# Patient Record
Sex: FEMALE | Race: OTHER | NOT HISPANIC OR LATINO | URBAN - METROPOLITAN AREA
[De-identification: names, ages, dates, MRNs, and addresses within clinical notes are randomized per-mention and may not be internally consistent; named-entity substitution may affect disease eponyms.]

---

## 2018-09-14 ENCOUNTER — INPATIENT (INPATIENT)
Facility: HOSPITAL | Age: 83
LOS: 48 days | Discharge: HOME | DRG: 4 | End: 2018-11-02
Attending: STUDENT IN AN ORGANIZED HEALTH CARE EDUCATION/TRAINING PROGRAM | Admitting: HOSPITALIST
Payer: MEDICARE

## 2018-09-14 VITALS
OXYGEN SATURATION: 92 % | HEART RATE: 86 BPM | TEMPERATURE: 99 F | DIASTOLIC BLOOD PRESSURE: 63 MMHG | SYSTOLIC BLOOD PRESSURE: 200 MMHG | RESPIRATION RATE: 28 BRPM

## 2018-09-14 DIAGNOSIS — Z95.0 PRESENCE OF CARDIAC PACEMAKER: Chronic | ICD-10-CM

## 2018-09-14 DIAGNOSIS — Z95.2 PRESENCE OF PROSTHETIC HEART VALVE: Chronic | ICD-10-CM

## 2018-09-14 DIAGNOSIS — J96.91 RESPIRATORY FAILURE, UNSPECIFIED WITH HYPOXIA: ICD-10-CM

## 2018-09-14 LAB
ALBUMIN SERPL ELPH-MCNC: 3.5 G/DL — SIGNIFICANT CHANGE UP (ref 3.3–5)
ALBUMIN SERPL ELPH-MCNC: 3.8 G/DL — SIGNIFICANT CHANGE UP (ref 3.3–5)
ALBUMIN SERPL ELPH-MCNC: 3.9 G/DL — SIGNIFICANT CHANGE UP (ref 3.3–5)
ALP SERPL-CCNC: 128 U/L — HIGH (ref 40–120)
ALP SERPL-CCNC: 135 U/L — HIGH (ref 40–120)
ALP SERPL-CCNC: 142 U/L — HIGH (ref 40–120)
ALT FLD-CCNC: 56 U/L — HIGH (ref 10–45)
ALT FLD-CCNC: 58 U/L — HIGH (ref 10–45)
ALT FLD-CCNC: 61 U/L — HIGH (ref 10–45)
ANION GAP SERPL CALC-SCNC: 11 MMOL/L — SIGNIFICANT CHANGE UP (ref 5–17)
ANION GAP SERPL CALC-SCNC: 14 MMOL/L — SIGNIFICANT CHANGE UP (ref 5–17)
ANION GAP SERPL CALC-SCNC: 16 MMOL/L — SIGNIFICANT CHANGE UP (ref 5–17)
APPEARANCE UR: CLEAR — SIGNIFICANT CHANGE UP
APTT BLD: 44.2 SEC — HIGH (ref 27.5–37.4)
APTT BLD: 50.5 SEC — HIGH (ref 27.5–37.4)
AST SERPL-CCNC: 63 U/L — HIGH (ref 10–40)
AST SERPL-CCNC: 74 U/L — HIGH (ref 10–40)
AST SERPL-CCNC: 82 U/L — HIGH (ref 10–40)
BACTERIA # UR AUTO: 0 — SIGNIFICANT CHANGE UP
BASE EXCESS BLDV CALC-SCNC: 0.5 MMOL/L — SIGNIFICANT CHANGE UP (ref -2–2)
BASOPHILS # BLD AUTO: 0 K/UL — SIGNIFICANT CHANGE UP (ref 0–0.2)
BASOPHILS NFR BLD AUTO: 0.1 % — SIGNIFICANT CHANGE UP (ref 0–2)
BILIRUB SERPL-MCNC: 0.8 MG/DL — SIGNIFICANT CHANGE UP (ref 0.2–1.2)
BILIRUB SERPL-MCNC: 1 MG/DL — SIGNIFICANT CHANGE UP (ref 0.2–1.2)
BILIRUB SERPL-MCNC: 1.2 MG/DL — SIGNIFICANT CHANGE UP (ref 0.2–1.2)
BILIRUB UR-MCNC: NEGATIVE — SIGNIFICANT CHANGE UP
BUN SERPL-MCNC: 26 MG/DL — HIGH (ref 7–23)
BUN SERPL-MCNC: 30 MG/DL — HIGH (ref 7–23)
BUN SERPL-MCNC: 33 MG/DL — HIGH (ref 7–23)
CALCIUM SERPL-MCNC: 9 MG/DL — SIGNIFICANT CHANGE UP (ref 8.4–10.5)
CALCIUM SERPL-MCNC: 9.2 MG/DL — SIGNIFICANT CHANGE UP (ref 8.4–10.5)
CALCIUM SERPL-MCNC: 9.6 MG/DL — SIGNIFICANT CHANGE UP (ref 8.4–10.5)
CHLORIDE SERPL-SCNC: 97 MMOL/L — SIGNIFICANT CHANGE UP (ref 96–108)
CHLORIDE SERPL-SCNC: 99 MMOL/L — SIGNIFICANT CHANGE UP (ref 96–108)
CHLORIDE SERPL-SCNC: 99 MMOL/L — SIGNIFICANT CHANGE UP (ref 96–108)
CK MB BLD-MCNC: 2.2 % — SIGNIFICANT CHANGE UP (ref 0–3.5)
CK MB CFR SERPL CALC: 3.3 NG/ML — SIGNIFICANT CHANGE UP (ref 0–3.8)
CK SERPL-CCNC: 152 U/L — SIGNIFICANT CHANGE UP (ref 25–170)
CO2 BLDV-SCNC: 29 MMOL/L — SIGNIFICANT CHANGE UP (ref 22–30)
CO2 SERPL-SCNC: 21 MMOL/L — LOW (ref 22–31)
CO2 SERPL-SCNC: 24 MMOL/L — SIGNIFICANT CHANGE UP (ref 22–31)
CO2 SERPL-SCNC: 25 MMOL/L — SIGNIFICANT CHANGE UP (ref 22–31)
COLOR SPEC: SIGNIFICANT CHANGE UP
CREAT SERPL-MCNC: 1.24 MG/DL — SIGNIFICANT CHANGE UP (ref 0.5–1.3)
CREAT SERPL-MCNC: 1.87 MG/DL — HIGH (ref 0.5–1.3)
CREAT SERPL-MCNC: 2.12 MG/DL — HIGH (ref 0.5–1.3)
DIFF PNL FLD: NEGATIVE — SIGNIFICANT CHANGE UP
EOSINOPHIL # BLD AUTO: 0 K/UL — SIGNIFICANT CHANGE UP (ref 0–0.5)
EOSINOPHIL NFR BLD AUTO: 0.2 % — SIGNIFICANT CHANGE UP (ref 0–6)
EPI CELLS # UR: 1 /HPF — SIGNIFICANT CHANGE UP
GAS PNL BLDA: SIGNIFICANT CHANGE UP
GAS PNL BLDA: SIGNIFICANT CHANGE UP
GAS PNL BLDV: SIGNIFICANT CHANGE UP
GAS PNL BLDV: SIGNIFICANT CHANGE UP
GLUCOSE SERPL-MCNC: 139 MG/DL — HIGH (ref 70–99)
GLUCOSE SERPL-MCNC: 168 MG/DL — HIGH (ref 70–99)
GLUCOSE SERPL-MCNC: 205 MG/DL — HIGH (ref 70–99)
GLUCOSE UR QL: NEGATIVE — SIGNIFICANT CHANGE UP
GRAM STN FLD: SIGNIFICANT CHANGE UP
HCO3 BLDV-SCNC: 27 MMOL/L — SIGNIFICANT CHANGE UP (ref 21–29)
HCT VFR BLD CALC: 34.4 % — LOW (ref 34.5–45)
HCT VFR BLD CALC: 37.9 % — SIGNIFICANT CHANGE UP (ref 34.5–45)
HGB BLD-MCNC: 11.7 G/DL — SIGNIFICANT CHANGE UP (ref 11.5–15.5)
HGB BLD-MCNC: 12.2 G/DL — SIGNIFICANT CHANGE UP (ref 11.5–15.5)
HOROWITZ INDEX BLDV+IHG-RTO: 40 — SIGNIFICANT CHANGE UP
HYALINE CASTS # UR AUTO: 1 /LPF — SIGNIFICANT CHANGE UP (ref 0–2)
INR BLD: 2.9 RATIO — HIGH (ref 0.88–1.16)
INR BLD: 4.37 RATIO — HIGH (ref 0.88–1.16)
KETONES UR-MCNC: NEGATIVE — SIGNIFICANT CHANGE UP
LEUKOCYTE ESTERASE UR-ACNC: NEGATIVE — SIGNIFICANT CHANGE UP
LYMPHOCYTES # BLD AUTO: 0.7 K/UL — LOW (ref 1–3.3)
LYMPHOCYTES # BLD AUTO: 4.7 % — LOW (ref 13–44)
MAGNESIUM SERPL-MCNC: 2 MG/DL — SIGNIFICANT CHANGE UP (ref 1.6–2.6)
MAGNESIUM SERPL-MCNC: 2.2 MG/DL — SIGNIFICANT CHANGE UP (ref 1.6–2.6)
MCHC RBC-ENTMCNC: 28.1 PG — SIGNIFICANT CHANGE UP (ref 27–34)
MCHC RBC-ENTMCNC: 29.5 PG — SIGNIFICANT CHANGE UP (ref 27–34)
MCHC RBC-ENTMCNC: 32.3 GM/DL — SIGNIFICANT CHANGE UP (ref 32–36)
MCHC RBC-ENTMCNC: 34 GM/DL — SIGNIFICANT CHANGE UP (ref 32–36)
MCV RBC AUTO: 86.7 FL — SIGNIFICANT CHANGE UP (ref 80–100)
MCV RBC AUTO: 86.8 FL — SIGNIFICANT CHANGE UP (ref 80–100)
MONOCYTES # BLD AUTO: 0.7 K/UL — SIGNIFICANT CHANGE UP (ref 0–0.9)
MONOCYTES NFR BLD AUTO: 4.7 % — SIGNIFICANT CHANGE UP (ref 2–14)
NEUTROPHILS # BLD AUTO: 13.1 K/UL — HIGH (ref 1.8–7.4)
NEUTROPHILS NFR BLD AUTO: 90.3 % — HIGH (ref 43–77)
NITRITE UR-MCNC: NEGATIVE — SIGNIFICANT CHANGE UP
NT-PROBNP SERPL-SCNC: 3467 PG/ML — HIGH (ref 0–300)
PCO2 BLDV: 54 MMHG — HIGH (ref 35–50)
PH BLDV: 7.32 — LOW (ref 7.35–7.45)
PH UR: 6 — SIGNIFICANT CHANGE UP (ref 5–8)
PHOSPHATE SERPL-MCNC: 3.7 MG/DL — SIGNIFICANT CHANGE UP (ref 2.5–4.5)
PHOSPHATE SERPL-MCNC: 3.9 MG/DL — SIGNIFICANT CHANGE UP (ref 2.5–4.5)
PLATELET # BLD AUTO: 174 K/UL — SIGNIFICANT CHANGE UP (ref 150–400)
PLATELET # BLD AUTO: 180 K/UL — SIGNIFICANT CHANGE UP (ref 150–400)
PO2 BLDV: 38 MMHG — SIGNIFICANT CHANGE UP (ref 25–45)
POTASSIUM SERPL-MCNC: 4.9 MMOL/L — SIGNIFICANT CHANGE UP (ref 3.5–5.3)
POTASSIUM SERPL-MCNC: 4.9 MMOL/L — SIGNIFICANT CHANGE UP (ref 3.5–5.3)
POTASSIUM SERPL-MCNC: 5.7 MMOL/L — HIGH (ref 3.5–5.3)
POTASSIUM SERPL-SCNC: 4.9 MMOL/L — SIGNIFICANT CHANGE UP (ref 3.5–5.3)
POTASSIUM SERPL-SCNC: 4.9 MMOL/L — SIGNIFICANT CHANGE UP (ref 3.5–5.3)
POTASSIUM SERPL-SCNC: 5.7 MMOL/L — HIGH (ref 3.5–5.3)
PROT SERPL-MCNC: 6.9 G/DL — SIGNIFICANT CHANGE UP (ref 6–8.3)
PROT SERPL-MCNC: 7 G/DL — SIGNIFICANT CHANGE UP (ref 6–8.3)
PROT SERPL-MCNC: 7.9 G/DL — SIGNIFICANT CHANGE UP (ref 6–8.3)
PROT UR-MCNC: ABNORMAL
PROTHROM AB SERPL-ACNC: 32 SEC — HIGH (ref 9.8–12.7)
PROTHROM AB SERPL-ACNC: 49.1 SEC — HIGH (ref 9.8–12.7)
RAPID RVP RESULT: DETECTED
RBC # BLD: 3.97 M/UL — SIGNIFICANT CHANGE UP (ref 3.8–5.2)
RBC # BLD: 4.36 M/UL — SIGNIFICANT CHANGE UP (ref 3.8–5.2)
RBC # FLD: 15.2 % — HIGH (ref 10.3–14.5)
RBC # FLD: 15.6 % — HIGH (ref 10.3–14.5)
RBC CASTS # UR COMP ASSIST: 9 /HPF — HIGH (ref 0–4)
RV+EV RNA SPEC QL NAA+PROBE: DETECTED
SAO2 % BLDV: 74 % — SIGNIFICANT CHANGE UP (ref 67–88)
SODIUM SERPL-SCNC: 134 MMOL/L — LOW (ref 135–145)
SODIUM SERPL-SCNC: 134 MMOL/L — LOW (ref 135–145)
SODIUM SERPL-SCNC: 138 MMOL/L — SIGNIFICANT CHANGE UP (ref 135–145)
SP GR SPEC: 1.01 — SIGNIFICANT CHANGE UP (ref 1.01–1.02)
SPECIMEN SOURCE: SIGNIFICANT CHANGE UP
TROPONIN T, HIGH SENSITIVITY RESULT: 166 NG/L — HIGH (ref 0–51)
TROPONIN T, HIGH SENSITIVITY RESULT: 21 NG/L — SIGNIFICANT CHANGE UP (ref 0–51)
TROPONIN T, HIGH SENSITIVITY RESULT: 254 NG/L — HIGH (ref 0–51)
TROPONIN T, HIGH SENSITIVITY RESULT: 261 NG/L — HIGH (ref 0–51)
TROPONIN T, HIGH SENSITIVITY RESULT: 52 NG/L — HIGH (ref 0–51)
UROBILINOGEN FLD QL: NEGATIVE — SIGNIFICANT CHANGE UP
WBC # BLD: 13 K/UL — HIGH (ref 3.8–10.5)
WBC # BLD: 14.5 K/UL — HIGH (ref 3.8–10.5)
WBC # FLD AUTO: 13 K/UL — HIGH (ref 3.8–10.5)
WBC # FLD AUTO: 14.5 K/UL — HIGH (ref 3.8–10.5)
WBC UR QL: 0 /HPF — SIGNIFICANT CHANGE UP (ref 0–5)

## 2018-09-14 PROCEDURE — 99291 CRITICAL CARE FIRST HOUR: CPT | Mod: 25

## 2018-09-14 PROCEDURE — 99291 CRITICAL CARE FIRST HOUR: CPT | Mod: 25,GC

## 2018-09-14 PROCEDURE — 71045 X-RAY EXAM CHEST 1 VIEW: CPT | Mod: 26,77

## 2018-09-14 PROCEDURE — 31500 INSERT EMERGENCY AIRWAY: CPT | Mod: GC

## 2018-09-14 PROCEDURE — 93306 TTE W/DOPPLER COMPLETE: CPT | Mod: 26

## 2018-09-14 PROCEDURE — 36556 INSERT NON-TUNNEL CV CATH: CPT | Mod: GC

## 2018-09-14 PROCEDURE — 93010 ELECTROCARDIOGRAM REPORT: CPT | Mod: 59

## 2018-09-14 PROCEDURE — 71045 X-RAY EXAM CHEST 1 VIEW: CPT | Mod: 26,77,59

## 2018-09-14 PROCEDURE — 71045 X-RAY EXAM CHEST 1 VIEW: CPT | Mod: 26

## 2018-09-14 RX ORDER — CEFEPIME 1 G/1
1000 INJECTION, POWDER, FOR SOLUTION INTRAMUSCULAR; INTRAVENOUS DAILY
Qty: 0 | Refills: 0 | Status: DISCONTINUED | OUTPATIENT
Start: 2018-09-15 | End: 2018-09-17

## 2018-09-14 RX ORDER — PROPOFOL 10 MG/ML
5 INJECTION, EMULSION INTRAVENOUS
Qty: 500 | Refills: 0 | Status: DISCONTINUED | OUTPATIENT
Start: 2018-09-14 | End: 2018-09-16

## 2018-09-14 RX ORDER — SUCCINYLCHOLINE CHLORIDE 100 MG/5ML
80 SYRINGE (ML) INTRAVENOUS ONCE
Qty: 0 | Refills: 0 | Status: COMPLETED | OUTPATIENT
Start: 2018-09-14 | End: 2018-09-14

## 2018-09-14 RX ORDER — CEFEPIME 1 G/1
1000 INJECTION, POWDER, FOR SOLUTION INTRAMUSCULAR; INTRAVENOUS ONCE
Qty: 0 | Refills: 0 | Status: COMPLETED | OUTPATIENT
Start: 2018-09-14 | End: 2018-09-14

## 2018-09-14 RX ORDER — VANCOMYCIN HCL 1 G
500 VIAL (EA) INTRAVENOUS EVERY 12 HOURS
Qty: 0 | Refills: 0 | Status: DISCONTINUED | OUTPATIENT
Start: 2018-09-14 | End: 2018-09-15

## 2018-09-14 RX ORDER — CEFEPIME 1 G/1
INJECTION, POWDER, FOR SOLUTION INTRAMUSCULAR; INTRAVENOUS
Qty: 0 | Refills: 0 | Status: DISCONTINUED | OUTPATIENT
Start: 2018-09-14 | End: 2018-09-17

## 2018-09-14 RX ORDER — ASPIRIN/CALCIUM CARB/MAGNESIUM 324 MG
325 TABLET ORAL ONCE
Qty: 0 | Refills: 0 | Status: COMPLETED | OUTPATIENT
Start: 2018-09-14 | End: 2018-09-14

## 2018-09-14 RX ORDER — AZITHROMYCIN 500 MG/1
500 TABLET, FILM COATED ORAL ONCE
Qty: 0 | Refills: 0 | Status: COMPLETED | OUTPATIENT
Start: 2018-09-14 | End: 2018-09-14

## 2018-09-14 RX ORDER — MIDAZOLAM HYDROCHLORIDE 1 MG/ML
2 INJECTION, SOLUTION INTRAMUSCULAR; INTRAVENOUS ONCE
Qty: 0 | Refills: 0 | Status: DISCONTINUED | OUTPATIENT
Start: 2018-09-14 | End: 2018-09-14

## 2018-09-14 RX ORDER — PIPERACILLIN AND TAZOBACTAM 4; .5 G/20ML; G/20ML
3.38 INJECTION, POWDER, LYOPHILIZED, FOR SOLUTION INTRAVENOUS EVERY 8 HOURS
Qty: 0 | Refills: 0 | Status: DISCONTINUED | OUTPATIENT
Start: 2018-09-14 | End: 2018-09-14

## 2018-09-14 RX ORDER — AZITHROMYCIN 500 MG/1
TABLET, FILM COATED ORAL
Qty: 0 | Refills: 0 | Status: DISCONTINUED | OUTPATIENT
Start: 2018-09-14 | End: 2018-09-16

## 2018-09-14 RX ORDER — ACETAMINOPHEN 500 MG
650 TABLET ORAL ONCE
Qty: 0 | Refills: 0 | Status: DISCONTINUED | OUTPATIENT
Start: 2018-09-14 | End: 2018-09-18

## 2018-09-14 RX ORDER — ASPIRIN/CALCIUM CARB/MAGNESIUM 324 MG
81 TABLET ORAL DAILY
Qty: 0 | Refills: 0 | Status: DISCONTINUED | OUTPATIENT
Start: 2018-09-15 | End: 2018-10-03

## 2018-09-14 RX ORDER — IPRATROPIUM/ALBUTEROL SULFATE 18-103MCG
3 AEROSOL WITH ADAPTER (GRAM) INHALATION EVERY 4 HOURS
Qty: 0 | Refills: 0 | Status: DISCONTINUED | OUTPATIENT
Start: 2018-09-14 | End: 2018-09-16

## 2018-09-14 RX ORDER — ETOMIDATE 2 MG/ML
20 INJECTION INTRAVENOUS ONCE
Qty: 0 | Refills: 0 | Status: COMPLETED | OUTPATIENT
Start: 2018-09-14 | End: 2018-09-14

## 2018-09-14 RX ORDER — AZITHROMYCIN 500 MG/1
500 TABLET, FILM COATED ORAL EVERY 24 HOURS
Qty: 0 | Refills: 0 | Status: DISCONTINUED | OUTPATIENT
Start: 2018-09-15 | End: 2018-09-16

## 2018-09-14 RX ORDER — FUROSEMIDE 40 MG
40 TABLET ORAL ONCE
Qty: 0 | Refills: 0 | Status: COMPLETED | OUTPATIENT
Start: 2018-09-14 | End: 2018-09-14

## 2018-09-14 RX ORDER — CLOPIDOGREL BISULFATE 75 MG/1
75 TABLET, FILM COATED ORAL DAILY
Qty: 0 | Refills: 0 | Status: DISCONTINUED | OUTPATIENT
Start: 2018-09-15 | End: 2018-09-16

## 2018-09-14 RX ORDER — ONDANSETRON 8 MG/1
4 TABLET, FILM COATED ORAL ONCE
Qty: 0 | Refills: 0 | Status: COMPLETED | OUTPATIENT
Start: 2018-09-14 | End: 2018-09-14

## 2018-09-14 RX ORDER — CLOPIDOGREL BISULFATE 75 MG/1
300 TABLET, FILM COATED ORAL ONCE
Qty: 0 | Refills: 0 | Status: COMPLETED | OUTPATIENT
Start: 2018-09-14 | End: 2018-09-14

## 2018-09-14 RX ORDER — NOREPINEPHRINE BITARTRATE/D5W 8 MG/250ML
0.05 PLASTIC BAG, INJECTION (ML) INTRAVENOUS
Qty: 8 | Refills: 0 | Status: DISCONTINUED | OUTPATIENT
Start: 2018-09-14 | End: 2018-09-16

## 2018-09-14 RX ORDER — ACETAMINOPHEN 500 MG
1000 TABLET ORAL ONCE
Qty: 0 | Refills: 0 | Status: COMPLETED | OUTPATIENT
Start: 2018-09-14 | End: 2018-09-14

## 2018-09-14 RX ORDER — CEFTRIAXONE 500 MG/1
1 INJECTION, POWDER, FOR SOLUTION INTRAMUSCULAR; INTRAVENOUS ONCE
Qty: 0 | Refills: 0 | Status: COMPLETED | OUTPATIENT
Start: 2018-09-14 | End: 2018-09-14

## 2018-09-14 RX ORDER — PANTOPRAZOLE SODIUM 20 MG/1
40 TABLET, DELAYED RELEASE ORAL DAILY
Qty: 0 | Refills: 0 | Status: DISCONTINUED | OUTPATIENT
Start: 2018-09-14 | End: 2018-09-26

## 2018-09-14 RX ORDER — VANCOMYCIN HCL 1 G
1000 VIAL (EA) INTRAVENOUS EVERY 12 HOURS
Qty: 0 | Refills: 0 | Status: DISCONTINUED | OUTPATIENT
Start: 2018-09-14 | End: 2018-09-14

## 2018-09-14 RX ADMIN — AZITHROMYCIN 250 MILLIGRAM(S): 500 TABLET, FILM COATED ORAL at 07:48

## 2018-09-14 RX ADMIN — ONDANSETRON 4 MILLIGRAM(S): 8 TABLET, FILM COATED ORAL at 06:40

## 2018-09-14 RX ADMIN — CEFTRIAXONE 1 GRAM(S): 500 INJECTION, POWDER, FOR SOLUTION INTRAMUSCULAR; INTRAVENOUS at 07:42

## 2018-09-14 RX ADMIN — Medication 1000 MILLIGRAM(S): at 12:50

## 2018-09-14 RX ADMIN — CEFEPIME 100 MILLIGRAM(S): 1 INJECTION, POWDER, FOR SOLUTION INTRAMUSCULAR; INTRAVENOUS at 15:34

## 2018-09-14 RX ADMIN — PANTOPRAZOLE SODIUM 40 MILLIGRAM(S): 20 TABLET, DELAYED RELEASE ORAL at 14:47

## 2018-09-14 RX ADMIN — ETOMIDATE 20 MILLIGRAM(S): 2 INJECTION INTRAVENOUS at 09:35

## 2018-09-14 RX ADMIN — Medication 100 MILLIGRAM(S): at 14:23

## 2018-09-14 RX ADMIN — Medication 325 MILLIGRAM(S): at 14:47

## 2018-09-14 RX ADMIN — Medication 80 MILLIGRAM(S): at 09:35

## 2018-09-14 RX ADMIN — Medication 3 MILLILITER(S): at 08:36

## 2018-09-14 RX ADMIN — CEFTRIAXONE 100 GRAM(S): 500 INJECTION, POWDER, FOR SOLUTION INTRAMUSCULAR; INTRAVENOUS at 06:39

## 2018-09-14 RX ADMIN — MIDAZOLAM HYDROCHLORIDE 2 MILLIGRAM(S): 1 INJECTION, SOLUTION INTRAMUSCULAR; INTRAVENOUS at 11:45

## 2018-09-14 RX ADMIN — Medication 3 MILLILITER(S): at 21:07

## 2018-09-14 RX ADMIN — Medication 400 MILLIGRAM(S): at 11:30

## 2018-09-14 RX ADMIN — Medication 3 MILLILITER(S): at 14:04

## 2018-09-14 RX ADMIN — Medication 3 MILLILITER(S): at 17:36

## 2018-09-14 RX ADMIN — CLOPIDOGREL BISULFATE 300 MILLIGRAM(S): 75 TABLET, FILM COATED ORAL at 14:47

## 2018-09-14 RX ADMIN — Medication 40 MILLIGRAM(S): at 06:39

## 2018-09-14 RX ADMIN — PROPOFOL 1.8 MICROGRAM(S)/KG/MIN: 10 INJECTION, EMULSION INTRAVENOUS at 10:33

## 2018-09-14 NOTE — PROCEDURE NOTE - PROCEDURE
<<-----Click on this checkbox to enter Procedure Central line placement  09/14/2018    Active  [id number]

## 2018-09-14 NOTE — ED PROVIDER NOTE - PMH
Aortic stenosis, moderate    Chronic atrial fibrillation    Chronic obstructive pulmonary disease, unspecified COPD type    CKD (chronic kidney disease) stage 3, GFR 30-59 ml/min    Heart failure with preserved ejection fraction    Rheumatic heart disease    Sick sinus syndrome

## 2018-09-14 NOTE — ED PROVIDER NOTE - NS ED MD EKG STATUS PRIOR 1
Newly inverted T waves om V4-V6. II, III, aVF, elevated trop known by MICU.  MICU called to make aware of EKG changes

## 2018-09-14 NOTE — ED PROVIDER NOTE - CRITICAL CARE PROVIDED
consultation with other physicians/conducted a detailed discussion of DNR status/consult w/ pt's family directly relating to pts condition/direct patient care (not related to procedure)/documentation

## 2018-09-14 NOTE — ED ADULT NURSE NOTE - ED STAT RN HANDOFF DETAILS
Bedside report given to on coming nurse Gudelia Trevino RN. Understands pmh, medications given and plan of care for patient. Patient in stable condition, vital signs updated, has no complaints at this time and has been updated on care plan. Explained to patient that it is change of shift and new nurse is taking over, pt verbalized understanding.

## 2018-09-14 NOTE — ED ADULT NURSE REASSESSMENT NOTE - NS ED NURSE REASSESS COMMENT FT1
Pt is tachypneic and breathing in tripod position. Spo2 >95% on 2L NC. MD Garcia aware. Respiratory contacted by MD Garcia to put pt back on bipap. Pt started on Duoneb treatment per MD Garcia request.

## 2018-09-14 NOTE — H&P ADULT - NSHPLABSRESULTS_GEN_ALL_CORE
12.2   14.5  )-----------( 174      ( 14 Sep 2018 06:26 )             37.9     09-14    138  |  99  |  26<H>  ----------------------------<  205<H>  5.7<H>   |  25  |  1.24    Ca    9.6      14 Sep 2018 06:26    TPro  7.9  /  Alb  3.9  /  TBili  0.8  /  DBili  x   /  AST  82<H>  /  ALT  61<H>  /  AlkPhos  142<H>  09-14    PT/INR - ( 14 Sep 2018 07:12 )   PT: 32.0 sec;   INR: 2.90 ratio      PTT - ( 14 Sep 2018 07:12 )  PTT:44.2 sec    < from: Xray Chest 1 View- PORTABLE-Urgent (09.14.18 @ 06:28) >    IMPRESSION:   Bilateral central opacities may represent pulmonary edema, however   superimposed infection cannot be ruled out.    < end of copied text >    EKG: V-paced, rate 64, TWI in V4-V6, II, III    All labs, imaging, and EKG personally reviewed by me.

## 2018-09-14 NOTE — H&P ADULT - NSHPPHYSICALEXAM_GEN_ALL_CORE
Vital Signs Last 24 Hrs  T(C): 37.1 (14 Sep 2018 05:15), Max: 37.1 (14 Sep 2018 05:15)  T(F): 98.7 (14 Sep 2018 05:15), Max: 98.7 (14 Sep 2018 05:15)  HR: 62 (14 Sep 2018 10:53) (62 - 86)  BP: 134/40 (14 Sep 2018 10:53) (123/88 - 200/63)  BP(mean): --  RR: 30 (14 Sep 2018 10:53) (28 - 40)  SpO2: 100% (14 Sep 2018 10:53) (92% - 100%)    PHYSICAL EXAM:   GENERAL: intubated and sedated  HEENT: NC/AT, EOMI, neck supple  RESPIRATORY: coarse BS w/ intermittent wheezes and rhonchi  CARDIOVASCULAR: regular rate, irregularly irregular rhythm, +2/6 systolic murmur LUSB  ABDOMINAL: soft, non-tender, non-distended, positive bowel sounds   EXTREMITIES: no clubbing, cyanosis, or edema  NEUROLOGICAL: sedated  SKIN: no rashes or lesions   MUSCULOSKELETAL: no gross joint deformity

## 2018-09-14 NOTE — CONSULT NOTE ADULT - ATTENDING COMMENTS
Patient intubated, on pressors.  INR uptrending.    Patient with + blood cultures.    Appreciate ICU care - continue with aggressive supportive care.  Would Repeat INR later today, if continues to uptrend, consider low dose PO vitamin K.  Echo with aortic stenosis and increased gradients through mitral valve and diastolic dysfunction.  Cautious monitoring of fluid status.   Diurese as needed to keep output more than input.    Will follow with you    Thanks    ÁNGEL Denny [id number]

## 2018-09-14 NOTE — CONSULT NOTE ADULT - SUBJECTIVE AND OBJECTIVE BOX
Patient seen and evaluated at bedside    Chief Complaint:    HPI:  This is an 85 y/o F w/ a PMH significant for COPD on home O2 (though has not used in past few months), JENNIE on BiPAP, rheumatic heart disease s/p mitral valve replacement, HFpEF, aortic stenosis, A-fib on coumadin, sick sinus syndrome s/p pacemaker placement, HTN, and CKD3 who presented to the ED w/ dyspnea x2 days. She had a known sick contact at home w/ a URI. Per family, she did not have any fevers/chills, chest pain, or dizziness/lightheadedness. Patient was initially placed on BiPAP in the ED, but she was persistently tachypneic and tripoding, and was subsequently intubated for work of breathing. She was admitted to the MICU. Cardiac enzymes are up trending, so cardiology was consulted for NSTEMI.       PMHx:   Aortic stenosis, moderate  Heart failure with preserved ejection fraction  Rheumatic heart disease  Chronic obstructive pulmonary disease, unspecified COPD type  CKD (chronic kidney disease) stage 3, GFR 30-59 ml/min  Chronic atrial fibrillation  Sick sinus syndrome      PSHx:   Cardiac pacemaker recipient  H/O mitral valve replacement      Allergies:  penicillin (Rash)      Home Meds:  · 	Advair Diskus 500 mcg-50 mcg inhalation powder: 1 puff(s) inhaled 2 times a day, Last Dose Taken:    · 	albuterol 2.5 mg/3 mL (0.083%) inhalation solution: 3 milliliter(s) inhaled every 6 hours, Last Dose Taken:    · 	aspirin 81 mg oral delayed release tablet: 1 tab(s) orally once a day, Last Dose Taken:    · 	Lipitor 20 mg oral tablet: 1 tab(s) orally once a day, Last Dose Taken:    · 	Cardizem  mg/24 hours oral capsule, extended release: 1 cap(s) orally once a day, Last Dose Taken:    · 	Colcrys 0.6 mg oral tablet: 1 tab(s) orally once a day, Last Dose Taken:    · 	latanoprost 0.005% ophthalmic solution: 1 drop(s) to each affected eye once a day (in the evening), Last Dose Taken:    · 	omeprazole 20 mg oral delayed release capsule: 1 cap(s) orally once a day  · 	warfarin 5 mg oral tablet: 1 tab(s) orally once a day  · 	torsemide 20 mg oral tablet: 2 tab(s) orally once a day  · 	sotalol 120 mg oral tablet: 1 tab(s) orally once a day          · 	Spiriva 18 mcg inhalation capsule: 1 cap(s) inhaled once a day    Current Medications:   acetaminophen   Tablet .. 650 milliGRAM(s) Oral once PRN  ALBUTerol/ipratropium for Nebulization 3 milliLiter(s) Nebulizer every 4 hours  azithromycin  IVPB      azithromycin  IVPB 500 milliGRAM(s) IV Intermittent once  cefepime   IVPB      norepinephrine Infusion 0.05 MICROgram(s)/kG/Min IV Continuous <Continuous>  pantoprazole  Injectable 40 milliGRAM(s) IV Push daily  propofol Infusion 5 MICROgram(s)/kG/Min IV Continuous <Continuous>  vancomycin  IVPB 500 milliGRAM(s) IV Intermittent every 12 hours      FAMILY HISTORY:  No pertinent family history in first degree relatives      Social History: does ADLs  Smoking History: None  Alcohol Use: None  Drug Use: None    REVIEW OF SYSTEMS:  Constitutional:     [ ] negative [ ] fevers [ ] chills [ ] weight loss [ ] weight gain  HEENT:                  [ ] negative [ ] dry eyes [ ] eye irritation [ ] postnasal drip [ ] nasal congestion  CV:                         [ ] negative  [ ] chest pain [ ] orthopnea [ ] palpitations [ ] murmur  Resp:                     [ ] negative [ ] cough [ ] shortness of breath [ ] dyspnea [ ] wheezing [ ] sputum [ ]hemoptysis  GI:                          [ ] negative [ ] nausea [ ] vomiting [ ] diarrhea [ ] constipation [ ] abd pain [ ] dysphagia   :                        [ ] negative [ ] dysuria [ ] nocturia [ ] hematuria [ ] increased urinary frequency  Musculoskeletal: [ ] negative [ ] back pain [ ] myalgias [ ] arthralgias [ ] fracture  Skin:                       [ ] negative [ ] rash [ ] itch  Neurological:        [ ] negative [ ] headache [ ] dizziness [ ] syncope [ ] weakness [ ] numbness  Psychiatric:           [ ] negative [ ] anxiety [ ] depression  Endocrine:            [ ] negative [ ] diabetes [ ] thyroid problem  Heme/Lymph:      [ ] negative [ ] anemia [ ] bleeding problem  Allergic/Immune: [ ] negative [ ] itchy eyes [ ] nasal discharge [ ] hives [ ] angioedema    [ ] All other systems negative  [x ] Unable to assess ROS because pt intubated and sedated.       Physical Exam:  T(F): 101 (09-14), Max: 103.3 (09-14)  HR: 59 (09-14) (59 - 86)  BP: 116/56 (09-14) (80/38 - 200/63)  RR: 28 (09-14)  SpO2: 100% (09-14)  GENERAL: No acute distress, well-developed  HEAD:  Atraumatic, Normocephalic  ENT: EOMI, PERRLA, conjunctiva and sclera clear, Neck supple, No JVD, moist mucosa  CHEST/LUNG:b/l ronchi.   BACK: No spinal tenderness  HEART: Regular rate and rhythm; No murmurs, rubs, or gallops  ABDOMEN: Soft, Nontender, Nondistended; Bowel sounds present  EXTREMITIES:  No clubbing, cyanosis, or edema  PSYCH: Nl behavior, nl affect  NEUROLOGY: AAOx3, non-focal, cranial nerves intact  SKIN: Normal color, No rashes or lesions  LINES:    Cardiovascular Diagnostic Testing:    ECG: Personally reviewed:  paced  Echo: Personally reviewed:    CXR: Personally reviewed    Labs: Personally reviewed                        11.7   13.0  )-----------( 180      ( 14 Sep 2018 12:13 )             34.4     09-14    134<L>  |  99  |  30<H>  ----------------------------<  168<H>  4.9   |  24  |  1.87<H>    Ca    9.2      14 Sep 2018 12:13  Phos  3.9     09-14  Mg     2.2     09-14    TPro  7.0  /  Alb  3.8  /  TBili  1.2  /  DBili  x   /  AST  74<H>  /  ALT  58<H>  /  AlkPhos  135<H>  09-14    PT/INR - ( 14 Sep 2018 07:12 )   PT: 32.0 sec;   INR: 2.90 ratio         PTT - ( 14 Sep 2018 07:12 )  PTT:44.2 sec  Serum Pro-Brain Natriuretic Peptide: 3467 pg/mL (09-14 @ 06:26)

## 2018-09-14 NOTE — ED PROVIDER NOTE - CARE PLAN
Principal Discharge DX:	Respiratory failure with hypoxia  Secondary Diagnosis:	CHF exacerbation Principal Discharge DX:	Respiratory failure with hypoxia  Secondary Diagnosis:	CHF exacerbation  Secondary Diagnosis:	Respiratory distress

## 2018-09-14 NOTE — ED PROVIDER NOTE - PHYSICAL EXAMINATION
GENERAL: tachypneic; able to speak in full sentences.   HEAD:  Atraumatic, Normocephalic  EYES: PERRLA,   ENT: MMM; oropharynx clear  NECK: Supple, +JVD  CHEST/LUNG: Coarse breath sounds throughout w/rhonchi & rales. No wheezing.   HEART: Regular rate and rhythm; No murmurs, rubs, or gallops  ABDOMEN: Soft, Nontender, Nondistended; Bowel sounds present  EXTREMITIES:  2+ Peripheral Pulses, 1+ pitting LE edema   PSYCH: AAOx3  NEUROLOGY: no focal motor or sensory deficits. 5/5 muscle strength in all extremities.   SKIN: No rashes or lesions

## 2018-09-14 NOTE — H&P ADULT - ATTENDING COMMENTS
Patient seen and examined. Admitted to MICU after being intubated for increased work of breathing and respiratory distress in the ED. She is visiting from [place] and has a history of rheumatic heart disease with MV repair, Afib on AC, HFpEF, COPD on supplemental oxygen with prior lung infections of Aspergillus and ADELA - but unclear if these were ever treated who presents to [facility] with dyspnea and cough. She is a nonsmoker, denied prior fevers or chills, but did have a sick contact. She was initially on BIPAP but continued to have increased work of breathing and therefore was intubated.     Upon arrival to the MICU she was noted to be febrile to 103 and was hypotensive with MAP 50s while on sedation. She has been having pink, frothy sputum and has diffuse B-lines on bedside echo. Her bedside echo is also notable for an abnormal MV and abnormal TV movement.    -Acute Hypoxemic Respiratory Failure - intubated continue mechanical ventilation. Check ABG. I suspect her findings are due to decompensated heart failure and volume overload/pulmonary edema in the setting of uncontrolled HTN. Check sputum culture. Would suggest bronchoscopy to evaluate further given prior Aspergillus and ADELA but family has deferred.  -ID - Patient febrile to 103 in MICU. Check cultures - sputum, blood, urine. RVP negative. Her MV looks abnormal on bedside echo with concern for possible vegetation. Broad spectrum antibiotics for now.  -Cardiovascular - check official echo. Patient presently in shock likely distributive. Continue pressors to maintain MAP > 65. Was previously hypertensive in ED but received medications which may now be causing present hypotension in addition to sepsis and propofol. Repeat troponin as it is increasing. Patient also with history of SSS with PPM. Will need to interrogate device.  -Renal function - monitor urine output. Patient was on Torsemide at home. Will monitor BP and urine output. Will likely require further diuresis.    Admit to MICU for further care.  Critical Care Time 55 minutes

## 2018-09-14 NOTE — H&P ADULT - ASSESSMENT
85 y/o F w/ a PMH significant for COPD on home O2 (though has not used in past few months), JENNIE on BiPAP, rheumatic heart disease s/p mitral valve replacement, HFpEF, aortic stenosis, A-fib on coumadin, sick sinus syndrome s/p pacemaker placement, HTN, and CKD3 who presented to the ED w/ dyspnea x2 days. Admitted w/ COPD exacerbation, ADHF, and +entero/rhinovirus.     #Neuro  - sedated on propofol    #CV  - BP was significantly elevated upon admission w/   - +troponin and new TWI in multiple leads  - this is all most likely in the setting of hypertensive urgency, flash pulmonary edema, and demand ischemia  - patient takes torsemide 40 mg daily, would hold for now as patient became hypotensive on propofol upon arriving to the MICU    #Resp  - intubated on volume control: FiO2 40%, PEEP 5, RR 16, Vt 400 cc  - CXR w/ pulmonary edema  - will obtain POCUS once in MICU    #Renal  - patient appears to have minimal LATANYA, most likely in the setting of sepsis 2/2 +URI and hypertensive urgency    #ID  - +entero/rhinovirus  - patient spiked fever to 103 upon arrival to MICU  - would c/w ceftriaxone/azithro at this time for possible superimposed bacterial infection  - f/u blood and urine cx    Will admit to MICU for further care.    Elian Cárdenas MD  PGY-2, Internal Medicine  Pager# [phone] 83 y/o F w/ a PMH significant for COPD on home O2 (though has not used in past few months), JENNIE on BiPAP, rheumatic heart disease s/p mitral valve replacement, HFpEF, aortic stenosis, A-fib on coumadin, sick sinus syndrome s/p pacemaker placement, HTN, and CKD3 who presented to the ED w/ dyspnea x2 days. Admitted w/ COPD exacerbation, ADHF, and +entero/rhinovirus.     #Neuro  - sedated on propofol    #CV  - BP was significantly elevated upon admission w/   - +troponin and new TWI in multiple leads  - this is all most likely in the setting of hypertensive urgency, flash pulmonary edema, and demand ischemia  - patient takes torsemide 40 mg daily, would hold for now as patient became hypotensive on propofol upon arriving to the MICU  - f/u TTE    #Resp  - intubated on volume control: FiO2 40%, PEEP 5, RR 16, Vt 400 cc  - CXR w/ pulmonary edema  - will obtain POCUS once in MICU    #Renal  - patient appears to have minimal LATANYA, most likely in the setting of sepsis 2/2 +URI and hypertensive urgency    #ID  - +entero/rhinovirus  - patient spiked fever to 103 upon arrival to MICU  - would c/w vanc/zosyn at this time for possible superimposed bacterial infection  - f/u blood and urine cx    Will admit to MICU for further care.    Elian Cárdenas MD  PGY-2, Internal Medicine  Pager# [phone]

## 2018-09-14 NOTE — ED ADULT NURSE REASSESSMENT NOTE - NS ED NURSE REASSESS COMMENT FT1
MD Garcia made decision with family to intubate patient at 0920. Pt VSS pre intubation -SEE ED ADULT VS FLOW SHEET.   0935- succinylcholine 80mg pushed, 20mg etomidate pushed for RSI  0936- VSS and patient sedated appropriately -SEE ED ADULT VS FLOW SHEET.   0937- First intubation attempt failed (7.0 tube) by MD Veronica, no color change on co2 monitor and air heard in abd  0938- Tube removed by MD Veronica  0940- Second intubation attempt by MD Veronica successful with 7.0 ET tube, 22 lip line, confirmed with color change on Co2 monitor and isabela breath sounds heard by MD Garcia. Tube secured by Renae from respiratory therapy. Vent settings- 400 TV, 40% fio2, 5 PEEP, 16 RR  0955- Propofol drip started at 5/mcg/kg/min for estimated weight of 60kg  1000- OGT placed by MD Veronica, air push heard in stomach to confirm placement  1003- MD Varela, MD Cárdenas at [facility] from MICU, patient accepted, bed is ready MORIAH More to call report.

## 2018-09-14 NOTE — ED ADULT NURSE NOTE - OBJECTIVE STATEMENT
85 YO female with PMH COPD on BiPAP at night, Rheumatic heart disease sp MVR, aortic stenosis, HF, sick sinus syndrome sp PPM, atrial fibrillation on coumadin, DM diet controlled, HLD, & CKD III, via walk in presenting with complaints of shortness of breath and cough. Pt and family at bedside report that over the last two days, pt has been experiencing shortness of breath and cough, sick contact earlier in the week. Pt denies chest pain, visual disturbances, numbness/tingling, fever, chills, diaphoresis, headache, nausea, vomiting, constipation, diarrhea, or urinary symptoms.   Pt Axox4, gross neuro intact, PERRL 3 mm. Rhonchi auscultated bilaterally. S1S2 heard. Abdomen soft, non-tender, non-distended. Skin warm, dry, and intact. Safety and comfort measures maintained. family present at bedside.

## 2018-09-14 NOTE — ED PROVIDER NOTE - ATTENDING CONTRIBUTION TO CARE
84 yof pmhx copd, ronak w bipap qhs, intermittent home 02 use prn as per family, rheumatic heart dz, mitral valve replacement, hf w pEF, ckd, aortic stenosis, a fib on coumadin, sick sinus syndrome s/p pacer, htn, lives in [facility] and receives most of her care at [place] - presents w sob/ breaux x 2 days. states one of her family members at home is sick w uri sxs. family at bedside are both physicians - deny any fever or chills. + cough which is making her unable to sleep. no headache, no cp. no signif swelling to legs. states difficulty lying flat due to sob.     ROS:   constitutional - no fever, no chills  eyes - no visual changes, no redness  eent - no sore throat, no nasal congestion  cvs - no chest pain, no leg swelling  resp - +shortness of breath, + cough  gi - no abdominal pain, no vomiting, no diarrhea  gu - no dysuria, no hematuria  msk - no acute back pain, no joint swelling  skin - no rashes, no jaundice  neuro - no headache, no focal weakness  psych - no acute mental health issue     Physical Exam:   constitutional - ill appearing, awake and alert, oriented x3  head - no external evidence of trauma  cvs -rrr , no murmurs, 1+ peripheral edema, jvd bilat  resp - coarse rhonchi bilat  gi - abdomen soft and nontender, no rigidity, guarding or rebound, bowel sounds present  msk - moving all extremities spontaneously  neuro - alert and oriented x3, no focal deficits, CNs 2-12 grossly intact  skin- no jaundice, warm and dry  psych - mood and affect wnl, no apparent risk to self or others     ? CAP (pt w recent admission at hospital near her home few days greater than 4 mo) vs viral syndrome vs chf exac/APE vs COPD - likely a component of all of the above.   pt afebrile, however cxr concerning for poss infiltrates vs pulm effusion - will start abx.   given nebs for copd and started on bipap for incr wob w some improvement initially.   found also to be entero/rhino pos. some improvement in sxs w lasix and bipap. endorsed to Dr Garcia on BIPAP admitted at 0700. LUIS ENRIQUE Benitez MD

## 2018-09-14 NOTE — ED ADULT NURSE REASSESSMENT NOTE - NS ED NURSE REASSESS COMMENT FT1
pt received 0730 from MORIAH Sin, pt requested female caretaker. She is ambulatory in bathroom with wheelchair assisting to and from room. Pt is urinating after Lasix. Clear yellow urine. She is saturating well on 2L NC and lung sounds are present at bases. VSS/ NAD. Safety and comfort maintained. Family at bedside. Pt is admitted to hospital and awaiting bed assignment. Will continue to monitor.

## 2018-09-14 NOTE — ED PROVIDER NOTE - OBJECTIVE STATEMENT
83 y/o F hx of COPD on BiPaP qHS, Rheumatic Heart Disease s/p MVR, AS, HFpEF, SSS s/p PPM, Afib on Coumadin, DM (diet controlled), HLD, CKD III presents with cough & dyspnea.    Patient has been experiencing cough and dyspnea since 2 days prior to arrival. Known sick contact (URI) earlier this week. Family at bedside deny any fevers/chills, chest pain, abdominal pain or over wheezing. Patient uses BiPap qHS but has not been on home oxygen in 3 mo. Home saturation usually 92-95%.    Meds: Advair, Albuterol, ASA, atorvastatin, Cardizem, Nexium, Torsemide, sotalol, Spiriva & Coumadin.     Meds: 85 y/o F hx of COPD on BiPaP qHS, Rheumatic Heart Disease s/p MVR, AS, HFpEF, SSS s/p PPM, Afib on Coumadin, DM (diet controlled), HLD, CKD III presents with cough & dyspnea.    Patient has been experiencing cough and dyspnea since 2 days prior to arrival. Known sick contact (URI) earlier this week. Family at bedside deny any fevers/chills, chest pain, abdominal pain or over wheezing. Patient uses BiPap qHS but has not been on home oxygen in 3 mo. Home saturation usually 92-95%.    Meds: Advair, Albuterol, ASA, atorvastatin, Cardizem, Nexium, Torsemide, sotalol, Spiriva & Coumadin.

## 2018-09-14 NOTE — PROCEDURE NOTE - NSPOSTPRCRAD_GEN_A_CORE
central line located in the/guidewire noted in Rt IJ, catheter filtered over guidewire, guidewire removed in entirety . Lung sliding noted via POCUS/no pneumothorax

## 2018-09-14 NOTE — ED PROVIDER NOTE - MEDICAL DECISION MAKING DETAILS
85 y/o F hx of COPD on BiPaP qHS, Rheumatic Heart Disease s/p MVR, AS, HFpEF, SSS s/p PPM, Afib on Coumadin, DM (diet controlled), HLD, CKD III presents with hypoxic respiratory failure. Concern for concomitant PNA/viral infection with ADHF given evidence of hypervolemia with course breath sounds on exam. Plan: Abx, Diuretics, Labs including CE, BNP, CXR, Blood Cx, trial of BiPAP.

## 2018-09-14 NOTE — ED PROVIDER NOTE - PROGRESS NOTE DETAILS
Patient weaned off BiPaP; saturating 92-94% on NC. Admitted to hospitalist. Attending MD Garcia.  Pt noted shortly after signout to have increased resp distress with tachypnea, SOB, O2 sats remain stable despite above.  Bipap restarted and duonebs administered however pt remains persistently tachypneic with tripoding, SOB and coarse breath sounds throughout bilateral lung fields.  Discussion with family re: goals of care.  Pt continues to have labored breathing and family/pt amenable to intubation but made aware of risks of intubation with pt's age.  Family and pt aware and pt intubated.  She tolerated procedure well with 2 attempts (first direct with esophageal intubation noted with lack of color change and insufflation of stomach, tube removed and glidescope utilized and 7.0 tube visualized to pass through cords.)  OG tube placed.  Tube secured by respiratory and MICU called for consult/admission.  Hospitalist paged re: change in status. Attending MD Garcia.  MICU called to make aware of EKG changes from previous EKG, known trop elevation.  In setting of severe resp distress.  Recommended cards eval if desired by ICU. Pressure better controlled s/p Lasix IVP; defer Nitroglycerin. Patient wishes to take off BiPaP, however encouraged to maintain given increased WOB and tachypnea. Shortly after, patient vomited. BiPap removed to prevent aspiration; Patient in no overt distress; saturating 92-94% on NC. Admitted to hospitalist.

## 2018-09-14 NOTE — ED ADULT NURSE NOTE - NSIMPLEMENTINTERV_GEN_ALL_ED
Implemented All Fall with Harm Risk Interventions:  [place] to call system. Call bell, personal items and telephone within reach. Instruct patient to call for assistance. Room bathroom lighting operational. Non-slip footwear when patient is off stretcher. Physically safe environment: no spills, clutter or unnecessary equipment. Stretcher in lowest position, wheels locked, appropriate side rails in place. Provide visual cue, wrist band, yellow gown, etc. Monitor gait and stability. Monitor for mental status changes and reorient to person, place, and time. Review medications for side effects contributing to fall risk. Reinforce activity limits and safety measures with patient and family. Provide visual clues: red socks.

## 2018-09-14 NOTE — ED ADULT NURSE REASSESSMENT NOTE - NS ED NURSE REASSESS COMMENT FT1
pt not responding to bipap, remains tachypneic and tripod position. MD penn discussion intubation with family. family decides they would like pt to be intubated for airway protection long term. will prepare pt for intubation. RSI meds at bedside and 2nd large bore IV initiated.

## 2018-09-14 NOTE — H&P ADULT - HISTORY OF PRESENT ILLNESS
This is an 85 y/o F w/ a PMH significant for COPD on home O2 (though has not used in past few months), JENNIE on BiPAP, rheumatic heart disease s/p mitral valve replacement, HFpEF, aortic stenosis, A-fib on coumadin, sick sinus syndrome s/p pacemaker placement, HTN, and CKD3 who presented to the ED w/ dyspnea x2 days. She had a known sick contact at home w/ a URI. Per family, she did not have any fevers/chills, chest pain, or dizziness/lightheadedness. Patient was initially placed on BiPAP in the ED, but she was persistently tachypneic and tripoding, and was subsequently intubated for work of breathing.     In the ED, initial vitals: temp 98.7F, HR 86, /63, RR 28, saturating 92% on 2L NC. Labs and imaging remarkable for: WBC 14.5, INR 2.9, troponin 21 => 52, SCr 1.24 (baseline 0.8-1), pro-BNP 3467, RVP +entero/rhinovirus, CXR w/ pulmonary vascular congestion. Patient received 1g ceftriaxone, 500 mg azithro, 40 mg IV lasix, multiple duonebs, intubated and sedated on propofol.

## 2018-09-15 LAB
ALBUMIN SERPL ELPH-MCNC: 3.1 G/DL — LOW (ref 3.3–5)
ALBUMIN SERPL ELPH-MCNC: 3.4 G/DL — SIGNIFICANT CHANGE UP (ref 3.3–5)
ALP SERPL-CCNC: 114 U/L — SIGNIFICANT CHANGE UP (ref 40–120)
ALP SERPL-CCNC: 117 U/L — SIGNIFICANT CHANGE UP (ref 40–120)
ALT FLD-CCNC: 48 U/L — HIGH (ref 10–45)
ALT FLD-CCNC: 53 U/L — HIGH (ref 10–45)
ANION GAP SERPL CALC-SCNC: 12 MMOL/L — SIGNIFICANT CHANGE UP (ref 5–17)
ANION GAP SERPL CALC-SCNC: 12 MMOL/L — SIGNIFICANT CHANGE UP (ref 5–17)
APTT BLD: 55.7 SEC — HIGH (ref 27.5–37.4)
APTT BLD: 60.6 SEC — HIGH (ref 27.5–37.4)
APTT BLD: 65 SEC — HIGH (ref 27.5–37.4)
AST SERPL-CCNC: 44 U/L — HIGH (ref 10–40)
AST SERPL-CCNC: 53 U/L — HIGH (ref 10–40)
BILIRUB SERPL-MCNC: 0.6 MG/DL — SIGNIFICANT CHANGE UP (ref 0.2–1.2)
BILIRUB SERPL-MCNC: 0.7 MG/DL — SIGNIFICANT CHANGE UP (ref 0.2–1.2)
BUN SERPL-MCNC: 38 MG/DL — HIGH (ref 7–23)
BUN SERPL-MCNC: 38 MG/DL — HIGH (ref 7–23)
CALCIUM SERPL-MCNC: 8.7 MG/DL — SIGNIFICANT CHANGE UP (ref 8.4–10.5)
CALCIUM SERPL-MCNC: 9.2 MG/DL — SIGNIFICANT CHANGE UP (ref 8.4–10.5)
CHLORIDE SERPL-SCNC: 95 MMOL/L — LOW (ref 96–108)
CHLORIDE SERPL-SCNC: 96 MMOL/L — SIGNIFICANT CHANGE UP (ref 96–108)
CK MB BLD-MCNC: 2.5 % — SIGNIFICANT CHANGE UP (ref 0–3.5)
CK MB BLD-MCNC: 2.6 % — SIGNIFICANT CHANGE UP (ref 0–3.5)
CK MB CFR SERPL CALC: 2.8 NG/ML — SIGNIFICANT CHANGE UP (ref 0–3.8)
CK MB CFR SERPL CALC: 3.2 NG/ML — SIGNIFICANT CHANGE UP (ref 0–3.8)
CK SERPL-CCNC: 107 U/L — SIGNIFICANT CHANGE UP (ref 25–170)
CK SERPL-CCNC: 128 U/L — SIGNIFICANT CHANGE UP (ref 25–170)
CO2 SERPL-SCNC: 23 MMOL/L — SIGNIFICANT CHANGE UP (ref 22–31)
CO2 SERPL-SCNC: 24 MMOL/L — SIGNIFICANT CHANGE UP (ref 22–31)
CORTIS AM PEAK SERPL-MCNC: 18.2 UG/DL — SIGNIFICANT CHANGE UP (ref 6–18.4)
CREAT SERPL-MCNC: 2.29 MG/DL — HIGH (ref 0.5–1.3)
CREAT SERPL-MCNC: 2.53 MG/DL — HIGH (ref 0.5–1.3)
CULTURE RESULTS: NO GROWTH — SIGNIFICANT CHANGE UP
D DIMER BLD IA.RAPID-MCNC: 1188 NG/ML DDU — HIGH
FIBRINOGEN PPP-MCNC: 679 MG/DL — HIGH (ref 310–510)
GLUCOSE SERPL-MCNC: 132 MG/DL — HIGH (ref 70–99)
GLUCOSE SERPL-MCNC: 183 MG/DL — HIGH (ref 70–99)
GRAM STN FLD: SIGNIFICANT CHANGE UP
HAEM INFLU DNA BLD POS QL NAA+NON-PROBE: SIGNIFICANT CHANGE UP
HCT VFR BLD CALC: 34.9 % — SIGNIFICANT CHANGE UP (ref 34.5–45)
HGB BLD-MCNC: 11.4 G/DL — LOW (ref 11.5–15.5)
INR BLD: 4.95 RATIO — HIGH (ref 0.88–1.16)
INR BLD: 7.39 RATIO — CRITICAL HIGH (ref 0.88–1.16)
INR BLD: 9.28 RATIO — CRITICAL HIGH (ref 0.88–1.16)
MCHC RBC-ENTMCNC: 28.3 PG — SIGNIFICANT CHANGE UP (ref 27–34)
MCHC RBC-ENTMCNC: 32.7 GM/DL — SIGNIFICANT CHANGE UP (ref 32–36)
MCV RBC AUTO: 86.7 FL — SIGNIFICANT CHANGE UP (ref 80–100)
METHOD TYPE: SIGNIFICANT CHANGE UP
PLATELET # BLD AUTO: 167 K/UL — SIGNIFICANT CHANGE UP (ref 150–400)
POTASSIUM SERPL-MCNC: 4.7 MMOL/L — SIGNIFICANT CHANGE UP (ref 3.5–5.3)
POTASSIUM SERPL-MCNC: 4.8 MMOL/L — SIGNIFICANT CHANGE UP (ref 3.5–5.3)
POTASSIUM SERPL-SCNC: 4.7 MMOL/L — SIGNIFICANT CHANGE UP (ref 3.5–5.3)
POTASSIUM SERPL-SCNC: 4.8 MMOL/L — SIGNIFICANT CHANGE UP (ref 3.5–5.3)
PROCALCITONIN SERPL-MCNC: 36.07 NG/ML — HIGH (ref 0.02–0.1)
PROT SERPL-MCNC: 6.6 G/DL — SIGNIFICANT CHANGE UP (ref 6–8.3)
PROT SERPL-MCNC: 6.8 G/DL — SIGNIFICANT CHANGE UP (ref 6–8.3)
PROTHROM AB SERPL-ACNC: 104.9 SEC — HIGH (ref 9.8–12.7)
PROTHROM AB SERPL-ACNC: 55.7 SEC — HIGH (ref 9.8–12.7)
PROTHROM AB SERPL-ACNC: 83.9 SEC — HIGH (ref 9.8–12.7)
RBC # BLD: 4.03 M/UL — SIGNIFICANT CHANGE UP (ref 3.8–5.2)
RBC # FLD: 15.5 % — HIGH (ref 10.3–14.5)
SODIUM SERPL-SCNC: 130 MMOL/L — LOW (ref 135–145)
SODIUM SERPL-SCNC: 132 MMOL/L — LOW (ref 135–145)
SPECIMEN SOURCE: SIGNIFICANT CHANGE UP
SPECIMEN SOURCE: SIGNIFICANT CHANGE UP
TROPONIN T, HIGH SENSITIVITY RESULT: 152 NG/L — HIGH (ref 0–51)
TROPONIN T, HIGH SENSITIVITY RESULT: 192 NG/L — HIGH (ref 0–51)
URATE SERPL-MCNC: 9.5 MG/DL — HIGH (ref 2.5–7)
WBC # BLD: 17.7 K/UL — HIGH (ref 3.8–10.5)
WBC # FLD AUTO: 17.7 K/UL — HIGH (ref 3.8–10.5)

## 2018-09-15 PROCEDURE — 99291 CRITICAL CARE FIRST HOUR: CPT

## 2018-09-15 PROCEDURE — 99223 1ST HOSP IP/OBS HIGH 75: CPT

## 2018-09-15 PROCEDURE — 93010 ELECTROCARDIOGRAM REPORT: CPT

## 2018-09-15 RX ORDER — ACETAMINOPHEN 500 MG
1000 TABLET ORAL ONCE
Qty: 0 | Refills: 0 | Status: COMPLETED | OUTPATIENT
Start: 2018-09-15 | End: 2018-09-15

## 2018-09-15 RX ORDER — PHYTONADIONE (VIT K1) 5 MG
5 TABLET ORAL ONCE
Qty: 0 | Refills: 0 | Status: COMPLETED | OUTPATIENT
Start: 2018-09-15 | End: 2018-09-15

## 2018-09-15 RX ADMIN — Medication 3 MILLILITER(S): at 10:50

## 2018-09-15 RX ADMIN — Medication 1000 MILLIGRAM(S): at 18:52

## 2018-09-15 RX ADMIN — Medication 3 MILLILITER(S): at 23:10

## 2018-09-15 RX ADMIN — CLOPIDOGREL BISULFATE 75 MILLIGRAM(S): 75 TABLET, FILM COATED ORAL at 11:41

## 2018-09-15 RX ADMIN — Medication 400 MILLIGRAM(S): at 18:30

## 2018-09-15 RX ADMIN — AZITHROMYCIN 250 MILLIGRAM(S): 500 TABLET, FILM COATED ORAL at 06:58

## 2018-09-15 RX ADMIN — Medication 3 MILLILITER(S): at 05:22

## 2018-09-15 RX ADMIN — Medication 3 MILLILITER(S): at 18:09

## 2018-09-15 RX ADMIN — PROPOFOL 1.8 MICROGRAM(S)/KG/MIN: 10 INJECTION, EMULSION INTRAVENOUS at 02:49

## 2018-09-15 RX ADMIN — Medication 3 MILLILITER(S): at 01:10

## 2018-09-15 RX ADMIN — Medication 5.9 MICROGRAM(S)/KG/MIN: at 02:50

## 2018-09-15 RX ADMIN — Medication 1000 MILLIGRAM(S): at 11:43

## 2018-09-15 RX ADMIN — Medication 100 MILLIGRAM(S): at 02:49

## 2018-09-15 RX ADMIN — Medication 3 MILLILITER(S): at 14:20

## 2018-09-15 RX ADMIN — PANTOPRAZOLE SODIUM 40 MILLIGRAM(S): 20 TABLET, DELAYED RELEASE ORAL at 11:41

## 2018-09-15 RX ADMIN — Medication 81 MILLIGRAM(S): at 11:41

## 2018-09-15 RX ADMIN — Medication 101 MILLIGRAM(S): at 19:00

## 2018-09-15 RX ADMIN — CEFEPIME 100 MILLIGRAM(S): 1 INJECTION, POWDER, FOR SOLUTION INTRAMUSCULAR; INTRAVENOUS at 11:41

## 2018-09-15 RX ADMIN — Medication 400 MILLIGRAM(S): at 11:43

## 2018-09-15 NOTE — PROGRESS NOTE ADULT - SUBJECTIVE AND OBJECTIVE BOX
Shawnee Figueroa, PGY2  Internal Medicine, team 1  Pager [phone] / [phone]  After 7PM on weekdays and 12PM on weekends, please page #[phone]    INTERVAL HPI/OVERNIGHT EVENTS:  No acute events overnight. Tube feeds started. Pressure trial. BCx w/ GN coccobascillus.     SUBJECTIVE: Patient seen and examined at bedside.     OBJECTIVE:    VITAL SIGNS:  ICU Vital Signs Last 24 Hrs  T(C): 36.7 (15 Sep 2018 08:00), Max: 39.6 (14 Sep 2018 11:30)  T(F): 98 (15 Sep 2018 08:00), Max: 103.3 (14 Sep 2018 11:30)  HR: 59 (15 Sep 2018 08:15) (59 - 79)  BP: 111/56 (15 Sep 2018 08:15) (80/38 - 177/58)  BP(mean): 81 (15 Sep 2018 08:15) (55 - 89)  ABP: --  ABP(mean): --  RR: 20 (15 Sep 2018 08:15) (16 - 40)  SpO2: 100% (15 Sep 2018 08:15) (97% - 100%)    Mode: AC/ CMV (Assist Control/ Continuous Mandatory Ventilation), RR (machine): 20, TV (machine): 400, FiO2: 40, PEEP: 5, ITime: 0.86, MAP: 10, PIP: 32     @ 07:01  -  09-15 @ 07:00  --------------------------------------------------------  IN: 1517.5 mL / OUT: 545 mL / NET: 972.5 mL    09-15 @ 07:01  -  09-15 @ 08:27  --------------------------------------------------------  IN: 21.3 mL / OUT: 30 mL / NET: -8.7 mL      CAPILLARY BLOOD GLUCOSE          PHYSICAL EXAM:    General: NAD  HEENT: NC/AT; PERRL, clear conjunctiva  Neck: Central line in place w/out evidence bleeding or discharge.  Respiratory: Loud ronchi and upper airway sounds. Diffuse crackles.  Cardiovascular: +S1/S2; RRR. Unable to accurately assess murmurs 2/2 loud airway sounds.   Abdomen: soft, NT/ND; +BS x4  Extremities: WWP, 2+ peripheral pulses b/l; no LE edema  Skin: normal color and turgor; no rash  Neurological: Sedated. Withdraws to pain.    MEDICATIONS:  MEDICATIONS  (STANDING):  ALBUTerol/ipratropium for Nebulization 3 milliLiter(s) Nebulizer every 4 hours  aspirin  chewable 81 milliGRAM(s) Oral daily  azithromycin  IVPB      azithromycin  IVPB 500 milliGRAM(s) IV Intermittent every 24 hours  cefepime   IVPB      cefepime   IVPB 1000 milliGRAM(s) IV Intermittent daily  clopidogrel Tablet 75 milliGRAM(s) Oral daily  norepinephrine Infusion 0.05 MICROgram(s)/kG/Min (5.897 mL/Hr) IV Continuous <Continuous>  pantoprazole  Injectable 40 milliGRAM(s) IV Push daily  propofol Infusion 5 MICROgram(s)/kG/Min (1.8 mL/Hr) IV Continuous <Continuous>  vancomycin  IVPB 500 milliGRAM(s) IV Intermittent every 12 hours    MEDICATIONS  (PRN):  acetaminophen   Tablet .. 650 milliGRAM(s) Oral once PRN Moderate Pain (4 - 6)      ALLERGIES:  Allergies    penicillin (Rash)    Intolerances        LABS:                        11.4   17.7  )-----------( 167      ( 15 Sep 2018 00:55 )             34.9     CBC Full  -  ( 15 Sep 2018 00:55 )  WBC Count : 17.7 K/uL  Hemoglobin : 11.4 g/dL  Hematocrit : 34.9 %  Platelet Count - Automated : 167 K/uL  Mean Cell Volume : 86.7 fl  Mean Cell Hemoglobin : 28.3 pg  Mean Cell Hemoglobin Concentration : 32.7 gm/dL  Auto Neutrophil # : x  Auto Lymphocyte # : x  Auto Monocyte # : x  Auto Eosinophil # : x  Auto Basophil # : x  Auto Neutrophil % : x  Auto Lymphocyte % : x  Auto Monocyte % : x  Auto Eosinophil % : x  Auto Basophil % : x    09-15    132<L>  |  96  |  38<H>  ----------------------------<  183<H>  4.7   |  24  |  2.29<H>    Ca    8.7      15 Sep 2018 07:43  Phos  3.7       Mg     2.0         TPro  6.6  /  Alb  3.1<L>  /  TBili  0.6  /  DBili  x   /  AST  44<H>  /  ALT  48<H>  /  AlkPhos  114  09-15    Creatinine Trend: 2.29<--, 2.53<--, 2.12<--, 1.87<--, 1.24<--  LIVER FUNCTIONS - ( 15 Sep 2018 07:43 )  Alb: 3.1 g/dL / Pro: 6.6 g/dL / ALK PHOS: 114 U/L / ALT: 48 U/L / AST: 44 U/L / GGT: x           PT/INR - ( 15 Sep 2018 07:43 )   PT: 83.9 sec;   INR: 7.39 ratio         PTT - ( 15 Sep 2018 07:43 )  PTT:60.6 sec  CARDIAC MARKERS ( 15 Sep 2018 07:43 )  x     / x     / 107 U/L / x     / x      CARDIAC MARKERS ( 15 Sep 2018 00:55 )  x     / x     / 128 U/L / x     / 3.2 ng/mL  CARDIAC MARKERS ( 14 Sep 2018 17:49 )  x     / x     / 152 U/L / x     / 3.3 ng/mL      ABG - ( 14 Sep 2018 17:42 )  pH, Arterial: 7.36  pH, Blood: x     /  pCO2: 43    /  pO2: 109   / HCO3: 24    / Base Excess: -1.0  /  SaO2: 97                Urinalysis Basic - ( 14 Sep 2018 12:13 )    Color: Light Yellow / Appearance: Clear / S.013 / pH: x  Gluc: x / Ketone: Negative  / Bili: Negative / Urobili: Negative   Blood: x / Protein: 30 mg/dL / Nitrite: Negative   Leuk Esterase: Negative / RBC: 9 /hpf / WBC 0 /hpf   Sq Epi: x / Non Sq Epi: 1 /hpf / Bacteria: 0.0        MICROBIOLOGY:    Culture - Sputum (collected 14 Sep 2018 17:30)  Source: .Sputum Sputum  Gram Stain (14 Sep 2018 23:31):    Moderate polymorphonuclear leukocytes per low power field    Rare Squamous epithelial cells per low power field    No organisms seen per oil power field    Culture - Blood (collected 14 Sep 2018 09:13)  Source: .Blood Blood-Venous  Gram Stain (15 Sep 2018 06:27):    Growth in aerobic bottle: Gram Negative Coccobacilli  Preliminary Report (15 Sep 2018 06:28):    Growth in aerobic bottle: Gram Negative Coccobacilli         IMAGING:    RADIOLOGY & ADDITIONAL TESTS: Reviewed. Temitope Topete MD PGY-1  [id number]  c: [phone]    INTERVAL HPI/OVERNIGHT EVENTS:  No acute events overnight. Tube feeds started. Pressure trial. BCx w/ GN coccobascillus.     SUBJECTIVE: Patient seen and examined at bedside.     OBJECTIVE:    VITAL SIGNS:  ICU Vital Signs Last 24 Hrs  T(C): 36.7 (15 Sep 2018 08:00), Max: 39.6 (14 Sep 2018 11:30)  T(F): 98 (15 Sep 2018 08:00), Max: 103.3 (14 Sep 2018 11:30)  HR: 59 (15 Sep 2018 08:15) (59 - 79)  BP: 111/56 (15 Sep 2018 08:15) (80/38 - 177/58)  BP(mean): 81 (15 Sep 2018 08:15) (55 - 89)  ABP: --  ABP(mean): --  RR: 20 (15 Sep 2018 08:15) (16 - 40)  SpO2: 100% (15 Sep 2018 08:15) (97% - 100%)    Mode: AC/ CMV (Assist Control/ Continuous Mandatory Ventilation), RR (machine): 20, TV (machine): 400, FiO2: 40, PEEP: 5, ITime: 0.86, MAP: 10, PIP: 32     @ :01  -  09-15 @ 07:00  --------------------------------------------------------  IN: 1517.5 mL / OUT: 545 mL / NET: 972.5 mL    09-15 @ 07:01  -  09-15 @ 08:27  --------------------------------------------------------  IN: 21.3 mL / OUT: 30 mL / NET: -8.7 mL      CAPILLARY BLOOD GLUCOSE          PHYSICAL EXAM:    General: NAD  HEENT: NC/AT; PERRL, clear conjunctiva  Neck: Central line in place w/out evidence bleeding or discharge.  Respiratory: Loud ronchi and upper airway sounds. Diffuse crackles.  Cardiovascular: +S1/S2; RRR. Unable to accurately assess murmurs 2/2 loud airway sounds.   Abdomen: soft, NT/ND; +BS x4  Extremities: WWP, 2+ peripheral pulses b/l; no LE edema  Skin: normal color and turgor; no rash  Neurological: Sedated. Withdraws to pain.    MEDICATIONS:  MEDICATIONS  (STANDING):  ALBUTerol/ipratropium for Nebulization 3 milliLiter(s) Nebulizer every 4 hours  aspirin  chewable 81 milliGRAM(s) Oral daily  azithromycin  IVPB      azithromycin  IVPB 500 milliGRAM(s) IV Intermittent every 24 hours  cefepime   IVPB      cefepime   IVPB 1000 milliGRAM(s) IV Intermittent daily  clopidogrel Tablet 75 milliGRAM(s) Oral daily  norepinephrine Infusion 0.05 MICROgram(s)/kG/Min (5.897 mL/Hr) IV Continuous <Continuous>  pantoprazole  Injectable 40 milliGRAM(s) IV Push daily  propofol Infusion 5 MICROgram(s)/kG/Min (1.8 mL/Hr) IV Continuous <Continuous>  vancomycin  IVPB 500 milliGRAM(s) IV Intermittent every 12 hours    MEDICATIONS  (PRN):  acetaminophen   Tablet .. 650 milliGRAM(s) Oral once PRN Moderate Pain (4 - 6)      ALLERGIES:  Allergies    penicillin (Rash)    Intolerances        LABS:                        11.4   17.7  )-----------( 167      ( 15 Sep 2018 00:55 )             34.9     CBC Full  -  ( 15 Sep 2018 00:55 )  WBC Count : 17.7 K/uL  Hemoglobin : 11.4 g/dL  Hematocrit : 34.9 %  Platelet Count - Automated : 167 K/uL  Mean Cell Volume : 86.7 fl  Mean Cell Hemoglobin : 28.3 pg  Mean Cell Hemoglobin Concentration : 32.7 gm/dL  Auto Neutrophil # : x  Auto Lymphocyte # : x  Auto Monocyte # : x  Auto Eosinophil # : x  Auto Basophil # : x  Auto Neutrophil % : x  Auto Lymphocyte % : x  Auto Monocyte % : x  Auto Eosinophil % : x  Auto Basophil % : x    09-15    132<L>  |  96  |  38<H>  ----------------------------<  183<H>  4.7   |  24  |  2.29<H>    Ca    8.7      15 Sep 2018 07:43  Phos  3.7     -14  Mg     2.0     -    TPro  6.6  /  Alb  3.1<L>  /  TBili  0.6  /  DBili  x   /  AST  44<H>  /  ALT  48<H>  /  AlkPhos  114  09-15    Creatinine Trend: 2.29<--, 2.53<--, 2.12<--, 1.87<--, 1.24<--  LIVER FUNCTIONS - ( 15 Sep 2018 07:43 )  Alb: 3.1 g/dL / Pro: 6.6 g/dL / ALK PHOS: 114 U/L / ALT: 48 U/L / AST: 44 U/L / GGT: x           PT/INR - ( 15 Sep 2018 07:43 )   PT: 83.9 sec;   INR: 7.39 ratio         PTT - ( 15 Sep 2018 07:43 )  PTT:60.6 sec  CARDIAC MARKERS ( 15 Sep 2018 07:43 )  x     / x     / 107 U/L / x     / x      CARDIAC MARKERS ( 15 Sep 2018 00:55 )  x     / x     / 128 U/L / x     / 3.2 ng/mL  CARDIAC MARKERS ( 14 Sep 2018 17:49 )  x     / x     / 152 U/L / x     / 3.3 ng/mL      ABG - ( 14 Sep 2018 17:42 )  pH, Arterial: 7.36  pH, Blood: x     /  pCO2: 43    /  pO2: 109   / HCO3: 24    / Base Excess: -1.0  /  SaO2: 97                Urinalysis Basic - ( 14 Sep 2018 12:13 )    Color: Light Yellow / Appearance: Clear / S.013 / pH: x  Gluc: x / Ketone: Negative  / Bili: Negative / Urobili: Negative   Blood: x / Protein: 30 mg/dL / Nitrite: Negative   Leuk Esterase: Negative / RBC: 9 /hpf / WBC 0 /hpf   Sq Epi: x / Non Sq Epi: 1 /hpf / Bacteria: 0.0        MICROBIOLOGY:    Culture - Sputum (collected 14 Sep 2018 17:30)  Source: .Sputum Sputum  Gram Stain (14 Sep 2018 23:31):    Moderate polymorphonuclear leukocytes per low power field    Rare Squamous epithelial cells per low power field    No organisms seen per oil power field    Culture - Blood (collected 14 Sep 2018 09:13)  Source: .Blood Blood-Venous  Gram Stain (15 Sep 2018 06:27):    Growth in aerobic bottle: Gram Negative Coccobacilli  Preliminary Report (15 Sep 2018 06:28):    Growth in aerobic bottle: Gram Negative Coccobacilli         IMAGING:    RADIOLOGY & ADDITIONAL TESTS: Reviewed. Temitope Topete MD PGY-1  [phone]  c: [phone]    INTERVAL HPI/OVERNIGHT EVENTS:  No acute events overnight. Tube feeds started. Pressure trial. BCx w/ GN coccobascillus.     SUBJECTIVE: Patient seen and examined at bedside.     OBJECTIVE:    VITAL SIGNS:  ICU Vital Signs Last 24 Hrs  T(C): 36.7 (15 Sep 2018 08:00), Max: 39.6 (14 Sep 2018 11:30)  T(F): 98 (15 Sep 2018 08:00), Max: 103.3 (14 Sep 2018 11:30)  HR: 59 (15 Sep 2018 08:15) (59 - 79)  BP: 111/56 (15 Sep 2018 08:15) (80/38 - 177/58)  BP(mean): 81 (15 Sep 2018 08:15) (55 - 89)  ABP: --  ABP(mean): --  RR: 20 (15 Sep 2018 08:15) (16 - 40)  SpO2: 100% (15 Sep 2018 08:15) (97% - 100%)    Mode: AC/ CMV (Assist Control/ Continuous Mandatory Ventilation), RR (machine): 20, TV (machine): 400, FiO2: 40, PEEP: 5, ITime: 0.86, MAP: 10, PIP: 32     @ 07:01  -  09-15 @ 07:00  --------------------------------------------------------  IN: 1517.5 mL / OUT: 545 mL / NET: 972.5 mL    09-15 @ 07:01  -  09-15 @ 08:27  --------------------------------------------------------  IN: 21.3 mL / OUT: 30 mL / NET: -8.7 mL      CAPILLARY BLOOD GLUCOSE          PHYSICAL EXAM:    General: NAD.   HEENT: NC/AT; PERRL, clear conjunctiva  Neck: Central line in place w/out evidence bleeding or discharge.  Respiratory: Loud ronchi and upper airway sounds. Diffuse crackles.  Cardiovascular: +S1/S2; RRR. Unable to accurately assess murmurs 2/2 loud airway sounds.   Abdomen: soft, NT/ND; +BS x4  Extremities: WWP, 2+ peripheral pulses b/l; no LE edema  Skin: normal color and turgor; no rash  Neurological: Sedated. Withdraws to pain.    MEDICATIONS:  MEDICATIONS  (STANDING):  ALBUTerol/ipratropium for Nebulization 3 milliLiter(s) Nebulizer every 4 hours  aspirin  chewable 81 milliGRAM(s) Oral daily  azithromycin  IVPB      azithromycin  IVPB 500 milliGRAM(s) IV Intermittent every 24 hours  cefepime   IVPB      cefepime   IVPB 1000 milliGRAM(s) IV Intermittent daily  clopidogrel Tablet 75 milliGRAM(s) Oral daily  norepinephrine Infusion 0.05 MICROgram(s)/kG/Min (5.897 mL/Hr) IV Continuous <Continuous>  pantoprazole  Injectable 40 milliGRAM(s) IV Push daily  propofol Infusion 5 MICROgram(s)/kG/Min (1.8 mL/Hr) IV Continuous <Continuous>  vancomycin  IVPB 500 milliGRAM(s) IV Intermittent every 12 hours    MEDICATIONS  (PRN):  acetaminophen   Tablet .. 650 milliGRAM(s) Oral once PRN Moderate Pain (4 - 6)      ALLERGIES:  Allergies    penicillin (Rash)    Intolerances        LABS:                        11.4   17.7  )-----------( 167      ( 15 Sep 2018 00:55 )             34.9     CBC Full  -  ( 15 Sep 2018 00:55 )  WBC Count : 17.7 K/uL  Hemoglobin : 11.4 g/dL  Hematocrit : 34.9 %  Platelet Count - Automated : 167 K/uL  Mean Cell Volume : 86.7 fl  Mean Cell Hemoglobin : 28.3 pg  Mean Cell Hemoglobin Concentration : 32.7 gm/dL  Auto Neutrophil # : x  Auto Lymphocyte # : x  Auto Monocyte # : x  Auto Eosinophil # : x  Auto Basophil # : x  Auto Neutrophil % : x  Auto Lymphocyte % : x  Auto Monocyte % : x  Auto Eosinophil % : x  Auto Basophil % : x    09-15    132<L>  |  96  |  38<H>  ----------------------------<  183<H>  4.7   |  24  |  2.29<H>    Ca    8.7      15 Sep 2018 07:43  Phos  3.7     -  Mg     2.0     -    TPro  6.6  /  Alb  3.1<L>  /  TBili  0.6  /  DBili  x   /  AST  44<H>  /  ALT  48<H>  /  AlkPhos  114  09-15    Creatinine Trend: 2.29<--, 2.53<--, 2.12<--, 1.87<--, 1.24<--  LIVER FUNCTIONS - ( 15 Sep 2018 07:43 )  Alb: 3.1 g/dL / Pro: 6.6 g/dL / ALK PHOS: 114 U/L / ALT: 48 U/L / AST: 44 U/L / GGT: x           PT/INR - ( 15 Sep 2018 07:43 )   PT: 83.9 sec;   INR: 7.39 ratio         PTT - ( 15 Sep 2018 07:43 )  PTT:60.6 sec  CARDIAC MARKERS ( 15 Sep 2018 07:43 )  x     / x     / 107 U/L / x     / x      CARDIAC MARKERS ( 15 Sep 2018 00:55 )  x     / x     / 128 U/L / x     / 3.2 ng/mL  CARDIAC MARKERS ( 14 Sep 2018 17:49 )  x     / x     / 152 U/L / x     / 3.3 ng/mL      ABG - ( 14 Sep 2018 17:42 )  pH, Arterial: 7.36  pH, Blood: x     /  pCO2: 43    /  pO2: 109   / HCO3: 24    / Base Excess: -1.0  /  SaO2: 97                Urinalysis Basic - ( 14 Sep 2018 12:13 )    Color: Light Yellow / Appearance: Clear / S.013 / pH: x  Gluc: x / Ketone: Negative  / Bili: Negative / Urobili: Negative   Blood: x / Protein: 30 mg/dL / Nitrite: Negative   Leuk Esterase: Negative / RBC: 9 /hpf / WBC 0 /hpf   Sq Epi: x / Non Sq Epi: 1 /hpf / Bacteria: 0.0        MICROBIOLOGY:    Culture - Sputum (collected 14 Sep 2018 17:30)  Source: .Sputum Sputum  Gram Stain (14 Sep 2018 23:31):    Moderate polymorphonuclear leukocytes per low power field    Rare Squamous epithelial cells per low power field    No organisms seen per oil power field    Culture - Blood (collected 14 Sep 2018 09:13)  Source: .Blood Blood-Venous  Gram Stain (15 Sep 2018 06:27):    Growth in aerobic bottle: Gram Negative Coccobacilli  Preliminary Report (15 Sep 2018 06:28):    Growth in aerobic bottle: Gram Negative Coccobacilli           RADIOLOGY & ADDITIONAL TESTS: Reviewed.

## 2018-09-15 NOTE — PROGRESS NOTE ADULT - ASSESSMENT
85 y/o F w/ a PMH significant for COPD on home O2 (though has not used in past few months), JENNIE on BiPAP, rheumatic heart disease s/p mitral valve replacement, HFpEF, aortic stenosis, A-fib on coumadin, sick sinus syndrome s/p pacemaker placement, HTN, and CKD3 who presented to the ED w/ dyspnea x2 days. Admitted w/ COPD exacerbation, ADHF, and +entero/rhinovirus.     #Neuro  - sedated on propofol    #CV  - BP was significantly elevated upon admission w/   - +troponin and new TWI in multiple leads  - this is all most likely in the setting of hypertensive urgency, flash pulmonary edema, and demand ischemia  - patient takes torsemide 40 mg daily, would hold for now as patient became hypotensive on propofol upon arriving to the MICU  - f/u TTE    #Resp  - intubated on volume control: FiO2 40%, PEEP 5, RR 16, Vt 400 cc  - CXR w/ pulmonary edema  - will obtain POCUS once in MICU    #Renal  - patient appears to have minimal LATANYA, most likely in the setting of sepsis 2/2 +URI and hypertensive urgency    #ID  - +entero/rhinovirus  - patient spiked fever to 103 upon arrival to MICU  - would c/w vanc/zosyn at this time for possible superimposed bacterial infection  - f/u blood and urine cx 83 y/o F w/ a PMH significant for COPD, JENNIE on BiPAP, rheumatic heart disease s/p mitral valve replacement, HFpEF, aortic stenosis, A-fib on coumadin, sick sinus syndrome s/p pacemaker placement, HTN, and CKD3 who presented to the ED w/ dyspnea x2 days. Admitted w/ COPD exacerbation, ADHF, and +entero/rhinovirus.     #Neuro  -sedated on propofol    #CV  -admitted w/ sBP 200, since requiring pressure support 2/2 sepsis  -0.12 noreph pressure support  -troponins downtrending, currently 192. likely demand 2/2 HTN urgency              -per cardiology, trend CK/CKMP Q8H (past x3 wnl)              -s/p ASA and plavix load. now on QD ASA + clopidogrel.  -HFpEF (40-50%) w/ severe diasystolic failure  -multivalvular disease: MV replacement, severe AS  -per cards, no diuresis w/ lasix.      #Resp  - intubated on volume control: FiO2 40%, PEEP 5, RR 16, Vt 400 cc  - CXR w/ pulmonary edema  - enterovirus positive, now w/ GN coccobacillus positive, possible bacteremia from overlying PNA  -triple abx coverage: azithro, cefepime, vanc      #Renal  -LATANYA, likely 2/2 sepsis + HTN urgency  -sCr continues to uptrend  -holding diuresis per cards     #ID  - +entero/rhinovirus  - GN coccobacillus on 9/15 culture  -admitted w/ 103 fever, now afebrile.   -triple therapy (started 9/14): azithro, cefepime, vanc    Heme:  -persistently increasing INR.   -home warfarin  -worsening 2/2 sepsis vs DIC vs LATANYA/decreased clearance  -DIC panel ordered, pending  -LFTs mildly elevated. low suspicion for acute liver failure.     GI: No issues.    #Endocrine: No issues.    #Nutrition  -started tube feeds last PM 83 y/o F w/ a PMH significant for COPD, JENNIE on BiPAP, rheumatic heart disease s/p mitral valve replacement, HFpEF, aortic stenosis, A-fib on coumadin, sick sinus syndrome s/p pacemaker placement, HTN, and CKD3 who presented to the ED w/ dyspnea x2 days. Admitted w/ COPD exacerbation, ADHF, and +entero/rhinovirus.     #Neuro  -sedated on propofol    #CV  -admitted w/ sBP 200, since requiring pressure support 2/2 sepsis  -0.12 noreph pressure support  -troponins downtrending, currently 192. likely demand 2/2 HTN urgency              -will stop trending cardiac enzymes (CK/CKMB negative x3)              -s/p ASA and plavix load. now on QD ASA + clopidogrel.              -low threshold to stop ASA/plavix of any sx bleeding.  -1 episode lai to 30s              -EP consulted for interrogation of pacemaker.  -HFpEF (40-50%) w/ severe diasystolic failure  -multivalvular disease: MV replacement, severe AS  -per cards, no diuresis w/ lasix in setting of shock      #Resp  - intubated on volume control: FiO2 40%, PEEP 5, RR 16, Vt 400 cc  - CXR w/ pulmonary edema vs overlying PNA  -enterovirus positive, now w/ GN coccobacillus positive, possible bacteremia from overlying PNA  -triple abx coverage: azithro, cefepime, vanc      #Renal  -LATANYA, likely 2/2 sepsis + HTN urgency  -sCr continues to uptrend, expect improvement w/ improvement of sepsis/shock.  -holding diuresis per cards     #ID  - +entero/rhinovirus  - GN coccobacillus on 9/15 culture  -admitted w/ 103 fever, now afebrile.   -triple therapy (started 9/14): azithro, cefepime, vanc    Heme:  -persistently increasing INR.   -home warfarin  -worsening 2/2 sepsis vs DIC vs LATANYA/decreased clearance  -DIC panel ordered, pending  -LFTs mildly elevated. low suspicion for acute liver failure.     GI: No issues.    #Endocrine: No issues.    #Nutrition  -started tube feeds last PM 85 y/o F w/ a PMH significant for COPD, JENNIE on BiPAP, multivalvular disease (severe AS, s/p MV replacement), HFpEF/severe diastolic failure, A-fib on coumadin, sick sinus syndrome s/p pacemaker placement, HTN, and CKD3 who admitted for acute respiratory failure requiring intubation suspected to be 2/2 COPD exacerbation c/b PNA w/ +entero/rhinovirus and GN coccobacillus.    #Neuro  -sedated on propofol    #CV  -admitted w/ sBP 200, since requiring pressure support 2/2 sepsis  -0.12 noreph pressure support  -troponins downtrending, currently 192. likely demand 2/2 HTN urgency              -will stop trending cardiac enzymes (CK/CKMB negative x3)              -s/p ASA and plavix load. now on QD ASA + clopidogrel.              -low threshold to stop ASA/plavix of any sx bleeding.  -1 episode lai to 30s              -EP consulted for interrogation of pacemaker.  -HFpEF (40-50%) w/ severe diastolic failure  -multivalvular disease: MV replacement, severe AS  -per cards, no diuresis w/ lasix in setting of shock      #Resp  - intubated on volume control: FiO2 40%, PEEP 5, RR 16, Vt 400 cc  - CXR w/ pulmonary edema vs overlying PNA  -enterovirus positive, now w/ GN coccobacillus positive, possible bacteremia from overlying PNA  -suspect H. flu PNA  -continue azithromycin, cefepime      #Renal  -LATANYA, likely 2/2 sepsis + HTN urgency  -sCr continues to uptrend, expect improvement w/ improvement of sepsis/shock.  -holding diuresis per cards in setting of shock    #ID  -+entero/rhinovirus  -GN coccobacillus on 9/15 culture  -admitted w/ 103 fever, now afebrile.   -dual therapy (started 9/14): azithro, cefepime  -d/c vanc, low suspicion for MRSA infection    Heme:  -persistently increasing INR.   -home warfarin  -worsening 2/2 sepsis vs loss of GI chastity from abx  -DIC panel negative  -LFTs mildly elevated. low suspicion for acute liver failure.   -continue to monitor, low threshold for IV K if any sx bleeding.    GI: No issues.    #Endocrine: No issues.    #Nutrition  -started tube feeds last PM

## 2018-09-15 NOTE — CONSULT NOTE ADULT - SUBJECTIVE AND OBJECTIVE BOX
ELECTROPHYSIOLOGY  Device Interrogation Performed                                  Date/Time: 9/15/18 12:00PM  : [employer]                         Model: [place] XT DR MRI [id number]             Mode: DDDR                 Rate: 60        Atrial Lead:  P wave amplitude: 1.5 mv          Impedence:  361 Ohms      Threshold:  V@  ms      Ventricular Lead(s):  RV Lead: R wave amplitude:    2.0 mv          Impedence:    437Ohms      Threshold:    V@   ms   LV Lead:  R wave amplitude:     mv          Impedence:    Ohms      Threshold:    V@   ms     Battery Status:   Good                ERROL                     EOL    Underlying Rhythm:       Events/Observation: No arrhythmia episode noted since 9/4/18.    Impression/Plan:  Normal PPM / ICD function.   Normal sensing and pacing via iterative testing. Good battery status. Excellent threshold capture.  No reprogramming.     Gal Mendoza, #[id number]  Cardiology Fellow

## 2018-09-15 NOTE — PROGRESS NOTE ADULT - ATTENDING COMMENTS
Elderly woman wit COPD, valvular heart disease, CHR ( normal EFO pacemaker atrial fibrillation and CKD admitted with respiratory failure, sepsis, likely secondary ot pneumonia,  blood cultures with gram negative coccobacillus, ID pending.  Remains on pressors, vent support, creatinine improving. INR is rising, there is no evidence of bleeding.  Will continue to observe for now and not correct at this time.  Agree with current management.  Check ID on cultures and adjust antibiotics as needed.  Titrate pressors as needed.  NO weaining efforts at this time.  She is critically ill.

## 2018-09-16 LAB
ALBUMIN SERPL ELPH-MCNC: 2.6 G/DL — LOW (ref 3.3–5)
ALP SERPL-CCNC: 146 U/L — HIGH (ref 40–120)
ALT FLD-CCNC: 68 U/L — HIGH (ref 10–45)
ANION GAP SERPL CALC-SCNC: 14 MMOL/L — SIGNIFICANT CHANGE UP (ref 5–17)
APTT BLD: 39.2 SEC — HIGH (ref 27.5–37.4)
APTT BLD: 54.2 SEC — HIGH (ref 27.5–37.4)
AST SERPL-CCNC: 62 U/L — HIGH (ref 10–40)
BILIRUB SERPL-MCNC: 0.5 MG/DL — SIGNIFICANT CHANGE UP (ref 0.2–1.2)
BUN SERPL-MCNC: 54 MG/DL — HIGH (ref 7–23)
CALCIUM SERPL-MCNC: 8.1 MG/DL — LOW (ref 8.4–10.5)
CHLORIDE SERPL-SCNC: 97 MMOL/L — SIGNIFICANT CHANGE UP (ref 96–108)
CO2 SERPL-SCNC: 22 MMOL/L — SIGNIFICANT CHANGE UP (ref 22–31)
CREAT SERPL-MCNC: 2.39 MG/DL — HIGH (ref 0.5–1.3)
CULTURE RESULTS: SIGNIFICANT CHANGE UP
GAS PNL BLDA: SIGNIFICANT CHANGE UP
GLUCOSE SERPL-MCNC: 185 MG/DL — HIGH (ref 70–99)
HCT VFR BLD CALC: 29.5 % — LOW (ref 34.5–45)
HGB BLD-MCNC: 9.7 G/DL — LOW (ref 11.5–15.5)
INR BLD: 1.33 RATIO — HIGH (ref 0.88–1.16)
INR BLD: 3.41 RATIO — HIGH (ref 0.88–1.16)
LEGIONELLA AG UR QL: NEGATIVE — SIGNIFICANT CHANGE UP
MAGNESIUM SERPL-MCNC: 2.4 MG/DL — SIGNIFICANT CHANGE UP (ref 1.6–2.6)
MCHC RBC-ENTMCNC: 28.4 PG — SIGNIFICANT CHANGE UP (ref 27–34)
MCHC RBC-ENTMCNC: 33 GM/DL — SIGNIFICANT CHANGE UP (ref 32–36)
MCV RBC AUTO: 86.3 FL — SIGNIFICANT CHANGE UP (ref 80–100)
PHOSPHATE SERPL-MCNC: 4.2 MG/DL — SIGNIFICANT CHANGE UP (ref 2.5–4.5)
PLATELET # BLD AUTO: 117 K/UL — LOW (ref 150–400)
POTASSIUM SERPL-MCNC: 4.6 MMOL/L — SIGNIFICANT CHANGE UP (ref 3.5–5.3)
POTASSIUM SERPL-SCNC: 4.6 MMOL/L — SIGNIFICANT CHANGE UP (ref 3.5–5.3)
PROT SERPL-MCNC: 6.1 G/DL — SIGNIFICANT CHANGE UP (ref 6–8.3)
PROTHROM AB SERPL-ACNC: 14.6 SEC — HIGH (ref 9.8–12.7)
PROTHROM AB SERPL-ACNC: 37.8 SEC — HIGH (ref 9.8–12.7)
RBC # BLD: 3.42 M/UL — LOW (ref 3.8–5.2)
RBC # FLD: 15.1 % — HIGH (ref 10.3–14.5)
SODIUM SERPL-SCNC: 133 MMOL/L — LOW (ref 135–145)
SPECIMEN SOURCE: SIGNIFICANT CHANGE UP
WBC # BLD: 8.5 K/UL — SIGNIFICANT CHANGE UP (ref 3.8–10.5)
WBC # FLD AUTO: 8.5 K/UL — SIGNIFICANT CHANGE UP (ref 3.8–10.5)

## 2018-09-16 PROCEDURE — 99233 SBSQ HOSP IP/OBS HIGH 50: CPT

## 2018-09-16 PROCEDURE — 99291 CRITICAL CARE FIRST HOUR: CPT

## 2018-09-16 RX ORDER — HEPARIN SODIUM 5000 [USP'U]/ML
2500 INJECTION INTRAVENOUS; SUBCUTANEOUS EVERY 6 HOURS
Qty: 0 | Refills: 0 | Status: DISCONTINUED | OUTPATIENT
Start: 2018-09-16 | End: 2018-09-20

## 2018-09-16 RX ORDER — HEPARIN SODIUM 5000 [USP'U]/ML
5000 INJECTION INTRAVENOUS; SUBCUTANEOUS EVERY 6 HOURS
Qty: 0 | Refills: 0 | Status: DISCONTINUED | OUTPATIENT
Start: 2018-09-16 | End: 2018-09-20

## 2018-09-16 RX ORDER — DEXMEDETOMIDINE HYDROCHLORIDE IN 0.9% SODIUM CHLORIDE 4 UG/ML
0.01 INJECTION INTRAVENOUS
Qty: 200 | Refills: 0 | Status: DISCONTINUED | OUTPATIENT
Start: 2018-09-16 | End: 2018-09-17

## 2018-09-16 RX ORDER — HEPARIN SODIUM 5000 [USP'U]/ML
INJECTION INTRAVENOUS; SUBCUTANEOUS
Qty: 25000 | Refills: 0 | Status: DISCONTINUED | OUTPATIENT
Start: 2018-09-16 | End: 2018-09-20

## 2018-09-16 RX ORDER — IPRATROPIUM/ALBUTEROL SULFATE 18-103MCG
3 AEROSOL WITH ADAPTER (GRAM) INHALATION EVERY 6 HOURS
Qty: 0 | Refills: 0 | Status: DISCONTINUED | OUTPATIENT
Start: 2018-09-16 | End: 2018-09-29

## 2018-09-16 RX ADMIN — Medication 3 MILLILITER(S): at 10:36

## 2018-09-16 RX ADMIN — Medication 3 MILLILITER(S): at 06:11

## 2018-09-16 RX ADMIN — CLOPIDOGREL BISULFATE 75 MILLIGRAM(S): 75 TABLET, FILM COATED ORAL at 11:16

## 2018-09-16 RX ADMIN — AZITHROMYCIN 250 MILLIGRAM(S): 500 TABLET, FILM COATED ORAL at 06:02

## 2018-09-16 RX ADMIN — HEPARIN SODIUM 1100 UNIT(S)/HR: 5000 INJECTION INTRAVENOUS; SUBCUTANEOUS at 17:48

## 2018-09-16 RX ADMIN — Medication 40 MILLIGRAM(S): at 14:28

## 2018-09-16 RX ADMIN — PANTOPRAZOLE SODIUM 40 MILLIGRAM(S): 20 TABLET, DELAYED RELEASE ORAL at 11:16

## 2018-09-16 RX ADMIN — Medication 3 MILLILITER(S): at 17:13

## 2018-09-16 RX ADMIN — CEFEPIME 100 MILLIGRAM(S): 1 INJECTION, POWDER, FOR SOLUTION INTRAMUSCULAR; INTRAVENOUS at 11:15

## 2018-09-16 RX ADMIN — Medication 81 MILLIGRAM(S): at 11:16

## 2018-09-16 NOTE — PROGRESS NOTE ADULT - ASSESSMENT
85 y/o F w/ a PMH significant for COPD, JENNIE on BiPAP, multivalvular disease (severe AS, s/p MV replacement), HFpEF/severe diastolic failure, A-fib on coumadin, sick sinus syndrome s/p pacemaker placement, HTN, and CKD3 who admitted for acute respiratory failure requiring intubation suspected to be 2/2 COPD exacerbation c/b PNA w/ +entero/rhinovirus and GN coccobacillus.    #Neuro  -sedated on propofol    #CV  -admitted w/ sBP 200, since requiring pressure support 2/2 sepsis  -0.12 noreph pressure support  -troponins downtrending, currently 192. likely demand 2/2 HTN urgency              -will stop trending cardiac enzymes (CK/CKMB negative x3)              -s/p ASA and plavix load. now on QD ASA + clopidogrel.              -low threshold to stop ASA/plavix of any sx bleeding.  -1 episode lai to 30s              -EP consulted for interrogation of pacemaker.  -HFpEF (40-50%) w/ severe diastolic failure  -multivalvular disease: MV replacement, severe AS  -per cards, no diuresis w/ lasix in setting of shock      #Resp  - intubated on volume control: FiO2 40%, PEEP 5, RR 16, Vt 400 cc  - CXR w/ pulmonary edema vs overlying PNA  -enterovirus positive, now w/ GN coccobacillus positive, possible bacteremia from overlying PNA  -suspect H. flu PNA  -continue azithromycin, cefepime      #Renal  -LATANYA, likely 2/2 sepsis + HTN urgency  -sCr continues to uptrend, expect improvement w/ improvement of sepsis/shock.  -holding diuresis per cards in setting of shock    #ID  -+entero/rhinovirus  -GN coccobacillus on 9/15 culture  -admitted w/ 103 fever, now afebrile.   -dual therapy (started 9/14): azithro, cefepime  -d/c vanc, low suspicion for MRSA infection    Heme:  -persistently increasing INR.   -home warfarin  -worsening 2/2 sepsis vs loss of GI chastity from abx  -DIC panel negative  -LFTs mildly elevated. low suspicion for acute liver failure.   -continue to monitor, low threshold for IV K if any sx bleeding.    GI: No issues.    #Endocrine: No issues.    #Nutrition  -started tube feeds last PM 85 y/o F w/ a PMH significant for COPD, JENNIE on BiPAP, multivalvular disease (severe AS, s/p MV replacement), HFpEF/severe diastolic failure, A-fib on coumadin, sick sinus syndrome s/p pacemaker placement, HTN, and CKD3 who admitted for acute respiratory failure requiring intubation suspected to be 2/2 COPD exacerbation c/b PNA w/ +entero/rhinovirus and GN coccobacillus.    #Neuro  -sedated on propofol    #CV  -admitted w/ sBP 200, since requiring pressure support 2/2 sepsis  -0.12 noreph pressure support  -troponins downtrending, currently 192. likely demand 2/2 HTN urgency              -will stop trending cardiac enzymes (CK/CKMB negative x3)              -s/p ASA and plavix load. now on QD ASA + clopidogrel.              -low threshold to stop ASA/plavix of any sx bleeding.  -1 episode lai to 30s              -EP consulted for interrogation of pacemaker.  -HFpEF (40-50%) w/ severe diastolic failure  -multivalvular disease: MV replacement, severe AS  -per cards, no diuresis w/ lasix in setting of shock      #Resp  - intubated on volume control: FiO2 40%, PEEP 5, RR 16, Vt 400 cc  - CXR w/ pulmonary edema vs overlying PNA  -enterovirus positive, now w/ GN coccobacillus positive, possible bacteremia from overlying PNA  -suspect H. flu PNA  -continue azithromycin, cefepime      #Renal  -LATANYA, likely 2/2 sepsis + HTN urgency  -sCr continues to uptrend, expect improvement w/ improvement of sepsis/shock.  -holding diuresis per cards in setting of shock    #ID  -+entero/rhinovirus  -legionella negative  -GN coccobacillus (H. influenzae) on 9/14 blood culture  - UClx (9/14): NGTD  - Sputum Clx (9/14): No organisms  -admitted w/ 103 fever, now afebrile.   -dual therapy (started 9/14): azithro, cefepime  -d/c vanc, low suspicion for MRSA infection    Heme:  -persistently increasing INR.   -home warfarin  -worsening 2/2 sepsis vs loss of GI chastity from abx  -DIC panel negative  -LFTs mildly elevated. low suspicion for acute liver failure.   -continue to monitor, low threshold for IV K if any sx bleeding.    GI: No issues.    #Endocrine: No issues.    #Nutrition  -started tube feeds last PM

## 2018-09-16 NOTE — PROGRESS NOTE ADULT - SUBJECTIVE AND OBJECTIVE BOX
Patient seen and examined at bedside.    Overnight Events: Continues to be intubated and on pressors. Received IV Vit K yesterday. Is/Os +1.5L in last 24 hours.    Review Of Systems: No chest pain, shortness of breath, or palpitations            Medications:  acetaminophen   Tablet .. 650 milliGRAM(s) Oral once PRN  ALBUTerol/ipratropium for Nebulization 3 milliLiter(s) Nebulizer every 4 hours  aspirin  chewable 81 milliGRAM(s) Oral daily  azithromycin  IVPB      azithromycin  IVPB 500 milliGRAM(s) IV Intermittent every 24 hours  cefepime   IVPB      cefepime   IVPB 1000 milliGRAM(s) IV Intermittent daily  clopidogrel Tablet 75 milliGRAM(s) Oral daily  norepinephrine Infusion 0.05 MICROgram(s)/kG/Min IV Continuous <Continuous>  pantoprazole  Injectable 40 milliGRAM(s) IV Push daily  propofol Infusion 5 MICROgram(s)/kG/Min IV Continuous <Continuous>      PAST MEDICAL & SURGICAL HISTORY:  Aortic stenosis, moderate  Heart failure with preserved ejection fraction  Rheumatic heart disease  Chronic obstructive pulmonary disease, unspecified COPD type  CKD (chronic kidney disease) stage 3, GFR 30-59 ml/min  Chronic atrial fibrillation  Sick sinus syndrome  Cardiac pacemaker recipient  H/O mitral valve replacement      Vitals:  T(F): 100.5 (09-16), Max: 101.8 (09-15)  HR: 59 (09-16) (59 - 62)  BP: 108/54 (09-16) (80/39 - 137/60)  RR: 20 (09-16)  SpO2: 100% (09-16)  I&O's Summary    15 Sep 2018 07:01  -  16 Sep 2018 07:00  --------------------------------------------------------  IN: 2139 mL / OUT: 620 mL / NET: 1519 mL    16 Sep 2018 07:01  -  16 Sep 2018 08:22  --------------------------------------------------------  IN: 16 mL / OUT: 40 mL / NET: -24 mL        Physical Exam:  GENERAL: intubated, sedated  HEAD:  Atraumatic, Normocephalic  ENT: EOMI, JVD elevated, moist mucosa  CHEST/LUNG: B/l ronchi.   HEART: Regular rate and rhythm; II/VI systolic murmur without S2  ABDOMEN: Soft, Nontender, Nondistended; Bowel sounds present  EXTREMITIES:  No clubbing, cyanosis, or edema  NEUROLOGY: sedated                          9.7    8.5   )-----------( 117      ( 16 Sep 2018 01:26 )             29.5     09-16    133<L>  |  97  |  54<H>  ----------------------------<  185<H>  4.6   |  22  |  2.39<H>    Ca    8.1<L>      16 Sep 2018 01:26  Phos  4.2     09-16  Mg     2.4     09-16    TPro  6.1  /  Alb  2.6<L>  /  TBili  0.5  /  DBili  x   /  AST  62<H>  /  ALT  68<H>  /  AlkPhos  146<H>  09-16    PT/INR - ( 16 Sep 2018 01:26 )   PT: 37.8 sec;   INR: 3.41 ratio         PTT - ( 16 Sep 2018 01:26 )  PTT:54.2 sec  CARDIAC MARKERS ( 15 Sep 2018 07:43 )  x     / x     / 107 U/L / x     / 2.8 ng/mL  CARDIAC MARKERS ( 15 Sep 2018 00:55 )  x     / x     / 128 U/L / x     / 3.2 ng/mL  CARDIAC MARKERS ( 14 Sep 2018 17:49 )  x     / x     / 152 U/L / x     / 3.3 ng/mL      Serum Pro-Brain Natriuretic Peptide: 3467 pg/mL (09-14 @ 06:26)    Interpretation of Telemetry:  AT,  60 Patient seen and examined at bedside.    Overnight Events: Continues to be intubated and on pressors. Received IV Vit K yesterday. Is/Os +1.5L in last 24 hours.    Review Of Systems: No chest pain, shortness of breath, or palpitations            Medications:  acetaminophen   Tablet .. 650 milliGRAM(s) Oral once PRN  ALBUTerol/ipratropium for Nebulization 3 milliLiter(s) Nebulizer every 4 hours  aspirin  chewable 81 milliGRAM(s) Oral daily  azithromycin  IVPB      azithromycin  IVPB 500 milliGRAM(s) IV Intermittent every 24 hours  cefepime   IVPB      cefepime   IVPB 1000 milliGRAM(s) IV Intermittent daily  clopidogrel Tablet 75 milliGRAM(s) Oral daily  norepinephrine Infusion 0.05 MICROgram(s)/kG/Min IV Continuous <Continuous>  pantoprazole  Injectable 40 milliGRAM(s) IV Push daily  propofol Infusion 5 MICROgram(s)/kG/Min IV Continuous <Continuous>      PAST MEDICAL & SURGICAL HISTORY:  Aortic stenosis, moderate  Heart failure with preserved ejection fraction  Rheumatic heart disease  Chronic obstructive pulmonary disease, unspecified COPD type  CKD (chronic kidney disease) stage 3, GFR 30-59 ml/min  Chronic atrial fibrillation  Sick sinus syndrome  Cardiac pacemaker recipient  H/O mitral valve replacement      Vitals:  T(F): 100.5 (09-16), Max: 101.8 (09-15)  HR: 59 (09-16) (59 - 62)  BP: 108/54 (09-16) (80/39 - 137/60)  RR: 20 (09-16)  SpO2: 100% (09-16)  I&O's Summary    15 Sep 2018 07:01  -  16 Sep 2018 07:00  --------------------------------------------------------  IN: 2139 mL / OUT: 620 mL / NET: 1519 mL    16 Sep 2018 07:01  -  16 Sep 2018 08:22  --------------------------------------------------------  IN: 16 mL / OUT: 40 mL / NET: -24 mL        Physical Exam:  GENERAL: intubated, sedated  HEAD:  Atraumatic, Normocephalic  ENT: EOMI, JVD elevated, moist mucosa  CHEST/LUNG: B/l ronchi.   HEART: Regular rate and rhythm; II/VI systolic murmur  ABDOMEN: Soft, Nontender, Nondistended; Bowel sounds present  EXTREMITIES:  No clubbing, cyanosis, or edema  NEUROLOGY: sedated                          9.7    8.5   )-----------( 117      ( 16 Sep 2018 01:26 )             29.5     09-16    133<L>  |  97  |  54<H>  ----------------------------<  185<H>  4.6   |  22  |  2.39<H>    Ca    8.1<L>      16 Sep 2018 01:26  Phos  4.2     09-16  Mg     2.4     09-16    TPro  6.1  /  Alb  2.6<L>  /  TBili  0.5  /  DBili  x   /  AST  62<H>  /  ALT  68<H>  /  AlkPhos  146<H>  09-16    PT/INR - ( 16 Sep 2018 01:26 )   PT: 37.8 sec;   INR: 3.41 ratio         PTT - ( 16 Sep 2018 01:26 )  PTT:54.2 sec  CARDIAC MARKERS ( 15 Sep 2018 07:43 )  x     / x     / 107 U/L / x     / 2.8 ng/mL  CARDIAC MARKERS ( 15 Sep 2018 00:55 )  x     / x     / 128 U/L / x     / 3.2 ng/mL  CARDIAC MARKERS ( 14 Sep 2018 17:49 )  x     / x     / 152 U/L / x     / 3.3 ng/mL      Serum Pro-Brain Natriuretic Peptide: 3467 pg/mL (09-14 @ 06:26)    Interpretation of Telemetry:  AT,  60

## 2018-09-16 NOTE — PROGRESS NOTE ADULT - SUBJECTIVE AND OBJECTIVE BOX
Patient is a 84y old  Female who presents with a chief complaint of COPD exacerbation, ADHF (15 Sep 2018 12:25)      Interval Events:    REVIEW OF SYSTEMS:  Constitutional: [ ] negative [ ] fevers [ ] chills [ ] weight loss [ ] weight gain  HEENT: [ ] negative [ ] dry eyes [ ] eye irritation [ ] postnasal drip [ ] nasal congestion  CV: [ ] negative  [ ] chest pain [ ] orthopnea [ ] palpitations [ ] murmur  Resp: [ ] negative [ ] cough [ ] shortness of breath [ ] dyspnea [ ] wheezing [ ] sputum [ ] hemoptysis  GI: [ ] negative [ ] nausea [ ] vomiting [ ] diarrhea [ ] constipation [ ] abd pain [ ] dysphagia   : [ ] negative [ ] dysuria [ ] nocturia [ ] hematuria [ ] increased urinary frequency  Musculoskeletal: [ ] negative [ ] back pain [ ] myalgias [ ] arthralgias [ ] fracture  Skin: [ ] negative [ ] rash [ ] itch  Neurological: [ ] negative [ ] headache [ ] dizziness [ ] syncope [ ] weakness [ ] numbness  Psychiatric: [ ] negative [ ] anxiety [ ] depression  Endocrine: [ ] negative [ ] diabetes [ ] thyroid problem  Hematologic/Lymphatic: [ ] negative [ ] anemia [ ] bleeding problem  Allergic/Immunologic: [ ] negative [ ] itchy eyes [ ] nasal discharge [ ] hives [ ] angioedema  [ ] All other systems negative  [ ] Unable to assess ROS because ________    OBJECTIVE:  ICU Vital Signs Last 24 Hrs  T(C): 37.4 (16 Sep 2018 04:00), Max: 38.8 (15 Sep 2018 11:45)  T(F): 99.4 (16 Sep 2018 04:00), Max: 101.8 (15 Sep 2018 11:45)  HR: 59 (16 Sep 2018 06:45) (59 - 62)  BP: 89/46 (16 Sep 2018 06:45) (80/39 - 137/60)  BP(mean): 65 (16 Sep 2018 06:45) (57 - 87)  ABP: --  ABP(mean): --  RR: 21 (16 Sep 2018 06:45) (20 - 36)  SpO2: 100% (16 Sep 2018 06:45) (100% - 100%)    Mode: AC/ CMV (Assist Control/ Continuous Mandatory Ventilation), RR (machine): 20, TV (machine): 400, FiO2: 40, PEEP: 5, ITime: 1, MAP: 10, PIP: 26    15 @ 07:01  -   @ 07:00  --------------------------------------------------------  IN: 2139 mL / OUT: 620 mL / NET: 1519 mL      CAPILLARY BLOOD GLUCOSE        PHYSICAL EXAM:  General: NAD.   HEENT: NC/AT; PERRL, clear conjunctiva  Neck: Central line in place w/out evidence bleeding or discharge.  Respiratory: Loud ronchi and upper airway sounds. Diffuse crackles.  Cardiovascular: +S1/S2; RRR. Unable to accurately assess murmurs 2/2 loud airway sounds.   Abdomen: soft, NT/ND; +BS x4  Extremities: WWP, 2+ peripheral pulses b/l; no LE edema  Skin: normal color and turgor; no rash  Neurological: Sedated. Withdraws to pain.    LINES:    HOSPITAL MEDICATIONS:  Standing Meds:  ALBUTerol/ipratropium for Nebulization 3 milliLiter(s) Nebulizer every 4 hours  aspirin  chewable 81 milliGRAM(s) Oral daily  azithromycin  IVPB      azithromycin  IVPB 500 milliGRAM(s) IV Intermittent every 24 hours  cefepime   IVPB      cefepime   IVPB 1000 milliGRAM(s) IV Intermittent daily  clopidogrel Tablet 75 milliGRAM(s) Oral daily  norepinephrine Infusion 0.05 MICROgram(s)/kG/Min IV Continuous <Continuous>  pantoprazole  Injectable 40 milliGRAM(s) IV Push daily  propofol Infusion 5 MICROgram(s)/kG/Min IV Continuous <Continuous>      PRN Meds:  acetaminophen   Tablet .. 650 milliGRAM(s) Oral once PRN      LABS:                        9.7    8.5   )-----------( 117      ( 16 Sep 2018 01:26 )             29.5     Hgb Trend: 9.7<--, 11.4<--, 11.7<--, 12.2<--  0916    133<L>  |  97  |  54<H>  ----------------------------<  185<H>  4.6   |  22  |  2.39<H>    Ca    8.1<L>      16 Sep 2018 01:26  Phos  4.2       Mg     2.4         TPro  6.1  /  Alb  2.6<L>  /  TBili  0.5  /  DBili  x   /  AST  62<H>  /  ALT  68<H>  /  AlkPhos  146<H>      Creatinine Trend: 2.39<--, 2.29<--, 2.53<--, 2.12<--, 1.87<--, 1.24<--  PT/INR - ( 16 Sep 2018 01:26 )   PT: 37.8 sec;   INR: 3.41 ratio         PTT - ( 16 Sep 2018 01:26 )  PTT:54.2 sec  Urinalysis Basic - ( 14 Sep 2018 12:13 )    Color: Light Yellow / Appearance: Clear / S.013 / pH: x  Gluc: x / Ketone: Negative  / Bili: Negative / Urobili: Negative   Blood: x / Protein: 30 mg/dL / Nitrite: Negative   Leuk Esterase: Negative / RBC: 9 /hpf / WBC 0 /hpf   Sq Epi: x / Non Sq Epi: 1 /hpf / Bacteria: 0.0      Arterial Blood Gas:   @ 17:42  7.36/43/109/24/97/-1.0  ABG lactate: --  Arterial Blood Gas:   @ 12:06  7.32/51/106/26/97/-.3  ABG lactate: --    Venous Blood Gas:   @ 14:24  7.32/54/38/27/74  VBG Lactate: --      MICROBIOLOGY:     Culture - Sputum (collected 14 Sep 2018 17:30)  Source: .Sputum Sputum  Gram Stain (14 Sep 2018 23:31):    Moderate polymorphonuclear leukocytes per low power field    Rare Squamous epithelial cells per low power field    No organisms seen per oil power field  Preliminary Report (15 Sep 2018 17:03):    No growth to date.    Culture - Urine (collected 14 Sep 2018 16:58)  Source: .Urine Catheterized  Final Report (15 Sep 2018 22:31):    No growth    Culture - Blood (collected 14 Sep 2018 09:13)  Source: .Blood Blood-Venous  Gram Stain (15 Sep 2018 06:27):    Growth in aerobic bottle: Gram Negative Coccobacilli  Preliminary Report (15 Sep 2018 06:28):    Growth in aerobic bottle: Gram Negative Coccobacilli    "Due to technical problems, Proteus sp. will Not be reported as part of    the BCID panel until further notice"    ***Blood Panel PCR results on this specimen are available    approximately 3 hours after the Gram stain result.***    Gram stain, PCR, and/or culture results may not always    correspond due to difference in methodologies.    ************************************************************    This PCR assay was performed using "[employer].    The following targets are tested for: Enterococcus,    vancomycin resistant enterococci, Listeria monocytogenes,    coagulase negative staphylococci, S. aureus,    methicillin resistant S. aureus, Streptococcus agalactiae    (Group B), S. pneumoniae, S. pyogenes (Group A),    Acinetobacter baumannii, Enterobacter cloacae, E. coli,    Klebsiella oxytoca, K. pneumoniae, Proteus sp.,    Serratia marcescens, Haemophilus influenzae,    Neisseria meningitidis, Pseudomonas aeruginosa, Candida    albicans, C. glabrata, C krusei, C parapsilosis,    C. tropicalis and the KPC resistance gene.  Organism: Blood Culture PCR (15 Sep 2018 08:02)  Organism: Blood Culture PCR (15 Sep 2018 08:02)    Culture - Blood (collected 14 Sep 2018 09:13)  Source: .Blood Blood-Peripheral  Preliminary Report (15 Sep 2018 10:01):    No growth to date.      RADIOLOGY:  [ ] Reviewed and interpreted by me    EKG: Patient is a 84y old  Female who presents with a chief complaint of COPD exacerbation, ADHF (15 Sep 2018 12:25)      Interval Events: INR elevated at 9.28. Given Vitamin K. Repeat INR at 3.41.    REVIEW OF SYSTEMS:  Constitutional: [ ] negative [ ] fevers [ ] chills [ ] weight loss [ ] weight gain  HEENT: [ ] negative [ ] dry eyes [ ] eye irritation [ ] postnasal drip [ ] nasal congestion  CV: [ ] negative  [ ] chest pain [ ] orthopnea [ ] palpitations [ ] murmur  Resp: [ ] negative [ ] cough [ ] shortness of breath [ ] dyspnea [ ] wheezing [ ] sputum [ ] hemoptysis  GI: [ ] negative [ ] nausea [ ] vomiting [ ] diarrhea [ ] constipation [ ] abd pain [ ] dysphagia   : [ ] negative [ ] dysuria [ ] nocturia [ ] hematuria [ ] increased urinary frequency  Musculoskeletal: [ ] negative [ ] back pain [ ] myalgias [ ] arthralgias [ ] fracture  Skin: [ ] negative [ ] rash [ ] itch  Neurological: [ ] negative [ ] headache [ ] dizziness [ ] syncope [ ] weakness [ ] numbness  Psychiatric: [ ] negative [ ] anxiety [ ] depression  Endocrine: [ ] negative [ ] diabetes [ ] thyroid problem  Hematologic/Lymphatic: [ ] negative [ ] anemia [ ] bleeding problem  Allergic/Immunologic: [ ] negative [ ] itchy eyes [ ] nasal discharge [ ] hives [ ] angioedema  [ ] All other systems negative  [ ] Unable to assess ROS because ________    OBJECTIVE:  ICU Vital Signs Last 24 Hrs  T(C): 37.4 (16 Sep 2018 04:00), Max: 38.8 (15 Sep 2018 11:45)  T(F): 99.4 (16 Sep 2018 04:00), Max: 101.8 (15 Sep 2018 11:45)  HR: 59 (16 Sep 2018 06:45) (59 - 62)  BP: 89/46 (16 Sep 2018 06:45) (80/39 - 137/60)  BP(mean): 65 (16 Sep 2018 06:45) (57 - 87)  ABP: --  ABP(mean): --  RR: 21 (16 Sep 2018 06:45) (20 - 36)  SpO2: 100% (16 Sep 2018 06:45) (100% - 100%)    Mode: AC/ CMV (Assist Control/ Continuous Mandatory Ventilation), RR (machine): 20, TV (machine): 400, FiO2: 40, PEEP: 5, ITime: 1, MAP: 10, PIP: 26    09-15 @ 07:01  -   @ 07:00  --------------------------------------------------------  IN: 2139 mL / OUT: 620 mL / NET: 1519 mL      CAPILLARY BLOOD GLUCOSE        PHYSICAL EXAM:  General: NAD.   HEENT: NC/AT; PERRL, clear conjunctiva  Neck: Central line in place w/out evidence bleeding or discharge.  Respiratory: Loud ronchi and upper airway sounds. Diffuse crackles.  Cardiovascular: +S1/S2; RRR. Unable to accurately assess murmurs 2/2 loud airway sounds.   Abdomen: soft, NT/ND; +BS x4  Extremities: WWP, 2+ peripheral pulses b/l; no LE edema  Skin: normal color and turgor; no rash  Neurological: Sedated. Withdraws to pain.    LINES:    HOSPITAL MEDICATIONS:  Standing Meds:  ALBUTerol/ipratropium for Nebulization 3 milliLiter(s) Nebulizer every 4 hours  aspirin  chewable 81 milliGRAM(s) Oral daily  azithromycin  IVPB      azithromycin  IVPB 500 milliGRAM(s) IV Intermittent every 24 hours  cefepime   IVPB      cefepime   IVPB 1000 milliGRAM(s) IV Intermittent daily  clopidogrel Tablet 75 milliGRAM(s) Oral daily  norepinephrine Infusion 0.05 MICROgram(s)/kG/Min IV Continuous <Continuous>  pantoprazole  Injectable 40 milliGRAM(s) IV Push daily  propofol Infusion 5 MICROgram(s)/kG/Min IV Continuous <Continuous>      PRN Meds:  acetaminophen   Tablet .. 650 milliGRAM(s) Oral once PRN      LABS:                        9.7    8.5   )-----------( 117      ( 16 Sep 2018 01:26 )             29.5     Hgb Trend: 9.7<--, 11.4<--, 11.7<--, 12.2<--      133<L>  |  97  |  54<H>  ----------------------------<  185<H>  4.6   |  22  |  2.39<H>    Ca    8.1<L>      16 Sep 2018 01:26  Phos  4.2       Mg     2.4         TPro  6.1  /  Alb  2.6<L>  /  TBili  0.5  /  DBili  x   /  AST  62<H>  /  ALT  68<H>  /  AlkPhos  146<H>      Creatinine Trend: 2.39<--, 2.29<--, 2.53<--, 2.12<--, 1.87<--, 1.24<--  PT/INR - ( 16 Sep 2018 01:26 )   PT: 37.8 sec;   INR: 3.41 ratio         PTT - ( 16 Sep 2018 01:26 )  PTT:54.2 sec  Urinalysis Basic - ( 14 Sep 2018 12:13 )    Color: Light Yellow / Appearance: Clear / S.013 / pH: x  Gluc: x / Ketone: Negative  / Bili: Negative / Urobili: Negative   Blood: x / Protein: 30 mg/dL / Nitrite: Negative   Leuk Esterase: Negative / RBC: 9 /hpf / WBC 0 /hpf   Sq Epi: x / Non Sq Epi: 1 /hpf / Bacteria: 0.0      Arterial Blood Gas:   @ 17:42  7.36/43/109/24/97/-1.0  ABG lactate: --  Arterial Blood Gas:   @ 12:06  7.32/51/106/26/97/-.3  ABG lactate: --    Venous Blood Gas:   @ 14:24  7.32/54/38/27/74  VBG Lactate: --      MICROBIOLOGY:     Culture - Sputum (collected 14 Sep 2018 17:30)  Source: .Sputum Sputum  Gram Stain (14 Sep 2018 23:31):    Moderate polymorphonuclear leukocytes per low power field    Rare Squamous epithelial cells per low power field    No organisms seen per oil power field  Preliminary Report (15 Sep 2018 17:03):    No growth to date.    Culture - Urine (collected 14 Sep 2018 16:58)  Source: .Urine Catheterized  Final Report (15 Sep 2018 22:31):    No growth    Culture - Blood (collected 14 Sep 2018 09:13)  Source: .Blood Blood-Venous  Gram Stain (15 Sep 2018 06:27):    Growth in aerobic bottle: Gram Negative Coccobacilli  Preliminary Report (15 Sep 2018 06:28):    Growth in aerobic bottle: Gram Negative Coccobacilli    "Due to technical problems, Proteus sp. will Not be reported as part of    the BCID panel until further notice"    ***Blood Panel PCR results on this specimen are available    approximately 3 hours after the Gram stain result.***    Gram stain, PCR, and/or culture results may not always    correspond due to difference in methodologies.    ************************************************************    This PCR assay was performed using [employer].    The following targets are tested for: Enterococcus,    vancomycin resistant enterococci, Listeria monocytogenes,    coagulase negative staphylococci, S. aureus,    methicillin resistant S. aureus, Streptococcus agalactiae    (Group B), S. pneumoniae, S. pyogenes (Group A),    Acinetobacter baumannii, Enterobacter cloacae, E. coli,    Klebsiella oxytoca, K. pneumoniae, Proteus sp.,    Serratia marcescens, Haemophilus influenzae,    Neisseria meningitidis, Pseudomonas aeruginosa, Candida    albicans, C. glabrata, C krusei, C parapsilosis,    C. tropicalis and the KPC resistance gene.  Organism: Blood Culture PCR (15 Sep 2018 08:02)  Organism: Blood Culture PCR (15 Sep 2018 08:02)    Culture - Blood (collected 14 Sep 2018 09:13)  Source: .Blood Blood-Peripheral  Preliminary Report (15 Sep 2018 10:01):    No growth to date.      RADIOLOGY:  [ ] Reviewed and interpreted by me    EKG:

## 2018-09-16 NOTE — PROGRESS NOTE ADULT - ASSESSMENT
83 y/o F w/ a PMH significant for COPD on home O2 (though has not used in past few months), JENNIE on BiPAP, rheumatic heart disease s/p mitral valve replacement, HFpEF, aortic stenosis, A-fib on coumadin, sick sinus syndrome s/p pacemaker placement, HTN, and CKD3 who presented to the ED w/ dyspnea x2 days. Admitted w/ COPD exacerbation, ADHF, and +entero/rhinovirus.     #NSTEMI. Likely Type II 2/2 septic shock.  - Stop plavix  - Would transduce CVP off CVL and give diuretics for goal 8-10 while intubated    #Mechanical MV. Elevated gradients on TTE.  - INR goal 2.5-3.5  - May need JENN once she recovers from this illness    #Severe AS  - Will address long term treatment once she recovers from this illness 83 y/o F w/ a PMH significant for COPD on home O2 (though has not used in past few months), JENNIE on BiPAP, rheumatic heart disease s/p mitral valve replacement, HFpEF, aortic stenosis, A-fib on coumadin, sick sinus syndrome s/p pacemaker placement, HTN, and CKD3 who presented to the ED w/ dyspnea x2 days. Admitted w/ COPD exacerbation, ADHF, and +entero/rhinovirus.     #NSTEMI. Likely Type II 2/2 septic shock.  - Stop plavix  - Would transduce CVP off CVL and give diuretics for goal 8-10 while intubated    #Mechanical MV. Elevated gradients on TTE.  - INR goal 2.5-3.5  - May need JENN once she recovers from this illness    #Mod-severe AS. Decreased MAGNUS but gradients do not reflect severe AS.  - repeat TTE when she recovers from this illness

## 2018-09-16 NOTE — PROGRESS NOTE ADULT - ATTENDING COMMENTS
84 year old woman with respiratory failure/sepsis secondary to h,Influenza pneumonia and enterorhinovirus.  Pressor requirement is improving.  INR was partially corrected yesterday with vitamin K as it continued to rise overnight.  SHe has normal eF diastolic dysfunction and valvular disease.  Will start weaning efforts today, follow INR and start heparin if less than 2.  Conitnue antibioitcs.  Will check sensitivities and narrow spectrum if possible from Cefepime.

## 2018-09-17 LAB
ALBUMIN SERPL ELPH-MCNC: 2.6 G/DL — LOW (ref 3.3–5)
ALP SERPL-CCNC: 139 U/L — HIGH (ref 40–120)
ALT FLD-CCNC: 92 U/L — HIGH (ref 10–45)
ANION GAP SERPL CALC-SCNC: 12 MMOL/L — SIGNIFICANT CHANGE UP (ref 5–17)
APTT BLD: 128 SEC — CRITICAL HIGH (ref 27.5–37.4)
APTT BLD: 138.7 SEC — CRITICAL HIGH (ref 27.5–37.4)
APTT BLD: 62.5 SEC — HIGH (ref 27.5–37.4)
APTT BLD: 72.9 SEC — HIGH (ref 27.5–37.4)
AST SERPL-CCNC: 71 U/L — HIGH (ref 10–40)
BILIRUB SERPL-MCNC: 0.3 MG/DL — SIGNIFICANT CHANGE UP (ref 0.2–1.2)
BUN SERPL-MCNC: 72 MG/DL — HIGH (ref 7–23)
CALCIUM SERPL-MCNC: 8 MG/DL — LOW (ref 8.4–10.5)
CHLORIDE SERPL-SCNC: 97 MMOL/L — SIGNIFICANT CHANGE UP (ref 96–108)
CO2 SERPL-SCNC: 23 MMOL/L — SIGNIFICANT CHANGE UP (ref 22–31)
CREAT SERPL-MCNC: 1.95 MG/DL — HIGH (ref 0.5–1.3)
CULTURE RESULTS: SIGNIFICANT CHANGE UP
GAS PNL BLDA: SIGNIFICANT CHANGE UP
GLUCOSE BLDC GLUCOMTR-MCNC: 152 MG/DL — HIGH (ref 70–99)
GLUCOSE BLDC GLUCOMTR-MCNC: 250 MG/DL — HIGH (ref 70–99)
GLUCOSE BLDC GLUCOMTR-MCNC: 256 MG/DL — HIGH (ref 70–99)
GLUCOSE SERPL-MCNC: 329 MG/DL — HIGH (ref 70–99)
GRAM STN FLD: SIGNIFICANT CHANGE UP
HCT VFR BLD CALC: 26.3 % — LOW (ref 34.5–45)
HCT VFR BLD CALC: 27.9 % — LOW (ref 34.5–45)
HGB BLD-MCNC: 8.9 G/DL — LOW (ref 11.5–15.5)
HGB BLD-MCNC: 9 G/DL — LOW (ref 11.5–15.5)
INR BLD: 1.05 RATIO — SIGNIFICANT CHANGE UP (ref 0.88–1.16)
INR BLD: 1.18 RATIO — HIGH (ref 0.88–1.16)
INR BLD: 1.28 RATIO — HIGH (ref 0.88–1.16)
MAGNESIUM SERPL-MCNC: 2.8 MG/DL — HIGH (ref 1.6–2.6)
MCHC RBC-ENTMCNC: 28.2 PG — SIGNIFICANT CHANGE UP (ref 27–34)
MCHC RBC-ENTMCNC: 29.4 PG — SIGNIFICANT CHANGE UP (ref 27–34)
MCHC RBC-ENTMCNC: 32.4 GM/DL — SIGNIFICANT CHANGE UP (ref 32–36)
MCHC RBC-ENTMCNC: 34 GM/DL — SIGNIFICANT CHANGE UP (ref 32–36)
MCV RBC AUTO: 86.6 FL — SIGNIFICANT CHANGE UP (ref 80–100)
MCV RBC AUTO: 87.1 FL — SIGNIFICANT CHANGE UP (ref 80–100)
PHOSPHATE SERPL-MCNC: 4.8 MG/DL — HIGH (ref 2.5–4.5)
PLATELET # BLD AUTO: 114 K/UL — LOW (ref 150–400)
PLATELET # BLD AUTO: 99 K/UL — LOW (ref 150–400)
POTASSIUM SERPL-MCNC: 5.1 MMOL/L — SIGNIFICANT CHANGE UP (ref 3.5–5.3)
POTASSIUM SERPL-SCNC: 5.1 MMOL/L — SIGNIFICANT CHANGE UP (ref 3.5–5.3)
PROT SERPL-MCNC: 6.1 G/DL — SIGNIFICANT CHANGE UP (ref 6–8.3)
PROTHROM AB SERPL-ACNC: 11.4 SEC — SIGNIFICANT CHANGE UP (ref 9.8–12.7)
PROTHROM AB SERPL-ACNC: 12.8 SEC — HIGH (ref 9.8–12.7)
PROTHROM AB SERPL-ACNC: 13.9 SEC — HIGH (ref 9.8–12.7)
RBC # BLD: 3.04 M/UL — LOW (ref 3.8–5.2)
RBC # BLD: 3.2 M/UL — LOW (ref 3.8–5.2)
RBC # FLD: 15.3 % — HIGH (ref 10.3–14.5)
RBC # FLD: 15.4 % — HIGH (ref 10.3–14.5)
SODIUM SERPL-SCNC: 132 MMOL/L — LOW (ref 135–145)
SPECIMEN SOURCE: SIGNIFICANT CHANGE UP
WBC # BLD: 5.8 K/UL — SIGNIFICANT CHANGE UP (ref 3.8–10.5)
WBC # BLD: 7.9 K/UL — SIGNIFICANT CHANGE UP (ref 3.8–10.5)
WBC # FLD AUTO: 5.8 K/UL — SIGNIFICANT CHANGE UP (ref 3.8–10.5)
WBC # FLD AUTO: 7.9 K/UL — SIGNIFICANT CHANGE UP (ref 3.8–10.5)

## 2018-09-17 PROCEDURE — 93308 TTE F-UP OR LMTD: CPT | Mod: 26

## 2018-09-17 PROCEDURE — 99232 SBSQ HOSP IP/OBS MODERATE 35: CPT | Mod: GC

## 2018-09-17 PROCEDURE — 99291 CRITICAL CARE FIRST HOUR: CPT

## 2018-09-17 PROCEDURE — 76604 US EXAM CHEST: CPT | Mod: 26

## 2018-09-17 RX ORDER — DEXTROSE 50 % IN WATER 50 %
25 SYRINGE (ML) INTRAVENOUS ONCE
Qty: 0 | Refills: 0 | Status: DISCONTINUED | OUTPATIENT
Start: 2018-09-17 | End: 2018-09-20

## 2018-09-17 RX ORDER — DEXTROSE 50 % IN WATER 50 %
15 SYRINGE (ML) INTRAVENOUS ONCE
Qty: 0 | Refills: 0 | Status: DISCONTINUED | OUTPATIENT
Start: 2018-09-17 | End: 2018-09-19

## 2018-09-17 RX ORDER — CEFTRIAXONE 500 MG/1
1 INJECTION, POWDER, FOR SOLUTION INTRAMUSCULAR; INTRAVENOUS ONCE
Qty: 0 | Refills: 0 | Status: COMPLETED | OUTPATIENT
Start: 2018-09-17 | End: 2018-09-17

## 2018-09-17 RX ORDER — SODIUM CHLORIDE 9 MG/ML
1000 INJECTION, SOLUTION INTRAVENOUS
Qty: 0 | Refills: 0 | Status: DISCONTINUED | OUTPATIENT
Start: 2018-09-17 | End: 2018-09-20

## 2018-09-17 RX ORDER — CEFTRIAXONE 500 MG/1
INJECTION, POWDER, FOR SOLUTION INTRAMUSCULAR; INTRAVENOUS
Qty: 0 | Refills: 0 | Status: DISCONTINUED | OUTPATIENT
Start: 2018-09-17 | End: 2018-09-21

## 2018-09-17 RX ORDER — GLUCAGON INJECTION, SOLUTION 0.5 MG/.1ML
1 INJECTION, SOLUTION SUBCUTANEOUS ONCE
Qty: 0 | Refills: 0 | Status: DISCONTINUED | OUTPATIENT
Start: 2018-09-17 | End: 2018-09-20

## 2018-09-17 RX ORDER — CEFTRIAXONE 500 MG/1
1 INJECTION, POWDER, FOR SOLUTION INTRAMUSCULAR; INTRAVENOUS EVERY 24 HOURS
Qty: 0 | Refills: 0 | Status: DISCONTINUED | OUTPATIENT
Start: 2018-09-18 | End: 2018-09-21

## 2018-09-17 RX ORDER — INSULIN LISPRO 100/ML
VIAL (ML) SUBCUTANEOUS EVERY 6 HOURS
Qty: 0 | Refills: 0 | Status: DISCONTINUED | OUTPATIENT
Start: 2018-09-17 | End: 2018-09-20

## 2018-09-17 RX ORDER — DEXTROSE 50 % IN WATER 50 %
12.5 SYRINGE (ML) INTRAVENOUS ONCE
Qty: 0 | Refills: 0 | Status: DISCONTINUED | OUTPATIENT
Start: 2018-09-17 | End: 2018-09-20

## 2018-09-17 RX ADMIN — Medication 40 MILLIGRAM(S): at 05:43

## 2018-09-17 RX ADMIN — HEPARIN SODIUM 800 UNIT(S)/HR: 5000 INJECTION INTRAVENOUS; SUBCUTANEOUS at 08:06

## 2018-09-17 RX ADMIN — HEPARIN SODIUM 0 UNIT(S)/HR: 5000 INJECTION INTRAVENOUS; SUBCUTANEOUS at 06:51

## 2018-09-17 RX ADMIN — Medication 1: at 18:02

## 2018-09-17 RX ADMIN — Medication 3 MILLILITER(S): at 05:22

## 2018-09-17 RX ADMIN — Medication 3 MILLILITER(S): at 18:06

## 2018-09-17 RX ADMIN — Medication 2: at 11:47

## 2018-09-17 RX ADMIN — HEPARIN SODIUM 1000 UNIT(S)/HR: 5000 INJECTION INTRAVENOUS; SUBCUTANEOUS at 01:23

## 2018-09-17 RX ADMIN — Medication 3: at 05:43

## 2018-09-17 RX ADMIN — HEPARIN SODIUM 800 UNIT(S)/HR: 5000 INJECTION INTRAVENOUS; SUBCUTANEOUS at 21:39

## 2018-09-17 RX ADMIN — CEFEPIME 100 MILLIGRAM(S): 1 INJECTION, POWDER, FOR SOLUTION INTRAMUSCULAR; INTRAVENOUS at 11:47

## 2018-09-17 RX ADMIN — PANTOPRAZOLE SODIUM 40 MILLIGRAM(S): 20 TABLET, DELAYED RELEASE ORAL at 11:47

## 2018-09-17 RX ADMIN — Medication 3 MILLILITER(S): at 00:14

## 2018-09-17 RX ADMIN — Medication 3 MILLILITER(S): at 11:54

## 2018-09-17 RX ADMIN — CEFTRIAXONE 100 GRAM(S): 500 INJECTION, POWDER, FOR SOLUTION INTRAMUSCULAR; INTRAVENOUS at 18:01

## 2018-09-17 RX ADMIN — Medication 81 MILLIGRAM(S): at 11:47

## 2018-09-17 NOTE — DIETITIAN INITIAL EVALUATION ADULT. - OTHER INFO
Per grandson pt weighs herself daily 2/2 CHF and reports no recent changes in wt.  Pt seen for: MICU Length Of Stay   Adm dx: COPD exacerbation, heart failure, LATANYA   GI issues: no V/D, abd soft   Last BM: none since adm   Food allergies: NKFA   Vit/supplement PTA: none noted

## 2018-09-17 NOTE — AIRWAY REMOVAL NOTE  ADULT & PEDS - ARTIFICAL AIRWAY REMOVAL COMMENTS
Written order for extubation verified. The patient was identified by full name and birth date compared to the identification band. Present during the procedure was Radha GUTIERREZ

## 2018-09-17 NOTE — PROGRESS NOTE ADULT - SUBJECTIVE AND OBJECTIVE BOX
Critical Care Ultrasonography  Indication: Respiratory Failure  Lung US: A line predominance throughout with scattered B lines Small pleff on right   Cardiac: Mild reduction LV fxn No RV [facility] c/w AS

## 2018-09-17 NOTE — ED PROVIDER NOTE - NS ED ATTENDING STATEMENT MOD
Left message for patient to call back I have personally seen and examined this patient.  I have fully participated in the care of this patient. I have reviewed all pertinent clinical information, including history, physical exam, plan and the Resident’s note and agree except as noted.

## 2018-09-17 NOTE — DIETITIAN INITIAL EVALUATION ADULT. - NS AS NUTRI INTERV ENTERAL NUTRITION
If pt remains intubated, recommend continue Vital 1.2 at 60 cc/hr x 18 hrs provides 1296 kcals, 81 gm protein, 876cc free water  meets 21 Kcal/Kg, 1.3Gm/kg dosing wt 62.9kg

## 2018-09-17 NOTE — PROGRESS NOTE ADULT - ASSESSMENT
83 y/o F w/ a PMH significant for COPD on home O2 (though has not used in past few months), JENNIE on BiPAP, rheumatic heart disease s/p mitral valve replacement, HFpEF, aortic stenosis, A-fib on coumadin, sick sinus syndrome s/p pacemaker placement, HTN, and CKD3 who presented to the ED w/ dyspnea x2 days. Admitted w/ COPD exacerbation, ADHF, and +entero/rhinovirus.     #NSTEMI. Likely Type II 2/2 septic shock v stress CM.   - no need to continue to check CE.   - c/w ASA.   - Overall pt not showing signs of ADHF.   - TTE showing mild global systolic dysfunction.   - Would repeat TTE after d/c    #Mechanical MV. Elevated gradients on TTE.  - INR goal 2.5-3.5  - May need JENN once recovered to eval mitral valve.     #Mod-severe AS. Decreased MAGNUS but gradients do not reflect severe AS.  - repeat TTE when pt recovers, decreased opening may be do to decreased LV function.     #PPM  - Pt has PPM set in DDDR according to interrogation.   - Underlying rhythm on monitor seems to be atrial flutter.   - will need to discuss setting w/ EP.     Barb Ray MD  Cardiology Fellow - PGY-4  [facility]: [id number]  NS: [phone]  [id number] 85 y/o F w/ a PMH significant for COPD on home O2 (though has not used in past few months), JENNIE on BiPAP, rheumatic heart disease s/p mitral valve replacement, HFpEF, aortic stenosis, A-fib on coumadin, sick sinus syndrome s/p pacemaker placement, HTN, and CKD3 who presented to the ED w/ dyspnea x2 days. Admitted w/ COPD exacerbation, ADHF, and +entero/rhinovirus.     #NSTEMI. Likely Type II 2/2 septic shock v stress CM.   - no need to continue to check CE.   - c/w ASA.   - Overall pt not showing signs of ADHF.   - TTE showing mild global systolic dysfunction.   - Would repeat TTE after d/c    #Mechanical MV. Elevated gradients on TTE.  - INR goal 2.5-3.5, heparin to coumadin bridge.   - May need JENN once recovered to eval mitral valve.     #Mod-severe AS. Decreased MAGNUS but gradients do not reflect severe AS.  - repeat TTE when pt recovers, decreased opening may be do to decreased LV function.     #PPM  - Pt has PPM set in DDDR according to interrogation.   - Underlying rhythm on monitor seems to be atrial flutter.   - will need to discuss setting w/ EP.     Barb Ray MD  Cardiology Fellow - PGY-4  [facility]: [id number]  NS: [phone] [id number] 85 y/o F w/ a PMH significant for COPD on home O2 (though has not used in past few months), JENNIE on BiPAP, rheumatic heart disease s/p mitral valve replacement, HFpEF, aortic stenosis, A-fib on coumadin, sick sinus syndrome s/p pacemaker placement, HTN, and CKD3 who presented to the ED w/ dyspnea x2 days. Admitted w/ COPD exacerbation, ADHF, and +entero/rhinovirus.     #NSTEMI. Likely Type II 2/2 septic shock v stress CM.   - no need to continue to check CE.   - can d/c ASA  - Overall pt not showing signs of ADHF.   - TTE showing mild global systolic dysfunction.   - Would repeat TTE after d/c    #Mechanical MV. Elevated gradients on TTE.  - INR goal 2.5-3.5, heparin to coumadin bridge.   - May need JENN once recovered to eval mitral valve.     #Mod-severe AS. Decreased MAGNUS but gradients do not reflect severe AS.  - repeat TTE when pt recovers, decreased opening may be do to decreased LV function.   - Would need to be worked up once pts acute illness has resolved.     #PPM  - Pt has PPM set in DDDR according to interrogation.   - Underlying rhythm on monitor seems to be atrial flutter, which keyurley prompted mode switch to VVO, which is why pts HR is 60.     Barb Ray MD  Cardiology Fellow - PGY-4  LAST: [id number]  NS: [phone]  [id number] 83 y/o F w/ a PMH significant for COPD on home O2 (though has not used in past few months), JENNIE on BiPAP, rheumatic heart disease s/p mitral valve replacement, HFpEF, aortic stenosis, A-fib on coumadin, sick sinus syndrome s/p pacemaker placement, HTN, and CKD3 who presented to the ED w/ dyspnea x2 days. Admitted w/ COPD exacerbation, ADHF, and +entero/rhinovirus.     #NSTEMI. Likely Type II 2/2 septic shock v stress CM.   - no need to continue to check CE.   - can d/c ASA  - Overall pt not showing signs of ADHF.   - TTE showing mild global systolic dysfunction.   - Would repeat TTE after d/c    #Mechanical MV. Elevated gradients on TTE.  - INR goal 2.5-3.5, heparin to coumadin bridge.   - May need JENN once recovered to eval mitral valve.     #Mod-severe AS. Decreased MAGNUS but gradients do not reflect severe AS and may be magnified by concomitant AI. LFLG AS, element of pseudo AS remains possible  - repeat TTE when pt recovers, decreased opening may be do to decreased LV function.   - Would need to be worked up once pts acute illness has resolved.     #PPM  - Pt has PPM set in DDDR according to interrogation.   - Underlying rhythm on monitor seems to be atrial flutter, which likely prompted mode switch to VVI, which is why pts HR is 60.     Barb Ray MD  Cardiology Fellow - PGY-4  LAST: [id number]  NS: [phone]  [id number]

## 2018-09-17 NOTE — PROGRESS NOTE ADULT - ASSESSMENT
85 y/o F w/ a PMH significant for COPD, JENNIE on BiPAP, multivalvular disease (severe AS, s/p MV replacement), HFpEF/severe diastolic failure, A-fib on coumadin, sick sinus syndrome s/p pacemaker placement, HTN, and CKD3 who admitted for acute respiratory failure requiring intubation suspected to be 2/2 COPD exacerbation c/b PNA w/ +entero/rhinovirus and GN coccobacillus.    #Neuro  -sedated on precedex    #CV  -admitted w/ sBP 200, since requiring pressure support 2/2 sepsis  -0.12 noreph pressure support  -troponins downtrending, currently 192. likely demand 2/2 HTN urgency              -will stop trending cardiac enzymes (CK/CKMB negative x3)              -s/p ASA and plavix load. now on QD ASA + clopidogrel.              -low threshold to stop ASA/plavix of any sx bleeding.  -1 episode lai to 30s              -EP consulted for interrogation of pacemaker.  -HFpEF (40-50%) w/ severe diastolic failure  -multivalvular disease: MV replacement, severe AS  -per cards, no diuresis w/ lasix in setting of shock      #Resp  - intubated on volume control: FiO2 40%, PEEP 5, RR 16, Vt 400 cc  - CXR w/ pulmonary edema vs overlying PNA  -enterovirus positive, now w/ GN coccobacillus positive, possible bacteremia from overlying PNA  -suspect H. flu PNA  -continue azithromycin, cefepime      #Renal  -LATANYA, likely 2/2 sepsis + HTN urgency  -sCr continues to uptrend, expect improvement w/ improvement of sepsis/shock.  -holding diuresis per cards in setting of shock    #ID  -+entero/rhinovirus  -legionella negative  -GN coccobacillus (H. influenzae) on 9/14 blood culture  - UClx (9/14): NGTD  - Sputum Clx (9/14): No organisms  -admitted w/ 103 fever, now afebrile.   -dual therapy (started 9/14): azithro, cefepime  -d/c vanc, low suspicion for MRSA infection    Heme:  -persistently increasing INR.   -home warfarin  -worsening 2/2 sepsis vs loss of GI chastity from abx  -DIC panel negative  -LFTs mildly elevated. low suspicion for acute liver failure.   -continue to monitor, low threshold for IV K if any sx bleeding.    GI: No issues.    #Endocrine: No issues.    #Nutrition  -started tube feeds last PM

## 2018-09-17 NOTE — PROGRESS NOTE ADULT - ATTENDING COMMENTS
Patient interviewed and examined.  Chart reviewed and note edited where appropriate.  Case discussed with fellow.  Agree w/ Assessment and Plan as outlined.    Ernie Wset MD [facility]  Spectra:  [id number]  Office: [phone]

## 2018-09-17 NOTE — PROGRESS NOTE ADULT - SUBJECTIVE AND OBJECTIVE BOX
Patient seen and examined at bedside.    Overnight Events: Pt intubated.       REVIEW OF SYSTEMS:  Constitutional:     [ ] negative [ ] fevers [ ] chills [ ] weight loss [ ] weight gain  HEENT:                  [ ] negative [ ] dry eyes [ ] eye irritation [ ] postnasal drip [ ] nasal congestion  CV:                         [ ] negative  [ ] chest pain [ ] orthopnea [ ] palpitations [ ] murmur  Resp:                     [ ] negative [ ] cough [ ] shortness of breath [ ] dyspnea [ ] wheezing [ ] sputum [ ]hemoptysis  GI:                          [ ] negative [ ] nausea [ ] vomiting [ ] diarrhea [ ] constipation [ ] abd pain [ ] dysphagia   :                        [ ] negative [ ] dysuria [ ] nocturia [ ] hematuria [ ] increased urinary frequency  Musculoskeletal: [ ] negative [ ] back pain [ ] myalgias [ ] arthralgias [ ] fracture  Skin:                       [ ] negative [ ] rash [ ] itch  Neurological:        [ ] negative [ ] headache [ ] dizziness [ ] syncope [ ] weakness [ ] numbness  Psychiatric:           [ ] negative [ ] anxiety [ ] depression  Endocrine:            [ ] negative [ ] diabetes [ ] thyroid problem  Heme/Lymph:      [ ] negative [ ] anemia [ ] bleeding problem  Allergic/Immune: [ ] negative [ ] itchy eyes [ ] nasal discharge [ ] hives [ ] angioedema    [ ] All other systems negative  [x ] Unable to assess ROS because pt intubated.     Current Meds:  acetaminophen   Tablet .. 650 milliGRAM(s) Oral once PRN  ALBUTerol/ipratropium for Nebulization 3 milliLiter(s) Nebulizer every 6 hours  aspirin  chewable 81 milliGRAM(s) Oral daily  cefepime   IVPB      cefepime   IVPB 1000 milliGRAM(s) IV Intermittent daily  dexmedetomidine Infusion 0.01 MICROgram(s)/kG/Hr IV Continuous <Continuous>  dextrose 40% Gel 15 Gram(s) Oral once PRN  dextrose 5%. 1000 milliLiter(s) IV Continuous <Continuous>  dextrose 50% Injectable 12.5 Gram(s) IV Push once  dextrose 50% Injectable 25 Gram(s) IV Push once  dextrose 50% Injectable 25 Gram(s) IV Push once  glucagon  Injectable 1 milliGRAM(s) IntraMuscular once PRN  heparin  Infusion.  Unit(s)/Hr IV Continuous <Continuous>  heparin  Injectable 5000 Unit(s) IV Push every 6 hours PRN  heparin  Injectable 2500 Unit(s) IV Push every 6 hours PRN  insulin lispro (HumaLOG) corrective regimen sliding scale   SubCutaneous every 6 hours  pantoprazole  Injectable 40 milliGRAM(s) IV Push daily  predniSONE   Tablet 40 milliGRAM(s) Oral daily      PAST MEDICAL & SURGICAL HISTORY:  Aortic stenosis, moderate  Heart failure with preserved ejection fraction  Rheumatic heart disease  Chronic obstructive pulmonary disease, unspecified COPD type  CKD (chronic kidney disease) stage 3, GFR 30-59 ml/min  Chronic atrial fibrillation  Sick sinus syndrome  Cardiac pacemaker recipient  H/O mitral valve replacement      Vitals:  T(F): 98.4 (09-17), Max: 100.5 (09-16)  HR: 59 (09-17) (59 - 65)  BP: 96/45 (09-17) (80/42 - 179/74)  RR: 26 (09-17)  SpO2: 100% (09-17)  I&O's Summary    15 Sep 2018 07:01  -  16 Sep 2018 07:00  --------------------------------------------------------  IN: 2139 mL / OUT: 620 mL / NET: 1519 mL    16 Sep 2018 07:01  -  17 Sep 2018 06:30  --------------------------------------------------------  IN: 867 mL / OUT: 1345 mL / NET: -478 mL        Physical Exam:  Appearance: No acute distress; well appearing  Eyes: PERRL, EOMI, pink conjunctiva  HENT: Normal oral mucosa. intubated.   Cardiovascular: RRR, S1, S2, systolic murmur, no rubs, or gallops; no edema; no JVD  Respiratory: Clear to auscultation bilaterally  Gastrointestinal: soft, non-tender, non-distended with normal bowel sounds  Musculoskeletal: No clubbing; no joint deformity   Neurologic: Non-focal  Lymphatic: No lymphadenopathy  Skin: No rashes, ecchymoses, or cyanosis                          9.0    5.8   )-----------( 99       ( 17 Sep 2018 00:05 )             27.9     09-17    132<L>  |  97  |  72<H>  ----------------------------<  329<H>  5.1   |  23  |  1.95<H>    Ca    8.0<L>      17 Sep 2018 00:05  Phos  4.8     09-17  Mg     2.8     09-17    TPro  6.1  /  Alb  2.6<L>  /  TBili  0.3  /  DBili  x   /  AST  71<H>  /  ALT  92<H>  /  AlkPhos  139<H>  09-17    PT/INR - ( 17 Sep 2018 00:05 )   PT: 13.9 sec;   INR: 1.28 ratio         PTT - ( 17 Sep 2018 00:05 )  PTT:128.0 sec  CARDIAC MARKERS ( 15 Sep 2018 07:43 )  x     / x     / 107 U/L / x     / 2.8 ng/mL      Serum Pro-Brain Natriuretic Peptide: 3467 pg/mL (09-14 @ 06:26)          New ECG(s): Personally reviewed    Echo:  < from: Transthoracic Echocardiogram (09.14.18 @ 17:01) >  ------------------------------------------------------------------------  Conclusions:  1. Mechanical prosthetic mitral valve replacement. Peak  mitral valve gradient equals 13 mm Hg, mean transmitral  valve gradient equals 6 mm Hg, which is elevated even in  the setting of a mechanical prosthetic mitral valve  replacement.  2. Severely calcified aortic valve with decreased opening.  Peak transaortic valve gradient equals 36 mm Hg, mean  transaortic valve gradient equals 19 mmHg, estimated  aortic valve area equals 0.7 sqcm (by continuity equation),  aortic valve velocity time integral equals 61 cm,  consistent with severe aortic stenosis. Moderate aortic  regurgitation.  3. Severely dilated left atrium.  LA volume index = 49  cc/m2.  4. Increased relative wall thickness with normal left  ventricular mass index, consistent with concentric left  ventricular remodeling.  5. Mild global left ventricular systolic dysfunction.  6. Severe diastolic dysfunction (Stage III).  7. Moderate right atrial enlargement.  8. Normal right ventricular size with mildly decreased  right ventricular systolic function. A device wire is noted  in the right heart.  9. Estimated pulmonary artery systolic pressure equals 46  mm Hg, assuming right atrial pressure equals 10 - 15 mm Hg,  consistent with mild pulmonary pressures.  *** No previous Echo exam.  ------------------------------------------------------------------------  Confirmed on  9/15/2018 - 05:10:20 by Jerry Edwards M.D.  ------------------------------------------------------------------------    < end of copied text >    Imaging:    Interpretation of Telemetry:

## 2018-09-17 NOTE — PROGRESS NOTE ADULT - ATTENDING COMMENTS
Critically ill on vent with COPD exacerbation Adequate performance of SBT For extubation attempt  Frequent bedside visits with therapy change today. Crit Care Time Today 35 min +

## 2018-09-17 NOTE — DIETITIAN INITIAL EVALUATION ADULT. - ENERGY NEEDS
Estimated calorie needs for intubated pt per [employer] ([employer]) 2003  1311cal/day (21kcals/kg)   Ht: 62"  Wt: 139  BMI: 25.5 kg/m2   IBW: 110 (+/-10%)    126% IBW  Edema: none    Skin: no pressure injuries

## 2018-09-17 NOTE — PROGRESS NOTE ADULT - SUBJECTIVE AND OBJECTIVE BOX
Patient is a 84y old  Female who presents with a chief complaint of COPD exacerbation, ADHF (17 Sep 2018 06:30)      Interval Events: NAOE. On heparin gtt, pTT 128-138.    REVIEW OF SYSTEMS:  Constitutional: [ ] negative [ ] fevers [ ] chills [ ] weight loss [ ] weight gain  HEENT: [ ] negative [ ] dry eyes [ ] eye irritation [ ] postnasal drip [ ] nasal congestion  CV: [ ] negative  [ ] chest pain [ ] orthopnea [ ] palpitations [ ] murmur  Resp: [ ] negative [ ] cough [ ] shortness of breath [ ] dyspnea [ ] wheezing [ ] sputum [ ] hemoptysis  GI: [ ] negative [ ] nausea [ ] vomiting [ ] diarrhea [ ] constipation [ ] abd pain [ ] dysphagia   : [ ] negative [ ] dysuria [ ] nocturia [ ] hematuria [ ] increased urinary frequency  Musculoskeletal: [ ] negative [ ] back pain [ ] myalgias [ ] arthralgias [ ] fracture  Skin: [ ] negative [ ] rash [ ] itch  Neurological: [ ] negative [ ] headache [ ] dizziness [ ] syncope [ ] weakness [ ] numbness  Psychiatric: [ ] negative [ ] anxiety [ ] depression  Endocrine: [ ] negative [ ] diabetes [ ] thyroid problem  Hematologic/Lymphatic: [ ] negative [ ] anemia [ ] bleeding problem  Allergic/Immunologic: [ ] negative [ ] itchy eyes [ ] nasal discharge [ ] hives [ ] angioedema  [ ] All other systems negative  [X] Unable to assess ROS because intubated    OBJECTIVE:  ICU Vital Signs Last 24 Hrs  T(C): 36.9 (17 Sep 2018 04:00), Max: 38.1 (16 Sep 2018 08:00)  T(F): 98.4 (17 Sep 2018 04:00), Max: 100.5 (16 Sep 2018 08:00)  HR: 59 (17 Sep 2018 06:00) (59 - 65)  BP: 96/45 (17 Sep 2018 06:00) (80/42 - 179/74)  BP(mean): 65 (17 Sep 2018 06:00) (58 - 107)  ABP: --  ABP(mean): --  RR: 26 (17 Sep 2018 06:00) (20 - 35)  SpO2: 100% (17 Sep 2018 06:00) (100% - 100%)    Mode: AC/ CMV (Assist Control/ Continuous Mandatory Ventilation), RR (machine): 20, TV (machine): 400, FiO2: 40, PEEP: 5, ITime: 1, MAP: 10, PIP: 24    09-15 @ 07:01 - 09-16 @ 07:00  --------------------------------------------------------  IN: 2139 mL / OUT: 620 mL / NET: 1519 mL    09-16 @ 07:01 - 09-17 @ 06:58  --------------------------------------------------------  IN: 866.4 mL / OUT: 1425 mL / NET: -558.6 mL      CAPILLARY BLOOD GLUCOSE      POCT Blood Glucose.: 256 mg/dL (17 Sep 2018 05:37)      PHYSICAL EXAM:  General: NAD.  HEENT: NC/AT; PERRL, clear conjunctiva  Neck: Central line in place w/out evidence bleeding or discharge.  Respiratory: Loud ronchi and upper airway sounds. Diffuse crackles.  Cardiovascular: +S1/S2; RRR. Unable to accurately assess murmurs 2/2 loud airway sounds.   Abdomen: soft, NT/ND; +BS x4  Extremities: WWP, 2+ peripheral pulses b/l; no LE edema  Skin: normal color and turgor; no rash  Neurological: Sedated. Withdraws to pain.    LINES:    HOSPITAL MEDICATIONS:  Standing Meds:  ALBUTerol/ipratropium for Nebulization 3 milliLiter(s) Nebulizer every 6 hours  aspirin  chewable 81 milliGRAM(s) Oral daily  cefepime   IVPB      cefepime   IVPB 1000 milliGRAM(s) IV Intermittent daily  dexmedetomidine Infusion 0.01 MICROgram(s)/kG/Hr IV Continuous <Continuous>  dextrose 5%. 1000 milliLiter(s) IV Continuous <Continuous>  dextrose 50% Injectable 12.5 Gram(s) IV Push once  dextrose 50% Injectable 25 Gram(s) IV Push once  dextrose 50% Injectable 25 Gram(s) IV Push once  heparin  Infusion.  Unit(s)/Hr IV Continuous <Continuous>  insulin lispro (HumaLOG) corrective regimen sliding scale   SubCutaneous every 6 hours  pantoprazole  Injectable 40 milliGRAM(s) IV Push daily  predniSONE   Tablet 40 milliGRAM(s) Oral daily      PRN Meds:  acetaminophen   Tablet .. 650 milliGRAM(s) Oral once PRN  dextrose 40% Gel 15 Gram(s) Oral once PRN  glucagon  Injectable 1 milliGRAM(s) IntraMuscular once PRN  heparin  Injectable 5000 Unit(s) IV Push every 6 hours PRN  heparin  Injectable 2500 Unit(s) IV Push every 6 hours PRN      LABS:                        9.0    5.8   )-----------( 99       ( 17 Sep 2018 00:05 )             27.9     Hgb Trend: 9.0<--, 9.7<--, 11.4<--, 11.7<--, 12.2<--  09-17    132<L>  |  97  |  72<H>  ----------------------------<  329<H>  5.1   |  23  |  1.95<H>    Ca    8.0<L>      17 Sep 2018 00:05  Phos  4.8     09-17  Mg     2.8     09-17    TPro  6.1  /  Alb  2.6<L>  /  TBili  0.3  /  DBili  x   /  AST  71<H>  /  ALT  92<H>  /  AlkPhos  139<H>  09-17    Creatinine Trend: 1.95<--, 2.39<--, 2.29<--, 2.53<--, 2.12<--, 1.87<--  PT/INR - ( 17 Sep 2018 06:10 )   PT: 12.8 sec;   INR: 1.18 ratio         PTT - ( 17 Sep 2018 06:10 )  PTT:138.7 sec    Arterial Blood Gas:  09-16 @ 14:48  7.34/47/143/25/97/-.7  ABG lactate: --        MICROBIOLOGY:     Culture - Sputum (collected 14 Sep 2018 17:30)  Source: .Sputum Sputum  Gram Stain (14 Sep 2018 23:31):    Moderate polymorphonuclear leukocytes per low power field    Rare Squamous epithelial cells per low power field    No organisms seen per oil power field  Final Report (16 Sep 2018 16:46):    No growth at 48 hours    Culture - Urine (collected 14 Sep 2018 16:58)  Source: .Urine Catheterized  Final Report (15 Sep 2018 22:31):    No growth    Culture - Blood (collected 14 Sep 2018 09:13)  Source: .Blood Blood-Venous  Gram Stain (15 Sep 2018 06:27):    Growth in aerobic bottle: Gram Negative Coccobacilli  Preliminary Report (16 Sep 2018 09:32):    Growth in aerobic bottle: Haemophilus influenzae    "Due to technical problems, Proteus sp. will Not be reported as part of    the BCID panel until further notice"    ***Blood Panel PCR results on this specimen are available    approximately 3 hours after the Gram stain result.***    Gram stain, PCR, and/or culture results may not always    correspond due to difference in methodologies.    ************************************************************    This PCR assay was performed using [employer].    Thefollowing targets are tested for: Enterococcus,    vancomycin resistant enterococci, Listeria monocytogenes,    coagulase negative staphylococci, S. aureus,    methicillin resistant S. aureus, Streptococcus agalactiae    (Group B), S. pneumoniae, S. pyogenes(Group A),    Acinetobacter baumannii, Enterobacter cloacae, E. coli,    Klebsiella oxytoca, K. pneumoniae, Proteus sp.,    Serratia marcescens, Haemophilus influenzae,    Neisseria meningitidis, Pseudomonas aeruginosa, Candida    albicans, C. glabrata, C krusei, C parapsilosis,    C. tropicalis and the KPC resistance gene.  Organism: Blood Culture PCR (15 Sep 2018 08:02)  Organism: Blood Culture PCR (15 Sep 2018 08:02)    Culture - Blood (collected 14 Sep 2018 09:13)  Source: .Blood Blood-Peripheral  Gram Stain (17 Sep 2018 02:39):    Growth in anaerobic bottle: Gram Negative Coccobacilli  Preliminary Report (17 Sep 2018 02:39):    Growth in anaerobic bottle: Gram Negative Coccobacilli      RADIOLOGY:  [ ] Reviewed and interpreted by me    EKG: Patient is a 84y old  Female who presents with a chief complaint of COPD exacerbation, ADHF (17 Sep 2018 06:30)      Interval Events: NAOE. Off the pressors since yesterday noon. On heparin gtt, pTT 128-138. Failed CPAP trial this AM due to tachypnea and low TV.    REVIEW OF SYSTEMS:  Constitutional: [ ] negative [ ] fevers [ ] chills [ ] weight loss [ ] weight gain  HEENT: [ ] negative [ ] dry eyes [ ] eye irritation [ ] postnasal drip [ ] nasal congestion  CV: [ ] negative  [ ] chest pain [ ] orthopnea [ ] palpitations [ ] murmur  Resp: [ ] negative [ ] cough [ ] shortness of breath [ ] dyspnea [ ] wheezing [ ] sputum [ ] hemoptysis  GI: [ ] negative [ ] nausea [ ] vomiting [ ] diarrhea [ ] constipation [ ] abd pain [ ] dysphagia   : [ ] negative [ ] dysuria [ ] nocturia [ ] hematuria [ ] increased urinary frequency  Musculoskeletal: [ ] negative [ ] back pain [ ] myalgias [ ] arthralgias [ ] fracture  Skin: [ ] negative [ ] rash [ ] itch  Neurological: [ ] negative [ ] headache [ ] dizziness [ ] syncope [ ] weakness [ ] numbness  Psychiatric: [ ] negative [ ] anxiety [ ] depression  Endocrine: [ ] negative [ ] diabetes [ ] thyroid problem  Hematologic/Lymphatic: [ ] negative [ ] anemia [ ] bleeding problem  Allergic/Immunologic: [ ] negative [ ] itchy eyes [ ] nasal discharge [ ] hives [ ] angioedema  [ ] All other systems negative  [X] Unable to assess ROS because intubated    OBJECTIVE:  ICU Vital Signs Last 24 Hrs  T(C): 36.9 (17 Sep 2018 04:00), Max: 38.1 (16 Sep 2018 08:00)  T(F): 98.4 (17 Sep 2018 04:00), Max: 100.5 (16 Sep 2018 08:00)  HR: 59 (17 Sep 2018 06:00) (59 - 65)  BP: 96/45 (17 Sep 2018 06:00) (80/42 - 179/74)  BP(mean): 65 (17 Sep 2018 06:00) (58 - 107)  ABP: --  ABP(mean): --  RR: 26 (17 Sep 2018 06:00) (20 - 35)  SpO2: 100% (17 Sep 2018 06:00) (100% - 100%)    Mode: AC/ CMV (Assist Control/ Continuous Mandatory Ventilation), RR (machine): 20, TV (machine): 400, FiO2: 40, PEEP: 5, ITime: 1, MAP: 10, PIP: 24    09-15 @ 07:01  -  09-16 @ 07:00  --------------------------------------------------------  IN: 2139 mL / OUT: 620 mL / NET: 1519 mL    09-16 @ 07:01 - 09-17 @ 06:58  --------------------------------------------------------  IN: 866.4 mL / OUT: 1425 mL / NET: -558.6 mL      CAPILLARY BLOOD GLUCOSE      POCT Blood Glucose.: 256 mg/dL (17 Sep 2018 05:37)      PHYSICAL EXAM:  General: NAD.  HEENT: NC/AT; PERRL, clear conjunctiva  Neck: Central line in place w/out evidence bleeding or discharge.  Respiratory: Loud ronchi and upper airway sounds. Diffuse crackles.  Cardiovascular: +S1/S2; RRR. Unable to accurately assess murmurs 2/2 loud airway sounds.   Abdomen: soft, NT/ND; +BS x4  Extremities: WWP, 2+ peripheral pulses b/l; no LE edema  Skin: normal color and turgor; no rash  Neurological: Sedated. Withdraws to pain.    LINES:    HOSPITAL MEDICATIONS:  Standing Meds:  ALBUTerol/ipratropium for Nebulization 3 milliLiter(s) Nebulizer every 6 hours  aspirin  chewable 81 milliGRAM(s) Oral daily  cefepime   IVPB      cefepime   IVPB 1000 milliGRAM(s) IV Intermittent daily  dexmedetomidine Infusion 0.01 MICROgram(s)/kG/Hr IV Continuous <Continuous>  dextrose 5%. 1000 milliLiter(s) IV Continuous <Continuous>  dextrose 50% Injectable 12.5 Gram(s) IV Push once  dextrose 50% Injectable 25 Gram(s) IV Push once  dextrose 50% Injectable 25 Gram(s) IV Push once  heparin  Infusion.  Unit(s)/Hr IV Continuous <Continuous>  insulin lispro (HumaLOG) corrective regimen sliding scale   SubCutaneous every 6 hours  pantoprazole  Injectable 40 milliGRAM(s) IV Push daily  predniSONE   Tablet 40 milliGRAM(s) Oral daily      PRN Meds:  acetaminophen   Tablet .. 650 milliGRAM(s) Oral once PRN  dextrose 40% Gel 15 Gram(s) Oral once PRN  glucagon  Injectable 1 milliGRAM(s) IntraMuscular once PRN  heparin  Injectable 5000 Unit(s) IV Push every 6 hours PRN  heparin  Injectable 2500 Unit(s) IV Push every 6 hours PRN      LABS:                        9.0    5.8   )-----------( 99       ( 17 Sep 2018 00:05 )             27.9     Hgb Trend: 9.0<--, 9.7<--, 11.4<--, 11.7<--, 12.2<--  09-17    132<L>  |  97  |  72<H>  ----------------------------<  329<H>  5.1   |  23  |  1.95<H>    Ca    8.0<L>      17 Sep 2018 00:05  Phos  4.8     09-17  Mg     2.8     09-17    TPro  6.1  /  Alb  2.6<L>  /  TBili  0.3  /  DBili  x   /  AST  71<H>  /  ALT  92<H>  /  AlkPhos  139<H>  09-17    Creatinine Trend: 1.95<--, 2.39<--, 2.29<--, 2.53<--, 2.12<--, 1.87<--  PT/INR - ( 17 Sep 2018 06:10 )   PT: 12.8 sec;   INR: 1.18 ratio         PTT - ( 17 Sep 2018 06:10 )  PTT:138.7 sec    Arterial Blood Gas:  09-16 @ 14:48  7.34/47/143/25/97/-.7  ABG lactate: --        MICROBIOLOGY:     Culture - Sputum (collected 14 Sep 2018 17:30)  Source: .Sputum Sputum  Gram Stain (14 Sep 2018 23:31):    Moderate polymorphonuclear leukocytes per low power field    Rare Squamous epithelial cells per low power field    No organisms seen per oil power field  Final Report (16 Sep 2018 16:46):    No growth at 48 hours    Culture - Urine (collected 14 Sep 2018 16:58)  Source: .Urine Catheterized  Final Report (15 Sep 2018 22:31):    No growth    Culture - Blood (collected 14 Sep 2018 09:13)  Source: .Blood Blood-Venous  Gram Stain (15 Sep 2018 06:27):    Growth in aerobic bottle: Gram Negative Coccobacilli  Preliminary Report (16 Sep 2018 09:32):    Growth in aerobic bottle: Haemophilus influenzae    "Due to technical problems, Proteus sp. will Not be reported as part of    the BCID panel until further notice"    ***Blood Panel PCR results on this specimen are available    approximately 3 hours after the Gram stain result.***    Gram stain, PCR, and/or culture results may not always    correspond due to difference in methodologies.    ************************************************************    This PCR assay was performed using [employer].    Thefollowing targets are tested for: Enterococcus,    vancomycin resistant enterococci, Listeria monocytogenes,    coagulase negative staphylococci, S. aureus,    methicillin resistant S. aureus, Streptococcus agalactiae    (Group B), S. pneumoniae, S. pyogenes(Group A),    Acinetobacter baumannii, Enterobacter cloacae, E. coli,    Klebsiella oxytoca, K. pneumoniae, Proteus sp.,    Serratia marcescens, Haemophilus influenzae,    Neisseria meningitidis, Pseudomonas aeruginosa, Candida    albicans, C. glabrata, C krusei, C parapsilosis,    C. tropicalis and the KPC resistance gene.  Organism: Blood Culture PCR (15 Sep 2018 08:02)  Organism: Blood Culture PCR (15 Sep 2018 08:02)    Culture - Blood (collected 14 Sep 2018 09:13)  Source: .Blood Blood-Peripheral  Gram Stain (17 Sep 2018 02:39):    Growth in anaerobic bottle: Gram Negative Coccobacilli  Preliminary Report (17 Sep 2018 02:39):    Growth in anaerobic bottle: Gram Negative Coccobacilli      RADIOLOGY:  [ ] Reviewed and interpreted by me    EKG:

## 2018-09-18 LAB
-  AMPICILLIN: SIGNIFICANT CHANGE UP
-  CEFTRIAXONE: SIGNIFICANT CHANGE UP
-  LEVOFLOXACIN: SIGNIFICANT CHANGE UP
-  MEROPENEM: SIGNIFICANT CHANGE UP
ALBUMIN SERPL ELPH-MCNC: 2.6 G/DL — LOW (ref 3.3–5)
ALP SERPL-CCNC: 116 U/L — SIGNIFICANT CHANGE UP (ref 40–120)
ALT FLD-CCNC: 107 U/L — HIGH (ref 10–45)
ANION GAP SERPL CALC-SCNC: 13 MMOL/L — SIGNIFICANT CHANGE UP (ref 5–17)
APTT BLD: 60.5 SEC — HIGH (ref 27.5–37.4)
AST SERPL-CCNC: 68 U/L — HIGH (ref 10–40)
BILIRUB SERPL-MCNC: 0.3 MG/DL — SIGNIFICANT CHANGE UP (ref 0.2–1.2)
BUN SERPL-MCNC: 76 MG/DL — HIGH (ref 7–23)
CALCIUM SERPL-MCNC: 8.6 MG/DL — SIGNIFICANT CHANGE UP (ref 8.4–10.5)
CHLORIDE SERPL-SCNC: 103 MMOL/L — SIGNIFICANT CHANGE UP (ref 96–108)
CO2 SERPL-SCNC: 24 MMOL/L — SIGNIFICANT CHANGE UP (ref 22–31)
CREAT SERPL-MCNC: 1.42 MG/DL — HIGH (ref 0.5–1.3)
GAS PNL BLDA: SIGNIFICANT CHANGE UP
GLUCOSE BLDC GLUCOMTR-MCNC: 126 MG/DL — HIGH (ref 70–99)
GLUCOSE BLDC GLUCOMTR-MCNC: 226 MG/DL — HIGH (ref 70–99)
GLUCOSE BLDC GLUCOMTR-MCNC: 235 MG/DL — HIGH (ref 70–99)
GLUCOSE SERPL-MCNC: 127 MG/DL — HIGH (ref 70–99)
HCT VFR BLD CALC: 28.2 % — LOW (ref 34.5–45)
HGB BLD-MCNC: 9.2 G/DL — LOW (ref 11.5–15.5)
INR BLD: 1.05 RATIO — SIGNIFICANT CHANGE UP (ref 0.88–1.16)
MAGNESIUM SERPL-MCNC: 3 MG/DL — HIGH (ref 1.6–2.6)
MCHC RBC-ENTMCNC: 28.2 PG — SIGNIFICANT CHANGE UP (ref 27–34)
MCHC RBC-ENTMCNC: 32.5 GM/DL — SIGNIFICANT CHANGE UP (ref 32–36)
MCV RBC AUTO: 86.9 FL — SIGNIFICANT CHANGE UP (ref 80–100)
METHOD TYPE: SIGNIFICANT CHANGE UP
PHOSPHATE SERPL-MCNC: 3.2 MG/DL — SIGNIFICANT CHANGE UP (ref 2.5–4.5)
PLATELET # BLD AUTO: 123 K/UL — LOW (ref 150–400)
POTASSIUM SERPL-MCNC: 4.3 MMOL/L — SIGNIFICANT CHANGE UP (ref 3.5–5.3)
POTASSIUM SERPL-SCNC: 4.3 MMOL/L — SIGNIFICANT CHANGE UP (ref 3.5–5.3)
PROT SERPL-MCNC: 6.2 G/DL — SIGNIFICANT CHANGE UP (ref 6–8.3)
PROTHROM AB SERPL-ACNC: 11.3 SEC — SIGNIFICANT CHANGE UP (ref 9.8–12.7)
RBC # BLD: 3.25 M/UL — LOW (ref 3.8–5.2)
RBC # FLD: 15.3 % — HIGH (ref 10.3–14.5)
SODIUM SERPL-SCNC: 140 MMOL/L — SIGNIFICANT CHANGE UP (ref 135–145)
WBC # BLD: 8.5 K/UL — SIGNIFICANT CHANGE UP (ref 3.8–10.5)
WBC # FLD AUTO: 8.5 K/UL — SIGNIFICANT CHANGE UP (ref 3.8–10.5)

## 2018-09-18 PROCEDURE — 99291 CRITICAL CARE FIRST HOUR: CPT | Mod: 25

## 2018-09-18 PROCEDURE — 99232 SBSQ HOSP IP/OBS MODERATE 35: CPT | Mod: GC

## 2018-09-18 PROCEDURE — 76775 US EXAM ABDO BACK WALL LIM: CPT | Mod: 26

## 2018-09-18 PROCEDURE — 71045 X-RAY EXAM CHEST 1 VIEW: CPT | Mod: 26

## 2018-09-18 RX ORDER — HALOPERIDOL DECANOATE 100 MG/ML
1 INJECTION INTRAMUSCULAR ONCE
Qty: 0 | Refills: 0 | Status: DISCONTINUED | OUTPATIENT
Start: 2018-09-18 | End: 2018-09-18

## 2018-09-18 RX ORDER — HYDRALAZINE HCL 50 MG
5 TABLET ORAL ONCE
Qty: 0 | Refills: 0 | Status: COMPLETED | OUTPATIENT
Start: 2018-09-18 | End: 2018-09-18

## 2018-09-18 RX ORDER — HYDRALAZINE HCL 50 MG
10 TABLET ORAL ONCE
Qty: 0 | Refills: 0 | Status: COMPLETED | OUTPATIENT
Start: 2018-09-18 | End: 2018-09-18

## 2018-09-18 RX ORDER — ACETAMINOPHEN 500 MG
1000 TABLET ORAL ONCE
Qty: 0 | Refills: 0 | Status: COMPLETED | OUTPATIENT
Start: 2018-09-18 | End: 2018-09-18

## 2018-09-18 RX ORDER — SODIUM CHLORIDE 9 MG/ML
3 INJECTION INTRAMUSCULAR; INTRAVENOUS; SUBCUTANEOUS EVERY 12 HOURS
Qty: 0 | Refills: 0 | Status: DISCONTINUED | OUTPATIENT
Start: 2018-09-18 | End: 2018-09-20

## 2018-09-18 RX ORDER — HALOPERIDOL DECANOATE 100 MG/ML
2.5 INJECTION INTRAMUSCULAR ONCE
Qty: 0 | Refills: 0 | Status: COMPLETED | OUTPATIENT
Start: 2018-09-18 | End: 2018-09-18

## 2018-09-18 RX ADMIN — Medication 1000 MILLIGRAM(S): at 10:00

## 2018-09-18 RX ADMIN — HALOPERIDOL DECANOATE 2.5 MILLIGRAM(S): 100 INJECTION INTRAMUSCULAR at 23:31

## 2018-09-18 RX ADMIN — CEFTRIAXONE 100 GRAM(S): 500 INJECTION, POWDER, FOR SOLUTION INTRAMUSCULAR; INTRAVENOUS at 17:01

## 2018-09-18 RX ADMIN — Medication 1000 MILLIGRAM(S): at 04:07

## 2018-09-18 RX ADMIN — Medication 400 MILLIGRAM(S): at 03:09

## 2018-09-18 RX ADMIN — Medication 400 MILLIGRAM(S): at 22:58

## 2018-09-18 RX ADMIN — Medication 400 MILLIGRAM(S): at 09:41

## 2018-09-18 RX ADMIN — SODIUM CHLORIDE 3 MILLILITER(S): 9 INJECTION INTRAMUSCULAR; INTRAVENOUS; SUBCUTANEOUS at 05:46

## 2018-09-18 RX ADMIN — HEPARIN SODIUM 800 UNIT(S)/HR: 5000 INJECTION INTRAVENOUS; SUBCUTANEOUS at 01:00

## 2018-09-18 RX ADMIN — Medication 32 MILLIGRAM(S): at 06:17

## 2018-09-18 RX ADMIN — Medication 10 MILLIGRAM(S): at 22:30

## 2018-09-18 RX ADMIN — Medication 3 MILLILITER(S): at 17:16

## 2018-09-18 RX ADMIN — Medication 5 MILLIGRAM(S): at 21:54

## 2018-09-18 RX ADMIN — Medication 3 MILLILITER(S): at 23:23

## 2018-09-18 RX ADMIN — Medication 2: at 17:24

## 2018-09-18 RX ADMIN — PANTOPRAZOLE SODIUM 40 MILLIGRAM(S): 20 TABLET, DELAYED RELEASE ORAL at 11:30

## 2018-09-18 RX ADMIN — SODIUM CHLORIDE 3 MILLILITER(S): 9 INJECTION INTRAMUSCULAR; INTRAVENOUS; SUBCUTANEOUS at 17:16

## 2018-09-18 RX ADMIN — Medication 3 MILLILITER(S): at 11:55

## 2018-09-18 RX ADMIN — Medication 3 MILLILITER(S): at 05:47

## 2018-09-18 RX ADMIN — Medication 3 MILLILITER(S): at 00:02

## 2018-09-18 RX ADMIN — Medication 2: at 11:30

## 2018-09-18 NOTE — PROGRESS NOTE ADULT - ATTENDING COMMENTS
Patient interviewed and examined.  Chart reviewed and note edited where appropriate.  Case discussed with fellow.  Agree w/ Assessment and Plan as outlined.    Ernie West MD [facility]  Spectra:  [phone]  Office: [phone]

## 2018-09-18 NOTE — PROGRESS NOTE ADULT - SUBJECTIVE AND OBJECTIVE BOX
Patient is a 84y old  Female who presents with a chief complaint of COPD exacerbation, ADHF (18 Sep 2018 06:16)      Interval Events: Extubated yesterday. NAOE. Pt. has moderate secretions; underwent chest PT and started on hypertonic saline 3% inhalation q12h. Currently on 2L NC.    REVIEW OF SYSTEMS:  Constitutional: [ ] negative [ ] fevers [ ] chills [ ] weight loss [ ] weight gain  HEENT: [ ] negative [ ] dry eyes [ ] eye irritation [ ] postnasal drip [ ] nasal congestion  CV: [ ] negative  [ ] chest pain [ ] orthopnea [ ] palpitations [ ] murmur  Resp: [ ] negative [ ] cough [ ] shortness of breath [ ] dyspnea [ ] wheezing [ ] sputum [ ] hemoptysis  GI: [ ] negative [ ] nausea [ ] vomiting [ ] diarrhea [ ] constipation [ ] abd pain [ ] dysphagia   : [ ] negative [ ] dysuria [ ] nocturia [ ] hematuria [ ] increased urinary frequency  Musculoskeletal: [ ] negative [ ] back pain [ ] myalgias [ ] arthralgias [ ] fracture  Skin: [ ] negative [ ] rash [ ] itch  Neurological: [ ] negative [ ] headache [ ] dizziness [ ] syncope [ ] weakness [ ] numbness  Psychiatric: [ ] negative [ ] anxiety [ ] depression  Endocrine: [ ] negative [ ] diabetes [ ] thyroid problem  Hematologic/Lymphatic: [ ] negative [ ] anemia [ ] bleeding problem  Allergic/Immunologic: [ ] negative [ ] itchy eyes [ ] nasal discharge [ ] hives [ ] angioedema  [X] All other systems negative  [ ] Unable to assess ROS because ________    OBJECTIVE:  ICU Vital Signs Last 24 Hrs  T(C): 36.4 (18 Sep 2018 04:00), Max: 36.6 (17 Sep 2018 08:00)  T(F): 97.6 (18 Sep 2018 04:00), Max: 97.9 (17 Sep 2018 08:00)  HR: 63 (18 Sep 2018 06:00) (59 - 79)  BP: 149/65 (18 Sep 2018 06:00) (105/52 - 149/65)  BP(mean): 93 (18 Sep 2018 06:00) (75 - 94)  ABP: --  ABP(mean): --  RR: 28 (18 Sep 2018 06:00) (20 - 38)  SpO2: 100% (18 Sep 2018 06:00) (95% - 100%)    Mode: CPAP with PS, FiO2: 40, PEEP: 5, PS: 5, MAP: 8    09-17 @ 07:01  -  09-18 @ 07:00  --------------------------------------------------------  IN: 440.8 mL / OUT: 740 mL / NET: -299.2 mL      CAPILLARY BLOOD GLUCOSE      POCT Blood Glucose.: 126 mg/dL (18 Sep 2018 05:35)      PHYSICAL EXAM:  General: NAD.  HEENT: NC/AT; PERRL, clear conjunctiva  Neck: Central line in place w/out evidence bleeding or discharge.  Respiratory: Diffuse crackles.  Cardiovascular: +S1/S2; RRR. Unable to accurately assess murmurs 2/2 loud airway sounds.   Abdomen: soft, NT/ND; +BS x4  Extremities: WWP, 2+ peripheral pulses b/l; no LE edema  Skin: normal color and turgor; no rash  Neurological: Sedated. Withdraws to pain.    LINES:    HOSPITAL MEDICATIONS:  Standing Meds:  ALBUTerol/ipratropium for Nebulization 3 milliLiter(s) Nebulizer every 6 hours  aspirin  chewable 81 milliGRAM(s) Oral daily  cefTRIAXone   IVPB      cefTRIAXone   IVPB 1 Gram(s) IV Intermittent every 24 hours  dextrose 5%. 1000 milliLiter(s) IV Continuous <Continuous>  dextrose 50% Injectable 12.5 Gram(s) IV Push once  dextrose 50% Injectable 25 Gram(s) IV Push once  dextrose 50% Injectable 25 Gram(s) IV Push once  heparin  Infusion.  Unit(s)/Hr IV Continuous <Continuous>  insulin lispro (HumaLOG) corrective regimen sliding scale   SubCutaneous every 6 hours  pantoprazole  Injectable 40 milliGRAM(s) IV Push daily  predniSONE   Tablet 40 milliGRAM(s) Oral daily  sodium chloride 3%  Inhalation 3 milliLiter(s) Inhalation every 12 hours      PRN Meds:  acetaminophen   Tablet .. 650 milliGRAM(s) Oral once PRN  bisacodyl Suppository 10 milliGRAM(s) Rectal daily PRN  dextrose 40% Gel 15 Gram(s) Oral once PRN  glucagon  Injectable 1 milliGRAM(s) IntraMuscular once PRN  heparin  Injectable 5000 Unit(s) IV Push every 6 hours PRN  heparin  Injectable 2500 Unit(s) IV Push every 6 hours PRN      LABS:                        9.2    8.5   )-----------( 123      ( 18 Sep 2018 00:03 )             28.2     Hgb Trend: 9.2<--, 8.9<--, 9.0<--, 9.7<--, 11.4<--  09-18    140  |  103  |  76<H>  ----------------------------<  127<H>  4.3   |  24  |  1.42<H>    Ca    8.6      18 Sep 2018 00:03  Phos  3.2     09-18  Mg     3.0     09-18    TPro  6.2  /  Alb  2.6<L>  /  TBili  0.3  /  DBili  x   /  AST  68<H>  /  ALT  107<H>  /  AlkPhos  116  09-18    Creatinine Trend: 1.42<--, 1.95<--, 2.39<--, 2.29<--, 2.53<--, 2.12<--  PT/INR - ( 18 Sep 2018 00:03 )   PT: 11.3 sec;   INR: 1.05 ratio         PTT - ( 18 Sep 2018 00:03 )  PTT:60.5 sec    Arterial Blood Gas:  09-17 @ 12:29  7.39/44/165/26/99/1.1  ABG lactate: --  Arterial Blood Gas:  09-16 @ 14:48  7.34/47/143/25/97/-.7  ABG lactate: --        MICROBIOLOGY:     RADIOLOGY:  [ ] Reviewed and interpreted by me    EKG:

## 2018-09-18 NOTE — PROGRESS NOTE ADULT - SUBJECTIVE AND OBJECTIVE BOX
Critical Care Ultrasonography  Indication: Respiratory distress  Lung: Bilateral A line pattern  Cardiac: Nl LV fxn, no RV enlargement, No PEF  Retroperitoneal: Massive bladder distentiion  Imp: Pt much improved post straight cath

## 2018-09-18 NOTE — PROGRESS NOTE ADULT - SUBJECTIVE AND OBJECTIVE BOX
Patient seen and examined at bedside.    Overnight Events:       REVIEW OF SYSTEMS:  Constitutional:     [ ] negative [ ] fevers [ ] chills [ ] weight loss [ ] weight gain  HEENT:                  [ ] negative [ ] dry eyes [ ] eye irritation [ ] postnasal drip [ ] nasal congestion  CV:                         [ ] negative  [ ] chest pain [ ] orthopnea [ ] palpitations [ ] murmur  Resp:                     [ ] negative [ ] cough [ ] shortness of breath [ ] dyspnea [ ] wheezing [ ] sputum [ ]hemoptysis  GI:                          [ ] negative [ ] nausea [ ] vomiting [ ] diarrhea [ ] constipation [ ] abd pain [ ] dysphagia   :                        [ ] negative [ ] dysuria [ ] nocturia [ ] hematuria [ ] increased urinary frequency  Musculoskeletal: [ ] negative [ ] back pain [ ] myalgias [ ] arthralgias [ ] fracture  Skin:                       [ ] negative [ ] rash [ ] itch  Neurological:        [ ] negative [ ] headache [ ] dizziness [ ] syncope [ ] weakness [ ] numbness  Psychiatric:           [ ] negative [ ] anxiety [ ] depression  Endocrine:            [ ] negative [ ] diabetes [ ] thyroid problem  Heme/Lymph:      [ ] negative [ ] anemia [ ] bleeding problem  Allergic/Immune: [ ] negative [ ] itchy eyes [ ] nasal discharge [ ] hives [ ] angioedema    [ ] All other systems negative  [ ] Unable to assess ROS because sedated with anoxic brain injury.    Current Meds:  acetaminophen   Tablet .. 650 milliGRAM(s) Oral once PRN  ALBUTerol/ipratropium for Nebulization 3 milliLiter(s) Nebulizer every 6 hours  aspirin  chewable 81 milliGRAM(s) Oral daily  bisacodyl Suppository 10 milliGRAM(s) Rectal daily PRN  cefTRIAXone   IVPB      cefTRIAXone   IVPB 1 Gram(s) IV Intermittent every 24 hours  dextrose 40% Gel 15 Gram(s) Oral once PRN  dextrose 5%. 1000 milliLiter(s) IV Continuous <Continuous>  dextrose 50% Injectable 12.5 Gram(s) IV Push once  dextrose 50% Injectable 25 Gram(s) IV Push once  dextrose 50% Injectable 25 Gram(s) IV Push once  glucagon  Injectable 1 milliGRAM(s) IntraMuscular once PRN  heparin  Infusion.  Unit(s)/Hr IV Continuous <Continuous>  heparin  Injectable 5000 Unit(s) IV Push every 6 hours PRN  heparin  Injectable 2500 Unit(s) IV Push every 6 hours PRN  insulin lispro (HumaLOG) corrective regimen sliding scale   SubCutaneous every 6 hours  methylPREDNISolone sodium succinate Injectable 32 milliGRAM(s) IV Push once  pantoprazole  Injectable 40 milliGRAM(s) IV Push daily  predniSONE   Tablet 40 milliGRAM(s) Oral daily  sodium chloride 3%  Inhalation 3 milliLiter(s) Inhalation every 12 hours      PAST MEDICAL & SURGICAL HISTORY:  Aortic stenosis, moderate  Heart failure with preserved ejection fraction  Rheumatic heart disease  Chronic obstructive pulmonary disease, unspecified COPD type  CKD (chronic kidney disease) stage 3, GFR 30-59 ml/min  Chronic atrial fibrillation  Sick sinus syndrome  Cardiac pacemaker recipient  H/O mitral valve replacement      Vitals:  T(F): 97.6 (09-18), Max: 97.9 (09-17)  HR: 61 (09-18) (59 - 79)  BP: 105/80 (09-18) (99/51 - 138/60)  RR: 29 (09-18)  SpO2: 99% (09-18)  I&O's Summary    16 Sep 2018 07:01  -  17 Sep 2018 07:00  --------------------------------------------------------  IN: 866.4 mL / OUT: 1425 mL / NET: -558.6 mL    17 Sep 2018 07:01  -  18 Sep 2018 06:16  --------------------------------------------------------  IN: 332.8 mL / OUT: 740 mL / NET: -407.2 mL        Physical Exam:  Appearance: No acute distress; well appearing  Eyes: PERRL, EOMI, pink conjunctiva  HENT: Normal oral mucosa  Cardiovascular: RRR, S1, S2, no murmurs, rubs, or gallops; no edema; no JVD  Respiratory: Clear to auscultation bilaterally  Gastrointestinal: soft, non-tender, non-distended with normal bowel sounds  Musculoskeletal: No clubbing; no joint deformity   Neurologic: Non-focal  Lymphatic: No lymphadenopathy  Psychiatry: AAOx3, mood & affect appropriate  Skin: No rashes, ecchymoses, or cyanosis                          9.2    8.5   )-----------( 123      ( 18 Sep 2018 00:03 )             28.2     09-18    140  |  103  |  76<H>  ----------------------------<  127<H>  4.3   |  24  |  1.42<H>    Ca    8.6      18 Sep 2018 00:03  Phos  3.2     09-18  Mg     3.0     09-18    TPro  6.2  /  Alb  2.6<L>  /  TBili  0.3  /  DBili  x   /  AST  68<H>  /  ALT  107<H>  /  AlkPhos  116  09-18    PT/INR - ( 18 Sep 2018 00:03 )   PT: 11.3 sec;   INR: 1.05 ratio         PTT - ( 18 Sep 2018 00:03 )  PTT:60.5 sec      Serum Pro-Brain Natriuretic Peptide: 3467 pg/mL (09-14 @ 06:26)          New ECG(s): Personally reviewed    Echo:  < from: Transthoracic Echocardiogram (09.14.18 @ 17:01) >  ------------------------------------------------------------------------  Conclusions:  1. Mechanical prosthetic mitral valve replacement. Peak  mitral valve gradient equals 13 mm Hg, mean transmitral  valve gradient equals 6 mm Hg, which is elevated even in  the setting of a mechanical prosthetic mitral valve  replacement.  2. Severely calcified aortic valve with decreased opening.  Peak transaortic valve gradient equals 36 mm Hg, mean  transaortic valve gradient equals 19 mmHg, estimated  aortic valve area equals 0.7 sqcm (by continuity equation),  aortic valve velocity time integral equals 61 cm,  consistent with severe aortic stenosis. Moderate aortic  regurgitation.  3. Severely dilated left atrium.  LA volume index = 49  cc/m2.  4. Increased relative wall thickness with normal left  ventricular mass index, consistent with concentric left  ventricular remodeling.  5. Mild global left ventricular systolic dysfunction.  6. Severe diastolic dysfunction (Stage III).  7. Moderate right atrial enlargement.  8. Normal right ventricular size with mildly decreased  right ventricular systolic function. A device wire is noted  in the right heart.  9. Estimated pulmonary artery systolic pressure equals 46  mm Hg, assuming right atrial pressure equals 10 - 15 mm Hg,  consistent with mild pulmonary pressures.  *** No previous Echo exam.  ------------------------------------------------------------------------  Confirmed on  9/15/2018 - 05:10:20 by Jerry Edwards M.D.  ------------------------------------------------------------------------      Interpretation of Telemetry: Patient seen and examined at bedside.    Overnight Events: Pt extubated yesterday. pt still having great deal of secretions.       REVIEW OF SYSTEMS:  Constitutional:     [ ] negative [ ] fevers [ ] chills [ ] weight loss [ ] weight gain  HEENT:                  [ ] negative [ ] dry eyes [ ] eye irritation [ ] postnasal drip [ ] nasal congestion  CV:                         [ ] negative  [ ] chest pain [ ] orthopnea [ ] palpitations [ ] murmur  Resp:                     [ ] negative [ ] cough [x ] shortness of breath [ ] dyspnea [ ] wheezing [ ] sputum [ ]hemoptysis  GI:                          [ ] negative [ x] nausea [ ] vomiting [ ] diarrhea [ ] constipation [ ] abd pain [ ] dysphagia   :                        [ ] negative [ ] dysuria [ ] nocturia [ ] hematuria [ ] increased urinary frequency  Musculoskeletal: [ ] negative [ ] back pain [ ] myalgias [ ] arthralgias [ ] fracture  Skin:                       [ ] negative [ ] rash [ ] itch  Neurological:        [ ] negative [ ] headache [ ] dizziness [ ] syncope [ ] weakness [ ] numbness  Psychiatric:           [ ] negative [ ] anxiety [ ] depression  Endocrine:            [ ] negative [ ] diabetes [ ] thyroid problem  Heme/Lymph:      [ ] negative [ ] anemia [ ] bleeding problem  Allergic/Immune: [ ] negative [ ] itchy eyes [ ] nasal discharge [ ] hives [ ] angioedema    [x ] All other systems negative  [ ] Unable to assess ROS because sedated with anoxic brain injury.    Current Meds:  acetaminophen   Tablet .. 650 milliGRAM(s) Oral once PRN  ALBUTerol/ipratropium for Nebulization 3 milliLiter(s) Nebulizer every 6 hours  aspirin  chewable 81 milliGRAM(s) Oral daily  bisacodyl Suppository 10 milliGRAM(s) Rectal daily PRN  cefTRIAXone   IVPB      cefTRIAXone   IVPB 1 Gram(s) IV Intermittent every 24 hours  dextrose 40% Gel 15 Gram(s) Oral once PRN  dextrose 5%. 1000 milliLiter(s) IV Continuous <Continuous>  dextrose 50% Injectable 12.5 Gram(s) IV Push once  dextrose 50% Injectable 25 Gram(s) IV Push once  dextrose 50% Injectable 25 Gram(s) IV Push once  glucagon  Injectable 1 milliGRAM(s) IntraMuscular once PRN  heparin  Infusion.  Unit(s)/Hr IV Continuous <Continuous>  heparin  Injectable 5000 Unit(s) IV Push every 6 hours PRN  heparin  Injectable 2500 Unit(s) IV Push every 6 hours PRN  insulin lispro (HumaLOG) corrective regimen sliding scale   SubCutaneous every 6 hours  methylPREDNISolone sodium succinate Injectable 32 milliGRAM(s) IV Push once  pantoprazole  Injectable 40 milliGRAM(s) IV Push daily  predniSONE   Tablet 40 milliGRAM(s) Oral daily  sodium chloride 3%  Inhalation 3 milliLiter(s) Inhalation every 12 hours      PAST MEDICAL & SURGICAL HISTORY:  Aortic stenosis, moderate  Heart failure with preserved ejection fraction  Rheumatic heart disease  Chronic obstructive pulmonary disease, unspecified COPD type  CKD (chronic kidney disease) stage 3, GFR 30-59 ml/min  Chronic atrial fibrillation  Sick sinus syndrome  Cardiac pacemaker recipient  H/O mitral valve replacement      Vitals:  T(F): 97.6 (09-18), Max: 97.9 (09-17)  HR: 61 (09-18) (59 - 79)  BP: 105/80 (09-18) (99/51 - 138/60)  RR: 29 (09-18)  SpO2: 99% (09-18)  I&O's Summary    16 Sep 2018 07:01  -  17 Sep 2018 07:00  --------------------------------------------------------  IN: 866.4 mL / OUT: 1425 mL / NET: -558.6 mL    17 Sep 2018 07:01  -  18 Sep 2018 06:16  --------------------------------------------------------  IN: 332.8 mL / OUT: 740 mL / NET: -407.2 mL        Physical Exam:  Appearance: No acute distress; well appearing  Eyes: PERRL, EOMI, pink conjunctiva  HENT: Normal oral mucosa  Cardiovascular: RRR, prominent S1, S2, 3/6 systolic murmur heard at left 3rd intercostal, rubs, or gallops; no edema; no JVD  Respiratory: b/l ronchi.   Gastrointestinal: soft, non-tender, non-distended with normal bowel sounds  Musculoskeletal: No clubbing; no joint deformity   Neurologic: Non-focal  Lymphatic: No lymphadenopathy  Psychiatry: AAOx3, mood & affect appropriate  Skin: No rashes, ecchymoses, or cyanosis                          9.2    8.5   )-----------( 123      ( 18 Sep 2018 00:03 )             28.2     09-18    140  |  103  |  76<H>  ----------------------------<  127<H>  4.3   |  24  |  1.42<H>    Ca    8.6      18 Sep 2018 00:03  Phos  3.2     09-18  Mg     3.0     09-18    TPro  6.2  /  Alb  2.6<L>  /  TBili  0.3  /  DBili  x   /  AST  68<H>  /  ALT  107<H>  /  AlkPhos  116  09-18    PT/INR - ( 18 Sep 2018 00:03 )   PT: 11.3 sec;   INR: 1.05 ratio         PTT - ( 18 Sep 2018 00:03 )  PTT:60.5 sec      Serum Pro-Brain Natriuretic Peptide: 3467 pg/mL (09-14 @ 06:26)          New ECG(s): Personally reviewed    Echo:  < from: Transthoracic Echocardiogram (09.14.18 @ 17:01) >  ------------------------------------------------------------------------  Conclusions:  1. Mechanical prosthetic mitral valve replacement. Peak  mitral valve gradient equals 13 mm Hg, mean transmitral  valve gradient equals 6 mm Hg, which is elevated even in  the setting of a mechanical prosthetic mitral valve  replacement.  2. Severely calcified aortic valve with decreased opening.  Peak transaortic valve gradient equals 36 mm Hg, mean  transaortic valve gradient equals 19 mmHg, estimated  aortic valve area equals 0.7 sqcm (by continuity equation),  aortic valve velocity time integral equals 61 cm,  consistent with severe aortic stenosis. Moderate aortic  regurgitation.  3. Severely dilated left atrium.  LA volume index = 49  cc/m2.  4. Increased relative wall thickness with normal left  ventricular mass index, consistent with concentric left  ventricular remodeling.  5. Mild global left ventricular systolic dysfunction.  6. Severe diastolic dysfunction (Stage III).  7. Moderate right atrial enlargement.  8. Normal right ventricular size with mildly decreased  right ventricular systolic function. A device wire is noted  in the right heart.  9. Estimated pulmonary artery systolic pressure equals 46  mm Hg, assuming right atrial pressure equals 10 - 15 mm Hg,  consistent with mild pulmonary pressures.  *** No previous Echo exam.  ------------------------------------------------------------------------  Confirmed on  9/15/2018 - 05:10:20 by Jerry Edwards M.D.  ------------------------------------------------------------------------      Interpretation of Telemetry:

## 2018-09-18 NOTE — PROGRESS NOTE ADULT - ASSESSMENT
83 y/o F w/ a PMH significant for COPD on home O2 (though has not used in past few months), JENNIE on BiPAP, rheumatic heart disease s/p mitral valve replacement, HFpEF, aortic stenosis, A-fib on coumadin, sick sinus syndrome s/p pacemaker placement, HTN, and CKD3 who presented to the ED w/ dyspnea x2 days. Admitted w/ COPD exacerbation, ADHF, and +entero/rhinovirus.     #NSTEMI. Likely Type II 2/2 septic shock v stress CM.   - no need to continue to check CE.   - can d/c ASA  - Overall pt not showing signs of ADHF.   - TTE showing mild global systolic dysfunction.   - Would repeat TTE after d/c    #Mechanical MV. Elevated gradients on TTE.  - INR goal 2.5-3.5, heparin to coumadin bridge.   - May need JENN once recovered to eval mitral valve.     #Mod-severe AS. Decreased MAGNUS but gradients do not reflect severe AS and may be magnified by concomitant AI. LFLG AS, element of pseudo AS remains possible  - repeat TTE when pt recovers, decreased opening may be do to decreased LV function.   - Would need to be worked up once pts acute illness has resolved.     #PPM  - Pt has PPM set in DDDR according to interrogation.   - Underlying rhythm on monitor seems to be atrial flutter, which likely prompted mode switch to VVI, which is why pts HR is 60.     Barb Ray MD  Cardiology Fellow - PGY-4  LAST: [id number]  NS: [phone]  [id number] 83 y/o F w/ a PMH significant for COPD on home O2 (though has not used in past few months), JENNIE on BiPAP, rheumatic heart disease s/p mitral valve replacement, HFpEF, aortic stenosis, A-fib on coumadin, sick sinus syndrome s/p pacemaker placement, HTN, and CKD3 who presented to the ED w/ dyspnea x2 days. Admitted w/ COPD exacerbation, ADHF, and +entero/rhinovirus.     #NSTEMI. Likely Type II 2/2 septic shock v stress CM.   - no need to continue to check CE.   - can d/c ASA  - Overall pt not showing signs of ADHF.   - TTE showing mild global systolic dysfunction.   - Would repeat TTE after d/c    #Mechanical MV. Elevated gradients on TTE.  - INR goal 2.5-3.5, heparin to coumadin bridge.   - May need JENN once recovered to eval mitral valve.     #Mod-severe AS. Decreased MAGNUS but gradients do not reflect severe AS and may be magnified by concomitant AI. LFLG AS, element of pseudo AS are both possible  - repeat TTE when pt recovers, decreased opening may be do to decreased LV function.   - Would need to be worked up once pts acute illness has resolved.     #PPM  - Pt has PPM set in DDDR according to interrogation.   - Underlying rhythm on monitor seems to be atrial flutter, which likely prompted mode switch to VVI, which is why pts HR is 60.     Barb Ray MD  Cardiology Fellow - PGY-4  LAST: [id number]  NS: [phone]  [id number]

## 2018-09-18 NOTE — PROGRESS NOTE ADULT - ATTENDING COMMENTS
Critically ill with agitation/labored breathing Lung US with A line pattern GDE with normal LV fxn no RV dilation AS and mech MV Severe bladder distention with estimated 600cc volume Straight cath resulted in remission of symptoms  Frequent bedside visits with therapy change today. Crit Care Time Today 35 min +

## 2018-09-19 LAB
-  AMOXICILLIN/CLAVULANIC ACID: SIGNIFICANT CHANGE UP
-  AMPICILLIN/SULBACTAM: SIGNIFICANT CHANGE UP
-  CIPROFLOXACIN: SIGNIFICANT CHANGE UP
ALBUMIN SERPL ELPH-MCNC: 3.4 G/DL — SIGNIFICANT CHANGE UP (ref 3.3–5)
ALP SERPL-CCNC: 114 U/L — SIGNIFICANT CHANGE UP (ref 40–120)
ALT FLD-CCNC: 117 U/L — HIGH (ref 10–45)
ANION GAP SERPL CALC-SCNC: 13 MMOL/L — SIGNIFICANT CHANGE UP (ref 5–17)
APTT BLD: 51.7 SEC — HIGH (ref 27.5–37.4)
APTT BLD: 69.3 SEC — HIGH (ref 27.5–37.4)
APTT BLD: 93.2 SEC — HIGH (ref 27.5–37.4)
AST SERPL-CCNC: 60 U/L — HIGH (ref 10–40)
BILIRUB SERPL-MCNC: 0.5 MG/DL — SIGNIFICANT CHANGE UP (ref 0.2–1.2)
BUN SERPL-MCNC: 76 MG/DL — HIGH (ref 7–23)
CALCIUM SERPL-MCNC: 9.6 MG/DL — SIGNIFICANT CHANGE UP (ref 8.4–10.5)
CHLORIDE SERPL-SCNC: 103 MMOL/L — SIGNIFICANT CHANGE UP (ref 96–108)
CO2 SERPL-SCNC: 24 MMOL/L — SIGNIFICANT CHANGE UP (ref 22–31)
CREAT SERPL-MCNC: 1.37 MG/DL — HIGH (ref 0.5–1.3)
CULTURE RESULTS: SIGNIFICANT CHANGE UP
GAS PNL BLDA: SIGNIFICANT CHANGE UP
GLUCOSE BLDC GLUCOMTR-MCNC: 224 MG/DL — HIGH (ref 70–99)
GLUCOSE BLDC GLUCOMTR-MCNC: 243 MG/DL — HIGH (ref 70–99)
GLUCOSE SERPL-MCNC: 206 MG/DL — HIGH (ref 70–99)
HCT VFR BLD CALC: 31.2 % — LOW (ref 34.5–45)
HGB BLD-MCNC: 10 G/DL — LOW (ref 11.5–15.5)
INR BLD: 1.04 RATIO — SIGNIFICANT CHANGE UP (ref 0.88–1.16)
INR BLD: 1.23 RATIO — HIGH (ref 0.88–1.16)
MAGNESIUM SERPL-MCNC: 3.1 MG/DL — HIGH (ref 1.6–2.6)
MCHC RBC-ENTMCNC: 28 PG — SIGNIFICANT CHANGE UP (ref 27–34)
MCHC RBC-ENTMCNC: 32.2 GM/DL — SIGNIFICANT CHANGE UP (ref 32–36)
MCV RBC AUTO: 87.1 FL — SIGNIFICANT CHANGE UP (ref 80–100)
ORGANISM # SPEC MICROSCOPIC CNT: SIGNIFICANT CHANGE UP
PHOSPHATE SERPL-MCNC: 3.1 MG/DL — SIGNIFICANT CHANGE UP (ref 2.5–4.5)
PLATELET # BLD AUTO: 164 K/UL — SIGNIFICANT CHANGE UP (ref 150–400)
POTASSIUM SERPL-MCNC: 4.9 MMOL/L — SIGNIFICANT CHANGE UP (ref 3.5–5.3)
POTASSIUM SERPL-SCNC: 4.9 MMOL/L — SIGNIFICANT CHANGE UP (ref 3.5–5.3)
PROT SERPL-MCNC: 7.1 G/DL — SIGNIFICANT CHANGE UP (ref 6–8.3)
PROTHROM AB SERPL-ACNC: 11.4 SEC — SIGNIFICANT CHANGE UP (ref 9.8–12.7)
PROTHROM AB SERPL-ACNC: 13.3 SEC — HIGH (ref 9.8–12.7)
RBC # BLD: 3.58 M/UL — LOW (ref 3.8–5.2)
RBC # FLD: 15.7 % — HIGH (ref 10.3–14.5)
SODIUM SERPL-SCNC: 140 MMOL/L — SIGNIFICANT CHANGE UP (ref 135–145)
SPECIMEN SOURCE: SIGNIFICANT CHANGE UP
WBC # BLD: 11.7 K/UL — HIGH (ref 3.8–10.5)
WBC # FLD AUTO: 11.7 K/UL — HIGH (ref 3.8–10.5)

## 2018-09-19 PROCEDURE — 74018 RADEX ABDOMEN 1 VIEW: CPT | Mod: 26

## 2018-09-19 PROCEDURE — 71045 X-RAY EXAM CHEST 1 VIEW: CPT | Mod: 26

## 2018-09-19 PROCEDURE — 92953 TEMPORARY EXTERNAL PACING: CPT | Mod: 59

## 2018-09-19 PROCEDURE — 93308 TTE F-UP OR LMTD: CPT | Mod: 26

## 2018-09-19 PROCEDURE — 31500 INSERT EMERGENCY AIRWAY: CPT

## 2018-09-19 PROCEDURE — 76775 US EXAM ABDO BACK WALL LIM: CPT | Mod: 26

## 2018-09-19 PROCEDURE — 76604 US EXAM CHEST: CPT | Mod: 26

## 2018-09-19 PROCEDURE — 99291 CRITICAL CARE FIRST HOUR: CPT | Mod: 25

## 2018-09-19 PROCEDURE — 99233 SBSQ HOSP IP/OBS HIGH 50: CPT

## 2018-09-19 RX ORDER — HYDRALAZINE HCL 50 MG
10 TABLET ORAL ONCE
Qty: 0 | Refills: 0 | Status: COMPLETED | OUTPATIENT
Start: 2018-09-19 | End: 2018-09-19

## 2018-09-19 RX ORDER — FENTANYL CITRATE 50 UG/ML
100 INJECTION INTRAVENOUS ONCE
Qty: 0 | Refills: 0 | Status: DISCONTINUED | OUTPATIENT
Start: 2018-09-19 | End: 2018-09-19

## 2018-09-19 RX ORDER — NICARDIPINE HYDROCHLORIDE 30 MG/1
3 CAPSULE, EXTENDED RELEASE ORAL
Qty: 40 | Refills: 0 | Status: DISCONTINUED | OUTPATIENT
Start: 2018-09-19 | End: 2018-09-19

## 2018-09-19 RX ORDER — PROPOFOL 10 MG/ML
5 INJECTION, EMULSION INTRAVENOUS
Qty: 500 | Refills: 0 | Status: DISCONTINUED | OUTPATIENT
Start: 2018-09-19 | End: 2018-09-22

## 2018-09-19 RX ORDER — SENNA PLUS 8.6 MG/1
10 TABLET ORAL ONCE
Qty: 0 | Refills: 0 | Status: COMPLETED | OUTPATIENT
Start: 2018-09-19 | End: 2018-09-19

## 2018-09-19 RX ORDER — PROPOFOL 10 MG/ML
50 INJECTION, EMULSION INTRAVENOUS ONCE
Qty: 0 | Refills: 0 | Status: COMPLETED | OUTPATIENT
Start: 2018-09-19 | End: 2018-09-19

## 2018-09-19 RX ORDER — POLYETHYLENE GLYCOL 3350 17 G/17G
17 POWDER, FOR SOLUTION ORAL
Qty: 0 | Refills: 0 | Status: DISCONTINUED | OUTPATIENT
Start: 2018-09-19 | End: 2018-09-28

## 2018-09-19 RX ORDER — NOREPINEPHRINE BITARTRATE/D5W 8 MG/250ML
0.05 PLASTIC BAG, INJECTION (ML) INTRAVENOUS
Qty: 8 | Refills: 0 | Status: DISCONTINUED | OUTPATIENT
Start: 2018-09-19 | End: 2018-09-25

## 2018-09-19 RX ADMIN — Medication 2: at 01:30

## 2018-09-19 RX ADMIN — Medication 81 MILLIGRAM(S): at 13:03

## 2018-09-19 RX ADMIN — FENTANYL CITRATE 100 MICROGRAM(S): 50 INJECTION INTRAVENOUS at 10:31

## 2018-09-19 RX ADMIN — Medication 10 MILLIGRAM(S): at 03:00

## 2018-09-19 RX ADMIN — Medication 1000 MILLIGRAM(S): at 00:00

## 2018-09-19 RX ADMIN — Medication 3 MILLILITER(S): at 17:31

## 2018-09-19 RX ADMIN — SENNA PLUS 10 MILLILITER(S): 8.6 TABLET ORAL at 20:48

## 2018-09-19 RX ADMIN — PANTOPRAZOLE SODIUM 40 MILLIGRAM(S): 20 TABLET, DELAYED RELEASE ORAL at 13:03

## 2018-09-19 RX ADMIN — PROPOFOL 50 MILLIGRAM(S): 10 INJECTION, EMULSION INTRAVENOUS at 10:30

## 2018-09-19 RX ADMIN — Medication 3 MILLILITER(S): at 23:22

## 2018-09-19 RX ADMIN — Medication 40 MILLIGRAM(S): at 18:14

## 2018-09-19 RX ADMIN — Medication 3 MILLILITER(S): at 11:21

## 2018-09-19 RX ADMIN — Medication 2: at 13:03

## 2018-09-19 RX ADMIN — HEPARIN SODIUM 900 UNIT(S)/HR: 5000 INJECTION INTRAVENOUS; SUBCUTANEOUS at 11:30

## 2018-09-19 RX ADMIN — Medication 3 MILLILITER(S): at 05:20

## 2018-09-19 RX ADMIN — Medication 10 MILLIGRAM(S): at 01:25

## 2018-09-19 RX ADMIN — Medication 2: at 05:10

## 2018-09-19 RX ADMIN — SODIUM CHLORIDE 3 MILLILITER(S): 9 INJECTION INTRAMUSCULAR; INTRAVENOUS; SUBCUTANEOUS at 17:34

## 2018-09-19 RX ADMIN — SODIUM CHLORIDE 3 MILLILITER(S): 9 INJECTION INTRAMUSCULAR; INTRAVENOUS; SUBCUTANEOUS at 05:20

## 2018-09-19 RX ADMIN — CEFTRIAXONE 100 GRAM(S): 500 INJECTION, POWDER, FOR SOLUTION INTRAMUSCULAR; INTRAVENOUS at 18:09

## 2018-09-19 RX ADMIN — HEPARIN SODIUM 900 UNIT(S)/HR: 5000 INJECTION INTRAVENOUS; SUBCUTANEOUS at 04:53

## 2018-09-19 NOTE — PROGRESS NOTE ADULT - ASSESSMENT
85 y/o F w/ a PMH significant for COPD on home O2 (though has not used in past few months), JENNIE on BiPAP, rheumatic heart disease s/p mitral valve replacement, HFpEF, aortic stenosis, A-fib on coumadin, sick sinus syndrome s/p pacemaker placement, HTN, and CKD3 who presented to the ED w/ dyspnea x2 days. Admitted w/ COPD exacerbation, ADHF, and +entero/rhinovirus.     #NSTEMI. Likely Type II 2/2 septic shock v stress CM.   - no need to continue to check CE.   - Overall pt not showing signs of ADHF.   - TTE showing mild global systolic dysfunction.   - Would repeat TTE after d/c    #Mechanical MV. Elevated gradients on TTE.  - INR goal 2.5-3.5, heparin to coumadin bridge.   - May need JENN once recovered to eval mitral valve.     #Mod-severe AS. Decreased MAGNUS but gradients do not reflect severe AS and may be magnified by concomitant AI. LFLG AS, element of pseudo AS are both possible  - repeat TTE when pt recovers, decreased opening may be do to decreased LV function.   - Would need to be worked up once pts acute illness has resolved.     #PPM  - Pt has PPM set in DDDR according to interrogation.   - Underlying rhythm on monitor seems to be atrial flutter, which likely prompted mode switch to VVI, which is why pts HR is 60.     Barb Ray MD  Cardiology Fellow - PGY-4  LAST: [id number]  NS: [phone]  [id number] 83 y/o F w/ a PMH significant for COPD on home O2 (though has not used in past few months), JENNIE on BiPAP, rheumatic heart disease s/p mitral valve replacement, HFpEF, aortic stenosis, A-fib on coumadin, sick sinus syndrome s/p pacemaker placement, HTN, and CKD3 who presented to the ED w/ dyspnea x2 days. Admitted w/ COPD exacerbation, ADHF, and +entero/rhinovirus.     #NSTEMI. Likely Type II 2/2 septic shock v stress CM.   - no need to continue to check CE.   - Overall pt not showing signs of ADHF.   - TTE showing mild global systolic dysfunction.   - Would repeat TTE after d/c    #Mechanical MV. Elevated gradients on TTE.  - INR goal 2.5-3.5, heparin to coumadin bridge.     #Mod-severe AS. Decreased MAGNUS but gradients do not reflect severe AS and may be magnified by concomitant AI. LFLG AS, element of pseudo AS are both possible  - repeat TTE after d/c, decreased opening may be do to decreased LV function.   - Would need to be worked up once pts acute illness has resolved.     #PPM  - Pt has PPM set in DDDR according to interrogation.   - Underlying rhythm on monitor seems to be atrial flutter, which likely prompted mode switch to VVI, which is why pts HR is 60.     - Please call back with any questions.     Barb Ray MD  Cardiology Fellow - PGY-4  LIWARNER: [id number]  NS: [phone]  [id number]

## 2018-09-19 NOTE — PHYSICAL THERAPY INITIAL EVALUATION ADULT - IMPAIRMENTS CONTRIBUTING IMPAIRED BED MOBILITY, REHAB EVAL
impaired motor control/decreased strength/impaired balance/decreased flexibility/impaired postural control

## 2018-09-19 NOTE — PROGRESS NOTE ADULT - SUBJECTIVE AND OBJECTIVE BOX
Patient is a 84y old  Female who presents with a chief complaint of COPD exacerbation, ADHF (19 Sep 2018 06:29)      Interval Events: Agitated overnight; given Haldol 2.5mg. Also found to be hypertensive 170-180s. Multiple Hydralazine pushes didn't help. Started on nicardipine drip at 15mL/hr. BP decreased to 140s.    REVIEW OF SYSTEMS:  Constitutional: [ ] negative [ ] fevers [ ] chills [ ] weight loss [ ] weight gain  HEENT: [ ] negative [ ] dry eyes [ ] eye irritation [ ] postnasal drip [ ] nasal congestion  CV: [ ] negative  [ ] chest pain [ ] orthopnea [ ] palpitations [ ] murmur  Resp: [ ] negative [ ] cough [ ] shortness of breath [ ] dyspnea [ ] wheezing [ ] sputum [ ] hemoptysis  GI: [ ] negative [ ] nausea [ ] vomiting [ ] diarrhea [ ] constipation [ ] abd pain [ ] dysphagia   : [ ] negative [ ] dysuria [ ] nocturia [ ] hematuria [ ] increased urinary frequency  Musculoskeletal: [ ] negative [ ] back pain [ ] myalgias [ ] arthralgias [ ] fracture  Skin: [ ] negative [ ] rash [ ] itch  Neurological: [ ] negative [ ] headache [ ] dizziness [ ] syncope [ ] weakness [ ] numbness  Psychiatric: [ ] negative [ ] anxiety [ ] depression  Endocrine: [ ] negative [ ] diabetes [ ] thyroid problem  Hematologic/Lymphatic: [ ] negative [ ] anemia [ ] bleeding problem  Allergic/Immunologic: [ ] negative [ ] itchy eyes [ ] nasal discharge [ ] hives [ ] angioedema  [X] All other systems negative  [ ] Unable to assess ROS because ________    OBJECTIVE:  ICU Vital Signs Last 24 Hrs  T(C): 36.4 (19 Sep 2018 04:00), Max: 36.7 (18 Sep 2018 12:00)  T(F): 97.6 (19 Sep 2018 04:00), Max: 98 (18 Sep 2018 12:00)  HR: 59 (19 Sep 2018 07:00) (59 - 72)  BP: 149/64 (19 Sep 2018 07:00) (125/58 - 192/112)  BP(mean): 92 (19 Sep 2018 07:00) (84 - 146)  ABP: --  ABP(mean): --  RR: 78 (19 Sep 2018 07:00) (20 - 78)  SpO2: 92% (19 Sep 2018 07:00) (92% - 100%)        09-18 @ 07:01  -  09-19 @ 07:00  --------------------------------------------------------  IN: 510 mL / OUT: 2380 mL / NET: -1870 mL      CAPILLARY BLOOD GLUCOSE      POCT Blood Glucose.: 243 mg/dL (19 Sep 2018 05:05)      PHYSICAL EXAM:  General: NAD.  HEENT: NC/AT; PERRL, clear conjunctiva  Neck: Central line in place w/out evidence bleeding or discharge.  Respiratory: Diffuse crackles.  Cardiovascular: +S1/S2; RRR. Unable to accurately assess murmurs 2/2 loud airway sounds.   Abdomen: soft, NT/ND; +BS x4  Extremities: WWP, 2+ peripheral pulses b/l; no LE edema  Skin: normal color and turgor; no rash  Neurological: AAOx3.    LINES:    HOSPITAL MEDICATIONS:  Standing Meds:  ALBUTerol/ipratropium for Nebulization 3 milliLiter(s) Nebulizer every 6 hours  aspirin  chewable 81 milliGRAM(s) Oral daily  cefTRIAXone   IVPB      cefTRIAXone   IVPB 1 Gram(s) IV Intermittent every 24 hours  dextrose 5%. 1000 milliLiter(s) IV Continuous <Continuous>  dextrose 50% Injectable 12.5 Gram(s) IV Push once  dextrose 50% Injectable 25 Gram(s) IV Push once  dextrose 50% Injectable 25 Gram(s) IV Push once  heparin  Infusion.  Unit(s)/Hr IV Continuous <Continuous>  insulin lispro (HumaLOG) corrective regimen sliding scale   SubCutaneous every 6 hours  niCARdipine Infusion 3 mG/Hr IV Continuous <Continuous>  pantoprazole  Injectable 40 milliGRAM(s) IV Push daily  sodium chloride 3%  Inhalation 3 milliLiter(s) Inhalation every 12 hours      PRN Meds:  bisacodyl Suppository 10 milliGRAM(s) Rectal daily PRN  dextrose 40% Gel 15 Gram(s) Oral once PRN  glucagon  Injectable 1 milliGRAM(s) IntraMuscular once PRN  heparin  Injectable 5000 Unit(s) IV Push every 6 hours PRN  heparin  Injectable 2500 Unit(s) IV Push every 6 hours PRN      LABS:                        10.0   11.7  )-----------( 164      ( 19 Sep 2018 00:54 )             31.2     Hgb Trend: 10.0<--, 9.2<--, 8.9<--, 9.0<--, 9.7<--  09-19    140  |  103  |  76<H>  ----------------------------<  206<H>  4.9   |  24  |  1.37<H>    Ca    9.6      19 Sep 2018 00:54  Phos  3.1     09-19  Mg     3.1     09-19    TPro  7.1  /  Alb  3.4  /  TBili  0.5  /  DBili  x   /  AST  60<H>  /  ALT  117<H>  /  AlkPhos  114  09-19    Creatinine Trend: 1.37<--, 1.42<--, 1.95<--, 2.39<--, 2.29<--, 2.53<--  PT/INR - ( 19 Sep 2018 00:54 )   PT: 11.4 sec;   INR: 1.04 ratio         PTT - ( 19 Sep 2018 00:54 )  PTT:51.7 sec    Arterial Blood Gas:  09-18 @ 23:18  7.40/43/86/26/96/1.4  ABG lactate: --  Arterial Blood Gas:  09-17 @ 12:29  7.39/44/165/26/99/1.1  ABG lactate: --        MICROBIOLOGY:     Culture - Blood (collected 17 Sep 2018 14:06)  Source: .Blood Blood-Peripheral  Preliminary Report (18 Sep 2018 15:01):    No growth to date.    Culture - Blood (collected 17 Sep 2018 14:05)  Source: .Blood Blood-Venous  Preliminary Report (18 Sep 2018 15:01):    No growth to date.      RADIOLOGY:  [ ] Reviewed and interpreted by me    EKG:

## 2018-09-19 NOTE — PHYSICAL THERAPY INITIAL EVALUATION ADULT - PERTINENT HX OF CURRENT PROBLEM, REHAB EVAL
Pt is a 85 y/o F w/ a PMH significant for COPD, JENNIE on BiPAP, multivalvular disease (severe AS, s/p MV replacement), HFpEF/severe diastolic failure, A-fib on coumadin, sick sinus syndrome s/p pacemaker placement, HTN, and CKD3 who admitted for acute respiratory failure requiring intubation suspected to be 2/2 COPD exacerbation c/b PNA w/ +entero/rhinovirus and GN coccobacillus.

## 2018-09-19 NOTE — PROGRESS NOTE ADULT - ATTENDING COMMENTS
Critically ill with respiratory distress multifactorial in origin Requires reintubation  Frequent bedside visits with therapy change today. Crit Care Time Today 35 min +

## 2018-09-19 NOTE — PROGRESS NOTE ADULT - SUBJECTIVE AND OBJECTIVE BOX
Critical Care Ultrasonography  Indication: Respiratory failure  Lung: Bilateral A line pattern  Cardiac: Nl LV fxn, no RV enlargement, No PEF, AS, mech MV in place    Retroperitoneal: Empty bladder  Imp: No change in cardiac exam Normal aeration pattern No urinary retention

## 2018-09-19 NOTE — PROGRESS NOTE ADULT - SUBJECTIVE AND OBJECTIVE BOX
Patient seen and examined at bedside.    Overnight Events:       REVIEW OF SYSTEMS:  Constitutional:     [ ] negative [ ] fevers [ ] chills [ ] weight loss [ ] weight gain  HEENT:                  [ ] negative [ ] dry eyes [ ] eye irritation [ ] postnasal drip [ ] nasal congestion  CV:                         [ ] negative  [ ] chest pain [ ] orthopnea [ ] palpitations [ ] murmur  Resp:                     [ ] negative [ ] cough [ ] shortness of breath [ ] dyspnea [ ] wheezing [ ] sputum [ ]hemoptysis  GI:                          [ ] negative [ ] nausea [ ] vomiting [ ] diarrhea [ ] constipation [ ] abd pain [ ] dysphagia   :                        [ ] negative [ ] dysuria [ ] nocturia [ ] hematuria [ ] increased urinary frequency  Musculoskeletal: [ ] negative [ ] back pain [ ] myalgias [ ] arthralgias [ ] fracture  Skin:                       [ ] negative [ ] rash [ ] itch  Neurological:        [ ] negative [ ] headache [ ] dizziness [ ] syncope [ ] weakness [ ] numbness  Psychiatric:           [ ] negative [ ] anxiety [ ] depression  Endocrine:            [ ] negative [ ] diabetes [ ] thyroid problem  Heme/Lymph:      [ ] negative [ ] anemia [ ] bleeding problem  Allergic/Immune: [ ] negative [ ] itchy eyes [ ] nasal discharge [ ] hives [ ] angioedema    [ ] All other systems negative  [ ] Unable to assess ROS because sedated with anoxic brain injury.    Current Meds:  ALBUTerol/ipratropium for Nebulization 3 milliLiter(s) Nebulizer every 6 hours  aspirin  chewable 81 milliGRAM(s) Oral daily  bisacodyl Suppository 10 milliGRAM(s) Rectal daily PRN  cefTRIAXone   IVPB      cefTRIAXone   IVPB 1 Gram(s) IV Intermittent every 24 hours  dextrose 40% Gel 15 Gram(s) Oral once PRN  dextrose 5%. 1000 milliLiter(s) IV Continuous <Continuous>  dextrose 50% Injectable 12.5 Gram(s) IV Push once  dextrose 50% Injectable 25 Gram(s) IV Push once  dextrose 50% Injectable 25 Gram(s) IV Push once  glucagon  Injectable 1 milliGRAM(s) IntraMuscular once PRN  heparin  Infusion.  Unit(s)/Hr IV Continuous <Continuous>  heparin  Injectable 5000 Unit(s) IV Push every 6 hours PRN  heparin  Injectable 2500 Unit(s) IV Push every 6 hours PRN  insulin lispro (HumaLOG) corrective regimen sliding scale   SubCutaneous every 6 hours  niCARdipine Infusion 3 mG/Hr IV Continuous <Continuous>  pantoprazole  Injectable 40 milliGRAM(s) IV Push daily  sodium chloride 3%  Inhalation 3 milliLiter(s) Inhalation every 12 hours      PAST MEDICAL & SURGICAL HISTORY:  Aortic stenosis, moderate  Heart failure with preserved ejection fraction  Rheumatic heart disease  Chronic obstructive pulmonary disease, unspecified COPD type  CKD (chronic kidney disease) stage 3, GFR 30-59 ml/min  Chronic atrial fibrillation  Sick sinus syndrome  Cardiac pacemaker recipient  H/O mitral valve replacement      Vitals:  T(F): 97.6 (09-19), Max: 98 (09-18)  HR: 59 (09-19) (59 - 72)  BP: 152/67 (09-19) (125/58 - 192/112)  RR: 27 (09-19)  SpO2: 96% (09-19)  I&O's Summary    17 Sep 2018 07:01  -  18 Sep 2018 07:00  --------------------------------------------------------  IN: 440.8 mL / OUT: 740 mL / NET: -299.2 mL    18 Sep 2018 07:01  -  19 Sep 2018 06:30  --------------------------------------------------------  IN: 510 mL / OUT: 2330 mL / NET: -1820 mL        Physical Exam:  Appearance: No acute distress; well appearing  Eyes: PERRL, EOMI, pink conjunctiva  HENT: Normal oral mucosa  Cardiovascular: RRR, S1, S2, no murmurs, rubs, or gallops; no edema; no JVD  Respiratory: Clear to auscultation bilaterally  Gastrointestinal: soft, non-tender, non-distended with normal bowel sounds  Musculoskeletal: No clubbing; no joint deformity   Neurologic: Non-focal  Lymphatic: No lymphadenopathy  Psychiatry: AAOx3, mood & affect appropriate  Skin: No rashes, ecchymoses, or cyanosis                          10.0   11.7  )-----------( 164      ( 19 Sep 2018 00:54 )             31.2     09-19    140  |  103  |  76<H>  ----------------------------<  206<H>  4.9   |  24  |  1.37<H>    Ca    9.6      19 Sep 2018 00:54  Phos  3.1     09-19  Mg     3.1     09-19    TPro  7.1  /  Alb  3.4  /  TBili  0.5  /  DBili  x   /  AST  60<H>  /  ALT  117<H>  /  AlkPhos  114  09-19    PT/INR - ( 19 Sep 2018 00:54 )   PT: 11.4 sec;   INR: 1.04 ratio         PTT - ( 19 Sep 2018 00:54 )  PTT:51.7 sec      Serum Pro-Brain Natriuretic Peptide: 3467 pg/mL (09-14 @ 06:26)    New ECG(s): Personally reviewed    Echo:  < from: Transthoracic Echocardiogram (09.14.18 @ 17:01) >  ------------------------------------------------------------------------  Conclusions:  1. Mechanical prosthetic mitral valve replacement. Peak  mitral valve gradient equals 13 mm Hg, mean transmitral  valve gradient equals 6 mm Hg, which is elevated even in  the setting of a mechanical prosthetic mitral valve  replacement.  2. Severely calcified aortic valve with decreased opening.  Peak transaortic valve gradient equals 36 mm Hg, mean  transaortic valve gradient equals 19 mmHg, estimated  aortic valve area equals 0.7 sqcm (by continuity equation),  aortic valve velocity time integral equals 61 cm,  consistent with severe aortic stenosis. Moderate aortic  regurgitation.  3. Severely dilated left atrium.  LA volume index = 49  cc/m2.  4. Increased relative wall thickness with normal left  ventricular mass index, consistent with concentric left  ventricular remodeling.  5. Mild global left ventricular systolic dysfunction.  6. Severe diastolic dysfunction (Stage III).  7. Moderate right atrial enlargement.  8. Normal right ventricular size with mildly decreased  right ventricular systolic function. A device wire is noted  in the right heart.  9. Estimated pulmonary artery systolic pressure equals 46  mm Hg, assuming right atrial pressure equals 10 - 15 mm Hg,  consistent with mild pulmonary pressures.  *** No previous Echo exam.  ------------------------------------------------------------------------  Confirmed on  9/15/2018 - 05:10:20 by Jerry Edwards M.D.  ------------------------------------------------------------------------    < end of copied text >      Interpretation of Telemetry: Patient seen and examined at bedside.    Overnight Events: Pt still having some SOB after extubation. Lots of secretions and was reintubated. Agitated and confused overnight. Hypertensive and started on nicardipine gtt.       REVIEW OF SYSTEMS:  Constitutional:     [ ] negative [ ] fevers [ ] chills [ ] weight loss [ ] weight gain  HEENT:                  [ ] negative [ ] dry eyes [ ] eye irritation [ ] postnasal drip [ ] nasal congestion  CV:                         [ ] negative  [ ] chest pain [ ] orthopnea [ ] palpitations [ ] murmur  Resp:                     [ ] negative [ ] cough [ ] shortness of breath [ ] dyspnea [ ] wheezing [ ] sputum [ ]hemoptysis  GI:                          [ ] negative [ ] nausea [ ] vomiting [ ] diarrhea [ ] constipation [ ] abd pain [ ] dysphagia   :                        [ ] negative [ ] dysuria [ ] nocturia [ ] hematuria [ ] increased urinary frequency  Musculoskeletal: [ ] negative [ ] back pain [ ] myalgias [ ] arthralgias [ ] fracture  Skin:                       [ ] negative [ ] rash [ ] itch  Neurological:        [ ] negative [ ] headache [ ] dizziness [ ] syncope [ ] weakness [ ] numbness  Psychiatric:           [ ] negative [ ] anxiety [ ] depression  Endocrine:            [ ] negative [ ] diabetes [ ] thyroid problem  Heme/Lymph:      [ ] negative [ ] anemia [ ] bleeding problem  Allergic/Immune: [ ] negative [ ] itchy eyes [ ] nasal discharge [ ] hives [ ] angioedema    [ ] All other systems negative  [x ] Unable to assess ROS because pt intubated.     Current Meds:  ALBUTerol/ipratropium for Nebulization 3 milliLiter(s) Nebulizer every 6 hours  aspirin  chewable 81 milliGRAM(s) Oral daily  bisacodyl Suppository 10 milliGRAM(s) Rectal daily PRN  cefTRIAXone   IVPB      cefTRIAXone   IVPB 1 Gram(s) IV Intermittent every 24 hours  dextrose 40% Gel 15 Gram(s) Oral once PRN  dextrose 5%. 1000 milliLiter(s) IV Continuous <Continuous>  dextrose 50% Injectable 12.5 Gram(s) IV Push once  dextrose 50% Injectable 25 Gram(s) IV Push once  dextrose 50% Injectable 25 Gram(s) IV Push once  glucagon  Injectable 1 milliGRAM(s) IntraMuscular once PRN  heparin  Infusion.  Unit(s)/Hr IV Continuous <Continuous>  heparin  Injectable 5000 Unit(s) IV Push every 6 hours PRN  heparin  Injectable 2500 Unit(s) IV Push every 6 hours PRN  insulin lispro (HumaLOG) corrective regimen sliding scale   SubCutaneous every 6 hours  niCARdipine Infusion 3 mG/Hr IV Continuous <Continuous>  pantoprazole  Injectable 40 milliGRAM(s) IV Push daily  sodium chloride 3%  Inhalation 3 milliLiter(s) Inhalation every 12 hours      PAST MEDICAL & SURGICAL HISTORY:  Aortic stenosis, moderate  Heart failure with preserved ejection fraction  Rheumatic heart disease  Chronic obstructive pulmonary disease, unspecified COPD type  CKD (chronic kidney disease) stage 3, GFR 30-59 ml/min  Chronic atrial fibrillation  Sick sinus syndrome  Cardiac pacemaker recipient  H/O mitral valve replacement      Vitals:  T(F): 97.6 (09-19), Max: 98 (09-18)  HR: 59 (09-19) (59 - 72)  BP: 152/67 (09-19) (125/58 - 192/112)  RR: 27 (09-19)  SpO2: 96% (09-19)  I&O's Summary    17 Sep 2018 07:01  -  18 Sep 2018 07:00  --------------------------------------------------------  IN: 440.8 mL / OUT: 740 mL / NET: -299.2 mL    18 Sep 2018 07:01  -  19 Sep 2018 06:30  --------------------------------------------------------  IN: 510 mL / OUT: 2330 mL / NET: -1820 mL      Physical Exam:  Appearance: No acute distress; well appearing  Eyes: PERRL, EOMI, pink conjunctiva  HENT: Normal oral mucosa  Cardiovascular: RRR, S1, S2, systolic murmurs, no rubs, or gallops; no edema; no JVD  Respiratory: Clear to auscultation bilaterally  Gastrointestinal: soft, non-tender, non-distended with normal bowel sounds  Musculoskeletal: No clubbing; no joint deformity   Neurologic: Non-focal  Lymphatic: No lymphadenopathy  Skin: No rashes, ecchymoses, or cyanosis                          10.0   11.7  )-----------( 164      ( 19 Sep 2018 00:54 )             31.2     09-19    140  |  103  |  76<H>  ----------------------------<  206<H>  4.9   |  24  |  1.37<H>    Ca    9.6      19 Sep 2018 00:54  Phos  3.1     09-19  Mg     3.1     09-19    TPro  7.1  /  Alb  3.4  /  TBili  0.5  /  DBili  x   /  AST  60<H>  /  ALT  117<H>  /  AlkPhos  114  09-19    PT/INR - ( 19 Sep 2018 00:54 )   PT: 11.4 sec;   INR: 1.04 ratio         PTT - ( 19 Sep 2018 00:54 )  PTT:51.7 sec      Serum Pro-Brain Natriuretic Peptide: 3467 pg/mL (09-14 @ 06:26)    New ECG(s): Personally reviewed    Echo:  < from: Transthoracic Echocardiogram (09.14.18 @ 17:01) >  ------------------------------------------------------------------------  Conclusions:  1. Mechanical prosthetic mitral valve replacement. Peak  mitral valve gradient equals 13 mm Hg, mean transmitral  valve gradient equals 6 mm Hg, which is elevated even in  the setting of a mechanical prosthetic mitral valve  replacement.  2. Severely calcified aortic valve with decreased opening.  Peak transaortic valve gradient equals 36 mm Hg, mean  transaortic valve gradient equals 19 mmHg, estimated  aortic valve area equals 0.7 sqcm (by continuity equation),  aortic valve velocity time integral equals 61 cm,  consistent with severe aortic stenosis. Moderate aortic  regurgitation.  3. Severely dilated left atrium.  LA volume index = 49  cc/m2.  4. Increased relative wall thickness with normal left  ventricular mass index, consistent with concentric left  ventricular remodeling.  5. Mild global left ventricular systolic dysfunction.  6. Severe diastolic dysfunction (Stage III).  7. Moderate right atrial enlargement.  8. Normal right ventricular size with mildly decreased  right ventricular systolic function. A device wire is noted  in the right heart.  9. Estimated pulmonary artery systolic pressure equals 46  mm Hg, assuming right atrial pressure equals 10 - 15 mm Hg,  consistent with mild pulmonary pressures.  *** No previous Echo exam.  ------------------------------------------------------------------------  Confirmed on  9/15/2018 - 05:10:20 by Jerry Edwards M.D.  ------------------------------------------------------------------------    < end of copied text >      Interpretation of Telemetry: Aflutter V paced at 60 Patient seen and examined at bedside.    Overnight Events: Pt still having some SOB after extubation. Lots of secretions and was reintubated. Agitated and confused overnight. Hypertensive and started on nicardipine gtt.     REVIEW OF SYSTEMS:  Constitutional:     [ ] negative [ ] fevers [ ] chills [ ] weight loss [ ] weight gain  HEENT:                  [ ] negative [ ] dry eyes [ ] eye irritation [ ] postnasal drip [ ] nasal congestion  CV:                         [ ] negative  [ ] chest pain [ ] orthopnea [ ] palpitations [ ] murmur  Resp:                     [ ] negative [ ] cough [ ] shortness of breath [ ] dyspnea [ ] wheezing [ ] sputum [ ]hemoptysis  GI:                          [ ] negative [ ] nausea [ ] vomiting [ ] diarrhea [ ] constipation [ ] abd pain [ ] dysphagia   :                        [ ] negative [ ] dysuria [ ] nocturia [ ] hematuria [ ] increased urinary frequency  Musculoskeletal: [ ] negative [ ] back pain [ ] myalgias [ ] arthralgias [ ] fracture  Skin:                       [ ] negative [ ] rash [ ] itch  Neurological:        [ ] negative [ ] headache [ ] dizziness [ ] syncope [ ] weakness [ ] numbness  Psychiatric:           [ ] negative [ ] anxiety [ ] depression  Endocrine:            [ ] negative [ ] diabetes [ ] thyroid problem  Heme/Lymph:      [ ] negative [ ] anemia [ ] bleeding problem  Allergic/Immune: [ ] negative [ ] itchy eyes [ ] nasal discharge [ ] hives [ ] angioedema    [ ] All other systems negative  [x ] Unable to assess ROS because pt intubated.     Current Meds:  ALBUTerol/ipratropium for Nebulization 3 milliLiter(s) Nebulizer every 6 hours  aspirin  chewable 81 milliGRAM(s) Oral daily  bisacodyl Suppository 10 milliGRAM(s) Rectal daily PRN  cefTRIAXone   IVPB      cefTRIAXone   IVPB 1 Gram(s) IV Intermittent every 24 hours  dextrose 40% Gel 15 Gram(s) Oral once PRN  dextrose 5%. 1000 milliLiter(s) IV Continuous <Continuous>  dextrose 50% Injectable 12.5 Gram(s) IV Push once  dextrose 50% Injectable 25 Gram(s) IV Push once  dextrose 50% Injectable 25 Gram(s) IV Push once  glucagon  Injectable 1 milliGRAM(s) IntraMuscular once PRN  heparin  Infusion.  Unit(s)/Hr IV Continuous <Continuous>  heparin  Injectable 5000 Unit(s) IV Push every 6 hours PRN  heparin  Injectable 2500 Unit(s) IV Push every 6 hours PRN  insulin lispro (HumaLOG) corrective regimen sliding scale   SubCutaneous every 6 hours  niCARdipine Infusion 3 mG/Hr IV Continuous <Continuous>  pantoprazole  Injectable 40 milliGRAM(s) IV Push daily  sodium chloride 3%  Inhalation 3 milliLiter(s) Inhalation every 12 hours    PAST MEDICAL & SURGICAL HISTORY:  Aortic stenosis, moderate  Heart failure with preserved ejection fraction  Rheumatic heart disease  Chronic obstructive pulmonary disease, unspecified COPD type  CKD (chronic kidney disease) stage 3, GFR 30-59 ml/min  Chronic atrial fibrillation  Sick sinus syndrome  Cardiac pacemaker recipient  H/O mitral valve replacement    Vitals:  T(F): 97.6 (09-19), Max: 98 (09-18)  HR: 59 (09-19) (59 - 72)  BP: 152/67 (09-19) (125/58 - 192/112)  RR: 27 (09-19)  SpO2: 96% (09-19)  I&O's Summary    17 Sep 2018 07:01  -  18 Sep 2018 07:00  --------------------------------------------------------  IN: 440.8 mL / OUT: 740 mL / NET: -299.2 mL    18 Sep 2018 07:01  -  19 Sep 2018 06:30  --------------------------------------------------------  IN: 510 mL / OUT: 2330 mL / NET: -1820 mL  Physical Exam:  Appearance: No acute distress; well appearing  Eyes: PERRL, EOMI, pink conjunctiva  HENT: Normal oral mucosa  Cardiovascular: RRR, S1, S2, systolic murmurs, no rubs, or gallops; no edema; no JVD  Respiratory: Clear to auscultation bilaterally  Gastrointestinal: soft, non-tender, non-distended with normal bowel sounds  Musculoskeletal: No clubbing; no joint deformity   Neurologic: Non-focal  Lymphatic: No lymphadenopathy  Skin: No rashes, ecchymoses, or cyanosis                          10.0   11.7  )-----------( 164      ( 19 Sep 2018 00:54 )             31.2     09-19    140  |  103  |  76<H>  ----------------------------<  206<H>  4.9   |  24  |  1.37<H>    Ca    9.6      19 Sep 2018 00:54  Phos  3.1     09-19  Mg     3.1     09-19    TPro  7.1  /  Alb  3.4  /  TBili  0.5  /  DBili  x   /  AST  60<H>  /  ALT  117<H>  /  AlkPhos  114  09-19    PT/INR - ( 19 Sep 2018 00:54 )   PT: 11.4 sec;   INR: 1.04 ratio         PTT - ( 19 Sep 2018 00:54 )  PTT:51.7 sec      Serum Pro-Brain Natriuretic Peptide: 3467 pg/mL (09-14 @ 06:26)    New ECG(s): Personally reviewed    Echo:  < from: Transthoracic Echocardiogram (09.14.18 @ 17:01) >  ------------------------------------------------------------------------  Conclusions:  1. Mechanical prosthetic mitral valve replacement. Peak  mitral valve gradient equals 13 mm Hg, mean transmitral  valve gradient equals 6 mm Hg, which is elevated even in  the setting of a mechanical prosthetic mitral valve  replacement.  2. Severely calcified aortic valve with decreased opening.  Peak transaortic valve gradient equals 36 mm Hg, mean  transaortic valve gradient equals 19 mmHg, estimated  aortic valve area equals 0.7 sqcm (by continuity equation),  aortic valve velocity time integral equals 61 cm,  consistent with severe aortic stenosis. Moderate aortic  regurgitation.  3. Severely dilated left atrium.  LA volume index = 49  cc/m2.  4. Increased relative wall thickness with normal left  ventricular mass index, consistent with concentric left  ventricular remodeling.  5. Mild global left ventricular systolic dysfunction.  6. Severe diastolic dysfunction (Stage III).  7. Moderate right atrial enlargement.  8. Normal right ventricular size with mildly decreased  right ventricular systolic function. A device wire is noted  in the right heart.  9. Estimated pulmonary artery systolic pressure equals 46  mm Hg, assuming right atrial pressure equals 10 - 15 mm Hg,  consistent with mild pulmonary pressures.  *** No previous Echo exam.  ------------------------------------------------------------------------  Confirmed on  9/15/2018 - 05:10:20 by Jerry Edwards M.D.  ------------------------------------------------------------------------    < end of copied text >    Interpretation of Telemetry: Aflutter V paced at 60

## 2018-09-19 NOTE — PROGRESS NOTE ADULT - ATTENDING COMMENTS
85 y/o F w/ a PMH significant for COPD on home O2 (though has not used in past few months), JENNIE on BiPAP, rheumatic heart disease s/p mitral valve replacement, HFpEF, moderate to severe aortic stenosis, A-fib on coumadin, sick sinus syndrome s/p pacemaker placement, HTN, and CKD3 who presented with COPD exacerbation, ADHF, and +entero/rhinovirus requiring intubation x2.     Plan:   Continue COPD treatment and management with steroid, Abx as per primary team.   Continue medical management of non-thrombotic troponin elevation, supply demand mismatch in the setting of respiratory distress with aspirin 81 mg daily. Noted mild reduction in global function in the setting of acute illness. Repeat ECHO in the future after recovery. No ischemic workup planned acutely as this is not a type I event.   Continue AF management with A/C and rate control beta blocker when feasible.     Please call with questions.

## 2018-09-19 NOTE — PHYSICAL THERAPY INITIAL EVALUATION ADULT - PLANNED THERAPY INTERVENTIONS, PT EVAL
transfer training/gait training/GOAL: Stair Negotiation Training: Patient will be able to negotiate up & down 1 flight of stairs with bilateral rails, step to gait pattern, in 4 weeks./balance training/bed mobility training/strengthening

## 2018-09-19 NOTE — PROGRESS NOTE ADULT - ASSESSMENT
83 y/o F w/ a PMH significant for COPD, JENNIE on BiPAP, multivalvular disease (severe AS, s/p MV replacement), HFpEF/severe diastolic failure, A-fib on coumadin, sick sinus syndrome s/p pacemaker placement, HTN, and CKD3 who admitted for acute respiratory failure requiring intubation suspected to be 2/2 COPD exacerbation c/b PNA w/ +entero/rhinovirus and GN coccobacillus.    #Neuro  -AAOx3    #CV  -admitted w/ sBP 200, since requiring pressure support 2/2 sepsis  -0.12 noreph pressure support  -troponins downtrending, currently 192. likely demand 2/2 HTN urgency              -will stop trending cardiac enzymes (CK/CKMB negative x3)              -s/p ASA and plavix load. now on QD ASA + clopidogrel.              -low threshold to stop ASA/plavix of any sx bleeding.  -1 episode lai to 30s              -EP consulted for interrogation of pacemaker.  -HFpEF (40-50%) w/ severe diastolic failure  -multivalvular disease: MV replacement, severe AS  -per cards, no diuresis w/ lasix in setting of shock      #Resp  - intubated on volume control: FiO2 40%, PEEP 5, RR 16, Vt 400 cc  - CXR w/ pulmonary edema vs overlying PNA  -enterovirus positive, now w/ GN coccobacillus positive, possible bacteremia from overlying PNA  -suspect H. flu PNA  -continue azithromycin, cefepime      #Renal  -LATANYA, likely 2/2 sepsis + HTN urgency  -sCr continues to uptrend, expect improvement w/ improvement of sepsis/shock.  -holding diuresis per cards in setting of shock    #ID  -+entero/rhinovirus  -legionella negative  -GN coccobacillus (H. influenzae) on 9/14 blood culture  - UClx (9/14): NGTD  - Sputum Clx (9/14): No organisms  -admitted w/ 103 fever, now afebrile.   -dual therapy (started 9/14): azithro, cefepime  -d/c vanc, low suspicion for MRSA infection    Heme:  -persistently increasing INR.   -home warfarin  -worsening 2/2 sepsis vs loss of GI chastity from abx  -DIC panel negative  -LFTs mildly elevated. low suspicion for acute liver failure.   -continue to monitor, low threshold for IV K if any sx bleeding.    GI: No issues.    #Endocrine: No issues.    #Nutrition  -started tube feeds last PM

## 2018-09-19 NOTE — PHYSICAL THERAPY INITIAL EVALUATION ADULT - GENERAL OBSERVATIONS, REHAB EVAL
Pt received intubated, semisupine in bed with +cardiac monitor, +BP cuff, +pulse ox, +feeds, +IV and +ICU monitoring. NAD noted.

## 2018-09-19 NOTE — PHYSICAL THERAPY INITIAL EVALUATION ADULT - BALANCE TRAINING, PT EVAL
GOAL: Patient will demonstrate improved static/dynamic balance to good, in order to improve stability, decrease fall risk and increase independence with ADLs within 4 weeks.

## 2018-09-19 NOTE — PHYSICAL THERAPY INITIAL EVALUATION ADULT - PRECAUTIONS/LIMITATIONS, REHAB EVAL
+CXR 9/14/18: Pulmonary edema. +Trans Echo 9/14/18: Mechanical prosthetic mitral valve replacement. Severely calcified aortic valve with decreased opening. Severe aortic stenosis. Moderate aortic regurgitation. Severely dilated left atrium. Increased relative wall thickness with normal left ventricular mass index, consistent with concentric left ventricular remodeling. Mild global left ventricular systolic dysfunction. Severe diastolic dysfunction (Stage III). Moderate right atrial enlargement. Normal right ventricular size with mildly decreased right ventricular systolic function. fall precautions/+CXR 9/14/18: Pulmonary edema. +Trans Echo 9/14/18: Mechanical prosthetic mitral valve replacement. Severely calcified aortic valve with decreased opening. Severe aortic stenosis. Moderate aortic regurgitation. Severely dilated left atrium. Increased relative wall thickness with normal left ventricular mass index, consistent with concentric left ventricular remodeling. Mild global left ventricular systolic dysfunction. Severe diastolic dysfunction (Stage III). Moderate right atrial enlargement. Normal right ventricular size with mildly decreased right ventricular systolic function.

## 2018-09-20 LAB
ALBUMIN SERPL ELPH-MCNC: 2.5 G/DL — LOW (ref 3.3–5)
ALP SERPL-CCNC: 96 U/L — SIGNIFICANT CHANGE UP (ref 40–120)
ALT FLD-CCNC: 77 U/L — HIGH (ref 10–45)
ANION GAP SERPL CALC-SCNC: 12 MMOL/L — SIGNIFICANT CHANGE UP (ref 5–17)
ANION GAP SERPL CALC-SCNC: 13 MMOL/L — SIGNIFICANT CHANGE UP (ref 5–17)
APTT BLD: 86.8 SEC — HIGH (ref 27.5–37.4)
AST SERPL-CCNC: 26 U/L — SIGNIFICANT CHANGE UP (ref 10–40)
BILIRUB SERPL-MCNC: 0.3 MG/DL — SIGNIFICANT CHANGE UP (ref 0.2–1.2)
BLD GP AB SCN SERPL QL: NEGATIVE — SIGNIFICANT CHANGE UP
BUN SERPL-MCNC: 72 MG/DL — HIGH (ref 7–23)
BUN SERPL-MCNC: 74 MG/DL — HIGH (ref 7–23)
CALCIUM SERPL-MCNC: 9.1 MG/DL — SIGNIFICANT CHANGE UP (ref 8.4–10.5)
CALCIUM SERPL-MCNC: 9.5 MG/DL — SIGNIFICANT CHANGE UP (ref 8.4–10.5)
CHLORIDE SERPL-SCNC: 109 MMOL/L — HIGH (ref 96–108)
CHLORIDE SERPL-SCNC: 110 MMOL/L — HIGH (ref 96–108)
CO2 SERPL-SCNC: 25 MMOL/L — SIGNIFICANT CHANGE UP (ref 22–31)
CO2 SERPL-SCNC: 25 MMOL/L — SIGNIFICANT CHANGE UP (ref 22–31)
CREAT SERPL-MCNC: 1.17 MG/DL — SIGNIFICANT CHANGE UP (ref 0.5–1.3)
CREAT SERPL-MCNC: 1.19 MG/DL — SIGNIFICANT CHANGE UP (ref 0.5–1.3)
GLUCOSE BLDC GLUCOMTR-MCNC: 234 MG/DL — HIGH (ref 70–99)
GLUCOSE BLDC GLUCOMTR-MCNC: 244 MG/DL — HIGH (ref 70–99)
GLUCOSE BLDC GLUCOMTR-MCNC: 256 MG/DL — HIGH (ref 70–99)
GLUCOSE BLDC GLUCOMTR-MCNC: 273 MG/DL — HIGH (ref 70–99)
GLUCOSE SERPL-MCNC: 252 MG/DL — HIGH (ref 70–99)
GLUCOSE SERPL-MCNC: 256 MG/DL — HIGH (ref 70–99)
HCT VFR BLD CALC: 25.2 % — LOW (ref 34.5–45)
HCT VFR BLD CALC: 25.3 % — LOW (ref 34.5–45)
HCT VFR BLD CALC: 25.5 % — LOW (ref 34.5–45)
HCT VFR BLD CALC: 25.5 % — LOW (ref 34.5–45)
HGB BLD-MCNC: 8.1 G/DL — LOW (ref 11.5–15.5)
HGB BLD-MCNC: 8.4 G/DL — LOW (ref 11.5–15.5)
HGB BLD-MCNC: 8.4 G/DL — LOW (ref 11.5–15.5)
HGB BLD-MCNC: 8.6 G/DL — LOW (ref 11.5–15.5)
INR BLD: 1.27 RATIO — HIGH (ref 0.88–1.16)
MAGNESIUM SERPL-MCNC: 3.1 MG/DL — HIGH (ref 1.6–2.6)
MCHC RBC-ENTMCNC: 27.5 PG — SIGNIFICANT CHANGE UP (ref 27–34)
MCHC RBC-ENTMCNC: 28.9 PG — SIGNIFICANT CHANGE UP (ref 27–34)
MCHC RBC-ENTMCNC: 29.2 PG — SIGNIFICANT CHANGE UP (ref 27–34)
MCHC RBC-ENTMCNC: 29.4 PG — SIGNIFICANT CHANGE UP (ref 27–34)
MCHC RBC-ENTMCNC: 31.7 GM/DL — LOW (ref 32–36)
MCHC RBC-ENTMCNC: 33.1 GM/DL — SIGNIFICANT CHANGE UP (ref 32–36)
MCHC RBC-ENTMCNC: 33.4 GM/DL — SIGNIFICANT CHANGE UP (ref 32–36)
MCHC RBC-ENTMCNC: 33.7 GM/DL — SIGNIFICANT CHANGE UP (ref 32–36)
MCV RBC AUTO: 86.7 FL — SIGNIFICANT CHANGE UP (ref 80–100)
MCV RBC AUTO: 87.1 FL — SIGNIFICANT CHANGE UP (ref 80–100)
MCV RBC AUTO: 87.2 FL — SIGNIFICANT CHANGE UP (ref 80–100)
MCV RBC AUTO: 87.5 FL — SIGNIFICANT CHANGE UP (ref 80–100)
PHOSPHATE SERPL-MCNC: 3.3 MG/DL — SIGNIFICANT CHANGE UP (ref 2.5–4.5)
PLATELET # BLD AUTO: 124 K/UL — LOW (ref 150–400)
PLATELET # BLD AUTO: 128 K/UL — LOW (ref 150–400)
PLATELET # BLD AUTO: 128 K/UL — LOW (ref 150–400)
PLATELET # BLD AUTO: 130 K/UL — LOW (ref 150–400)
POTASSIUM SERPL-MCNC: 4.5 MMOL/L — SIGNIFICANT CHANGE UP (ref 3.5–5.3)
POTASSIUM SERPL-MCNC: 4.7 MMOL/L — SIGNIFICANT CHANGE UP (ref 3.5–5.3)
POTASSIUM SERPL-SCNC: 4.5 MMOL/L — SIGNIFICANT CHANGE UP (ref 3.5–5.3)
POTASSIUM SERPL-SCNC: 4.7 MMOL/L — SIGNIFICANT CHANGE UP (ref 3.5–5.3)
PROT SERPL-MCNC: 6.2 G/DL — SIGNIFICANT CHANGE UP (ref 6–8.3)
PROTHROM AB SERPL-ACNC: 13.8 SEC — HIGH (ref 9.8–12.7)
RBC # BLD: 2.89 M/UL — LOW (ref 3.8–5.2)
RBC # BLD: 2.89 M/UL — LOW (ref 3.8–5.2)
RBC # BLD: 2.92 M/UL — LOW (ref 3.8–5.2)
RBC # BLD: 2.95 M/UL — LOW (ref 3.8–5.2)
RBC # FLD: 15.3 % — HIGH (ref 10.3–14.5)
RBC # FLD: 15.4 % — HIGH (ref 10.3–14.5)
RBC # FLD: 15.5 % — HIGH (ref 10.3–14.5)
RBC # FLD: 15.7 % — HIGH (ref 10.3–14.5)
RH IG SCN BLD-IMP: POSITIVE — SIGNIFICANT CHANGE UP
SODIUM SERPL-SCNC: 146 MMOL/L — HIGH (ref 135–145)
SODIUM SERPL-SCNC: 148 MMOL/L — HIGH (ref 135–145)
WBC # BLD: 5.4 K/UL — SIGNIFICANT CHANGE UP (ref 3.8–10.5)
WBC # BLD: 6.2 K/UL — SIGNIFICANT CHANGE UP (ref 3.8–10.5)
WBC # BLD: 6.3 K/UL — SIGNIFICANT CHANGE UP (ref 3.8–10.5)
WBC # BLD: 6.8 K/UL — SIGNIFICANT CHANGE UP (ref 3.8–10.5)
WBC # FLD AUTO: 5.4 K/UL — SIGNIFICANT CHANGE UP (ref 3.8–10.5)
WBC # FLD AUTO: 6.2 K/UL — SIGNIFICANT CHANGE UP (ref 3.8–10.5)
WBC # FLD AUTO: 6.3 K/UL — SIGNIFICANT CHANGE UP (ref 3.8–10.5)
WBC # FLD AUTO: 6.8 K/UL — SIGNIFICANT CHANGE UP (ref 3.8–10.5)

## 2018-09-20 PROCEDURE — 74176 CT ABD & PELVIS W/O CONTRAST: CPT | Mod: 26

## 2018-09-20 PROCEDURE — 99291 CRITICAL CARE FIRST HOUR: CPT

## 2018-09-20 RX ORDER — DEXTROSE 50 % IN WATER 50 %
12.5 SYRINGE (ML) INTRAVENOUS ONCE
Qty: 0 | Refills: 0 | Status: DISCONTINUED | OUTPATIENT
Start: 2018-09-20 | End: 2018-10-03

## 2018-09-20 RX ORDER — DEXTROSE 50 % IN WATER 50 %
25 SYRINGE (ML) INTRAVENOUS ONCE
Qty: 0 | Refills: 0 | Status: DISCONTINUED | OUTPATIENT
Start: 2018-09-20 | End: 2018-10-03

## 2018-09-20 RX ORDER — HEPARIN SODIUM 5000 [USP'U]/ML
INJECTION INTRAVENOUS; SUBCUTANEOUS
Qty: 25000 | Refills: 0 | Status: DISCONTINUED | OUTPATIENT
Start: 2018-09-20 | End: 2018-09-23

## 2018-09-20 RX ORDER — DEXTROSE 50 % IN WATER 50 %
15 SYRINGE (ML) INTRAVENOUS ONCE
Qty: 0 | Refills: 0 | Status: DISCONTINUED | OUTPATIENT
Start: 2018-09-20 | End: 2018-10-03

## 2018-09-20 RX ORDER — HUMAN INSULIN 100 [IU]/ML
2 INJECTION, SUSPENSION SUBCUTANEOUS EVERY 6 HOURS
Qty: 0 | Refills: 0 | Status: DISCONTINUED | OUTPATIENT
Start: 2018-09-20 | End: 2018-09-20

## 2018-09-20 RX ORDER — INSULIN LISPRO 100/ML
VIAL (ML) SUBCUTANEOUS EVERY 6 HOURS
Qty: 0 | Refills: 0 | Status: DISCONTINUED | OUTPATIENT
Start: 2018-09-20 | End: 2018-10-02

## 2018-09-20 RX ORDER — GLUCAGON INJECTION, SOLUTION 0.5 MG/.1ML
1 INJECTION, SOLUTION SUBCUTANEOUS ONCE
Qty: 0 | Refills: 0 | Status: DISCONTINUED | OUTPATIENT
Start: 2018-09-20 | End: 2018-10-03

## 2018-09-20 RX ORDER — HUMAN INSULIN 100 [IU]/ML
4 INJECTION, SUSPENSION SUBCUTANEOUS EVERY 6 HOURS
Qty: 0 | Refills: 0 | Status: DISCONTINUED | OUTPATIENT
Start: 2018-09-20 | End: 2018-09-21

## 2018-09-20 RX ORDER — HEPARIN SODIUM 5000 [USP'U]/ML
5000 INJECTION INTRAVENOUS; SUBCUTANEOUS ONCE
Qty: 0 | Refills: 0 | Status: DISCONTINUED | OUTPATIENT
Start: 2018-09-20 | End: 2018-09-20

## 2018-09-20 RX ORDER — SODIUM CHLORIDE 9 MG/ML
1000 INJECTION, SOLUTION INTRAVENOUS
Qty: 0 | Refills: 0 | Status: DISCONTINUED | OUTPATIENT
Start: 2018-09-20 | End: 2018-09-20

## 2018-09-20 RX ORDER — HEPARIN SODIUM 5000 [USP'U]/ML
5000 INJECTION INTRAVENOUS; SUBCUTANEOUS EVERY 6 HOURS
Qty: 0 | Refills: 0 | Status: DISCONTINUED | OUTPATIENT
Start: 2018-09-20 | End: 2018-09-26

## 2018-09-20 RX ORDER — HEPARIN SODIUM 5000 [USP'U]/ML
2500 INJECTION INTRAVENOUS; SUBCUTANEOUS EVERY 6 HOURS
Qty: 0 | Refills: 0 | Status: DISCONTINUED | OUTPATIENT
Start: 2018-09-20 | End: 2018-09-26

## 2018-09-20 RX ORDER — SODIUM CHLORIDE 9 MG/ML
1000 INJECTION, SOLUTION INTRAVENOUS
Qty: 0 | Refills: 0 | Status: DISCONTINUED | OUTPATIENT
Start: 2018-09-20 | End: 2018-09-21

## 2018-09-20 RX ADMIN — Medication 4: at 12:05

## 2018-09-20 RX ADMIN — Medication 10 MILLIGRAM(S): at 01:31

## 2018-09-20 RX ADMIN — HUMAN INSULIN 2 UNIT(S): 100 INJECTION, SUSPENSION SUBCUTANEOUS at 12:05

## 2018-09-20 RX ADMIN — Medication 10 MILLIGRAM(S): at 21:03

## 2018-09-20 RX ADMIN — Medication 3 MILLILITER(S): at 05:15

## 2018-09-20 RX ADMIN — Medication 3 MILLILITER(S): at 11:09

## 2018-09-20 RX ADMIN — SODIUM CHLORIDE 3 MILLILITER(S): 9 INJECTION INTRAMUSCULAR; INTRAVENOUS; SUBCUTANEOUS at 05:18

## 2018-09-20 RX ADMIN — PANTOPRAZOLE SODIUM 40 MILLIGRAM(S): 20 TABLET, DELAYED RELEASE ORAL at 12:02

## 2018-09-20 RX ADMIN — Medication 2: at 05:12

## 2018-09-20 RX ADMIN — HUMAN INSULIN 4 UNIT(S): 100 INJECTION, SUSPENSION SUBCUTANEOUS at 23:30

## 2018-09-20 RX ADMIN — Medication 40 MILLIGRAM(S): at 18:15

## 2018-09-20 RX ADMIN — CEFTRIAXONE 100 GRAM(S): 500 INJECTION, POWDER, FOR SOLUTION INTRAMUSCULAR; INTRAVENOUS at 17:15

## 2018-09-20 RX ADMIN — HUMAN INSULIN 4 UNIT(S): 100 INJECTION, SUSPENSION SUBCUTANEOUS at 19:08

## 2018-09-20 RX ADMIN — Medication 6: at 18:21

## 2018-09-20 RX ADMIN — Medication 6: at 23:30

## 2018-09-20 RX ADMIN — Medication 40 MILLIGRAM(S): at 05:12

## 2018-09-20 RX ADMIN — HEPARIN SODIUM 1100 UNIT(S)/HR: 5000 INJECTION INTRAVENOUS; SUBCUTANEOUS at 20:29

## 2018-09-20 RX ADMIN — Medication 3 MILLILITER(S): at 23:12

## 2018-09-20 RX ADMIN — POLYETHYLENE GLYCOL 3350 17 GRAM(S): 17 POWDER, FOR SOLUTION ORAL at 18:15

## 2018-09-20 RX ADMIN — Medication 3 MILLILITER(S): at 18:16

## 2018-09-20 RX ADMIN — Medication 81 MILLIGRAM(S): at 12:02

## 2018-09-20 NOTE — PROGRESS NOTE ADULT - ASSESSMENT
85 y/o F w/ a PMH significant for COPD, JENNIE on BiPAP, multivalvular disease (severe AS, s/p MV replacement), HFpEF/severe diastolic failure, A-fib on coumadin, sick sinus syndrome s/p pacemaker placement, HTN, and CKD3 who admitted for acute respiratory failure requiring intubation suspected to be 2/2 COPD exacerbation c/b PNA w/ +entero/rhinovirus and GN coccobacillus.    #Neuro  -AAOx3    #CV  -admitted w/ sBP 200, since requiring pressure support 2/2 sepsis  -0.12 noreph pressure support  -troponins downtrending, currently 192. likely demand 2/2 HTN urgency              -will stop trending cardiac enzymes (CK/CKMB negative x3)              -s/p ASA and plavix load. now on QD ASA + clopidogrel.              -low threshold to stop ASA/plavix of any sx bleeding.  -1 episode lai to 30s              -EP consulted for interrogation of pacemaker.  -HFpEF (40-50%) w/ severe diastolic failure  -multivalvular disease: MV replacement, severe AS  -per cards, no diuresis w/ lasix in setting of shock      #Resp  - intubated on volume control: FiO2 40%, PEEP 5, RR 16, Vt 400 cc  - CXR w/ pulmonary edema vs overlying PNA  -enterovirus positive, now w/ GN coccobacillus positive, possible bacteremia from overlying PNA  -suspect H. flu PNA  -continue azithromycin, cefepime      #Renal  -LATANYA, likely 2/2 sepsis + HTN urgency  -sCr continues to uptrend, expect improvement w/ improvement of sepsis/shock.  -holding diuresis per cards in setting of shock    #ID  -+entero/rhinovirus  -legionella negative  -GN coccobacillus (H. influenzae) on 9/14 blood culture  - UClx (9/14): NGTD  - Sputum Clx (9/14): No organisms  -admitted w/ 103 fever, now afebrile.   -dual therapy (started 9/14): azithro, cefepime  -d/c vanc, low suspicion for MRSA infection    Heme:  -persistently increasing INR.   -home warfarin  -worsening 2/2 sepsis vs loss of GI chastity from abx  -DIC panel negative  -LFTs mildly elevated. low suspicion for acute liver failure.   -continue to monitor, low threshold for IV K if any sx bleeding.    GI: No issues.    #Endocrine: No issues.    #Nutrition  -started tube feeds last PM

## 2018-09-20 NOTE — PROGRESS NOTE ADULT - SUBJECTIVE AND OBJECTIVE BOX
Patient is a 84y old  Female who presents with a chief complaint of COPD exacerbation, ADHF (19 Sep 2018 15:04)      Interval Events: Holding heparin drip for the Hb drop from 10.6 to 8.6 overnight. Repeat CBC at 9am. High residual; holding feeds currently. Pressors off since 8pm last night. Currently on propofol only.    REVIEW OF SYSTEMS:  Constitutional: [ ] negative [ ] fevers [ ] chills [ ] weight loss [ ] weight gain  HEENT: [ ] negative [ ] dry eyes [ ] eye irritation [ ] postnasal drip [ ] nasal congestion  CV: [ ] negative  [ ] chest pain [ ] orthopnea [ ] palpitations [ ] murmur  Resp: [ ] negative [ ] cough [ ] shortness of breath [ ] dyspnea [ ] wheezing [ ] sputum [ ] hemoptysis  GI: [ ] negative [ ] nausea [ ] vomiting [ ] diarrhea [ ] constipation [ ] abd pain [ ] dysphagia   : [ ] negative [ ] dysuria [ ] nocturia [ ] hematuria [ ] increased urinary frequency  Musculoskeletal: [ ] negative [ ] back pain [ ] myalgias [ ] arthralgias [ ] fracture  Skin: [ ] negative [ ] rash [ ] itch  Neurological: [ ] negative [ ] headache [ ] dizziness [ ] syncope [ ] weakness [ ] numbness  Psychiatric: [ ] negative [ ] anxiety [ ] depression  Endocrine: [ ] negative [ ] diabetes [ ] thyroid problem  Hematologic/Lymphatic: [ ] negative [ ] anemia [ ] bleeding problem  Allergic/Immunologic: [ ] negative [ ] itchy eyes [ ] nasal discharge [ ] hives [ ] angioedema  [ ] All other systems negative  [X] Unable to assess ROS because intubated and sedated    OBJECTIVE:  ICU Vital Signs Last 24 Hrs  T(C): 37.2 (20 Sep 2018 04:00), Max: 37.2 (19 Sep 2018 20:00)  T(F): 98.9 (20 Sep 2018 04:00), Max: 98.9 (19 Sep 2018 20:00)  HR: 61 (20 Sep 2018 07:00) (59 - 93)  BP: 155/70 (20 Sep 2018 07:00) (103/53 - 183/77)  BP(mean): 100 (20 Sep 2018 07:00) (68 - 111)  ABP: --  ABP(mean): --  RR: 23 (20 Sep 2018 07:00) (20 - 41)  SpO2: 100% (20 Sep 2018 07:00) (93% - 100%)    Mode: AC/ CMV (Assist Control/ Continuous Mandatory Ventilation), RR (machine): 20, TV (machine): 400, FiO2: 30, PEEP: 5, ITime: 1, MAP: 10, PIP: 25    09-19 @ 07:01  -  09-20 @ 07:00  --------------------------------------------------------  IN: 958.5 mL / OUT: 1430 mL / NET: -471.5 mL      CAPILLARY BLOOD GLUCOSE      POCT Blood Glucose.: 234 mg/dL (20 Sep 2018 05:11)      PHYSICAL EXAM:  General: Intubated and sedated  HEENT: NC/AT; PERRL, clear conjunctiva  Neck: Central line in place w/out evidence bleeding or discharge.  Respiratory: Diffuse crackles.  Cardiovascular: +S1/S2; RRR. Unable to accurately assess murmurs 2/2 loud airway sounds.   Abdomen: soft, NT/ND; +BS x4  Extremities: WWP, 2+ peripheral pulses b/l; no LE edema  Skin: normal color and turgor; no rash  Neurological: AAOx3.    LINES:    HOSPITAL MEDICATIONS:  Standing Meds:  ALBUTerol/ipratropium for Nebulization 3 milliLiter(s) Nebulizer every 6 hours  aspirin  chewable 81 milliGRAM(s) Oral daily  bisacodyl Suppository 10 milliGRAM(s) Rectal at bedtime  cefTRIAXone   IVPB      cefTRIAXone   IVPB 1 Gram(s) IV Intermittent every 24 hours  dextrose 5%. 1000 milliLiter(s) IV Continuous <Continuous>  dextrose 50% Injectable 12.5 Gram(s) IV Push once  dextrose 50% Injectable 25 Gram(s) IV Push once  dextrose 50% Injectable 25 Gram(s) IV Push once  insulin lispro (HumaLOG) corrective regimen sliding scale   SubCutaneous every 6 hours  methylPREDNISolone sodium succinate Injectable 40 milliGRAM(s) IV Push every 12 hours  norepinephrine Infusion 0.05 MICROgram(s)/kG/Min IV Continuous <Continuous>  pantoprazole  Injectable 40 milliGRAM(s) IV Push daily  polyethylene glycol 3350 17 Gram(s) Oral two times a day  propofol Infusion 5 MICROgram(s)/kG/Min IV Continuous <Continuous>  sodium chloride 3%  Inhalation 3 milliLiter(s) Inhalation every 12 hours      PRN Meds:  dextrose 40% Gel 15 Gram(s) Oral once PRN  glucagon  Injectable 1 milliGRAM(s) IntraMuscular once PRN      LABS:                        8.6    6.2   )-----------( 130      ( 20 Sep 2018 02:35 )             25.5     Hgb Trend: 8.6<--, 8.4<--, 10.0<--, 9.2<--, 8.9<--  09-20    146<H>  |  109<H>  |  74<H>  ----------------------------<  256<H>  4.5   |  25  |  1.17    Ca    9.5      20 Sep 2018 02:35  Phos  3.3     09-20  Mg     3.1     09-20    TPro  6.2  /  Alb  2.5<L>  /  TBili  0.3  /  DBili  x   /  AST  26  /  ALT  77<H>  /  AlkPhos  96  09-20    Creatinine Trend: 1.17<--, 1.19<--, 1.37<--, 1.42<--, 1.95<--, 2.39<--  PT/INR - ( 20 Sep 2018 01:08 )   PT: 13.8 sec;   INR: 1.27 ratio         PTT - ( 20 Sep 2018 01:08 )  PTT:86.8 sec    Arterial Blood Gas:  09-19 @ 18:19  7.42/48/161/31/99/5.9  ABG lactate: --  Arterial Blood Gas:  09-18 @ 23:18  7.40/43/86/26/96/1.4  ABG lactate: --        MICROBIOLOGY:     Culture - Blood (collected 17 Sep 2018 14:06)  Source: .Blood Blood-Peripheral  Preliminary Report (18 Sep 2018 15:01):    No growth to date.    Culture - Blood (collected 17 Sep 2018 14:05)  Source: .Blood Blood-Venous  Preliminary Report (18 Sep 2018 15:01):    No growth to date.      RADIOLOGY:  [ ] Reviewed and interpreted by me    EKG:

## 2018-09-20 NOTE — PROGRESS NOTE ADULT - ATTENDING COMMENTS
Critically ill on vent with deconditioning and airway clearance failure  Frequent bedside visits with therapy change today. Crit Care Time Today 35 min +

## 2018-09-21 LAB
ALBUMIN SERPL ELPH-MCNC: 2.9 G/DL — LOW (ref 3.3–5)
ALP SERPL-CCNC: 87 U/L — SIGNIFICANT CHANGE UP (ref 40–120)
ALT FLD-CCNC: 60 U/L — HIGH (ref 10–45)
ANION GAP SERPL CALC-SCNC: 9 MMOL/L — SIGNIFICANT CHANGE UP (ref 5–17)
APPEARANCE UR: CLEAR — SIGNIFICANT CHANGE UP
APTT BLD: 102.5 SEC — HIGH (ref 27.5–37.4)
APTT BLD: 82.3 SEC — HIGH (ref 27.5–37.4)
APTT BLD: 83.8 SEC — HIGH (ref 27.5–37.4)
APTT BLD: 86.3 SEC — HIGH (ref 27.5–37.4)
AST SERPL-CCNC: 22 U/L — SIGNIFICANT CHANGE UP (ref 10–40)
BASE EXCESS BLDA CALC-SCNC: 5 MMOL/L — HIGH (ref -2–2)
BILIRUB SERPL-MCNC: 0.3 MG/DL — SIGNIFICANT CHANGE UP (ref 0.2–1.2)
BILIRUB UR-MCNC: NEGATIVE — SIGNIFICANT CHANGE UP
BUN SERPL-MCNC: 78 MG/DL — HIGH (ref 7–23)
CALCIUM SERPL-MCNC: 9.3 MG/DL — SIGNIFICANT CHANGE UP (ref 8.4–10.5)
CHLORIDE SERPL-SCNC: 109 MMOL/L — HIGH (ref 96–108)
CO2 BLDA-SCNC: 30 MMOL/L — SIGNIFICANT CHANGE UP (ref 22–30)
CO2 SERPL-SCNC: 26 MMOL/L — SIGNIFICANT CHANGE UP (ref 22–31)
COLOR SPEC: COLORLESS — SIGNIFICANT CHANGE UP
CREAT SERPL-MCNC: 1.11 MG/DL — SIGNIFICANT CHANGE UP (ref 0.5–1.3)
DIFF PNL FLD: NEGATIVE — SIGNIFICANT CHANGE UP
GAS PNL BLDA: SIGNIFICANT CHANGE UP
GLUCOSE BLDC GLUCOMTR-MCNC: 226 MG/DL — HIGH (ref 70–99)
GLUCOSE BLDC GLUCOMTR-MCNC: 233 MG/DL — HIGH (ref 70–99)
GLUCOSE BLDC GLUCOMTR-MCNC: 289 MG/DL — HIGH (ref 70–99)
GLUCOSE SERPL-MCNC: 277 MG/DL — HIGH (ref 70–99)
GLUCOSE UR QL: NEGATIVE — SIGNIFICANT CHANGE UP
GRAM STN FLD: SIGNIFICANT CHANGE UP
HBA1C BLD-MCNC: 7.2 % — HIGH (ref 4–5.6)
HCO3 BLDA-SCNC: 29 MMOL/L — SIGNIFICANT CHANGE UP (ref 21–29)
HCT VFR BLD CALC: 24.1 % — LOW (ref 34.5–45)
HCT VFR BLD CALC: 26.6 % — LOW (ref 34.5–45)
HGB BLD-MCNC: 8.1 G/DL — LOW (ref 11.5–15.5)
HGB BLD-MCNC: 8.8 G/DL — LOW (ref 11.5–15.5)
HOROWITZ INDEX BLDA+IHG-RTO: 30 — SIGNIFICANT CHANGE UP
INR BLD: 1.28 RATIO — HIGH (ref 0.88–1.16)
KETONES UR-MCNC: NEGATIVE — SIGNIFICANT CHANGE UP
LEUKOCYTE ESTERASE UR-ACNC: ABNORMAL
MAGNESIUM SERPL-MCNC: 3 MG/DL — HIGH (ref 1.6–2.6)
MCHC RBC-ENTMCNC: 28.8 PG — SIGNIFICANT CHANGE UP (ref 27–34)
MCHC RBC-ENTMCNC: 29.2 PG — SIGNIFICANT CHANGE UP (ref 27–34)
MCHC RBC-ENTMCNC: 33 GM/DL — SIGNIFICANT CHANGE UP (ref 32–36)
MCHC RBC-ENTMCNC: 33.7 GM/DL — SIGNIFICANT CHANGE UP (ref 32–36)
MCV RBC AUTO: 86.7 FL — SIGNIFICANT CHANGE UP (ref 80–100)
MCV RBC AUTO: 87 FL — SIGNIFICANT CHANGE UP (ref 80–100)
NITRITE UR-MCNC: NEGATIVE — SIGNIFICANT CHANGE UP
PCO2 BLDA: 39 MMHG — SIGNIFICANT CHANGE UP (ref 32–46)
PH BLDA: 7.47 — HIGH (ref 7.35–7.45)
PH UR: 6 — SIGNIFICANT CHANGE UP (ref 5–8)
PHOSPHATE SERPL-MCNC: 3 MG/DL — SIGNIFICANT CHANGE UP (ref 2.5–4.5)
PLATELET # BLD AUTO: 132 K/UL — LOW (ref 150–400)
PLATELET # BLD AUTO: 148 K/UL — LOW (ref 150–400)
PO2 BLDA: 100 MMHG — SIGNIFICANT CHANGE UP (ref 74–108)
POTASSIUM SERPL-MCNC: 4.6 MMOL/L — SIGNIFICANT CHANGE UP (ref 3.5–5.3)
POTASSIUM SERPL-SCNC: 4.6 MMOL/L — SIGNIFICANT CHANGE UP (ref 3.5–5.3)
PROT SERPL-MCNC: 6.4 G/DL — SIGNIFICANT CHANGE UP (ref 6–8.3)
PROT UR-MCNC: SIGNIFICANT CHANGE UP
PROTHROM AB SERPL-ACNC: 14 SEC — HIGH (ref 9.8–12.7)
RBC # BLD: 2.78 M/UL — LOW (ref 3.8–5.2)
RBC # BLD: 3.06 M/UL — LOW (ref 3.8–5.2)
RBC # FLD: 15.5 % — HIGH (ref 10.3–14.5)
RBC # FLD: 15.9 % — HIGH (ref 10.3–14.5)
SAO2 % BLDA: 97 % — HIGH (ref 92–96)
SODIUM SERPL-SCNC: 144 MMOL/L — SIGNIFICANT CHANGE UP (ref 135–145)
SP GR SPEC: 1.01 — SIGNIFICANT CHANGE UP
SPECIMEN SOURCE: SIGNIFICANT CHANGE UP
UROBILINOGEN FLD QL: NEGATIVE — SIGNIFICANT CHANGE UP
WBC # BLD: 7.5 K/UL — SIGNIFICANT CHANGE UP (ref 3.8–10.5)
WBC # BLD: 9.2 K/UL — SIGNIFICANT CHANGE UP (ref 3.8–10.5)
WBC # FLD AUTO: 7.5 K/UL — SIGNIFICANT CHANGE UP (ref 3.8–10.5)
WBC # FLD AUTO: 9.2 K/UL — SIGNIFICANT CHANGE UP (ref 3.8–10.5)

## 2018-09-21 PROCEDURE — 71045 X-RAY EXAM CHEST 1 VIEW: CPT | Mod: 26

## 2018-09-21 PROCEDURE — 99291 CRITICAL CARE FIRST HOUR: CPT

## 2018-09-21 RX ORDER — NICARDIPINE HYDROCHLORIDE 30 MG/1
3 CAPSULE, EXTENDED RELEASE ORAL
Qty: 40 | Refills: 0 | Status: DISCONTINUED | OUTPATIENT
Start: 2018-09-21 | End: 2018-09-22

## 2018-09-21 RX ORDER — HUMAN INSULIN 100 [IU]/ML
12 INJECTION, SUSPENSION SUBCUTANEOUS EVERY 6 HOURS
Qty: 0 | Refills: 0 | Status: DISCONTINUED | OUTPATIENT
Start: 2018-09-21 | End: 2018-10-01

## 2018-09-21 RX ORDER — VANCOMYCIN HCL 1 G
1000 VIAL (EA) INTRAVENOUS ONCE
Qty: 0 | Refills: 0 | Status: COMPLETED | OUTPATIENT
Start: 2018-09-21 | End: 2018-09-21

## 2018-09-21 RX ORDER — FUROSEMIDE 40 MG
40 TABLET ORAL ONCE
Qty: 0 | Refills: 0 | Status: COMPLETED | OUTPATIENT
Start: 2018-09-21 | End: 2018-09-21

## 2018-09-21 RX ORDER — AZTREONAM 2 G
1000 VIAL (EA) INJECTION EVERY 8 HOURS
Qty: 0 | Refills: 0 | Status: DISCONTINUED | OUTPATIENT
Start: 2018-09-22 | End: 2018-09-22

## 2018-09-21 RX ORDER — AZTREONAM 2 G
VIAL (EA) INJECTION
Qty: 0 | Refills: 0 | Status: DISCONTINUED | OUTPATIENT
Start: 2018-09-21 | End: 2018-09-22

## 2018-09-21 RX ORDER — HUMAN INSULIN 100 [IU]/ML
8 INJECTION, SUSPENSION SUBCUTANEOUS EVERY 6 HOURS
Qty: 0 | Refills: 0 | Status: DISCONTINUED | OUTPATIENT
Start: 2018-09-21 | End: 2018-09-21

## 2018-09-21 RX ORDER — AZTREONAM 2 G
1000 VIAL (EA) INJECTION ONCE
Qty: 0 | Refills: 0 | Status: COMPLETED | OUTPATIENT
Start: 2018-09-21 | End: 2018-09-21

## 2018-09-21 RX ORDER — PIPERACILLIN AND TAZOBACTAM 4; .5 G/20ML; G/20ML
3.38 INJECTION, POWDER, LYOPHILIZED, FOR SOLUTION INTRAVENOUS ONCE
Qty: 0 | Refills: 0 | Status: DISCONTINUED | OUTPATIENT
Start: 2018-09-21 | End: 2018-09-21

## 2018-09-21 RX ORDER — VANCOMYCIN HCL 1 G
1000 VIAL (EA) INTRAVENOUS EVERY 12 HOURS
Qty: 0 | Refills: 0 | Status: DISCONTINUED | OUTPATIENT
Start: 2018-09-21 | End: 2018-09-22

## 2018-09-21 RX ORDER — DEXMEDETOMIDINE HYDROCHLORIDE IN 0.9% SODIUM CHLORIDE 4 UG/ML
0.2 INJECTION INTRAVENOUS
Qty: 200 | Refills: 0 | Status: DISCONTINUED | OUTPATIENT
Start: 2018-09-21 | End: 2018-09-26

## 2018-09-21 RX ORDER — PIPERACILLIN AND TAZOBACTAM 4; .5 G/20ML; G/20ML
3.38 INJECTION, POWDER, LYOPHILIZED, FOR SOLUTION INTRAVENOUS EVERY 8 HOURS
Qty: 0 | Refills: 0 | Status: DISCONTINUED | OUTPATIENT
Start: 2018-09-21 | End: 2018-09-21

## 2018-09-21 RX ADMIN — Medication 250 MILLIGRAM(S): at 18:29

## 2018-09-21 RX ADMIN — HUMAN INSULIN 8 UNIT(S): 100 INJECTION, SUSPENSION SUBCUTANEOUS at 18:10

## 2018-09-21 RX ADMIN — Medication 40 MILLIGRAM(S): at 05:27

## 2018-09-21 RX ADMIN — HEPARIN SODIUM 1000 UNIT(S)/HR: 5000 INJECTION INTRAVENOUS; SUBCUTANEOUS at 09:49

## 2018-09-21 RX ADMIN — HUMAN INSULIN 4 UNIT(S): 100 INJECTION, SUSPENSION SUBCUTANEOUS at 05:26

## 2018-09-21 RX ADMIN — Medication 81 MILLIGRAM(S): at 11:56

## 2018-09-21 RX ADMIN — HEPARIN SODIUM 1000 UNIT(S)/HR: 5000 INJECTION INTRAVENOUS; SUBCUTANEOUS at 16:51

## 2018-09-21 RX ADMIN — Medication 6: at 05:26

## 2018-09-21 RX ADMIN — Medication 40 MILLIGRAM(S): at 14:04

## 2018-09-21 RX ADMIN — POLYETHYLENE GLYCOL 3350 17 GRAM(S): 17 POWDER, FOR SOLUTION ORAL at 18:11

## 2018-09-21 RX ADMIN — Medication 3 MILLILITER(S): at 17:07

## 2018-09-21 RX ADMIN — CEFTRIAXONE 100 GRAM(S): 500 INJECTION, POWDER, FOR SOLUTION INTRAMUSCULAR; INTRAVENOUS at 16:58

## 2018-09-21 RX ADMIN — Medication 3 MILLILITER(S): at 11:02

## 2018-09-21 RX ADMIN — Medication 10 MILLIGRAM(S): at 21:33

## 2018-09-21 RX ADMIN — DEXMEDETOMIDINE HYDROCHLORIDE IN 0.9% SODIUM CHLORIDE 3.15 MICROGRAM(S)/KG/HR: 4 INJECTION INTRAVENOUS at 11:56

## 2018-09-21 RX ADMIN — HEPARIN SODIUM 1100 UNIT(S)/HR: 5000 INJECTION INTRAVENOUS; SUBCUTANEOUS at 03:00

## 2018-09-21 RX ADMIN — Medication 3 MILLILITER(S): at 05:31

## 2018-09-21 RX ADMIN — Medication 3 MILLILITER(S): at 23:22

## 2018-09-21 RX ADMIN — POLYETHYLENE GLYCOL 3350 17 GRAM(S): 17 POWDER, FOR SOLUTION ORAL at 05:27

## 2018-09-21 RX ADMIN — Medication 4: at 18:10

## 2018-09-21 RX ADMIN — PANTOPRAZOLE SODIUM 40 MILLIGRAM(S): 20 TABLET, DELAYED RELEASE ORAL at 11:57

## 2018-09-21 RX ADMIN — HUMAN INSULIN 8 UNIT(S): 100 INJECTION, SUSPENSION SUBCUTANEOUS at 12:19

## 2018-09-21 RX ADMIN — Medication 40 MILLIGRAM(S): at 18:10

## 2018-09-21 RX ADMIN — Medication 4: at 12:18

## 2018-09-21 RX ADMIN — Medication 50 MILLIGRAM(S): at 18:30

## 2018-09-21 NOTE — PROGRESS NOTE ADULT - SUBJECTIVE AND OBJECTIVE BOX
Patient is a 84y old  Female who presents with a chief complaint of COPD exacerbation, ADHF (20 Sep 2018 07:56)      Interval Events: D5W switched to free water pushes ([id number]) due to hypernatremia and hyperglycemia. Na dropped from 148 to 144. NPH increased from 4U Q6H to 8U Q6H. Hb stable at low 8s; heparin gtt re-initiated.    REVIEW OF SYSTEMS:  Constitutional: [ ] negative [ ] fevers [ ] chills [ ] weight loss [ ] weight gain  HEENT: [ ] negative [ ] dry eyes [ ] eye irritation [ ] postnasal drip [ ] nasal congestion  CV: [ ] negative  [ ] chest pain [ ] orthopnea [ ] palpitations [ ] murmur  Resp: [ ] negative [ ] cough [ ] shortness of breath [ ] dyspnea [ ] wheezing [ ] sputum [ ] hemoptysis  GI: [ ] negative [ ] nausea [ ] vomiting [ ] diarrhea [ ] constipation [ ] abd pain [ ] dysphagia   : [ ] negative [ ] dysuria [ ] nocturia [ ] hematuria [ ] increased urinary frequency  Musculoskeletal: [ ] negative [ ] back pain [ ] myalgias [ ] arthralgias [ ] fracture  Skin: [ ] negative [ ] rash [ ] itch  Neurological: [ ] negative [ ] headache [ ] dizziness [ ] syncope [ ] weakness [ ] numbness  Psychiatric: [ ] negative [ ] anxiety [ ] depression  Endocrine: [ ] negative [ ] diabetes [ ] thyroid problem  Hematologic/Lymphatic: [ ] negative [ ] anemia [ ] bleeding problem  Allergic/Immunologic: [ ] negative [ ] itchy eyes [ ] nasal discharge [ ] hives [ ] angioedema  [ ] All other systems negative  [X] Unable to assess ROS because pt. intubated sedated    OBJECTIVE:  ICU Vital Signs Last 24 Hrs  T(C): 36.6 (21 Sep 2018 04:00), Max: 36.8 (20 Sep 2018 16:00)  T(F): 97.8 (21 Sep 2018 04:00), Max: 98.3 (20 Sep 2018 16:00)  HR: 59 (21 Sep 2018 05:31) (59 - 70)  BP: 160/79 (21 Sep 2018 05:00) (113/56 - 169/68)  BP(mean): 113 (21 Sep 2018 05:00) (81 - 113)  ABP: --  ABP(mean): --  RR: 20 (21 Sep 2018 05:00) (20 - 24)  SpO2: 100% (21 Sep 2018 05:31) (99% - 100%)    Mode: AC/ CMV (Assist Control/ Continuous Mandatory Ventilation), RR (machine): 20, TV (machine): 400, FiO2: 30, PEEP: 5, ITime: 0.9, MAP: 9, PIP: 26    09-20 @ 07:01  -  09-21 @ 07:00  --------------------------------------------------------  IN: 2484.6 mL / OUT: 885 mL / NET: 1599.6 mL      CAPILLARY BLOOD GLUCOSE      POCT Blood Glucose.: 289 mg/dL (21 Sep 2018 05:26)      PHYSICAL EXAM:  General: Intubated and sedated  HEENT: NC/AT; PERRL, clear conjunctiva  Neck: Central line in place w/out evidence bleeding or discharge.  Respiratory: Diffuse crackles.  Cardiovascular: +S1/S2; RRR. Unable to accurately assess murmurs 2/2 loud airway sounds.   Abdomen: soft, NT/ND; +BS x4  Extremities: WWP, 2+ peripheral pulses b/l; no LE edema  Skin: normal color and turgor; no rash  Neurological: Sedated and intubated    LINES:    HOSPITAL MEDICATIONS:  Standing Meds:  ALBUTerol/ipratropium for Nebulization 3 milliLiter(s) Nebulizer every 6 hours  aspirin  chewable 81 milliGRAM(s) Oral daily  bisacodyl Suppository 10 milliGRAM(s) Rectal at bedtime  cefTRIAXone   IVPB      cefTRIAXone   IVPB 1 Gram(s) IV Intermittent every 24 hours  dextrose 50% Injectable 12.5 Gram(s) IV Push once  dextrose 50% Injectable 25 Gram(s) IV Push once  dextrose 50% Injectable 25 Gram(s) IV Push once  heparin  Infusion.  Unit(s)/Hr IV Continuous <Continuous>  insulin lispro (HumaLOG) corrective regimen sliding scale   SubCutaneous every 6 hours  insulin NPH human recombinant 8 Unit(s) SubCutaneous every 6 hours  methylPREDNISolone sodium succinate Injectable 40 milliGRAM(s) IV Push every 12 hours  norepinephrine Infusion 0.05 MICROgram(s)/kG/Min IV Continuous <Continuous>  pantoprazole  Injectable 40 milliGRAM(s) IV Push daily  polyethylene glycol 3350 17 Gram(s) Oral two times a day  propofol Infusion 5 MICROgram(s)/kG/Min IV Continuous <Continuous>      PRN Meds:  dextrose 40% Gel 15 Gram(s) Oral once PRN  glucagon  Injectable 1 milliGRAM(s) IntraMuscular once PRN  heparin  Injectable 5000 Unit(s) IV Push every 6 hours PRN  heparin  Injectable 2500 Unit(s) IV Push every 6 hours PRN      LABS:                        8.1    7.5   )-----------( 132      ( 21 Sep 2018 02:09 )             24.1     Hgb Trend: 8.1<--, 8.4<--, 8.1<--, 8.6<--, 8.4<--  09-21    144  |  109<H>  |  78<H>  ----------------------------<  277<H>  4.6   |  26  |  1.11    Ca    9.3      21 Sep 2018 02:09  Phos  3.0     09-21  Mg     3.0     09-21    TPro  6.4  /  Alb  2.9<L>  /  TBili  0.3  /  DBili  x   /  AST  22  /  ALT  60<H>  /  AlkPhos  87  09-21    Creatinine Trend: 1.11<--, 1.17<--, 1.19<--, 1.37<--, 1.42<--, 1.95<--  PT/INR - ( 21 Sep 2018 02:09 )   PT: 14.0 sec;   INR: 1.28 ratio         PTT - ( 21 Sep 2018 02:09 )  PTT:83.8 sec    Arterial Blood Gas:  09-21 @ 02:07  7.47/39/100/29/97/5.0  ABG lactate: --  Arterial Blood Gas:  09-19 @ 18:19  7.42/48/161/31/99/5.9  ABG lactate: --        MICROBIOLOGY:     RADIOLOGY:  [ ] Reviewed and interpreted by me    EKG:

## 2018-09-21 NOTE — PROGRESS NOTE ADULT - ASSESSMENT
85 y/o F w/ a PMH significant for COPD, JENNIE on BiPAP, multivalvular disease (severe AS, s/p MV replacement), HFpEF/severe diastolic failure, A-fib on coumadin, sick sinus syndrome s/p pacemaker placement, HTN, and CKD3 who admitted for acute respiratory failure requiring intubation suspected to be 2/2 COPD exacerbation c/b PNA w/ +entero/rhinovirus and GN coccobacillus.    #Neuro  -AAOx3    #CV  -admitted w/ sBP 200, since requiring pressure support 2/2 sepsis  -0.12 noreph pressure support  -troponins downtrending, currently 192. likely demand 2/2 HTN urgency              -will stop trending cardiac enzymes (CK/CKMB negative x3)              -s/p ASA and plavix load. now on QD ASA + clopidogrel.              -low threshold to stop ASA/plavix of any sx bleeding.  -1 episode lai to 30s              -EP consulted for interrogation of pacemaker.  -HFpEF (40-50%) w/ severe diastolic failure  -multivalvular disease: MV replacement, severe AS  -per cards, no diuresis w/ lasix in setting of shock      #Resp  - intubated on volume control: FiO2 40%, PEEP 5, RR 16, Vt 400 cc  - CXR w/ pulmonary edema vs overlying PNA  -enterovirus positive, now w/ GN coccobacillus positive, possible bacteremia from overlying PNA  -suspect H. flu PNA  -continue azithromycin, cefepime      #Renal  -LATANYA, likely 2/2 sepsis + HTN urgency  -sCr continues to uptrend, expect improvement w/ improvement of sepsis/shock.  -holding diuresis per cards in setting of shock    #ID  -+entero/rhinovirus  -legionella negative  -GN coccobacillus (H. influenzae) on 9/14 blood culture  - UClx (9/14): NGTD  - Sputum Clx (9/14): No organisms  -admitted w/ 103 fever, now afebrile.   -dual therapy (started 9/14): azithro, cefepime  -d/c vanc, low suspicion for MRSA infection    Heme:  -persistently increasing INR.   -home warfarin  -worsening 2/2 sepsis vs loss of GI chastity from abx  -DIC panel negative  -LFTs mildly elevated. low suspicion for acute liver failure.   -continue to monitor, low threshold for IV K if any sx bleeding.    GI: No issues.    #Endocrine: No issues.    #Nutrition  -started tube feeds last PM 85 y/o F w/ a PMH significant for COPD, JENNIE on BiPAP, multivalvular disease (severe AS, s/p MV replacement), HFpEF/severe diastolic failure, A-fib on coumadin, sick sinus syndrome s/p pacemaker placement, HTN, and CKD3 who admitted for acute respiratory failure requiring intubation suspected to be 2/2 COPD exacerbation c/b PNA w/ +entero/rhinovirus and GN coccobacillus.    #Neuro  - Sedated on precedex and propofol    #CV  -admitted w/ sBP 200, since requiring pressure support 2/2 sepsis  -Noreph pressure support  -troponins downtrended; likely demand 2/2 HTN urgency              -will stop trending cardiac enzymes (CK/CKMB negative x3)              -s/p ASA and plavix load. now on QD ASA + clopidogrel.              -low threshold to stop ASA/plavix of any sx bleeding.  -1 episode lai to 30s              -EP consulted for interrogation of pacemaker.  -HFpEF (40-50%) w/ severe diastolic failure  -multivalvular disease: MV replacement, severe AS  -per cards, no diuresis w/ lasix in setting of shock  -c/w heparin full anticoagulation for the MV    #Resp  - Intubated on 9/14; extubated on 9/17. Re-intubated on 9/19 for increased secretions and WOB  - CXR w/ pulmonary edema vs overlying PNA  - Enterovirus positive, now w/ GN coccobacillus positive, possible bacteremia from overlying PNA  - + H. flu PNA  - C/w CFTX  - C/w Solumedrol 40mg IV BID    #Renal  -LATANYA, likely 2/2 sepsis + HTN urgency; resolved with Cr at baseline  -c/w lasix 40mg IV for oligurea    #ID  -+entero/rhinovirus  -legionella negative  -GN coccobacillus (H. influenzae) on 9/14 blood culture  - UClx (9/14): NGTD  - Sputum Clx (9/14): No organisms  -admitted w/ 103 fever, now afebrile.   - c/w CFTX    Heme:  - c/w heparin gtt; holding home warfarin  -DIC panel negative  -LFTs mildly elevated. low suspicion for acute liver failure.    Endo:  - C/w NPH    GI:  - C/w tube feeds  - C/w bowel regimen  - C/w PPI    Heme:  - Hb drop from 10 to 8; CT A/P negative for acute bleeding

## 2018-09-21 NOTE — PROGRESS NOTE ADULT - ATTENDING COMMENTS
Critically ill with PNA and deconditioning    Frequent bedside visits with therapy change today.   Crit Care Time Today 35 min+

## 2018-09-21 NOTE — CHART NOTE - NSCHARTNOTEFT_GEN_A_CORE
Follow up.    Adm dx: COPD exacerbation, heart failure, intubated.    Source: Patient [ ]    Family [ ]     other [ x] chart    Diet : 9/20 Vital 1.2 45cc/hr x 18 hrs via NGT    GI: no vomiting, +abd distention, last BM 9/20        Enteral /Parenteral Nutrition: 24 hr intake 9/21 710cc, 9/20 180cc      Current Weight: 9/21 141lb, dosing 9/14 139lb      Pertinent Medications: MEDICATIONS  (STANDING):  ALBUTerol/ipratropium for Nebulization 3 milliLiter(s) Nebulizer every 6 hours  aspirin  chewable 81 milliGRAM(s) Oral daily  bisacodyl Suppository 10 milliGRAM(s) Rectal at bedtime  cefTRIAXone   IVPB      cefTRIAXone   IVPB 1 Gram(s) IV Intermittent every 24 hours  dexmedetomidine Infusion 0.2 MICROgram(s)/kG/Hr (3.145 mL/Hr) IV Continuous <Continuous>  dextrose 50% Injectable 12.5 Gram(s) IV Push once  dextrose 50% Injectable 25 Gram(s) IV Push once  dextrose 50% Injectable 25 Gram(s) IV Push once  heparin  Infusion.  Unit(s)/Hr (11 mL/Hr) IV Continuous <Continuous>  insulin lispro (HumaLOG) corrective regimen sliding scale   SubCutaneous every 6 hours  insulin NPH human recombinant 8 Unit(s) SubCutaneous every 6 hours  methylPREDNISolone sodium succinate Injectable 40 milliGRAM(s) IV Push every 12 hours  norepinephrine Infusion 0.05 MICROgram(s)/kG/Min (5.897 mL/Hr) IV Continuous <Continuous>  pantoprazole  Injectable 40 milliGRAM(s) IV Push daily  polyethylene glycol 3350 17 Gram(s) Oral two times a day  propofol Infusion 5 MICROgram(s)/kG/Min (1.887 mL/Hr) IV Continuous <Continuous>    MEDICATIONS  (PRN):  dextrose 40% Gel 15 Gram(s) Oral once PRN Blood Glucose LESS THAN 70 milliGRAM(s)/deciliter  glucagon  Injectable 1 milliGRAM(s) IntraMuscular once PRN Glucose LESS THAN 70 milligrams/deciliter  heparin  Injectable 5000 Unit(s) IV Push every 6 hours PRN For aPTT less than 40  heparin  Injectable 2500 Unit(s) IV Push every 6 hours PRN For aPTT between 40 - 57    Pertinent Labs:  09-21 Na144 mmol/L Glu 277 mg/dL<H> K+ 4.6 mmol/L Cr  1.11 mg/dL BUN 78 mg/dL<H> 09-21 Phos 3.0 mg/dL 09-21 Alb 2.9 g/dL<L> 09-21 [id number] 7.2 %<H>      Skin:     Estimated Needs:   [ ] no change since previous assessment  [ ] recalculated:       Previous Nutrition Diagnosis:     [ ] Inadequate Energy Intake [ ]Inadequate Oral Intake [ ] Excessive Energy Intake     [ ] Underweight [ ] Increased Nutrient Needs [ ] Overweight/Obesity     [ ] Altered GI Function [ ] Unintended Weight Loss [ ] Food & Nutrition Related Knowledge Deficit [ ] Malnutrition          Nutrition Diagnosis is [ ] ongoing  [ ] resolved [ ] not applicable          New Nutrition Diagnosis: [ ] not applicable    [ ] Inadequate Protein Energy Intake [ ]Inadequate Oral Intake [ ] Excessive Energy Intake     [ ] Underweight [ ] Increased Nutrient Needs [ ] Overweight/Obesity     [ ] Altered GI Function [ ] Unintended Weight Loss [ ] Food & Nutrition Related Knowledge Deficit[ ] Limited Adherence to nutrition related recommendations [ ] Malnutrition  [ ] other: Free text       Related to:      As evidenced by:      Interventions:     Recommend · Enteral Nutrition: If pt remains intubated, recommend continue Vital 1.2 at 60 cc/hr x 18 hrs provides 1296 kcals, 81 gm protein, 876cc free water  meets 21 Kcal/Kg, 1.3Gm/kg dosing wt 62.9kg      [ ] Change Diet To:    [ ] Nutrition Supplement    [ ] Nutrition Support    [ ] Other:        Monitoring and Evaluation:     [ ] PO intake [ ] Tolerance to diet prescription [ ] weights [ ] follow up per protocol    [ ] other: Follow up.    Adm dx: COPD exacerbation, heart failure, intubated.    Source: Patient [ ]    Family [ ]     other [ x] chart    Diet : 9/20 Vital 1.2 45cc/hr x 18 hrs via NGT    GI: no vomiting, +abd distention, last BM 9/20        Enteral /Parenteral Nutrition: 24 hr intake 9/21 710cc, 9/20 180cc  feeds initiated yesterday      Current Weight: 9/21 141lb, dosing 9/14 139lb  no edema 9/20    Pertinent Medications: MEDICATIONS  (STANDING):  ALBUTerol/ipratropium for Nebulization 3 milliLiter(s) Nebulizer every 6 hours  aspirin  chewable 81 milliGRAM(s) Oral daily  bisacodyl Suppository 10 milliGRAM(s) Rectal at bedtime  cefTRIAXone   IVPB      cefTRIAXone   IVPB 1 Gram(s) IV Intermittent every 24 hours  dexmedetomidine Infusion 0.2 MICROgram(s)/kG/Hr (3.145 mL/Hr) IV Continuous <Continuous>  dextrose 50% Injectable 12.5 Gram(s) IV Push once  dextrose 50% Injectable 25 Gram(s) IV Push once  dextrose 50% Injectable 25 Gram(s) IV Push once  heparin  Infusion.  Unit(s)/Hr (11 mL/Hr) IV Continuous <Continuous>  insulin lispro (HumaLOG) corrective regimen sliding scale   SubCutaneous every 6 hours  insulin NPH human recombinant 8 Unit(s) SubCutaneous every 6 hours  methylPREDNISolone sodium succinate Injectable 40 milliGRAM(s) IV Push every 12 hours  norepinephrine Infusion 0.05 MICROgram(s)/kG/Min (5.897 mL/Hr) IV Continuous <Continuous>  pantoprazole  Injectable 40 milliGRAM(s) IV Push daily  polyethylene glycol 3350 17 Gram(s) Oral two times a day  propofol Infusion 5 MICROgram(s)/kG/Min (1.887 mL/Hr) IV Continuous <Continuous>    MEDICATIONS  (PRN):  dextrose 40% Gel 15 Gram(s) Oral once PRN Blood Glucose LESS THAN 70 milliGRAM(s)/deciliter  glucagon  Injectable 1 milliGRAM(s) IntraMuscular once PRN Glucose LESS THAN 70 milligrams/deciliter  heparin  Injectable 5000 Unit(s) IV Push every 6 hours PRN For aPTT less than 40  heparin  Injectable 2500 Unit(s) IV Push every 6 hours PRN For aPTT between 40 - 57    Pertinent Labs:  09-21 Na144 mmol/L Glu 277 mg/dL<H> K+ 4.6 mmol/L Cr  1.11 mg/dL BUN 78 mg/dL<H> 09-21 Phos 3.0 mg/dL 09-21 Alb 2.9 g/dL<L> 09-21 [id number] 7.2 %<H>      Skin: no pressure injuries documented    Estimated Needs:   [x ] no change since previous assessment  [ ] recalculated:       Previous Nutrition Diagnosis: none    Nutrition Diagnosis is [ ] ongoing  [ ] resolved [ ] not applicable       New Nutrition Diagnosis: [ x] not applicable     Recommend · Enteral Nutrition: recommend  Vital 1.2 goal  60 cc/hr x 18 hrs provides 1296 kcals, 81 gm protein, 876cc free water  meets 21 Kcal/Kg, 1.3Gm/kg dosing wt 62.9kg       Monitoring and Evaluation: trend wt, labs, EN tolerance Follow up.    Adm dx: COPD exacerbation, heart failure, intubated.    Source: Patient [ ]    Family [ ]     other [ x] chart    Diet : 9/20 Vital 1.2 45cc/hr x 18 hrs via NGT    GI: no vomiting, +abd distention, last BM 9/20        Enteral /Parenteral Nutrition: 24 hr intake 9/21 710cc, 9/20 180cc  feeds initiated yesterday      Current Weight: 9/21 141lb, dosing 9/14 139lb  no edema 9/20    Pertinent Medications: MEDICATIONS  (STANDING):  ALBUTerol/ipratropium for Nebulization 3 milliLiter(s) Nebulizer every 6 hours  aspirin  chewable 81 milliGRAM(s) Oral daily  bisacodyl Suppository 10 milliGRAM(s) Rectal at bedtime  cefTRIAXone   IVPB      cefTRIAXone   IVPB 1 Gram(s) IV Intermittent every 24 hours  dexmedetomidine Infusion 0.2 MICROgram(s)/kG/Hr (3.145 mL/Hr) IV Continuous <Continuous>  dextrose 50% Injectable 12.5 Gram(s) IV Push once  dextrose 50% Injectable 25 Gram(s) IV Push once  dextrose 50% Injectable 25 Gram(s) IV Push once  heparin  Infusion.  Unit(s)/Hr (11 mL/Hr) IV Continuous <Continuous>  insulin lispro (HumaLOG) corrective regimen sliding scale   SubCutaneous every 6 hours  insulin NPH human recombinant 8 Unit(s) SubCutaneous every 6 hours  methylPREDNISolone sodium succinate Injectable 40 milliGRAM(s) IV Push every 12 hours  norepinephrine Infusion 0.05 MICROgram(s)/kG/Min (5.897 mL/Hr) IV Continuous <Continuous>  pantoprazole  Injectable 40 milliGRAM(s) IV Push daily  polyethylene glycol 3350 17 Gram(s) Oral two times a day  propofol Infusion 5 MICROgram(s)/kG/Min (1.887 mL/Hr) IV Continuous <Continuous>    MEDICATIONS  (PRN):  dextrose 40% Gel 15 Gram(s) Oral once PRN Blood Glucose LESS THAN 70 milliGRAM(s)/deciliter  glucagon  Injectable 1 milliGRAM(s) IntraMuscular once PRN Glucose LESS THAN 70 milligrams/deciliter  heparin  Injectable 5000 Unit(s) IV Push every 6 hours PRN For aPTT less than 40  heparin  Injectable 2500 Unit(s) IV Push every 6 hours PRN For aPTT between 40 - 57    Pertinent Labs:  09-21 Na144 mmol/L Glu 277 mg/dL<H> K+ 4.6 mmol/L Cr  1.11 mg/dL BUN 78 mg/dL<H> 09-21 Phos 3.0 mg/dL 09-21 Alb 2.9 g/dL<L> 09-21 [id number] 7.2 %<H>  CAPILLARY BLOOD GLUCOSE  POCT Blood Glucose.: 233 mg/dL (21 Sep 2018 12:16)  POCT Blood Glucose.: 289 mg/dL (21 Sep 2018 05:26)  POCT Blood Glucose.: 273 mg/dL (20 Sep 2018 23:29)  POCT Blood Glucose.: 256 mg/dL (20 Sep 2018 18:19)      Skin: no pressure injuries documented    Estimated Needs:   [x ] no change since previous assessment  [ ] recalculated:       Previous Nutrition Diagnosis: none    Nutrition Diagnosis is [ ] ongoing  [ ] resolved [ ] not applicable       New Nutrition Diagnosis: [ x] not applicable     Recommend · 1. Enteral Nutrition: recommend  Vital 1.2 goal  60 cc/hr x 18 hrs provides 1296 kcals, 81 gm protein, 876cc free water  meets 21 Kcal/Kg, 1.3Gm/kg dosing wt 62.9kg  2. reassess BG control for BG >180     Monitoring and Evaluation: trend wt, labs, EN tolerance

## 2018-09-22 LAB
ALBUMIN SERPL ELPH-MCNC: 2.8 G/DL — LOW (ref 3.3–5)
ALP SERPL-CCNC: 82 U/L — SIGNIFICANT CHANGE UP (ref 40–120)
ALT FLD-CCNC: 43 U/L — SIGNIFICANT CHANGE UP (ref 10–45)
ANION GAP SERPL CALC-SCNC: 11 MMOL/L — SIGNIFICANT CHANGE UP (ref 5–17)
AST SERPL-CCNC: 11 U/L — SIGNIFICANT CHANGE UP (ref 10–40)
BASE EXCESS BLDA CALC-SCNC: 2.6 MMOL/L — HIGH (ref -2–2)
BILIRUB SERPL-MCNC: 0.2 MG/DL — SIGNIFICANT CHANGE UP (ref 0.2–1.2)
BUN SERPL-MCNC: 82 MG/DL — HIGH (ref 7–23)
CALCIUM SERPL-MCNC: 8.8 MG/DL — SIGNIFICANT CHANGE UP (ref 8.4–10.5)
CHLORIDE SERPL-SCNC: 110 MMOL/L — HIGH (ref 96–108)
CO2 BLDA-SCNC: 28 MMOL/L — SIGNIFICANT CHANGE UP (ref 22–30)
CO2 SERPL-SCNC: 24 MMOL/L — SIGNIFICANT CHANGE UP (ref 22–31)
CREAT SERPL-MCNC: 1.34 MG/DL — HIGH (ref 0.5–1.3)
CULTURE RESULTS: SIGNIFICANT CHANGE UP
CULTURE RESULTS: SIGNIFICANT CHANGE UP
GAS PNL BLDA: SIGNIFICANT CHANGE UP
GLUCOSE BLDC GLUCOMTR-MCNC: 158 MG/DL — HIGH (ref 70–99)
GLUCOSE BLDC GLUCOMTR-MCNC: 222 MG/DL — HIGH (ref 70–99)
GLUCOSE BLDC GLUCOMTR-MCNC: 253 MG/DL — HIGH (ref 70–99)
GLUCOSE SERPL-MCNC: 344 MG/DL — HIGH (ref 70–99)
HBA1C BLD-MCNC: 7.2 % — HIGH (ref 4–5.6)
HCO3 BLDA-SCNC: 27 MMOL/L — SIGNIFICANT CHANGE UP (ref 21–29)
HCT VFR BLD CALC: 25.7 % — LOW (ref 34.5–45)
HGB BLD-MCNC: 8.5 G/DL — LOW (ref 11.5–15.5)
HOROWITZ INDEX BLDA+IHG-RTO: 30 — SIGNIFICANT CHANGE UP
MAGNESIUM SERPL-MCNC: 2.6 MG/DL — SIGNIFICANT CHANGE UP (ref 1.6–2.6)
MCHC RBC-ENTMCNC: 29 PG — SIGNIFICANT CHANGE UP (ref 27–34)
MCHC RBC-ENTMCNC: 33.1 GM/DL — SIGNIFICANT CHANGE UP (ref 32–36)
MCV RBC AUTO: 87.5 FL — SIGNIFICANT CHANGE UP (ref 80–100)
PCO2 BLDA: 42 MMHG — SIGNIFICANT CHANGE UP (ref 32–46)
PH BLDA: 7.42 — SIGNIFICANT CHANGE UP (ref 7.35–7.45)
PHOSPHATE SERPL-MCNC: 3 MG/DL — SIGNIFICANT CHANGE UP (ref 2.5–4.5)
PLATELET # BLD AUTO: 149 K/UL — LOW (ref 150–400)
PO2 BLDA: 108 MMHG — SIGNIFICANT CHANGE UP (ref 74–108)
POTASSIUM SERPL-MCNC: 5.1 MMOL/L — SIGNIFICANT CHANGE UP (ref 3.5–5.3)
POTASSIUM SERPL-SCNC: 5.1 MMOL/L — SIGNIFICANT CHANGE UP (ref 3.5–5.3)
PROT SERPL-MCNC: 6.2 G/DL — SIGNIFICANT CHANGE UP (ref 6–8.3)
RBC # BLD: 2.94 M/UL — LOW (ref 3.8–5.2)
RBC # FLD: 15.9 % — HIGH (ref 10.3–14.5)
SAO2 % BLDA: 97 % — HIGH (ref 92–96)
SODIUM SERPL-SCNC: 145 MMOL/L — SIGNIFICANT CHANGE UP (ref 135–145)
SPECIMEN SOURCE: SIGNIFICANT CHANGE UP
SPECIMEN SOURCE: SIGNIFICANT CHANGE UP
WBC # BLD: 10.2 K/UL — SIGNIFICANT CHANGE UP (ref 3.8–10.5)
WBC # FLD AUTO: 10.2 K/UL — SIGNIFICANT CHANGE UP (ref 3.8–10.5)

## 2018-09-22 PROCEDURE — 99291 CRITICAL CARE FIRST HOUR: CPT

## 2018-09-22 RX ORDER — VANCOMYCIN HCL 1 G
1000 VIAL (EA) INTRAVENOUS EVERY 24 HOURS
Qty: 0 | Refills: 0 | Status: DISCONTINUED | OUTPATIENT
Start: 2018-09-22 | End: 2018-09-24

## 2018-09-22 RX ORDER — HYDRALAZINE HCL 50 MG
25 TABLET ORAL EVERY 8 HOURS
Qty: 0 | Refills: 0 | Status: DISCONTINUED | OUTPATIENT
Start: 2018-09-22 | End: 2018-09-27

## 2018-09-22 RX ORDER — VANCOMYCIN HCL 1 G
VIAL (EA) INTRAVENOUS
Qty: 0 | Refills: 0 | Status: DISCONTINUED | OUTPATIENT
Start: 2018-09-22 | End: 2018-09-22

## 2018-09-22 RX ORDER — VANCOMYCIN HCL 1 G
1000 VIAL (EA) INTRAVENOUS EVERY 24 HOURS
Qty: 0 | Refills: 0 | Status: DISCONTINUED | OUTPATIENT
Start: 2018-09-22 | End: 2018-09-22

## 2018-09-22 RX ORDER — AZTREONAM 2 G
1000 VIAL (EA) INJECTION EVERY 8 HOURS
Qty: 0 | Refills: 0 | Status: DISCONTINUED | OUTPATIENT
Start: 2018-09-22 | End: 2018-09-24

## 2018-09-22 RX ADMIN — Medication 81 MILLIGRAM(S): at 12:57

## 2018-09-22 RX ADMIN — Medication 40 MILLIGRAM(S): at 17:25

## 2018-09-22 RX ADMIN — Medication 50 MILLIGRAM(S): at 21:10

## 2018-09-22 RX ADMIN — Medication 25 MILLIGRAM(S): at 21:10

## 2018-09-22 RX ADMIN — HUMAN INSULIN 12 UNIT(S): 100 INJECTION, SUSPENSION SUBCUTANEOUS at 12:58

## 2018-09-22 RX ADMIN — Medication 3 MILLILITER(S): at 11:25

## 2018-09-22 RX ADMIN — Medication 250 MILLIGRAM(S): at 17:26

## 2018-09-22 RX ADMIN — Medication 50 MILLIGRAM(S): at 13:41

## 2018-09-22 RX ADMIN — Medication 3 MILLILITER(S): at 23:54

## 2018-09-22 RX ADMIN — DEXMEDETOMIDINE HYDROCHLORIDE IN 0.9% SODIUM CHLORIDE 3.15 MICROGRAM(S)/KG/HR: 4 INJECTION INTRAVENOUS at 21:10

## 2018-09-22 RX ADMIN — DEXMEDETOMIDINE HYDROCHLORIDE IN 0.9% SODIUM CHLORIDE 3.15 MICROGRAM(S)/KG/HR: 4 INJECTION INTRAVENOUS at 05:04

## 2018-09-22 RX ADMIN — Medication 6: at 05:08

## 2018-09-22 RX ADMIN — HUMAN INSULIN 12 UNIT(S): 100 INJECTION, SUSPENSION SUBCUTANEOUS at 17:27

## 2018-09-22 RX ADMIN — PANTOPRAZOLE SODIUM 40 MILLIGRAM(S): 20 TABLET, DELAYED RELEASE ORAL at 12:57

## 2018-09-22 RX ADMIN — POLYETHYLENE GLYCOL 3350 17 GRAM(S): 17 POWDER, FOR SOLUTION ORAL at 19:02

## 2018-09-22 RX ADMIN — HEPARIN SODIUM 1000 UNIT(S)/HR: 5000 INJECTION INTRAVENOUS; SUBCUTANEOUS at 00:32

## 2018-09-22 RX ADMIN — Medication 3 MILLILITER(S): at 06:28

## 2018-09-22 RX ADMIN — Medication 8: at 01:30

## 2018-09-22 RX ADMIN — Medication 4: at 12:58

## 2018-09-22 RX ADMIN — Medication 10 MILLIGRAM(S): at 21:10

## 2018-09-22 RX ADMIN — Medication 25 MILLIGRAM(S): at 05:01

## 2018-09-22 RX ADMIN — POLYETHYLENE GLYCOL 3350 17 GRAM(S): 17 POWDER, FOR SOLUTION ORAL at 05:01

## 2018-09-22 RX ADMIN — Medication 2: at 17:26

## 2018-09-22 RX ADMIN — HUMAN INSULIN 12 UNIT(S): 100 INJECTION, SUSPENSION SUBCUTANEOUS at 01:31

## 2018-09-22 RX ADMIN — Medication 40 MILLIGRAM(S): at 05:01

## 2018-09-22 RX ADMIN — DEXMEDETOMIDINE HYDROCHLORIDE IN 0.9% SODIUM CHLORIDE 3.15 MICROGRAM(S)/KG/HR: 4 INJECTION INTRAVENOUS at 00:31

## 2018-09-22 RX ADMIN — Medication 3 MILLILITER(S): at 17:07

## 2018-09-22 RX ADMIN — HUMAN INSULIN 12 UNIT(S): 100 INJECTION, SUSPENSION SUBCUTANEOUS at 05:05

## 2018-09-22 RX ADMIN — Medication 50 MILLIGRAM(S): at 05:00

## 2018-09-22 RX ADMIN — PROPOFOL 1.89 MICROGRAM(S)/KG/MIN: 10 INJECTION, EMULSION INTRAVENOUS at 05:18

## 2018-09-22 RX ADMIN — Medication 25 MILLIGRAM(S): at 13:41

## 2018-09-22 NOTE — PROGRESS NOTE ADULT - ASSESSMENT
85 y/o F w/ a PMH significant for COPD, JENNIE on BiPAP, multivalvular disease (severe AS, s/p MV replacement), HFpEF/severe diastolic failure, A-fib on coumadin, sick sinus syndrome s/p pacemaker placement, HTN, and CKD3 who admitted for acute respiratory failure requiring intubation suspected to be 2/2 COPD exacerbation c/b PNA w/ +entero/rhinovirus and GN coccobacillus.    #Neuro  - Sedated on precedex and propofol    #CV  -admitted w/ sBP 200, since requiring pressure support 2/2 sepsis  -Noreph pressure support  -troponins downtrended; likely demand 2/2 HTN urgency              -will stop trending cardiac enzymes (CK/CKMB negative x3)              -s/p ASA and plavix load. now on QD ASA + clopidogrel.              -low threshold to stop ASA/plavix of any sx bleeding.  -1 episode lai to 30s              -EP consulted for interrogation of pacemaker.  -HFpEF (40-50%) w/ severe diastolic failure  -multivalvular disease: MV replacement, severe AS  -per cards, no diuresis w/ lasix in setting of shock  -c/w heparin full anticoagulation for the MV    #Resp  - Intubated on 9/14; extubated on 9/17. Re-intubated on 9/19 for increased secretions and WOB  - CXR w/ pulmonary edema vs overlying PNA  - Enterovirus positive, now w/ GN coccobacillus positive, possible bacteremia from overlying PNA  - + H. flu PNA  - C/w CFTX  - C/w Solumedrol 40mg IV BID    #Renal  -LATANYA, likely 2/2 sepsis + HTN urgency; resolved with Cr at baseline  -c/w lasix 40mg IV for oligurea    #ID  -+entero/rhinovirus  -legionella negative  -GN coccobacillus (H. influenzae) on 9/14 blood culture  - UClx (9/14): NGTD  - Sputum Clx (9/14): No organisms  -admitted w/ 103 fever, now afebrile.   - c/w CFTX    Heme:  - c/w heparin gtt; holding home warfarin  -DIC panel negative  -LFTs mildly elevated. low suspicion for acute liver failure.    Endo:  - C/w NPH    GI:  - C/w tube feeds  - C/w bowel regimen  - C/w PPI    Heme:  - Hb drop from 10 to 8; CT A/P negative for acute bleeding 85 y/o F w/ a PMH significant for COPD, JENNIE on BiPAP, multivalvular disease (severe AS, s/p MV replacement), HFpEF/severe diastolic failure, A-fib on coumadin, sick sinus syndrome s/p pacemaker placement, HTN, and CKD3 who admitted for acute respiratory failure requiring intubation suspected to be 2/2 COPD exacerbation c/b PNA w/ +entero/rhinovirus and GN coccobacillus.    #Neuro  - Sedated on precedex. Off propofol now    #CV  -admitted w/ sBP 200, since requiring pressure support 2/2 sepsis  -Noreph pressure support  -troponins downtrended; likely demand 2/2 HTN urgency              -will stop trending cardiac enzymes (CK/CKMB negative x3)              -s/p ASA and plavix load. now on QD ASA + clopidogrel.              -low threshold to stop ASA/plavix of any sx bleeding.  -1 episode lai to 30s              -EP consulted for interrogation of pacemaker.  -HFpEF (40-50%) w/ severe diastolic failure  -multivalvular disease: MV replacement, severe AS  -per cards, no diuresis w/ lasix in setting of shock  -c/w heparin full anticoagulation for the MV    #Resp  - Intubated on 9/14; extubated on 9/17. Re-intubated on 9/19 for increased secretions and WOB  - CXR w/ pulmonary edema vs overlying PNA  - Enterovirus positive, now w/ GN coccobacillus positive, possible bacteremia from overlying PNA  - + H. flu PNA  - C/w CFTX  - C/w Solumedrol 40mg IV BID  - will POCUS for pulmonary edema and if clinical condition warrants, will consider extubation today    #Renal  -LATANYA, likely 2/2 sepsis + HTN urgency; previously resolved with Cr at baseline, now Cr elevated again (1.34)  -c/w lasix 40mg IV for oligurea  -will consider results of POCUS lung exam/fluid status in terms of determining need for additional IVF given LATANYA    #ID  -+entero/rhinovirus  -legionella negative  -GN coccobacillus (H. influenzae) on 9/14 blood culture  - UClx (9/14): NGTD  - Sputum Clx (9/14): No organisms  -admitted w/ 103 fever, now afebrile.   - c/w CFTX    Heme:  - c/w heparin gtt; holding home warfarin  -DIC panel negative  -LFTs mildly elevated. low suspicion for acute liver failure.    Endo:  - C/w NPH    GI:  - C/w tube feeds  - C/w bowel regimen  - C/w PPI    Heme:  - Hb drop from 10 to 8; CT A/P negative for acute bleeding 85 y/o F w/ a PMH significant for COPD, JENNIE on BiPAP, multivalvular disease (severe AS, s/p MV replacement), HFpEF/severe diastolic failure, A-fib on coumadin, sick sinus syndrome s/p pacemaker placement, HTN, and CKD3 who admitted for acute respiratory failure requiring intubation suspected to be 2/2 COPD exacerbation c/b PNA w/ +entero/rhinovirus and GN coccobacillus.    #Neuro  - Sedated on precedex. Off propofol now    #CV  -admitted w/ sBP 200, since requiring pressure support 2/2 sepsis  -Noreph pressure support  -troponins downtrended; likely demand 2/2 HTN urgency              -will stop trending cardiac enzymes (CK/CKMB negative x3)              -s/p ASA and plavix load. now on QD ASA + clopidogrel.              -low threshold to stop ASA/plavix of any sx bleeding.  -1 episode lai to 30s              -EP consulted for interrogation of pacemaker.  -HFpEF (40-50%) w/ severe diastolic failure  -multivalvular disease: MV replacement, severe AS  -per cards, no diuresis w/ lasix in setting of shock  -c/w heparin full anticoagulation for the MV    #Resp  - Intubated on 9/14; extubated on 9/17. Re-intubated on 9/19 for increased secretions and WOB  - CXR w/ pulmonary edema vs overlying PNA  - Enterovirus positive, now w/ GN coccobacillus positive, possible bacteremia from overlying PNA  - + H. flu PNA  - C/w CFTX  - C/w Solumedrol 40mg IV BID  - will POCUS for pulmonary edema and if clinical condition warrants, will consider extubation today    #Renal  -LATANYA, likely 2/2 sepsis + HTN urgency; previously resolved with Cr at baseline, now Cr elevated again (1.34)  -c/w lasix 40mg IV for oligurea  -will consider results of POCUS lung exam/fluid status (for example, if IVC is distended, if pulmonary edema is present) in terms of determining need for additional IVF given LATANYA    #ID  -+entero/rhinovirus  -legionella negative  -GN coccobacillus (H. influenzae) on 9/14 blood culture  - UClx (9/14): NGTD  - Sputum Clx (9/14): No organisms  -admitted w/ 103 fever, now afebrile.   - c/w vancomycin and aztreonam    Heme:  - c/w heparin gtt; holding home warfarin  -DIC panel negative  -LFTs no longer mildly elevated    Endo:  - C/w NPH (dose increased)    GI:  - C/w tube feeds  - C/w bowel regimen  - C/w PPI    Heme:  - Hb drop from 10 to 8 (now stable); CT A/P negative for acute bleeding

## 2018-09-22 NOTE — PROGRESS NOTE ADULT - SUBJECTIVE AND OBJECTIVE BOX
CHIEF COMPLAINT:    Interval Events:    REVIEW OF SYSTEMS:  Constitutional: [ ] negative [ ] fevers [ ] chills [ ] weight loss [ ] weight gain  HEENT: [ ] negative [ ] dry eyes [ ] eye irritation [ ] postnasal drip [ ] nasal congestion  CV: [ ] negative  [ ] chest pain [ ] orthopnea [ ] palpitations [ ] murmur  Resp: [ ] negative [ ] cough [ ] shortness of breath [ ] dyspnea [ ] wheezing [ ] sputum [ ] hemoptysis  GI: [ ] negative [ ] nausea [ ] vomiting [ ] diarrhea [ ] constipation [ ] abd pain [ ] dysphagia   : [ ] negative [ ] dysuria [ ] nocturia [ ] hematuria [ ] increased urinary frequency  Musculoskeletal: [ ] negative [ ] back pain [ ] myalgias [ ] arthralgias [ ] fracture  Skin: [ ] negative [ ] rash [ ] itch  Neurological: [ ] negative [ ] headache [ ] dizziness [ ] syncope [ ] weakness [ ] numbness  Psychiatric: [ ] negative [ ] anxiety [ ] depression  Endocrine: [ ] negative [ ] diabetes [ ] thyroid problem  Hematologic/Lymphatic: [ ] negative [ ] anemia [ ] bleeding problem  Allergic/Immunologic: [ ] negative [ ] itchy eyes [ ] nasal discharge [ ] hives [ ] angioedema  [ ] All other systems negative  [ ] Unable to assess ROS because ________    OBJECTIVE:  ICU Vital Signs Last 24 Hrs  T(C): 37.4 (22 Sep 2018 04:00), Max: 38.6 (21 Sep 2018 18:00)  T(F): 99.3 (22 Sep 2018 04:00), Max: 101.5 (21 Sep 2018 18:00)  HR: 59 (22 Sep 2018 07:00) (59 - 62)  BP: 123/60 (22 Sep 2018 07:00) (110/56 - 194/81)  BP(mean): 86 (22 Sep 2018 07:00) (80 - 118)  ABP: --  ABP(mean): --  RR: 23 (22 Sep 2018 07:00) (20 - 42)  SpO2: 99% (22 Sep 2018 07:00) (98% - 100%)    Mode: AC/ CMV (Assist Control/ Continuous Mandatory Ventilation), RR (machine): 2, TV (machine): 400, FiO2: 30, PEEP: 5, ITime: 0.9, MAP: 9, PIP: 25     @ 07:01  -   @ 07:00  --------------------------------------------------------  IN: 3086 mL / OUT: 2755 mL / NET: 331 mL      CAPILLARY BLOOD GLUCOSE      POCT Blood Glucose.: 253 mg/dL (22 Sep 2018 05:04)      PHYSICAL EXAM:  General:   HEENT:   Lymph Nodes:  Neck:   Respiratory:   Cardiovascular:   Abdomen:   Extremities:   Skin:   Neurological:  Psychiatry:    LINES:    HOSPITAL MEDICATIONS:  Standing Meds:  ALBUTerol/ipratropium for Nebulization 3 milliLiter(s) Nebulizer every 6 hours  aspirin  chewable 81 milliGRAM(s) Oral daily  aztreonam  IVPB 1000 milliGRAM(s) IV Intermittent every 8 hours  bisacodyl Suppository 10 milliGRAM(s) Rectal at bedtime  dexmedetomidine Infusion 0.2 MICROgram(s)/kG/Hr IV Continuous <Continuous>  dextrose 50% Injectable 12.5 Gram(s) IV Push once  dextrose 50% Injectable 25 Gram(s) IV Push once  dextrose 50% Injectable 25 Gram(s) IV Push once  heparin  Infusion.  Unit(s)/Hr IV Continuous <Continuous>  hydrALAZINE 25 milliGRAM(s) Oral every 8 hours  insulin lispro (HumaLOG) corrective regimen sliding scale   SubCutaneous every 6 hours  insulin NPH human recombinant 12 Unit(s) SubCutaneous every 6 hours  methylPREDNISolone sodium succinate Injectable 40 milliGRAM(s) IV Push every 12 hours  norepinephrine Infusion 0.05 MICROgram(s)/kG/Min IV Continuous <Continuous>  pantoprazole  Injectable 40 milliGRAM(s) IV Push daily  polyethylene glycol 3350 17 Gram(s) Oral two times a day  propofol Infusion 5 MICROgram(s)/kG/Min IV Continuous <Continuous>      PRN Meds:  dextrose 40% Gel 15 Gram(s) Oral once PRN  glucagon  Injectable 1 milliGRAM(s) IntraMuscular once PRN  heparin  Injectable 5000 Unit(s) IV Push every 6 hours PRN  heparin  Injectable 2500 Unit(s) IV Push every 6 hours PRN      LABS:                        8.5    10.2  )-----------( 149      ( 22 Sep 2018 00:35 )             25.7     Hgb Trend: 8.5<--, 8.8<--, 8.1<--, 8.4<--, 8.1<--      145  |  110<H>  |  82<H>  ----------------------------<  344<H>  5.1   |  24  |  1.34<H>    Ca    8.8      22 Sep 2018 00:35  Phos  3.0       Mg     2.6         TPro  6.2  /  Alb  2.8<L>  /  TBili  0.2  /  DBili  x   /  AST  11  /  ALT  43  /  AlkPhos  82      Creatinine Trend: 1.34<--, 1.11<--, 1.17<--, 1.19<--, 1.37<--, 1.42<--  PT/INR - ( 21 Sep 2018 02:09 )   PT: 14.0 sec;   INR: 1.28 ratio         PTT - ( 21 Sep 2018 22:43 )  PTT:82.3 sec  Urinalysis Basic - ( 21 Sep 2018 18:19 )    Color: Colorless / Appearance: Clear / S.012 / pH: x  Gluc: x / Ketone: Negative  / Bili: Negative / Urobili: Negative   Blood: x / Protein: Trace / Nitrite: Negative   Leuk Esterase: Small / RBC: 2 /hpf / WBC 6 /hpf   Sq Epi: x / Non Sq Epi: 3 /hpf / Bacteria: Negative      Arterial Blood Gas:   @ 02:07  7.47/39/100/29/97/5.0  ABG lactate: --        MICROBIOLOGY:     Culture - Bronchial (collected 21 Sep 2018 21:22)  Source: Bronch Wash Combicath  Gram Stain (21 Sep 2018 23:53):    No squamous epithelial cells per low power field    Few polymorphonuclear leukocytes per low power field    No organisms seen per oil power field      RADIOLOGY:  [ ] Reviewed and interpreted by me    EKG: CHIEF COMPLAINT/Interval Events: Febrile, so placed on aztreonam in setting of PCN allergy. Placed on hydralazine 25q8. Lasix held overnight, NPH increased from 8 --> 12.     REVIEW OF SYSTEMS:  Constitutional: [ ] negative [ ] fevers [ ] chills [ ] weight loss [ ] weight gain  HEENT: [ ] negative [ ] dry eyes [ ] eye irritation [ ] postnasal drip [ ] nasal congestion  CV: [ ] negative  [ ] chest pain [ ] orthopnea [ ] palpitations [ ] murmur  Resp: [ ] negative [ ] cough [ ] shortness of breath [ ] dyspnea [ ] wheezing [ ] sputum [ ] hemoptysis  GI: [ ] negative [ ] nausea [ ] vomiting [ ] diarrhea [ ] constipation [ ] abd pain [ ] dysphagia   : [ ] negative [ ] dysuria [ ] nocturia [ ] hematuria [ ] increased urinary frequency  Musculoskeletal: [ ] negative [ ] back pain [ ] myalgias [ ] arthralgias [ ] fracture  Skin: [ ] negative [ ] rash [ ] itch  Neurological: [ ] negative [ ] headache [ ] dizziness [ ] syncope [ ] weakness [ ] numbness  Psychiatric: [ ] negative [ ] anxiety [ ] depression  Endocrine: [ ] negative [ ] diabetes [ ] thyroid problem  Hematologic/Lymphatic: [ ] negative [ ] anemia [ ] bleeding problem  Allergic/Immunologic: [ ] negative [ ] itchy eyes [ ] nasal discharge [ ] hives [ ] angioedema  [ ] All other systems negative  [X ] Unable to assess ROS because sedated and intubated at time of interview    OBJECTIVE:  ICU Vital Signs Last 24 Hrs  T(C): 37.4 (22 Sep 2018 04:00), Max: 38.6 (21 Sep 2018 18:00)  T(F): 99.3 (22 Sep 2018 04:00), Max: 101.5 (21 Sep 2018 18:00)  HR: 59 (22 Sep 2018 07:00) (59 - 62)  BP: 123/60 (22 Sep 2018 07:00) (110/56 - 194/81)  BP(mean): 86 (22 Sep 2018 07:00) (80 - 118)  ABP: --  ABP(mean): --  RR: 23 (22 Sep 2018 07:00) (20 - 42)  SpO2: 99% (22 Sep 2018 07:00) (98% - 100%)    Mode: AC/ CMV (Assist Control/ Continuous Mandatory Ventilation), RR (machine): 2, TV (machine): 400, FiO2: 30, PEEP: 5, ITime: 0.9, MAP: 9, PIP: 25    - @ 07:01  -   @ 07:00  --------------------------------------------------------  IN: 3086 mL / OUT: 2755 mL / NET: 331 mL      CAPILLARY BLOOD GLUCOSE      POCT Blood Glucose.: 253 mg/dL (22 Sep 2018 05:04)      PHYSICAL EXAM:  General: elderly female, in no acute distress  HEENT: pupils minimally reactive on sedation  Respiratory: CTA in anterior lung fields b/l  Cardiovascular: +systolic murmur, regular rate  Abdomen: soft, distended, +BS  Extremities: warm extremities, 2+ DP and radial pulses, cap refill <2 sec  Neurological: pupils minimally reactive on sedation, opens eyes to tactile stimuli      LINES: [facility] MEDICATIONS:  Standing Meds:  ALBUTerol/ipratropium for Nebulization 3 milliLiter(s) Nebulizer every 6 hours  aspirin  chewable 81 milliGRAM(s) Oral daily  aztreonam  IVPB 1000 milliGRAM(s) IV Intermittent every 8 hours  bisacodyl Suppository 10 milliGRAM(s) Rectal at bedtime  dexmedetomidine Infusion 0.2 MICROgram(s)/kG/Hr IV Continuous <Continuous>  dextrose 50% Injectable 12.5 Gram(s) IV Push once  dextrose 50% Injectable 25 Gram(s) IV Push once  dextrose 50% Injectable 25 Gram(s) IV Push once  heparin  Infusion.  Unit(s)/Hr IV Continuous <Continuous>  hydrALAZINE 25 milliGRAM(s) Oral every 8 hours  insulin lispro (HumaLOG) corrective regimen sliding scale   SubCutaneous every 6 hours  insulin NPH human recombinant 12 Unit(s) SubCutaneous every 6 hours  methylPREDNISolone sodium succinate Injectable 40 milliGRAM(s) IV Push every 12 hours  norepinephrine Infusion 0.05 MICROgram(s)/kG/Min IV Continuous <Continuous>  pantoprazole  Injectable 40 milliGRAM(s) IV Push daily  polyethylene glycol 3350 17 Gram(s) Oral two times a day  propofol Infusion 5 MICROgram(s)/kG/Min IV Continuous <Continuous>      PRN Meds:  dextrose 40% Gel 15 Gram(s) Oral once PRN  glucagon  Injectable 1 milliGRAM(s) IntraMuscular once PRN  heparin  Injectable 5000 Unit(s) IV Push every 6 hours PRN  heparin  Injectable 2500 Unit(s) IV Push every 6 hours PRN      LABS:                        8.5    10.2  )-----------( 149      ( 22 Sep 2018 00:35 )             25.7     Hgb Trend: 8.5<--, 8.8<--, 8.1<--, 8.4<--, 8.1<--      145  |  110<H>  |  82<H>  ----------------------------<  344<H>  5.1   |  24  |  1.34<H>    Ca    8.8      22 Sep 2018 00:35  Phos  3.0       Mg     2.6         TPro  6.2  /  Alb  2.8<L>  /  TBili  0.2  /  DBili  x   /  AST  11  /  ALT  43  /  AlkPhos  82      Creatinine Trend: 1.34<--, 1.11<--, 1.17<--, 1.19<--, 1.37<--, 1.42<--  PT/INR - ( 21 Sep 2018 02:09 )   PT: 14.0 sec;   INR: 1.28 ratio         PTT - ( 21 Sep 2018 22:43 )  PTT:82.3 sec  Urinalysis Basic - ( 21 Sep 2018 18:19 )    Color: Colorless / Appearance: Clear / S.012 / pH: x  Gluc: x / Ketone: Negative  / Bili: Negative / Urobili: Negative   Blood: x / Protein: Trace / Nitrite: Negative   Leuk Esterase: Small / RBC: 2 /hpf / WBC 6 /hpf   Sq Epi: x / Non Sq Epi: 3 /hpf / Bacteria: Negative      Arterial Blood Gas:   @ 02:07  7.47/39/100/29/97/5.0  ABG lactate: --        MICROBIOLOGY:     Culture - Bronchial (collected 21 Sep 2018 21:22)  Source: Bronch Wash Combicath  Gram Stain (21 Sep 2018 23:53):    No squamous epithelial cells per low power field    Few polymorphonuclear leukocytes per low power field    No organisms seen per oil power field      RADIOLOGY:  [ ] Reviewed and interpreted by me    EKG:

## 2018-09-22 NOTE — PROGRESS NOTE ADULT - ATTENDING COMMENTS
84 year old woman with COPD, JENNIE on nocturnal BiPAP, multivalvular disease (severe AS, s/p MV mechanical replacement), HFpEF/severe diastolic failure, A-fib on coumadin, sick sinus syndrome s/p pacemaker placement, HTN, and CKD3 acute respiratory failure requiring intubation, COPD exacerbation with superimposed PNA +entero/rhinovirus, failed extubation and was re-intubated, hypertensive urgency    - on Precedex follows commands  - SBT with PS today  - complete course of antibiotics  - BP controlled  - LATANYA improving  - tolerating feeds  - febrile overnight antibiotics broadened    critical care time spent 40 minutes  plan of care discussed with family at bedside.

## 2018-09-23 LAB
ALBUMIN SERPL ELPH-MCNC: 3 G/DL — LOW (ref 3.3–5)
ALP SERPL-CCNC: 75 U/L — SIGNIFICANT CHANGE UP (ref 40–120)
ALT FLD-CCNC: 41 U/L — SIGNIFICANT CHANGE UP (ref 10–45)
ANION GAP SERPL CALC-SCNC: 7 MMOL/L — SIGNIFICANT CHANGE UP (ref 5–17)
APTT BLD: 100.1 SEC — HIGH (ref 27.5–37.4)
APTT BLD: 26 SEC — LOW (ref 27.5–37.4)
APTT BLD: 67.6 SEC — HIGH (ref 27.5–37.4)
APTT BLD: 85.2 SEC — HIGH (ref 27.5–37.4)
AST SERPL-CCNC: 19 U/L — SIGNIFICANT CHANGE UP (ref 10–40)
BILIRUB SERPL-MCNC: 0.3 MG/DL — SIGNIFICANT CHANGE UP (ref 0.2–1.2)
BUN SERPL-MCNC: 71 MG/DL — HIGH (ref 7–23)
CALCIUM SERPL-MCNC: 9 MG/DL — SIGNIFICANT CHANGE UP (ref 8.4–10.5)
CHLORIDE SERPL-SCNC: 110 MMOL/L — HIGH (ref 96–108)
CO2 SERPL-SCNC: 27 MMOL/L — SIGNIFICANT CHANGE UP (ref 22–31)
CREAT SERPL-MCNC: 1 MG/DL — SIGNIFICANT CHANGE UP (ref 0.5–1.3)
CULTURE RESULTS: SIGNIFICANT CHANGE UP
GLUCOSE BLDC GLUCOMTR-MCNC: 136 MG/DL — HIGH (ref 70–99)
GLUCOSE BLDC GLUCOMTR-MCNC: 152 MG/DL — HIGH (ref 70–99)
GLUCOSE BLDC GLUCOMTR-MCNC: 221 MG/DL — HIGH (ref 70–99)
GLUCOSE BLDC GLUCOMTR-MCNC: 264 MG/DL — HIGH (ref 70–99)
GLUCOSE SERPL-MCNC: 264 MG/DL — HIGH (ref 70–99)
HCT VFR BLD CALC: 26.3 % — LOW (ref 34.5–45)
HCT VFR BLD CALC: 26.3 % — LOW (ref 34.5–45)
HCT VFR BLD CALC: 27.3 % — LOW (ref 34.5–45)
HGB BLD-MCNC: 8.6 G/DL — LOW (ref 11.5–15.5)
HGB BLD-MCNC: 8.8 G/DL — LOW (ref 11.5–15.5)
HGB BLD-MCNC: 8.9 G/DL — LOW (ref 11.5–15.5)
INR BLD: 1.09 RATIO — SIGNIFICANT CHANGE UP (ref 0.88–1.16)
MAGNESIUM SERPL-MCNC: 2.4 MG/DL — SIGNIFICANT CHANGE UP (ref 1.6–2.6)
MCHC RBC-ENTMCNC: 28.6 PG — SIGNIFICANT CHANGE UP (ref 27–34)
MCHC RBC-ENTMCNC: 28.8 PG — SIGNIFICANT CHANGE UP (ref 27–34)
MCHC RBC-ENTMCNC: 30 PG — SIGNIFICANT CHANGE UP (ref 27–34)
MCHC RBC-ENTMCNC: 32.4 GM/DL — SIGNIFICANT CHANGE UP (ref 32–36)
MCHC RBC-ENTMCNC: 32.5 GM/DL — SIGNIFICANT CHANGE UP (ref 32–36)
MCHC RBC-ENTMCNC: 33.8 GM/DL — SIGNIFICANT CHANGE UP (ref 32–36)
MCV RBC AUTO: 87.8 FL — SIGNIFICANT CHANGE UP (ref 80–100)
MCV RBC AUTO: 88.7 FL — SIGNIFICANT CHANGE UP (ref 80–100)
MCV RBC AUTO: 89 FL — SIGNIFICANT CHANGE UP (ref 80–100)
PHOSPHATE SERPL-MCNC: 4.2 MG/DL — SIGNIFICANT CHANGE UP (ref 2.5–4.5)
PLATELET # BLD AUTO: 146 K/UL — LOW (ref 150–400)
PLATELET # BLD AUTO: 148 K/UL — LOW (ref 150–400)
PLATELET # BLD AUTO: 169 K/UL — SIGNIFICANT CHANGE UP (ref 150–400)
POTASSIUM SERPL-MCNC: 4.8 MMOL/L — SIGNIFICANT CHANGE UP (ref 3.5–5.3)
POTASSIUM SERPL-SCNC: 4.8 MMOL/L — SIGNIFICANT CHANGE UP (ref 3.5–5.3)
PROT SERPL-MCNC: 6 G/DL — SIGNIFICANT CHANGE UP (ref 6–8.3)
PROTHROM AB SERPL-ACNC: 11.9 SEC — SIGNIFICANT CHANGE UP (ref 9.8–12.7)
RBC # BLD: 2.97 M/UL — LOW (ref 3.8–5.2)
RBC # BLD: 2.99 M/UL — LOW (ref 3.8–5.2)
RBC # BLD: 3.07 M/UL — LOW (ref 3.8–5.2)
RBC # FLD: 16 % — HIGH (ref 10.3–14.5)
RBC # FLD: 16.2 % — HIGH (ref 10.3–14.5)
RBC # FLD: 16.3 % — HIGH (ref 10.3–14.5)
SODIUM SERPL-SCNC: 144 MMOL/L — SIGNIFICANT CHANGE UP (ref 135–145)
SPECIMEN SOURCE: SIGNIFICANT CHANGE UP
VANCOMYCIN TROUGH SERPL-MCNC: 12 UG/ML — SIGNIFICANT CHANGE UP (ref 10–20)
WBC # BLD: 10.3 K/UL — SIGNIFICANT CHANGE UP (ref 3.8–10.5)
WBC # BLD: 14 K/UL — HIGH (ref 3.8–10.5)
WBC # BLD: 15.8 K/UL — HIGH (ref 3.8–10.5)
WBC # FLD AUTO: 10.3 K/UL — SIGNIFICANT CHANGE UP (ref 3.8–10.5)
WBC # FLD AUTO: 14 K/UL — HIGH (ref 3.8–10.5)
WBC # FLD AUTO: 15.8 K/UL — HIGH (ref 3.8–10.5)

## 2018-09-23 PROCEDURE — 99291 CRITICAL CARE FIRST HOUR: CPT

## 2018-09-23 RX ORDER — FENTANYL CITRATE 50 UG/ML
25 INJECTION INTRAVENOUS ONCE
Qty: 0 | Refills: 0 | Status: DISCONTINUED | OUTPATIENT
Start: 2018-09-23 | End: 2018-09-23

## 2018-09-23 RX ORDER — FENTANYL CITRATE 50 UG/ML
50 INJECTION INTRAVENOUS ONCE
Qty: 0 | Refills: 0 | Status: DISCONTINUED | OUTPATIENT
Start: 2018-09-23 | End: 2018-09-23

## 2018-09-23 RX ORDER — PROPOFOL 10 MG/ML
5 INJECTION, EMULSION INTRAVENOUS
Qty: 500 | Refills: 0 | Status: DISCONTINUED | OUTPATIENT
Start: 2018-09-23 | End: 2018-09-25

## 2018-09-23 RX ORDER — HEPARIN SODIUM 5000 [USP'U]/ML
900 INJECTION INTRAVENOUS; SUBCUTANEOUS
Qty: 25000 | Refills: 0 | Status: DISCONTINUED | OUTPATIENT
Start: 2018-09-23 | End: 2018-09-26

## 2018-09-23 RX ORDER — MIDAZOLAM HYDROCHLORIDE 1 MG/ML
2 INJECTION, SOLUTION INTRAMUSCULAR; INTRAVENOUS ONCE
Qty: 0 | Refills: 0 | Status: DISCONTINUED | OUTPATIENT
Start: 2018-09-23 | End: 2018-09-23

## 2018-09-23 RX ADMIN — MIDAZOLAM HYDROCHLORIDE 2 MILLIGRAM(S): 1 INJECTION, SOLUTION INTRAMUSCULAR; INTRAVENOUS at 21:00

## 2018-09-23 RX ADMIN — HEPARIN SODIUM 900 UNIT(S)/HR: 5000 INJECTION INTRAVENOUS; SUBCUTANEOUS at 00:54

## 2018-09-23 RX ADMIN — DEXMEDETOMIDINE HYDROCHLORIDE IN 0.9% SODIUM CHLORIDE 3.15 MICROGRAM(S)/KG/HR: 4 INJECTION INTRAVENOUS at 22:05

## 2018-09-23 RX ADMIN — DEXMEDETOMIDINE HYDROCHLORIDE IN 0.9% SODIUM CHLORIDE 3.15 MICROGRAM(S)/KG/HR: 4 INJECTION INTRAVENOUS at 13:12

## 2018-09-23 RX ADMIN — HEPARIN SODIUM 900 UNIT(S)/HR: 5000 INJECTION INTRAVENOUS; SUBCUTANEOUS at 21:11

## 2018-09-23 RX ADMIN — Medication 4: at 05:45

## 2018-09-23 RX ADMIN — HEPARIN SODIUM 900 UNIT(S)/HR: 5000 INJECTION INTRAVENOUS; SUBCUTANEOUS at 07:11

## 2018-09-23 RX ADMIN — Medication 25 MILLIGRAM(S): at 05:01

## 2018-09-23 RX ADMIN — Medication 50 MILLIGRAM(S): at 21:00

## 2018-09-23 RX ADMIN — HEPARIN SODIUM 1200 UNIT(S)/HR: 5000 INJECTION INTRAVENOUS; SUBCUTANEOUS at 14:03

## 2018-09-23 RX ADMIN — Medication 40 MILLIGRAM(S): at 05:01

## 2018-09-23 RX ADMIN — PANTOPRAZOLE SODIUM 40 MILLIGRAM(S): 20 TABLET, DELAYED RELEASE ORAL at 13:01

## 2018-09-23 RX ADMIN — Medication 3 MILLILITER(S): at 17:05

## 2018-09-23 RX ADMIN — HUMAN INSULIN 12 UNIT(S): 100 INJECTION, SUSPENSION SUBCUTANEOUS at 17:12

## 2018-09-23 RX ADMIN — DEXMEDETOMIDINE HYDROCHLORIDE IN 0.9% SODIUM CHLORIDE 3.15 MICROGRAM(S)/KG/HR: 4 INJECTION INTRAVENOUS at 02:10

## 2018-09-23 RX ADMIN — Medication 250 MILLIGRAM(S): at 19:29

## 2018-09-23 RX ADMIN — DEXMEDETOMIDINE HYDROCHLORIDE IN 0.9% SODIUM CHLORIDE 3.15 MICROGRAM(S)/KG/HR: 4 INJECTION INTRAVENOUS at 19:41

## 2018-09-23 RX ADMIN — FENTANYL CITRATE 50 MICROGRAM(S): 50 INJECTION INTRAVENOUS at 19:29

## 2018-09-23 RX ADMIN — Medication 25 MILLIGRAM(S): at 13:12

## 2018-09-23 RX ADMIN — Medication 40 MILLIGRAM(S): at 17:12

## 2018-09-23 RX ADMIN — FENTANYL CITRATE 25 MICROGRAM(S): 50 INJECTION INTRAVENOUS at 03:39

## 2018-09-23 RX ADMIN — Medication 3 MILLILITER(S): at 11:17

## 2018-09-23 RX ADMIN — Medication 6: at 23:59

## 2018-09-23 RX ADMIN — FENTANYL CITRATE 50 MICROGRAM(S): 50 INJECTION INTRAVENOUS at 19:59

## 2018-09-23 RX ADMIN — Medication 50 MILLIGRAM(S): at 05:00

## 2018-09-23 RX ADMIN — Medication 3 MILLILITER(S): at 05:23

## 2018-09-23 RX ADMIN — FENTANYL CITRATE 25 MICROGRAM(S): 50 INJECTION INTRAVENOUS at 04:09

## 2018-09-23 RX ADMIN — HUMAN INSULIN 12 UNIT(S): 100 INJECTION, SUSPENSION SUBCUTANEOUS at 23:58

## 2018-09-23 RX ADMIN — Medication 81 MILLIGRAM(S): at 13:01

## 2018-09-23 RX ADMIN — Medication 50 MILLIGRAM(S): at 13:12

## 2018-09-23 RX ADMIN — Medication 6: at 00:56

## 2018-09-23 RX ADMIN — HUMAN INSULIN 12 UNIT(S): 100 INJECTION, SUSPENSION SUBCUTANEOUS at 05:46

## 2018-09-23 RX ADMIN — HUMAN INSULIN 12 UNIT(S): 100 INJECTION, SUSPENSION SUBCUTANEOUS at 00:56

## 2018-09-23 RX ADMIN — POLYETHYLENE GLYCOL 3350 17 GRAM(S): 17 POWDER, FOR SOLUTION ORAL at 05:00

## 2018-09-23 RX ADMIN — Medication 2: at 17:12

## 2018-09-23 RX ADMIN — Medication 25 MILLIGRAM(S): at 21:00

## 2018-09-23 NOTE — PROGRESS NOTE ADULT - ASSESSMENT
85 y/o F w/ a PMH significant for COPD, JENNIE on BiPAP, multivalvular disease (severe AS, s/p MV replacement), HFpEF/severe diastolic failure, A-fib on coumadin, sick sinus syndrome s/p pacemaker placement, HTN, and CKD3 who admitted for acute respiratory failure requiring intubation suspected to be 2/2 COPD exacerbation c/b PNA w/ +entero/rhinovirus and GN coccobacillus.    #Neuro  - Sedated on precedex. Off propofol now    #CV  -admitted w/ sBP 200, since requiring pressure support 2/2 sepsis  -Noreph pressure support  -troponins downtrended; likely demand 2/2 HTN urgency              -will stop trending cardiac enzymes (CK/CKMB negative x3)              -s/p ASA and plavix load. now on QD ASA + clopidogrel.              -low threshold to stop ASA/plavix of any sx bleeding.  -1 episode lai to 30s              -EP consulted for interrogation of pacemaker.  -HFpEF (40-50%) w/ severe diastolic failure  -multivalvular disease: MV replacement, severe AS  -per cards, no diuresis w/ lasix in setting of shock  -c/w heparin full anticoagulation for the MV    #Resp  - Intubated on 9/14; extubated on 9/17. Re-intubated on 9/19 for increased secretions and WOB  - CXR w/ pulmonary edema vs overlying PNA  - Enterovirus positive, now w/ GN coccobacillus positive, possible bacteremia from overlying PNA  - + H. flu PNA  - C/w CFTX  - C/w Solumedrol 40mg IV BID  - will POCUS for pulmonary edema and if clinical condition warrants, will consider extubation today    #Renal  -LATANYA, likely 2/2 sepsis + HTN urgency; previously resolved with Cr at baseline, now Cr elevated again (1.34)  -c/w lasix 40mg IV for oligurea  -will consider results of POCUS lung exam/fluid status (for example, if IVC is distended, if pulmonary edema is present) in terms of determining need for additional IVF given LATANYA    #ID  -+entero/rhinovirus  -legionella negative  -GN coccobacillus (H. influenzae) on 9/14 blood culture  - UClx (9/14): NGTD  - Sputum Clx (9/14): No organisms  -admitted w/ 103 fever, now afebrile.   - c/w vancomycin and aztreonam    Heme:  - c/w heparin gtt; holding home warfarin  -DIC panel negative  -LFTs no longer mildly elevated    Endo:  - C/w NPH (dose increased)    GI:  - C/w tube feeds  - C/w bowel regimen  - C/w PPI    Heme:  - Hb drop from 10 to 8 (now stable); CT A/P negative for acute bleeding

## 2018-09-23 NOTE — PROGRESS NOTE ADULT - ATTENDING COMMENTS
84 year old woman with COPD, JENNIE on nocturnal BiPAP, multivalvular disease (severe AS, s/p MV mechanical replacement), HFpEF/severe diastolic failure, A-fib on coumadin, sick sinus syndrome s/p pacemaker placement, HTN, and CKD3 acute respiratory failure requiring intubation, COPD exacerbation with superimposed PNA +entero/rhinovirus, failed extubation and was re-intubated, hypertensive urgency    - bleeding from ETT today  - getting good volumes and no high pressures on the vent  - PTT elevated will hold heparin and start at a lower dose  - on Precedex follows commands  - SBT with PS today  - complete course of antibiotics  - BP controlled  - LATANYA improving  - tolerating feeds      critical care time spent 40 minutes  plan of care discussed with family at bedside.

## 2018-09-23 NOTE — PROGRESS NOTE ADULT - SUBJECTIVE AND OBJECTIVE BOX
Patient is a 84y old  Female who presents with a chief complaint of COPD exacerbation, ADHF (22 Sep 2018 07:34)      Interval Events: Currently undergoing CPAP trial. On free water 50 q6h.    REVIEW OF SYSTEMS:  Constitutional: [ ] negative [ ] fevers [ ] chills [ ] weight loss [ ] weight gain  HEENT: [ ] negative [ ] dry eyes [ ] eye irritation [ ] postnasal drip [ ] nasal congestion  CV: [ ] negative  [ ] chest pain [ ] orthopnea [ ] palpitations [ ] murmur  Resp: [ ] negative [ ] cough [ ] shortness of breath [ ] dyspnea [ ] wheezing [ ] sputum [ ] hemoptysis  GI: [ ] negative [ ] nausea [ ] vomiting [ ] diarrhea [ ] constipation [ ] abd pain [ ] dysphagia   : [ ] negative [ ] dysuria [ ] nocturia [ ] hematuria [ ] increased urinary frequency  Musculoskeletal: [ ] negative [ ] back pain [ ] myalgias [ ] arthralgias [ ] fracture  Skin: [ ] negative [ ] rash [ ] itch  Neurological: [ ] negative [ ] headache [ ] dizziness [ ] syncope [ ] weakness [ ] numbness  Psychiatric: [ ] negative [ ] anxiety [ ] depression  Endocrine: [ ] negative [ ] diabetes [ ] thyroid problem  Hematologic/Lymphatic: [ ] negative [ ] anemia [ ] bleeding problem  Allergic/Immunologic: [ ] negative [ ] itchy eyes [ ] nasal discharge [ ] hives [ ] angioedema  [ ] All other systems negative  [X] Unable to assess ROS because pt. intubated and sedated    OBJECTIVE:  ICU Vital Signs Last 24 Hrs  T(C): 36.7 (23 Sep 2018 04:00), Max: 37.4 (22 Sep 2018 08:00)  T(F): 98 (23 Sep 2018 04:00), Max: 99.4 (22 Sep 2018 08:00)  HR: 61 (23 Sep 2018 07:00) (59 - 66)  BP: 150/67 (23 Sep 2018 07:00) (113/57 - 154/70)  BP(mean): 97 (23 Sep 2018 07:00) (78 - 101)  ABP: --  ABP(mean): --  RR: 28 (23 Sep 2018 07:00) (20 - 28)  SpO2: 100% (23 Sep 2018 07:00) (98% - 100%)    Mode: CPAP with PS, FiO2: 30, PEEP: 5, PS: 5, MAP: 7, PIP: 11     @ 07: @ 07:00  --------------------------------------------------------  IN: 2749.2 mL / OUT: 2090 mL / NET: 659.2 mL     @ 07: @ 07:29  --------------------------------------------------------  IN: 10 mL / OUT: 0 mL / NET: 10 mL      CAPILLARY BLOOD GLUCOSE      POCT Blood Glucose.: 221 mg/dL (23 Sep 2018 05:40)      PHYSICAL EXAM:  General: elderly female, in no acute distress  HEENT: pupils minimally reactive on sedation  Respiratory: CTA in anterior lung fields b/l  Cardiovascular: +systolic murmur, regular rate  Abdomen: soft, distended, +BS  Extremities: warm extremities, 2+ DP and radial pulses, cap refill <2 sec  Neurological: pupils minimally reactive on sedation, opens eyes to tactile stimuli      LINES: [facility] MEDICATIONS:  Standing Meds:  ALBUTerol/ipratropium for Nebulization 3 milliLiter(s) Nebulizer every 6 hours  aspirin  chewable 81 milliGRAM(s) Oral daily  aztreonam  IVPB 1000 milliGRAM(s) IV Intermittent every 8 hours  bisacodyl Suppository 10 milliGRAM(s) Rectal at bedtime  dexmedetomidine Infusion 0.2 MICROgram(s)/kG/Hr IV Continuous <Continuous>  dextrose 50% Injectable 12.5 Gram(s) IV Push once  dextrose 50% Injectable 25 Gram(s) IV Push once  dextrose 50% Injectable 25 Gram(s) IV Push once  heparin  Infusion.  Unit(s)/Hr IV Continuous <Continuous>  hydrALAZINE 25 milliGRAM(s) Oral every 8 hours  insulin lispro (HumaLOG) corrective regimen sliding scale   SubCutaneous every 6 hours  insulin NPH human recombinant 12 Unit(s) SubCutaneous every 6 hours  methylPREDNISolone sodium succinate Injectable 40 milliGRAM(s) IV Push every 12 hours  norepinephrine Infusion 0.05 MICROgram(s)/kG/Min IV Continuous <Continuous>  pantoprazole  Injectable 40 milliGRAM(s) IV Push daily  polyethylene glycol 3350 17 Gram(s) Oral two times a day  vancomycin  IVPB 1000 milliGRAM(s) IV Intermittent every 24 hours      PRN Meds:  dextrose 40% Gel 15 Gram(s) Oral once PRN  glucagon  Injectable 1 milliGRAM(s) IntraMuscular once PRN  heparin  Injectable 5000 Unit(s) IV Push every 6 hours PRN  heparin  Injectable 2500 Unit(s) IV Push every 6 hours PRN      LABS:                        8.6    10.3  )-----------( 146      ( 22 Sep 2018 23:56 )             26.3     Hgb Trend: 8.6<--, 8.5<--, 8.8<--, 8.1<--, 8.4<--      144  |  110<H>  |  71<H>  ----------------------------<  264<H>  4.8   |  27  |  1.00    Ca    9.0      22 Sep 2018 23:56  Phos  4.2       Mg     2.4         TPro  6.0  /  Alb  3.0<L>  /  TBili  0.3  /  DBili  x   /  AST  19  /  ALT  41  /  AlkPhos  75      Creatinine Trend: 1.00<--, 1.34<--, 1.11<--, 1.17<--, 1.19<--, 1.37<--  PT/INR - ( 22 Sep 2018 23:56 )   PT: 11.9 sec;   INR: 1.09 ratio         PTT - ( 23 Sep 2018 06:44 )  PTT:85.2 sec  Urinalysis Basic - ( 21 Sep 2018 18:19 )    Color: Colorless / Appearance: Clear / S.012 / pH: x  Gluc: x / Ketone: Negative  / Bili: Negative / Urobili: Negative   Blood: x / Protein: Trace / Nitrite: Negative   Leuk Esterase: Small / RBC: 2 /hpf / WBC 6 /hpf   Sq Epi: x / Non Sq Epi: 3 /hpf / Bacteria: Negative      Arterial Blood Gas:   @ 23:51  7.42/42/108/27/97/2.6  ABG lactate: --        MICROBIOLOGY:     Culture - Bronchial (collected 21 Sep 2018 21:22)  Source: Bronch Wash Combicath  Gram Stain (21 Sep 2018 23:53):    No squamous epithelial cells per low power field    Few polymorphonuclear leukocytes per low power field    No organisms seen per oil power field  Preliminary Report (22 Sep 2018 17:08):    Normal Respiratory María present    Culture - Blood (collected 21 Sep 2018 20:46)  Source: .Blood Blood-Peripheral  Preliminary Report (22 Sep 2018 21:00):    No growth to date.    Culture - Blood (collected 21 Sep 2018 20:46)  Source: .Blood Blood  Preliminary Report (22 Sep 2018 21:00):    No growth to date.      RADIOLOGY:  [ ] Reviewed and interpreted by me    EKG:

## 2018-09-24 LAB
ALBUMIN SERPL ELPH-MCNC: 2.6 G/DL — LOW (ref 3.3–5)
ALP SERPL-CCNC: 75 U/L — SIGNIFICANT CHANGE UP (ref 40–120)
ALT FLD-CCNC: 34 U/L — SIGNIFICANT CHANGE UP (ref 10–45)
ANION GAP SERPL CALC-SCNC: 10 MMOL/L — SIGNIFICANT CHANGE UP (ref 5–17)
APTT BLD: 69.3 SEC — HIGH (ref 27.5–37.4)
AST SERPL-CCNC: 16 U/L — SIGNIFICANT CHANGE UP (ref 10–40)
BASE EXCESS BLDA CALC-SCNC: 2.2 MMOL/L — HIGH (ref -2–2)
BILIRUB SERPL-MCNC: 0.2 MG/DL — SIGNIFICANT CHANGE UP (ref 0.2–1.2)
BUN SERPL-MCNC: 56 MG/DL — HIGH (ref 7–23)
CALCIUM SERPL-MCNC: 9.2 MG/DL — SIGNIFICANT CHANGE UP (ref 8.4–10.5)
CHLORIDE SERPL-SCNC: 112 MMOL/L — HIGH (ref 96–108)
CO2 BLDA-SCNC: 28 MMOL/L — SIGNIFICANT CHANGE UP (ref 22–30)
CO2 SERPL-SCNC: 24 MMOL/L — SIGNIFICANT CHANGE UP (ref 22–31)
CREAT SERPL-MCNC: 0.8 MG/DL — SIGNIFICANT CHANGE UP (ref 0.5–1.3)
GAS PNL BLDA: SIGNIFICANT CHANGE UP
GLUCOSE BLDC GLUCOMTR-MCNC: 130 MG/DL — HIGH (ref 70–99)
GLUCOSE BLDC GLUCOMTR-MCNC: 143 MG/DL — HIGH (ref 70–99)
GLUCOSE BLDC GLUCOMTR-MCNC: 158 MG/DL — HIGH (ref 70–99)
GLUCOSE SERPL-MCNC: 193 MG/DL — HIGH (ref 70–99)
HCO3 BLDA-SCNC: 27 MMOL/L — SIGNIFICANT CHANGE UP (ref 21–29)
HCT VFR BLD CALC: 24.8 % — LOW (ref 34.5–45)
HGB BLD-MCNC: 8.2 G/DL — LOW (ref 11.5–15.5)
INR BLD: 1.09 RATIO — SIGNIFICANT CHANGE UP (ref 0.88–1.16)
MAGNESIUM SERPL-MCNC: 2.2 MG/DL — SIGNIFICANT CHANGE UP (ref 1.6–2.6)
MCHC RBC-ENTMCNC: 29.4 PG — SIGNIFICANT CHANGE UP (ref 27–34)
MCHC RBC-ENTMCNC: 33.1 GM/DL — SIGNIFICANT CHANGE UP (ref 32–36)
MCV RBC AUTO: 88.7 FL — SIGNIFICANT CHANGE UP (ref 80–100)
PCO2 BLDA: 44 MMHG — SIGNIFICANT CHANGE UP (ref 32–46)
PH BLDA: 7.4 — SIGNIFICANT CHANGE UP (ref 7.35–7.45)
PHOSPHATE SERPL-MCNC: 3.2 MG/DL — SIGNIFICANT CHANGE UP (ref 2.5–4.5)
PLATELET # BLD AUTO: 154 K/UL — SIGNIFICANT CHANGE UP (ref 150–400)
PO2 BLDA: 104 MMHG — SIGNIFICANT CHANGE UP (ref 74–108)
POTASSIUM SERPL-MCNC: 4.4 MMOL/L — SIGNIFICANT CHANGE UP (ref 3.5–5.3)
POTASSIUM SERPL-SCNC: 4.4 MMOL/L — SIGNIFICANT CHANGE UP (ref 3.5–5.3)
PROT SERPL-MCNC: 5.7 G/DL — LOW (ref 6–8.3)
PROTHROM AB SERPL-ACNC: 11.8 SEC — SIGNIFICANT CHANGE UP (ref 9.8–12.7)
RBC # BLD: 2.79 M/UL — LOW (ref 3.8–5.2)
RBC # FLD: 16.1 % — HIGH (ref 10.3–14.5)
SAO2 % BLDA: 98 % — HIGH (ref 92–96)
SODIUM SERPL-SCNC: 146 MMOL/L — HIGH (ref 135–145)
WBC # BLD: 12 K/UL — HIGH (ref 3.8–10.5)
WBC # FLD AUTO: 12 K/UL — HIGH (ref 3.8–10.5)

## 2018-09-24 PROCEDURE — 99291 CRITICAL CARE FIRST HOUR: CPT | Mod: 25

## 2018-09-24 PROCEDURE — 93308 TTE F-UP OR LMTD: CPT | Mod: 26,GC

## 2018-09-24 PROCEDURE — 76604 US EXAM CHEST: CPT | Mod: 26,GC

## 2018-09-24 RX ORDER — FENTANYL CITRATE 50 UG/ML
12.5 INJECTION INTRAVENOUS ONCE
Qty: 0 | Refills: 0 | Status: DISCONTINUED | OUTPATIENT
Start: 2018-09-24 | End: 2018-09-24

## 2018-09-24 RX ORDER — CEFEPIME 1 G/1
2000 INJECTION, POWDER, FOR SOLUTION INTRAMUSCULAR; INTRAVENOUS EVERY 8 HOURS
Qty: 0 | Refills: 0 | Status: DISCONTINUED | OUTPATIENT
Start: 2018-09-24 | End: 2018-09-28

## 2018-09-24 RX ORDER — CEFEPIME 1 G/1
2000 INJECTION, POWDER, FOR SOLUTION INTRAMUSCULAR; INTRAVENOUS ONCE
Qty: 0 | Refills: 0 | Status: COMPLETED | OUTPATIENT
Start: 2018-09-24 | End: 2018-09-24

## 2018-09-24 RX ORDER — CEFEPIME 1 G/1
INJECTION, POWDER, FOR SOLUTION INTRAMUSCULAR; INTRAVENOUS
Qty: 0 | Refills: 0 | Status: DISCONTINUED | OUTPATIENT
Start: 2018-09-24 | End: 2018-09-28

## 2018-09-24 RX ADMIN — FENTANYL CITRATE 12.5 MICROGRAM(S): 50 INJECTION INTRAVENOUS at 21:21

## 2018-09-24 RX ADMIN — PROPOFOL 1.89 MICROGRAM(S)/KG/MIN: 10 INJECTION, EMULSION INTRAVENOUS at 04:40

## 2018-09-24 RX ADMIN — Medication 3 MILLILITER(S): at 05:56

## 2018-09-24 RX ADMIN — PROPOFOL 1.89 MICROGRAM(S)/KG/MIN: 10 INJECTION, EMULSION INTRAVENOUS at 09:30

## 2018-09-24 RX ADMIN — Medication 40 MILLIGRAM(S): at 18:19

## 2018-09-24 RX ADMIN — HUMAN INSULIN 12 UNIT(S): 100 INJECTION, SUSPENSION SUBCUTANEOUS at 11:43

## 2018-09-24 RX ADMIN — Medication 81 MILLIGRAM(S): at 11:44

## 2018-09-24 RX ADMIN — Medication 3 MILLILITER(S): at 00:07

## 2018-09-24 RX ADMIN — Medication 40 MILLIGRAM(S): at 05:00

## 2018-09-24 RX ADMIN — Medication 50 MILLIGRAM(S): at 05:01

## 2018-09-24 RX ADMIN — Medication 3 MILLILITER(S): at 11:15

## 2018-09-24 RX ADMIN — HEPARIN SODIUM 900 UNIT(S)/HR: 5000 INJECTION INTRAVENOUS; SUBCUTANEOUS at 03:37

## 2018-09-24 RX ADMIN — HUMAN INSULIN 12 UNIT(S): 100 INJECTION, SUSPENSION SUBCUTANEOUS at 05:19

## 2018-09-24 RX ADMIN — DEXMEDETOMIDINE HYDROCHLORIDE IN 0.9% SODIUM CHLORIDE 3.15 MICROGRAM(S)/KG/HR: 4 INJECTION INTRAVENOUS at 04:40

## 2018-09-24 RX ADMIN — Medication 25 MILLIGRAM(S): at 21:07

## 2018-09-24 RX ADMIN — HUMAN INSULIN 12 UNIT(S): 100 INJECTION, SUSPENSION SUBCUTANEOUS at 18:19

## 2018-09-24 RX ADMIN — PANTOPRAZOLE SODIUM 40 MILLIGRAM(S): 20 TABLET, DELAYED RELEASE ORAL at 11:44

## 2018-09-24 RX ADMIN — Medication 25 MILLIGRAM(S): at 14:40

## 2018-09-24 RX ADMIN — Medication 2: at 05:19

## 2018-09-24 RX ADMIN — DEXMEDETOMIDINE HYDROCHLORIDE IN 0.9% SODIUM CHLORIDE 3.15 MICROGRAM(S)/KG/HR: 4 INJECTION INTRAVENOUS at 09:30

## 2018-09-24 RX ADMIN — Medication 25 MILLIGRAM(S): at 05:01

## 2018-09-24 RX ADMIN — CEFEPIME 100 MILLIGRAM(S): 1 INJECTION, POWDER, FOR SOLUTION INTRAMUSCULAR; INTRAVENOUS at 10:21

## 2018-09-24 RX ADMIN — Medication 3 MILLILITER(S): at 17:07

## 2018-09-24 RX ADMIN — Medication 3 MILLILITER(S): at 23:17

## 2018-09-24 RX ADMIN — FENTANYL CITRATE 12.5 MICROGRAM(S): 50 INJECTION INTRAVENOUS at 21:00

## 2018-09-24 RX ADMIN — DEXMEDETOMIDINE HYDROCHLORIDE IN 0.9% SODIUM CHLORIDE 3.15 MICROGRAM(S)/KG/HR: 4 INJECTION INTRAVENOUS at 18:00

## 2018-09-24 RX ADMIN — CEFEPIME 100 MILLIGRAM(S): 1 INJECTION, POWDER, FOR SOLUTION INTRAMUSCULAR; INTRAVENOUS at 21:07

## 2018-09-24 NOTE — PROGRESS NOTE ADULT - ASSESSMENT
83 y/o F w/ a PMH significant for COPD, JENNIE on BiPAP, multivalvular disease (severe AS, s/p MV replacement), HFpEF/severe diastolic failure, A-fib on coumadin, sick sinus syndrome s/p pacemaker placement, HTN, and CKD3 who admitted for acute respiratory failure requiring intubation suspected to be 2/2 COPD exacerbation c/b PNA w/ +entero/rhinovirus and GN coccobacillus.    #Neuro  - Sedated on precedex. Off propofol now    #CV  -admitted w/ sBP 200, since requiring pressure support 2/2 sepsis  -[place] pressure support  -troponins downtrended; likely demand 2/2 HTN urgency              -will stop trending cardiac enzymes (CK/CKMB negative x3)              -s/p ASA and plavix load. now on QD ASA + clopidogrel.              -low threshold to stop ASA/plavix of any sx bleeding.  -1 episode lai to 30s              -EP consulted for interrogation of pacemaker.  -HFpEF (40-50%) w/ severe diastolic failure  -multivalvular disease: MV replacement, severe AS  -per cards, no diuresis w/ lasix in setting of shock  -c/w heparin full anticoagulation for the MV    #Resp  - Intubated on 9/14; extubated on 9/17. Re-intubated on 9/19 for increased secretions and WOB  - CXR w/ pulmonary edema vs overlying PNA  - Enterovirus positive, now w/ GN coccobacillus positive, possible bacteremia from overlying PNA  - + H. flu PNA  - C/w CFTX  - C/w Solumedrol 40mg IV BID  - will POCUS for pulmonary edema and if clinical condition warrants, will consider extubation today    #Renal  -LATANYA, likely 2/2 sepsis + HTN urgency; previously resolved with Cr at baseline, now Cr elevated again (1.34)  -c/w lasix 40mg IV for oligurea  -will consider results of POCUS lung exam/fluid status (for example, if IVC is distended, if pulmonary edema is present) in terms of determining need for additional IVF given LATANYA    #ID  -+entero/rhinovirus  -legionella negative  -GN coccobacillus (H. influenzae) on 9/14 blood culture  - UClx (9/14): NGTD  - Sputum Clx (9/14): No organisms  -admitted w/ 103 fever, now afebrile.   - c/w vancomycin and aztreonam    Heme:  - c/w heparin gtt; holding home warfarin  -DIC panel negative  -LFTs no longer mildly elevated    Endo:  - C/w NPH (dose increased)    GI:  - C/w tube feeds  - C/w bowel regimen  - C/w PPI    Heme:  - Hb drop from 10 to 8 (now stable); CT A/P negative for acute bleeding 83 y/o F w/ a PMH significant for COPD, JENNIE on BiPAP, multivalvular disease (severe AS, s/p MV replacement), HFpEF/severe diastolic failure, A-fib on coumadin, sick sinus syndrome s/p pacemaker placement, HTN, and CKD3 who admitted for acute respiratory failure requiring intubation suspected to be 2/2 COPD exacerbation c/b PNA w/ +entero/rhinovirus and GN coccobacillus.    #Neuro  - Sedated on precedex AND PROPOFOL    #CV  -admitted w/ sBP 200, since requiring pressure support 2/2 sepsis, pt placed on hydralazine, required cardene drip  -Pt not on norepi  -troponins downtrended; likely demand 2/2 HTN urgency              -will stop trending cardiac enzymes (CK/CKMB negative x3)              -s/p ASA and plavix load. plavix d/c'ed by cards and on hep ggt              -low threshold to stop if any sx bleeding.  -1 episode lai to 30s              -EP consulted for interrogation of pacemaker and cleared  -HFpEF (40-50%) w/ severe diastolic failure  -multivalvular disease: MV replacement, severe AS  -per cards, no diuresis w/ lasix in setting of shock  -c/w heparin full anticoagulation for the MV  -c/w hydralazine    #Resp  - Intubated on 9/14; extubated on 9/17. Re-intubated on 9/19 for increased secretions and WOB  - CXR w/ pulmonary edema vs overlying PNA  - Enterovirus positive, now w/ GN coccobacillus positive, possible bacteremia from overlying PNA  - + H. flu PNA  - C/w Solumedrol 40mg IV BID  - will consider extubation tomorrow if pt's secretions decrease    #Renal  -LATANYA, likely 2/2 sepsis + HTN urgency; resolved with Cr at baseline    #ID  -+entero/rhinovirus  -legionella negative  -GN coccobacillus (H. influenzae) on 9/14 blood culture  - UClx (9/14): NGTD  - Sputum Clx (9/14): No organisms  - admitted w/ 103 fever, now afebrile.   - d/c vancomycin and aztreonam and start cefepime  - f/u blood cultures from 9/21    Heme:  - c/w heparin gtt; holding home warfarin  -DIC panel negative  -LFTs no longer mildly elevated    Endo:  - C/w NPH (dose increased)    GI:  - C/w tube feeds  - C/w bowel regimen  - C/w PPI    Heme:  - Hb drop from 10 to 8 (now stable); CT A/P negative for acute bleeding 85 y/o F w/ a PMH significant for COPD, JENNIE on BiPAP, multivalvular disease (severe AS, s/p MV replacement), HFpEF/severe diastolic failure, A-fib on coumadin, sick sinus syndrome s/p pacemaker placement, HTN, and CKD3 who admitted for acute respiratory failure requiring intubation suspected to be 2/2 COPD exacerbation c/b PNA w/ +entero/rhinovirus and GN coccobacillus.    #Neuro  - Sedated on precedex and propofol    #CV  -admitted w/ sBP 200, since requiring pressure support 2/2 sepsis, pt placed on hydralazine, required cardene drip  -pt currently does not require norepi  -troponins downtrended; likely demand 2/2 HTN urgency              -will stop trending cardiac enzymes (CK/CKMB negative x3)              -s/p ASA and plavix load. plavix d/c'ed by cards and on hep ggt              -low threshold to stop if any sx bleeding.  -1 episode lai to 30s              -EP consulted for interrogation of pacemaker and cleared  -HFpEF (40-50%) w/ severe diastolic failure  -multivalvular disease: MV replacement, severe AS  -per cards, no diuresis w/ lasix in setting of shock  -c/w heparin full anticoagulation for the MV  -c/w hydralazine    #Resp  - Intubated on 9/14; extubated on 9/17. Re-intubated on 9/19 for increased secretions and WOB  - CXR w/ pulmonary edema vs overlying PNA  - Enterovirus positive, now w/ GN coccobacillus positive, possible bacteremia from overlying PNA  - + H. flu PNA  - C/w Solumedrol 40mg IV BID  - will consider extubation tomorrow if pt's secretions decrease    #Renal  -LATANYA, likely 2/2 sepsis + HTN urgency; resolved with Cr at baseline  -pt has hypernatremia and currently on free water  -will d/c tejada catheter    #ID  -+entero/rhinovirus  -legionella negative  -GN coccobacillus (H. influenzae) on 9/14 blood culture  - UClx (9/14): NGTD  - Sputum Clx (9/14): No organisms  - admitted w/ 103 fever, now afebrile.   - d/c vancomycin and aztreonam and start cefepime  - f/u blood cultures from 9/21    Heme:  -DIC panel negative  -LFTs no longer mildly elevated  - Hb drop from 10 to 8 (now stable); CT A/P negative for acute bleeding  - c/w heparin gtt; holding home warfarin    Endo:  - C/w NPH (dose increased)    GI:  - C/w tube feeds  - C/w bowel regimen  - C/w PPI 85 y/o F w/ a PMH significant for COPD, JENNIE on BiPAP, multivalvular disease (severe AS, s/p MV replacement), HFpEF/severe diastolic failure, A-fib on coumadin, sick sinus syndrome s/p pacemaker placement, HTN, and CKD3 who admitted for acute respiratory failure requiring intubation suspected to be 2/2 COPD exacerbation c/b PNA w/ +entero/rhinovirus and GN coccobacillus.    #Neuro  - Sedated on precedex and propofol    #CV  -admitted w/ sBP 200, since requiring pressure support 2/2 sepsis, pt placed on hydralazine, required cardene drip  -pt currently does not require norepi  -troponins downtrended; likely demand 2/2 HTN urgency              -will stop trending cardiac enzymes (CK/CKMB negative x3)              -s/p ASA and plavix load. plavix d/c'ed by cards and on hep ggt              -low threshold to stop if any sx bleeding.  -1 episode lai to 30s              -EP consulted for interrogation of pacemaker and cleared  -HFpEF (40-50%) w/ severe diastolic failure  -multivalvular disease: MV replacement, severe AS  -per cards, no diuresis w/ lasix in setting of shock  -c/w heparin full anticoagulation for the MV  -c/w hydralazine    #Resp  - Intubated on 9/14; extubated on 9/17. Re-intubated on 9/19 for increased secretions and WOB  - CXR w/ pulmonary edema vs overlying PNA  - Enterovirus positive, now w/ GN coccobacillus positive, possible bacteremia from overlying PNA  - + H. flu PNA  - C/w Solumedrol 40mg IV BID  - will consider extubation tomorrow if pt's secretions decrease    #GI  -no acute pathology  -c/w tube feeds  -c/w bowel regimen  -c/w PPI ppx    #Renal  -LATANYA, likely 2/2 sepsis + HTN urgency; resolved with Cr at baseline  -pt has hypernatremia and currently on free water 250mL Q6  -will d/c tejada catheter    #ID  -+entero/rhinovirus  -legionella negative  -GN coccobacillus (H. influenzae) on 9/14 blood culture  - UClx (9/14): NGTD  - Sputum Clx (9/14): No organisms  - admitted w/ 103 fever, now afebrile.   - d/c vancomycin and aztreonam and start cefepime  - f/u blood cultures from 9/21    #Heme:  -DIC panel negative  -LFTs no longer mildly elevated  - Hb drop from 10 to 8 (now stable); CT A/P negative for acute bleeding  - c/w heparin gtt; holding home warfarin    #Endo:  - C/w NPH (dose increased)    #GOC: 85 y/o F w/ a PMH significant for COPD, JENNIE on BiPAP, multivalvular disease (severe AS, s/p MV replacement), HFpEF/severe diastolic failure, A-fib on coumadin, sick sinus syndrome s/p pacemaker placement, HTN, and CKD3 who admitted for acute respiratory failure requiring intubation suspected to be 2/2 COPD exacerbation c/b PNA w/ +entero/rhinovirus and GN coccobacillus.    #Neuro  - Sedated on precedex and propofol    #CV  -admitted w/ sBP 200, since requiring pressure support 2/2 sepsis, pt placed on hydralazine, required cardene drip  -pt currently does not require norepi  -troponins downtrended; likely demand 2/2 HTN urgency              -will stop trending cardiac enzymes (CK/CKMB negative x3)              -s/p ASA and plavix load. plavix d/c'ed by cards and on hep ggt              -low threshold to stop if any sx bleeding.  -1 episode lai to 30s              -EP consulted for interrogation of pacemaker and cleared  -HFpEF (40-50%) w/ severe diastolic failure  -multivalvular disease: MV replacement, severe AS  -per cards, no diuresis w/ lasix in setting of shock  -c/w heparin full anticoagulation for the MV  -c/w hydralazine    #Resp  - Intubated on 9/14; extubated on 9/17. Re-intubated on 9/19 for increased secretions and WOB  - CXR w/ pulmonary edema vs overlying PNA  - Enterovirus positive, now w/ GN coccobacillus positive, possible bacteremia from overlying PNA  - + H. flu PNA  - C/w Solumedrol 40mg IV BID  - will consider extubation tomorrow if pt's secretions decrease    #GI  -no acute pathology  -c/w tube feeds  -c/w bowel regimen  -c/w PPI ppx    #Renal  -LATANYA, likely 2/2 sepsis + HTN urgency; resolved with Cr at baseline  -pt has hypernatremia and currently on free water 250mL Q6  -will d/c tejada catheter    #ID  -+entero/rhinovirus  -legionella negative  -GN coccobacillus (H. influenzae) on 9/14 blood culture  - UClx (9/14): NGTD  - Sputum Clx (9/14): No organisms  - admitted w/ 103 fever, now afebrile.   - d/c vancomycin and aztreonam and start cefepime  - f/u blood cultures from 9/21    #Heme:  -DIC panel negative  -LFTs no longer mildly elevated  - Hb drop from 10 to 8 (now stable); CT A/P negative for acute bleeding  - c/w heparin gtt; holding home warfarin    #Endo:  - C/w NPH (dose increased)    #GOC: will discuss with family prior to extubation

## 2018-09-24 NOTE — PHARMACOTHERAPY INTERVENTION NOTE - COMMENTS
Discussion during round regarding using cefepime instead of aztreonam and vancomycin. Patient tolerated cefepime in past and will resume cefepime for H. influenza bacteremia. Discussion during round regarding using cefepime instead of aztreonam and vancomycin. Patient tolerated cefepime in past and will resume cefepime for Haemophilus influenza bacteremia.

## 2018-09-24 NOTE — CHART NOTE - NSCHARTNOTEFT_GEN_A_CORE
: Ronna Amezcua    INDICATION: Eval lungs and heart    PROCEDURE:  [x] LIMITED ECHO  [x] LIMITED CHEST  [ ] LIMITED RETROPERITONEAL  [ ] LIMITED ABDOMINAL  [ ] LIMITED DVT  [ ] NEEDLE GUIDANCE VASCULAR  [ ] NEEDLE GUIDANCE THORACENTESIS  [ ] NEEDLE GUIDANCE PARACENTESIS  [ ] NEEDLE GUIDANCE PERICARDIOCENTESIS  [ ] OTHER    FINDINGS:   Chest: Alines anteriorly, no Pleural effusion.   ECHO: mild decrease in LV function, no pericardial effusion    INTERPRETATION:  continue antibiotics for PNA. No further diuresis. : Ronna Amezcua    INDICATION: Eval lungs and heart    PROCEDURE:  [x] LIMITED ECHO  [x] LIMITED CHEST  [ ] LIMITED RETROPERITONEAL  [ ] LIMITED ABDOMINAL  [ ] LIMITED DVT  [ ] NEEDLE GUIDANCE VASCULAR  [ ] NEEDLE GUIDANCE THORACENTESIS  [ ] NEEDLE GUIDANCE PARACENTESIS  [ ] NEEDLE GUIDANCE PERICARDIOCENTESIS  [ ] OTHER    FINDINGS:   Chest: Alines anteriorly, no Pleural effusion.   ECHO: mild decrease in LV function, no pericardial effusion    INTERPRETATION:  Mildly decrease lv function. Normal chest

## 2018-09-24 NOTE — PROGRESS NOTE ADULT - ATTENDING COMMENTS
1. Acute hypoxemic  respiratory failure due to H. Influenza bacteremia and pneumonia. Pt tolerating PS but with decreasing bloody secretions likely from suction trauma. Since pt failed extubation due to increase secretions  before will give another day on vent for secretions to decrease . Minimal ETT suctioning.  2.ID Change ABX  to cefepime. No evidence of MRSA.  3. Hypernatremia: Continue free water bolus.  4. Cardiac. MVR  and afib. Continue IV heparin.    cc time 35 min

## 2018-09-24 NOTE — PROGRESS NOTE ADULT - SUBJECTIVE AND OBJECTIVE BOX
INTERVAL HPI/OVERNIGHT EVENTS: pt became agitated yesterday on the vent and was given fent 50mcg , midazolam 2mg, and started on propofol drip 5mcg/kg/min.     SUBJECTIVE: Patient seen and examined at bedside.     OBJECTIVE:    VITAL SIGNS:  ICU Vital Signs Last 24 Hrs  T(C): 36.6 (24 Sep 2018 04:00), Max: 36.6 (24 Sep 2018 04:00)  T(F): 97.8 (24 Sep 2018 04:00), Max: 97.8 (24 Sep 2018 04:00)  HR: 59 (24 Sep 2018 07:00) (59 - 64)  BP: 130/61 (24 Sep 2018 07:00) (103/53 - 172/99)  BP(mean): 88 (24 Sep 2018 07:00) (76 - 127)  ABP: --  ABP(mean): --  RR: 26 (24 Sep 2018 07:00) (20 - 33)  SpO2: 100% (24 Sep 2018 07:00) (97% - 100%)    Mode: CPAP with PS, FiO2: 30, PEEP: 5, PS: 8, MAP: 8, PIP: 12    09-23 @ 07:01  -  09-24 @ 07:00  --------------------------------------------------------  IN: 3139.7 mL / OUT: 1680 mL / NET: 1459.7 mL    CAPILLARY BLOOD GLUCOSE  POCT Blood Glucose.: 158 mg/dL (24 Sep 2018 05:05)      PHYSICAL EXAM:    General: NAD  HEENT: NC/AT; PERRL, clear conjunctiva  Neck: supple  Respiratory: CTA b/l  Cardiovascular: +S1/S2; RRR  Abdomen: soft, NT/ND; +BS x4  Extremities: WWP, 2+ peripheral pulses b/l; no LE edema  Skin: normal color and turgor; no rash  Neurological:    MEDICATIONS:  MEDICATIONS  (STANDING):  ALBUTerol/ipratropium for Nebulization 3 milliLiter(s) Nebulizer every 6 hours  aspirin  chewable 81 milliGRAM(s) Oral daily  aztreonam  IVPB 1000 milliGRAM(s) IV Intermittent every 8 hours  bisacodyl Suppository 10 milliGRAM(s) Rectal at bedtime  dexmedetomidine Infusion 0.2 MICROgram(s)/kG/Hr (3.145 mL/Hr) IV Continuous <Continuous>  heparin  Infusion. 900 Unit(s)/Hr (9 mL/Hr) IV Continuous <Continuous>  hydrALAZINE 25 milliGRAM(s) Oral every 8 hours  insulin lispro (HumaLOG) corrective regimen sliding scale   SubCutaneous every 6 hours  insulin NPH human recombinant 12 Unit(s) SubCutaneous every 6 hours  methylPREDNISolone sodium succinate Injectable 40 milliGRAM(s) IV Push every 12 hours  norepinephrine Infusion 0.05 MICROgram(s)/kG/Min (5.897 mL/Hr) IV Continuous <Continuous>  pantoprazole  Injectable 40 milliGRAM(s) IV Push daily  polyethylene glycol 3350 17 Gram(s) Oral two times a day  propofol Infusion 5 MICROgram(s)/kG/Min (1.887 mL/Hr) IV Continuous <Continuous>  vancomycin  IVPB 1000 milliGRAM(s) IV Intermittent every 24 hours    ALLERGIES:  Allergies  penicillin (Rash)    LABS:                   8.2    12.0  )-----------( 154      ( 24 Sep 2018 03:04 )             24.8     CBC Full  -  ( 24 Sep 2018 03:04 )  WBC Count : 12.0 K/uL  Hemoglobin : 8.2 g/dL  Hematocrit : 24.8 %  Platelet Count - Automated : 154 K/uL  Mean Cell Volume : 88.7 fl  Mean Cell Hemoglobin : 29.4 pg  Mean Cell Hemoglobin Concentration : 33.1 gm/dL    09-24    146<H>  |  112<H>  |  56<H>  ----------------------------<  193<H>  4.4   |  24  |  0.80    Ca    9.2      24 Sep 2018 03:04  Phos  3.2     09-24  Mg     2.2     09-24    TPro  5.7<L>  /  Alb  2.6<L>  /  TBili  0.2  /  DBili  x   /  AST  16  /  ALT  34  /  AlkPhos  75  09-24    Creatinine Trend: 0.80<--, 1.00<--, 1.34<--, 1.11<--, 1.17<--, 1.19<--  LIVER FUNCTIONS - ( 24 Sep 2018 03:04 )  Alb: 2.6 g/dL / Pro: 5.7 g/dL / ALK PHOS: 75 U/L / ALT: 34 U/L / AST: 16 U/L / GGT: x           PT/INR - ( 24 Sep 2018 03:04 )   PT: 11.8 sec;   INR: 1.09 ratio      PTT - ( 24 Sep 2018 03:04 )  PTT:69.3 sec    ABG - ( 24 Sep 2018 03:07 )  pH, Arterial: 7.40  pH, Blood: x     /  pCO2: 44    /  pO2: 104   / HCO3: 27    / Base Excess: 2.2   /  SaO2: 98        MICROBIOLOGY:    Culture - Bronchial (collected 21 Sep 2018 21:22)  Source: Bronch Wash Combicath  Gram Stain (21 Sep 2018 23:53):    No squamous epithelial cells per low power field    Few polymorphonuclear leukocytes per low power field    No organisms seen per oil power field  Final Report (23 Sep 2018 18:42):    Normal Respiratory María present    Culture - Blood (collected 21 Sep 2018 20:46)  Source: .Blood Blood-Peripheral  Preliminary Report (22 Sep 2018 21:00):    No growth to date.    Culture - Blood (collected 21 Sep 2018 20:46)  Source: .Blood Blood  Preliminary Report (22 Sep 2018 21:00):    No growth to date.    RADIOLOGY & ADDITIONAL TESTS: Reviewed. INTERVAL HPI/OVERNIGHT EVENTS: pt became agitated yesterday on the vent and was given fent 50mcg , midazolam 2mg, and started on propofol drip 5mcg/kg/min.     SUBJECTIVE: Patient seen and examined at bedside.     OBJECTIVE:    VITAL SIGNS:  ICU Vital Signs Last 24 Hrs  T(C): 36.6 (24 Sep 2018 04:00), Max: 36.6 (24 Sep 2018 04:00)  T(F): 97.8 (24 Sep 2018 04:00), Max: 97.8 (24 Sep 2018 04:00)  HR: 59 (24 Sep 2018 07:00) (59 - 64)  BP: 130/61 (24 Sep 2018 07:00) (103/53 - 172/99)  BP(mean): 88 (24 Sep 2018 07:00) (76 - 127)  ABP: --  ABP(mean): --  RR: 26 (24 Sep 2018 07:00) (20 - 33)  SpO2: 100% (24 Sep 2018 07:00) (97% - 100%)    Mode: CPAP with PS, FiO2: 30, PEEP: 5, PS: 8, MAP: 8, PIP: 12    09-23 @ 07:01  -  09-24 @ 07:00  --------------------------------------------------------  IN: 3139.7 mL / OUT: 1680 mL / NET: 1459.7 mL    CAPILLARY BLOOD GLUCOSE  POCT Blood Glucose.: 158 mg/dL (24 Sep 2018 05:05)      PHYSICAL EXAM:    General: NAD, intubated  HEENT: NC/AT; PERRL  Respiratory: CTA b/l  Cardiovascular: regular rate, systolic murmur  Abdomen: distended, soft, nontender; +BS  Extremities: 2+ peripheral pulses b/l; no LE edema  Skin: normal color and turgor; warm and pink  Neurological: awake and follows commands    MEDICATIONS:  MEDICATIONS  (STANDING):  ALBUTerol/ipratropium for Nebulization 3 milliLiter(s) Nebulizer every 6 hours  aspirin  chewable 81 milliGRAM(s) Oral daily  aztreonam  IVPB 1000 milliGRAM(s) IV Intermittent every 8 hours  bisacodyl Suppository 10 milliGRAM(s) Rectal at bedtime  dexmedetomidine Infusion 0.2 MICROgram(s)/kG/Hr (3.145 mL/Hr) IV Continuous <Continuous>  heparin  Infusion. 900 Unit(s)/Hr (9 mL/Hr) IV Continuous <Continuous>  hydrALAZINE 25 milliGRAM(s) Oral every 8 hours  insulin lispro (HumaLOG) corrective regimen sliding scale   SubCutaneous every 6 hours  insulin NPH human recombinant 12 Unit(s) SubCutaneous every 6 hours  methylPREDNISolone sodium succinate Injectable 40 milliGRAM(s) IV Push every 12 hours  pantoprazole  Injectable 40 milliGRAM(s) IV Push daily  polyethylene glycol 3350 17 Gram(s) Oral two times a day  propofol Infusion 5 MICROgram(s)/kG/Min (1.887 mL/Hr) IV Continuous <Continuous>  vancomycin  IVPB 1000 milliGRAM(s) IV Intermittent every 24 hours    ALLERGIES:  Allergies  penicillin (Rash)    LABS:                   8.2    12.0  )-----------( 154      ( 24 Sep 2018 03:04 )             24.8     CBC Full  -  ( 24 Sep 2018 03:04 )  WBC Count : 12.0 K/uL  Hemoglobin : 8.2 g/dL  Hematocrit : 24.8 %  Platelet Count - Automated : 154 K/uL  Mean Cell Volume : 88.7 fl  Mean Cell Hemoglobin : 29.4 pg  Mean Cell Hemoglobin Concentration : 33.1 gm/dL    09-24    146<H>  |  112<H>  |  56<H>  ----------------------------<  193<H>  4.4   |  24  |  0.80    Ca    9.2      24 Sep 2018 03:04  Phos  3.2     09-24  Mg     2.2     09-24    TPro  5.7<L>  /  Alb  2.6<L>  /  TBili  0.2  /  DBili  x   /  AST  16  /  ALT  34  /  AlkPhos  75  09-24    Creatinine Trend: 0.80<--, 1.00<--, 1.34<--, 1.11<--, 1.17<--, 1.19<--  LIVER FUNCTIONS - ( 24 Sep 2018 03:04 )  Alb: 2.6 g/dL / Pro: 5.7 g/dL / ALK PHOS: 75 U/L / ALT: 34 U/L / AST: 16 U/L / GGT: x           PT/INR - ( 24 Sep 2018 03:04 )   PT: 11.8 sec;   INR: 1.09 ratio      PTT - ( 24 Sep 2018 03:04 )  PTT:69.3 sec    ABG - ( 24 Sep 2018 03:07 )  pH, Arterial: 7.40  pH, Blood: x     /  pCO2: 44    /  pO2: 104   / HCO3: 27    / Base Excess: 2.2   /  SaO2: 98        MICROBIOLOGY:    Culture - Bronchial (collected 21 Sep 2018 21:22)  Source: Bronch Wash Combicath  Gram Stain (21 Sep 2018 23:53):    No squamous epithelial cells per low power field    Few polymorphonuclear leukocytes per low power field    No organisms seen per oil power field  Final Report (23 Sep 2018 18:42):    Normal Respiratory María present    Culture - Blood (collected 21 Sep 2018 20:46)  Source: .Blood Blood-Peripheral  Preliminary Report (22 Sep 2018 21:00):    No growth to date.    Culture - Blood (collected 21 Sep 2018 20:46)  Source: .Blood Blood  Preliminary Report (22 Sep 2018 21:00):    No growth to date.    RADIOLOGY & ADDITIONAL TESTS: Reviewed. INTERVAL HPI/OVERNIGHT EVENTS: pt became agitated yesterday on the vent and was given fent 50mcg , midazolam 2mg, and started on propofol drip 5mcg/kg/min.     SUBJECTIVE: Patient seen and examined at bedside.     OBJECTIVE:    VITAL SIGNS:  ICU Vital Signs Last 24 Hrs  T(C): 36.6 (24 Sep 2018 04:00), Max: 36.6 (24 Sep 2018 04:00)  T(F): 97.8 (24 Sep 2018 04:00), Max: 97.8 (24 Sep 2018 04:00)  HR: 59 (24 Sep 2018 07:00) (59 - 64)  BP: 130/61 (24 Sep 2018 07:00) (103/53 - 172/99)  BP(mean): 88 (24 Sep 2018 07:00) (76 - 127)  ABP: --  ABP(mean): --  RR: 26 (24 Sep 2018 07:00) (20 - 33)  SpO2: 100% (24 Sep 2018 07:00) (97% - 100%)    Mode: CPAP with PS, FiO2: 30, PEEP: 5, PS: 8, MAP: 8, PIP: 12    09-23 @ 07:01  -  09-24 @ 07:00  --------------------------------------------------------  IN: 3139.7 mL / OUT: 1680 mL / NET: 1459.7 mL    CAPILLARY BLOOD GLUCOSE  POCT Blood Glucose.: 158 mg/dL (24 Sep 2018 05:05)      PHYSICAL EXAM:    General: NAD, intubated  HEENT: NC/AT; PERRL  Respiratory: CTA b/l  Cardiovascular: regular rate, systolic murmur  Abdomen: distended, soft, nontender; +BS  Extremities: 2+ peripheral pulses b/l; no LE edema  Skin: normal color and turgor; warm and pink  Neurological: awake and follows commands    MEDICATIONS:  MEDICATIONS  (STANDING):  ALBUTerol/ipratropium for Nebulization 3 milliLiter(s) Nebulizer every 6 hours  aspirin  chewable 81 milliGRAM(s) Oral daily  aztreonam  IVPB 1000 milliGRAM(s) IV Intermittent every 8 hours  bisacodyl Suppository 10 milliGRAM(s) Rectal at bedtime  dexmedetomidine Infusion 0.2 MICROgram(s)/kG/Hr (3.145 mL/Hr) IV Continuous <Continuous>  heparin  Infusion. 900 Unit(s)/Hr (9 mL/Hr) IV Continuous <Continuous>  hydrALAZINE 25 milliGRAM(s) Oral every 8 hours  insulin lispro (HumaLOG) corrective regimen sliding scale   SubCutaneous every 6 hours  insulin NPH human recombinant 12 Unit(s) SubCutaneous every 6 hours  methylPREDNISolone sodium succinate Injectable 40 milliGRAM(s) IV Push every 12 hours  pantoprazole  Injectable 40 milliGRAM(s) IV Push daily  polyethylene glycol 3350 17 Gram(s) Oral two times a day  propofol Infusion 5 MICROgram(s)/kG/Min (1.887 mL/Hr) IV Continuous <Continuous>  vancomycin  IVPB 1000 milliGRAM(s) IV Intermittent every 24 hours    ALLERGIES:  Allergies  penicillin (Rash)    LABS:                   8.2    12.0  )-----------( 154      ( 24 Sep 2018 03:04 )             24.8     CBC Full  -  ( 24 Sep 2018 03:04 )  Mean Cell Volume : 88.7 fl  Mean Cell Hemoglobin : 29.4 pg  Mean Cell Hemoglobin Concentration : 33.1 gm/dL    09-24    146<H>  |  112<H>  |  56<H>  ----------------------------<  193<H>  4.4   |  24  |  0.80    Ca    9.2      24 Sep 2018 03:04  Phos  3.2     09-24  Mg     2.2     09-24    TPro  5.7<L>  /  Alb  2.6<L>  /  TBili  0.2  /  DBili  x   /  AST  16  /  ALT  34  /  AlkPhos  75  09-24    Creatinine Trend: 0.80<--, 1.00<--, 1.34<--, 1.11<--, 1.17<--, 1.19<--  LIVER FUNCTIONS - ( 24 Sep 2018 03:04 )  Alb: 2.6 g/dL / Pro: 5.7 g/dL / ALK PHOS: 75 U/L / ALT: 34 U/L / AST: 16 U/L / GGT: x           PT/INR - ( 24 Sep 2018 03:04 )   PT: 11.8 sec;   INR: 1.09 ratio      PTT - ( 24 Sep 2018 03:04 )  PTT:69.3 sec    ABG - ( 24 Sep 2018 03:07 )  pH, Arterial: 7.40  pH, Blood: x     /  pCO2: 44    /  pO2: 104   / HCO3: 27    / Base Excess: 2.2   /  SaO2: 98        MICROBIOLOGY:    Culture - Bronchial (collected 21 Sep 2018 21:22)  Source: Bronch Wash Combicath  Gram Stain (21 Sep 2018 23:53):    No squamous epithelial cells per low power field    Few polymorphonuclear leukocytes per low power field    No organisms seen per oil power field  Final Report (23 Sep 2018 18:42):    Normal Respiratory María present    Culture - Blood (collected 21 Sep 2018 20:46)  Source: .Blood Blood-Peripheral  Preliminary Report (22 Sep 2018 21:00):    No growth to date.    Culture - Blood (collected 21 Sep 2018 20:46)  Source: .Blood Blood  Preliminary Report (22 Sep 2018 21:00):    No growth to date.    RADIOLOGY & ADDITIONAL TESTS: Reviewed.

## 2018-09-25 LAB
ALBUMIN SERPL ELPH-MCNC: 2.9 G/DL — LOW (ref 3.3–5)
ALP SERPL-CCNC: 70 U/L — SIGNIFICANT CHANGE UP (ref 40–120)
ALT FLD-CCNC: 31 U/L — SIGNIFICANT CHANGE UP (ref 10–45)
ANION GAP SERPL CALC-SCNC: 6 MMOL/L — SIGNIFICANT CHANGE UP (ref 5–17)
APTT BLD: 68.5 SEC — HIGH (ref 27.5–37.4)
AST SERPL-CCNC: 16 U/L — SIGNIFICANT CHANGE UP (ref 10–40)
BILIRUB SERPL-MCNC: 0.3 MG/DL — SIGNIFICANT CHANGE UP (ref 0.2–1.2)
BUN SERPL-MCNC: 56 MG/DL — HIGH (ref 7–23)
CALCIUM SERPL-MCNC: 9.4 MG/DL — SIGNIFICANT CHANGE UP (ref 8.4–10.5)
CHLORIDE SERPL-SCNC: 109 MMOL/L — HIGH (ref 96–108)
CO2 SERPL-SCNC: 27 MMOL/L — SIGNIFICANT CHANGE UP (ref 22–31)
CREAT SERPL-MCNC: 0.86 MG/DL — SIGNIFICANT CHANGE UP (ref 0.5–1.3)
GLUCOSE BLDC GLUCOMTR-MCNC: 131 MG/DL — HIGH (ref 70–99)
GLUCOSE BLDC GLUCOMTR-MCNC: 165 MG/DL — HIGH (ref 70–99)
GLUCOSE BLDC GLUCOMTR-MCNC: 245 MG/DL — HIGH (ref 70–99)
GLUCOSE BLDC GLUCOMTR-MCNC: 80 MG/DL — SIGNIFICANT CHANGE UP (ref 70–99)
GLUCOSE SERPL-MCNC: 199 MG/DL — HIGH (ref 70–99)
HCT VFR BLD CALC: 26.1 % — LOW (ref 34.5–45)
HGB BLD-MCNC: 8.2 G/DL — LOW (ref 11.5–15.5)
INR BLD: 1.05 RATIO — SIGNIFICANT CHANGE UP (ref 0.88–1.16)
MAGNESIUM SERPL-MCNC: 2.2 MG/DL — SIGNIFICANT CHANGE UP (ref 1.6–2.6)
MCHC RBC-ENTMCNC: 27.8 PG — SIGNIFICANT CHANGE UP (ref 27–34)
MCHC RBC-ENTMCNC: 31.2 GM/DL — LOW (ref 32–36)
MCV RBC AUTO: 89 FL — SIGNIFICANT CHANGE UP (ref 80–100)
PHOSPHATE SERPL-MCNC: 4.2 MG/DL — SIGNIFICANT CHANGE UP (ref 2.5–4.5)
PLATELET # BLD AUTO: 145 K/UL — LOW (ref 150–400)
POTASSIUM SERPL-MCNC: 5 MMOL/L — SIGNIFICANT CHANGE UP (ref 3.5–5.3)
POTASSIUM SERPL-SCNC: 5 MMOL/L — SIGNIFICANT CHANGE UP (ref 3.5–5.3)
PROT SERPL-MCNC: 5.8 G/DL — LOW (ref 6–8.3)
PROTHROM AB SERPL-ACNC: 11.5 SEC — SIGNIFICANT CHANGE UP (ref 9.8–12.7)
RBC # BLD: 2.93 M/UL — LOW (ref 3.8–5.2)
RBC # FLD: 16.5 % — HIGH (ref 10.3–14.5)
SODIUM SERPL-SCNC: 142 MMOL/L — SIGNIFICANT CHANGE UP (ref 135–145)
WBC # BLD: 15.3 K/UL — HIGH (ref 3.8–10.5)
WBC # FLD AUTO: 15.3 K/UL — HIGH (ref 3.8–10.5)

## 2018-09-25 PROCEDURE — 99291 CRITICAL CARE FIRST HOUR: CPT

## 2018-09-25 RX ORDER — ACETAMINOPHEN 500 MG
1000 TABLET ORAL ONCE
Qty: 0 | Refills: 0 | Status: COMPLETED | OUTPATIENT
Start: 2018-09-25 | End: 2018-09-25

## 2018-09-25 RX ORDER — ACETAMINOPHEN 500 MG
650 TABLET ORAL ONCE
Qty: 0 | Refills: 0 | Status: COMPLETED | OUTPATIENT
Start: 2018-09-25 | End: 2018-09-25

## 2018-09-25 RX ORDER — FUROSEMIDE 40 MG
20 TABLET ORAL ONCE
Qty: 0 | Refills: 0 | Status: COMPLETED | OUTPATIENT
Start: 2018-09-25 | End: 2018-09-25

## 2018-09-25 RX ORDER — LATANOPROST 0.05 MG/ML
1 SOLUTION/ DROPS OPHTHALMIC; TOPICAL AT BEDTIME
Qty: 0 | Refills: 0 | Status: DISCONTINUED | OUTPATIENT
Start: 2018-09-25 | End: 2018-10-03

## 2018-09-25 RX ADMIN — DEXMEDETOMIDINE HYDROCHLORIDE IN 0.9% SODIUM CHLORIDE 3.15 MICROGRAM(S)/KG/HR: 4 INJECTION INTRAVENOUS at 11:55

## 2018-09-25 RX ADMIN — Medication 25 MILLIGRAM(S): at 13:20

## 2018-09-25 RX ADMIN — HUMAN INSULIN 12 UNIT(S): 100 INJECTION, SUSPENSION SUBCUTANEOUS at 05:23

## 2018-09-25 RX ADMIN — LATANOPROST 1 DROP(S): 0.05 SOLUTION/ DROPS OPHTHALMIC; TOPICAL at 21:46

## 2018-09-25 RX ADMIN — Medication 81 MILLIGRAM(S): at 11:54

## 2018-09-25 RX ADMIN — CEFEPIME 100 MILLIGRAM(S): 1 INJECTION, POWDER, FOR SOLUTION INTRAMUSCULAR; INTRAVENOUS at 13:20

## 2018-09-25 RX ADMIN — DEXMEDETOMIDINE HYDROCHLORIDE IN 0.9% SODIUM CHLORIDE 3.15 MICROGRAM(S)/KG/HR: 4 INJECTION INTRAVENOUS at 23:40

## 2018-09-25 RX ADMIN — Medication 4: at 11:57

## 2018-09-25 RX ADMIN — Medication 3 MILLILITER(S): at 11:20

## 2018-09-25 RX ADMIN — DEXMEDETOMIDINE HYDROCHLORIDE IN 0.9% SODIUM CHLORIDE 3.15 MICROGRAM(S)/KG/HR: 4 INJECTION INTRAVENOUS at 09:58

## 2018-09-25 RX ADMIN — HUMAN INSULIN 12 UNIT(S): 100 INJECTION, SUSPENSION SUBCUTANEOUS at 01:38

## 2018-09-25 RX ADMIN — CEFEPIME 100 MILLIGRAM(S): 1 INJECTION, POWDER, FOR SOLUTION INTRAMUSCULAR; INTRAVENOUS at 05:23

## 2018-09-25 RX ADMIN — Medication 10 MILLIGRAM(S): at 21:42

## 2018-09-25 RX ADMIN — Medication 3 MILLILITER(S): at 18:11

## 2018-09-25 RX ADMIN — Medication 2: at 23:44

## 2018-09-25 RX ADMIN — Medication 25 MILLIGRAM(S): at 05:23

## 2018-09-25 RX ADMIN — CEFEPIME 100 MILLIGRAM(S): 1 INJECTION, POWDER, FOR SOLUTION INTRAMUSCULAR; INTRAVENOUS at 21:41

## 2018-09-25 RX ADMIN — PANTOPRAZOLE SODIUM 40 MILLIGRAM(S): 20 TABLET, DELAYED RELEASE ORAL at 11:53

## 2018-09-25 RX ADMIN — Medication 3 MILLILITER(S): at 23:41

## 2018-09-25 RX ADMIN — Medication 40 MILLIGRAM(S): at 17:44

## 2018-09-25 RX ADMIN — DEXMEDETOMIDINE HYDROCHLORIDE IN 0.9% SODIUM CHLORIDE 3.15 MICROGRAM(S)/KG/HR: 4 INJECTION INTRAVENOUS at 21:45

## 2018-09-25 RX ADMIN — Medication 25 MILLIGRAM(S): at 21:41

## 2018-09-25 RX ADMIN — Medication 2: at 01:38

## 2018-09-25 RX ADMIN — Medication 400 MILLIGRAM(S): at 23:39

## 2018-09-25 RX ADMIN — Medication 20 MILLIGRAM(S): at 09:59

## 2018-09-25 RX ADMIN — Medication 650 MILLIGRAM(S): at 12:00

## 2018-09-25 RX ADMIN — Medication 40 MILLIGRAM(S): at 05:23

## 2018-09-25 RX ADMIN — HUMAN INSULIN 12 UNIT(S): 100 INJECTION, SUSPENSION SUBCUTANEOUS at 23:45

## 2018-09-25 RX ADMIN — HEPARIN SODIUM 900 UNIT(S)/HR: 5000 INJECTION INTRAVENOUS; SUBCUTANEOUS at 01:43

## 2018-09-25 RX ADMIN — Medication 3 MILLILITER(S): at 05:01

## 2018-09-25 RX ADMIN — Medication 650 MILLIGRAM(S): at 12:30

## 2018-09-25 NOTE — PROGRESS NOTE ADULT - ATTENDING COMMENTS
1. Acute hypoxemic respiratory failure due to  H. influenza  pneumonia. Pt weaning well  with fewer secretions.  Trial of extubation.  Continue   cefepime.  2. Hypernatremia corrected. Will give lasix  x1 prior to extubation.      cc time 35 min

## 2018-09-25 NOTE — PROGRESS NOTE ADULT - SUBJECTIVE AND OBJECTIVE BOX
INTERVAL HPI/OVERNIGHT EVENTS:    SUBJECTIVE: Patient seen and examined at bedside.     OBJECTIVE:    VITAL SIGNS:  ICU Vital Signs Last 24 Hrs  T(C): 37.2 (25 Sep 2018 04:00), Max: 37.2 (25 Sep 2018 04:00)  T(F): 99 (25 Sep 2018 04:00), Max: 99 (25 Sep 2018 04:00)  HR: 82 (25 Sep 2018 06:00) (59 - 82)  BP: 106/52 (25 Sep 2018 06:00) (106/52 - 149/66)  BP(mean): 74 (25 Sep 2018 06:00) (74 - 95)  RR: 25 (25 Sep 2018 06:00) (16 - 29)  SpO2: 98% (25 Sep 2018 06:00) (94% - 100%)    Mode: CPAP with PS, FiO2: 30, PEEP: 5, PS: 5, MAP: 7, PIP: 11    09-24 @ 07:01  -  09-25 @ 07:00  --------------------------------------------------------  IN: 2820.8 mL / OUT: 595 mL / NET: 2225.8 mL    CAPILLARY BLOOD GLUCOSE  POCT Blood Glucose.: 131 mg/dL (25 Sep 2018 05:21)      PHYSICAL EXAM:  General: NAD  HEENT: NC/AT; PERRL, clear conjunctiva  Respiratory: CTA b/l  Cardiovascular: +S1/S2; RRR  Abdomen: soft, NT/ND; +BS x4  Extremities: WWP, 2+ peripheral pulses b/l; no LE edema  Skin: normal color and turgor; no rash  Neurological:    LINES:     MEDICATIONS  (STANDING):  ALBUTerol/ipratropium for Nebulization 3 milliLiter(s) Nebulizer every 6 hours  aspirin  chewable 81 milliGRAM(s) Oral daily  bisacodyl Suppository 10 milliGRAM(s) Rectal at bedtime  cefepime   IVPB      cefepime   IVPB 2000 milliGRAM(s) IV Intermittent every 8 hours  dexmedetomidine Infusion 0.2 MICROgram(s)/kG/Hr (3.145 mL/Hr) IV Continuous <Continuous>  dextrose 50% Injectable 12.5 Gram(s) IV Push once  dextrose 50% Injectable 25 Gram(s) IV Push once  dextrose 50% Injectable 25 Gram(s) IV Push once  heparin  Infusion. 900 Unit(s)/Hr (9 mL/Hr) IV Continuous <Continuous>  hydrALAZINE 25 milliGRAM(s) Oral every 8 hours  insulin lispro (HumaLOG) corrective regimen sliding scale   SubCutaneous every 6 hours  insulin NPH human recombinant 12 Unit(s) SubCutaneous every 6 hours  methylPREDNISolone sodium succinate Injectable 40 milliGRAM(s) IV Push every 12 hours  pantoprazole  Injectable 40 milliGRAM(s) IV Push daily  polyethylene glycol 3350 17 Gram(s) Oral two times a day  propofol Infusion 5 MICROgram(s)/kG/Min (1.887 mL/Hr) IV Continuous <Continuous>    ALLERGIES:  penicillin (Rash)    LABS:                        8.2    15.3  )-----------( 145      ( 25 Sep 2018 00:33 )             26.1     Mean Cell Volume : 89.0 fl  Mean Cell Hemoglobin : 27.8 pg  Mean Cell Hemoglobin Concentration : 31.2 gm/dL    09-25    142  |  109<H>  |  56<H>  ----------------------------<  199<H>  5.0   |  27  |  0.86    Ca    9.4      25 Sep 2018 00:33  Phos  4.2     09-25  Mg     2.2     09-25    TPro  5.8<L>  /  Alb  2.9<L>  /  TBili  0.3  /  DBili  x   /  AST  16  /  ALT  31  /  AlkPhos  70  09-25    Creatinine Trend: 0.86<--, 0.80<--, 1.00<--, 1.34<--, 1.11<--, 1.17<--  LIVER FUNCTIONS - ( 25 Sep 2018 00:33 )  Alb: 2.9 g/dL / Pro: 5.8 g/dL / ALK PHOS: 70 U/L / ALT: 31 U/L / AST: 16 U/L / GGT: x           PT/INR - ( 25 Sep 2018 00:33 )   PT: 11.5 sec;   INR: 1.05 ratio       PTT - ( 25 Sep 2018 00:33 )  PTT:68.5 sec    ABG - ( 24 Sep 2018 03:07 )  pH, Arterial: 7.40  pH, Blood: x     /  pCO2: 44    /  pO2: 104   / HCO3: 27    / Base Excess: 2.2   /  SaO2: 98          RADIOLOGY & ADDITIONAL TESTS: Reviewed. INTERVAL HPI/OVERNIGHT EVENTS:    SUBJECTIVE: Patient seen and examined at bedside.     OBJECTIVE:    VITAL SIGNS:  ICU Vital Signs Last 24 Hrs  T(C): 37.2 (25 Sep 2018 04:00), Max: 37.2 (25 Sep 2018 04:00)  T(F): 99 (25 Sep 2018 04:00), Max: 99 (25 Sep 2018 04:00)  HR: 82 (25 Sep 2018 06:00) (59 - 82)  BP: 106/52 (25 Sep 2018 06:00) (106/52 - 149/66)  BP(mean): 74 (25 Sep 2018 06:00) (74 - 95)  RR: 25 (25 Sep 2018 06:00) (16 - 29)  SpO2: 98% (25 Sep 2018 06:00) (94% - 100%)    Mode: CPAP with PS, FiO2: 30, PEEP: 5, PS: 5, MAP: 7, PIP: 11    09-24 @ 07:01  -  09-25 @ 07:00  --------------------------------------------------------  IN: 2820.8 mL / OUT: 595 mL / NET: 2225.8 mL    CAPILLARY BLOOD GLUCOSE  POCT Blood Glucose.: 131 mg/dL (25 Sep 2018 05:21)      PHYSICAL EXAM:  General: NAD  HEENT: NC/AT; PERRL, clear conjunctiva  Respiratory: CTA b/l  Cardiovascular: +S1/S2; RRR  Abdomen: soft, NT/ND; +BS x4  Extremities: WWP, 2+ peripheral pulses b/l; no LE edema  Skin: normal color and turgor; no rash  Neurological:    LINES:     MEDICATIONS  (STANDING):  ALBUTerol/ipratropium for Nebulization 3 milliLiter(s) Nebulizer every 6 hours  bisacodyl Suppository 10 milliGRAM(s) Rectal at bedtime  pantoprazole  Injectable 40 milliGRAM(s) IV Push daily  polyethylene glycol 3350 17 Gram(s) Oral two times a day  aspirin  chewable 81 milliGRAM(s) Oral daily  heparin  Infusion. 900 Unit(s)/Hr (9 mL/Hr) IV Continuous <Continuous>  dexmedetomidine Infusion 0.2 MICROgram(s)/kG/Hr (3.145 mL/Hr) IV Continuous <Continuous>  propofol Infusion 5 MICROgram(s)/kG/Min (1.887 mL/Hr) IV Continuous <Continuous>  hydrALAZINE 25 milliGRAM(s) Oral every 8 hours  insulin lispro (HumaLOG) corrective regimen sliding scale   SubCutaneous every 6 hours  insulin NPH human recombinant 12 Unit(s) SubCutaneous every 6 hours  methylPREDNISolone sodium succinate Injectable 40 milliGRAM(s) IV Push every 12 hours  cefepime   IVPB 2000 milliGRAM(s) IV Intermittent every 8 hours    ALLERGIES:  penicillin (Rash)    LABS:                        8.2    15.3  )-----------( 145      ( 25 Sep 2018 00:33 )             26.1     Mean Cell Volume : 89.0 fl  Mean Cell Hemoglobin : 27.8 pg  Mean Cell Hemoglobin Concentration : 31.2 gm/dL    09-25    142  |  109<H>  |  56<H>  ----------------------------<  199<H>  5.0   |  27  |  0.86    Ca    9.4      25 Sep 2018 00:33  Phos  4.2     09-25  Mg     2.2     09-25    TPro  5.8<L>  /  Alb  2.9<L>  /  TBili  0.3  /  DBili  x   /  AST  16  /  ALT  31  /  AlkPhos  70  09-25    Creatinine Trend: 0.86<--, 0.80<--, 1.00<--, 1.34<--, 1.11<--, 1.17<--  LIVER FUNCTIONS - ( 25 Sep 2018 00:33 )  Alb: 2.9 g/dL / Pro: 5.8 g/dL / ALK PHOS: 70 U/L / ALT: 31 U/L / AST: 16 U/L / GGT: x           PT/INR - ( 25 Sep 2018 00:33 )   PT: 11.5 sec;   INR: 1.05 ratio       PTT - ( 25 Sep 2018 00:33 )  PTT:68.5 sec    ABG - ( 24 Sep 2018 03:07 )  pH, Arterial: 7.40  pH, Blood: x     /  pCO2: 44    /  pO2: 104   / HCO3: 27    / Base Excess: 2.2   /  SaO2: 98          RADIOLOGY & ADDITIONAL TESTS: Reviewed. INTERVAL HPI/OVERNIGHT EVENTS: pt complained of pain and was agitated and required 12.5mcg of fentanyl overnight.     SUBJECTIVE: Patient seen and examined at bedside.     OBJECTIVE:    VITAL SIGNS:  ICU Vital Signs Last 24 Hrs  T(C): 37.2 (25 Sep 2018 04:00), Max: 37.2 (25 Sep 2018 04:00)  T(F): 99 (25 Sep 2018 04:00), Max: 99 (25 Sep 2018 04:00)  HR: 82 (25 Sep 2018 06:00) (59 - 82)  BP: 106/52 (25 Sep 2018 06:00) (106/52 - 149/66)  BP(mean): 74 (25 Sep 2018 06:00) (74 - 95)  RR: 25 (25 Sep 2018 06:00) (16 - 29)  SpO2: 98% (25 Sep 2018 06:00) (94% - 100%)    Mode: CPAP with PS, FiO2: 30, PEEP: 5, PS: 5, MAP: 7, PIP: 11    09-24 @ 07:01  -  09-25 @ 07:00  --------------------------------------------------------  IN: 2820.8 mL / OUT: 595 mL / NET: 2225.8 mL    CAPILLARY BLOOD GLUCOSE  POCT Blood Glucose.: 131 mg/dL (25 Sep 2018 05:21)      PHYSICAL EXAM:  General: NAD, intubated  HEENT: NC/AT; PERRL, oval right pupil  Respiratory: coarse ventilatory breath sounds  Cardiovascular: distant heart sounds, regular rate  Abdomen: soft, mildly distended NT/ND  Extremities: 2+ peripheral pulses b/l; no LE edema  Skin: normal color, warm; no rash  Neurological: sedated but arousable    LINES: right IJ, 2PIV in right arm, 1 PIV in left arm    MEDICATIONS  (STANDING):  ALBUTerol/ipratropium for Nebulization 3 milliLiter(s) Nebulizer every 6 hours  bisacodyl Suppository 10 milliGRAM(s) Rectal at bedtime  pantoprazole  Injectable 40 milliGRAM(s) IV Push daily  polyethylene glycol 3350 17 Gram(s) Oral two times a day  aspirin  chewable 81 milliGRAM(s) Oral daily  heparin  Infusion. 900 Unit(s)/Hr (9 mL/Hr) IV Continuous <Continuous>  dexmedetomidine Infusion 0.2 MICROgram(s)/kG/Hr (3.145 mL/Hr) IV Continuous <Continuous>  propofol Infusion 5 MICROgram(s)/kG/Min (1.887 mL/Hr) IV Continuous <Continuous>  hydrALAZINE 25 milliGRAM(s) Oral every 8 hours  insulin lispro (HumaLOG) corrective regimen sliding scale   SubCutaneous every 6 hours  insulin NPH human recombinant 12 Unit(s) SubCutaneous every 6 hours  methylPREDNISolone sodium succinate Injectable 40 milliGRAM(s) IV Push every 12 hours  cefepime   IVPB 2000 milliGRAM(s) IV Intermittent every 8 hours    ALLERGIES:  penicillin (Rash)    LABS:                        8.2    15.3  )-----------( 145      ( 25 Sep 2018 00:33 )             26.1     Mean Cell Volume : 89.0 fl  Mean Cell Hemoglobin : 27.8 pg  Mean Cell Hemoglobin Concentration : 31.2 gm/dL    09-25    142  |  109<H>  |  56<H>  ----------------------------<  199<H>  5.0   |  27  |  0.86    Ca    9.4      25 Sep 2018 00:33  Phos  4.2     09-25  Mg     2.2     09-25    TPro  5.8<L>  /  Alb  2.9<L>  /  TBili  0.3  /  DBili  x   /  AST  16  /  ALT  31  /  AlkPhos  70  09-25    Creatinine Trend: 0.86<--, 0.80<--, 1.00<--, 1.34<--, 1.11<--, 1.17<--  LIVER FUNCTIONS - ( 25 Sep 2018 00:33 )  Alb: 2.9 g/dL / Pro: 5.8 g/dL / ALK PHOS: 70 U/L / ALT: 31 U/L / AST: 16 U/L / GGT: x           PT/INR - ( 25 Sep 2018 00:33 )   PT: 11.5 sec;   INR: 1.05 ratio       PTT - ( 25 Sep 2018 00:33 )  PTT:68.5 sec    ABG - ( 24 Sep 2018 03:07 )  pH, Arterial: 7.40  pH, Blood: x     /  pCO2: 44    /  pO2: 104   / HCO3: 27    / Base Excess: 2.2   /  SaO2: 98        RADIOLOGY & ADDITIONAL TESTS: Reviewed. INTERVAL HPI/OVERNIGHT EVENTS: pt complained of pain and was agitated and required 12.5mcg of fentanyl overnight.     SUBJECTIVE: Patient seen and examined at bedside.     OBJECTIVE:    VITAL SIGNS:  ICU Vital Signs Last 24 Hrs  T(C): 37.2 (25 Sep 2018 04:00), Max: 37.2 (25 Sep 2018 04:00)  T(F): 99 (25 Sep 2018 04:00), Max: 99 (25 Sep 2018 04:00)  HR: 82 (25 Sep 2018 06:00) (59 - 82)  BP: 106/52 (25 Sep 2018 06:00) (106/52 - 149/66)  BP(mean): 74 (25 Sep 2018 06:00) (74 - 95)  RR: 25 (25 Sep 2018 06:00) (16 - 29)  SpO2: 98% (25 Sep 2018 06:00) (94% - 100%)    Mode: CPAP with PS, FiO2: 30, PEEP: 5, PS: 5, MAP: 7, PIP: 11    09-24 @ 07:01  -  09-25 @ 07:00  --------------------------------------------------------  IN: 2820.8 mL / OUT: 595 mL / NET: 2225.8 mL    CAPILLARY BLOOD GLUCOSE  POCT Blood Glucose.: 131 mg/dL (25 Sep 2018 05:21)      PHYSICAL EXAM:  General: NAD, intubated  HEENT: NC/AT; PERRL, oval right pupil  Respiratory: coarse ventilatory breath sounds  Cardiovascular: distant heart sounds, regular rate  Abdomen: soft, mildly distended NT/ND  Extremities: 2+ peripheral pulses b/l; no LE edema  Skin: normal color, warm; no rash  Neurological: sedated but arousable    LINES: right IJ, 2PIV in right arm, 1 PIV in left arm    MEDICATIONS  (STANDING):  ALBUTerol/ipratropium for Nebulization 3 milliLiter(s) Nebulizer every 6 hours  bisacodyl Suppository 10 milliGRAM(s) Rectal at bedtime  pantoprazole  Injectable 40 milliGRAM(s) IV Push daily  polyethylene glycol 3350 17 Gram(s) Oral two times a day  aspirin  chewable 81 milliGRAM(s) Oral daily  heparin  Infusion. 900 Unit(s)/Hr (9 mL/Hr) IV Continuous <Continuous>  dexmedetomidine Infusion 0.2 MICROgram(s)/kG/Hr (3.145 mL/Hr) IV Continuous <Continuous>  propofol Infusion 5 MICROgram(s)/kG/Min (1.887 mL/Hr) IV Continuous <Continuous>  hydrALAZINE 25 milliGRAM(s) Oral every 8 hours  insulin lispro (HumaLOG) corrective regimen sliding scale   SubCutaneous every 6 hours  insulin NPH human recombinant 12 Unit(s) SubCutaneous every 6 hours  methylPREDNISolone sodium succinate Injectable 40 milliGRAM(s) IV Push every 12 hours  cefepime   IVPB 2000 milliGRAM(s) IV Intermittent every 8 hours    ALLERGIES:  penicillin (Rash)    LABS:                        8.2    15.3  )-----------( 145      ( 25 Sep 2018 00:33 )             26.1     Mean Cell Volume : 89.0 fl  Mean Cell Hemoglobin : 27.8 pg  Mean Cell Hemoglobin Concentration : 31.2 gm/dL    09-25    142  |  109<H>  |  56<H>  ----------------------------<  199<H>  5.0   |  27  |  0.86    Ca    9.4      25 Sep 2018 00:33  Phos  4.2     09-25  Mg     2.2     09-25    TPro  5.8<L>  /  Alb  2.9<L>  /  TBili  0.3  /  DBili  x   /  AST  16  /  ALT  31  /  AlkPhos  70  09-25    Creatinine Trend: 0.86<--, 0.80<--, 1.00<--, 1.34<--, 1.11<--, 1.17<--  LIVER FUNCTIONS - ( 25 Sep 2018 00:33 )  Alb: 2.9 g/dL / Pro: 5.8 g/dL / ALK PHOS: 70 U/L / ALT: 31 U/L / AST: 16 U/L / GGT: x           PT/INR - ( 25 Sep 2018 00:33 )   PT: 11.5 sec;   INR: 1.05 ratio       PTT - ( 25 Sep 2018 00:33 )  PTT:68.5 sec    ABG - ( 24 Sep 2018 03:07 )  pH, Arterial: 7.40  pH, Blood: x     /  pCO2: 44    /  pO2: 104   / HCO3: 27    / Base Excess: 2.2   /  SaO2: 98        Culture - Blood (09.14.18 @ 09:13)    Gram Stain:   Growth in anaerobic bottle: Gram Negative Coccobacilli    Specimen Source: .Blood Blood-Peripheral    Culture Results:   Growth in anaerobic bottle: Haemophilus influenzae  See previous culture [id number]    Rapid Respiratory Viral Panel (09.14.18 @ 06:26)    Rapid RVP Result: Detected: The [employer] RVP Rapid uses polymerase chain reaction (PCR) and melt  curve analysis to screen for adenovirus; coronavirus HKU1, NL63, 229E,  OC43; human metapneumovirus (hMPV); human enterovirus/rhinovirus  (Entero/RV); influenza A; influenza A/H1;influenza A/H3; influenza  A/H1-2009; influenza B; parainfluenza viruses 1, 2, 3, 4; respiratory  syncytial virus; Bordetella pertussis; Mycoplasma pneumoniae; and  Chlamydophila pneumoniae.    Culture - Bronchial (09.21.18 @ 21:22)    Gram Stain:   No squamous epithelial cells per low power field  Few polymorphonuclear leukocytes per low power field  No organisms seen per oil power field    Specimen Source: Bronch Wash Combicath    Culture Results:   Normal Respiratory María present      RADIOLOGY & ADDITIONAL TESTS: Reviewed.

## 2018-09-25 NOTE — PROGRESS NOTE ADULT - ASSESSMENT
85 y/o F w/ a PMH significant for COPD, JENNIE on BiPAP, multivalvular disease (severe AS, s/p MV replacement), HFpEF/severe diastolic failure, A-fib on coumadin, sick sinus syndrome s/p pacemaker placement, HTN, and CKD3 who admitted for acute respiratory failure requiring intubation suspected to be 2/2 COPD exacerbation c/b PNA w/ +entero/rhinovirus and GN coccobacillus.    #Neuro  - Sedated on precedex and propofol    #CV  -admitted w/ sBP 200, since requiring pressure support 2/2 sepsis, pt placed on hydralazine, required cardene drip  -pt currently does not require norepi  -troponins downtrended; likely demand 2/2 HTN urgency              -will stop trending cardiac enzymes (CK/CKMB negative x3)              -s/p ASA and plavix load. plavix d/c'ed by cards and on hep ggt              -low threshold to stop if any sx bleeding.  -1 episode lai to 30s              -EP consulted for interrogation of pacemaker and cleared  -HFpEF (40-50%) w/ severe diastolic failure  -multivalvular disease: MV replacement, severe AS  -per cards, no diuresis w/ lasix in setting of shock  -c/w heparin full anticoagulation for the MV  -c/w hydralazine    #Resp  - Intubated on 9/14; extubated on 9/17. Re-intubated on 9/19 for increased secretions and WOB  - CXR w/ pulmonary edema vs overlying PNA  - Enterovirus positive, now w/ GN coccobacillus positive, possible bacteremia from overlying PNA  - + H. flu PNA  - C/w Solumedrol 40mg IV BID  - will consider extubation tomorrow if pt's secretions decrease    #GI  -no acute pathology  -c/w tube feeds  -c/w bowel regimen  -c/w PPI ppx    #Renal  -LATANYA, likely 2/2 sepsis + HTN urgency; resolved with Cr at baseline  -pt has hypernatremia and currently on free water 250mL Q6  -will d/c tejada catheter    #ID  -+entero/rhinovirus  -legionella negative  -GN coccobacillus (H. influenzae) on 9/14 blood culture  - UClx (9/14): NGTD  - Sputum Clx (9/14): No organisms  - admitted w/ 103 fever, now afebrile.   - d/c vancomycin and aztreonam and start cefepime  - f/u blood cultures from 9/21    #Heme:  -DIC panel negative  -LFTs no longer mildly elevated  - Hb drop from 10 to 8 (now stable); CT A/P negative for acute bleeding  - c/w heparin gtt; holding home warfarin    #Endo:  - C/w NPH (dose increased)    #GOC: will discuss with family prior to extubation 83 y/o F w/ a PMH significant for COPD, JENNIE on BiPAP, multivalvular disease (severe AS, s/p MV replacement), HFpEF/severe diastolic failure, A-fib on coumadin, sick sinus syndrome s/p pacemaker placement, HTN, and CKD3 who admitted for acute respiratory failure requiring intubation suspected to be 2/2 COPD exacerbation c/b PNA w/ +entero/rhinovirus and GN coccobacillus.    #Neuro  - Sedated on precedex and propofol    #CV  -admitted w/ sBP 200, since requiring pressure support 2/2 sepsis, pt placed on hydralazine, required cardene drip  -pt currently does not require norepi  -1 episode lai to 30s              -EP consulted for interrogation of pacemaker and cleared  -troponins downtrended; likely demand 2/2 HTN urgency              -will stop trending cardiac enzymes (CK/CKMB negative x3)              -s/p ASA and plavix load. plavix d/c'ed by cards and on hep ggt              -low threshold to stop if any sx bleeding.  -HFpEF (40-50%) w/ severe diastolic failure  -multivalvular disease: MV replacement, severe AS  -c/w heparin full anticoagulation for the MV and ASA 81mg daily  -c/w hydralazine  -will diurese with lasix 20mg prior to extubation    #Resp  - Intubated on 9/14; extubated on 9/17. Re-intubated on 9/19 for increased secretions and WOB  - CXR w/ pulmonary edema vs overlying PNA  - Enterovirus positive, now w/ GN coccobacillus positive, possible bacteremia from overlying PNA  - + H. flu PNA  - c/w cefepime  - C/w Solumedrol 40mg IV BID  - will extubate today as secretions are improved and will watch for secretions and administer chest PT    #GI  -no acute pathology  -c/w tube feeds  -c/w bowel regimen  -c/w PPI ppx    #Renal  -LATANYA, likely 2/2 sepsis + HTN urgency; resolved with Cr at baseline  -pts hypernatremia is improved, will d/c free water  -will d/c tejada catheter    #ID  - +entero/rhinovirus  - GN coccobacillus (H. influenzae) on 9/14 blood culture  - UClx (9/14): NGTD  - Sputum Clx (9/14): No organisms  - legionella negative  - repeat blood cx from 9/17 and 9/21: NGTD  - c/w cefepime    #Heme:  -DIC panel negative  -LFTs no longer mildly elevated  - Hb drop from 10 to 8 (now stable); CT A/P negative for acute bleeding  - c/w heparin gtt; holding home warfarin    #Endo:  - C/w NPH (dose increased)    #Dispo  - will continue with PT

## 2018-09-26 LAB
ALBUMIN SERPL ELPH-MCNC: 2.8 G/DL — LOW (ref 3.3–5)
ALP SERPL-CCNC: 67 U/L — SIGNIFICANT CHANGE UP (ref 40–120)
ALT FLD-CCNC: 23 U/L — SIGNIFICANT CHANGE UP (ref 10–45)
ANION GAP SERPL CALC-SCNC: 6 MMOL/L — SIGNIFICANT CHANGE UP (ref 5–17)
APTT BLD: 56.4 SEC — HIGH (ref 27.5–37.4)
APTT BLD: 84 SEC — HIGH (ref 27.5–37.4)
AST SERPL-CCNC: 9 U/L — LOW (ref 10–40)
BILIRUB SERPL-MCNC: 0.4 MG/DL — SIGNIFICANT CHANGE UP (ref 0.2–1.2)
BUN SERPL-MCNC: 52 MG/DL — HIGH (ref 7–23)
CALCIUM SERPL-MCNC: 9.4 MG/DL — SIGNIFICANT CHANGE UP (ref 8.4–10.5)
CHLORIDE SERPL-SCNC: 112 MMOL/L — HIGH (ref 96–108)
CO2 SERPL-SCNC: 25 MMOL/L — SIGNIFICANT CHANGE UP (ref 22–31)
CREAT SERPL-MCNC: 0.89 MG/DL — SIGNIFICANT CHANGE UP (ref 0.5–1.3)
CULTURE RESULTS: SIGNIFICANT CHANGE UP
CULTURE RESULTS: SIGNIFICANT CHANGE UP
GLUCOSE BLDC GLUCOMTR-MCNC: 119 MG/DL — HIGH (ref 70–99)
GLUCOSE BLDC GLUCOMTR-MCNC: 124 MG/DL — HIGH (ref 70–99)
GLUCOSE BLDC GLUCOMTR-MCNC: 198 MG/DL — HIGH (ref 70–99)
GLUCOSE BLDC GLUCOMTR-MCNC: 84 MG/DL — SIGNIFICANT CHANGE UP (ref 70–99)
GLUCOSE SERPL-MCNC: 159 MG/DL — HIGH (ref 70–99)
HCT VFR BLD CALC: 26.8 % — LOW (ref 34.5–45)
HGB BLD-MCNC: 8.4 G/DL — LOW (ref 11.5–15.5)
INR BLD: 1.05 RATIO — SIGNIFICANT CHANGE UP (ref 0.88–1.16)
MAGNESIUM SERPL-MCNC: 2.2 MG/DL — SIGNIFICANT CHANGE UP (ref 1.6–2.6)
MCHC RBC-ENTMCNC: 28.3 PG — SIGNIFICANT CHANGE UP (ref 27–34)
MCHC RBC-ENTMCNC: 31.5 GM/DL — LOW (ref 32–36)
MCV RBC AUTO: 89.9 FL — SIGNIFICANT CHANGE UP (ref 80–100)
PHOSPHATE SERPL-MCNC: 3.6 MG/DL — SIGNIFICANT CHANGE UP (ref 2.5–4.5)
PLATELET # BLD AUTO: 179 K/UL — SIGNIFICANT CHANGE UP (ref 150–400)
POTASSIUM SERPL-MCNC: 4.6 MMOL/L — SIGNIFICANT CHANGE UP (ref 3.5–5.3)
POTASSIUM SERPL-SCNC: 4.6 MMOL/L — SIGNIFICANT CHANGE UP (ref 3.5–5.3)
PROT SERPL-MCNC: 5.7 G/DL — LOW (ref 6–8.3)
PROTHROM AB SERPL-ACNC: 11.3 SEC — SIGNIFICANT CHANGE UP (ref 9.8–12.7)
RBC # BLD: 2.98 M/UL — LOW (ref 3.8–5.2)
RBC # FLD: 17.1 % — HIGH (ref 10.3–14.5)
SODIUM SERPL-SCNC: 143 MMOL/L — SIGNIFICANT CHANGE UP (ref 135–145)
SPECIMEN SOURCE: SIGNIFICANT CHANGE UP
SPECIMEN SOURCE: SIGNIFICANT CHANGE UP
WBC # BLD: 20.1 K/UL — HIGH (ref 3.8–10.5)
WBC # FLD AUTO: 20.1 K/UL — HIGH (ref 3.8–10.5)

## 2018-09-26 PROCEDURE — 99233 SBSQ HOSP IP/OBS HIGH 50: CPT | Mod: GC

## 2018-09-26 RX ORDER — CLONAZEPAM 1 MG
0.5 TABLET ORAL
Qty: 0 | Refills: 0 | Status: DISCONTINUED | OUTPATIENT
Start: 2018-09-26 | End: 2018-09-27

## 2018-09-26 RX ORDER — HEPARIN SODIUM 5000 [USP'U]/ML
1000 INJECTION INTRAVENOUS; SUBCUTANEOUS
Qty: 25000 | Refills: 0 | Status: DISCONTINUED | OUTPATIENT
Start: 2018-09-26 | End: 2018-09-28

## 2018-09-26 RX ORDER — HEPARIN SODIUM 5000 [USP'U]/ML
1000 INJECTION INTRAVENOUS; SUBCUTANEOUS
Qty: 25000 | Refills: 0 | Status: DISCONTINUED | OUTPATIENT
Start: 2018-09-26 | End: 2018-09-26

## 2018-09-26 RX ORDER — HEPARIN SODIUM 5000 [USP'U]/ML
900 INJECTION INTRAVENOUS; SUBCUTANEOUS
Qty: 25000 | Refills: 0 | Status: DISCONTINUED | OUTPATIENT
Start: 2018-09-26 | End: 2018-09-26

## 2018-09-26 RX ORDER — HYDRALAZINE HCL 50 MG
5 TABLET ORAL ONCE
Qty: 0 | Refills: 0 | Status: DISCONTINUED | OUTPATIENT
Start: 2018-09-26 | End: 2018-09-26

## 2018-09-26 RX ORDER — HYDRALAZINE HCL 50 MG
10 TABLET ORAL ONCE
Qty: 0 | Refills: 0 | Status: COMPLETED | OUTPATIENT
Start: 2018-09-26 | End: 2018-09-26

## 2018-09-26 RX ADMIN — Medication 3 MILLILITER(S): at 17:17

## 2018-09-26 RX ADMIN — LATANOPROST 1 DROP(S): 0.05 SOLUTION/ DROPS OPHTHALMIC; TOPICAL at 22:47

## 2018-09-26 RX ADMIN — Medication 3 MILLILITER(S): at 05:52

## 2018-09-26 RX ADMIN — Medication 0.5 MILLIGRAM(S): at 09:41

## 2018-09-26 RX ADMIN — Medication 3 MILLILITER(S): at 13:07

## 2018-09-26 RX ADMIN — Medication 10 MILLIGRAM(S): at 13:14

## 2018-09-26 RX ADMIN — Medication 1000 MILLIGRAM(S): at 00:39

## 2018-09-26 RX ADMIN — Medication 25 MILLIGRAM(S): at 05:25

## 2018-09-26 RX ADMIN — CEFEPIME 100 MILLIGRAM(S): 1 INJECTION, POWDER, FOR SOLUTION INTRAMUSCULAR; INTRAVENOUS at 14:23

## 2018-09-26 RX ADMIN — Medication 40 MILLIGRAM(S): at 05:25

## 2018-09-26 RX ADMIN — Medication 0.5 MILLIGRAM(S): at 21:22

## 2018-09-26 RX ADMIN — Medication 2: at 23:59

## 2018-09-26 RX ADMIN — Medication 20 MILLIGRAM(S): at 21:22

## 2018-09-26 RX ADMIN — CEFEPIME 100 MILLIGRAM(S): 1 INJECTION, POWDER, FOR SOLUTION INTRAMUSCULAR; INTRAVENOUS at 21:22

## 2018-09-26 RX ADMIN — Medication 3 MILLILITER(S): at 23:58

## 2018-09-26 RX ADMIN — CEFEPIME 100 MILLIGRAM(S): 1 INJECTION, POWDER, FOR SOLUTION INTRAMUSCULAR; INTRAVENOUS at 05:25

## 2018-09-26 RX ADMIN — HEPARIN SODIUM 10 UNIT(S)/HR: 5000 INJECTION INTRAVENOUS; SUBCUTANEOUS at 21:25

## 2018-09-26 RX ADMIN — Medication 81 MILLIGRAM(S): at 09:41

## 2018-09-26 RX ADMIN — Medication 25 MILLIGRAM(S): at 21:22

## 2018-09-26 RX ADMIN — Medication 25 MILLIGRAM(S): at 14:23

## 2018-09-26 RX ADMIN — HEPARIN SODIUM 1000 UNIT(S)/HR: 5000 INJECTION INTRAVENOUS; SUBCUTANEOUS at 01:10

## 2018-09-26 NOTE — PROGRESS NOTE ADULT - SUBJECTIVE AND OBJECTIVE BOX
INTERVAL HPI/OVERNIGHT EVENTS: no acute overnight events. trying to wean patient off dex to decrease sedation    SUBJECTIVE: Patient seen and examined at bedside.     OBJECTIVE:    VITAL SIGNS:  ICU Vital Signs Last 24 Hrs  T(C): 36.1 (26 Sep 2018 04:00), Max: 37.6 (25 Sep 2018 12:00)  T(F): 97 (26 Sep 2018 04:00), Max: 99.6 (25 Sep 2018 12:00)  HR: 69 (26 Sep 2018 07:00) (59 - 105)  BP: 167/71 (26 Sep 2018 07:00) (115/62 - 176/72)  BP(mean): 102 (26 Sep 2018 07:00) (81 - 110)  RR: 25 (26 Sep 2018 07:00) (17 - 34)  SpO2: 100% (26 Sep 2018 07:00) (98% - 100%)    Mode: CPAP with PS, FiO2: 30, PEEP: 5, PS: 5, MAP: 7, PIP: 12    09-25 @ 07:01  -  09-26 @ 07:00  --------------------------------------------------------  IN: 655 mL / OUT: 750 mL / NET: -95 mL    CAPILLARY BLOOD GLUCOSE  POCT Blood Glucose.: 84 mg/dL (26 Sep 2018 05:26)    PHYSICAL EXAM:  General: NAD  HEENT: NC/AT; PERRL, clear conjunctiva  Respiratory: CTA b/l  Cardiovascular: +S1/S2; RRR  Abdomen: soft, NT/ND; +BS x4  Extremities: WWP, 2+ peripheral pulses b/l; no LE edema  Skin: normal color and turgor; no rash  Neurological:    LINES:     MEDICATIONS  (STANDING):  ALBUTerol/ipratropium for Nebulization 3 milliLiter(s) Nebulizer every 6 hours  methylPREDNISolone sodium succinate Injectable 40 milliGRAM(s) IV Push every 12 hours  cefepime   IVPB 2000 milliGRAM(s) IV Intermittent every 8 hours  aspirin  chewable 81 milliGRAM(s) Oral daily  heparin  Infusion. 900 Unit(s)/Hr (9 mL/Hr) IV Continuous <Continuous>  hydrALAZINE 25 milliGRAM(s) Oral every 8 hours  dexmedetomidine Infusion 0.2 MICROgram(s)/kG/Hr (3.145 mL/Hr) IV Continuous <Continuous>  insulin lispro (HumaLOG) corrective regimen sliding scale   SubCutaneous every 6 hours  insulin NPH human recombinant 12 Unit(s) SubCutaneous every 6 hours  latanoprost 0.005% Ophthalmic Solution 1 Drop(s) Both EYES at bedtime  bisacodyl Suppository 10 milliGRAM(s) Rectal at bedtime  pantoprazole  Injectable 40 milliGRAM(s) IV Push daily  polyethylene glycol 3350 17 Gram(s) Oral two times a day    ALLERGIES:  penicillin (Rash)    LABS:                   8.4    20.1  )-----------( 179      ( 26 Sep 2018 00:24 )             26.8     Mean Cell Volume : 89.9 fl  Mean Cell Hemoglobin : 28.3 pg  Mean Cell Hemoglobin Concentration : 31.5 gm/dL    09-26    143  |  112<H>  |  52<H>  ----------------------------<  159<H>  4.6   |  25  |  0.89    Ca    9.4      26 Sep 2018 00:24  Phos  3.6     09-26  Mg     2.2     09-26    TPro  5.7<L>  /  Alb  2.8<L>  /  TBili  0.4  /  DBili  x   /  AST  9<L>  /  ALT  23  /  AlkPhos  67  09-26    Creatinine Trend: 0.89<--, 0.86<--, 0.80<--, 1.00<--, 1.34<--, 1.11<--    LIVER FUNCTIONS - ( 26 Sep 2018 00:24 )  Alb: 2.8 g/dL / Pro: 5.7 g/dL / ALK PHOS: 67 U/L / ALT: 23 U/L / AST: 9 U/L / GGT: x           PT/INR - ( 26 Sep 2018 00:24 )   PT: 11.3 sec;   INR: 1.05 ratio    PTT - ( 26 Sep 2018 00:24 )  PTT:56.4 sec    RADIOLOGY & ADDITIONAL TESTS: Reviewed. INTERVAL HPI/OVERNIGHT EVENTS: no acute overnight events. trying to wean patient off dex to decrease sedation    SUBJECTIVE: Patient seen and examined at bedside. pt has been restless and unable to sleep well. pt denies any SOB but has some coughing. she had back pain while in the bed which has improved since getting into the chair. she has had BMs yesterday and has minimal abdominal discomfort.    OBJECTIVE:    VITAL SIGNS:  ICU Vital Signs Last 24 Hrs  T(C): 36.1 (26 Sep 2018 04:00), Max: 37.6 (25 Sep 2018 12:00)  T(F): 97 (26 Sep 2018 04:00), Max: 99.6 (25 Sep 2018 12:00)  HR: 69 (26 Sep 2018 07:00) (59 - 105)  BP: 167/71 (26 Sep 2018 07:00) (115/62 - 176/72)  BP(mean): 102 (26 Sep 2018 07:00) (81 - 110)  RR: 25 (26 Sep 2018 07:00) (17 - 34)  SpO2: 100% (26 Sep 2018 07:00) (98% - 100%)    Mode: CPAP with PS, FiO2: 30, PEEP: 5, PS: 5, MAP: 7, PIP: 12    09-25 @ 07:01  -  09-26 @ 07:00  --------------------------------------------------------  IN: 655 mL / OUT: 750 mL / NET: -95 mL    CAPILLARY BLOOD GLUCOSE  POCT Blood Glucose.: 84 mg/dL (26 Sep 2018 05:26)    PHYSICAL EXAM:  General: elderly woman sitting in chair with NG tube and nasal canula in no acute distress  HEENT: NC/AT; PERRL, clear conjunctiva  Respiratory: crackles bilaterally  Cardiovascular: normal s1, s2, regular rate, systolic murmur  Abdomen: soft, mildly distended, nontender  Extremities: 2+ peripheral pulses b/l; no LE edema  Skin: normal color; no rash  Neurological: alert and A&O x 3    LINES: right IJ, 3 PIV in place    MEDICATIONS  (STANDING):  ALBUTerol/ipratropium for Nebulization 3 milliLiter(s) Nebulizer every 6 hours  methylPREDNISolone sodium succinate Injectable 40 milliGRAM(s) IV Push every 12 hours  cefepime   IVPB 2000 milliGRAM(s) IV Intermittent every 8 hours  aspirin  chewable 81 milliGRAM(s) Oral daily  heparin  Infusion. 900 Unit(s)/Hr (9 mL/Hr) IV Continuous <Continuous>  hydrALAZINE 25 milliGRAM(s) Oral every 8 hours  dexmedetomidine Infusion 0.2 MICROgram(s)/kG/Hr (3.145 mL/Hr) IV Continuous <Continuous>  insulin lispro (HumaLOG) corrective regimen sliding scale   SubCutaneous every 6 hours  insulin NPH human recombinant 12 Unit(s) SubCutaneous every 6 hours  latanoprost 0.005% Ophthalmic Solution 1 Drop(s) Both EYES at bedtime  bisacodyl Suppository 10 milliGRAM(s) Rectal at bedtime  pantoprazole  Injectable 40 milliGRAM(s) IV Push daily  polyethylene glycol 3350 17 Gram(s) Oral two times a day    ALLERGIES:  penicillin (Rash)    LABS:                   8.4    20.1  )-----------( 179      ( 26 Sep 2018 00:24 )             26.8     Mean Cell Volume : 89.9 fl  Mean Cell Hemoglobin : 28.3 pg  Mean Cell Hemoglobin Concentration : 31.5 gm/dL    09-26    143  |  112<H>  |  52<H>  ----------------------------<  159<H>  4.6   |  25  |  0.89    Ca    9.4      26 Sep 2018 00:24  Phos  3.6     09-26  Mg     2.2     09-26    TPro  5.7<L>  /  Alb  2.8<L>  /  TBili  0.4  /  DBili  x   /  AST  9<L>  /  ALT  23  /  AlkPhos  67  09-26    Creatinine Trend: 0.89<--, 0.86<--, 0.80<--, 1.00<--, 1.34<--, 1.11<--    LIVER FUNCTIONS - ( 26 Sep 2018 00:24 )  Alb: 2.8 g/dL / Pro: 5.7 g/dL / ALK PHOS: 67 U/L / ALT: 23 U/L / AST: 9 U/L / GGT: x           PT/INR - ( 26 Sep 2018 00:24 )   PT: 11.3 sec;   INR: 1.05 ratio    PTT - ( 26 Sep 2018 00:24 )  PTT:56.4 sec    Microbiology:  Culture - Bronchial (09.21.18 @ 21:22)    Gram Stain:   No squamous epithelial cells per low power field  Few polymorphonuclear leukocytes per low power field  No organisms seen per oil power field    Specimen Source: Bronch Wash Combicath    Culture Results:   Normal Respiratory María present    Culture - Blood (09.21.18 @ 20:46)    Specimen Source: .Blood Blood-Peripheral    Culture Results:   No growth to date.    Urine Microscopic-Add On (NC) (09.21.18 @ 18:19)    Bacteria: Negative    Hyaline Casts: 0 /lpf    White Blood Cell - Urine: 6 /hpf    Red Blood Cell - Urine: 2 /hpf    Epithelial Cells: 3 /hpf      RADIOLOGY & ADDITIONAL TESTS: Reviewed.

## 2018-09-26 NOTE — PROGRESS NOTE ADULT - ASSESSMENT
85 y/o F w/ a PMH significant for COPD, JENNIE on BiPAP, multivalvular disease (severe AS, s/p MV replacement), HFpEF/severe diastolic failure, A-fib on coumadin, sick sinus syndrome s/p pacemaker placement, HTN, and CKD3 who admitted for acute respiratory failure requiring intubation suspected to be 2/2 COPD exacerbation c/b PNA w/ +entero/rhinovirus and GN coccobacillus.    #Neuro  - Sedated on precedex    #CV  -admitted w/ sBP 200, since requiring pressure support 2/2 sepsis, pt placed on hydralazine, required cardene drip  -pt currently does not require norepi  -1 episode lai to 30s              -EP consulted for interrogation of pacemaker and cleared  -troponins downtrended; likely demand 2/2 HTN urgency              -will stop trending cardiac enzymes (CK/CKMB negative x3)              -s/p ASA and plavix load. plavix d/c'ed by cards and on hep ggt              -low threshold to stop if any sx bleeding.  -HFpEF (40-50%) w/ severe diastolic failure  -multivalvular disease: MV replacement, severe AS  -c/w heparin full anticoagulation for the MV and ASA 81mg daily  -c/w hydralazine  -will diurese with lasix 20mg prior to extubation    #Resp  - Intubated on 9/14; extubated on 9/17. Re-intubated on 9/19 for increased secretions and WOB and extubated 9/25  - CXR w/ pulmonary edema vs overlying PNA  - Enterovirus positive, now w/ GN coccobacillus positive, possible bacteremia from overlying PNA  - + H. flu PNA  - c/w cefepime  - C/w Solumedrol 40mg IV BID  - c/w nebs  - chest PT  - pt currently saturating well on NC  - will continue to monitor and assess for secretions and WOB      #GI  -no acute pathology  -c/w tube feeds until pt is more stabilized  -c/w bowel regimen  -c/w PPI ppx    #Renal  -LATANYA, likely 2/2 sepsis + HTN urgency; resolved with Cr at baseline  -pts hypernatremia is improved, will d/c free water  -will monitor I&O's    #ID  - +entero/rhinovirus  - GN coccobacillus (H. influenzae) on 9/14 blood culture  - UClx (9/14): NGTD  - Sputum Clx (9/14): No organisms, repeat combicath (9/21): normal respiratory chastity  - legionella negative  - repeat blood cx from 9/17 and 9/21: NGTD  - c/w cefepime    #Heme:  -DIC panel negative  -LFTs no longer mildly elevated  - Hb drop from 10 to 8 (now stable); CT A/P negative for acute bleeding  - c/w heparin gtt; holding home warfarin    #Endo:  - C/w NPH    #Dispo  - will continue with PT 85 y/o F w/ a PMH significant for COPD, JENNIE on BiPAP, multivalvular disease (severe AS, s/p MV replacement), HFpEF/severe diastolic failure, A-fib on coumadin, sick sinus syndrome s/p pacemaker placement, HTN, and CKD3 who admitted for acute respiratory failure requiring intubation suspected to be 2/2 COPD exacerbation c/b PNA w/ +entero/rhinovirus and GN coccobacillus.    #Neuro  - awake and alert, oriented x 3  -no longer on sedation  -pt feels restless  -will start on clonazepam    #CV  -admitted w/ sBP 200, since requiring pressure support 2/2 sepsis, pt placed on hydralazine, required cardene drip  -pt currently does not require norepi  -1 episode lai to 30s              -EP consulted for interrogation of pacemaker and cleared  -troponins downtrended; likely demand 2/2 HTN urgency              -will stop trending cardiac enzymes (CK/CKMB negative x3)              -s/p ASA and plavix load. plavix d/c'ed by cards and on hep ggt              -low threshold to stop if any sx bleeding.  -HFpEF (40-50%) w/ severe diastolic failure  -multivalvular disease: MV replacement, severe AS  -c/w heparin and ASA 81mg daily, full anticoagulation for the MV - currently therapeutic levels of aPTT  -c/w hydralazine    #Resp  - Intubated on 9/14; extubated on 9/17. Re-intubated on 9/19 for increased secretions and WOB and extubated 9/25  - initial CXR w/ pulmonary edema vs overlying PNA  - Enterovirus positive on RVP, now w/ GN coccobacillus positive, possible bacteremia from overlying PNA  - + H. flu PNA  - c/w cefepime  - taper steroids to prednisone 20mg daily  - c/w nebs  - chest PT  - pt currently saturating well on NC  - will continue to monitor and assess for secretions and WOB    #GI  -no acute pathology  -c/w tube feeds until pt is more stabilized  -hold bowel regimen if pt continues to have multiple BMs  -d/c PPI ppx  -will consult speech/swallow for eval    #Renal  -LATANYA, likely 2/2 sepsis + HTN urgency; resolved with Cr at baseline  -will monitor I&O's    #ID  - +entero/rhinovirus  - GN coccobacillus (H. influenzae) on 9/14 blood culture  - UClx (9/14): NGTD  - Sputum Clx (9/14): No organisms, repeat combicath (9/21): normal respiratory chastity  - repeat blood cx from 9/17 and 9/21: NGTD  - legionella negative  - c/w cefepime for full 2 week course    #Heme:  -DIC panel negative  -LFTs no longer mildly elevated  - Hb drop from 10 to 8 (now stable); CT A/P was negative for acute bleeding  - c/w heparin gtt; holding home warfarin    #Endo:  - C/w NPH    #Ophtho  -c/w home latanoprost drops for glaucoma    #Dispo  - will continue with PT

## 2018-09-26 NOTE — PROGRESS NOTE ADULT - ATTENDING COMMENTS
1. Pulmonary: tolerating extubation.  2.ID pneumonia . Continue cefepime for h. influenza bacteremia and pneumonia.  3. HTN . Prn hydralazine. Start home medications.  4. Continue NGT feeds.  5. Anxiety: Klonipin.  6. OOB chair.  7. Afib and MVR contonue heparin drip

## 2018-09-26 NOTE — CHART NOTE - NSCHARTNOTEFT_GEN_A_CORE
Follow up.    Chart reviewed, events noted. Adm dx: COPD exacerbation, heart failure. Extubated yesterday. Swallow eval pending, NGT feeds resumed today.    Source: Patient [ ]    Family [ ]     other [x ] chart    Diet : 9/26 Vital 1.2 goal 60cc/hr x 18 hrs, infusing at 10cc/hr    GI: no V/abd distention, had BM today     Enteral /Parenteral Nutrition: goal prior to extubation 1080cc  24 hr intake 9/26 110cc (feeds held for extubation), 9/25 1020cc (94% of needs), 9/24 (83% of needs)      Current Weight: 9/26 138lb, dosing wt 9/14 139lb  1+ dependent edema    Pertinent Medications: MEDICATIONS  (STANDING):  ALBUTerol/ipratropium for Nebulization 3 milliLiter(s) Nebulizer every 6 hours  aspirin  chewable 81 milliGRAM(s) Oral daily  bisacodyl Suppository 10 milliGRAM(s) Rectal at bedtime  cefepime   IVPB      cefepime   IVPB 2000 milliGRAM(s) IV Intermittent every 8 hours  clonazePAM Tablet 0.5 milliGRAM(s) Oral two times a day  dextrose 50% Injectable 12.5 Gram(s) IV Push once  dextrose 50% Injectable 25 Gram(s) IV Push once  dextrose 50% Injectable 25 Gram(s) IV Push once  heparin  Infusion. 900 Unit(s)/Hr (9 mL/Hr) IV Continuous <Continuous>  hydrALAZINE 25 milliGRAM(s) Oral every 8 hours  insulin lispro (HumaLOG) corrective regimen sliding scale   SubCutaneous every 6 hours  insulin NPH human recombinant 12 Unit(s) SubCutaneous every 6 hours  latanoprost 0.005% Ophthalmic Solution 1 Drop(s) Both EYES at bedtime  polyethylene glycol 3350 17 Gram(s) Oral two times a day  predniSONE   Tablet 20 milliGRAM(s) Oral daily    MEDICATIONS  (PRN):  dextrose 40% Gel 15 Gram(s) Oral once PRN Blood Glucose LESS THAN 70 milliGRAM(s)/deciliter  glucagon  Injectable 1 milliGRAM(s) IntraMuscular once PRN Glucose LESS THAN 70 milligrams/deciliter  heparin  Injectable 5000 Unit(s) IV Push every 6 hours PRN For aPTT less than 40  heparin  Injectable 2500 Unit(s) IV Push every 6 hours PRN For aPTT between 40 - 57    Pertinent Labs:  09-26 Na143 mmol/L Glu 159 mg/dL<H> K+ 4.6 mmol/L Cr  0.89 mg/dL BUN 52 mg/dL<H> 09-26 Phos 3.6 mg/dL 09-26 Alb 2.8 g/dL<L> 09-22 [id number] 7.2 %<H>  CAPILLARY BLOOD GLUCOSE  POCT Blood Glucose.: 124 mg/dL (26 Sep 2018 12:22)  POCT Blood Glucose.: 84 mg/dL (26 Sep 2018 05:26)  POCT Blood Glucose.: 165 mg/dL (25 Sep 2018 23:42)  POCT Blood Glucose.: 80 mg/dL (25 Sep 2018 17:43)      Skin: no pressure injuries documented     Estimated Needs:   [ ] no change since previous assessment  [x ] recalculated energy needs post extubation, 1572-1888kcals (25-30 kcals/kg dosing wt 62.9kg)      Previous Nutrition Diagnosis: none    New Nutrition Diagnosis: [x ] not applicable    Recommend:  Vital 1.2 goal 70cc/hr x 18 hrs provides 1512 kcals, 95 gm protein, 1022cc free water  meets 24 Kcal/Kg, 1.5Gm/kg dosing wt 62.9kg     Monitoring and Evaluation: trend wt, labs, EN tolerance Follow up.    Chart reviewed, events noted. Adm dx: COPD exacerbation, heart failure. Extubated yesterday. Swallow eval pending, NGT feeds resumed today.    Source: Patient [ ]    Family [ ]     other [x ] chart    Diet : 9/26 Vital 1.2 goal 60cc/hr x 18 hrs, infusing at 10cc/hr    GI: no V/abd distention, had BM today     Enteral /Parenteral Nutrition: goal prior to extubation 1080cc  24 hr intake 9/26 110cc (feeds held for extubation), 9/25 1020cc (94% of needs), 9/24 (83% of needs)      Current Weight: 9/26 138lb, dosing wt 9/14 139lb  1+ dependent edema    Pertinent Medications: MEDICATIONS  (STANDING):  ALBUTerol/ipratropium for Nebulization 3 milliLiter(s) Nebulizer every 6 hours  aspirin  chewable 81 milliGRAM(s) Oral daily  bisacodyl Suppository 10 milliGRAM(s) Rectal at bedtime  cefepime   IVPB      cefepime   IVPB 2000 milliGRAM(s) IV Intermittent every 8 hours  clonazePAM Tablet 0.5 milliGRAM(s) Oral two times a day  dextrose 50% Injectable 12.5 Gram(s) IV Push once  dextrose 50% Injectable 25 Gram(s) IV Push once  dextrose 50% Injectable 25 Gram(s) IV Push once  heparin  Infusion. 900 Unit(s)/Hr (9 mL/Hr) IV Continuous <Continuous>  hydrALAZINE 25 milliGRAM(s) Oral every 8 hours  insulin lispro (HumaLOG) corrective regimen sliding scale   SubCutaneous every 6 hours  insulin NPH human recombinant 12 Unit(s) SubCutaneous every 6 hours  latanoprost 0.005% Ophthalmic Solution 1 Drop(s) Both EYES at bedtime  polyethylene glycol 3350 17 Gram(s) Oral two times a day  predniSONE   Tablet 20 milliGRAM(s) Oral daily    MEDICATIONS  (PRN):  dextrose 40% Gel 15 Gram(s) Oral once PRN Blood Glucose LESS THAN 70 milliGRAM(s)/deciliter  glucagon  Injectable 1 milliGRAM(s) IntraMuscular once PRN Glucose LESS THAN 70 milligrams/deciliter  heparin  Injectable 5000 Unit(s) IV Push every 6 hours PRN For aPTT less than 40  heparin  Injectable 2500 Unit(s) IV Push every 6 hours PRN For aPTT between 40 - 57    Pertinent Labs:  09-26 Na143 mmol/L Glu 159 mg/dL<H> K+ 4.6 mmol/L Cr  0.89 mg/dL BUN 52 mg/dL<H> 09-26 Phos 3.6 mg/dL 09-26 Alb 2.8 g/dL<L> 09-22 [id number] 7.2 %<H>  CAPILLARY BLOOD GLUCOSE  POCT Blood Glucose.: 124 mg/dL (26 Sep 2018 12:22)  POCT Blood Glucose.: 84 mg/dL (26 Sep 2018 05:26)  POCT Blood Glucose.: 165 mg/dL (25 Sep 2018 23:42)  POCT Blood Glucose.: 80 mg/dL (25 Sep 2018 17:43)      Skin: no pressure injuries documented     Estimated Needs:   [ ] no change since previous assessment  [x ] recalculated energy needs post extubation, 1572-1888kcals (25-30 kcals/kg dosing wt 62.9kg)      Previous Nutrition Diagnosis: none    New Nutrition Diagnosis: [x ] not applicable    Recommend:  Vital 1.2 goal 70cc/hr x 18 hrs provides 1512 kcals, 95 gm protein, 1022cc free water  meets 24 Kcal/Kg, 1.5Gm/kg dosing wt 62.9kg     Monitoring and Evaluation: trend wt, labs, EN tolerance  f/u on ST recommendations

## 2018-09-27 LAB
ALBUMIN SERPL ELPH-MCNC: 3.3 G/DL — SIGNIFICANT CHANGE UP (ref 3.3–5)
ALP SERPL-CCNC: 76 U/L — SIGNIFICANT CHANGE UP (ref 40–120)
ALT FLD-CCNC: 26 U/L — SIGNIFICANT CHANGE UP (ref 10–45)
ANION GAP SERPL CALC-SCNC: 12 MMOL/L — SIGNIFICANT CHANGE UP (ref 5–17)
APTT BLD: 75.9 SEC — HIGH (ref 27.5–37.4)
APTT BLD: 87.5 SEC — HIGH (ref 27.5–37.4)
APTT BLD: 87.9 SEC — HIGH (ref 27.5–37.4)
APTT BLD: 99.4 SEC — HIGH (ref 27.5–37.4)
AST SERPL-CCNC: 14 U/L — SIGNIFICANT CHANGE UP (ref 10–40)
BILIRUB SERPL-MCNC: 0.5 MG/DL — SIGNIFICANT CHANGE UP (ref 0.2–1.2)
BUN SERPL-MCNC: 50 MG/DL — HIGH (ref 7–23)
CALCIUM SERPL-MCNC: 10.9 MG/DL — HIGH (ref 8.4–10.5)
CHLORIDE SERPL-SCNC: 114 MMOL/L — HIGH (ref 96–108)
CO2 SERPL-SCNC: 23 MMOL/L — SIGNIFICANT CHANGE UP (ref 22–31)
CREAT SERPL-MCNC: 0.88 MG/DL — SIGNIFICANT CHANGE UP (ref 0.5–1.3)
GAS PNL BLDA: SIGNIFICANT CHANGE UP
GLUCOSE BLDC GLUCOMTR-MCNC: 170 MG/DL — HIGH (ref 70–99)
GLUCOSE BLDC GLUCOMTR-MCNC: 188 MG/DL — HIGH (ref 70–99)
GLUCOSE BLDC GLUCOMTR-MCNC: 195 MG/DL — HIGH (ref 70–99)
GLUCOSE BLDC GLUCOMTR-MCNC: 244 MG/DL — HIGH (ref 70–99)
GLUCOSE SERPL-MCNC: 180 MG/DL — HIGH (ref 70–99)
HCT VFR BLD CALC: 23.7 % — LOW (ref 34.5–45)
HCT VFR BLD CALC: 26.5 % — LOW (ref 34.5–45)
HCT VFR BLD CALC: 28 % — LOW (ref 34.5–45)
HCT VFR BLD CALC: 31.7 % — LOW (ref 34.5–45)
HGB BLD-MCNC: 7.2 G/DL — LOW (ref 11.5–15.5)
HGB BLD-MCNC: 8.4 G/DL — LOW (ref 11.5–15.5)
HGB BLD-MCNC: 8.8 G/DL — LOW (ref 11.5–15.5)
HGB BLD-MCNC: 9.8 G/DL — LOW (ref 11.5–15.5)
INR BLD: 1.06 RATIO — SIGNIFICANT CHANGE UP (ref 0.88–1.16)
INR BLD: 1.11 RATIO — SIGNIFICANT CHANGE UP (ref 0.88–1.16)
INR BLD: 1.11 RATIO — SIGNIFICANT CHANGE UP (ref 0.88–1.16)
INR BLD: 1.13 RATIO — SIGNIFICANT CHANGE UP (ref 0.88–1.16)
LIDOCAIN IGE QN: 182 U/L — HIGH (ref 7–60)
MAGNESIUM SERPL-MCNC: 2.5 MG/DL — SIGNIFICANT CHANGE UP (ref 1.6–2.6)
MCHC RBC-ENTMCNC: 27.4 PG — SIGNIFICANT CHANGE UP (ref 27–34)
MCHC RBC-ENTMCNC: 27.7 PG — SIGNIFICANT CHANGE UP (ref 27–34)
MCHC RBC-ENTMCNC: 28.5 PG — SIGNIFICANT CHANGE UP (ref 27–34)
MCHC RBC-ENTMCNC: 28.5 PG — SIGNIFICANT CHANGE UP (ref 27–34)
MCHC RBC-ENTMCNC: 30.4 GM/DL — LOW (ref 32–36)
MCHC RBC-ENTMCNC: 30.9 GM/DL — LOW (ref 32–36)
MCHC RBC-ENTMCNC: 31.6 GM/DL — LOW (ref 32–36)
MCHC RBC-ENTMCNC: 31.6 GM/DL — LOW (ref 32–36)
MCV RBC AUTO: 89.5 FL — SIGNIFICANT CHANGE UP (ref 80–100)
MCV RBC AUTO: 90 FL — SIGNIFICANT CHANGE UP (ref 80–100)
MCV RBC AUTO: 90.2 FL — SIGNIFICANT CHANGE UP (ref 80–100)
MCV RBC AUTO: 90.3 FL — SIGNIFICANT CHANGE UP (ref 80–100)
PHOSPHATE SERPL-MCNC: 3.5 MG/DL — SIGNIFICANT CHANGE UP (ref 2.5–4.5)
PLATELET # BLD AUTO: 215 K/UL — SIGNIFICANT CHANGE UP (ref 150–400)
PLATELET # BLD AUTO: 229 K/UL — SIGNIFICANT CHANGE UP (ref 150–400)
PLATELET # BLD AUTO: 242 K/UL — SIGNIFICANT CHANGE UP (ref 150–400)
PLATELET # BLD AUTO: 247 K/UL — SIGNIFICANT CHANGE UP (ref 150–400)
POTASSIUM SERPL-MCNC: 4.5 MMOL/L — SIGNIFICANT CHANGE UP (ref 3.5–5.3)
POTASSIUM SERPL-SCNC: 4.5 MMOL/L — SIGNIFICANT CHANGE UP (ref 3.5–5.3)
PROT SERPL-MCNC: 6.9 G/DL — SIGNIFICANT CHANGE UP (ref 6–8.3)
PROTHROM AB SERPL-ACNC: 11.6 SEC — SIGNIFICANT CHANGE UP (ref 9.8–12.7)
PROTHROM AB SERPL-ACNC: 12 SEC — SIGNIFICANT CHANGE UP (ref 9.8–12.7)
PROTHROM AB SERPL-ACNC: 12 SEC — SIGNIFICANT CHANGE UP (ref 9.8–12.7)
PROTHROM AB SERPL-ACNC: 12.2 SEC — SIGNIFICANT CHANGE UP (ref 9.8–12.7)
RBC # BLD: 2.63 M/UL — LOW (ref 3.8–5.2)
RBC # BLD: 2.94 M/UL — LOW (ref 3.8–5.2)
RBC # BLD: 3.11 M/UL — LOW (ref 3.8–5.2)
RBC # BLD: 3.54 M/UL — LOW (ref 3.8–5.2)
RBC # FLD: 17.5 % — HIGH (ref 10.3–14.5)
RBC # FLD: 17.6 % — HIGH (ref 10.3–14.5)
RBC # FLD: 17.9 % — HIGH (ref 10.3–14.5)
RBC # FLD: 18.1 % — HIGH (ref 10.3–14.5)
SODIUM SERPL-SCNC: 149 MMOL/L — HIGH (ref 135–145)
WBC # BLD: 20.8 K/UL — HIGH (ref 3.8–10.5)
WBC # BLD: 21.3 K/UL — HIGH (ref 3.8–10.5)
WBC # BLD: 21.7 K/UL — HIGH (ref 3.8–10.5)
WBC # BLD: 23.7 K/UL — HIGH (ref 3.8–10.5)
WBC # FLD AUTO: 20.8 K/UL — HIGH (ref 3.8–10.5)
WBC # FLD AUTO: 21.3 K/UL — HIGH (ref 3.8–10.5)
WBC # FLD AUTO: 21.7 K/UL — HIGH (ref 3.8–10.5)
WBC # FLD AUTO: 23.7 K/UL — HIGH (ref 3.8–10.5)

## 2018-09-27 PROCEDURE — 76604 US EXAM CHEST: CPT | Mod: 26,GC,59

## 2018-09-27 PROCEDURE — 93308 TTE F-UP OR LMTD: CPT | Mod: 26,GC,59

## 2018-09-27 PROCEDURE — 99233 SBSQ HOSP IP/OBS HIGH 50: CPT | Mod: GC

## 2018-09-27 RX ORDER — LANOLIN ALCOHOL/MO/W.PET/CERES
5 CREAM (GRAM) TOPICAL ONCE
Qty: 0 | Refills: 0 | Status: COMPLETED | OUTPATIENT
Start: 2018-09-27 | End: 2018-09-27

## 2018-09-27 RX ORDER — HYDRALAZINE HCL 50 MG
50 TABLET ORAL EVERY 8 HOURS
Qty: 0 | Refills: 0 | Status: DISCONTINUED | OUTPATIENT
Start: 2018-09-27 | End: 2018-09-27

## 2018-09-27 RX ORDER — QUETIAPINE FUMARATE 200 MG/1
25 TABLET, FILM COATED ORAL AT BEDTIME
Qty: 0 | Refills: 0 | Status: DISCONTINUED | OUTPATIENT
Start: 2018-09-27 | End: 2018-09-28

## 2018-09-27 RX ORDER — WARFARIN SODIUM 2.5 MG/1
5 TABLET ORAL ONCE
Qty: 0 | Refills: 0 | Status: COMPLETED | OUTPATIENT
Start: 2018-09-27 | End: 2018-09-27

## 2018-09-27 RX ORDER — HYDRALAZINE HCL 50 MG
10 TABLET ORAL ONCE
Qty: 0 | Refills: 0 | Status: COMPLETED | OUTPATIENT
Start: 2018-09-27 | End: 2018-09-27

## 2018-09-27 RX ORDER — HYDRALAZINE HCL 50 MG
50 TABLET ORAL EVERY 8 HOURS
Qty: 0 | Refills: 0 | Status: DISCONTINUED | OUTPATIENT
Start: 2018-09-27 | End: 2018-10-01

## 2018-09-27 RX ADMIN — POLYETHYLENE GLYCOL 3350 17 GRAM(S): 17 POWDER, FOR SOLUTION ORAL at 05:02

## 2018-09-27 RX ADMIN — QUETIAPINE FUMARATE 25 MILLIGRAM(S): 200 TABLET, FILM COATED ORAL at 18:18

## 2018-09-27 RX ADMIN — Medication 10 MILLIGRAM(S): at 00:08

## 2018-09-27 RX ADMIN — Medication 50 MILLIGRAM(S): at 22:05

## 2018-09-27 RX ADMIN — Medication 50 MILLIGRAM(S): at 14:47

## 2018-09-27 RX ADMIN — HEPARIN SODIUM 9 UNIT(S)/HR: 5000 INJECTION INTRAVENOUS; SUBCUTANEOUS at 10:49

## 2018-09-27 RX ADMIN — Medication 3 MILLILITER(S): at 17:36

## 2018-09-27 RX ADMIN — Medication 4: at 23:51

## 2018-09-27 RX ADMIN — Medication 2: at 17:33

## 2018-09-27 RX ADMIN — Medication 5 MILLIGRAM(S): at 01:20

## 2018-09-27 RX ADMIN — Medication 3 MILLILITER(S): at 23:17

## 2018-09-27 RX ADMIN — Medication 20 MILLIGRAM(S): at 05:02

## 2018-09-27 RX ADMIN — Medication 2: at 05:43

## 2018-09-27 RX ADMIN — CEFEPIME 100 MILLIGRAM(S): 1 INJECTION, POWDER, FOR SOLUTION INTRAMUSCULAR; INTRAVENOUS at 13:28

## 2018-09-27 RX ADMIN — HUMAN INSULIN 12 UNIT(S): 100 INJECTION, SUSPENSION SUBCUTANEOUS at 11:35

## 2018-09-27 RX ADMIN — HEPARIN SODIUM 9 UNIT(S)/HR: 5000 INJECTION INTRAVENOUS; SUBCUTANEOUS at 17:24

## 2018-09-27 RX ADMIN — Medication 81 MILLIGRAM(S): at 11:27

## 2018-09-27 RX ADMIN — Medication 2: at 11:34

## 2018-09-27 RX ADMIN — Medication 3 MILLILITER(S): at 05:01

## 2018-09-27 RX ADMIN — HEPARIN SODIUM 10 UNIT(S)/HR: 5000 INJECTION INTRAVENOUS; SUBCUTANEOUS at 10:42

## 2018-09-27 RX ADMIN — Medication 3 MILLILITER(S): at 11:57

## 2018-09-27 RX ADMIN — CEFEPIME 100 MILLIGRAM(S): 1 INJECTION, POWDER, FOR SOLUTION INTRAMUSCULAR; INTRAVENOUS at 22:05

## 2018-09-27 RX ADMIN — WARFARIN SODIUM 5 MILLIGRAM(S): 2.5 TABLET ORAL at 22:05

## 2018-09-27 RX ADMIN — HUMAN INSULIN 12 UNIT(S): 100 INJECTION, SUSPENSION SUBCUTANEOUS at 23:51

## 2018-09-27 RX ADMIN — CEFEPIME 100 MILLIGRAM(S): 1 INJECTION, POWDER, FOR SOLUTION INTRAMUSCULAR; INTRAVENOUS at 05:03

## 2018-09-27 RX ADMIN — LATANOPROST 1 DROP(S): 0.05 SOLUTION/ DROPS OPHTHALMIC; TOPICAL at 23:11

## 2018-09-27 RX ADMIN — HUMAN INSULIN 12 UNIT(S): 100 INJECTION, SUSPENSION SUBCUTANEOUS at 17:33

## 2018-09-27 RX ADMIN — Medication 50 MILLIGRAM(S): at 05:03

## 2018-09-27 NOTE — SWALLOW BEDSIDE ASSESSMENT ADULT - SLP PERTINENT HISTORY OF CURRENT PROBLEM
HX/HC per MICU Transfer Note: 83 yo woman with PMH COPD on home O2 (not used for few months), JENNIE on BiPAP, rheumatic heart disease s/p mitral valve replacement, HFpEF, aortic stenosis, A-fib on coumadin, sick sinus syndrome s/p pacemaker placement, HTN, and CKD3 presented w/ dyspnea x2 days. Initially placed on BiPAP in the ED but intubated for continued tachypnea, tripoding/increased work of breathing. She was +entero/rhinovirus on RVP, CXR revealed vascular congestion, given 1g ceftriaxone, 500mg azithro, 40mg lasix, multiple duonebs. Admitted to MICU for pulmonary edema vs CHF exacerbation vs COPD exacerbation. Pt was extubated on 9/17 but required reintubation 9/19 for increased secretions and WOB. She was extubated on 9/25 and continued with home BiPAP setting in evenings. Pt was started on solumedrol and tapered to oral prednisone.

## 2018-09-27 NOTE — PROGRESS NOTE ADULT - ATTENDING COMMENTS
1. Pulmonary : Pt tolerating extubation. Continue Cefepime for H. Influenza pneumonia and bacteremia.  2. COPD. taper steroids.  3. Cardiac . Afib and MVR Continue heparin . Start coumadin.  HTN. Restart home Cardizem.

## 2018-09-27 NOTE — CHART NOTE - NSCHARTNOTEFT_GEN_A_CORE
MICU Transfer Note    Transfer from: MICU    Transfer to: (X) Medicine    (  ) Telemetry     (   ) RCU        (    ) Palliative         (   ) Stroke Unit          (   ) __________________    Accepting physician:      MICU COURSE:          ASSESSMENT & PLAN:   85 y/o F w/ a PMH significant for COPD, JENNIE on BiPAP, multivalvular disease (severe AS, s/p MV replacement), HFpEF/severe diastolic failure, A-fib on coumadin, sick sinus syndrome s/p pacemaker placement, HTN, and CKD3 who admitted for acute respiratory failure requiring intubation suspected to be 2/2 COPD exacerbation c/b PNA w/ +entero/rhinovirus and GN coccobacillus and H. influenza bacteremia.    #Neuro  -awake and alert  -no sedation  -pt feels restless  -will start on clonazepam    #CV  -admitted w/ sBP 200, since requiring pressure support 2/2 sepsis, pt placed on hydralazine, required cardene drip  -pt currently does not require norepi  -1 episode lai to 30s              -EP consulted for interrogation of pacemaker and cleared  -troponins downtrended; likely demand 2/2 HTN urgency              -will stop trending cardiac enzymes (CK/CKMB negative x3)              -s/p ASA and plavix load. plavix d/c'ed by cards and on hep ggt              -low threshold to stop if any sx bleeding.  -HFpEF (40-50%) w/ severe diastolic failure  -multivalvular disease: MV replacement, severe AS  -c/w heparin and ASA 81mg daily, full anticoagulation for the MV - currently therapeutic levels of aPTT  -c/w hydralazine    #Resp  - Intubated on 9/14; extubated on 9/17. Re-intubated on 9/19 for increased secretions and WOB and extubated 9/25  - initial CXR w/ pulmonary edema vs overlying PNA  - Enterovirus positive on RVP, now w/ GN coccobacillus positive, possible bacteremia from overlying PNA  - + H. flu PNA  - c/w cefepime  - taper steroids to prednisone 20mg daily  - c/w nebs  - chest PT  - pt currently saturating well on NC  - will continue to monitor and assess for secretions and WOB    #GI  -no acute pathology  -c/w tube feeds until pt is more stabilized  -hold bowel regimen if pt continues to have multiple BMs  -d/c PPI ppx  -will consult speech/swallow for eval    #Renal  -LATANYA, likely 2/2 sepsis + HTN urgency; resolved with Cr at baseline  -will monitor I&O's    #ID  - +entero/rhinovirus  - GN coccobacillus (H. influenzae) on 9/14 blood culture  - UClx (9/14): NGTD  - Sputum Clx (9/14): No organisms, repeat combicath (9/21): normal respiratory chastity  - repeat blood cx from 9/17 and 9/21: NGTD  - legionella negative  - c/w cefepime for full 2 week course    #Heme:  -DIC panel negative  -LFTs no longer mildly elevated  - Hb drop from 10 to 8 (now stable); CT A/P was negative for acute bleeding  - c/w heparin gtt; holding home warfarin    #Endo:  - C/w NPH    #Ophtho  -c/w home latanoprost drops for glaucoma    #Dispo  - will continue with PT          For Followup:  []c/w bridge from heparin to coumadin   []can add on Sotalol (home med for Afib)    []f/u official speech and swallow (ordered)      Vital Signs Last 24 Hrs  T(C): 35.8 (27 Sep 2018 08:00), Max: 36.9 (26 Sep 2018 20:00)  T(F): 96.4 (27 Sep 2018 08:00), Max: 98.5 (27 Sep 2018 04:00)  HR: 69 (27 Sep 2018 11:57) (59 - 99)  BP: 118/72 (27 Sep 2018 10:00) (109/55 - 198/76)  BP(mean): 103 (27 Sep 2018 10:00) (79 - 118)  RR: 24 (27 Sep 2018 10:00) (22 - 38)  SpO2: 100% (27 Sep 2018 11:57) (97% - 100%)  I&O's Summary    26 Sep 2018 07:01  -  27 Sep 2018 07:00  --------------------------------------------------------  IN: 670 mL / OUT: 1400 mL / NET: -730 mL    27 Sep 2018 07:01  -  27 Sep 2018 13:22  --------------------------------------------------------  IN: 30 mL / OUT: 0 mL / NET: 30 mL        MEDICATIONS  (STANDING):  ALBUTerol/ipratropium for Nebulization 3 milliLiter(s) Nebulizer every 6 hours  aspirin  chewable 81 milliGRAM(s) Oral daily  bisacodyl Suppository 10 milliGRAM(s) Rectal at bedtime  cefepime   IVPB      cefepime   IVPB 2000 milliGRAM(s) IV Intermittent every 8 hours  dextrose 50% Injectable 12.5 Gram(s) IV Push once  dextrose 50% Injectable 25 Gram(s) IV Push once  dextrose 50% Injectable 25 Gram(s) IV Push once  diltiazem    Tablet 30 milliGRAM(s) Oral every 6 hours  heparin  Infusion 1000 Unit(s)/Hr (9 mL/Hr) IV Continuous <Continuous>  hydrALAZINE 50 milliGRAM(s) Oral every 8 hours  insulin lispro (HumaLOG) corrective regimen sliding scale   SubCutaneous every 6 hours  insulin NPH human recombinant 12 Unit(s) SubCutaneous every 6 hours  latanoprost 0.005% Ophthalmic Solution 1 Drop(s) Both EYES at bedtime  polyethylene glycol 3350 17 Gram(s) Oral two times a day  predniSONE   Tablet 20 milliGRAM(s) Oral daily  QUEtiapine 25 milliGRAM(s) Oral at bedtime  warfarin 5 milliGRAM(s) Oral once    MEDICATIONS  (PRN):  dextrose 40% Gel 15 Gram(s) Oral once PRN Blood Glucose LESS THAN 70 milliGRAM(s)/deciliter  glucagon  Injectable 1 milliGRAM(s) IntraMuscular once PRN Glucose LESS THAN 70 milligrams/deciliter        LABS                                            8.8                   Neurophils% (auto):   x      (09-27 @ 10:09):    20.8 )-----------(229          Lymphocytes% (auto):  x                                             28.0                   Eosinphils% (auto):   x        Manual%: Neutrophils x    ; Lymphocytes x    ; Eosinophils x    ; Bands%: x    ; Blasts x                                    149    |  114    |  50                  Calcium: 10.9  / iCa: x      (09-27 @ 04:11)    ----------------------------<  180       Magnesium: 2.5                              4.5     |  23     |  0.88             Phosphorous: 3.5      TPro  6.9    /  Alb  3.3    /  TBili  0.5    /  DBili  x      /  AST  14     /  ALT  26     /  AlkPhos  76     27 Sep 2018 04:11    ( 09-27 @ 10:09 )   PT: 12.0 sec;   INR: 1.11 ratio  aPTT: 99.4 sec MICU Transfer Note    Transfer from: MICU    Transfer to: (X) Medicine    (  ) Telemetry     (   ) RCU        (    ) Palliative         (   ) Stroke Unit          (   ) __________________    Accepting physician:      MICU COURSE:          ASSESSMENT & PLAN:   83 y/o F w/ a PMH significant for COPD, JENNIE on BiPAP, multivalvular disease (severe AS, s/p MV replacement), HFpEF/severe diastolic failure, A-fib on coumadin, sick sinus syndrome s/p pacemaker placement, HTN, and CKD3 who admitted for acute respiratory failure requiring intubation suspected to be 2/2 COPD exacerbation c/b PNA w/ +entero/rhinovirus and GN coccobacillus and H. influenza bacteremia.    #Neuro  -awake and alert  -no sedation  -pt feels restless  -will start pt on seroquel and d/c clonazepam    #CV  -admitted w/ sBP 200, since requiring pressure support 2/2 sepsis, pt placed on hydralazine, required cardene drip  -pt currently does not require norepi  -HFpEF (40-50%) w/ severe diastolic failure  -multivalvular disease: MV replacement, severe AS  -c/w heparin and ASA 81mg daily, full anticoagulation for the MV - currently therapeutic levels of aPTT and will bridge to coumadin and start 5mg coumadin today if continues to be therapeutic  -pt has increase of BP  -increased hydralazine dose to 50mg Q8  -add diltiazem 30mg q6  -continue to     #Resp  - Intubated on 9/14; extubated on 9/17. Re-intubated on 9/19 for increased secretions and WOB and extubated 9/25  - initial CXR w/ pulmonary edema vs overlying PNA  - Enterovirus positive on RVP, now w/ GN coccobacillus positive, possible bacteremia from overlying PNA  - + H. flu PNA  - c/w cefepime  - c/w prednisone 20mg daily  - c/w nebs  - c/w chest PT  - c/w BiPAP in evening   - pt currently saturating well on NC  - will continue to monitor and assess for secretions and WOB    #GI  -pt has diffuse abdominal tenderness  -will check lipase  -c/w tube feeds until pt is more stabilized and gets speech/swallow eval, will do bedside trial today  -c/w bowel regimen  -d/c PPI ppx    #Renal  -LATANYA, likely 2/2 sepsis + HTN urgency; resolved with Cr at baseline  -pt was hypernatremic and will start 200mL free water Q12  -will continue to monitor I&O's    #ID  - +entero/rhinovirus  - GN coccobacillus (H. influenzae) on 9/14 blood culture  - UClx (9/14): NGTD  - Sputum Clx (9/14): No organisms, repeat combicath (9/21): normal respiratory chastity  - repeat blood cx from 9/17 and 9/21: NGTD  - legionella negative  - c/w cefepime for full 2 week course since blood culture 9/17 (until 10/1)    #Heme:  -DIC panel negative  -LFTs no longer mildly elevated  - Hb drop from 10 to 8 (now stable); CT A/P was negative for acute bleeding  - bridge patient to coumadin for MV replacement and afib.    #Endo:  - C/w NPH    #Ophtho  -c/w home latanoprost drops for glaucoma    #Dispo  - will continue with PT      For Followup:  []c/w bridge from heparin to coumadin   []can add on Sotalol (home med for Afib)    []f/u official speech and swallow (ordered)  []trend lipase    Vital Signs Last 24 Hrs  T(C): 35.8 (27 Sep 2018 08:00), Max: 36.9 (26 Sep 2018 20:00)  T(F): 96.4 (27 Sep 2018 08:00), Max: 98.5 (27 Sep 2018 04:00)  HR: 69 (27 Sep 2018 11:57) (59 - 99)  BP: 118/72 (27 Sep 2018 10:00) (109/55 - 198/76)  BP(mean): 103 (27 Sep 2018 10:00) (79 - 118)  RR: 24 (27 Sep 2018 10:00) (22 - 38)  SpO2: 100% (27 Sep 2018 11:57) (97% - 100%)  I&O's Summary    26 Sep 2018 07:01  -  27 Sep 2018 07:00  --------------------------------------------------------  IN: 670 mL / OUT: 1400 mL / NET: -730 mL    27 Sep 2018 07:01  -  27 Sep 2018 13:22  --------------------------------------------------------  IN: 30 mL / OUT: 0 mL / NET: 30 mL        MEDICATIONS  (STANDING):  ALBUTerol/ipratropium for Nebulization 3 milliLiter(s) Nebulizer every 6 hours  aspirin  chewable 81 milliGRAM(s) Oral daily  bisacodyl Suppository 10 milliGRAM(s) Rectal at bedtime  cefepime   IVPB      cefepime   IVPB 2000 milliGRAM(s) IV Intermittent every 8 hours  dextrose 50% Injectable 12.5 Gram(s) IV Push once  dextrose 50% Injectable 25 Gram(s) IV Push once  dextrose 50% Injectable 25 Gram(s) IV Push once  diltiazem    Tablet 30 milliGRAM(s) Oral every 6 hours  heparin  Infusion 1000 Unit(s)/Hr (9 mL/Hr) IV Continuous <Continuous>  hydrALAZINE 50 milliGRAM(s) Oral every 8 hours  insulin lispro (HumaLOG) corrective regimen sliding scale   SubCutaneous every 6 hours  insulin NPH human recombinant 12 Unit(s) SubCutaneous every 6 hours  latanoprost 0.005% Ophthalmic Solution 1 Drop(s) Both EYES at bedtime  polyethylene glycol 3350 17 Gram(s) Oral two times a day  predniSONE   Tablet 20 milliGRAM(s) Oral daily  QUEtiapine 25 milliGRAM(s) Oral at bedtime  warfarin 5 milliGRAM(s) Oral once    MEDICATIONS  (PRN):  dextrose 40% Gel 15 Gram(s) Oral once PRN Blood Glucose LESS THAN 70 milliGRAM(s)/deciliter  glucagon  Injectable 1 milliGRAM(s) IntraMuscular once PRN Glucose LESS THAN 70 milligrams/deciliter        LABS                                            8.8                   Neurophils% (auto):   x      (09-27 @ 10:09):    20.8 )-----------(229          Lymphocytes% (auto):  x                                             28.0                   Eosinphils% (auto):   x        Manual%: Neutrophils x    ; Lymphocytes x    ; Eosinophils x    ; Bands%: x    ; Blasts x                                    149    |  114    |  50                  Calcium: 10.9  / iCa: x      (09-27 @ 04:11)    ----------------------------<  180       Magnesium: 2.5                              4.5     |  23     |  0.88             Phosphorous: 3.5      TPro  6.9    /  Alb  3.3    /  TBili  0.5    /  DBili  x      /  AST  14     /  ALT  26     /  AlkPhos  76     27 Sep 2018 04:11    ( 09-27 @ 10:09 )   PT: 12.0 sec;   INR: 1.11 ratio  aPTT: 99.4 sec MICU Transfer Note    Transfer from: MICU    Transfer to: (X) Medicine    (  ) Telemetry     (   ) RCU        (    ) Palliative         (   ) Stroke Unit          (   ) __________________    Accepting physician:      MICU COURSE:  83 yo woman with PMH COPD on home O2 (not used for few months), JENNIE on BiPAP, rheumatic heart disease s/p mitral valve replacement, HFpEF, aortic stenosis, A-fib on coumadin, sick sinus syndrome s/p pacemaker placement, HTN, and CKD3 presented w/ dyspnea x2 days. Initially placed on BiPAP in the ED but intubated for continued tachypnea, tripoding/increased work of breathing. She was +entero/rhinovirus on RVP, CXR revealed vascular congestion, given 1g ceftriaxone, 500mg azithro, 40mg lasix, multiple duonebs. Admitted to MICU for pulmonary edema vs CHF exacerbation vs COPD exacerbation.   Pt was extubated on 9/17 but required reintubation 9/19 for increased secretions and WOB. She was extubated on 9/25 and continued with home BiPAP setting in evenings. Pt was started on solumedrol and tapered to oral prednisone.   Trops were also initially elevated and uptrended from 21 to 261 and received ASA and plavix loading but troponins downtrended and CK/CKMB negative x 3 and plavix d/c'ed. Pt switched to heparin gtt after home coumadin was d/c'ed with increasing INR and currently restarted coumadin after therapeutic aPTT achieved.   Pt was initially hypertensive in 200's but subsequently required pressor support with NE and diuresis was d/c'ed in the setting of shock. Pt was then weaned off pressors and became hypertensive requiring a nicardipine drip, currently blood pressure controlled with hydralazine and diltiazem. Had an episode of bradycardia to 30's which spontaneously resolved and pacemaker interrogation revealed normal functioning.   Pt had a decrease in hgb and CT A/P performed which was negative for bleeding and hgb has been stable.   Pt was found to have GN coccobacillus (H. influenza) on blood culture (9/14) and continued on cefepime and azithromycin. Pt had negative blood culture on 9/17. Pt currently on only cefepime.    ASSESSMENT & PLAN:   83 y/o F w/ a PMH significant for COPD, JENNIE on BiPAP, multivalvular disease (severe AS, s/p MV replacement), HFpEF/severe diastolic failure, A-fib on coumadin, sick sinus syndrome s/p pacemaker placement, HTN, and CKD3 who admitted for acute respiratory failure requiring intubation suspected to be 2/2 COPD exacerbation c/b PNA w/ +entero/rhinovirus and GN coccobacillus and H. influenza bacteremia.    #Neuro  -awake and alert  -no sedation  -pt feels restless  -will start pt on seroquel and d/c clonazepam    #CV  -admitted w/ sBP 200, since requiring pressure support 2/2 sepsis, pt placed on hydralazine, required cardene drip  -pt currently does not require norepi  -HFpEF (40-50%) w/ severe diastolic failure  -multivalvular disease: MV replacement, severe AS  -c/w heparin and ASA 81mg daily, full anticoagulation for the MV - currently therapeutic levels of aPTT and will bridge to coumadin and start 5mg coumadin today if continues to be therapeutic  -pt has increase of BP  -increased hydralazine dose to 50mg Q8  -add diltiazem 30mg q6  -continue to monitor BP    #Resp  - Intubated on 9/14; extubated on 9/17. Re-intubated on 9/19 for increased secretions and WOB and extubated 9/25  - initial CXR w/ pulmonary edema vs overlying PNA  - Enterovirus positive on RVP, now w/ GN coccobacillus positive, possible bacteremia from overlying PNA  - + H. flu PNA  - c/w cefepime  - c/w prednisone 20mg daily  - c/w nebs  - c/w chest PT  - c/w BiPAP in evening   - pt currently saturating well on NC  - will continue to monitor and assess for secretions and WOB    #GI  -pt has diffuse abdominal tenderness  -will check lipase  -c/w tube feeds until pt is more stabilized and gets speech/swallow eval, will do bedside trial today  -c/w bowel regimen  -d/c PPI ppx    #Renal  -LATANYA, likely 2/2 sepsis + HTN urgency; resolved with Cr at baseline  -pt was hypernatremic and will start 200mL free water Q12  -will continue to monitor I&O's    #ID  - +entero/rhinovirus  - GN coccobacillus (H. influenzae) on 9/14 blood culture  - UClx (9/14): NGTD  - Sputum Clx (9/14): No organisms, repeat combicath (9/21): normal respiratory chastity  - repeat blood cx from 9/17 and 9/21: NGTD  - legionella negative  - c/w cefepime for full 2 week course since blood culture 9/17 (until 10/1)    #Heme:  -DIC panel negative  -LFTs no longer mildly elevated  - Hb drop from 10 to 8 (now stable); CT A/P was negative for acute bleeding  - bridge patient to coumadin for MV replacement and afib.    #Endo:  - C/w NPH    #Ophtho  -c/w home latanoprost drops for glaucoma    #Dispo  - will continue with PT    For Followup:  []c/w bridge from heparin to coumadin   []can add on Sotalol (home med for Afib)    []f/u official speech and swallow (ordered)  []trend lipase  []anxiety- started on seroquel  []c/w abx for bacteremia    Vital Signs Last 24 Hrs  T(C): 35.8 (27 Sep 2018 08:00), Max: 36.9 (26 Sep 2018 20:00)  T(F): 96.4 (27 Sep 2018 08:00), Max: 98.5 (27 Sep 2018 04:00)  HR: 69 (27 Sep 2018 11:57) (59 - 99)  BP: 118/72 (27 Sep 2018 10:00) (109/55 - 198/76)  BP(mean): 103 (27 Sep 2018 10:00) (79 - 118)  RR: 24 (27 Sep 2018 10:00) (22 - 38)  SpO2: 100% (27 Sep 2018 11:57) (97% - 100%)  I&O's Summary    26 Sep 2018 07:01  -  27 Sep 2018 07:00  --------------------------------------------------------  IN: 670 mL / OUT: 1400 mL / NET: -730 mL    27 Sep 2018 07:01  -  27 Sep 2018 13:22  --------------------------------------------------------  IN: 30 mL / OUT: 0 mL / NET: 30 mL        MEDICATIONS  (STANDING):  ALBUTerol/ipratropium for Nebulization 3 milliLiter(s) Nebulizer every 6 hours  aspirin  chewable 81 milliGRAM(s) Oral daily  bisacodyl Suppository 10 milliGRAM(s) Rectal at bedtime  cefepime   IVPB      cefepime   IVPB 2000 milliGRAM(s) IV Intermittent every 8 hours  dextrose 50% Injectable 12.5 Gram(s) IV Push once  dextrose 50% Injectable 25 Gram(s) IV Push once  dextrose 50% Injectable 25 Gram(s) IV Push once  diltiazem    Tablet 30 milliGRAM(s) Oral every 6 hours  heparin  Infusion 1000 Unit(s)/Hr (9 mL/Hr) IV Continuous <Continuous>  hydrALAZINE 50 milliGRAM(s) Oral every 8 hours  insulin lispro (HumaLOG) corrective regimen sliding scale   SubCutaneous every 6 hours  insulin NPH human recombinant 12 Unit(s) SubCutaneous every 6 hours  latanoprost 0.005% Ophthalmic Solution 1 Drop(s) Both EYES at bedtime  polyethylene glycol 3350 17 Gram(s) Oral two times a day  predniSONE   Tablet 20 milliGRAM(s) Oral daily  QUEtiapine 25 milliGRAM(s) Oral at bedtime  warfarin 5 milliGRAM(s) Oral once    MEDICATIONS  (PRN):  dextrose 40% Gel 15 Gram(s) Oral once PRN Blood Glucose LESS THAN 70 milliGRAM(s)/deciliter  glucagon  Injectable 1 milliGRAM(s) IntraMuscular once PRN Glucose LESS THAN 70 milligrams/deciliter        LABS                                            8.8                   Neurophils% (auto):   x      (09-27 @ 10:09):    20.8 )-----------(229          Lymphocytes% (auto):  x                                             28.0                   Eosinphils% (auto):   x        Manual%: Neutrophils x    ; Lymphocytes x    ; Eosinophils x    ; Bands%: x    ; Blasts x                                    149    |  114    |  50                  Calcium: 10.9  / iCa: x      (09-27 @ 04:11)    ----------------------------<  180       Magnesium: 2.5                              4.5     |  23     |  0.88             Phosphorous: 3.5      TPro  6.9    /  Alb  3.3    /  TBili  0.5    /  DBili  x      /  AST  14     /  ALT  26     /  AlkPhos  76     27 Sep 2018 04:11    ( 09-27 @ 10:09 )   PT: 12.0 sec;   INR: 1.11 ratio  aPTT: 99.4 sec

## 2018-09-27 NOTE — PROGRESS NOTE ADULT - ASSESSMENT
85 y/o F w/ a PMH significant for COPD, JENNIE on BiPAP, multivalvular disease (severe AS, s/p MV replacement), HFpEF/severe diastolic failure, A-fib on coumadin, sick sinus syndrome s/p pacemaker placement, HTN, and CKD3 who admitted for acute respiratory failure requiring intubation suspected to be 2/2 COPD exacerbation c/b PNA w/ +entero/rhinovirus and GN coccobacillus.    #Neuro  - awake and alert, oriented x 3  -no longer on sedation  -pt feels restless  -will start on clonazepam    #CV  -admitted w/ sBP 200, since requiring pressure support 2/2 sepsis, pt placed on hydralazine, required cardene drip  -pt currently does not require norepi  -1 episode lai to 30s              -EP consulted for interrogation of pacemaker and cleared  -troponins downtrended; likely demand 2/2 HTN urgency              -will stop trending cardiac enzymes (CK/CKMB negative x3)              -s/p ASA and plavix load. plavix d/c'ed by cards and on hep ggt              -low threshold to stop if any sx bleeding.  -HFpEF (40-50%) w/ severe diastolic failure  -multivalvular disease: MV replacement, severe AS  -c/w heparin and ASA 81mg daily, full anticoagulation for the MV - currently therapeutic levels of aPTT  -c/w hydralazine    #Resp  - Intubated on 9/14; extubated on 9/17. Re-intubated on 9/19 for increased secretions and WOB and extubated 9/25  - initial CXR w/ pulmonary edema vs overlying PNA  - Enterovirus positive on RVP, now w/ GN coccobacillus positive, possible bacteremia from overlying PNA  - + H. flu PNA  - c/w cefepime  - taper steroids to prednisone 20mg daily  - c/w nebs  - chest PT  - pt currently saturating well on NC  - will continue to monitor and assess for secretions and WOB    #GI  -no acute pathology  -c/w tube feeds until pt is more stabilized  -hold bowel regimen if pt continues to have multiple BMs  -d/c PPI ppx  -will consult speech/swallow for eval    #Renal  -LATANYA, likely 2/2 sepsis + HTN urgency; resolved with Cr at baseline  -will monitor I&O's    #ID  - +entero/rhinovirus  - GN coccobacillus (H. influenzae) on 9/14 blood culture  - UClx (9/14): NGTD  - Sputum Clx (9/14): No organisms, repeat combicath (9/21): normal respiratory chastity  - repeat blood cx from 9/17 and 9/21: NGTD  - legionella negative  - c/w cefepime for full 2 week course    #Heme:  -DIC panel negative  -LFTs no longer mildly elevated  - Hb drop from 10 to 8 (now stable); CT A/P was negative for acute bleeding  - c/w heparin gtt; holding home warfarin    #Endo:  - C/w NPH    #Ophtho  -c/w home latanoprost drops for glaucoma    #Dispo  - will continue with PT 83 y/o F w/ a PMH significant for COPD, JENNIE on BiPAP, multivalvular disease (severe AS, s/p MV replacement), HFpEF/severe diastolic failure, A-fib on coumadin, sick sinus syndrome s/p pacemaker placement, HTN, and CKD3 who admitted for acute respiratory failure requiring intubation suspected to be 2/2 COPD exacerbation c/b PNA w/ +entero/rhinovirus and GN coccobacillus and H. influenza bacteremia.    #Neuro  -awake and alert  -no sedation  -pt feels restless  -will start on clonazepam    #CV  -admitted w/ sBP 200, since requiring pressure support 2/2 sepsis, pt placed on hydralazine, required cardene drip  -pt currently does not require norepi  -1 episode lai to 30s              -EP consulted for interrogation of pacemaker and cleared  -troponins downtrended; likely demand 2/2 HTN urgency              -will stop trending cardiac enzymes (CK/CKMB negative x3)              -s/p ASA and plavix load. plavix d/c'ed by cards and on hep ggt              -low threshold to stop if any sx bleeding.  -HFpEF (40-50%) w/ severe diastolic failure  -multivalvular disease: MV replacement, severe AS  -c/w heparin and ASA 81mg daily, full anticoagulation for the MV - currently therapeutic levels of aPTT  -c/w hydralazine    #Resp  - Intubated on 9/14; extubated on 9/17. Re-intubated on 9/19 for increased secretions and WOB and extubated 9/25  - initial CXR w/ pulmonary edema vs overlying PNA  - Enterovirus positive on RVP, now w/ GN coccobacillus positive, possible bacteremia from overlying PNA  - + H. flu PNA  - c/w cefepime  - taper steroids to prednisone 20mg daily  - c/w nebs  - chest PT  - pt currently saturating well on NC  - will continue to monitor and assess for secretions and WOB    #GI  -no acute pathology  -c/w tube feeds until pt is more stabilized  -hold bowel regimen if pt continues to have multiple BMs  -d/c PPI ppx  -will consult speech/swallow for eval    #Renal  -LATANYA, likely 2/2 sepsis + HTN urgency; resolved with Cr at baseline  -will monitor I&O's    #ID  - +entero/rhinovirus  - GN coccobacillus (H. influenzae) on 9/14 blood culture  - UClx (9/14): NGTD  - Sputum Clx (9/14): No organisms, repeat combicath (9/21): normal respiratory chastity  - repeat blood cx from 9/17 and 9/21: NGTD  - legionella negative  - c/w cefepime for full 2 week course    #Heme:  -DIC panel negative  -LFTs no longer mildly elevated  - Hb drop from 10 to 8 (now stable); CT A/P was negative for acute bleeding  - c/w heparin gtt; holding home warfarin    #Endo:  - C/w NPH    #Ophtho  -c/w home latanoprost drops for glaucoma    #Dispo  - will continue with PT 83 y/o F w/ a PMH significant for COPD, JENNIE on BiPAP, multivalvular disease (severe AS, s/p MV replacement), HFpEF/severe diastolic failure, A-fib on coumadin, sick sinus syndrome s/p pacemaker placement, HTN, and CKD3 who admitted for acute respiratory failure requiring intubation suspected to be 2/2 COPD exacerbation c/b PNA w/ +entero/rhinovirus and GN coccobacillus and H. influenza bacteremia.    #Neuro  -awake and alert  -no sedation  -pt feels restless  -will start pt on seroquel and d/c clonazepam    #CV  -admitted w/ sBP 200, since requiring pressure support 2/2 sepsis, pt placed on hydralazine, required cardene drip  -pt currently does not require norepi  -HFpEF (40-50%) w/ severe diastolic failure  -multivalvular disease: MV replacement, severe AS  -c/w heparin and ASA 81mg daily, full anticoagulation for the MV - currently therapeutic levels of aPTT and will bridge to coumadin and start 5mg coumadin today if continues to be therapeutic  -pt has increase of BP  -increased hydralazine dose to 50mg Q8  -add diltiazem 30mg q6  -continue to     #Resp  - Intubated on 9/14; extubated on 9/17. Re-intubated on 9/19 for increased secretions and WOB and extubated 9/25  - initial CXR w/ pulmonary edema vs overlying PNA  - Enterovirus positive on RVP, now w/ GN coccobacillus positive, possible bacteremia from overlying PNA  - + H. flu PNA  - c/w cefepime  - c/w prednisone 20mg daily  - c/w nebs  - c/w chest PT  - pt currently saturating well on NC  - will continue to monitor and assess for secretions and WOB    #GI  -pt has diffuse abdominal tenderness  -will check lipase  -c/w tube feeds until pt is more stabilized and gets speech/swallow eval, will do bedside trial today  -c/w bowel regimen  -d/c PPI ppx    #Renal  -LATANYA, likely 2/2 sepsis + HTN urgency; resolved with Cr at baseline  -pt was hypernatremic and will start 200mL free water Q12  -will continue to monitor I&O's    #ID  - +entero/rhinovirus  - GN coccobacillus (H. influenzae) on 9/14 blood culture  - UClx (9/14): NGTD  - Sputum Clx (9/14): No organisms, repeat combicath (9/21): normal respiratory chastity  - repeat blood cx from 9/17 and 9/21: NGTD  - legionella negative  - c/w cefepime for full 2 week course since blood culture 9/17 (until 10/1)    #Heme:  -DIC panel negative  -LFTs no longer mildly elevated  - Hb drop from 10 to 8 (now stable); CT A/P was negative for acute bleeding  - bridge patient to coumadin for MV replacement and afib.    #Endo:  - C/w NPH    #Ophtho  -c/w home latanoprost drops for glaucoma    #Dispo  - will continue with PT 85 y/o F w/ a PMH significant for COPD, JENNIE on BiPAP, multivalvular disease (severe AS, s/p MV replacement), HFpEF/severe diastolic failure, A-fib on coumadin, sick sinus syndrome s/p pacemaker placement, HTN, and CKD3 who admitted for acute respiratory failure requiring intubation suspected to be 2/2 COPD exacerbation c/b PNA w/ +entero/rhinovirus and GN coccobacillus and H. influenza bacteremia.    #Neuro  -awake and alert  -no sedation  -pt feels restless  -will start pt on seroquel and d/c clonazepam    #CV  -admitted w/ sBP 200, since requiring pressure support 2/2 sepsis, pt placed on hydralazine, required cardene drip  -pt currently does not require norepi  -HFpEF (40-50%) w/ severe diastolic failure  -multivalvular disease: MV replacement, severe AS  -c/w heparin and ASA 81mg daily, full anticoagulation for the MV - currently therapeutic levels of aPTT and will bridge to coumadin and start 5mg coumadin today if continues to be therapeutic  -pt has increase of BP  -increased hydralazine dose to 50mg Q8  -add diltiazem 30mg q6  -continue to monitor BP    #Resp  - Intubated on 9/14; extubated on 9/17. Re-intubated on 9/19 for increased secretions and WOB and extubated 9/25  - initial CXR w/ pulmonary edema vs overlying PNA  - Enterovirus positive on RVP, now w/ GN coccobacillus positive, possible bacteremia from overlying PNA  - + H. flu PNA  - c/w cefepime  - c/w prednisone 20mg daily  - c/w nebs  - c/w chest PT  - c/w BiPAP at night  - pt currently saturating well on NC  - will continue to monitor and assess for secretions and WOB    #GI  -pt has diffuse abdominal tenderness  -will check lipase  -c/w tube feeds until pt is more stabilized and gets speech/swallow eval, will do bedside trial today  -c/w bowel regimen  -d/c PPI ppx    #Renal  -LATANYA, likely 2/2 sepsis + HTN urgency; resolved with Cr at baseline  -pt was hypernatremic and will start 200mL free water Q12  -will continue to monitor I&O's    #ID  - +entero/rhinovirus  - GN coccobacillus (H. influenzae) on 9/14 blood culture  - UClx (9/14): NGTD  - Sputum Clx (9/14): No organisms, repeat combicath (9/21): normal respiratory chastity  - repeat blood cx from 9/17 and 9/21: NGTD  - legionella negative  - c/w cefepime for full 2 week course since blood culture 9/17 (until 10/1)    #Heme:  -DIC panel negative  -LFTs no longer mildly elevated  - Hb drop from 10 to 8 (now stable); CT A/P was negative for acute bleeding  - bridge patient to coumadin for MV replacement and afib.    #Endo:  - C/w NPH    #Ophtho  -c/w home latanoprost drops for glaucoma    #Dispo  - will continue with PT

## 2018-09-27 NOTE — SWALLOW BEDSIDE ASSESSMENT ADULT - COMMENTS
Hx contd: Trops were also initially elevated and uptrended from 21 to 261 and received ASA and plavix loading but troponins downtrended and CK/CKMB negative x 3 and plavix d/c'ed. Pt switched to heparin gtt after home coumadin was d/c'ed with increasing INR and currently restarted coumadin after therapeutic aPTT achieved. Pt was initially hypertensive in 200's but subsequently required pressor support with NE and diuresis was d/c'ed in the setting of shock. Pt was then weaned off pressors and became hypertensive requiring a nicardipine drip, currently blood pressure controlled with hydralazine and diltiazem. Had an episode of bradycardia to 30's which spontaneously resolved and pacemaker interrogation revealed normal functioning. Pt had a decrease in hgb and CT A/P performed which was negative for bleeding and hgb has been stable. Pt was found to have GN coccobacillus (H. influenza) on blood culture (9/14) and continued on cefepime and azithromycin. Pt had negative blood culture on 9/17. Pt currently on only cefepime. Hx contd: Trops were also initially elevated and uptrended from 21 to 261 and received ASA and plavix loading but troponins downtrended and CK/CKMB negative x 3 and plavix d/c'ed. Pt switched to heparin gtt after home coumadin was d/c'ed with increasing INR and currently restarted coumadin after therapeutic aPTT achieved. Pt was initially hypertensive in 200's but subsequently required pressor support with NE and diuresis was d/c'ed in the setting of shock. Pt was then weaned off pressors and became hypertensive requiring a nicardipine drip, currently blood pressure controlled with hydralazine and diltiazem. Had an episode of bradycardia to 30's which spontaneously resolved and pacemaker interrogation revealed normal functioning. Pt had a decrease in hgb and CT A/P performed which was negative for bleeding and hgb has been stable. Pt was found to have GN coccobacillus (H. influenza) on blood culture (9/14) and continued on cefepime and azithromycin. Pt had negative blood culture on 9/17. Pt currently on only cefepime. Of note, AXR 9/19: Nonspecific gas pattern. NG tube is in the stomach. Air is seen in the colon. A few loops of small bowel appear to be present however no radiographic evidence for obstruction. Ileus cannot be ruled out entirely.  Per d/w MD Quinn, no concern for ileus at this time. Pt is cleared for intake of all PO.

## 2018-09-27 NOTE — CHART NOTE - NSCHARTNOTEFT_GEN_A_CORE
: Ronna Amezcua    INDICATION: Evaluate heart and lungs    PROCEDURE:  [x] LIMITED ECHO  [x] LIMITED CHEST  [ ] LIMITED RETROPERITONEAL  [ ] LIMITED ABDOMINAL  [ ] LIMITED DVT  [ ] NEEDLE GUIDANCE VASCULAR  [ ] NEEDLE GUIDANCE THORACENTESIS  [ ] NEEDLE GUIDANCE PARACENTESIS  [ ] NEEDLE GUIDANCE PERICARDIOCENTESIS  [ ] OTHER    FINDINGS:  ECHO: Normal contractility, no pericardial effusion  LUNGS: Natalia predominant anteriorly, no consolidation, no pneumothorax,     INTERPRETATION:  Euvolemic, no need for more Lasix. : Ronna Amezcua    INDICATION: Evaluate heart and lungs    PROCEDURE:  [x] LIMITED ECHO  [x] LIMITED CHEST  [ ] LIMITED RETROPERITONEAL  [ ] LIMITED ABDOMINAL  [ ] LIMITED DVT  [ ] NEEDLE GUIDANCE VASCULAR  [ ] NEEDLE GUIDANCE THORACENTESIS  [ ] NEEDLE GUIDANCE PARACENTESIS  [ ] NEEDLE GUIDANCE PERICARDIOCENTESIS  [ ] OTHER    FINDINGS:  ECHO: Normal contractility, no pericardial effusion  LUNGS: Natalia predominant anteriorly, no consolidation, no pneumothorax,     INTERPRETATION: 1. Normal LVF. 2. No evidence of pneumonia or pleural effusion.

## 2018-09-27 NOTE — SWALLOW BEDSIDE ASSESSMENT ADULT - SLP GENERAL OBSERVATIONS
Patient encountered ~35 degrees upright in bed, +KFT. Pt's eyes closed throughout majority of assessment Patient encountered ~35 degrees upright in bed, +KFT. Pt's eyes closed throughout majority of assessment; upon attempt to rouse with verbal and tactile stim, pt noted to occasional vocalize "mhm" with occasional eye opening. Pt unable to respond to orientation questions, unable to state name, unable to follow any directives or keep eyes open. As assessment progressed however pt only roused to noxious stimuli. Pt tachypneic with RR up to 31 via direct observation. Baseline throat clearing noted throughout assessment.

## 2018-09-27 NOTE — PROGRESS NOTE ADULT - SUBJECTIVE AND OBJECTIVE BOX
INTERVAL HPI/OVERNIGHT EVENTS: pt was anxious and was given melatonin which helped. restarted on nightly BiPAP. pt's BP elevated 200's and was given hydralazine 10mg push and increased standing dose to 50mg    SUBJECTIVE: Patient seen and examined at bedside.     OBJECTIVE:    VITAL SIGNS:  ICU Vital Signs Last 24 Hrs  T(C): 36.9 (27 Sep 2018 04:00), Max: 36.9 (26 Sep 2018 20:00)  T(F): 98.5 (27 Sep 2018 04:00), Max: 98.5 (27 Sep 2018 04:00)  HR: 74 (27 Sep 2018 06:38) (59 - 99)  BP: 109/55 (27 Sep 2018 06:38) (109/55 - 204/146)  BP(mean): 79 (27 Sep 2018 06:38) (79 - 166)    RR: 30 (27 Sep 2018 06:38) (22 - 38)  SpO2: 100% (27 Sep 2018 06:38) (93% - 100%)    09-26 @ 07:01  -  09-27 @ 07:00  --------------------------------------------------------  IN: 670 mL / OUT: 1400 mL / NET: -730 mL      CAPILLARY BLOOD GLUCOSE  POCT Blood Glucose.: 195 mg/dL (27 Sep 2018 05:27)      PHYSICAL EXAM:  General: NAD  HEENT: NC/AT; PERRL, clear conjunctiva  Respiratory: CTA b/l  Cardiovascular: +S1/S2; RRR  Abdomen: soft, NT/ND; +BS x4  Extremities: WWP, 2+ peripheral pulses b/l; no LE edema  Skin: normal color and turgor; no rash  Neurological:    LINES:     MEDICATIONS  (STANDING):  ALBUTerol/ipratropium for Nebulization 3 milliLiter(s) Nebulizer every 6 hours  aspirin  chewable 81 milliGRAM(s) Oral daily  heparin  Infusion 1000 Unit(s)/Hr (10 mL/Hr) IV Continuous <Continuous>  bisacodyl Suppository 10 milliGRAM(s) Rectal at bedtime  polyethylene glycol 3350 17 Gram(s) Oral two times a day  cefepime   IVPB 2000 milliGRAM(s) IV Intermittent every 8 hours  clonazePAM Tablet 0.5 milliGRAM(s) Oral two times a day  hydrALAZINE 50 milliGRAM(s) Oral every 8 hours  insulin lispro (HumaLOG) corrective regimen sliding scale   SubCutaneous every 6 hours  insulin NPH human recombinant 12 Unit(s) SubCutaneous every 6 hours  latanoprost 0.005% Ophthalmic Solution 1 Drop(s) Both EYES at bedtime  predniSONE   Tablet 20 milliGRAM(s) Oral daily    ALLERGIES:  penicillin (Rash)    LABS:                        9.8    21.7  )-----------( 247      ( 27 Sep 2018 04:11 )             31.7     Mean Cell Volume : 89.5 fl  Mean Cell Hemoglobin : 27.7 pg  Mean Cell Hemoglobin Concentration : 30.9 gm/dL    09-27    149<H>  |  114<H>  |  50<H>  ----------------------------<  180<H>  4.5   |  23  |  0.88    Ca    10.9<H>      27 Sep 2018 04:11  Phos  3.5     09-27  Mg     2.5     09-27    TPro  6.9  /  Alb  3.3  /  TBili  0.5  /  DBili  x   /  AST  14  /  ALT  26  /  AlkPhos  76  09-27    Creatinine Trend: 0.88<--, 0.89<--, 0.86<--, 0.80<--, 1.00<--, 1.34<--  LIVER FUNCTIONS - ( 27 Sep 2018 04:11 )  Alb: 3.3 g/dL / Pro: 6.9 g/dL / ALK PHOS: 76 U/L / ALT: 26 U/L / AST: 14 U/L / GGT: x           PT/INR - ( 27 Sep 2018 04:12 )   PT: 11.6 sec;   INR: 1.06 ratio    PTT - ( 27 Sep 2018 04:12 )  PTT:87.5 sec    MICROBIOLOGY:  Culture - Blood (09.21.18 @ 20:46)    Specimen Source: .Blood Blood-Peripheral    Culture Results:   No growth at 5 days.    Culture - Bronchial (09.21.18 @ 21:22)    Gram Stain:   No squamous epithelial cells per low power field  Few polymorphonuclear leukocytes per low power field  No organisms seen per oil power field    Specimen Source: Bronch Wash Combicath    Culture Results:   Normal Respiratory María present    RADIOLOGY & ADDITIONAL TESTS: Reviewed. INTERVAL HPI/OVERNIGHT EVENTS: pt was anxious and was given melatonin which helped. restarted on nightly BiPAP. pt's BP elevated 200's and was given hydralazine 10mg push and increased standing dose to 50mg    SUBJECTIVE: Patient seen and examined at bedside. pt's son reports poor sleep but mental status near baseline.    OBJECTIVE:    VITAL SIGNS:  ICU Vital Signs Last 24 Hrs  T(C): 36.9 (27 Sep 2018 04:00), Max: 36.9 (26 Sep 2018 20:00)  T(F): 98.5 (27 Sep 2018 04:00), Max: 98.5 (27 Sep 2018 04:00)  HR: 74 (27 Sep 2018 06:38) (59 - 99)  BP: 109/55 (27 Sep 2018 06:38) (109/55 - 204/146)  BP(mean): 79 (27 Sep 2018 06:38) (79 - 166)    RR: 30 (27 Sep 2018 06:38) (22 - 38)  SpO2: 100% (27 Sep 2018 06:38) (93% - 100%)    09-26 @ 07:01  -  09-27 @ 07:00  --------------------------------------------------------  IN: 670 mL / OUT: 1400 mL / NET: -730 mL      CAPILLARY BLOOD GLUCOSE  POCT Blood Glucose.: 195 mg/dL (27 Sep 2018 05:27)    PHYSICAL EXAM:  General: elderly woman sitting up with NG tube and nasal canula in no acute distress  HEENT: NC/AT; PERRL, clear conjunctiva  Respiratory: crackles bilaterally  Cardiovascular: normal s1, s2, regular rate, systolic murmur  Abdomen: soft, mildly distended, diffusely tender to palpation  Extremities: 2+ peripheral pulses b/l; no LE edema  Skin: normal color; no rash  Neurological: awake and A&O x 2    LINES: PIV    MEDICATIONS  (STANDING):  ALBUTerol/ipratropium for Nebulization 3 milliLiter(s) Nebulizer every 6 hours  aspirin  chewable 81 milliGRAM(s) Oral daily  heparin  Infusion 1000 Unit(s)/Hr (10 mL/Hr) IV Continuous <Continuous>  bisacodyl Suppository 10 milliGRAM(s) Rectal at bedtime  polyethylene glycol 3350 17 Gram(s) Oral two times a day  cefepime   IVPB 2000 milliGRAM(s) IV Intermittent every 8 hours  clonazePAM Tablet 0.5 milliGRAM(s) Oral two times a day  hydrALAZINE 50 milliGRAM(s) Oral every 8 hours  insulin lispro (HumaLOG) corrective regimen sliding scale   SubCutaneous every 6 hours  insulin NPH human recombinant 12 Unit(s) SubCutaneous every 6 hours  latanoprost 0.005% Ophthalmic Solution 1 Drop(s) Both EYES at bedtime  predniSONE   Tablet 20 milliGRAM(s) Oral daily    ALLERGIES:  penicillin (Rash)    LABS:                        9.8    21.7  )-----------( 247      ( 27 Sep 2018 04:11 )             31.7     Mean Cell Volume : 89.5 fl  Mean Cell Hemoglobin : 27.7 pg  Mean Cell Hemoglobin Concentration : 30.9 gm/dL    09-27    149<H>  |  114<H>  |  50<H>  ----------------------------<  180<H>  4.5   |  23  |  0.88    Ca    10.9<H>      27 Sep 2018 04:11  Phos  3.5     09-27  Mg     2.5     09-27    TPro  6.9  /  Alb  3.3  /  TBili  0.5  /  DBili  x   /  AST  14  /  ALT  26  /  AlkPhos  76  09-27    Creatinine Trend: 0.88<--, 0.89<--, 0.86<--, 0.80<--, 1.00<--, 1.34<--  LIVER FUNCTIONS - ( 27 Sep 2018 04:11 )  Alb: 3.3 g/dL / Pro: 6.9 g/dL / ALK PHOS: 76 U/L / ALT: 26 U/L / AST: 14 U/L / GGT: x           PT/INR - ( 27 Sep 2018 04:12 )   PT: 11.6 sec;   INR: 1.06 ratio    PTT - ( 27 Sep 2018 04:12 )  PTT:87.5 sec    MICROBIOLOGY:  Culture - Blood (09.21.18 @ 20:46)    Specimen Source: .Blood Blood-Peripheral    Culture Results:   No growth at 5 days.    Culture - Bronchial (09.21.18 @ 21:22)    Gram Stain:   No squamous epithelial cells per low power field  Few polymorphonuclear leukocytes per low power field  No organisms seen per oil power field    Specimen Source: Bronch Wash Combicath    Culture Results:   Normal Respiratory María present    RADIOLOGY & ADDITIONAL TESTS: Reviewed.

## 2018-09-27 NOTE — SWALLOW BEDSIDE ASSESSMENT ADULT - SWALLOW EVAL: DIAGNOSIS
Patient presents with 1) mentation not supportive for oral feeding at this time, 2) tachypnea with RR up to 31 via direct observation, and s/s of baseline reduced secretion management with throat clearing at baseline, suggestive of possible laryngeal penetration and/or aspiration of secretions. PO trials are contraindicated at this time. Patient presents with 1) mentation not supportive for oral feeding at this time, 2) tachypnea with RR up to 31 via direct observation, and s/s of baseline reduced secretion management with throat clearing at baseline, suggestive of possible laryngeal penetration and/or aspiration of secretions.

## 2018-09-28 LAB
ALBUMIN SERPL ELPH-MCNC: 3.3 G/DL — SIGNIFICANT CHANGE UP (ref 3.3–5)
ALBUMIN SERPL ELPH-MCNC: 3.3 G/DL — SIGNIFICANT CHANGE UP (ref 3.3–5)
ALP SERPL-CCNC: 68 U/L — SIGNIFICANT CHANGE UP (ref 40–120)
ALP SERPL-CCNC: 70 U/L — SIGNIFICANT CHANGE UP (ref 40–120)
ALT FLD-CCNC: 23 U/L — SIGNIFICANT CHANGE UP (ref 10–45)
ALT FLD-CCNC: 23 U/L — SIGNIFICANT CHANGE UP (ref 10–45)
ANION GAP SERPL CALC-SCNC: 11 MMOL/L — SIGNIFICANT CHANGE UP (ref 5–17)
ANION GAP SERPL CALC-SCNC: 9 MMOL/L — SIGNIFICANT CHANGE UP (ref 5–17)
APPEARANCE UR: ABNORMAL
APTT BLD: 23.2 SEC — LOW (ref 27.5–37.4)
APTT BLD: 25.9 SEC — LOW (ref 27.5–37.4)
AST SERPL-CCNC: 10 U/L — SIGNIFICANT CHANGE UP (ref 10–40)
AST SERPL-CCNC: 12 U/L — SIGNIFICANT CHANGE UP (ref 10–40)
BILIRUB SERPL-MCNC: 0.4 MG/DL — SIGNIFICANT CHANGE UP (ref 0.2–1.2)
BILIRUB SERPL-MCNC: 0.4 MG/DL — SIGNIFICANT CHANGE UP (ref 0.2–1.2)
BILIRUB UR-MCNC: NEGATIVE — SIGNIFICANT CHANGE UP
BUN SERPL-MCNC: 76 MG/DL — HIGH (ref 7–23)
BUN SERPL-MCNC: 79 MG/DL — HIGH (ref 7–23)
CALCIUM SERPL-MCNC: 11.1 MG/DL — HIGH (ref 8.4–10.5)
CALCIUM SERPL-MCNC: 11.2 MG/DL — HIGH (ref 8.4–10.5)
CHLORIDE SERPL-SCNC: 115 MMOL/L — HIGH (ref 96–108)
CHLORIDE SERPL-SCNC: 118 MMOL/L — HIGH (ref 96–108)
CO2 SERPL-SCNC: 21 MMOL/L — LOW (ref 22–31)
CO2 SERPL-SCNC: 22 MMOL/L — SIGNIFICANT CHANGE UP (ref 22–31)
COLOR SPEC: YELLOW — SIGNIFICANT CHANGE UP
CREAT SERPL-MCNC: 0.96 MG/DL — SIGNIFICANT CHANGE UP (ref 0.5–1.3)
CREAT SERPL-MCNC: 0.99 MG/DL — SIGNIFICANT CHANGE UP (ref 0.5–1.3)
DIFF PNL FLD: ABNORMAL
GAS PNL BLDA: SIGNIFICANT CHANGE UP
GLUCOSE BLDC GLUCOMTR-MCNC: 120 MG/DL — HIGH (ref 70–99)
GLUCOSE BLDC GLUCOMTR-MCNC: 132 MG/DL — HIGH (ref 70–99)
GLUCOSE BLDC GLUCOMTR-MCNC: 187 MG/DL — HIGH (ref 70–99)
GLUCOSE BLDC GLUCOMTR-MCNC: 225 MG/DL — HIGH (ref 70–99)
GLUCOSE SERPL-MCNC: 214 MG/DL — HIGH (ref 70–99)
GLUCOSE SERPL-MCNC: 238 MG/DL — HIGH (ref 70–99)
GLUCOSE UR QL: NEGATIVE — SIGNIFICANT CHANGE UP
HCT VFR BLD CALC: 22.5 % — LOW (ref 34.5–45)
HCT VFR BLD CALC: 23 % — LOW (ref 34.5–45)
HCT VFR BLD CALC: 24.7 % — LOW (ref 34.5–45)
HGB BLD-MCNC: 7.3 G/DL — LOW (ref 11.5–15.5)
HGB BLD-MCNC: 7.3 G/DL — LOW (ref 11.5–15.5)
HGB BLD-MCNC: 7.8 G/DL — LOW (ref 11.5–15.5)
INR BLD: 1.11 RATIO — SIGNIFICANT CHANGE UP (ref 0.88–1.16)
INR BLD: 1.12 RATIO — SIGNIFICANT CHANGE UP (ref 0.88–1.16)
KETONES UR-MCNC: ABNORMAL
LEUKOCYTE ESTERASE UR-ACNC: ABNORMAL
MAGNESIUM SERPL-MCNC: 2.3 MG/DL — SIGNIFICANT CHANGE UP (ref 1.6–2.6)
MAGNESIUM SERPL-MCNC: 2.4 MG/DL — SIGNIFICANT CHANGE UP (ref 1.6–2.6)
MCHC RBC-ENTMCNC: 28.4 PG — SIGNIFICANT CHANGE UP (ref 27–34)
MCHC RBC-ENTMCNC: 28.6 PG — SIGNIFICANT CHANGE UP (ref 27–34)
MCHC RBC-ENTMCNC: 29.1 PG — SIGNIFICANT CHANGE UP (ref 27–34)
MCHC RBC-ENTMCNC: 31.4 GM/DL — LOW (ref 32–36)
MCHC RBC-ENTMCNC: 31.7 GM/DL — LOW (ref 32–36)
MCHC RBC-ENTMCNC: 32.3 GM/DL — SIGNIFICANT CHANGE UP (ref 32–36)
MCV RBC AUTO: 90.1 FL — SIGNIFICANT CHANGE UP (ref 80–100)
MCV RBC AUTO: 90.3 FL — SIGNIFICANT CHANGE UP (ref 80–100)
MCV RBC AUTO: 90.5 FL — SIGNIFICANT CHANGE UP (ref 80–100)
NITRITE UR-MCNC: NEGATIVE — SIGNIFICANT CHANGE UP
PH UR: 5.5 — SIGNIFICANT CHANGE UP (ref 5–8)
PHOSPHATE SERPL-MCNC: 3 MG/DL — SIGNIFICANT CHANGE UP (ref 2.5–4.5)
PHOSPHATE SERPL-MCNC: 3.4 MG/DL — SIGNIFICANT CHANGE UP (ref 2.5–4.5)
PLATELET # BLD AUTO: 212 K/UL — SIGNIFICANT CHANGE UP (ref 150–400)
PLATELET # BLD AUTO: 219 K/UL — SIGNIFICANT CHANGE UP (ref 150–400)
PLATELET # BLD AUTO: 236 K/UL — SIGNIFICANT CHANGE UP (ref 150–400)
POTASSIUM SERPL-MCNC: 4.3 MMOL/L — SIGNIFICANT CHANGE UP (ref 3.5–5.3)
POTASSIUM SERPL-MCNC: 4.6 MMOL/L — SIGNIFICANT CHANGE UP (ref 3.5–5.3)
POTASSIUM SERPL-SCNC: 4.3 MMOL/L — SIGNIFICANT CHANGE UP (ref 3.5–5.3)
POTASSIUM SERPL-SCNC: 4.6 MMOL/L — SIGNIFICANT CHANGE UP (ref 3.5–5.3)
PROT SERPL-MCNC: 5.9 G/DL — LOW (ref 6–8.3)
PROT SERPL-MCNC: 6.4 G/DL — SIGNIFICANT CHANGE UP (ref 6–8.3)
PROT UR-MCNC: ABNORMAL
PROTHROM AB SERPL-ACNC: 12 SEC — SIGNIFICANT CHANGE UP (ref 9.8–12.7)
PROTHROM AB SERPL-ACNC: 12.1 SEC — SIGNIFICANT CHANGE UP (ref 9.8–12.7)
PTH-INTACT FLD-MCNC: 67 PG/ML — HIGH (ref 15–65)
RBC # BLD: 2.5 M/UL — LOW (ref 3.8–5.2)
RBC # BLD: 2.55 M/UL — LOW (ref 3.8–5.2)
RBC # BLD: 2.73 M/UL — LOW (ref 3.8–5.2)
RBC # FLD: 18.1 % — HIGH (ref 10.3–14.5)
RBC # FLD: 18.3 % — HIGH (ref 10.3–14.5)
RBC # FLD: 18.3 % — HIGH (ref 10.3–14.5)
RH IG SCN BLD-IMP: POSITIVE — SIGNIFICANT CHANGE UP
SODIUM SERPL-SCNC: 145 MMOL/L — SIGNIFICANT CHANGE UP (ref 135–145)
SODIUM SERPL-SCNC: 151 MMOL/L — HIGH (ref 135–145)
SP GR SPEC: 1.03 — HIGH (ref 1.01–1.02)
UROBILINOGEN FLD QL: NEGATIVE — SIGNIFICANT CHANGE UP
WBC # BLD: 20 K/UL — HIGH (ref 3.8–10.5)
WBC # BLD: 21.5 K/UL — HIGH (ref 3.8–10.5)
WBC # BLD: 21.8 K/UL — HIGH (ref 3.8–10.5)
WBC # FLD AUTO: 20 K/UL — HIGH (ref 3.8–10.5)
WBC # FLD AUTO: 21.5 K/UL — HIGH (ref 3.8–10.5)
WBC # FLD AUTO: 21.8 K/UL — HIGH (ref 3.8–10.5)

## 2018-09-28 PROCEDURE — 99291 CRITICAL CARE FIRST HOUR: CPT

## 2018-09-28 PROCEDURE — 76604 US EXAM CHEST: CPT | Mod: 26,GC

## 2018-09-28 PROCEDURE — 71045 X-RAY EXAM CHEST 1 VIEW: CPT | Mod: 26

## 2018-09-28 PROCEDURE — 93010 ELECTROCARDIOGRAM REPORT: CPT

## 2018-09-28 RX ORDER — PANTOPRAZOLE SODIUM 20 MG/1
40 TABLET, DELAYED RELEASE ORAL EVERY 12 HOURS
Qty: 0 | Refills: 0 | Status: DISCONTINUED | OUTPATIENT
Start: 2018-09-28 | End: 2018-10-03

## 2018-09-28 RX ORDER — ACETAMINOPHEN 500 MG
650 TABLET ORAL ONCE
Qty: 0 | Refills: 0 | Status: COMPLETED | OUTPATIENT
Start: 2018-09-28 | End: 2018-09-28

## 2018-09-28 RX ORDER — MEROPENEM 1 G/30ML
1000 INJECTION INTRAVENOUS ONCE
Qty: 0 | Refills: 0 | Status: COMPLETED | OUTPATIENT
Start: 2018-09-28 | End: 2018-09-28

## 2018-09-28 RX ORDER — FUROSEMIDE 40 MG
40 TABLET ORAL ONCE
Qty: 0 | Refills: 0 | Status: COMPLETED | OUTPATIENT
Start: 2018-09-28 | End: 2018-09-28

## 2018-09-28 RX ORDER — HEPARIN SODIUM 5000 [USP'U]/ML
900 INJECTION INTRAVENOUS; SUBCUTANEOUS
Qty: 25000 | Refills: 0 | Status: DISCONTINUED | OUTPATIENT
Start: 2018-09-28 | End: 2018-09-28

## 2018-09-28 RX ORDER — LANOLIN ALCOHOL/MO/W.PET/CERES
3 CREAM (GRAM) TOPICAL AT BEDTIME
Qty: 0 | Refills: 0 | Status: DISCONTINUED | OUTPATIENT
Start: 2018-09-28 | End: 2018-10-02

## 2018-09-28 RX ORDER — HEPARIN SODIUM 5000 [USP'U]/ML
5000 INJECTION INTRAVENOUS; SUBCUTANEOUS EVERY 6 HOURS
Qty: 0 | Refills: 0 | Status: DISCONTINUED | OUTPATIENT
Start: 2018-09-28 | End: 2018-09-28

## 2018-09-28 RX ORDER — MEROPENEM 1 G/30ML
1000 INJECTION INTRAVENOUS EVERY 8 HOURS
Qty: 0 | Refills: 0 | Status: DISCONTINUED | OUTPATIENT
Start: 2018-09-28 | End: 2018-10-02

## 2018-09-28 RX ORDER — FUROSEMIDE 40 MG
20 TABLET ORAL ONCE
Qty: 0 | Refills: 0 | Status: DISCONTINUED | OUTPATIENT
Start: 2018-09-28 | End: 2018-09-28

## 2018-09-28 RX ORDER — MEROPENEM 1 G/30ML
INJECTION INTRAVENOUS
Qty: 0 | Refills: 0 | Status: DISCONTINUED | OUTPATIENT
Start: 2018-09-28 | End: 2018-10-02

## 2018-09-28 RX ORDER — HEPARIN SODIUM 5000 [USP'U]/ML
2500 INJECTION INTRAVENOUS; SUBCUTANEOUS EVERY 6 HOURS
Qty: 0 | Refills: 0 | Status: DISCONTINUED | OUTPATIENT
Start: 2018-09-28 | End: 2018-09-28

## 2018-09-28 RX ORDER — ACETAMINOPHEN 500 MG
650 TABLET ORAL EVERY 6 HOURS
Qty: 0 | Refills: 0 | Status: DISCONTINUED | OUTPATIENT
Start: 2018-09-28 | End: 2018-09-28

## 2018-09-28 RX ORDER — SENNA PLUS 8.6 MG/1
2 TABLET ORAL AT BEDTIME
Qty: 0 | Refills: 0 | Status: DISCONTINUED | OUTPATIENT
Start: 2018-09-28 | End: 2018-10-03

## 2018-09-28 RX ORDER — FUROSEMIDE 40 MG
20 TABLET ORAL ONCE
Qty: 0 | Refills: 0 | Status: COMPLETED | OUTPATIENT
Start: 2018-09-28 | End: 2018-09-28

## 2018-09-28 RX ADMIN — PANTOPRAZOLE SODIUM 40 MILLIGRAM(S): 20 TABLET, DELAYED RELEASE ORAL at 18:40

## 2018-09-28 RX ADMIN — HUMAN INSULIN 12 UNIT(S): 100 INJECTION, SUSPENSION SUBCUTANEOUS at 13:06

## 2018-09-28 RX ADMIN — HEPARIN SODIUM 900 UNIT(S)/HR: 5000 INJECTION INTRAVENOUS; SUBCUTANEOUS at 07:41

## 2018-09-28 RX ADMIN — Medication 81 MILLIGRAM(S): at 12:53

## 2018-09-28 RX ADMIN — HUMAN INSULIN 12 UNIT(S): 100 INJECTION, SUSPENSION SUBCUTANEOUS at 18:40

## 2018-09-28 RX ADMIN — Medication 20 MILLIGRAM(S): at 05:24

## 2018-09-28 RX ADMIN — LATANOPROST 1 DROP(S): 0.05 SOLUTION/ DROPS OPHTHALMIC; TOPICAL at 21:44

## 2018-09-28 RX ADMIN — Medication 3 MILLILITER(S): at 23:18

## 2018-09-28 RX ADMIN — Medication 3 MILLIGRAM(S): at 21:44

## 2018-09-28 RX ADMIN — Medication 50 MILLIGRAM(S): at 23:09

## 2018-09-28 RX ADMIN — Medication 50 MILLIGRAM(S): at 06:29

## 2018-09-28 RX ADMIN — Medication 4: at 12:59

## 2018-09-28 RX ADMIN — SENNA PLUS 2 TABLET(S): 8.6 TABLET ORAL at 21:44

## 2018-09-28 RX ADMIN — Medication 650 MILLIGRAM(S): at 17:00

## 2018-09-28 RX ADMIN — Medication 50 MILLIGRAM(S): at 16:36

## 2018-09-28 RX ADMIN — PANTOPRAZOLE SODIUM 40 MILLIGRAM(S): 20 TABLET, DELAYED RELEASE ORAL at 03:41

## 2018-09-28 RX ADMIN — MEROPENEM 100 MILLIGRAM(S): 1 INJECTION INTRAVENOUS at 12:54

## 2018-09-28 RX ADMIN — HUMAN INSULIN 12 UNIT(S): 100 INJECTION, SUSPENSION SUBCUTANEOUS at 06:29

## 2018-09-28 RX ADMIN — Medication 3 MILLILITER(S): at 05:24

## 2018-09-28 RX ADMIN — Medication 3 MILLILITER(S): at 17:06

## 2018-09-28 RX ADMIN — MEROPENEM 100 MILLIGRAM(S): 1 INJECTION INTRAVENOUS at 21:43

## 2018-09-28 RX ADMIN — Medication 2: at 06:29

## 2018-09-28 RX ADMIN — Medication 40 MILLIGRAM(S): at 14:22

## 2018-09-28 RX ADMIN — MEROPENEM 100 MILLIGRAM(S): 1 INJECTION INTRAVENOUS at 04:08

## 2018-09-28 RX ADMIN — Medication 260 MILLIGRAM(S): at 16:00

## 2018-09-28 RX ADMIN — Medication 20 MILLIGRAM(S): at 10:30

## 2018-09-28 RX ADMIN — Medication 3 MILLILITER(S): at 11:25

## 2018-09-28 NOTE — CHART NOTE - NSCHARTNOTEFT_GEN_A_CORE
: Ronna Amezcua    INDICATION: Evaluate lungs    PROCEDURE:  [ ] LIMITED ECHO  [x] LIMITED CHEST  [ ] LIMITED RETROPERITONEAL  [ ] LIMITED ABDOMINAL  [ ] LIMITED DVT  [ ] NEEDLE GUIDANCE VASCULAR  [ ] NEEDLE GUIDANCE THORACENTESIS  [ ] NEEDLE GUIDANCE PARACENTESIS  [ ] NEEDLE GUIDANCE PERICARDIOCENTESIS  [ ] OTHER    FINDINGS:  B lines anteriorly bilaterally  no pneumothorax, no pleural effusion.     INTERPRETATION:  Findings suggestive of pulmonary edema.

## 2018-09-28 NOTE — PROGRESS NOTE ADULT - ATTENDING COMMENTS
1. Neuro: More confused and less responsive today. In part from Seroquel?  Will d/c Seroquel.  2. Pulmonary : Chest ultrasounds show  Diffuse B lines bilaterally which is new. Smooth pleura. Likely Pulmonary edema  from acute on chronic diastolic heart failure. Will increase Cardizem to 60mg po q6 hrs and give IV Lasix. Goal for 500-100mls negative today.  3. Hb dropped but remained low 7 for past q2 hours. Possible melena reported. D/C heparin until H/H  remains stable  for additional 8-12 hrs.  Check FBOT. If  H/H stable ,  restart IV heparin. Pt with Afib and MVR.  4. Started on meropenem because of abnormal cxr. however ,  chest ultrasound  more consistent with pulmonary edema than infection. Will continue meropenem for another 24 hrs. Await cx results. Pt has received 14 days of abx for H. Influenza pneumonia and bacteremia. If all remaining cxs are negative consider D/C  meropenem.   5. COPD Prednisone 20mg today then 3 days of 10 mg. Then D/C prednisone.    cc time 35 min

## 2018-09-28 NOTE — PROGRESS NOTE ADULT - SUBJECTIVE AND OBJECTIVE BOX
INTERVAL HPI/OVERNIGHT EVENTS: pt's hgb decreased to 7.3 and was consented for transfusion. 2 BM's yesterday with melena. hep ggt was held. PPI restarted. pt continued to have increased WBC and was lethargic. CXR revealed new infiltrates in left lung. Cefepime was changed to meropenem. Pt had blood culture, sputum culture, and UA sent.    SUBJECTIVE: Patient seen and examined at bedside.     OBJECTIVE:    VITAL SIGNS:  ICU Vital Signs Last 24 Hrs  T(C): 37.1 (28 Sep 2018 04:00), Max: 37.1 (28 Sep 2018 04:00)  T(F): 98.8 (28 Sep 2018 04:00), Max: 98.8 (28 Sep 2018 04:00)  HR: 72 (28 Sep 2018 06:00) (59 - 93)  BP: 177/70 (28 Sep 2018 06:00) (118/72 - 179/71)  BP(mean): 101 (28 Sep 2018 06:00) (80 - 118)  RR: 38 (28 Sep 2018 06:00) (20 - 47)  SpO2: 96% (28 Sep 2018 06:00) (93% - 100%)       @ 07:01  -   @ 07:00  --------------------------------------------------------  IN: 1637 mL / OUT: 1200 mL / NET: 437 mL      CAPILLARY BLOOD GLUCOSE  POCT Blood Glucose.: 187 mg/dL (28 Sep 2018 06:24)      PHYSICAL EXAM:    General: NAD  HEENT: NC/AT; PERRL, clear conjunctiva  Respiratory: CTA b/l  Cardiovascular: +S1/S2; RRR  Abdomen: soft, NT/ND; +BS x4  Extremities: WWP, 2+ peripheral pulses b/l; no LE edema  Skin: normal color and turgor; no rash  Neurological:    LINES:     MEDICATIONS  (STANDING):  ALBUTerol/ipratropium for Nebulization 3 milliLiter(s) Nebulizer every 6 hours  aspirin  chewable 81 milliGRAM(s) Oral daily  diltiazem    Tablet 30 milliGRAM(s) Oral every 6 hours  hydrALAZINE 50 milliGRAM(s) Oral every 8 hours  heparin  Infusion. 900 Unit(s)/Hr (9 mL/Hr) IV Continuous <Continuous>  insulin lispro (HumaLOG) corrective regimen sliding scale   SubCutaneous every 6 hours  insulin NPH human recombinant 12 Unit(s) SubCutaneous every 6 hours  latanoprost 0.005% Ophthalmic Solution 1 Drop(s) Both EYES at bedtime  meropenem  IVPB 1000 milliGRAM(s) IV Intermittent every 8 hours  pantoprazole  Injectable 40 milliGRAM(s) IV Push every 12 hours  bisacodyl Suppository 10 milliGRAM(s) Rectal at bedtime  polyethylene glycol 3350 17 Gram(s) Oral two times a day  predniSONE   Tablet 20 milliGRAM(s) Oral daily    ALLERGIES:  penicillin (Rash)    LABS:                        7.3    20.0  )-----------( 212      ( 28 Sep 2018 06:29 )             23.0     Mean Cell Volume : 90.3 fl  Mean Cell Hemoglobin : 28.6 pg  Mean Cell Hemoglobin Concentration : 31.7 gm/dL          145  |  115<H>  |  79<H>  ----------------------------<  214<H>  4.3   |  21<L>  |  0.96    Ca    11.1<H>      28 Sep 2018 02:44  Phos  3.0       Mg     2.4         TPro  5.9<L>  /  Alb  3.3  /  TBili  0.4  /  DBili  x   /  AST  12  /  ALT  23  /  AlkPhos  68      Creatinine Trend: 0.96<--, 0.88<--, 0.89<--, 0.86<--, 0.80<--, 1.00<--  LIVER FUNCTIONS - ( 28 Sep 2018 02:44 )  Alb: 3.3 g/dL / Pro: 5.9 g/dL / ALK PHOS: 68 U/L / ALT: 23 U/L / AST: 12 U/L / GGT: x           PT/INR - ( 28 Sep 2018 06:29 )   PT: 12.0 sec;   INR: 1.11 ratio    PTT - ( 28 Sep 2018 06:29 )  PTT:23.2 sec      ABG - ( 27 Sep 2018 22:32 )  pH, Arterial: 7.38  pH, Blood: x     /  pCO2: 43    /  pO2: 92    / HCO3: 25    / Base Excess: .0    /  SaO2: 96        Urinalysis Basic - ( 28 Sep 2018 04:16 )    Color: Yellow / Appearance: Slightly Turbid / S.027 / pH: x  Gluc: x / Ketone: Small  / Bili: Negative / Urobili: Negative   Blood: x / Protein: 100 mg/dL / Nitrite: Negative   Leuk Esterase: Small / RBC: 2-5 /hpf / WBC 2-5 /hpf   Sq Epi: x / Non Sq Epi: x / Bacteria: Few    MICROBIOLOGY:  Blood culture  Sputum culture    IMAGING:  CXR    RADIOLOGY & ADDITIONAL TESTS: Reviewed. INTERVAL HPI/OVERNIGHT EVENTS: pt's hgb decreased to 7.3 and was consented for transfusion. 2 BM's yesterday with melena. hep ggt was held. PPI restarted. pt continued to have increased WBC and was lethargic. CXR revealed new infiltrates in left lung. Cefepime was changed to meropenem. Pt had blood culture, sputum culture, and UA sent. pt had hypercalcemia and PTH was sent.    SUBJECTIVE: Patient seen and examined at bedside. pt's son reports that pt was not alert or following commands yesterday night s/p seroquel. they report that she was very restless as well.    OBJECTIVE:    VITAL SIGNS:  ICU Vital Signs Last 24 Hrs  T(C): 37.1 (28 Sep 2018 04:00), Max: 37.1 (28 Sep 2018 04:00)  T(F): 98.8 (28 Sep 2018 04:00), Max: 98.8 (28 Sep 2018 04:00)  HR: 72 (28 Sep 2018 06:00) (59 - 93)  BP: 177/70 (28 Sep 2018 06:00) (118/72 - 179/71)  BP(mean): 101 (28 Sep 2018 06:00) (80 - 118)  RR: 38 (28 Sep 2018 06:00) (20 - 47)  SpO2: 96% (28 Sep 2018 06:00) (93% - 100%)       @ 07:01  -   @ 07:00  --------------------------------------------------------  IN: 1637 mL / OUT: 1200 mL / NET: 437 mL      CAPILLARY BLOOD GLUCOSE  POCT Blood Glucose.: 187 mg/dL (28 Sep 2018 06:24)      PHYSICAL EXAM:    General: elderly woman in moderate distress, lying uncomfortably  HEENT: NC/AT; PERRL  Respiratory: coarse breath sounds  Cardiovascular: regular rate, systolic murmur  Abdomen: soft, nondistended, nontender  Extremities: 2+ peripheral pulses b/l  Skin: normal color, no rash  Neurological: awake and mildly agitated and not following commands    LINES:     MEDICATIONS  (STANDING):  ALBUTerol/ipratropium for Nebulization 3 milliLiter(s) Nebulizer every 6 hours  aspirin  chewable 81 milliGRAM(s) Oral daily  diltiazem    Tablet 30 milliGRAM(s) Oral every 6 hours  hydrALAZINE 50 milliGRAM(s) Oral every 8 hours  heparin  Infusion. 900 Unit(s)/Hr (9 mL/Hr) IV Continuous <Continuous>  insulin lispro (HumaLOG) corrective regimen sliding scale   SubCutaneous every 6 hours  insulin NPH human recombinant 12 Unit(s) SubCutaneous every 6 hours  latanoprost 0.005% Ophthalmic Solution 1 Drop(s) Both EYES at bedtime  meropenem  IVPB 1000 milliGRAM(s) IV Intermittent every 8 hours  pantoprazole  Injectable 40 milliGRAM(s) IV Push every 12 hours  bisacodyl Suppository 10 milliGRAM(s) Rectal at bedtime  polyethylene glycol 3350 17 Gram(s) Oral two times a day  predniSONE   Tablet 20 milliGRAM(s) Oral daily    ALLERGIES:  penicillin (Rash)    LABS:                        7.3    20.0  )-----------( 212      ( 28 Sep 2018 06:29 )             23.0     Mean Cell Volume : 90.3 fl  Mean Cell Hemoglobin : 28.6 pg  Mean Cell Hemoglobin Concentration : 31.7 gm/dL        145  |  115<H>  |  79<H>  ----------------------------<  214<H>  4.3   |  21<L>  |  0.96    Ca    11.1<H>      28 Sep 2018 02:44  Phos  3.0       Mg     2.4         TPro  5.9<L>  /  Alb  3.3  /  TBili  0.4  /  DBili  x   /  AST  12  /  ALT  23  /  AlkPhos  68      Creatinine Trend: 0.96<--, 0.88<--, 0.89<--, 0.86<--, 0.80<--, 1.00<--  LIVER FUNCTIONS - ( 28 Sep 2018 02:44 )  Alb: 3.3 g/dL / Pro: 5.9 g/dL / ALK PHOS: 68 U/L / ALT: 23 U/L / AST: 12 U/L / GGT: x           PT/INR - ( 28 Sep 2018 06:29 )   PT: 12.0 sec;   INR: 1.11 ratio    PTT - ( 28 Sep 2018 06:29 )  PTT:23.2 sec    ABG - ( 27 Sep 2018 22:32 )  pH, Arterial: 7.38  pH, Blood: x     /  pCO2: 43    /  pO2: 92    / HCO3: 25    / Base Excess: .0    /  SaO2: 96        Urinalysis Basic - ( 28 Sep 2018 04:16 )    Color: Yellow / Appearance: Slightly Turbid / S.027 / pH: x  Gluc: x / Ketone: Small  / Bili: Negative / Urobili: Negative   Blood: x / Protein: 100 mg/dL / Nitrite: Negative   Leuk Esterase: Small / RBC: 2-5 /hpf / WBC 2-5 /hpf   Sq Epi: x / Non Sq Epi: x / Bacteria: Few    MICROBIOLOGY:  Blood culture  Sputum culture    IMAGING:  CXR    RADIOLOGY & ADDITIONAL TESTS: Reviewed.

## 2018-09-29 LAB
ALBUMIN SERPL ELPH-MCNC: 3.1 G/DL — LOW (ref 3.3–5)
ALBUMIN SERPL ELPH-MCNC: 3.1 G/DL — LOW (ref 3.3–5)
ALBUMIN SERPL ELPH-MCNC: 3.2 G/DL — LOW (ref 3.3–5)
ALBUMIN SERPL ELPH-MCNC: 3.3 G/DL — SIGNIFICANT CHANGE UP (ref 3.3–5)
ALP SERPL-CCNC: 54 U/L — SIGNIFICANT CHANGE UP (ref 40–120)
ALP SERPL-CCNC: 57 U/L — SIGNIFICANT CHANGE UP (ref 40–120)
ALP SERPL-CCNC: 62 U/L — SIGNIFICANT CHANGE UP (ref 40–120)
ALP SERPL-CCNC: 63 U/L — SIGNIFICANT CHANGE UP (ref 40–120)
ALT FLD-CCNC: 20 U/L — SIGNIFICANT CHANGE UP (ref 10–45)
ALT FLD-CCNC: 21 U/L — SIGNIFICANT CHANGE UP (ref 10–45)
ALT FLD-CCNC: 22 U/L — SIGNIFICANT CHANGE UP (ref 10–45)
ALT FLD-CCNC: 23 U/L — SIGNIFICANT CHANGE UP (ref 10–45)
ANION GAP SERPL CALC-SCNC: 12 MMOL/L — SIGNIFICANT CHANGE UP (ref 5–17)
ANION GAP SERPL CALC-SCNC: 12 MMOL/L — SIGNIFICANT CHANGE UP (ref 5–17)
ANION GAP SERPL CALC-SCNC: 8 MMOL/L — SIGNIFICANT CHANGE UP (ref 5–17)
ANION GAP SERPL CALC-SCNC: 9 MMOL/L — SIGNIFICANT CHANGE UP (ref 5–17)
APTT BLD: 25.8 SEC — LOW (ref 27.5–37.4)
APTT BLD: 26.5 SEC — LOW (ref 27.5–37.4)
APTT BLD: 63.5 SEC — HIGH (ref 27.5–37.4)
AST SERPL-CCNC: 12 U/L — SIGNIFICANT CHANGE UP (ref 10–40)
AST SERPL-CCNC: 14 U/L — SIGNIFICANT CHANGE UP (ref 10–40)
BILIRUB SERPL-MCNC: 0.5 MG/DL — SIGNIFICANT CHANGE UP (ref 0.2–1.2)
BUN SERPL-MCNC: 62 MG/DL — HIGH (ref 7–23)
BUN SERPL-MCNC: 62 MG/DL — HIGH (ref 7–23)
BUN SERPL-MCNC: 67 MG/DL — HIGH (ref 7–23)
BUN SERPL-MCNC: 74 MG/DL — HIGH (ref 7–23)
CALCIUM SERPL-MCNC: 10.1 MG/DL — SIGNIFICANT CHANGE UP (ref 8.4–10.5)
CALCIUM SERPL-MCNC: 10.9 MG/DL — HIGH (ref 8.4–10.5)
CALCIUM SERPL-MCNC: 11.1 MG/DL — HIGH (ref 8.4–10.5)
CALCIUM SERPL-MCNC: 11.2 MG/DL — HIGH (ref 8.4–10.5)
CALCIUM SERPL-MCNC: 11.3 MG/DL — HIGH (ref 8.4–10.5)
CHLORIDE SERPL-SCNC: 101 MMOL/L — SIGNIFICANT CHANGE UP (ref 96–108)
CHLORIDE SERPL-SCNC: 113 MMOL/L — HIGH (ref 96–108)
CHLORIDE SERPL-SCNC: 116 MMOL/L — HIGH (ref 96–108)
CHLORIDE SERPL-SCNC: 118 MMOL/L — HIGH (ref 96–108)
CO2 SERPL-SCNC: 22 MMOL/L — SIGNIFICANT CHANGE UP (ref 22–31)
CO2 SERPL-SCNC: 23 MMOL/L — SIGNIFICANT CHANGE UP (ref 22–31)
CO2 SERPL-SCNC: 24 MMOL/L — SIGNIFICANT CHANGE UP (ref 22–31)
CO2 SERPL-SCNC: 25 MMOL/L — SIGNIFICANT CHANGE UP (ref 22–31)
CREAT SERPL-MCNC: 0.94 MG/DL — SIGNIFICANT CHANGE UP (ref 0.5–1.3)
CREAT SERPL-MCNC: 1.04 MG/DL — SIGNIFICANT CHANGE UP (ref 0.5–1.3)
CREAT SERPL-MCNC: 1.04 MG/DL — SIGNIFICANT CHANGE UP (ref 0.5–1.3)
CREAT SERPL-MCNC: 1.11 MG/DL — SIGNIFICANT CHANGE UP (ref 0.5–1.3)
GLUCOSE BLDC GLUCOMTR-MCNC: 177 MG/DL — HIGH (ref 70–99)
GLUCOSE BLDC GLUCOMTR-MCNC: 178 MG/DL — HIGH (ref 70–99)
GLUCOSE BLDC GLUCOMTR-MCNC: 184 MG/DL — HIGH (ref 70–99)
GLUCOSE BLDC GLUCOMTR-MCNC: 184 MG/DL — HIGH (ref 70–99)
GLUCOSE BLDC GLUCOMTR-MCNC: 199 MG/DL — HIGH (ref 70–99)
GLUCOSE SERPL-MCNC: 117 MG/DL — HIGH (ref 70–99)
GLUCOSE SERPL-MCNC: 168 MG/DL — HIGH (ref 70–99)
GLUCOSE SERPL-MCNC: 173 MG/DL — HIGH (ref 70–99)
GLUCOSE SERPL-MCNC: 664 MG/DL — CRITICAL HIGH (ref 70–99)
HCT VFR BLD CALC: 20.9 % — CRITICAL LOW (ref 34.5–45)
HCT VFR BLD CALC: 22.6 % — LOW (ref 34.5–45)
HCT VFR BLD CALC: 23 % — LOW (ref 34.5–45)
HCT VFR BLD CALC: 23.2 % — LOW (ref 34.5–45)
HGB BLD-MCNC: 6.6 G/DL — CRITICAL LOW (ref 11.5–15.5)
HGB BLD-MCNC: 7.1 G/DL — LOW (ref 11.5–15.5)
HGB BLD-MCNC: 7.2 G/DL — LOW (ref 11.5–15.5)
HGB BLD-MCNC: 7.2 G/DL — LOW (ref 11.5–15.5)
INR BLD: 1.18 RATIO — HIGH (ref 0.88–1.16)
INR BLD: 1.2 RATIO — HIGH (ref 0.88–1.16)
LIDOCAIN IGE QN: 125 U/L — HIGH (ref 7–60)
MAGNESIUM SERPL-MCNC: 2.2 MG/DL — SIGNIFICANT CHANGE UP (ref 1.6–2.6)
MAGNESIUM SERPL-MCNC: 2.4 MG/DL — SIGNIFICANT CHANGE UP (ref 1.6–2.6)
MAGNESIUM SERPL-MCNC: 2.4 MG/DL — SIGNIFICANT CHANGE UP (ref 1.6–2.6)
MCHC RBC-ENTMCNC: 28.6 PG — SIGNIFICANT CHANGE UP (ref 27–34)
MCHC RBC-ENTMCNC: 28.7 PG — SIGNIFICANT CHANGE UP (ref 27–34)
MCHC RBC-ENTMCNC: 28.9 PG — SIGNIFICANT CHANGE UP (ref 27–34)
MCHC RBC-ENTMCNC: 29.5 PG — SIGNIFICANT CHANGE UP (ref 27–34)
MCHC RBC-ENTMCNC: 31.1 GM/DL — LOW (ref 32–36)
MCHC RBC-ENTMCNC: 31.3 GM/DL — LOW (ref 32–36)
MCHC RBC-ENTMCNC: 31.4 GM/DL — LOW (ref 32–36)
MCHC RBC-ENTMCNC: 31.7 GM/DL — LOW (ref 32–36)
MCV RBC AUTO: 91 FL — SIGNIFICANT CHANGE UP (ref 80–100)
MCV RBC AUTO: 92.2 FL — SIGNIFICANT CHANGE UP (ref 80–100)
MCV RBC AUTO: 92.4 FL — SIGNIFICANT CHANGE UP (ref 80–100)
MCV RBC AUTO: 93.1 FL — SIGNIFICANT CHANGE UP (ref 80–100)
PHOSPHATE SERPL-MCNC: 2.4 MG/DL — LOW (ref 2.5–4.5)
PHOSPHATE SERPL-MCNC: 2.7 MG/DL — SIGNIFICANT CHANGE UP (ref 2.5–4.5)
PHOSPHATE SERPL-MCNC: 3.6 MG/DL — SIGNIFICANT CHANGE UP (ref 2.5–4.5)
PLATELET # BLD AUTO: 166 K/UL — SIGNIFICANT CHANGE UP (ref 150–400)
PLATELET # BLD AUTO: 196 K/UL — SIGNIFICANT CHANGE UP (ref 150–400)
PLATELET # BLD AUTO: 200 K/UL — SIGNIFICANT CHANGE UP (ref 150–400)
PLATELET # BLD AUTO: 239 K/UL — SIGNIFICANT CHANGE UP (ref 150–400)
POTASSIUM SERPL-MCNC: 3.6 MMOL/L — SIGNIFICANT CHANGE UP (ref 3.5–5.3)
POTASSIUM SERPL-MCNC: 4.1 MMOL/L — SIGNIFICANT CHANGE UP (ref 3.5–5.3)
POTASSIUM SERPL-MCNC: 4.2 MMOL/L — SIGNIFICANT CHANGE UP (ref 3.5–5.3)
POTASSIUM SERPL-MCNC: 4.5 MMOL/L — SIGNIFICANT CHANGE UP (ref 3.5–5.3)
POTASSIUM SERPL-SCNC: 3.6 MMOL/L — SIGNIFICANT CHANGE UP (ref 3.5–5.3)
POTASSIUM SERPL-SCNC: 4.1 MMOL/L — SIGNIFICANT CHANGE UP (ref 3.5–5.3)
POTASSIUM SERPL-SCNC: 4.2 MMOL/L — SIGNIFICANT CHANGE UP (ref 3.5–5.3)
POTASSIUM SERPL-SCNC: 4.5 MMOL/L — SIGNIFICANT CHANGE UP (ref 3.5–5.3)
PROT SERPL-MCNC: 5.5 G/DL — LOW (ref 6–8.3)
PROT SERPL-MCNC: 6 G/DL — SIGNIFICANT CHANGE UP (ref 6–8.3)
PROT SERPL-MCNC: 6.1 G/DL — SIGNIFICANT CHANGE UP (ref 6–8.3)
PROT SERPL-MCNC: 6.3 G/DL — SIGNIFICANT CHANGE UP (ref 6–8.3)
PROTHROM AB SERPL-ACNC: 12.9 SEC — HIGH (ref 9.8–12.7)
PROTHROM AB SERPL-ACNC: 13.1 SEC — HIGH (ref 9.8–12.7)
RBC # BLD: 2.24 M/UL — LOW (ref 3.8–5.2)
RBC # BLD: 2.48 M/UL — LOW (ref 3.8–5.2)
RBC # BLD: 2.49 M/UL — LOW (ref 3.8–5.2)
RBC # BLD: 2.52 M/UL — LOW (ref 3.8–5.2)
RBC # FLD: 18.5 % — HIGH (ref 10.3–14.5)
RBC # FLD: 19 % — HIGH (ref 10.3–14.5)
RBC # FLD: 19.1 % — HIGH (ref 10.3–14.5)
RBC # FLD: 19.2 % — HIGH (ref 10.3–14.5)
SODIUM SERPL-SCNC: 135 MMOL/L — SIGNIFICANT CHANGE UP (ref 135–145)
SODIUM SERPL-SCNC: 148 MMOL/L — HIGH (ref 135–145)
SODIUM SERPL-SCNC: 149 MMOL/L — HIGH (ref 135–145)
SODIUM SERPL-SCNC: 151 MMOL/L — HIGH (ref 135–145)
WBC # BLD: 18.2 K/UL — HIGH (ref 3.8–10.5)
WBC # BLD: 18.8 K/UL — HIGH (ref 3.8–10.5)
WBC # BLD: 20.4 K/UL — HIGH (ref 3.8–10.5)
WBC # BLD: 23.9 K/UL — HIGH (ref 3.8–10.5)
WBC # FLD AUTO: 18.2 K/UL — HIGH (ref 3.8–10.5)
WBC # FLD AUTO: 18.8 K/UL — HIGH (ref 3.8–10.5)
WBC # FLD AUTO: 20.4 K/UL — HIGH (ref 3.8–10.5)
WBC # FLD AUTO: 23.9 K/UL — HIGH (ref 3.8–10.5)

## 2018-09-29 PROCEDURE — 99291 CRITICAL CARE FIRST HOUR: CPT

## 2018-09-29 RX ORDER — QUETIAPINE FUMARATE 200 MG/1
25 TABLET, FILM COATED ORAL ONCE
Qty: 0 | Refills: 0 | Status: COMPLETED | OUTPATIENT
Start: 2018-09-29 | End: 2018-09-29

## 2018-09-29 RX ORDER — DEXMEDETOMIDINE HYDROCHLORIDE IN 0.9% SODIUM CHLORIDE 4 UG/ML
0.02 INJECTION INTRAVENOUS
Qty: 200 | Refills: 0 | Status: DISCONTINUED | OUTPATIENT
Start: 2018-09-29 | End: 2018-09-29

## 2018-09-29 RX ORDER — HEPARIN SODIUM 5000 [USP'U]/ML
2500 INJECTION INTRAVENOUS; SUBCUTANEOUS EVERY 6 HOURS
Qty: 0 | Refills: 0 | Status: DISCONTINUED | OUTPATIENT
Start: 2018-09-29 | End: 2018-10-03

## 2018-09-29 RX ORDER — HALOPERIDOL DECANOATE 100 MG/ML
2.5 INJECTION INTRAMUSCULAR ONCE
Qty: 0 | Refills: 0 | Status: COMPLETED | OUTPATIENT
Start: 2018-09-29 | End: 2018-09-29

## 2018-09-29 RX ORDER — ACETAMINOPHEN 500 MG
1000 TABLET ORAL ONCE
Qty: 0 | Refills: 0 | Status: COMPLETED | OUTPATIENT
Start: 2018-09-29 | End: 2018-09-29

## 2018-09-29 RX ORDER — HEPARIN SODIUM 5000 [USP'U]/ML
900 INJECTION INTRAVENOUS; SUBCUTANEOUS
Qty: 25000 | Refills: 0 | Status: DISCONTINUED | OUTPATIENT
Start: 2018-09-29 | End: 2018-09-30

## 2018-09-29 RX ORDER — HEPARIN SODIUM 5000 [USP'U]/ML
5000 INJECTION INTRAVENOUS; SUBCUTANEOUS EVERY 6 HOURS
Qty: 0 | Refills: 0 | Status: DISCONTINUED | OUTPATIENT
Start: 2018-09-29 | End: 2018-10-03

## 2018-09-29 RX ORDER — SODIUM CHLORIDE 9 MG/ML
1000 INJECTION, SOLUTION INTRAVENOUS
Qty: 0 | Refills: 0 | Status: DISCONTINUED | OUTPATIENT
Start: 2018-09-29 | End: 2018-10-01

## 2018-09-29 RX ADMIN — Medication 50 MILLIGRAM(S): at 23:38

## 2018-09-29 RX ADMIN — Medication 2: at 23:38

## 2018-09-29 RX ADMIN — Medication 10 MILLIGRAM(S): at 05:27

## 2018-09-29 RX ADMIN — Medication 50 MILLIGRAM(S): at 16:10

## 2018-09-29 RX ADMIN — Medication 400 MILLIGRAM(S): at 09:49

## 2018-09-29 RX ADMIN — Medication 1000 MILLIGRAM(S): at 10:39

## 2018-09-29 RX ADMIN — Medication 3 MILLIGRAM(S): at 21:26

## 2018-09-29 RX ADMIN — SENNA PLUS 2 TABLET(S): 8.6 TABLET ORAL at 21:26

## 2018-09-29 RX ADMIN — Medication 50 MILLIGRAM(S): at 06:43

## 2018-09-29 RX ADMIN — HUMAN INSULIN 12 UNIT(S): 100 INJECTION, SUSPENSION SUBCUTANEOUS at 05:34

## 2018-09-29 RX ADMIN — Medication 2: at 11:35

## 2018-09-29 RX ADMIN — PANTOPRAZOLE SODIUM 40 MILLIGRAM(S): 20 TABLET, DELAYED RELEASE ORAL at 05:27

## 2018-09-29 RX ADMIN — HEPARIN SODIUM 900 UNIT(S)/HR: 5000 INJECTION INTRAVENOUS; SUBCUTANEOUS at 12:53

## 2018-09-29 RX ADMIN — SODIUM CHLORIDE 75 MILLILITER(S): 9 INJECTION, SOLUTION INTRAVENOUS at 03:43

## 2018-09-29 RX ADMIN — MEROPENEM 100 MILLIGRAM(S): 1 INJECTION INTRAVENOUS at 21:26

## 2018-09-29 RX ADMIN — LATANOPROST 1 DROP(S): 0.05 SOLUTION/ DROPS OPHTHALMIC; TOPICAL at 21:27

## 2018-09-29 RX ADMIN — HALOPERIDOL DECANOATE 2.5 MILLIGRAM(S): 100 INJECTION INTRAMUSCULAR at 04:00

## 2018-09-29 RX ADMIN — PANTOPRAZOLE SODIUM 40 MILLIGRAM(S): 20 TABLET, DELAYED RELEASE ORAL at 17:28

## 2018-09-29 RX ADMIN — Medication 81 MILLIGRAM(S): at 11:25

## 2018-09-29 RX ADMIN — Medication 3 MILLILITER(S): at 11:24

## 2018-09-29 RX ADMIN — HUMAN INSULIN 12 UNIT(S): 100 INJECTION, SUSPENSION SUBCUTANEOUS at 11:35

## 2018-09-29 RX ADMIN — SODIUM CHLORIDE 75 MILLILITER(S): 9 INJECTION, SOLUTION INTRAVENOUS at 16:10

## 2018-09-29 RX ADMIN — Medication 3 MILLILITER(S): at 05:07

## 2018-09-29 RX ADMIN — MEROPENEM 100 MILLIGRAM(S): 1 INJECTION INTRAVENOUS at 13:59

## 2018-09-29 RX ADMIN — Medication 2: at 17:29

## 2018-09-29 RX ADMIN — MEROPENEM 100 MILLIGRAM(S): 1 INJECTION INTRAVENOUS at 05:25

## 2018-09-29 RX ADMIN — HALOPERIDOL DECANOATE 2.5 MILLIGRAM(S): 100 INJECTION INTRAMUSCULAR at 19:10

## 2018-09-29 RX ADMIN — HUMAN INSULIN 12 UNIT(S): 100 INJECTION, SUSPENSION SUBCUTANEOUS at 23:38

## 2018-09-29 RX ADMIN — Medication 2: at 05:34

## 2018-09-29 NOTE — PROGRESS NOTE ADULT - SUBJECTIVE AND OBJECTIVE BOX
Shawnee Figueroa, PGY2  Internal Medicine, team 1  Pager [phone] / [phone]  After 7PM on weekdays and 12PM on weekends, please page #[phone]    INTERVAL HPI/OVERNIGHT EVENTS:  Pt. hypernatremic o/n at 151 (12am BMP).  Was started on D5W @ 75ml/hr; BMP was rechecked q5 which showed Na stable at 151.  Hbg dropped from 7.8 to 7.2; 1U pRBC given.  Heparin drip was stopped and a FOBT was ordered.      SUBJECTIVE: Patient seen and examined at bedside.     OBJECTIVE:    VITAL SIGNS:  ICU Vital Signs Last 24 Hrs  T(C): 36.2 (29 Sep 2018 04:00), Max: 36.7 (29 Sep 2018 00:00)  T(F): 97.1 (29 Sep 2018 04:00), Max: 98.1 (29 Sep 2018 00:00)  HR: 98 (29 Sep 2018 07:00) (59 - 116)  BP: 155/67 (29 Sep 2018 07:00) (108/51 - 168/67)  BP(mean): 97 (29 Sep 2018 07:00) (74 - 127)  ABP: --  ABP(mean): --  RR: 48 (29 Sep 2018 07:00) (27 - 48)  SpO2: 100% (29 Sep 2018 07:00) (87% - 100%)         @ 07:01  -   @ 07:00  --------------------------------------------------------  IN: 1184 mL / OUT: 1000 mL / NET: 184 mL    - has female condom catheter       CAPILLARY BLOOD GLUCOSE      POCT Blood Glucose.: 199 mg/dL (29 Sep 2018 05:31)      PHYSICAL EXAM:    General: NAD  HEENT: NC/AT; PERRL, clear conjunctiva  Neck: supple  Respiratory: CTA b/l  Cardiovascular: +S1/S2; RRR  Abdomen: soft, NT/ND; +BS x4  Extremities: WWP, 2+ peripheral pulses b/l; no LE edema  Skin: normal color and turgor; no rash  Neurological:    MEDICATIONS:  MEDICATIONS  (STANDING):  ALBUTerol/ipratropium for Nebulization 3 milliLiter(s) Nebulizer every 6 hours  aspirin  chewable 81 milliGRAM(s) Oral daily  dextrose 5%. 1000 milliLiter(s) (75 mL/Hr) IV Continuous <Continuous>  dextrose 50% Injectable 12.5 Gram(s) IV Push once  dextrose 50% Injectable 25 Gram(s) IV Push once  dextrose 50% Injectable 25 Gram(s) IV Push once  diltiazem    Tablet 60 milliGRAM(s) Oral every 6 hours  hydrALAZINE 50 milliGRAM(s) Oral every 8 hours  insulin lispro (HumaLOG) corrective regimen sliding scale   SubCutaneous every 6 hours  insulin NPH human recombinant 12 Unit(s) SubCutaneous every 6 hours  latanoprost 0.005% Ophthalmic Solution 1 Drop(s) Both EYES at bedtime  melatonin 3 milliGRAM(s) Oral at bedtime  meropenem  IVPB      meropenem  IVPB 1000 milliGRAM(s) IV Intermittent every 8 hours  pantoprazole  Injectable 40 milliGRAM(s) IV Push every 12 hours  predniSONE   Tablet 10 milliGRAM(s) Oral daily  senna 2 Tablet(s) Oral at bedtime    MEDICATIONS  (PRN):  dextrose 40% Gel 15 Gram(s) Oral once PRN Blood Glucose LESS THAN 70 milliGRAM(s)/deciliter  glucagon  Injectable 1 milliGRAM(s) IntraMuscular once PRN Glucose LESS THAN 70 milligrams/deciliter      ALLERGIES:  Allergies    penicillin (Rash)    Intolerances        LABS:                        7.2    18.2  )-----------( 166      ( 29 Sep 2018 00:46 )             23.0     CBC Full  -  ( 29 Sep 2018 00:46 )  WBC Count : 18.2 K/uL  Hemoglobin : 7.2 g/dL  Hematocrit : 23.0 %  Platelet Count - Automated : 166 K/uL  Mean Cell Volume : 92.4 fl  Mean Cell Hemoglobin : 28.9 pg  Mean Cell Hemoglobin Concentration : 31.3 gm/dL  Auto Neutrophil # : x  Auto Lymphocyte # : x  Auto Monocyte # : x  Auto Eosinophil # : x  Auto Basophil # : x  Auto Neutrophil % : x  Auto Lymphocyte % : x  Auto Monocyte % : x  Auto Eosinophil % : x  Auto Basophil % : x        151<H>  |  118<H>  |  74<H>  ----------------------------<  117<H>  4.5   |  24  |  1.11    Ca    11.3<H>      29 Sep 2018 00:46  Phos  3.6       Mg     2.4         TPro  6.1  /  Alb  3.1<L>  /  TBili  0.5  /  DBili  x   /  AST  14  /  ALT  23  /  AlkPhos  62      Creatinine Trend: 1.11<--, 0.99<--, 0.96<--, 0.88<--, 0.89<--, 0.86<--  LIVER FUNCTIONS - ( 29 Sep 2018 00:46 )  Alb: 3.1 g/dL / Pro: 6.1 g/dL / ALK PHOS: 62 U/L / ALT: 23 U/L / AST: 14 U/L / GGT: x           PT/INR - ( 29 Sep 2018 00:46 )   PT: 12.9 sec;   INR: 1.18 ratio         PTT - ( 29 Sep 2018 00:46 )  PTT:26.5 sec      ABG - ( 28 Sep 2018 14:01 )  pH, Arterial: 7.39  pH, Blood: x     /  pCO2: 40    /  pO2: 73    / HCO3: 24    / Base Excess: -.5   /  SaO2: 95          Lipase 125 ( 182)  Ca 11.1, iPTH 67       Urinalysis Basic - ( 28 Sep 2018 04:16 )    Color: Yellow / Appearance: Slightly Turbid / S.027 / pH: x  Gluc: x / Ketone: Small  / Bili: Negative / Urobili: Negative   Blood: x / Protein: 100 mg/dL / Nitrite: Negative   Leuk Esterase: Small / RBC: 2-5 /hpf / WBC 2-5 /hpf   Sq Epi: x / Non Sq Epi: x / Bacteria: Few        EKG:   MICROBIOLOGY:    Culture - Blood (collected 28 Sep 2018 05:25)  Source: .Blood Blood  Preliminary Report (29 Sep 2018 06:01):    No growth to date.    Culture - Blood (collected 28 Sep 2018 05:25)  Source: .Blood Blood  Preliminary Report (29 Sep 2018 06:01):    No growth to date.      IMAGING:    RADIOLOGY & ADDITIONAL TESTS: Reviewed.

## 2018-09-29 NOTE — PROGRESS NOTE ADULT - ATTENDING COMMENTS
Agree with above.  Two episodes of respiratory failure requiring intubation.  Extubated three days now  Intermittent delirium and non-focal agitation. Per family, does not use corrective lenses or hearing aids.  Haldol PRN agitation, try to preserve day-night differentiation and re-orient to place and time regularly.  Hgb stable, resume heparin gtt.  NIPPV QHS and high-flow during the day in order to attempt to continue feeds.    Total attending critical care time spent: 35 minutes

## 2018-09-29 NOTE — PROGRESS NOTE ADULT - ASSESSMENT
83 y/o F w/ a PMH significant for COPD, JENNIE on BiPAP, multivalvular disease (severe AS, s/p MV replacement), HFpEF/severe diastolic failure, A-fib on coumadin, sick sinus syndrome s/p pacemaker placement, HTN, and CKD3 who was admitted for acute respiratory failure requiring intubation suspected to be 2/2 COPD exacerbation c/b PNA w/ +entero/rhinovirus and GN coccobacillus and H. influenza bacteremia.    #Neuro  - agitation at night  -no sedation      #CV  -admitted w/ sBP 200, since requiring pressure support 2/2 sepsis, pt placed on hydralazine, required cardene drip  -pt currently does not require norepi  -HFpEF (40-50%) w/ severe diastolic failure  -multivalvular disease: MV replacement, severe AS  * Afib:  - held heparin and coumadin while assessing for active bleed with decrease in H&H; s/p 1U pRBC o/n (Hbg dropped from 7.8 to 7.2; will f/u repeat CBC  -  diltiazem 60mg q6 (102/49 (71), HR 59)  -continue to monitor BP    #Resp  -+enterovirus on RVP with +H.influenza bacteremia  -currently extubated since 9/25    * Multi-focal PNA  -9/28 CXR:  Ill-defined density seen in the left lung and   the right middle lobe compatible with multifocal pneumonia.  -cefepime was switched to meropenem 9/28   -c/w prednisone taper (9/28 20mg, 9/29 10mg)   -c/w nebs  -c/w chest PT  -c/w BiPAP at night  -pt currently saturating well on NC  -will continue to monitor and assess for secretions and WOB    #GI  -pt has does not have abdominal tenderness today.  -lipase trend 182 --> 125  -c/w tube feeds until speech/swallow eval, was seen 9/27 but pt was lethargic and not following commands s/p seroquel.  Needs re-eval.   -decreased H&H on hep ggt and possible melena x 2, concern for GI bleed   -hold hep and coumadin  -PPI ppx restarted with concern for GI bleed  -hold bowel regimen for today    #Renal  -LATANYA, likely 2/2 sepsis + HTN urgency; resolved with Cr at baseline  -will continue to monitor I&O's    #ID  - +entero/rhinovirus  - GN coccobacillus (H. influenzae) on 9/14 blood culture  - UClx (9/14): NGTD  - Sputum Clx (9/14): No organisms, repeat combicath (9/21): normal respiratory chastity  - repeat blood cx from 9/17 and 9/21: NGTD  - legionella negative  - cefepime switched to meropenem (9/28) with concern for possible PNA.    #Heme:  -DIC panel negative  -LFTs no longer mildly elevated  - Hb drop from 7.8 --> 7.2; s/p 1U pRBCs); CT A/P 9/20 was negative for acute bleeding      #Endo:  - C/w NPH  - iPTH elevated @ 67, Ca 11.1     #Ophtho  -c/w home latanoprost drops for glaucoma    #Dispo  - will continue with PT  - DVT ppx: SCDs 83 y/o F w/ a PMH significant for COPD, JENNIE on BiPAP, multivalvular disease (severe AS, s/p MV replacement), HFpEF/severe diastolic failure, A-fib on coumadin, sick sinus syndrome s/p pacemaker placement, HTN, and CKD3 who was admitted for acute respiratory failure requiring intubation suspected to be 2/2 COPD exacerbation c/b PNA w/ +entero/rhinovirus and GN coccobacillus and H. influenza bacteremia.    #Neuro   * AMS - ddx: icu delerium vs metabolic   -  no focal deficits         #CV  -admitted w/ sBP 200, since requiring pressure support 2/2 sepsis, pt placed on hydralazine, required cardene drip  -pt currently does not require norepi  -HFpEF (40-50%) w/ severe diastolic failure  -multivalvular disease: MV replacement, severe AS  * Afib:  - held heparin and coumadin while assessing for active bleed with decrease in H&H; s/p 1U pRBC o/n (Hbg dropped from 7.8 to 7.2; repeat showed 7.2; monitoring cbc q12  -  diltiazem 60mg q6 (102/49 (71), HR 59)  -continue to monitor BP    #Resp  -+enterovirus on RVP with +H.influenza bacteremia  -currently extubated since 9/25      * Multi-focal PNA  -9/28 CXR:  Ill-defined density seen in the left lung and   the right middle lobe compatible with multifocal pneumonia.  -cefepime was switched to meropenem 9/28   - repeat blood cx negative x 2   -c/w prednisone taper (9/28 20mg, 9/29 10mg)   -c/w nebs  -c/w chest PT  -c/w BiPAP at night  -pt currently saturating well on NC  -will continue to monitor and assess for secretions and WOB    #GI  -pt has does not have abdominal tenderness today.  -lipase trend 182 --> 125  -c/w tube feeds until speech/swallow eval, was seen 9/27 but pt was lethargic and not following commands s/p seroquel.  Needs re-eval.   -decreased H&H on hep ggt and possible melena x 2, concern for GI bleed   -hold hep and coumadin  -PPI ppx restarted with concern for GI bleed  -hold bowel regimen for today    #Renal  -LATANYA, likely 2/2 sepsis + HTN urgency; resolved with Cr at baseline  -will continue to monitor I&O's    #ID  - +entero/rhinovirus  - GN coccobacillus (H. influenzae) on 9/14 blood culture  - UClx (9/14): NGTD  - Sputum Clx (9/14): No organisms, repeat combicath (9/21): normal respiratory chastity  - repeat blood cx from 9/17 and 9/21: NGTD  - legionella negative  - cefepime switched to meropenem (9/28) with concern for possible PNA.    #Heme:  -DIC panel negative  -LFTs no longer mildly elevated  - Hb drop from 7.8 --> 7.2; s/p 1U pRBCs); CT A/P 9/20 was negative for acute bleeding      #Endo:  - C/w NPH  - iPTH elevated @ 67, Ca 11.1     #Ophtho  -c/w home latanoprost drops for glaucoma    #Dispo  - will continue with PT  - DVT ppx: SCDs 83 y/o F w/ a PMH significant for COPD, JENNIE on BiPAP, multivalvular disease (severe AS, s/p MV replacement), HFpEF/severe diastolic failure, A-fib on coumadin, sick sinus syndrome s/p pacemaker placement, HTN, and CKD3 who was admitted for acute respiratory failure requiring intubation suspected to be 2/2 COPD exacerbation c/b PNA w/ +entero/rhinovirus and GN coccobacillus and H. influenza bacteremia.    #Neuro   * AMS - ddx: icu delerium vs metabolic 2/2 hypernatremia and hypercalcemia vs medication induced  -  Hypernatremic 151 --> 151 --> 149; 2.2L free water deficit - receiving D5W @75ml/hr; CMP q12   -  Corrected calcium for hypoalbumin = 12.5, Phosphorous 2.7; iPTH 67. Likely primary hyperparathyroidism vs secondary as pt. at normal Cr BL and has normal phosphorus.    -  seroquel d/c'd due to possible cause of AMS       #CV  -admitted w/ sBP 200, since requiring pressure support 2/2 sepsis, pt placed on hydralazine, required cardene drip  -pt currently does not require norepi  -HFpEF (40-50%) w/ severe diastolic failure  -multivalvular disease: MV replacement, severe AS  * Afib:  - held coumadin and stopped heparin drip while assessing for active bleed with decrease in H&H; (Hbg dropped from 7.8 to 7.2; repeat showed 7.2 - resumed heparin drip.  Monitoring cbc q12  -  diltiazem 60mg q6 (102/49 (71), HR 59)  -continue to monitor BP    #Resp  -+enterovirus on RVP with +H.influenza bacteremia  -currently extubated since 9/25  * Multi-focal PNA  -9/28 CXR:  Ill-defined density seen in the left lung and   the right middle lobe compatible with multifocal pneumonia.  -cefepime was switched to meropenem 9/28   - repeat blood cx negative x 2   -c/w prednisone taper (9/28 20mg, 9/29 10mg (day 1/3)   -c/w nebs  -c/w chest PT  -c/w BiPAP at night  -pt currently saturating well on NC  -will continue to monitor and assess for secretions and WOB    #GI  -pt has does not have abdominal tenderness today.  -lipase trend 182 --> 125  -c/w tube feeds until speech/swallow eval, was seen 9/27 but pt was lethargic and not following commands s/p seroquel.  Needs re-eval.   -decreased H&H on hep ggt and possible melena x 2, concern for GI bleed   -hold hep and coumadin  -PPI ppx restarted with concern for GI bleed  -hold bowel regimen for today    #Renal  -LATANYA, likely 2/2 sepsis + HTN urgency; resolved with Cr at baseline  -will continue to monitor I&O's    #ID  - +entero/rhinovirus  - GN coccobacillus (H. influenzae) on 9/14 blood culture.  s/p 14 days of abx for H. Influenza pneumonia and bacteremia.   - UClx (9/14): NGTD  - Sputum Clx (9/14): No organisms, repeat combicath (9/21): normal respiratory chastity  - repeat blood cx from 9/17 and 9/21: NGTD  - legionella negative  - cefepime switched to meropenem (9/28) with concern for possible PNA.    #Heme:  -DIC panel negative  -LFTs no longer mildly elevated  - Hb drop from 7.8 --> 7.2; s/p 1U pRBCs); CT A/P 9/20 was negative for acute bleeding      #Endo:  - C/w NPH  - iPTH elevated @ 67, Ca 11.1     #Ophtho  -c/w home latanoprost drops for glaucoma    #Dispo  - will continue with PT  - DVT ppx: SCDs

## 2018-09-30 LAB
ALBUMIN SERPL ELPH-MCNC: 3.1 G/DL — LOW (ref 3.3–5)
ALP SERPL-CCNC: 59 U/L — SIGNIFICANT CHANGE UP (ref 40–120)
ALT FLD-CCNC: 21 U/L — SIGNIFICANT CHANGE UP (ref 10–45)
ANION GAP SERPL CALC-SCNC: 10 MMOL/L — SIGNIFICANT CHANGE UP (ref 5–17)
APTT BLD: 67 SEC — HIGH (ref 27.5–37.4)
AST SERPL-CCNC: 15 U/L — SIGNIFICANT CHANGE UP (ref 10–40)
BASE EXCESS BLDA CALC-SCNC: -2.2 MMOL/L — LOW (ref -2–2)
BILIRUB SERPL-MCNC: 0.5 MG/DL — SIGNIFICANT CHANGE UP (ref 0.2–1.2)
BUN SERPL-MCNC: 56 MG/DL — HIGH (ref 7–23)
CALCIUM SERPL-MCNC: 10.7 MG/DL — HIGH (ref 8.4–10.5)
CHLORIDE SERPL-SCNC: 112 MMOL/L — HIGH (ref 96–108)
CO2 BLDA-SCNC: 25 MMOL/L — SIGNIFICANT CHANGE UP (ref 22–30)
CO2 SERPL-SCNC: 23 MMOL/L — SIGNIFICANT CHANGE UP (ref 22–31)
CREAT SERPL-MCNC: 0.99 MG/DL — SIGNIFICANT CHANGE UP (ref 0.5–1.3)
GAS PNL BLDA: SIGNIFICANT CHANGE UP
GLUCOSE BLDC GLUCOMTR-MCNC: 138 MG/DL — HIGH (ref 70–99)
GLUCOSE BLDC GLUCOMTR-MCNC: 198 MG/DL — HIGH (ref 70–99)
GLUCOSE BLDC GLUCOMTR-MCNC: 222 MG/DL — HIGH (ref 70–99)
GLUCOSE BLDC GLUCOMTR-MCNC: 260 MG/DL — HIGH (ref 70–99)
GLUCOSE SERPL-MCNC: 182 MG/DL — HIGH (ref 70–99)
HCO3 BLDA-SCNC: 23 MMOL/L — SIGNIFICANT CHANGE UP (ref 21–29)
HCT VFR BLD CALC: 21.1 % — LOW (ref 34.5–45)
HCT VFR BLD CALC: 21.7 % — LOW (ref 34.5–45)
HGB BLD-MCNC: 6.7 G/DL — CRITICAL LOW (ref 11.5–15.5)
HGB BLD-MCNC: 6.8 G/DL — CRITICAL LOW (ref 11.5–15.5)
HOROWITZ INDEX BLDA+IHG-RTO: 30 — SIGNIFICANT CHANGE UP
INR BLD: 1.17 RATIO — HIGH (ref 0.88–1.16)
MAGNESIUM SERPL-MCNC: 2.2 MG/DL — SIGNIFICANT CHANGE UP (ref 1.6–2.6)
MCHC RBC-ENTMCNC: 28.7 PG — SIGNIFICANT CHANGE UP (ref 27–34)
MCHC RBC-ENTMCNC: 29 PG — SIGNIFICANT CHANGE UP (ref 27–34)
MCHC RBC-ENTMCNC: 31.2 GM/DL — LOW (ref 32–36)
MCHC RBC-ENTMCNC: 31.5 GM/DL — LOW (ref 32–36)
MCV RBC AUTO: 92 FL — SIGNIFICANT CHANGE UP (ref 80–100)
MCV RBC AUTO: 92 FL — SIGNIFICANT CHANGE UP (ref 80–100)
PCO2 BLDA: 47 MMHG — HIGH (ref 32–46)
PH BLDA: 7.32 — LOW (ref 7.35–7.45)
PHOSPHATE SERPL-MCNC: 2.6 MG/DL — SIGNIFICANT CHANGE UP (ref 2.5–4.5)
PLATELET # BLD AUTO: 191 K/UL — SIGNIFICANT CHANGE UP (ref 150–400)
PLATELET # BLD AUTO: 216 K/UL — SIGNIFICANT CHANGE UP (ref 150–400)
PO2 BLDA: 61 MMHG — LOW (ref 74–108)
POTASSIUM SERPL-MCNC: 4 MMOL/L — SIGNIFICANT CHANGE UP (ref 3.5–5.3)
POTASSIUM SERPL-SCNC: 4 MMOL/L — SIGNIFICANT CHANGE UP (ref 3.5–5.3)
PROCALCITONIN SERPL-MCNC: 0.23 NG/ML — HIGH (ref 0.02–0.1)
PROT SERPL-MCNC: 6 G/DL — SIGNIFICANT CHANGE UP (ref 6–8.3)
PROTHROM AB SERPL-ACNC: 12.7 SEC — SIGNIFICANT CHANGE UP (ref 9.8–12.7)
RBC # BLD: 2.3 M/UL — LOW (ref 3.8–5.2)
RBC # BLD: 2.36 M/UL — LOW (ref 3.8–5.2)
RBC # FLD: 19.3 % — HIGH (ref 10.3–14.5)
RBC # FLD: 19.5 % — HIGH (ref 10.3–14.5)
SAO2 % BLDA: 88 % — LOW (ref 92–96)
SODIUM SERPL-SCNC: 145 MMOL/L — SIGNIFICANT CHANGE UP (ref 135–145)
WBC # BLD: 15.6 K/UL — HIGH (ref 3.8–10.5)
WBC # BLD: 20.7 K/UL — HIGH (ref 3.8–10.5)
WBC # FLD AUTO: 15.6 K/UL — HIGH (ref 3.8–10.5)
WBC # FLD AUTO: 20.7 K/UL — HIGH (ref 3.8–10.5)

## 2018-09-30 PROCEDURE — 99233 SBSQ HOSP IP/OBS HIGH 50: CPT | Mod: GC

## 2018-09-30 RX ORDER — DEXMEDETOMIDINE HYDROCHLORIDE IN 0.9% SODIUM CHLORIDE 4 UG/ML
0.01 INJECTION INTRAVENOUS
Qty: 200 | Refills: 0 | Status: DISCONTINUED | OUTPATIENT
Start: 2018-09-30 | End: 2018-10-03

## 2018-09-30 RX ORDER — HEPARIN SODIUM 5000 [USP'U]/ML
900 INJECTION INTRAVENOUS; SUBCUTANEOUS
Qty: 25000 | Refills: 0 | Status: DISCONTINUED | OUTPATIENT
Start: 2018-09-30 | End: 2018-10-02

## 2018-09-30 RX ORDER — QUETIAPINE FUMARATE 200 MG/1
12.5 TABLET, FILM COATED ORAL AT BEDTIME
Qty: 0 | Refills: 0 | Status: DISCONTINUED | OUTPATIENT
Start: 2018-09-30 | End: 2018-10-01

## 2018-09-30 RX ORDER — IPRATROPIUM/ALBUTEROL SULFATE 18-103MCG
3 AEROSOL WITH ADAPTER (GRAM) INHALATION ONCE
Qty: 0 | Refills: 0 | Status: COMPLETED | OUTPATIENT
Start: 2018-09-30 | End: 2018-09-30

## 2018-09-30 RX ORDER — FUROSEMIDE 40 MG
40 TABLET ORAL ONCE
Qty: 0 | Refills: 0 | Status: COMPLETED | OUTPATIENT
Start: 2018-09-30 | End: 2018-09-30

## 2018-09-30 RX ORDER — IPRATROPIUM/ALBUTEROL SULFATE 18-103MCG
3 AEROSOL WITH ADAPTER (GRAM) INHALATION EVERY 6 HOURS
Qty: 0 | Refills: 0 | Status: DISCONTINUED | OUTPATIENT
Start: 2018-09-30 | End: 2018-10-03

## 2018-09-30 RX ADMIN — SODIUM CHLORIDE 75 MILLILITER(S): 9 INJECTION, SOLUTION INTRAVENOUS at 23:27

## 2018-09-30 RX ADMIN — HEPARIN SODIUM 900 UNIT(S)/HR: 5000 INJECTION INTRAVENOUS; SUBCUTANEOUS at 02:01

## 2018-09-30 RX ADMIN — Medication 3 MILLILITER(S): at 17:51

## 2018-09-30 RX ADMIN — PANTOPRAZOLE SODIUM 40 MILLIGRAM(S): 20 TABLET, DELAYED RELEASE ORAL at 06:08

## 2018-09-30 RX ADMIN — LATANOPROST 1 DROP(S): 0.05 SOLUTION/ DROPS OPHTHALMIC; TOPICAL at 23:16

## 2018-09-30 RX ADMIN — PANTOPRAZOLE SODIUM 40 MILLIGRAM(S): 20 TABLET, DELAYED RELEASE ORAL at 17:48

## 2018-09-30 RX ADMIN — MEROPENEM 100 MILLIGRAM(S): 1 INJECTION INTRAVENOUS at 22:49

## 2018-09-30 RX ADMIN — MEROPENEM 100 MILLIGRAM(S): 1 INJECTION INTRAVENOUS at 15:20

## 2018-09-30 RX ADMIN — Medication 6: at 23:15

## 2018-09-30 RX ADMIN — Medication 50 MILLIGRAM(S): at 06:08

## 2018-09-30 RX ADMIN — Medication 4: at 17:57

## 2018-09-30 RX ADMIN — Medication 81 MILLIGRAM(S): at 12:38

## 2018-09-30 RX ADMIN — DEXMEDETOMIDINE HYDROCHLORIDE IN 0.9% SODIUM CHLORIDE 0.16 MICROGRAM(S)/KG/HR: 4 INJECTION INTRAVENOUS at 23:28

## 2018-09-30 RX ADMIN — HUMAN INSULIN 12 UNIT(S): 100 INJECTION, SUSPENSION SUBCUTANEOUS at 23:15

## 2018-09-30 RX ADMIN — DEXMEDETOMIDINE HYDROCHLORIDE IN 0.9% SODIUM CHLORIDE 0.16 MICROGRAM(S)/KG/HR: 4 INJECTION INTRAVENOUS at 06:17

## 2018-09-30 RX ADMIN — Medication 40 MILLIGRAM(S): at 23:13

## 2018-09-30 RX ADMIN — Medication 3 MILLILITER(S): at 14:30

## 2018-09-30 RX ADMIN — Medication 50 MILLIGRAM(S): at 15:21

## 2018-09-30 RX ADMIN — DEXMEDETOMIDINE HYDROCHLORIDE IN 0.9% SODIUM CHLORIDE 0.16 MICROGRAM(S)/KG/HR: 4 INJECTION INTRAVENOUS at 02:01

## 2018-09-30 RX ADMIN — Medication 10 MILLIGRAM(S): at 06:08

## 2018-09-30 RX ADMIN — HUMAN INSULIN 12 UNIT(S): 100 INJECTION, SUSPENSION SUBCUTANEOUS at 17:58

## 2018-09-30 RX ADMIN — Medication 2: at 12:37

## 2018-09-30 RX ADMIN — MEROPENEM 100 MILLIGRAM(S): 1 INJECTION INTRAVENOUS at 06:08

## 2018-09-30 RX ADMIN — HUMAN INSULIN 12 UNIT(S): 100 INJECTION, SUSPENSION SUBCUTANEOUS at 12:37

## 2018-09-30 RX ADMIN — SODIUM CHLORIDE 75 MILLILITER(S): 9 INJECTION, SOLUTION INTRAVENOUS at 06:17

## 2018-09-30 RX ADMIN — QUETIAPINE FUMARATE 12.5 MILLIGRAM(S): 200 TABLET, FILM COATED ORAL at 23:23

## 2018-09-30 NOTE — PROGRESS NOTE ADULT - SUBJECTIVE AND OBJECTIVE BOX
INTERVAL HPI/OVERNIGHT EVENTS:    SUBJECTIVE: Patient seen and examined at bedside.     OBJECTIVE:    VITAL SIGNS:  ICU Vital Signs Last 24 Hrs  T(C): 35.7 (30 Sep 2018 04:00), Max: 35.9 (30 Sep 2018 00:00)  T(F): 96.3 (30 Sep 2018 04:00), Max: 96.7 (30 Sep 2018 00:00)  HR: 67 (30 Sep 2018 07:00) (59 - 109)  BP: 125/60 (30 Sep 2018 07:00) (89/41 - 185/83)  BP(mean): 87 (30 Sep 2018 07:00) (59 - 119)  RR: 34 (30 Sep 2018 07:00) (22 - 44)  SpO2: 96% (30 Sep 2018 07:00) (96% - 100%)    09-29 @ 07:01  -  09-30 @ 07:00  --------------------------------------------------------  IN: 2915.2 mL / OUT: 350 mL / NET: 2565.2 mL      CAPILLARY BLOOD GLUCOSE  POCT Blood Glucose.: 138 mg/dL (30 Sep 2018 06:16)    PHYSICAL EXAM:  General: NAD  HEENT: NC/AT; PERRL, clear conjunctiva  Respiratory: CTA b/l  Cardiovascular: +S1/S2; RRR  Abdomen: soft, NT/ND; +BS x4  Extremities: WWP, 2+ peripheral pulses b/l; no LE edema  Skin: normal color and turgor; no rash  Neurological:    LINES:     MEDICATIONS  (STANDING):  insulin lispro (HumaLOG) corrective regimen sliding scale   SubCutaneous every 6 hours  insulin NPH human recombinant 12 Unit(s) SubCutaneous every 6 hours  latanoprost 0.005% Ophthalmic Solution 1 Drop(s) Both EYES at bedtime  dextrose 5%. 1000 milliLiter(s) (75 mL/Hr) IV Continuous <Continuous>  aspirin  chewable 81 milliGRAM(s) Oral daily  heparin  Infusion. 900 Unit(s)/Hr (9 mL/Hr) IV Continuous <Continuous>  dexmedetomidine Infusion 0.01 MICROgram(s)/kG/Hr (0.157 mL/Hr) IV Continuous <Continuous>  melatonin 3 milliGRAM(s) Oral at bedtime  diltiazem    Tablet 60 milliGRAM(s) Oral every 6 hours  hydrALAZINE 50 milliGRAM(s) Oral every 8 hours  meropenem  IVPB 1000 milliGRAM(s) IV Intermittent every 8 hours  predniSONE   Tablet 10 milliGRAM(s) Oral daily  senna 2 Tablet(s) Oral at bedtime  pantoprazole  Injectable 40 milliGRAM(s) IV Push every 12 hours    ALLERGIES:  penicillin (Rash)    LABS:                        6.8    20.7  )-----------( 216      ( 30 Sep 2018 01:06 )             21.7     Mean Cell Volume : 92.0 fl  Mean Cell Hemoglobin : 28.7 pg  Mean Cell Hemoglobin Concentration : 31.2 gm/dL    09-30    145  |  112<H>  |  56<H>  ----------------------------<  182<H>  4.0   |  23  |  0.99    Ca    10.7<H>      30 Sep 2018 01:06  Phos  2.6     09-30  Mg     2.2     09-30    TPro  6.0  /  Alb  3.1<L>  /  TBili  0.5  /  DBili  x   /  AST  15  /  ALT  21  /  AlkPhos  59  09-30    Creatinine Trend: 0.99<--, 1.04<--, 1.04<--, 0.94<--, 1.11<--, 0.99<--  LIVER FUNCTIONS - ( 30 Sep 2018 01:06 )  Alb: 3.1 g/dL / Pro: 6.0 g/dL / ALK PHOS: 59 U/L / ALT: 21 U/L / AST: 15 U/L / GGT: x           PT/INR - ( 30 Sep 2018 01:06 )   PT: 12.7 sec;   INR: 1.17 ratio    PTT - ( 30 Sep 2018 01:06 )  PTT:67.0 sec    ABG - ( 28 Sep 2018 14:01 )  pH, Arterial: 7.39  pH, Blood: x     /  pCO2: 40    /  pO2: 73    / HCO3: 24    / Base Excess: -.5   /  SaO2: 95        MICROBIOLOGY:    Culture - Blood (collected 28 Sep 2018 05:25)  Source: .Blood Blood  Preliminary Report (29 Sep 2018 06:01):    No growth to date.    Culture - Blood (collected 28 Sep 2018 05:25)  Source: .Blood Blood  Preliminary Report (29 Sep 2018 06:01):    No growth to date.      IMAGING:    RADIOLOGY & ADDITIONAL TESTS: Reviewed. INTERVAL HPI/OVERNIGHT EVENTS:    SUBJECTIVE: Patient seen and examined at bedside.     OBJECTIVE:    VITAL SIGNS:  ICU Vital Signs Last 24 Hrs  T(C): 35.7 (30 Sep 2018 04:00), Max: 35.9 (30 Sep 2018 00:00)  T(F): 96.3 (30 Sep 2018 04:00), Max: 96.7 (30 Sep 2018 00:00)  HR: 67 (30 Sep 2018 07:00) (59 - 109)  BP: 125/60 (30 Sep 2018 07:00) (89/41 - 185/83)  BP(mean): 87 (30 Sep 2018 07:00) (59 - 119)  RR: 34 (30 Sep 2018 07:00) (22 - 44)  SpO2: 96% (30 Sep 2018 07:00) (96% - 100%)    09-29 @ 07:01  -  09-30 @ 07:00  --------------------------------------------------------  IN: 2915.2 mL / OUT: 350 mL / NET: 2565.2 mL      CAPILLARY BLOOD GLUCOSE  POCT Blood Glucose.: 138 mg/dL (30 Sep 2018 06:16)    PHYSICAL EXAM:  General: NAD  HEENT: NC/AT; PERRL, clear conjunctiva  Respiratory: CTA b/l  Cardiovascular: +S1/S2; RRR  Abdomen: soft, NT/ND; +BS x4  Extremities: WWP, 2+ peripheral pulses b/l; no LE edema  Skin: normal color and turgor; no rash  Neurological:    LINES:     MEDICATIONS  (STANDING):  insulin lispro (HumaLOG) corrective regimen sliding scale   SubCutaneous every 6 hours  insulin NPH human recombinant 12 Unit(s) SubCutaneous every 6 hours  latanoprost 0.005% Ophthalmic Solution 1 Drop(s) Both EYES at bedtime  dextrose 5%. 1000 milliLiter(s) (75 mL/Hr) IV Continuous <Continuous>  aspirin  chewable 81 milliGRAM(s) Oral daily  heparin  Infusion. 900 Unit(s)/Hr (9 mL/Hr) IV Continuous <Continuous>  dexmedetomidine Infusion 0.01 MICROgram(s)/kG/Hr (0.157 mL/Hr) IV Continuous <Continuous>  melatonin 3 milliGRAM(s) Oral at bedtime  diltiazem    Tablet 60 milliGRAM(s) Oral every 6 hours  hydrALAZINE 50 milliGRAM(s) Oral every 8 hours  meropenem  IVPB 1000 milliGRAM(s) IV Intermittent every 8 hours  predniSONE   Tablet 10 milliGRAM(s) Oral daily  senna 2 Tablet(s) Oral at bedtime  pantoprazole  Injectable 40 milliGRAM(s) IV Push every 12 hours    ALLERGIES:  penicillin (Rash)    LABS:                        6.8    20.7  )-----------( 216      ( 30 Sep 2018 01:06 )             21.7     Mean Cell Volume : 92.0 fl  Mean Cell Hemoglobin : 28.7 pg  Mean Cell Hemoglobin Concentration : 31.2 gm/dL    09-30    145  |  112<H>  |  56<H>  ----------------------------<  182<H>  4.0   |  23  |  0.99    Ca    10.7<H>      30 Sep 2018 01:06  Phos  2.6     09-30  Mg     2.2     09-30    TPro  6.0  /  Alb  3.1<L>  /  TBili  0.5  /  DBili  x   /  AST  15  /  ALT  21  /  AlkPhos  59  09-30    Creatinine Trend: 0.99<--, 1.04<--, 1.04<--, 0.94<--, 1.11<--, 0.99<--  LIVER FUNCTIONS - ( 30 Sep 2018 01:06 )  Alb: 3.1 g/dL / Pro: 6.0 g/dL / ALK PHOS: 59 U/L / ALT: 21 U/L / AST: 15 U/L / GGT: x           PT/INR - ( 30 Sep 2018 01:06 )   PT: 12.7 sec;   INR: 1.17 ratio    PTT - ( 30 Sep 2018 01:06 )  PTT:67.0 sec    ABG - ( 28 Sep 2018 14:01 )  pH, Arterial: 7.39  pH, Blood: x     /  pCO2: 40    /  pO2: 73    / HCO3: 24    / Base Excess: -.5   /  SaO2: 95        Parathyroid Hormone Intact, Serum (09.28.18 @ 09:27)    Intact PTH: 67: PTH METHOD: Roche  Guide for Interpretation of PTH and Calcium Results                           Calcium             PTH                           MG/DL               PG/ML  Normal                   8.4-10.5            15-65  Primary  Hyperparathyroidism      >10.5               >50  Non-PTH Hypercalcemia    >10.5               0-20  Hypoparathroidism        <8.4                0-20  Pseudohypoparathyroid    <8.4                >50  This is intended as a guide only. Factors such as sunlight exposure,  Vitamin D status and renal function should be evaluated along with  clinical presentation. pg/mL    MICROBIOLOGY:    Culture - Blood (collected 28 Sep 2018 05:25)  Source: .Blood Blood  Preliminary Report (29 Sep 2018 06:01):    No growth to date.    Culture - Blood (collected 28 Sep 2018 05:25)  Source: .Blood Blood  Preliminary Report (29 Sep 2018 06:01):    No growth to date.      IMAGING:    RADIOLOGY & ADDITIONAL TESTS: Reviewed. INTERVAL HPI/OVERNIGHT EVENTS: pt was given a dose of haloperidol and melatonin last night and was well controlled with dexmedetomidine.    SUBJECTIVE: Patient seen and examined at bedside.     OBJECTIVE:  VITAL SIGNS:  ICU Vital Signs Last 24 Hrs  T(C): 35.7 (30 Sep 2018 04:00), Max: 35.9 (30 Sep 2018 00:00)  T(F): 96.3 (30 Sep 2018 04:00), Max: 96.7 (30 Sep 2018 00:00)  HR: 67 (30 Sep 2018 07:00) (59 - 109)  BP: 125/60 (30 Sep 2018 07:00) (89/41 - 185/83)  BP(mean): 87 (30 Sep 2018 07:00) (59 - 119)  RR: 34 (30 Sep 2018 07:00) (22 - 44)  SpO2: 96% (30 Sep 2018 07:00) (96% - 100%)    09-29 @ 07:01  -  09-30 @ 07:00  --------------------------------------------------------  IN: 2915.2 mL / OUT: 350 mL / NET: 2565.2 mL      CAPILLARY BLOOD GLUCOSE  POCT Blood Glucose.: 138 mg/dL (30 Sep 2018 06:16)    PHYSICAL EXAM:  General: elderly woman on BiPAP  HEENT: NC/AT; PERRL  Respiratory: minimal crackles bilaterally, breath sounds bilaterally  Cardiovascular: distant heart sounds  Abdomen: soft, nontender  Extremities: 2+ peripheral pulses b/l; no LE edema  Skin: normal color; no rash  Neurological: awake but not speaking through BiPAP    LINES: PIVs    MEDICATIONS  (STANDING):  insulin lispro (HumaLOG) corrective regimen sliding scale   SubCutaneous every 6 hours  insulin NPH human recombinant 12 Unit(s) SubCutaneous every 6 hours  latanoprost 0.005% Ophthalmic Solution 1 Drop(s) Both EYES at bedtime  dextrose 5%. 1000 milliLiter(s) (75 mL/Hr) IV Continuous <Continuous>  aspirin  chewable 81 milliGRAM(s) Oral daily  heparin  Infusion. 900 Unit(s)/Hr (9 mL/Hr) IV Continuous <Continuous>  dexmedetomidine Infusion 0.01 MICROgram(s)/kG/Hr (0.157 mL/Hr) IV Continuous <Continuous>  melatonin 3 milliGRAM(s) Oral at bedtime  diltiazem    Tablet 60 milliGRAM(s) Oral every 6 hours  hydrALAZINE 50 milliGRAM(s) Oral every 8 hours  meropenem  IVPB 1000 milliGRAM(s) IV Intermittent every 8 hours  predniSONE   Tablet 10 milliGRAM(s) Oral daily  senna 2 Tablet(s) Oral at bedtime  pantoprazole  Injectable 40 milliGRAM(s) IV Push every 12 hours    ALLERGIES:  penicillin (Rash)    LABS:                      6.8    20.7  )-----------( 216      ( 30 Sep 2018 01:06 )             21.7     Mean Cell Volume : 92.0 fl  Mean Cell Hemoglobin : 28.7 pg  Mean Cell Hemoglobin Concentration : 31.2 gm/dL    09-30    145  |  112<H>  |  56<H>  ----------------------------<  182<H>  4.0   |  23  |  0.99    Ca    10.7<H>      30 Sep 2018 01:06  Phos  2.6     09-30  Mg     2.2     09-30    TPro  6.0  /  Alb  3.1<L>  /  TBili  0.5  /  DBili  x   /  AST  15  /  ALT  21  /  AlkPhos  59  09-30    Creatinine Trend: 0.99<--, 1.04<--, 1.04<--, 0.94<--, 1.11<--, 0.99<--  LIVER FUNCTIONS - ( 30 Sep 2018 01:06 )  Alb: 3.1 g/dL / Pro: 6.0 g/dL / ALK PHOS: 59 U/L / ALT: 21 U/L / AST: 15 U/L / GGT: x           PT/INR - ( 30 Sep 2018 01:06 )   PT: 12.7 sec;   INR: 1.17 ratio    PTT - ( 30 Sep 2018 01:06 )  PTT:67.0 sec    ABG - ( 28 Sep 2018 14:01 )  pH, Arterial: 7.39  pH, Blood: x     /  pCO2: 40    /  pO2: 73    / HCO3: 24    / Base Excess: -.5   /  SaO2: 95        Parathyroid Hormone Intact, Serum (09.28.18 @ 09:27)    Intact PTH: 67: PTH METHOD: Roche  Guide for Interpretation of PTH and Calcium Results                           Calcium             PTH                           MG/DL               PG/ML  Normal                   8.4-10.5            15-65  Primary  Hyperparathyroidism      >10.5               >50  Non-PTH Hypercalcemia    >10.5               0-20  Hypoparathroidism        <8.4                0-20  Pseudohypoparathyroid    <8.4                >50  This is intended as a guide only. Factors such as sunlight exposure,  Vitamin D status and renal function should be evaluated along with  clinical presentation. pg/mL    MICROBIOLOGY:    Culture - Blood (collected 28 Sep 2018 05:25)  Source: .Blood Blood  Preliminary Report (29 Sep 2018 06:01):    No growth to date.    Culture - Blood (collected 28 Sep 2018 05:25)  Source: .Blood Blood  Preliminary Report (29 Sep 2018 06:01):    No growth to date.    RADIOLOGY & ADDITIONAL TESTS: Reviewed.

## 2018-09-30 NOTE — PROGRESS NOTE ADULT - ASSESSMENT
83 y/o F w/ a PMH significant for COPD, JENNIE on BiPAP, multivalvular disease (severe AS, s/p MV replacement), HFpEF/severe diastolic failure, A-fib on coumadin, sick sinus syndrome s/p pacemaker placement, HTN, and CKD3 who was admitted for acute respiratory failure requiring intubation suspected to be 2/2 COPD exacerbation c/b PNA w/ +entero/rhinovirus and GN coccobacillus and H. influenza bacteremia.    #Neuro   * AMS - ddx: icu delerium vs metabolic 2/2 hypernatremia and hypercalcemia vs medication induced  -  Hypernatremic 151 --> 151 --> 149; 2.2L free water deficit - receiving D5W @75ml/hr; CMP q12   -  Corrected calcium for hypoalbumin = 12.5, Phosphorous 2.7; iPTH 67. Likely primary hyperparathyroidism vs secondary as pt. at normal Cr BL and has normal phosphorus.    -  seroquel d/c'd due to possible cause of AMS       #CV  -admitted w/ sBP 200, since requiring pressure support 2/2 sepsis, pt placed on hydralazine, required cardene drip  -pt currently does not require norepi  -HFpEF (40-50%) w/ severe diastolic failure  -multivalvular disease: MV replacement, severe AS  * Afib:  - held coumadin and stopped heparin drip while assessing for active bleed with decrease in H&H; (Hbg dropped from 7.8 to 7.2; repeat showed 7.2 - resumed heparin drip.  Monitoring cbc q12  -  diltiazem 60mg q6 (102/49 (71), HR 59)  -continue to monitor BP    #Resp  -+enterovirus on RVP with +H.influenza bacteremia  -currently extubated since 9/25  * Multi-focal PNA  -9/28 CXR:  Ill-defined density seen in the left lung and   the right middle lobe compatible with multifocal pneumonia.  -cefepime was switched to meropenem 9/28   - repeat blood cx negative x 2   -c/w prednisone taper (9/28 20mg, 9/29 10mg (day 1/3)   -c/w nebs  -c/w chest PT  -c/w BiPAP at night  -pt currently saturating well on NC  -will continue to monitor and assess for secretions and WOB    #GI  -pt has does not have abdominal tenderness today.  -lipase trend 182 --> 125  -c/w tube feeds until speech/swallow eval, was seen 9/27 but pt was lethargic and not following commands s/p seroquel.  Needs re-eval.   -decreased H&H on hep ggt and possible melena x 2, concern for GI bleed   -hold hep and coumadin  -PPI ppx restarted with concern for GI bleed  -hold bowel regimen for today    #Renal  -LATANYA, likely 2/2 sepsis + HTN urgency; resolved with Cr at baseline  -will continue to monitor I&O's    #ID  - +entero/rhinovirus  - GN coccobacillus (H. influenzae) on 9/14 blood culture.  s/p 14 days of abx for H. Influenza pneumonia and bacteremia.   - UClx (9/14): NGTD  - Sputum Clx (9/14): No organisms, repeat combicath (9/21): normal respiratory chastity  - repeat blood cx from 9/17 and 9/21: NGTD  - legionella negative  - cefepime switched to meropenem (9/28) with concern for possible PNA.    #Heme:  -DIC panel negative  -LFTs no longer mildly elevated  - Hb drop from 7.8 --> 7.2; s/p 1U pRBCs); CT A/P 9/20 was negative for acute bleeding      #Endo:  - C/w NPH  - iPTH elevated @ 67, Ca 11.1     #Ophtho  -c/w home latanoprost drops for glaucoma    #Dispo  - will continue with PT  - DVT ppx: SCDs 83 y/o F w/ a PMH significant for COPD, JENNIE on BiPAP, multivalvular disease (severe AS, s/p MV replacement), HFpEF/severe diastolic failure, A-fib on coumadin, sick sinus syndrome s/p pacemaker placement, HTN, and CKD3 who was admitted for acute respiratory failure requiring intubation suspected to be 2/2 COPD exacerbation c/b PNA w/ +entero/rhinovirus and GN coccobacillus and H. influenza bacteremia.    #Neuro   - AMS - ddx: icu delerium vs metabolic 2/2 hypernatremia and hypercalcemia vs medication induced  - Hypernatremic - corrected to 145; currently 1L free water deficit - receiving D5W @75ml/hr; CMP q12   - Corrected calcium for hypoalbumin = 11.4, Phosphorous 2.6; PTH 67. Likely primary hyperparathyroidism vs secondary as pt. at normal Cr BL and has normal phosphorus.    - haldoperidol and melatonin given for agitation/delirium  - started on dexmedetomidine and will try to titrate off today    #CV  -admitted w/ sBP 200, since requiring pressure support 2/2 sepsis, pt placed on hydralazine, required cardene drip  -pt currently does not require norepi  -HFpEF (40-50%) w/ severe diastolic failure  -multivalvular disease: MV replacement, severe AS  * Afib:  - resumed heparin drip as Hgb was stable in 7's but coumadin d/c'ed  - Hgb today: 6.8; will f/u with repeat CBC and if <7 will transfuse. If continues to be in 7's will continue with hep ggt but hold on bridging to coumadin  - monitoring cbc q12 for now but check q24 if stable  - c/w diltiazem 60mg q6 and hydralazine 50mg q8  - continue to monitor BP    #Resp  -+enterovirus on RVP with +H.influenza bacteremia  -currently extubated since 9/25  * Multi-focal PNA  -9/28 CXR:  Ill-defined density seen in the left lung and   the right middle lobe compatible with multifocal pneumonia.  -cefepime was switched to meropenem 9/28  -will f/u with procalcitonin  -c/w prednisone taper; prednisone 10mg (day 2/3) then d/c after tomorrow's dose   -c/w nebs  -c/w chest PT  -pt required BiPAP yesterday due to desaturation but currently saturating well and will transition to NC and use HFNC if continues to desat  -will continue to monitor and assess for secretions and WOB    #GI  -pt has does not have abdominal tenderness today.  -lipase trend 182 --> 125  -will f/u with lipase trend  -c/w tube feeds once pt is off BiPAP  -speech/swallow eval tomorrow, was seen 9/27 but pt was lethargic and not following commands s/p seroquel. speech/swallow will f/u and eval tomorrow  -decreased H&H on hep ggt and possible melena x 2, concern for GI bleed and f/u with FOBT  -pt has history of AVM and GI bleed  -cont with hep ggt for now and will hold coumadin and f/u with repeat CBC  -PPI ppx restarted with concern for GI bleed  -can continue with bowel regimen    #Renal  -LATANYA, likely 2/2 sepsis + HTN urgency; resolved with Cr at baseline  -will continue to monitor I&O's    #ID  - +entero/rhinovirus  - GN coccobacillus (H. influenzae) on 9/14 blood culture.  s/p 14 days of abx for H. Influenza pneumonia and bacteremia.   - UClx (9/14): NGTD  - Sputum Clx (9/14): No organisms, repeat combicath (9/21): normal respiratory chastity  - repeat blood cx from 9/17, 9/21, 9/28: NGTD  - legionella negative  - cefepime switched to meropenem (9/28) with concern for possible PNA.  - pt was hypothermic and will f/u with procalcitonin    #Heme:  -DIC panel negative  -LFTs no longer mildly elevated  -H/H drop; CT A/P 9/20 was negative for acute bleeding  -will f/u with repeat CBC and if stable in 7's will continue to monitor if drops <7 then will transfuse.    #Endo:  - C/w NPH  - iPTH elevated @ 67, Ca 11.1     #Ophtho  -c/w home latanoprost drops for glaucoma    #Dispo  - will continue with PT  - DVT ppx: SCDs 85 y/o F w/ a PMH significant for COPD, JENNIE on BiPAP, multivalvular disease (severe AS, s/p MV replacement), HFpEF/severe diastolic failure, A-fib on coumadin, sick sinus syndrome s/p pacemaker placement, HTN, and CKD3 who was admitted for acute respiratory failure requiring intubation suspected to be 2/2 COPD exacerbation c/b PNA w/ +entero/rhinovirus and GN coccobacillus and H. influenza bacteremia.    #Neuro   - AMS - ddx: icu delerium vs metabolic 2/2 hypernatremia and hypercalcemia vs medication induced  - Hypernatremic - corrected to 145; currently 1L free water deficit - receiving D5W @75ml/hr; CMP q12   - Corrected calcium for hypoalbumin = 11.4, Phosphorous 2.6; PTH 67. Likely primary hyperparathyroidism vs secondary as pt. at normal Cr BL and has normal phosphorus.    - haldoperidol and melatonin given for agitation/delirium  - started on dexmedetomidine and will try to titrate off today    #CV  -admitted w/ sBP 200, since requiring pressure support 2/2 sepsis, pt placed on hydralazine, required cardene drip  -pt currently does not require norepi  -HFpEF (40-50%) w/ severe diastolic failure  -multivalvular disease: MV replacement, severe AS  * Afib:  - resumed heparin drip as Hgb was stable in 7's but coumadin d/c'ed  - Hgb today: 6.8; will f/u with repeat CBC and if <7 will transfuse. If continues to be in 7's will continue with hep ggt but hold on bridging to coumadin  - monitoring cbc q12 for now but check q24 if stable  - c/w diltiazem 60mg q6 and hydralazine 50mg q8  - continue to monitor BP    #Resp  -+enterovirus on RVP with +H.influenza bacteremia  -currently extubated since 9/25  * Multi-focal PNA  -9/28 CXR:  Ill-defined density seen in the left lung and   the right middle lobe compatible with multifocal pneumonia.  -cefepime was switched to meropenem 9/28  -will f/u with procalcitonin  -c/w prednisone taper; prednisone 10mg (day 2/3) then d/c after tomorrow's dose   -c/w nebs  -c/w chest PT  -pt required BiPAP yesterday due to desaturation but currently saturating well and will transition to NC and use HFNC if continues to desat  -will continue to monitor and assess for secretions and WOB    #GI  -pt has does not have abdominal tenderness today.  -lipase trend 182 --> 125  -will f/u with lipase trend  -c/w tube feeds once pt is off BiPAP  -speech/swallow eval tomorrow, was seen 9/27 but pt was lethargic and not following commands s/p seroquel. speech/swallow will f/u and eval tomorrow  -decreased H&H on hep ggt and possible melena x 2, concern for GI bleed and f/u with FOBT  -pt has history of AVM and GI bleed  -cont with hep ggt for now and will hold coumadin and f/u with repeat CBC  -PPI ppx restarted with concern for GI bleed  -can continue with bowel regimen    #Renal  -LATANYA, likely 2/2 sepsis + HTN urgency; resolved with Cr at baseline  -will continue to monitor I&O's    #ID  - +entero/rhinovirus  - GN coccobacillus (H. influenzae) on 9/14 blood culture.  s/p 14 days of abx for H. Influenza pneumonia and bacteremia.   - UClx (9/14): NGTD  - Sputum Clx (9/14): No organisms, repeat combicath (9/21): normal respiratory chastity  - repeat blood cx from 9/17, 9/21, 9/28: NGTD  - legionella negative  - cefepime switched to meropenem (9/28) with concern for possible PNA.  - pt was hypothermic and will f/u with procalcitonin    #Heme:  -DIC panel negative  -LFTs no longer mildly elevated  -H/H drop; CT A/P 9/20 was negative for acute bleeding  -will f/u with repeat CBC and if stable in 7's will continue to monitor if drops <7 then will transfuse.    #Endo:  - C/w NPH  - iPTH elevated @ 67, Ca 11.1   - will check ECG    #Ophtho  -c/w home latanoprost drops for glaucoma    #Dispo  - will continue with PT  - DVT ppx: SCDs

## 2018-09-30 NOTE — PROGRESS NOTE ADULT - ATTENDING COMMENTS
Agree with above.  Recurrent episodes of agitation / delirium.  Tapering steroids to off, which may help (steroid psychosis)  Noted to have increased QTc overnight (530s).  Started on Precedex with symptomatic improvement.  Currently calm and resting.  Continue to check serial QTc. If <470 can give Seroquel QHs and Haldol PRN.  Continue to monitor in ICU for now.  NIPPV QHS and high-flow / nasal cannula by day.

## 2018-10-01 LAB
ALBUMIN SERPL ELPH-MCNC: 2.9 G/DL — LOW (ref 3.3–5)
ALBUMIN SERPL ELPH-MCNC: 3 G/DL — LOW (ref 3.3–5)
ALP SERPL-CCNC: 55 U/L — SIGNIFICANT CHANGE UP (ref 40–120)
ALP SERPL-CCNC: 55 U/L — SIGNIFICANT CHANGE UP (ref 40–120)
ALT FLD-CCNC: 18 U/L — SIGNIFICANT CHANGE UP (ref 10–45)
ALT FLD-CCNC: 18 U/L — SIGNIFICANT CHANGE UP (ref 10–45)
ANION GAP SERPL CALC-SCNC: 8 MMOL/L — SIGNIFICANT CHANGE UP (ref 5–17)
ANION GAP SERPL CALC-SCNC: 8 MMOL/L — SIGNIFICANT CHANGE UP (ref 5–17)
APTT BLD: 85.9 SEC — HIGH (ref 27.5–37.4)
APTT BLD: 89.1 SEC — HIGH (ref 27.5–37.4)
APTT BLD: > 200 SEC (ref 27.5–37.4)
AST SERPL-CCNC: 12 U/L — SIGNIFICANT CHANGE UP (ref 10–40)
AST SERPL-CCNC: 14 U/L — SIGNIFICANT CHANGE UP (ref 10–40)
BILIRUB SERPL-MCNC: 0.2 MG/DL — SIGNIFICANT CHANGE UP (ref 0.2–1.2)
BILIRUB SERPL-MCNC: 0.4 MG/DL — SIGNIFICANT CHANGE UP (ref 0.2–1.2)
BUN SERPL-MCNC: 58 MG/DL — HIGH (ref 7–23)
BUN SERPL-MCNC: 59 MG/DL — HIGH (ref 7–23)
CALCIUM SERPL-MCNC: 10.1 MG/DL — SIGNIFICANT CHANGE UP (ref 8.4–10.5)
CALCIUM SERPL-MCNC: 9.9 MG/DL — SIGNIFICANT CHANGE UP (ref 8.4–10.5)
CHLORIDE SERPL-SCNC: 103 MMOL/L — SIGNIFICANT CHANGE UP (ref 96–108)
CHLORIDE SERPL-SCNC: 107 MMOL/L — SIGNIFICANT CHANGE UP (ref 96–108)
CO2 SERPL-SCNC: 23 MMOL/L — SIGNIFICANT CHANGE UP (ref 22–31)
CO2 SERPL-SCNC: 26 MMOL/L — SIGNIFICANT CHANGE UP (ref 22–31)
CREAT SERPL-MCNC: 1.28 MG/DL — SIGNIFICANT CHANGE UP (ref 0.5–1.3)
CREAT SERPL-MCNC: 1.34 MG/DL — HIGH (ref 0.5–1.3)
GAS PNL BLDA: SIGNIFICANT CHANGE UP
GAS PNL BLDA: SIGNIFICANT CHANGE UP
GLUCOSE BLDC GLUCOMTR-MCNC: 101 MG/DL — HIGH (ref 70–99)
GLUCOSE BLDC GLUCOMTR-MCNC: 112 MG/DL — HIGH (ref 70–99)
GLUCOSE BLDC GLUCOMTR-MCNC: 130 MG/DL — HIGH (ref 70–99)
GLUCOSE BLDC GLUCOMTR-MCNC: 161 MG/DL — HIGH (ref 70–99)
GLUCOSE BLDC GLUCOMTR-MCNC: 81 MG/DL — SIGNIFICANT CHANGE UP (ref 70–99)
GLUCOSE SERPL-MCNC: 119 MG/DL — HIGH (ref 70–99)
GLUCOSE SERPL-MCNC: 169 MG/DL — HIGH (ref 70–99)
HCT VFR BLD CALC: 23.1 % — LOW (ref 34.5–45)
HCT VFR BLD CALC: 24.1 % — LOW (ref 34.5–45)
HGB BLD-MCNC: 7.4 G/DL — LOW (ref 11.5–15.5)
HGB BLD-MCNC: 7.7 G/DL — LOW (ref 11.5–15.5)
INR BLD: 1.09 RATIO — SIGNIFICANT CHANGE UP (ref 0.88–1.16)
INR BLD: 1.14 RATIO — SIGNIFICANT CHANGE UP (ref 0.88–1.16)
INR BLD: 1.19 RATIO — HIGH (ref 0.88–1.16)
LIDOCAIN IGE QN: 88 U/L — HIGH (ref 7–60)
MAGNESIUM SERPL-MCNC: 2.3 MG/DL — SIGNIFICANT CHANGE UP (ref 1.6–2.6)
MCHC RBC-ENTMCNC: 29.2 PG — SIGNIFICANT CHANGE UP (ref 27–34)
MCHC RBC-ENTMCNC: 29.4 PG — SIGNIFICANT CHANGE UP (ref 27–34)
MCHC RBC-ENTMCNC: 31.8 GM/DL — LOW (ref 32–36)
MCHC RBC-ENTMCNC: 32.3 GM/DL — SIGNIFICANT CHANGE UP (ref 32–36)
MCV RBC AUTO: 91.1 FL — SIGNIFICANT CHANGE UP (ref 80–100)
MCV RBC AUTO: 91.7 FL — SIGNIFICANT CHANGE UP (ref 80–100)
PHOSPHATE SERPL-MCNC: 3.8 MG/DL — SIGNIFICANT CHANGE UP (ref 2.5–4.5)
PLATELET # BLD AUTO: 161 K/UL — SIGNIFICANT CHANGE UP (ref 150–400)
PLATELET # BLD AUTO: 167 K/UL — SIGNIFICANT CHANGE UP (ref 150–400)
POTASSIUM SERPL-MCNC: 4.6 MMOL/L — SIGNIFICANT CHANGE UP (ref 3.5–5.3)
POTASSIUM SERPL-MCNC: 4.7 MMOL/L — SIGNIFICANT CHANGE UP (ref 3.5–5.3)
POTASSIUM SERPL-SCNC: 4.6 MMOL/L — SIGNIFICANT CHANGE UP (ref 3.5–5.3)
POTASSIUM SERPL-SCNC: 4.7 MMOL/L — SIGNIFICANT CHANGE UP (ref 3.5–5.3)
PROT SERPL-MCNC: 5.4 G/DL — LOW (ref 6–8.3)
PROT SERPL-MCNC: 5.9 G/DL — LOW (ref 6–8.3)
PROTHROM AB SERPL-ACNC: 11.8 SEC — SIGNIFICANT CHANGE UP (ref 9.8–12.7)
PROTHROM AB SERPL-ACNC: 12.3 SEC — SIGNIFICANT CHANGE UP (ref 9.8–12.7)
PROTHROM AB SERPL-ACNC: 13 SEC — HIGH (ref 9.8–12.7)
RBC # BLD: 2.53 M/UL — LOW (ref 3.8–5.2)
RBC # BLD: 2.63 M/UL — LOW (ref 3.8–5.2)
RBC # FLD: 18 % — HIGH (ref 10.3–14.5)
RBC # FLD: 18.2 % — HIGH (ref 10.3–14.5)
SODIUM SERPL-SCNC: 137 MMOL/L — SIGNIFICANT CHANGE UP (ref 135–145)
SODIUM SERPL-SCNC: 138 MMOL/L — SIGNIFICANT CHANGE UP (ref 135–145)
WBC # BLD: 14.1 K/UL — HIGH (ref 3.8–10.5)
WBC # BLD: 14.3 K/UL — HIGH (ref 3.8–10.5)
WBC # FLD AUTO: 14.1 K/UL — HIGH (ref 3.8–10.5)
WBC # FLD AUTO: 14.3 K/UL — HIGH (ref 3.8–10.5)

## 2018-10-01 PROCEDURE — 99291 CRITICAL CARE FIRST HOUR: CPT | Mod: 25

## 2018-10-01 PROCEDURE — 71045 X-RAY EXAM CHEST 1 VIEW: CPT | Mod: 26

## 2018-10-01 PROCEDURE — 31500 INSERT EMERGENCY AIRWAY: CPT

## 2018-10-01 PROCEDURE — 76604 US EXAM CHEST: CPT | Mod: 26

## 2018-10-01 PROCEDURE — 93308 TTE F-UP OR LMTD: CPT | Mod: 26

## 2018-10-01 RX ORDER — DEXTROSE 10 % IN WATER 10 %
1000 INTRAVENOUS SOLUTION INTRAVENOUS
Qty: 0 | Refills: 0 | Status: DISCONTINUED | OUTPATIENT
Start: 2018-10-01 | End: 2018-10-02

## 2018-10-01 RX ORDER — MIDAZOLAM HYDROCHLORIDE 1 MG/ML
6 INJECTION, SOLUTION INTRAMUSCULAR; INTRAVENOUS ONCE
Qty: 0 | Refills: 0 | Status: DISCONTINUED | OUTPATIENT
Start: 2018-10-01 | End: 2018-10-01

## 2018-10-01 RX ORDER — BUDESONIDE AND FORMOTEROL FUMARATE DIHYDRATE 160; 4.5 UG/1; UG/1
2 AEROSOL RESPIRATORY (INHALATION)
Qty: 0 | Refills: 0 | Status: DISCONTINUED | OUTPATIENT
Start: 2018-10-01 | End: 2018-10-02

## 2018-10-01 RX ORDER — PROPOFOL 10 MG/ML
40 INJECTION, EMULSION INTRAVENOUS ONCE
Qty: 0 | Refills: 0 | Status: COMPLETED | OUTPATIENT
Start: 2018-10-01 | End: 2018-10-01

## 2018-10-01 RX ORDER — PHENYLEPHRINE HYDROCHLORIDE 10 MG/ML
4 INJECTION INTRAVENOUS
Qty: 40 | Refills: 0 | Status: DISCONTINUED | OUTPATIENT
Start: 2018-10-01 | End: 2018-10-02

## 2018-10-01 RX ORDER — HUMAN INSULIN 100 [IU]/ML
5 INJECTION, SUSPENSION SUBCUTANEOUS EVERY 12 HOURS
Qty: 0 | Refills: 0 | Status: DISCONTINUED | OUTPATIENT
Start: 2018-10-01 | End: 2018-10-01

## 2018-10-01 RX ORDER — PROPOFOL 10 MG/ML
5 INJECTION, EMULSION INTRAVENOUS
Qty: 500 | Refills: 0 | Status: DISCONTINUED | OUTPATIENT
Start: 2018-10-01 | End: 2018-10-03

## 2018-10-01 RX ADMIN — Medication 10 MILLIGRAM(S): at 05:59

## 2018-10-01 RX ADMIN — Medication 3 MILLILITER(S): at 00:25

## 2018-10-01 RX ADMIN — LATANOPROST 1 DROP(S): 0.05 SOLUTION/ DROPS OPHTHALMIC; TOPICAL at 22:29

## 2018-10-01 RX ADMIN — MEROPENEM 100 MILLIGRAM(S): 1 INJECTION INTRAVENOUS at 05:59

## 2018-10-01 RX ADMIN — Medication 3 MILLILITER(S): at 23:37

## 2018-10-01 RX ADMIN — Medication 81 MILLIGRAM(S): at 12:06

## 2018-10-01 RX ADMIN — HEPARIN SODIUM 900 UNIT(S)/HR: 5000 INJECTION INTRAVENOUS; SUBCUTANEOUS at 02:52

## 2018-10-01 RX ADMIN — PANTOPRAZOLE SODIUM 40 MILLIGRAM(S): 20 TABLET, DELAYED RELEASE ORAL at 05:59

## 2018-10-01 RX ADMIN — HUMAN INSULIN 5 UNIT(S): 100 INJECTION, SUSPENSION SUBCUTANEOUS at 17:44

## 2018-10-01 RX ADMIN — PROPOFOL 40 MILLIGRAM(S): 10 INJECTION, EMULSION INTRAVENOUS at 13:39

## 2018-10-01 RX ADMIN — Medication 3 MILLILITER(S): at 05:40

## 2018-10-01 RX ADMIN — Medication 3 MILLILITER(S): at 17:16

## 2018-10-01 RX ADMIN — MIDAZOLAM HYDROCHLORIDE 6 MILLIGRAM(S): 1 INJECTION, SOLUTION INTRAMUSCULAR; INTRAVENOUS at 13:35

## 2018-10-01 RX ADMIN — PANTOPRAZOLE SODIUM 40 MILLIGRAM(S): 20 TABLET, DELAYED RELEASE ORAL at 17:34

## 2018-10-01 RX ADMIN — Medication 3 MILLILITER(S): at 11:18

## 2018-10-01 RX ADMIN — MEROPENEM 100 MILLIGRAM(S): 1 INJECTION INTRAVENOUS at 22:19

## 2018-10-01 RX ADMIN — MEROPENEM 100 MILLIGRAM(S): 1 INJECTION INTRAVENOUS at 15:11

## 2018-10-01 RX ADMIN — Medication 30 MILLILITER(S): at 01:27

## 2018-10-01 NOTE — PROGRESS NOTE ADULT - SUBJECTIVE AND OBJECTIVE BOX
INTERVAL HPI/OVERNIGHT EVENTS:    SUBJECTIVE: Patient seen and examined at bedside.     OBJECTIVE:    VITAL SIGNS:  ICU Vital Signs Last 24 Hrs  T(C): 36.4 (01 Oct 2018 04:00), Max: 36.4 (01 Oct 2018 00:00)  T(F): 97.5 (01 Oct 2018 04:00), Max: 97.6 (01 Oct 2018 00:00)  HR: 59 (01 Oct 2018 06:00) (59 - 77)  BP: 110/52 (01 Oct 2018 06:00) (91/49 - 154/63)  BP(mean): 75 (01 Oct 2018 06:00) (65 - 113)  RR: 26 (01 Oct 2018 06:00) (22 - 49)  SpO2: 100% (01 Oct 2018 06:00) (85% - 100%)    09-30 @ 07:01  -  10-01 @ 07:00  --------------------------------------------------------  IN: 2514.4 mL / OUT: 600 mL / NET: 1914.4 mL    CAPILLARY BLOOD GLUCOSE  POCT Blood Glucose.: 101 mg/dL (01 Oct 2018 05:53)    PHYSICAL EXAM:  General:   HEENT:   Respiratory:   Cardiovascular: RRR  Abdomen:   Extremities:   Skin:   Neurological:    LINES:     MEDICATIONS:  ALBUTerol/ipratropium for Nebulization 3 milliLiter(s) Nebulizer every 6 hours  predniSONE   Tablet 10 milliGRAM(s) Oral daily  aspirin  chewable 81 milliGRAM(s) Oral daily  heparin  Infusion. 900 Unit(s)/Hr (9 mL/Hr) IV Continuous <Continuous>  dexmedetomidine Infusion 0.01 MICROgram(s)/kG/Hr (0.157 mL/Hr) IV Continuous <Continuous>  melatonin 3 milliGRAM(s) Oral at bedtime  QUEtiapine 12.5 milliGRAM(s) Oral at bedtime  dextrose 10%. 1000 milliLiter(s) (30 mL/Hr) IV Continuous <Continuous>  insulin lispro (HumaLOG) corrective regimen sliding scale   SubCutaneous every 6 hours  insulin NPH human recombinant 12 Unit(s) SubCutaneous every 6 hours  latanoprost 0.005% Ophthalmic Solution 1 Drop(s) Both EYES at bedtime  diltiazem    Tablet 60 milliGRAM(s) Oral every 6 hours  hydrALAZINE 50 milliGRAM(s) Oral every 8 hours  meropenem  IVPB 1000 milliGRAM(s) IV Intermittent every 8 hours  pantoprazole  Injectable 40 milliGRAM(s) IV Push every 12 hours  senna 2 Tablet(s) Oral at bedtime    ALLERGIES:  penicillin (Rash)    LABS:                      7.4    14.3  )-----------( 167      ( 01 Oct 2018 01:38 )             23.1     Mean Cell Volume : 91.1 fl  Mean Cell Hemoglobin : 29.4 pg  Mean Cell Hemoglobin Concentration : 32.3 gm/dL  10-01    138  |  107  |  58<H>  ----------------------------<  119<H>  4.6   |  23  |  1.28    Ca    9.9      01 Oct 2018 01:38  Phos  3.8     10-01  Mg     2.3     10-01    TPro  5.4<L>  /  Alb  2.9<L>  /  TBili  0.2  /  DBili  x   /  AST  12  /  ALT  18  /  AlkPhos  55  10-01    Creatinine Trend: 1.28<--, 0.99<--, 1.04<--, 1.04<--, 0.94<--, 1.11<--  LIVER FUNCTIONS - ( 01 Oct 2018 01:38 )  Alb: 2.9 g/dL / Pro: 5.4 g/dL / ALK PHOS: 55 U/L / ALT: 18 U/L / AST: 12 U/L / GGT: x           PT/INR - ( 01 Oct 2018 01:38 )   PT: 13.0 sec;   INR: 1.19 ratio         PTT - ( 01 Oct 2018 01:38 )  PTT:89.1 sec      ABG - ( 30 Sep 2018 22:41 )  pH, Arterial: 7.32  pH, Blood: x     /  pCO2: 47    /  pO2: 61    / HCO3: 23    / Base Excess: -2.2  /  SaO2: 88        MICROBIOLOGY:    RADIOLOGY & ADDITIONAL TESTS: Reviewed. INTERVAL HPI/OVERNIGHT EVENTS: pt became hypoxic overnight and continued to require BiPAP, she was agitated and was continued no dexmedetomidine and seroquel. she was also seen to have afib and aflutter rhythm    SUBJECTIVE: Patient seen and examined at bedside.     OBJECTIVE:    VITAL SIGNS:  ICU Vital Signs Last 24 Hrs  T(C): 36.4 (01 Oct 2018 04:00), Max: 36.4 (01 Oct 2018 00:00)  T(F): 97.5 (01 Oct 2018 04:00), Max: 97.6 (01 Oct 2018 00:00)  HR: 59 (01 Oct 2018 06:00) (59 - 77)  BP: 110/52 (01 Oct 2018 06:00) (91/49 - 154/63)  BP(mean): 75 (01 Oct 2018 06:00) (65 - 113)  RR: 26 (01 Oct 2018 06:00) (22 - 49)  SpO2: 100% (01 Oct 2018 06:00) (85% - 100%)    09-30 @ 07:01  -  10-01 @ 07:00  --------------------------------------------------------  IN: 2514.4 mL / OUT: 600 mL / NET: 1914.4 mL    CAPILLARY BLOOD GLUCOSE  POCT Blood Glucose.: 101 mg/dL (01 Oct 2018 05:53)    PHYSICAL EXAM:  General: elderly woman on BiPAP  HEENT: NC/AT, PERRL  Respiratory: breath sounds bilaterally, increased work of breathing with accessory muscle use and tracheal tugging  Cardiovascular: RRR, systolic murmur  Abdomen: soft, nontender, nondistended  Extremities: 2+ peripheral pulses  Skin: normal skin turgor   Neurological: pt is lethargic    LINES: PIVs    MEDICATIONS:  ALBUTerol/ipratropium for Nebulization 3 milliLiter(s) Nebulizer every 6 hours  predniSONE   Tablet 10 milliGRAM(s) Oral daily  aspirin  chewable 81 milliGRAM(s) Oral daily  heparin  Infusion. 900 Unit(s)/Hr (9 mL/Hr) IV Continuous <Continuous>  dexmedetomidine Infusion 0.01 MICROgram(s)/kG/Hr (0.157 mL/Hr) IV Continuous <Continuous>  melatonin 3 milliGRAM(s) Oral at bedtime  QUEtiapine 12.5 milliGRAM(s) Oral at bedtime  dextrose 10%. 1000 milliLiter(s) (30 mL/Hr) IV Continuous <Continuous>  insulin lispro (HumaLOG) corrective regimen sliding scale   SubCutaneous every 6 hours  insulin NPH human recombinant 12 Unit(s) SubCutaneous every 6 hours  latanoprost 0.005% Ophthalmic Solution 1 Drop(s) Both EYES at bedtime  diltiazem    Tablet 60 milliGRAM(s) Oral every 6 hours  hydrALAZINE 50 milliGRAM(s) Oral every 8 hours  meropenem  IVPB 1000 milliGRAM(s) IV Intermittent every 8 hours  pantoprazole  Injectable 40 milliGRAM(s) IV Push every 12 hours  senna 2 Tablet(s) Oral at bedtime    ALLERGIES:  penicillin (Rash)    LABS:                      7.4    14.3  )-----------( 167      ( 01 Oct 2018 01:38 )             23.1     Mean Cell Volume : 91.1 fl  Mean Cell Hemoglobin : 29.4 pg  Mean Cell Hemoglobin Concentration : 32.3 gm/dL  10-01    138  |  107  |  58<H>  ----------------------------<  119<H>  4.6   |  23  |  1.28    Ca    9.9      01 Oct 2018 01:38  Phos  3.8     10-01  Mg     2.3     10-01    TPro  5.4<L>  /  Alb  2.9<L>  /  TBili  0.2  /  DBili  x   /  AST  12  /  ALT  18  /  AlkPhos  55  10-01    Creatinine Trend: 1.28<--, 0.99<--, 1.04<--, 1.04<--, 0.94<--, 1.11<--  LIVER FUNCTIONS - ( 01 Oct 2018 01:38 )  Alb: 2.9 g/dL / Pro: 5.4 g/dL / ALK PHOS: 55 U/L / ALT: 18 U/L / AST: 12 U/L / GGT: x           PT/INR - ( 01 Oct 2018 01:38 )   PT: 13.0 sec;   INR: 1.19 ratio    PTT - ( 01 Oct 2018 01:38 )  PTT:89.1 sec    ABG - ( 30 Sep 2018 22:41 )  pH, Arterial: 7.32  pH, Blood: x     /  pCO2: 47    /  pO2: 61    / HCO3: 23    / Base Excess: -2.2  /  SaO2: 88        RADIOLOGY & ADDITIONAL TESTS: Reviewed.

## 2018-10-01 NOTE — PROCEDURE NOTE - NSTRACHPOSTINTU_RESP_A_CORE
Appropriate capnography/Chest excursion noted/Positive end tidal Co2 noted/Breath sounds equal/Breath sounds bilateral
Appropriate capnography/Breath sounds bilateral/Chest X-Ray/Breath sounds equal

## 2018-10-01 NOTE — PROGRESS NOTE ADULT - ATTENDING COMMENTS
Critically ill on NIV with obtundation and loss of airway function For re-intubation  Frequent bedside visits with therapy change today. Crit Care Time Today 35 min +  Patient examined and case reviewed in detail on bedside routine    Critical Care Ultrasonography  Indication: Respiratory failure  Lung: Bilateral A line pattern with small anechoic pleff bilaterally  Cardiac: Nl LV fxn, no RV enlargement, No PEF, IVC indeterminate 2D exam c/w AS Mechanical MVR in place    Impression: No change in echo findings Normal aeration pattern with small pleff

## 2018-10-01 NOTE — PROCEDURE NOTE - NSPROCDETAILS_GEN_ALL_CORE
patient pre-oxygenated, tube inserted, placement confirmed
patient pre-oxygenated, tube inserted, placement confirmed/connected to ventilator
sterile technique, catheter placed/lumen(s) aspirated and flushed/ultrasound guidance/sterile dressing applied/guidewire recovered

## 2018-10-01 NOTE — SWALLOW BEDSIDE ASSESSMENT ADULT - SWALLOW EVAL: DIAGNOSIS
This service f/u for repeat bedside swallow evaluation. Pt continues to require continuos BiPap at this time. PO contraindicated. Discussed with MICU MD Junior. This service to sign off at this time. Team to reconsult as needed and as medically appropriate.

## 2018-10-01 NOTE — PROGRESS NOTE ADULT - ASSESSMENT
83 y/o F w/ a PMH significant for COPD, JENNIE on BiPAP, multivalvular disease (severe AS, s/p MV replacement), HFpEF/severe diastolic failure, A-fib on coumadin, sick sinus syndrome s/p pacemaker placement, HTN, and CKD3 who was admitted for acute respiratory failure requiring intubation suspected to be 2/2 COPD exacerbation c/b PNA w/ +entero/rhinovirus and GN coccobacillus and H. influenza bacteremia.    #Neuro   - AMS - ddx: icu delerium vs medication induced vs respiratory distress  - Hypernatremic - corrected to 145; currently 1L free water deficit - receiving D10W @30ml/hr; CMP q12   - calcium wnl  - c/w melatonin qhs  - c/w dexmedetomidine for agitation  - d/c seroquel    #CV  -admitted w/ sBP 200, since requiring pressure support 2/2 sepsis, pt placed on hydralazine, required cardene drip  -pt currently does not require norepi  -HFpEF (40-50%) w/ severe diastolic failure  -multivalvular disease: MV replacement, severe AS  - Afib  - resumed heparin drip as Hgb was stable in 7's but coumadin d/c'ed  - pt was given 1unit PRBC as hgb was 6.7 on repeat CBC  - monitoring cbc q12 for now  - c/w diltiazem 60mg q6 and hydralazine 50mg q8  - continue to monitor BP    #Resp  -+enterovirus on RVP with +H.influenza bacteremia  -currently extubated since 9/25  * Multi-focal PNA  -9/28 CXR:  Ill-defined density seen in the left lung and the right middle lobe compatible with multifocal pneumonia.  -cefepime was switched to meropenem 9/28 and will continue until 10/2  -procalcitonin 0.23 but will finish 5 days of meropenem  -d/c'ed steroid  -c/w nebs, start on home symbicort  -c/w chest PT  -discussed pt will most likely require reintubation as pt continues to have signs of respiratory distress on BiPAP    #GI  -pt has does not have abdominal tenderness today.  -lipase trend 182 --> 125  -keep NPO except meds  -speech/swallow signed off as pt has been unable to be tested adequately  -pt has history of AVM and GI bleed  -cont with hep ggt for now and will hold coumadin  -PPI ppx restarted with concern for GI bleed  -can continue with bowel regimen    #Renal  -LATANYA, likely 2/2 sepsis + HTN urgency; resolved with Cr at baseline  -add furosemide to help diurese, target net: 0.  -will continue to monitor I&O's    #ID  - +entero/rhinovirus  - GN coccobacillus (H. influenzae) on 9/14 blood culture.  s/p 14 days of abx for H. Influenza pneumonia and bacteremia.   - UClx (9/14): NGTD  - Sputum Clx (9/14): No organisms, repeat combicath (9/21): normal respiratory chastity  - repeat blood cx from 9/17, 9/21, 9/28: NGTD  - legionella negative  - cefepime switched to meropenem (9/28) with concern for possible PNA.  - pt was hypothermic and will f/u with procalcitonin    #Heme:  -DIC panel negative  -LFTs no longer mildly elevated  -H/H drop; CT A/P 9/20 was negative for acute bleeding, 10/1 bedside FAST negative, no signs of acute bleed  -pt responded appropriately to 1unit PRBC, will continue to check CBC q12  - DVT ppx: on hep ggt    #Endo:  - decreased NPH as pt is now off steroids and NPO, decreased to 6units Q12  - cont SSI    #Ophtho  -c/w home latanoprost drops for glaucoma    #Dispo  - will continue with PT 85 y/o F w/ a PMH significant for COPD, JENNIE on BiPAP, multivalvular disease (severe AS, s/p MV replacement), HFpEF/severe diastolic failure, A-fib on coumadin, sick sinus syndrome s/p pacemaker placement, HTN, and CKD3 who was admitted for acute respiratory failure requiring intubation suspected to be 2/2 COPD exacerbation c/b PNA w/ +entero/rhinovirus and GN coccobacillus and H. influenza bacteremia.    #Neuro  - sedated on dexmedetomidine and propofol after intubation  - d/c melatonin    #CV  -HTN  -HFpEF (40-50%) w/ severe diastolic failure  -multivalvular disease: MV replacement, severe AS  - Afib, SSS with PPM  - heparin ggt continued and will d/c once plans for trach, Hgb was stable in 7's  - pt was given 1unit PRBC as hgb was 6.7 on repeat CBC  - monitoring cbc q12 for now  - c/w diltiazem 60mg q6 and hydralazine 50mg q8  - continue to monitor BP    #Resp  -+enterovirus on RVP with +H.influenza bacteremia  -currently extubated since 9/25  * Multi-focal PNA  -9/28 CXR:  Ill-defined density seen in the left lung and the right middle lobe compatible with multifocal pneumonia.  -cefepime was switched to meropenem 9/28 and will continue until 10/2  -procalcitonin 0.23 but will finish 5 days of meropenem  -d/c'ed steroid  -c/w nebs, start on home symbicort  -c/w chest PT  -discussed pt will most likely require reintubation as pt continues to have signs of respiratory distress on BiPAP    #GI  -pt has does not have abdominal tenderness today.  -lipase trend 182 --> 125  -keep NPO except meds  -speech/swallow signed off as pt has been unable to be tested adequately  -pt has history of AVM and GI bleed  -cont with hep ggt for now and will hold coumadin  -PPI ppx restarted with concern for GI bleed  -can continue with bowel regimen    #Renal  -LATANYA, likely 2/2 sepsis + HTN urgency; resolved with Cr at baseline  -add furosemide to help diurese, target net: 0.  -will continue to monitor I&O's    #ID  - +entero/rhinovirus  - GN coccobacillus (H. influenzae) on 9/14 blood culture.  s/p 14 days of abx for H. Influenza pneumonia and bacteremia.   - UClx (9/14): NGTD  - Sputum Clx (9/14): No organisms, repeat combicath (9/21): normal respiratory chastity  - repeat blood cx from 9/17, 9/21, 9/28: NGTD  - legionella negative  - cefepime switched to meropenem (9/28) with concern for possible PNA.  - pt was hypothermic and will f/u with procalcitonin    #Heme:  -DIC panel negative  -LFTs no longer mildly elevated  -H/H drop; CT A/P 9/20 was negative for acute bleeding, 10/1 bedside FAST negative, no signs of acute bleed  -pt responded appropriately to 1unit PRBC, will continue to check CBC q12  - DVT ppx: on hep ggt    #Endo:  - decreased NPH as pt is now off steroids and NPO, decreased to 6units Q12  - cont SSI    #Ophtho  -c/w home latanoprost drops for glaucoma    #Dispo  - will continue with PT

## 2018-10-02 LAB
ALBUMIN SERPL ELPH-MCNC: 2.7 G/DL — LOW (ref 3.3–5)
ALP SERPL-CCNC: 70 U/L — SIGNIFICANT CHANGE UP (ref 40–120)
ALT FLD-CCNC: 16 U/L — SIGNIFICANT CHANGE UP (ref 10–45)
ANION GAP SERPL CALC-SCNC: 10 MMOL/L — SIGNIFICANT CHANGE UP (ref 5–17)
APTT BLD: 72.6 SEC — HIGH (ref 27.5–37.4)
APTT BLD: 79.2 SEC — HIGH (ref 27.5–37.4)
AST SERPL-CCNC: 11 U/L — SIGNIFICANT CHANGE UP (ref 10–40)
BILIRUB SERPL-MCNC: 0.4 MG/DL — SIGNIFICANT CHANGE UP (ref 0.2–1.2)
BUN SERPL-MCNC: 60 MG/DL — HIGH (ref 7–23)
CALCIUM SERPL-MCNC: 9.3 MG/DL — SIGNIFICANT CHANGE UP (ref 8.4–10.5)
CHLORIDE SERPL-SCNC: 105 MMOL/L — SIGNIFICANT CHANGE UP (ref 96–108)
CO2 SERPL-SCNC: 23 MMOL/L — SIGNIFICANT CHANGE UP (ref 22–31)
CREAT SERPL-MCNC: 1.29 MG/DL — SIGNIFICANT CHANGE UP (ref 0.5–1.3)
CULTURE RESULTS: SIGNIFICANT CHANGE UP
GLUCOSE BLDC GLUCOMTR-MCNC: 132 MG/DL — HIGH (ref 70–99)
GLUCOSE BLDC GLUCOMTR-MCNC: 140 MG/DL — HIGH (ref 70–99)
GLUCOSE BLDC GLUCOMTR-MCNC: 149 MG/DL — HIGH (ref 70–99)
GLUCOSE BLDC GLUCOMTR-MCNC: 152 MG/DL — HIGH (ref 70–99)
GLUCOSE BLDC GLUCOMTR-MCNC: 174 MG/DL — HIGH (ref 70–99)
GLUCOSE BLDC GLUCOMTR-MCNC: 193 MG/DL — HIGH (ref 70–99)
GLUCOSE SERPL-MCNC: 128 MG/DL — HIGH (ref 70–99)
HCT VFR BLD CALC: 23 % — LOW (ref 34.5–45)
HGB BLD-MCNC: 7.5 G/DL — LOW (ref 11.5–15.5)
MAGNESIUM SERPL-MCNC: 2.3 MG/DL — SIGNIFICANT CHANGE UP (ref 1.6–2.6)
MCHC RBC-ENTMCNC: 30.3 PG — SIGNIFICANT CHANGE UP (ref 27–34)
MCHC RBC-ENTMCNC: 32.7 GM/DL — SIGNIFICANT CHANGE UP (ref 32–36)
MCV RBC AUTO: 92.8 FL — SIGNIFICANT CHANGE UP (ref 80–100)
PHOSPHATE SERPL-MCNC: 3.8 MG/DL — SIGNIFICANT CHANGE UP (ref 2.5–4.5)
PLATELET # BLD AUTO: 180 K/UL — SIGNIFICANT CHANGE UP (ref 150–400)
POTASSIUM SERPL-MCNC: 4.6 MMOL/L — SIGNIFICANT CHANGE UP (ref 3.5–5.3)
POTASSIUM SERPL-SCNC: 4.6 MMOL/L — SIGNIFICANT CHANGE UP (ref 3.5–5.3)
PROT SERPL-MCNC: 5.4 G/DL — LOW (ref 6–8.3)
RBC # BLD: 2.48 M/UL — LOW (ref 3.8–5.2)
RBC # FLD: 18.7 % — HIGH (ref 10.3–14.5)
RH IG SCN BLD-IMP: POSITIVE — SIGNIFICANT CHANGE UP
SODIUM SERPL-SCNC: 138 MMOL/L — SIGNIFICANT CHANGE UP (ref 135–145)
WBC # BLD: 13.2 K/UL — HIGH (ref 3.8–10.5)
WBC # FLD AUTO: 13.2 K/UL — HIGH (ref 3.8–10.5)

## 2018-10-02 PROCEDURE — 99231 SBSQ HOSP IP/OBS SF/LOW 25: CPT

## 2018-10-02 PROCEDURE — 76604 US EXAM CHEST: CPT | Mod: 26

## 2018-10-02 PROCEDURE — 99222 1ST HOSP IP/OBS MODERATE 55: CPT

## 2018-10-02 PROCEDURE — 93308 TTE F-UP OR LMTD: CPT | Mod: 26

## 2018-10-02 PROCEDURE — 99291 CRITICAL CARE FIRST HOUR: CPT | Mod: 25

## 2018-10-02 RX ORDER — ACETAMINOPHEN 500 MG
1000 TABLET ORAL ONCE
Qty: 0 | Refills: 0 | Status: COMPLETED | OUTPATIENT
Start: 2018-10-02 | End: 2018-10-02

## 2018-10-02 RX ORDER — MIDAZOLAM HYDROCHLORIDE 1 MG/ML
1 INJECTION, SOLUTION INTRAMUSCULAR; INTRAVENOUS ONCE
Qty: 0 | Refills: 0 | Status: DISCONTINUED | OUTPATIENT
Start: 2018-10-02 | End: 2018-10-02

## 2018-10-02 RX ORDER — FENTANYL CITRATE 50 UG/ML
25 INJECTION INTRAVENOUS ONCE
Qty: 0 | Refills: 0 | Status: DISCONTINUED | OUTPATIENT
Start: 2018-10-02 | End: 2018-10-02

## 2018-10-02 RX ORDER — PHENYLEPHRINE HYDROCHLORIDE 10 MG/ML
1 INJECTION INTRAVENOUS
Qty: 40 | Refills: 0 | Status: DISCONTINUED | OUTPATIENT
Start: 2018-10-02 | End: 2018-10-03

## 2018-10-02 RX ORDER — HEPARIN SODIUM 5000 [USP'U]/ML
900 INJECTION INTRAVENOUS; SUBCUTANEOUS
Qty: 25000 | Refills: 0 | Status: DISCONTINUED | OUTPATIENT
Start: 2018-10-02 | End: 2018-10-03

## 2018-10-02 RX ADMIN — SENNA PLUS 2 TABLET(S): 8.6 TABLET ORAL at 21:45

## 2018-10-02 RX ADMIN — PHENYLEPHRINE HYDROCHLORIDE 23.59 MICROGRAM(S)/KG/MIN: 10 INJECTION INTRAVENOUS at 17:27

## 2018-10-02 RX ADMIN — DEXMEDETOMIDINE HYDROCHLORIDE IN 0.9% SODIUM CHLORIDE 0.16 MICROGRAM(S)/KG/HR: 4 INJECTION INTRAVENOUS at 17:28

## 2018-10-02 RX ADMIN — PROPOFOL 1.89 MICROGRAM(S)/KG/MIN: 10 INJECTION, EMULSION INTRAVENOUS at 17:27

## 2018-10-02 RX ADMIN — PANTOPRAZOLE SODIUM 40 MILLIGRAM(S): 20 TABLET, DELAYED RELEASE ORAL at 17:27

## 2018-10-02 RX ADMIN — FENTANYL CITRATE 25 MICROGRAM(S): 50 INJECTION INTRAVENOUS at 21:03

## 2018-10-02 RX ADMIN — PANTOPRAZOLE SODIUM 40 MILLIGRAM(S): 20 TABLET, DELAYED RELEASE ORAL at 06:04

## 2018-10-02 RX ADMIN — Medication 81 MILLIGRAM(S): at 11:25

## 2018-10-02 RX ADMIN — Medication 3 MILLILITER(S): at 17:21

## 2018-10-02 RX ADMIN — Medication 3 MILLILITER(S): at 11:20

## 2018-10-02 RX ADMIN — FENTANYL CITRATE 25 MICROGRAM(S): 50 INJECTION INTRAVENOUS at 20:54

## 2018-10-02 RX ADMIN — LATANOPROST 1 DROP(S): 0.05 SOLUTION/ DROPS OPHTHALMIC; TOPICAL at 21:45

## 2018-10-02 RX ADMIN — MEROPENEM 100 MILLIGRAM(S): 1 INJECTION INTRAVENOUS at 06:04

## 2018-10-02 RX ADMIN — FENTANYL CITRATE 25 MICROGRAM(S): 50 INJECTION INTRAVENOUS at 21:24

## 2018-10-02 RX ADMIN — Medication 3 MILLILITER(S): at 05:30

## 2018-10-02 RX ADMIN — MIDAZOLAM HYDROCHLORIDE 1 MILLIGRAM(S): 1 INJECTION, SOLUTION INTRAMUSCULAR; INTRAVENOUS at 19:34

## 2018-10-02 RX ADMIN — MEROPENEM 100 MILLIGRAM(S): 1 INJECTION INTRAVENOUS at 13:30

## 2018-10-02 RX ADMIN — HEPARIN SODIUM 900 UNIT(S)/HR: 5000 INJECTION INTRAVENOUS; SUBCUTANEOUS at 17:33

## 2018-10-02 RX ADMIN — HEPARIN SODIUM 900 UNIT(S)/HR: 5000 INJECTION INTRAVENOUS; SUBCUTANEOUS at 11:17

## 2018-10-02 RX ADMIN — Medication 400 MILLIGRAM(S): at 23:31

## 2018-10-02 RX ADMIN — MEROPENEM 100 MILLIGRAM(S): 1 INJECTION INTRAVENOUS at 21:44

## 2018-10-02 RX ADMIN — FENTANYL CITRATE 25 MICROGRAM(S): 50 INJECTION INTRAVENOUS at 20:33

## 2018-10-02 RX ADMIN — MIDAZOLAM HYDROCHLORIDE 1 MILLIGRAM(S): 1 INJECTION, SOLUTION INTRAMUSCULAR; INTRAVENOUS at 19:50

## 2018-10-02 NOTE — PROGRESS NOTE ADULT - ATTENDING COMMENTS
Critically ill on vent s/p re-intubation for airway clearance failure For surgical trach due to large midline vein  Frequent bedside visits with therapy change today. Crit Care Time Today 35 min +  Patient examined and case reviewed in detail on bedside routine    Critical Care Ultrasonography  Indication: Respiratory failure  Lung: Bilateral A line pattern anterolateral thorax Non-translobar consolidation RLL Small anecoic pleff on left with associated LLL compressive atelectasis  Cardiac: Nl LV fxn, no RV enlargement, No PEF, IVC indeterminate Mechanical MV    Impression: Resolving RLL PNA No cardiac limitation

## 2018-10-02 NOTE — CONSULT NOTE ADULT - ATTENDING COMMENTS
seen and examined 10-02-18 @ 1750    FiO2 = 30%, PEEP = 5 cm H2O.  trachea lies posterior to manubrium, but elevates well with swallowing.    -booked for open tracheostomy in OR 10/2/2018 @ 1300  -please hold heparin and TF at midnight

## 2018-10-02 NOTE — PROGRESS NOTE ADULT - ASSESSMENT
INTERVAL HPI/OVERNIGHT EVENTS:    SUBJECTIVE: Patient seen and examined at bedside.     OBJECTIVE:    VITAL SIGNS:  ICU Vital Signs Last 24 Hrs  T(C): 36.6 (02 Oct 2018 04:00), Max: 37.4 (02 Oct 2018 00:00)  T(F): 97.8 (02 Oct 2018 04:00), Max: 99.4 (02 Oct 2018 00:00)  HR: 59 (02 Oct 2018 07:00) (21 - 72)  BP: 100/51 (02 Oct 2018 07:00) (76/35 - 195/78)  BP(mean): 73 (02 Oct 2018 07:00) (51 - 112)  RR: 24 (02 Oct 2018 07:00) (18 - 43)  SpO2: 99% (02 Oct 2018 07:00) (96% - 100%)    Mode: AC/ CMV (Assist Control/ Continuous Mandatory Ventilation), RR (machine): 24, TV (machine): 400, FiO2: 30, PEEP: 5, ITime: 1, MAP: 10, PIP: 26    10-01 @ 07:01  -  10-02 @ 07:00  --------------------------------------------------------  IN: 1549.3 mL / OUT: 1060 mL / NET: 489.3 mL    CAPILLARY BLOOD GLUCOSE  POCT Blood Glucose.: 132 mg/dL (02 Oct 2018 06:00)    PHYSICAL EXAM:  General:   HEENT:   Respiratory:   Cardiovascular:   Abdomen:   Extremities:   Skin:   Neurological:    LINES:     MEDICATIONS  (STANDING):  ALBUTerol/ipratropium for Nebulization 3 milliLiter(s) Nebulizer every 6 hours  aspirin  chewable 81 milliGRAM(s) Oral daily  buDESOnide 160 MICROgram(s)/formoterol 4.5 MICROgram(s) Inhaler 2 Puff(s) Inhalation two times a day  dexmedetomidine Infusion 0.01 MICROgram(s)/kG/Hr (0.157 mL/Hr) IV Continuous <Continuous>  dextrose 10%. 1000 milliLiter(s) (20 mL/Hr) IV Continuous <Continuous>  latanoprost 0.005% Ophthalmic Solution 1 Drop(s) Both EYES at bedtime  melatonin 3 milliGRAM(s) Oral at bedtime  meropenem  IVPB 1000 milliGRAM(s) IV Intermittent every 8 hours  pantoprazole  Injectable 40 milliGRAM(s) IV Push every 12 hours  phenylephrine    Infusion 4 MICROgram(s)/kG/Min (94.35 mL/Hr) IV Continuous <Continuous>  propofol Infusion 5 MICROgram(s)/kG/Min (1.887 mL/Hr) IV Continuous <Continuous>  senna 2 Tablet(s) Oral at bedtime    ALLERGIES:  penicillin (Rash)    LABS:                      7.5    13.2  )-----------( 180      ( 02 Oct 2018 00:53 )             23.0     Mean Cell Volume : 92.8 fl  Mean Cell Hemoglobin : 30.3 pg  Mean Cell Hemoglobin Concentration : 32.7 gm/dL    10-02    138  |  105  |  60<H>  ----------------------------<  128<H>  4.6   |  23  |  1.29    Ca    9.3      02 Oct 2018 00:53  Phos  3.8     10-02  Mg     2.3     10-02    TPro  5.4<L>  /  Alb  2.7<L>  /  TBili  0.4  /  DBili  x   /  AST  11  /  ALT  16  /  AlkPhos  70  10-02    Creatinine Trend: 1.29<--, 1.34<--, 1.28<--, 0.99<--, 1.04<--, 1.04<--  LIVER FUNCTIONS - ( 02 Oct 2018 00:53 )  Alb: 2.7 g/dL / Pro: 5.4 g/dL / ALK PHOS: 70 U/L / ALT: 16 U/L / AST: 11 U/L / GGT: x           PT/INR - ( 01 Oct 2018 16:55 )   PT: 11.8 sec;   INR: 1.09 ratio    PTT - ( 02 Oct 2018 00:53 )  PTT:72.6 sec    ABG - ( 01 Oct 2018 19:59 )  pH, Arterial: 7.36  pH, Blood: x     /  pCO2: 46    /  pO2: 130   / HCO3: 26    / Base Excess: .5    /  SaO2: 99        IMAGING:    RADIOLOGY & ADDITIONAL TESTS: Reviewed. 83 y/o F w/ a PMH significant for COPD, JENNIE on BiPAP, multivalvular disease (severe AS, s/p MV replacement), HFpEF/severe diastolic failure, A-fib on coumadin, sick sinus syndrome s/p pacemaker placement, HTN, and CKD3 who was admitted for acute respiratory failure requiring intubation suspected to be 2/2 COPD exacerbation c/b PNA w/ +entero/rhinovirus and GN coccobacillus and H. influenza bacteremia.    #Neuro  - sedated on dexmedetomidine and propofol after intubation  - d/c melatonin    #CV  -HTN  -HFpEF (40-50%) w/ severe diastolic failure  -multivalvular disease: MV replacement, severe AS  - Afib, SSS with PPM  - heparin ggt continued and will d/c once plans for trach, Hgb was stable in 7's  - pt was given 1unit PRBC as hgb was 6.7 on repeat CBC  - monitoring cbc q12 for now  - c/w diltiazem 60mg q6 and hydralazine 50mg q8  - continue to monitor BP    #Resp  -+enterovirus on RVP with +H.influenza bacteremia, multi-focal PNA  -9/28 CXR:  Ill-defined density seen in the left lung and the right middle lobe compatible with multifocal pneumonia.  -will d/c meropenem after today's dose, s/p steroid taper  -currently intubated  -c/w nebs and symbicort  -surgery consult for tracheostomy    #GI  -will restart tube feeds  -will hold coumadin as pt has anemia with history of AVM/GI bleed  -PPI ppx  -GI consult for PEG placement  -can continue with bowel regimen    #Renal  -LATANYA, likely 2/2 sepsis + HTN urgency; resolved with Cr at baseline  -pt had retention and tejada catheter was placed  -will continue to monitor I&O's    #ID  - +entero/rhinovirus  - GN coccobacillus (H. influenzae) on 9/14 blood culture.  s/p 14 days of abx for H. Influenza pneumonia and bacteremia.   - UClx (9/14): NGTD  - Sputum Clx (9/14): No organisms, repeat combicath (9/21): normal respiratory chastity  - repeat blood cx from 9/17, 9/21, 9/28: NGTD  - legionella negative  - pt will finish abx courses today.    #Heme:  -DIC panel negative  -LFTs no longer mildly elevated  -H/H drop; CT A/P 9/20 was negative for acute bleeding, 10/1 bedside FAST negative, no signs of acute bleed, hgb has been stable in 7's s/p 1 unit PRBC  - DVT ppx: on hep ggt    #Endo:  - cont SSI    #Ophtho  -c/w home latanoprost drops for glaucoma 85 y/o F w/ a PMH significant for COPD, JENNIE on BiPAP, multivalvular disease (severe AS, s/p MV replacement), HFpEF/severe diastolic failure, A-fib on coumadin, sick sinus syndrome s/p pacemaker placement, HTN, and CKD3 who was admitted for acute respiratory failure requiring intubation suspected to be 2/2 COPD exacerbation c/b PNA w/ +entero/rhinovirus and GN coccobacillus and H. influenza bacteremia.    #Neuro  - sedated on dexmedetomidine and propofol after intubation  - d/c melatonin    #CV  -HFpEF (40-50%) w/ severe diastolic failure  -multivalvular disease: MV replacement, severe AS  - Afib, SSS with PPM  -HTN: hold diltiazem and hydralazine as pt required vasopressin after intubation  - continue to monitor BP    #Resp  -+enterovirus on RVP with +H.influenza bacteremia, multi-focal PNA  -9/28 CXR:  Ill-defined density seen in the left lung and the right middle lobe compatible with multifocal pneumonia.  -will d/c meropenem after today's dose, s/p steroid taper  -currently intubated  -c/w nebs and symbicort  -surgery consult for tracheostomy    #GI  -will restart tube feeds  -will hold coumadin as pt has anemia with history of AVM/GI bleed  -PPI ppx  -GI consult for PEG placement  -can continue with bowel regimen    #Renal  -LATANYA, likely 2/2 sepsis + HTN urgency; resolved with Cr at baseline  -pt had retention and tejada catheter was placed  -will continue to monitor I&O's    #ID  - +entero/rhinovirus  - GN coccobacillus (H. influenzae) on 9/14 blood culture.  s/p 14 days of abx for H. Influenza pneumonia and bacteremia.   - UClx (9/14): NGTD  - Sputum Clx (9/14): No organisms, repeat combicath (9/21): normal respiratory chastity  - repeat blood cx from 9/17, 9/21, 9/28: NGTD  - legionella negative  - pt will finish abx courses today.    #Heme:  -DIC panel negative  -LFTs no longer mildly elevated  -H/H drop; CT A/P 9/20 was negative for acute bleeding, 10/1 bedside FAST negative, no signs of acute bleed, hgb has been stable in 7's s/p 1 unit PRBC  - DVT ppx: on hep ggt    #Endo:  - cont SSI    #Ophtho  -c/w home latanoprost drops for glaucoma 85 y/o F w/ a PMH significant for COPD, JENNIE on BiPAP, multivalvular disease (severe AS, s/p MV replacement), HFpEF/severe diastolic failure, A-fib on coumadin, sick sinus syndrome s/p pacemaker placement, HTN, and CKD3 who was admitted for acute respiratory failure requiring intubation suspected to be 2/2 COPD exacerbation c/b PNA w/ +entero/rhinovirus and GN coccobacillus and H. influenza bacteremia.    #Neuro  - sedated on dexmedetomidine and propofol after intubation  - d/c melatonin    #CV  -HFpEF (40-50%) w/ severe diastolic failure  -multivalvular disease: MV replacement, severe AS  - Afib, SSS with PPM  -HTN: hold diltiazem and hydralazine as pt required vasopressin after intubation  - continue to monitor BP    #Resp  -+enterovirus on RVP with +H.influenza bacteremia, multi-focal PNA  -9/28 CXR:  Ill-defined density seen in the left lung and the right middle lobe compatible with multifocal pneumonia.  -will d/c meropenem after today's dose, s/p steroid taper  -currently intubated  -c/w nebs  -surgery consult for tracheostomy    #GI  -will restart tube feeds  -will hold coumadin as pt has anemia with history of AVM/GI bleed  -PPI ppx  -GI consult for PEG placement  -can continue with bowel regimen    #Renal  -LATANYA, likely 2/2 sepsis + HTN urgency; resolved with Cr at baseline  -pt had retention and tejada catheter was placed  -will continue to monitor I&O's    #ID  - +entero/rhinovirus  - GN coccobacillus (H. influenzae) on 9/14 blood culture.  s/p 14 days of abx for H. Influenza pneumonia and bacteremia.   - UClx (9/14): NGTD  - Sputum Clx (9/14): No organisms, repeat combicath (9/21): normal respiratory chastity  - repeat blood cx from 9/17, 9/21, 9/28: NGTD  - legionella negative  - pt will finish abx courses today.    #Heme:  -DIC panel negative  -LFTs no longer mildly elevated  -H/H drop; CT A/P 9/20 was negative for acute bleeding, 10/1 bedside FAST negative, no signs of acute bleed, hgb has been stable in 7's s/p 1 unit PRBC  - DVT ppx: on hep ggt    #Endo:  - cont SSI    #Ophtho  -c/w home latanoprost drops for glaucoma

## 2018-10-02 NOTE — CHART NOTE - NSCHARTNOTEFT_GEN_A_CORE
: Ronna Amezcua    INDICATION: Respiratory failure    PROCEDURE:  [x] LIMITED ECHO  [x] LIMITED CHEST  [ ] LIMITED RETROPERITONEAL  [ ] LIMITED ABDOMINAL  [ ] LIMITED DVT  [ ] NEEDLE GUIDANCE VASCULAR  [ ] NEEDLE GUIDANCE THORACENTESIS  [ ] NEEDLE GUIDANCE PARACENTESIS  [ ] NEEDLE GUIDANCE PERICARDIOCENTESIS  [ ] OTHER    FINDINGS:    ECHO: Normal LV function, no pericardial effusion, no RV enlargement, IVC indeterminate on mechanical ventilation   CHEST: Bilateral A line pattern anteriorly. LLL with pleural effusion and atelectasis. RLL with non-translobar consolidation.      INTERPRETATION:  Normal cardiac function  Improving RLL pneumonia.

## 2018-10-02 NOTE — CONSULT NOTE ADULT - SUBJECTIVE AND OBJECTIVE BOX
HPI: 84F with history of Afrib on hep gtt, HFPEF, sick sinus syndrome with pacemaker, CKD COPD admitted for COPD exacerbation 2/2 pneumonia with respiratory failure. 2.5 weeks after initial intubation, extubation has been attempted 3 times each requiring reintubation. At this time the patient requires continued ventilator support. MICU was planning percutaneous tracheostomy at bedside but noted vessel anterior to trachea and requested surgical tracheostomy placement.    PAST MEDICAL & SURGICAL HISTORY:  Aortic stenosis, moderate  Heart failure with preserved ejection fraction  Rheumatic heart disease  Chronic obstructive pulmonary disease, unspecified COPD type  CKD (chronic kidney disease) stage 3, GFR 30-59 ml/min  Chronic atrial fibrillation  Sick sinus syndrome  Cardiac pacemaker recipient  H/O mitral valve replacement    T(C): 36 (10-02-18 @ 12:00), Max: 37.4 (10-02-18 @ 00:00)  HR: 59 (10-02-18 @ 14:45) (56 - 72)  BP: 123/60 (10-02-18 @ 14:45) (79/43 - 195/78)  RR: 24 (10-02-18 @ 14:45) (18 - 43)  SpO2: 100% (10-02-18 @ 14:45) (96% - 100%)  Wt(kg): --  10-01 @ 07:01  -  10-02 @ 07:00  --------------------------------------------------------  IN:    dexmedetomidine Infusion: 128.7 mL    dextrose 10%: 600 mL    Enteral Tube Flush: 30 mL    heparin  Infusion.: 207 mL    IV PiggyBack: 50 mL    phenylephrine   Infusion: 114.1 mL    propofol Infusion: 69.5 mL    Vital High Protein: 350 mL  Total IN: 1549.3 mL    OUT:    Intermittent Catheterization - Urethral: 1060 mL  Total OUT: 1060 mL    Total NET: 489.3 mL      10-02 @ 07:01  -  10-02 @ 14:51  --------------------------------------------------------  IN:    dexmedetomidine Infusion: 63.2 mL    dextrose 10%: 80 mL    Enteral Tube Flush: 100 mL    heparin  Infusion.: 36 mL    IV PiggyBack: 50 mL    phenylephrine   Infusion: 9.6 mL    phenylephrine   Infusion: 16.5 mL    propofol Infusion: 60 mL    Vital High Protein: 280 mL  Total IN: 695.3 mL    OUT:    Indwelling Catheter - Urethral: 1290 mL  Total OUT: 1290 mL    Total NET: -594.7 mL      Gen sedated and intubated  Chest CTAB, irregularly irregular  Abd soft nd  Ext wwp    .  LABS:                         7.5    13.2  )-----------( 180      ( 02 Oct 2018 00:53 )             23.0     10-02    138  |  105  |  60<H>  ----------------------------<  128<H>  4.6   |  23  |  1.29    Ca    9.3      02 Oct 2018 00:53  Phos  3.8     10-02  Mg     2.3     10-02    TPro  5.4<L>  /  Alb  2.7<L>  /  TBili  0.4  /  DBili  x   /  AST  11  /  ALT  16  /  AlkPhos  70  10-02    PT/INR - ( 01 Oct 2018 16:55 )   PT: 11.8 sec;   INR: 1.09 ratio         PTT - ( 02 Oct 2018 00:53 )  PTT:72.6 sec          RADIOLOGY, EKG & ADDITIONAL TESTS: Reviewed.

## 2018-10-02 NOTE — CONSULT NOTE ADULT - SUBJECTIVE AND OBJECTIVE BOX
Chief Complaint:  Patient is a 84y old  Female who presents with a chief complaint of COPD exacerbation, ADHF (02 Oct 2018 07:40)      HPI:  85 y/o F w/ a PMH significant for COPD, JENNIE on BiPAP, multivalvular disease (severe AS, s/p MV replacement), HFpEF/severe diastolic failure, A-fib on coumadin, sick sinus syndrome s/p pacemaker placement, HTN, and CKD3 who was admitted for acute respiratory failure requiring intubation suspected to be 2/2 COPD exacerbation c/b PNA w/ +entero/rhinovirus and GN coccobacillus and H. influenza bacteremia.  Patient is now s/p bedside trach, with inability to be evaluated by speech and swallow due to respiratory status so GI consulted for PEG placement.      Allergies:  penicillin (Rash)      Home Medications:    Hospital Medications:  ALBUTerol/ipratropium for Nebulization 3 milliLiter(s) Nebulizer every 6 hours  aspirin  chewable 81 milliGRAM(s) Oral daily  dexmedetomidine Infusion 0.01 MICROgram(s)/kG/Hr IV Continuous <Continuous>  dextrose 40% Gel 15 Gram(s) Oral once PRN  dextrose 50% Injectable 12.5 Gram(s) IV Push once  dextrose 50% Injectable 25 Gram(s) IV Push once  dextrose 50% Injectable 25 Gram(s) IV Push once  glucagon  Injectable 1 milliGRAM(s) IntraMuscular once PRN  heparin  Infusion. 900 Unit(s)/Hr IV Continuous <Continuous>  heparin  Injectable 5000 Unit(s) IV Push every 6 hours PRN  heparin  Injectable 2500 Unit(s) IV Push every 6 hours PRN  latanoprost 0.005% Ophthalmic Solution 1 Drop(s) Both EYES at bedtime  meropenem  IVPB      meropenem  IVPB 1000 milliGRAM(s) IV Intermittent every 8 hours  pantoprazole  Injectable 40 milliGRAM(s) IV Push every 12 hours  phenylephrine    Infusion 1 MICROgram(s)/kG/Min IV Continuous <Continuous>  propofol Infusion 5 MICROgram(s)/kG/Min IV Continuous <Continuous>  senna 2 Tablet(s) Oral at bedtime      PMHX/PSHX:  Aortic stenosis, moderate  Heart failure with preserved ejection fraction  Rheumatic heart disease  Chronic obstructive pulmonary disease, unspecified COPD type  CKD (chronic kidney disease) stage 3, GFR 30-59 ml/min  Chronic atrial fibrillation  Sick sinus syndrome  Cardiac pacemaker recipient  H/O mitral valve replacement      Family history:  No pertinent family history in first degree relatives      Social History:     ROS:     General:  No wt loss, fevers, chills, night sweats, fatigue,   Eyes:  Good vision, no reported pain  ENT:  No sore throat, pain, runny nose, dysphagia  CV:  No pain, palpitations, hypo/hypertension  Resp:  No dyspnea, cough, tachypnea, wheezing  GI:  No pain, No nausea, No vomiting, No diarrhea, No constipation, No weight loss, No fever, No pruritis, No rectal bleeding, No tarry stools, No dysphagia,  :  No pain, bleeding, incontinence, nocturia  Muscle:  No pain, weakness  Neuro:  No weakness, tingling, memory problems  Psych:  No fatigue, insomnia, mood problems, depression  Endocrine:  No polyuria, polydipsia, cold/heat intolerance  Heme:  No petechiae, ecchymosis, easy bruisability  Skin:  No rash, tattoos, scars, edema      PHYSICAL EXAM:     GENERAL:  Appears stated age, well-groomed, well-nourished, no distress  ABDOMEN:  Soft, non-tender, non-distended, normoactive bowel sounds,  no masses ,no hepato-splenomegaly, no signs of chronic liver disease  EXTEREMITIES:  no cyanosis,clubbing or edema  NEURO:  Alert, oriented, no tremor    Vital Signs:  Vital Signs Last 24 Hrs  T(C): 37.1 (02 Oct 2018 08:00), Max: 37.4 (02 Oct 2018 00:00)  T(F): 98.8 (02 Oct 2018 08:00), Max: 99.4 (02 Oct 2018 00:00)  HR: 63 (02 Oct 2018 11:45) (21 - 72)  BP: 106/54 (02 Oct 2018 11:45) (76/35 - 195/78)  BP(mean): 78 (02 Oct 2018 11:45) (51 - 112)  RR: 24 (02 Oct 2018 11:45) (18 - 43)  SpO2: 100% (02 Oct 2018 11:45) (96% - 100%)  Daily     Daily Weight in k.6 (02 Oct 2018 04:00)    LABS:                        7.5    13.2  )-----------( 180      ( 02 Oct 2018 00:53 )             23.0     Mean Cell Volume: 92.8 fl (10-02-18 @ 00:53)    10-02    138  |  105  |  60<H>  ----------------------------<  128<H>  4.6   |  23  |  1.29    Ca    9.3      02 Oct 2018 00:53  Phos  3.8     10  Mg     2.3     10-02    TPro  5.4<L>  /  Alb  2.7<L>  /  TBili  0.4  /  DBili  x   /  AST  11  /  ALT  16  /  AlkPhos  70  10-02    LIVER FUNCTIONS - ( 02 Oct 2018 00:53 )  Alb: 2.7 g/dL / Pro: 5.4 g/dL / ALK PHOS: 70 U/L / ALT: 16 U/L / AST: 11 U/L / GGT: x           PT/INR - ( 01 Oct 2018 16:55 )   PT: 11.8 sec;   INR: 1.09 ratio         PTT - ( 02 Oct 2018 00:53 )  PTT:72.6 sec                            7.5    13.2  )-----------( 180      ( 02 Oct 2018 00:53 )             23.0                         7.7    14.1  )-----------( 161      ( 01 Oct 2018 09:07 )             24.1                         7.4    14.3  )-----------( 167      ( 01 Oct 2018 01:38 )             23.1                         6.7    15.6  )-----------( 191      ( 30 Sep 2018 16:04 )             21.1                         6.8    20.7  )-----------( 216      ( 30 Sep 2018 01:06 )             21.7     Imaging: Chief Complaint:  Patient is a 84y old  Female who presents with a chief complaint of COPD exacerbation, ADHF (02 Oct 2018 07:40)      HPI:  83 y/o F w/ a PMH significant for COPD, JENNIE on BiPAP, multivalvular disease (severe AS, s/p MV replacement), HFpEF/severe diastolic failure, A-fib on coumadin, sick sinus syndrome s/p pacemaker placement, HTN, and CKD3 who was admitted for acute respiratory failure requiring intubation suspected to be 2/2 COPD exacerbation c/b PNA w/ +entero/rhinovirus and GN coccobacillus and H. influenza bacteremia.  Patient is now planned for trach tomorrow, with inability to be evaluated by speech and swallow due to respiratory status so GI consulted for PEG placement.      Allergies:  penicillin (Rash)      Home Medications:    Hospital Medications:  ALBUTerol/ipratropium for Nebulization 3 milliLiter(s) Nebulizer every 6 hours  aspirin  chewable 81 milliGRAM(s) Oral daily  dexmedetomidine Infusion 0.01 MICROgram(s)/kG/Hr IV Continuous <Continuous>  dextrose 40% Gel 15 Gram(s) Oral once PRN  dextrose 50% Injectable 12.5 Gram(s) IV Push once  dextrose 50% Injectable 25 Gram(s) IV Push once  dextrose 50% Injectable 25 Gram(s) IV Push once  glucagon  Injectable 1 milliGRAM(s) IntraMuscular once PRN  heparin  Infusion. 900 Unit(s)/Hr IV Continuous <Continuous>  heparin  Injectable 5000 Unit(s) IV Push every 6 hours PRN  heparin  Injectable 2500 Unit(s) IV Push every 6 hours PRN  latanoprost 0.005% Ophthalmic Solution 1 Drop(s) Both EYES at bedtime  meropenem  IVPB      meropenem  IVPB 1000 milliGRAM(s) IV Intermittent every 8 hours  pantoprazole  Injectable 40 milliGRAM(s) IV Push every 12 hours  phenylephrine    Infusion 1 MICROgram(s)/kG/Min IV Continuous <Continuous>  propofol Infusion 5 MICROgram(s)/kG/Min IV Continuous <Continuous>  senna 2 Tablet(s) Oral at bedtime      PMHX/PSHX:  Aortic stenosis, moderate  Heart failure with preserved ejection fraction  Rheumatic heart disease  Chronic obstructive pulmonary disease, unspecified COPD type  CKD (chronic kidney disease) stage 3, GFR 30-59 ml/min  Chronic atrial fibrillation  Sick sinus syndrome  Cardiac pacemaker recipient  H/O mitral valve replacement      Family history:  No pertinent family history in first degree relatives      Social History:     ROS:     General:  No wt loss, fevers, chills, night sweats, fatigue,   Eyes:  Good vision, no reported pain  ENT:  No sore throat, pain, runny nose, dysphagia  CV:  No pain, palpitations, hypo/hypertension  Resp:  No dyspnea, cough, tachypnea, wheezing  GI:  No pain, No nausea, No vomiting, No diarrhea, No constipation, No weight loss, No fever, No pruritis, No rectal bleeding, No tarry stools, No dysphagia,  :  No pain, bleeding, incontinence, nocturia  Muscle:  No pain, weakness  Neuro:  No weakness, tingling, memory problems  Psych:  No fatigue, insomnia, mood problems, depression  Endocrine:  No polyuria, polydipsia, cold/heat intolerance  Heme:  No petechiae, ecchymosis, easy bruisability  Skin:  No rash, tattoos, scars, edema      PHYSICAL EXAM:     GENERAL:  intubated, sedated  ABDOMEN:  Soft, non-tender, non-distended, normoactive bowel sounds,  no masses ,no hepato-splenomegaly, no signs of chronic liver disease  EXTEREMITIES:  no cyanosis,clubbing or edema  NEURO:  sedated    Vital Signs:  Vital Signs Last 24 Hrs  T(C): 37.1 (02 Oct 2018 08:00), Max: 37.4 (02 Oct 2018 00:00)  T(F): 98.8 (02 Oct 2018 08:00), Max: 99.4 (02 Oct 2018 00:00)  HR: 63 (02 Oct 2018 11:45) (21 - 72)  BP: 106/54 (02 Oct 2018 11:45) (76/35 - 195/78)  BP(mean): 78 (02 Oct 2018 11:45) (51 - 112)  RR: 24 (02 Oct 2018 11:45) (18 - 43)  SpO2: 100% (02 Oct 2018 11:45) (96% - 100%)  Daily     Daily Weight in k.6 (02 Oct 2018 04:00)    LABS:                        7.5    13.2  )-----------( 180      ( 02 Oct 2018 00:53 )             23.0     Mean Cell Volume: 92.8 fl (10-02-18 @ 00:53)    10-02    138  |  105  |  60<H>  ----------------------------<  128<H>  4.6   |  23  |  1.29    Ca    9.3      02 Oct 2018 00:53  Phos  3.8     10-  Mg     2.3     10-    TPro  5.4<L>  /  Alb  2.7<L>  /  TBili  0.4  /  DBili  x   /  AST  11  /  ALT  16  /  AlkPhos  70  10-02    LIVER FUNCTIONS - ( 02 Oct 2018 00:53 )  Alb: 2.7 g/dL / Pro: 5.4 g/dL / ALK PHOS: 70 U/L / ALT: 16 U/L / AST: 11 U/L / GGT: x           PT/INR - ( 01 Oct 2018 16:55 )   PT: 11.8 sec;   INR: 1.09 ratio         PTT - ( 02 Oct 2018 00:53 )  PTT:72.6 sec                            7.5    13.2  )-----------( 180      ( 02 Oct 2018 00:53 )             23.0                         7.7    14.1  )-----------( 161      ( 01 Oct 2018 09:07 )             24.1                         7.4    14.3  )-----------( 167      ( 01 Oct 2018 01:38 )             23.1                         6.7    15.6  )-----------( 191      ( 30 Sep 2018 16:04 )             21.1                         6.8    20.7  )-----------( 216      ( 30 Sep 2018 01:06 )             21.7     Imaging:

## 2018-10-02 NOTE — CHART NOTE - NSCHARTNOTEFT_GEN_A_CORE
Follow up      Chart reviewed, events noted. Adm dx: COPD exacerbation, heart failure, respiratory failure. Reintubated yesterday. Plan for trach and PEG.    Source: Patient [ ]    Family [ ]     other [x ] chart    Diet : 9/27 Vital 1.2 70cc/hr x 18 hrs via NGT.        Enteral /Parenteral Nutrition: currently infusing at 70cc/hr 24 hr goal 1260cc/day 24 hr intake 10/2 350cc (28% estimated needs) feeds held for reintubation, 10/1 490cc  (39% needs, feeds held for BIPAP), 9/30 280cc (22% needs, held for BIPAP)     Current Weight: 10/2 140lb, dosing wt 9/14 139 lb  has 2+ generalized edema    Pertinent Medications: MEDICATIONS  (STANDING):  ALBUTerol/ipratropium for Nebulization 3 milliLiter(s) Nebulizer every 6 hours  aspirin  chewable 81 milliGRAM(s) Oral daily  dexmedetomidine Infusion 0.01 MICROgram(s)/kG/Hr (0.157 mL/Hr) IV Continuous <Continuous>  dextrose 50% Injectable 12.5 Gram(s) IV Push once  dextrose 50% Injectable 25 Gram(s) IV Push once  dextrose 50% Injectable 25 Gram(s) IV Push once  heparin  Infusion. 900 Unit(s)/Hr (9 mL/Hr) IV Continuous <Continuous>  latanoprost 0.005% Ophthalmic Solution 1 Drop(s) Both EYES at bedtime  meropenem  IVPB      meropenem  IVPB 1000 milliGRAM(s) IV Intermittent every 8 hours  pantoprazole  Injectable 40 milliGRAM(s) IV Push every 12 hours  phenylephrine    Infusion 1 MICROgram(s)/kG/Min (23.587 mL/Hr) IV Continuous <Continuous>  propofol Infusion 5 MICROgram(s)/kG/Min (1.887 mL/Hr) IV Continuous <Continuous>  senna 2 Tablet(s) Oral at bedtime    MEDICATIONS  (PRN):  dextrose 40% Gel 15 Gram(s) Oral once PRN Blood Glucose LESS THAN 70 milliGRAM(s)/deciliter  glucagon  Injectable 1 milliGRAM(s) IntraMuscular once PRN Glucose LESS THAN 70 milligrams/deciliter  heparin  Injectable 5000 Unit(s) IV Push every 6 hours PRN For aPTT less than 40  heparin  Injectable 2500 Unit(s) IV Push every 6 hours PRN For aPTT between 40 - 57    Pertinent Labs:  10-02 Na138 mmol/L Glu 128 mg/dL<H> K+ 4.6 mmol/L Cr  1.29 mg/dL BUN 60 mg/dL<H> 10-02 Phos 3.8 mg/dL 10-02 Alb 2.7 g/dL<L> 09-22 [id number] 7.2 %<H>  CAPILLARY BLOOD GLUCOSE  POCT Blood Glucose.: 149 mg/dL (02 Oct 2018 10:08)  POCT Blood Glucose.: 132 mg/dL (02 Oct 2018 06:00)  POCT Blood Glucose.: 174 mg/dL (02 Oct 2018 02:06)  POCT Blood Glucose.: 140 mg/dL (02 Oct 2018 00:24)  POCT Blood Glucose.: 161 mg/dL (01 Oct 2018 22:22)  POCT Blood Glucose.: 130 mg/dL (01 Oct 2018 17:41)      Skin: no pressure injuries documented     Estimated Needs:  Estimated Energy Needs (20-25 calories/kg):  · Weight  (lbs)	138.6 lb  · Weight (kg)	62.9 kg  · From (20 kylie/kg)	1258   · To (25 kylie/kg)	1572     Estimated Protein Needs (1.2-1.4 gm/kg):  · Weight  (lbs)	138.6   · Weight (kg)	62.9 kg  · From (1.2 g/kg)	75.48   · To (1.4 g/kg)	88.06   based on Dosing wt 62.9kg    Previous Nutrition Diagnosis: none       New Nutrition Diagnosis: [ x] not applicable      Recommend: Vital 1.2 goal  60 cc/hr x 18 hrs provides 1296 kcals, 81 gm protein, 876cc free water  meets 21 Kcal/Kg, 1.3Gm/kg dosing wt 62.9kg       Monitoring and Evaluation: trend wt, labs, EN tolerance

## 2018-10-02 NOTE — PROGRESS NOTE ADULT - SUBJECTIVE AND OBJECTIVE BOX
INTERVAL HPI/OVERNIGHT EVENTS:    SUBJECTIVE: Patient seen and examined at bedside.     OBJECTIVE:    VITAL SIGNS:  ICU Vital Signs Last 24 Hrs  T(C): 36.6 (02 Oct 2018 04:00), Max: 37.4 (02 Oct 2018 00:00)  T(F): 97.8 (02 Oct 2018 04:00), Max: 99.4 (02 Oct 2018 00:00)  HR: 59 (02 Oct 2018 07:00) (21 - 72)  BP: 100/51 (02 Oct 2018 07:00) (76/35 - 195/78)  BP(mean): 73 (02 Oct 2018 07:00) (51 - 112)  RR: 24 (02 Oct 2018 07:00) (18 - 43)  SpO2: 99% (02 Oct 2018 07:00) (96% - 100%)    Mode: AC/ CMV (Assist Control/ Continuous Mandatory Ventilation), RR (machine): 24, TV (machine): 400, FiO2: 30, PEEP: 5, ITime: 1, MAP: 10, PIP: 26    10-01 @ 07:01  -  10-02 @ 07:00  --------------------------------------------------------  IN: 1549.3 mL / OUT: 1060 mL / NET: 489.3 mL    CAPILLARY BLOOD GLUCOSE  POCT Blood Glucose.: 132 mg/dL (02 Oct 2018 06:00)    PHYSICAL EXAM:  General:   HEENT:   Respiratory:   Cardiovascular:   Abdomen:   Extremities:   Skin:   Neurological:    LINES:     MEDICATIONS  (STANDING):  ALBUTerol/ipratropium for Nebulization 3 milliLiter(s) Nebulizer every 6 hours  aspirin  chewable 81 milliGRAM(s) Oral daily  buDESOnide 160 MICROgram(s)/formoterol 4.5 MICROgram(s) Inhaler 2 Puff(s) Inhalation two times a day  dexmedetomidine Infusion 0.01 MICROgram(s)/kG/Hr (0.157 mL/Hr) IV Continuous <Continuous>  dextrose 10%. 1000 milliLiter(s) (20 mL/Hr) IV Continuous <Continuous>  latanoprost 0.005% Ophthalmic Solution 1 Drop(s) Both EYES at bedtime  melatonin 3 milliGRAM(s) Oral at bedtime  meropenem  IVPB 1000 milliGRAM(s) IV Intermittent every 8 hours  pantoprazole  Injectable 40 milliGRAM(s) IV Push every 12 hours  phenylephrine    Infusion 4 MICROgram(s)/kG/Min (94.35 mL/Hr) IV Continuous <Continuous>  propofol Infusion 5 MICROgram(s)/kG/Min (1.887 mL/Hr) IV Continuous <Continuous>  senna 2 Tablet(s) Oral at bedtime    ALLERGIES:  penicillin (Rash)    LABS:                      7.5    13.2  )-----------( 180      ( 02 Oct 2018 00:53 )             23.0     Mean Cell Volume : 92.8 fl  Mean Cell Hemoglobin : 30.3 pg  Mean Cell Hemoglobin Concentration : 32.7 gm/dL    10-02    138  |  105  |  60<H>  ----------------------------<  128<H>  4.6   |  23  |  1.29    Ca    9.3      02 Oct 2018 00:53  Phos  3.8     10-02  Mg     2.3     10-02    TPro  5.4<L>  /  Alb  2.7<L>  /  TBili  0.4  /  DBili  x   /  AST  11  /  ALT  16  /  AlkPhos  70  10-02    Creatinine Trend: 1.29<--, 1.34<--, 1.28<--, 0.99<--, 1.04<--, 1.04<--  LIVER FUNCTIONS - ( 02 Oct 2018 00:53 )  Alb: 2.7 g/dL / Pro: 5.4 g/dL / ALK PHOS: 70 U/L / ALT: 16 U/L / AST: 11 U/L / GGT: x           PT/INR - ( 01 Oct 2018 16:55 )   PT: 11.8 sec;   INR: 1.09 ratio    PTT - ( 02 Oct 2018 00:53 )  PTT:72.6 sec    ABG - ( 01 Oct 2018 19:59 )  pH, Arterial: 7.36  pH, Blood: x     /  pCO2: 46    /  pO2: 130   / HCO3: 26    / Base Excess: .5    /  SaO2: 99        IMAGING:    RADIOLOGY & ADDITIONAL TESTS: Reviewed. INTERVAL HPI/OVERNIGHT EVENTS: no acute overnight events. pt was sedated after intubation and has been ventilating and oxygenating well.    SUBJECTIVE: Patient seen and examined at bedside.     OBJECTIVE:    VITAL SIGNS:  ICU Vital Signs Last 24 Hrs  T(C): 36.6 (02 Oct 2018 04:00), Max: 37.4 (02 Oct 2018 00:00)  T(F): 97.8 (02 Oct 2018 04:00), Max: 99.4 (02 Oct 2018 00:00)  HR: 59 (02 Oct 2018 07:00) (21 - 72)  BP: 100/51 (02 Oct 2018 07:00) (76/35 - 195/78)  BP(mean): 73 (02 Oct 2018 07:00) (51 - 112)  RR: 24 (02 Oct 2018 07:00) (18 - 43)  SpO2: 99% (02 Oct 2018 07:00) (96% - 100%)    Mode: AC/ CMV (Assist Control/ Continuous Mandatory Ventilation), RR (machine): 24, TV (machine): 400, FiO2: 30, PEEP: 5, ITime: 1, MAP: 10, PIP: 26    10-01 @ 07:01  -  10-02 @ 07:00  --------------------------------------------------------  IN: 1549.3 mL / OUT: 1060 mL / NET: 489.3 mL    CAPILLARY BLOOD GLUCOSE  POCT Blood Glucose.: 132 mg/dL (02 Oct 2018 06:00)    PHYSICAL EXAM:  General: intubated elderly woman in no acute distress  HEENT: NC/AT, PERRL  Respiratory: bilateral breath sounds  Cardiovascular: S1, S2, systolic murmur, normal rate  Abdomen: soft, nondistended  Extremities: 2+ peripheral pulses, minimal LE edema  Skin: no rash, normal skin turgor  Neurological: sedated    LINES: 4 PIVs    MEDICATIONS  (STANDING):  ALBUTerol/ipratropium for Nebulization 3 milliLiter(s) Nebulizer every 6 hours  buDESOnide 160 MICROgram(s)/formoterol 4.5 MICROgram(s) Inhaler 2 Puff(s) Inhalation two times a day  aspirin  chewable 81 milliGRAM(s) Oral daily  heparin  Infusion. 900 Unit(s)/Hr (9 mL/Hr) IV Continuous <Continuous>  phenylephrine    Infusion 4 MICROgram(s)/kG/Min (94.35 mL/Hr) IV Continuous <Continuous>  dexmedetomidine Infusion 0.01 MICROgram(s)/kG/Hr (0.157 mL/Hr) IV Continuous <Continuous>  propofol Infusion 5 MICROgram(s)/kG/Min (1.887 mL/Hr) IV Continuous <Continuous>  meropenem  IVPB 1000 milliGRAM(s) IV Intermittent every 8 hours  pantoprazole  Injectable 40 milliGRAM(s) IV Push every 12 hours  senna 2 Tablet(s) Oral at bedtime  latanoprost 0.005% Ophthalmic Solution 1 Drop(s) Both EYES at bedtime    ALLERGIES:  penicillin (Rash)    LABS:                      7.5    13.2  )-----------( 180      ( 02 Oct 2018 00:53 )             23.0     Mean Cell Volume : 92.8 fl  Mean Cell Hemoglobin : 30.3 pg  Mean Cell Hemoglobin Concentration : 32.7 gm/dL    10-02    138  |  105  |  60<H>  ----------------------------<  128<H>  4.6   |  23  |  1.29    Ca    9.3      02 Oct 2018 00:53  Phos  3.8     10-02  Mg     2.3     10-02    TPro  5.4<L>  /  Alb  2.7<L>  /  TBili  0.4  /  DBili  x   /  AST  11  /  ALT  16  /  AlkPhos  70  10-02    Creatinine Trend: 1.29<--, 1.34<--, 1.28<--, 0.99<--, 1.04<--, 1.04<--  LIVER FUNCTIONS - ( 02 Oct 2018 00:53 )  Alb: 2.7 g/dL / Pro: 5.4 g/dL / ALK PHOS: 70 U/L / ALT: 16 U/L / AST: 11 U/L / GGT: x           PT/INR - ( 01 Oct 2018 16:55 )   PT: 11.8 sec;   INR: 1.09 ratio    PTT - ( 02 Oct 2018 00:53 )  PTT:72.6 sec    ABG - ( 01 Oct 2018 19:59 )  pH, Arterial: 7.36  pH, Blood: x     /  pCO2: 46    /  pO2: 130   / HCO3: 26    / Base Excess: .5    /  SaO2: 99        IMAGING:    RADIOLOGY & ADDITIONAL TESTS: Reviewed.

## 2018-10-03 LAB
ALBUMIN SERPL ELPH-MCNC: 2.8 G/DL — LOW (ref 3.3–5)
ALP SERPL-CCNC: 70 U/L — SIGNIFICANT CHANGE UP (ref 40–120)
ALT FLD-CCNC: 14 U/L — SIGNIFICANT CHANGE UP (ref 10–45)
ANION GAP SERPL CALC-SCNC: 11 MMOL/L — SIGNIFICANT CHANGE UP (ref 5–17)
APPEARANCE UR: ABNORMAL
APTT BLD: 59.4 SEC — HIGH (ref 27.5–37.4)
AST SERPL-CCNC: 11 U/L — SIGNIFICANT CHANGE UP (ref 10–40)
BILIRUB SERPL-MCNC: 0.4 MG/DL — SIGNIFICANT CHANGE UP (ref 0.2–1.2)
BILIRUB UR-MCNC: NEGATIVE — SIGNIFICANT CHANGE UP
BLD GP AB SCN SERPL QL: NEGATIVE — SIGNIFICANT CHANGE UP
BUN SERPL-MCNC: 62 MG/DL — HIGH (ref 7–23)
CALCIUM SERPL-MCNC: 9.4 MG/DL — SIGNIFICANT CHANGE UP (ref 8.4–10.5)
CHLORIDE SERPL-SCNC: 108 MMOL/L — SIGNIFICANT CHANGE UP (ref 96–108)
CO2 SERPL-SCNC: 22 MMOL/L — SIGNIFICANT CHANGE UP (ref 22–31)
COLOR SPEC: YELLOW — SIGNIFICANT CHANGE UP
CREAT SERPL-MCNC: 1.13 MG/DL — SIGNIFICANT CHANGE UP (ref 0.5–1.3)
CULTURE RESULTS: SIGNIFICANT CHANGE UP
CULTURE RESULTS: SIGNIFICANT CHANGE UP
DIFF PNL FLD: ABNORMAL
GLUCOSE BLDC GLUCOMTR-MCNC: 130 MG/DL — HIGH (ref 70–99)
GLUCOSE BLDC GLUCOMTR-MCNC: 133 MG/DL — HIGH (ref 70–99)
GLUCOSE BLDC GLUCOMTR-MCNC: 164 MG/DL — HIGH (ref 70–99)
GLUCOSE BLDC GLUCOMTR-MCNC: 167 MG/DL — HIGH (ref 70–99)
GLUCOSE SERPL-MCNC: 154 MG/DL — HIGH (ref 70–99)
GLUCOSE UR QL: NEGATIVE — SIGNIFICANT CHANGE UP
GRAM STN FLD: SIGNIFICANT CHANGE UP
HCT VFR BLD CALC: 24.5 % — LOW (ref 34.5–45)
HGB BLD-MCNC: 7.9 G/DL — LOW (ref 11.5–15.5)
INR BLD: 1.04 RATIO — SIGNIFICANT CHANGE UP (ref 0.88–1.16)
KETONES UR-MCNC: NEGATIVE — SIGNIFICANT CHANGE UP
LACTATE SERPL-SCNC: 0.7 MMOL/L — SIGNIFICANT CHANGE UP (ref 0.7–2)
LEUKOCYTE ESTERASE UR-ACNC: ABNORMAL
MAGNESIUM SERPL-MCNC: 2.4 MG/DL — SIGNIFICANT CHANGE UP (ref 1.6–2.6)
MCHC RBC-ENTMCNC: 30 PG — SIGNIFICANT CHANGE UP (ref 27–34)
MCHC RBC-ENTMCNC: 32.3 GM/DL — SIGNIFICANT CHANGE UP (ref 32–36)
MCV RBC AUTO: 92.8 FL — SIGNIFICANT CHANGE UP (ref 80–100)
NITRITE UR-MCNC: NEGATIVE — SIGNIFICANT CHANGE UP
PH UR: 6 — SIGNIFICANT CHANGE UP (ref 5–8)
PHOSPHATE SERPL-MCNC: 3.3 MG/DL — SIGNIFICANT CHANGE UP (ref 2.5–4.5)
PLATELET # BLD AUTO: 173 K/UL — SIGNIFICANT CHANGE UP (ref 150–400)
POTASSIUM SERPL-MCNC: 4.7 MMOL/L — SIGNIFICANT CHANGE UP (ref 3.5–5.3)
POTASSIUM SERPL-SCNC: 4.7 MMOL/L — SIGNIFICANT CHANGE UP (ref 3.5–5.3)
PROT SERPL-MCNC: 5.6 G/DL — LOW (ref 6–8.3)
PROT UR-MCNC: ABNORMAL
PROTHROM AB SERPL-ACNC: 11.4 SEC — SIGNIFICANT CHANGE UP (ref 9.8–12.7)
RBC # BLD: 2.64 M/UL — LOW (ref 3.8–5.2)
RBC # FLD: 19.5 % — HIGH (ref 10.3–14.5)
RH IG SCN BLD-IMP: POSITIVE — SIGNIFICANT CHANGE UP
SODIUM SERPL-SCNC: 141 MMOL/L — SIGNIFICANT CHANGE UP (ref 135–145)
SP GR SPEC: 1.02 — SIGNIFICANT CHANGE UP (ref 1.01–1.02)
SPECIMEN SOURCE: SIGNIFICANT CHANGE UP
UROBILINOGEN FLD QL: NEGATIVE — SIGNIFICANT CHANGE UP
WBC # BLD: 14.2 K/UL — HIGH (ref 3.8–10.5)
WBC # FLD AUTO: 14.2 K/UL — HIGH (ref 3.8–10.5)

## 2018-10-03 PROCEDURE — 31600 PLANNED TRACHEOSTOMY: CPT

## 2018-10-03 PROCEDURE — 99232 SBSQ HOSP IP/OBS MODERATE 35: CPT

## 2018-10-03 PROCEDURE — 76604 US EXAM CHEST: CPT | Mod: 26

## 2018-10-03 PROCEDURE — 99291 CRITICAL CARE FIRST HOUR: CPT | Mod: 25

## 2018-10-03 RX ORDER — PHENYLEPHRINE HYDROCHLORIDE 10 MG/ML
0.1 INJECTION INTRAVENOUS
Qty: 40 | Refills: 0 | Status: DISCONTINUED | OUTPATIENT
Start: 2018-10-03 | End: 2018-10-05

## 2018-10-03 RX ORDER — DEXMEDETOMIDINE HYDROCHLORIDE IN 0.9% SODIUM CHLORIDE 4 UG/ML
0.2 INJECTION INTRAVENOUS
Qty: 200 | Refills: 0 | Status: DISCONTINUED | OUTPATIENT
Start: 2018-10-03 | End: 2018-10-05

## 2018-10-03 RX ORDER — HEPARIN SODIUM 5000 [USP'U]/ML
900 INJECTION INTRAVENOUS; SUBCUTANEOUS
Qty: 25000 | Refills: 0 | Status: DISCONTINUED | OUTPATIENT
Start: 2018-10-03 | End: 2018-10-04

## 2018-10-03 RX ORDER — ASPIRIN/CALCIUM CARB/MAGNESIUM 324 MG
81 TABLET ORAL DAILY
Qty: 0 | Refills: 0 | Status: DISCONTINUED | OUTPATIENT
Start: 2018-10-03 | End: 2018-10-03

## 2018-10-03 RX ORDER — PANTOPRAZOLE SODIUM 20 MG/1
40 TABLET, DELAYED RELEASE ORAL
Qty: 0 | Refills: 0 | Status: DISCONTINUED | OUTPATIENT
Start: 2018-10-03 | End: 2018-10-10

## 2018-10-03 RX ORDER — SENNA PLUS 8.6 MG/1
2 TABLET ORAL AT BEDTIME
Qty: 0 | Refills: 0 | Status: DISCONTINUED | OUTPATIENT
Start: 2018-10-03 | End: 2018-10-09

## 2018-10-03 RX ORDER — HEPARIN SODIUM 5000 [USP'U]/ML
2500 INJECTION INTRAVENOUS; SUBCUTANEOUS EVERY 6 HOURS
Qty: 0 | Refills: 0 | Status: DISCONTINUED | OUTPATIENT
Start: 2018-10-03 | End: 2018-10-04

## 2018-10-03 RX ORDER — HEPARIN SODIUM 5000 [USP'U]/ML
INJECTION INTRAVENOUS; SUBCUTANEOUS
Qty: 25000 | Refills: 0 | Status: DISCONTINUED | OUTPATIENT
Start: 2018-10-03 | End: 2018-10-03

## 2018-10-03 RX ORDER — LATANOPROST 0.05 MG/ML
1 SOLUTION/ DROPS OPHTHALMIC; TOPICAL AT BEDTIME
Qty: 0 | Refills: 0 | Status: DISCONTINUED | OUTPATIENT
Start: 2018-10-03 | End: 2018-11-02

## 2018-10-03 RX ORDER — ASPIRIN/CALCIUM CARB/MAGNESIUM 324 MG
81 TABLET ORAL DAILY
Qty: 0 | Refills: 0 | Status: DISCONTINUED | OUTPATIENT
Start: 2018-10-03 | End: 2018-11-02

## 2018-10-03 RX ORDER — HEPARIN SODIUM 5000 [USP'U]/ML
5000 INJECTION INTRAVENOUS; SUBCUTANEOUS EVERY 6 HOURS
Qty: 0 | Refills: 0 | Status: DISCONTINUED | OUTPATIENT
Start: 2018-10-03 | End: 2018-10-04

## 2018-10-03 RX ORDER — IPRATROPIUM/ALBUTEROL SULFATE 18-103MCG
3 AEROSOL WITH ADAPTER (GRAM) INHALATION EVERY 6 HOURS
Qty: 0 | Refills: 0 | Status: DISCONTINUED | OUTPATIENT
Start: 2018-10-03 | End: 2018-11-02

## 2018-10-03 RX ORDER — PROPOFOL 10 MG/ML
10 INJECTION, EMULSION INTRAVENOUS
Qty: 500 | Refills: 0 | Status: DISCONTINUED | OUTPATIENT
Start: 2018-10-03 | End: 2018-10-05

## 2018-10-03 RX ADMIN — PANTOPRAZOLE SODIUM 40 MILLIGRAM(S): 20 TABLET, DELAYED RELEASE ORAL at 17:01

## 2018-10-03 RX ADMIN — Medication 3 MILLILITER(S): at 17:35

## 2018-10-03 RX ADMIN — PROPOFOL 3.77 MICROGRAM(S)/KG/MIN: 10 INJECTION, EMULSION INTRAVENOUS at 14:45

## 2018-10-03 RX ADMIN — HEPARIN SODIUM 900 UNIT(S)/HR: 5000 INJECTION INTRAVENOUS; SUBCUTANEOUS at 00:49

## 2018-10-03 RX ADMIN — HEPARIN SODIUM 900 UNIT(S)/HR: 5000 INJECTION INTRAVENOUS; SUBCUTANEOUS at 17:21

## 2018-10-03 RX ADMIN — Medication 3 MILLILITER(S): at 11:47

## 2018-10-03 RX ADMIN — Medication 3 MILLILITER(S): at 00:05

## 2018-10-03 RX ADMIN — DEXMEDETOMIDINE HYDROCHLORIDE IN 0.9% SODIUM CHLORIDE 3.15 MICROGRAM(S)/KG/HR: 4 INJECTION INTRAVENOUS at 14:45

## 2018-10-03 RX ADMIN — LATANOPROST 1 DROP(S): 0.05 SOLUTION/ DROPS OPHTHALMIC; TOPICAL at 22:23

## 2018-10-03 RX ADMIN — Medication 3 MILLILITER(S): at 05:01

## 2018-10-03 RX ADMIN — PANTOPRAZOLE SODIUM 40 MILLIGRAM(S): 20 TABLET, DELAYED RELEASE ORAL at 06:16

## 2018-10-03 RX ADMIN — PHENYLEPHRINE HYDROCHLORIDE 2.36 MICROGRAM(S)/KG/MIN: 10 INJECTION INTRAVENOUS at 14:44

## 2018-10-03 RX ADMIN — Medication 3 MILLILITER(S): at 23:15

## 2018-10-03 NOTE — CHART NOTE - NSCHARTNOTEFT_GEN_A_CORE
Gastroenterology Brief Note    PEG tube planned for tomorrow at bedside   -  NPO after midnight   -  heparin to be turned off prior to procedure

## 2018-10-03 NOTE — PROGRESS NOTE ADULT - ATTENDING COMMENTS
Critically ill on vent with deconditioning For trach today  Critical Care time Today 35 min+  Frequent bedside visits with therapy change today  Critical Care time Today 35 min+

## 2018-10-03 NOTE — CHART NOTE - [id number]_GEN_A_CORE
MICU Transfer Note    Transfer from: MICU  Transfer to:  (  ) Medicine    (  ) Telemetry    (  ) RCU    (  ) Palliative    (  ) Stroke Unit    (  ) _______________  Accepting physican:    MICU COURSE:  85 yo woman with PMH COPD on home O2 (not used for few months), JENNIE on BiPAP, rheumatic heart disease s/p mitral valve replacement, HFpEF, aortic stenosis, A-fib on coumadin, sick sinus syndrome s/p pacemaker placement, HTN, and CKD3 presented w/ dyspnea. Initially placed on BiPAP in the ED but intubated for continued tachypnea, tripoding/increased work of breathing. She was +entero/rhinovirus on RVP, CXR revealed vascular congestion, given 1g ceftriaxone, 500mg azithro, 40mg lasix, multiple duonebs. Admitted to MICU for pulmonary edema 2/2 CHF exacerbation vs COPD exacerbation.   Pt was extubated on 9/17 but required reintubation 9/19 for increased secretions and WOB. She was extubated on 9/25 and continued with home BiPAP setting in evenings. Pt was s/p steroid course. Pt continued to have respiratory distress despite BiPAP and was reintubated 10/1. Pt then had a tracheostomy placed 10/3.  Trops were also initially elevated and uptrended from 21 to 261 and received ASA and plavix loading but troponins downtrended and CK/CKMB negative x 3 and plavix d/c'ed. Pt switched to heparin gtt after home coumadin was d/c'ed with increasing INR. She initially had a drop in H/H and CT A/P was performed which was negative for signs of bleeding. After her 2nd extubationa pt's H/H dropped again and required 1 unit of PRBC with adequate response and has been stable in 7's with no evidence of active bleeding. She was not bridged back to coumadin but continued on Hep ggt for MV replacement.  Pt was initially hypertensive in 200's but subsequently required pressor support with NE and diuresis was d/c'ed in the setting of shock. Pt was then weaned off pressors and became hypertensive requiring a nicardipine drip, then blood pressure was controlled with hydralazine and diltiazem. Had an episode of bradycardia to 30's which spontaneously resolved and pacemaker interrogation revealed normal functioning. Pt then required pressure support with vasopressin after the 3rd intubation and is currently not on any anti-hypertensive medications.   Pt was found to have GN coccobacillus (H. influenza) on blood culture (9/14) and continued on cefepime and azithromycin. Pt had negative blood culture on 9/17. Pt finished course of cefepime but pt had continued elevated WBC and evidence of infiltrate on CXR and was given a 5-day course of meropenem. She then had a fever and urine, sputum, and blood cultures were sent but abx were held.  Plan for PEG.    ASSESSMENT & PLAN:   85 y/o F w/ a PMH significant for COPD, JENNIE on BiPAP, multivalvular disease (severe AS, s/p MV replacement), HFpEF/severe diastolic failure, A-fib on coumadin, sick sinus syndrome s/p pacemaker placement, HTN, and CKD3 who was admitted for acute respiratory failure requiring intubation suspected to be 2/2 COPD exacerbation c/b PNA w/ +entero/rhinovirus and GN coccobacillus and H. influenza bacteremia treated with steroids and abx and resulted in tracheostomy placement due to failure to extubate with multiple reintubations.    #Neuro  - sedated on dexmedetomidine and propofol due to intubation/tracheostomy placement  - will d/c sedation after trach and PEG    #CV  -HFpEF (40-50%) w/ severe diastolic failure  -multivalvular disease: MV replacement, severe AS  -Afib, SSS with PPM  -HTN: hold diltiazem and hydralazine as pt is currently on vasopressin  -continue to monitor BP and restart BP medications if pt becomes hypertensive again    #Resp  -+enterovirus on RVP with +H.influenza bacteremia, multi-focal PNA  -9/28 CXR:  Ill-defined density seen in the left lung and the right middle lobe compatible with multifocal pneumonia.  -s/p steroid taper and abx course  -currently has tracheostomy  -c/w nebs  -f/u sputum culture, may require combicath    #GI  -will hold coumadin as pt has anemia with history of AVM/GI bleed  -PPI ppx  -will restart tube feeds after tracheostomy  -PEG placement tomorrow per GI, will keep NPO after midnight  -can continue with bowel regimen    #Renal  -LATANYA, likely 2/2 sepsis + HTN urgency; resolved with Cr at baseline  -pt had retention and tejada catheter was placed  -will continue to monitor I&O's    #ID  - +entero/rhinovirus  - GN coccobacillus (H. influenzae) on 9/14 blood culture.  s/p 14 days of abx for H. Influenza pneumonia and bacteremia.   - UClx (9/14): NGTD  - Sputum Clx (9/14): No organisms, repeat combicath (9/21): normal respiratory chastity  - repeat blood cx from 9/17, 9/21, 9/28: NGTD  - legionella negative  - f/u CXR and procalcitonin, repeat blood, urine, and sputum culture (10/3) as pt was febrile  - will consider restarting abx if pt continues to have fevers but will wait until cultures result if pt remains afebrile    #Heme:  -DIC panel negative  -LFTs no longer mildly elevated  -H/H drop; CT A/P 9/20 was negative for acute bleeding, 10/1 bedside FAST negative, no signs of acute bleed, hgb has been stable in 7's s/p 1 unit PRBC  - held hep drip at 7am for surgery and will restart once tracheostomy is in place  - DVT ppx: on hep ggt    #Endo:  - cont SSI    #Ophtho  -c/w home latanoprost drops for glaucoma      For Follow-Up:  [] PEG placement on Thursday  []   []    Vital Signs Last 24 Hrs  T(C): 36.6 (03 Oct 2018 12:00), Max: 38.6 (02 Oct 2018 23:15)  T(F): 97.8 (03 Oct 2018 12:00), Max: 101.5 (02 Oct 2018 23:15)  HR: 60 (03 Oct 2018 15:00) (59 - 72)  BP: 124/60 (03 Oct 2018 15:00) (82/24 - 154/70)  BP(mean): 87 (03 Oct 2018 15:00) (52 - 100)  RR: 24 (03 Oct 2018 15:00) (24 - 40)  SpO2: 100% (03 Oct 2018 15:00) (97% - 100%)  I&O's Summary    02 Oct 2018 07:01  -  03 Oct 2018 07:00  --------------------------------------------------------  IN: 2030.3 mL / OUT: 2045 mL / NET: -14.7 mL    03 Oct 2018 07:01  -  03 Oct 2018 15:08  --------------------------------------------------------  IN: 139.7 mL / OUT: 735 mL / NET: -595.3 mL      MEDICATIONS  (STANDING):  ALBUTerol/ipratropium for Nebulization 3 milliLiter(s) Nebulizer every 6 hours  aspirin  chewable 81 milliGRAM(s) Oral daily  dexmedetomidine Infusion 0.2 MICROgram(s)/kG/Hr (3.145 mL/Hr) IV Continuous <Continuous>  latanoprost 0.005% Ophthalmic Solution 1 Drop(s) Both EYES at bedtime  pantoprazole  Injectable 40 milliGRAM(s) IV Push two times a day  phenylephrine    Infusion 0.1 MICROgram(s)/kG/Min (2.359 mL/Hr) IV Continuous <Continuous>  propofol Infusion 10 MICROgram(s)/kG/Min (3.774 mL/Hr) IV Continuous <Continuous>  senna 2 Tablet(s) Oral at bedtime      LABS                                            7.9                   Neurophils% (auto):   x      (10-03 @ 00:14):    14.2 )-----------(173          Lymphocytes% (auto):  x                                             24.5                   Eosinphils% (auto):   x        Manual%: Neutrophils x    ; Lymphocytes x    ; Eosinophils x    ; Bands%: x    ; Blasts x                                    141    |  108    |  62                  Calcium: 9.4   / iCa: x      (10-03 @ 00:14)    ----------------------------<  154       Magnesium: 2.4                              4.7     |  22     |  1.13             Phosphorous: 3.3      TPro  5.6    /  Alb  2.8    /  TBili  0.4    /  DBili  x      /  AST  11     /  ALT  14     /  AlkPhos  70     03 Oct 2018 00:14    ( 10-03 @ 00:14 )   PT: 11.4 sec;   INR: 1.04 ratio  aPTT: 59.4 sec MICU Transfer Note    Transfer from: MICU  Transfer to:  (  ) Medicine    (  ) Telemetry    (  ) RCU    (  ) Palliative    (  ) Stroke Unit    (  ) _______________  Accepting physican:    MICU COURSE:  85 yo woman with PMH COPD on home O2 (not used for few months), JENNIE on BiPAP, rheumatic heart disease s/p mitral valve replacement, HFpEF, aortic stenosis, A-fib on coumadin, sick sinus syndrome s/p pacemaker placement, HTN, and CKD3 presented w/ dyspnea. Initially placed on BiPAP in the ED but intubated for continued tachypnea, tripoding/increased work of breathing. She was +entero/rhinovirus on RVP, CXR revealed vascular congestion, given 1g ceftriaxone, 500mg azithro, 40mg lasix, multiple duonebs. Admitted to MICU for pulmonary edema 2/2 CHF exacerbation vs COPD exacerbation.   Pt was extubated on 9/17 but required reintubation 9/19 for increased secretions and WOB. She was extubated on 9/25 and continued with home BiPAP setting in evenings. Pt was s/p steroid course. Pt continued to have respiratory distress despite BiPAP and was reintubated 10/1. Pt then had a tracheostomy placed 10/3.  Trops were also initially elevated and uptrended from 21 to 261 and received ASA and plavix loading but troponins downtrended and CK/CKMB negative x 3 and plavix d/c'ed. Pt switched to heparin gtt after home coumadin was d/c'ed with increasing INR. She initially had a drop in H/H and CT A/P was performed which was negative for signs of bleeding. After her 2nd extubationa pt's H/H dropped again and required 1 unit of PRBC with adequate response and has been stable in 7's with no evidence of active bleeding. She was not bridged back to coumadin but continued on Hep ggt for MV replacement.  Pt was initially hypertensive in 200's but subsequently required pressor support with NE and diuresis was d/c'ed in the setting of shock. Pt was then weaned off pressors and became hypertensive requiring a nicardipine drip, then blood pressure was controlled with hydralazine and diltiazem. Had an episode of bradycardia to 30's which spontaneously resolved and pacemaker interrogation revealed normal functioning. Pt then required pressure support with vasopressin after the 3rd intubation and is currently not on any anti-hypertensive medications.   Pt was found to have GN coccobacillus (H. influenza) on blood culture (9/14) and continued on cefepime and azithromycin. Pt had negative blood culture on 9/17. Pt finished course of cefepime but pt had continued elevated WBC and evidence of infiltrate on CXR and was given a 5-day course of meropenem. She then had a fever and urine, sputum, and blood cultures were sent but abx were held.  Plan for PEG.    ASSESSMENT & PLAN:   85 y/o F w/ a PMH significant for COPD, JENNIE on BiPAP, multivalvular disease (severe AS, s/p MV replacement), HFpEF/severe diastolic failure, A-fib on coumadin, sick sinus syndrome s/p pacemaker placement, HTN, and CKD3 who was admitted for acute respiratory failure requiring intubation suspected to be 2/2 COPD exacerbation c/b PNA w/ +entero/rhinovirus and GN coccobacillus and H. influenza bacteremia treated with steroids and abx and resulted in tracheostomy placement due to failure to extubate with multiple reintubations.    #Neuro  - sedated on dexmedetomidine and propofol due to intubation/tracheostomy placement  - will d/c sedation after trach and PEG    #CV  -HFpEF (40-50%) w/ severe diastolic failure  -multivalvular disease: MV replacement, severe AS  -Afib, SSS with PPM  -HTN: hold diltiazem and hydralazine  -continue to monitor BP and restart BP medications if pt becomes hypertensive again    #Resp  -+enterovirus on RVP with +H.influenza bacteremia, multi-focal PNA  -9/28 CXR:  Ill-defined density seen in the left lung and the right middle lobe compatible with multifocal pneumonia.  -s/p steroid taper and abx course  -currently has tracheostomy  -c/w nebs  -f/u sputum culture, may require combicath    #GI  -will hold coumadin as pt has anemia with history of AVM/GI bleed  -PPI ppx  -will restart tube feeds after tracheostomy  -PEG placement tomorrow per GI, will keep NPO after midnight  -can continue with bowel regimen    #Renal  -LATANYA, likely 2/2 sepsis + HTN urgency; resolved with Cr at baseline  -pt had retention and tejada catheter was placed  -will continue to monitor I&O's    #ID  - +entero/rhinovirus  - GN coccobacillus (H. influenzae) on 9/14 blood culture.  s/p 14 days of abx for H. Influenza pneumonia and bacteremia.   - UClx (9/14): NGTD  - Sputum Clx (9/14): No organisms, repeat combicath (9/21): normal respiratory chastity  - repeat blood cx from 9/17, 9/21, 9/28: NGTD  - legionella negative  - f/u CXR and procalcitonin, repeat blood, urine, and sputum culture (10/3) as pt was febrile  - will consider restarting abx if pt continues to have fevers but will wait until cultures result if pt remains afebrile    #Heme:  -DIC panel negative  -LFTs no longer mildly elevated  -H/H drop; CT A/P 9/20 was negative for acute bleeding, 10/1 bedside FAST negative, no signs of acute bleed, hgb has been stable in 7's s/p 1 unit PRBC  - held hep drip at 7am for surgery and will restart once tracheostomy is in place  - DVT ppx: on hep ggt    #Endo:  - cont SSI    #Ophtho  -c/w home latanoprost drops for glaucoma      For Follow-Up:  [] PEG placement  [] Hep drip bridge to coumadin  [] f/u blood culture, sputum culture, urine culture from 10/3  [] monitor for HTN  [] will require PT    Vital Signs Last 24 Hrs  T(C): 36.6 (03 Oct 2018 12:00), Max: 38.6 (02 Oct 2018 23:15)  T(F): 97.8 (03 Oct 2018 12:00), Max: 101.5 (02 Oct 2018 23:15)  HR: 60 (03 Oct 2018 15:00) (59 - 72)  BP: 124/60 (03 Oct 2018 15:00) (82/24 - 154/70)  BP(mean): 87 (03 Oct 2018 15:00) (52 - 100)  RR: 24 (03 Oct 2018 15:00) (24 - 40)  SpO2: 100% (03 Oct 2018 15:00) (97% - 100%)  I&O's Summary    02 Oct 2018 07:01  -  03 Oct 2018 07:00  --------------------------------------------------------  IN: 2030.3 mL / OUT: 2045 mL / NET: -14.7 mL    03 Oct 2018 07:01  -  03 Oct 2018 15:08  --------------------------------------------------------  IN: 139.7 mL / OUT: 735 mL / NET: -595.3 mL      MEDICATIONS  (STANDING):  ALBUTerol/ipratropium for Nebulization 3 milliLiter(s) Nebulizer every 6 hours  aspirin  chewable 81 milliGRAM(s) Oral daily  dexmedetomidine Infusion 0.2 MICROgram(s)/kG/Hr (3.145 mL/Hr) IV Continuous <Continuous>  latanoprost 0.005% Ophthalmic Solution 1 Drop(s) Both EYES at bedtime  pantoprazole  Injectable 40 milliGRAM(s) IV Push two times a day  phenylephrine    Infusion 0.1 MICROgram(s)/kG/Min (2.359 mL/Hr) IV Continuous <Continuous>  propofol Infusion 10 MICROgram(s)/kG/Min (3.774 mL/Hr) IV Continuous <Continuous>  senna 2 Tablet(s) Oral at bedtime      LABS                                            7.9                   Neurophils% (auto):   x      (10-03 @ 00:14):    14.2 )-----------(173          Lymphocytes% (auto):  x                                             24.5                   Eosinphils% (auto):   x        Manual%: Neutrophils x    ; Lymphocytes x    ; Eosinophils x    ; Bands%: x    ; Blasts x                                    141    |  108    |  62                  Calcium: 9.4   / iCa: x      (10-03 @ 00:14)    ----------------------------<  154       Magnesium: 2.4                              4.7     |  22     |  1.13             Phosphorous: 3.3      TPro  5.6    /  Alb  2.8    /  TBili  0.4    /  DBili  x      /  AST  11     /  ALT  14     /  AlkPhos  70     03 Oct 2018 00:14    ( 10-03 @ 00:14 )   PT: 11.4 sec;   INR: 1.04 ratio  aPTT: 59.4 sec MICU Transfer Note    Transfer from: MICU  Transfer to:  (  ) Medicine    (  ) Telemetry    (  ) RCU    (  ) Palliative    (  ) Stroke Unit    (  ) _______________  Accepting physican:    MICU COURSE:  83 yo woman with PMH COPD on home O2 (not used for few months), JENNIE on BiPAP, rheumatic heart disease s/p mitral valve replacement, HFpEF, aortic stenosis, A-fib on coumadin, sick sinus syndrome s/p pacemaker placement, HTN, and CKD3 presented w/ dyspnea. Initially placed on BiPAP in the ED but intubated for continued tachypnea, tripoding/increased work of breathing. She was +entero/rhinovirus on RVP, CXR revealed vascular congestion, given 1g ceftriaxone, 500mg azithro, 40mg lasix, multiple duonebs. Admitted to MICU for pulmonary edema 2/2 CHF exacerbation vs COPD exacerbation.   Pt was extubated on 9/17 but required reintubation 9/19 for increased secretions and WOB. She was extubated on 9/25 and continued with home BiPAP setting in evenings. Pt was s/p steroid course. Pt continued to have respiratory distress despite BiPAP and was reintubated 10/1. Pt then had a tracheostomy placed 10/3.  Trops were also initially elevated and uptrended from 21 to 261 and received ASA and plavix loading but troponins downtrended and CK/CKMB negative x 3 and plavix d/c'ed. Pt switched to heparin gtt after home coumadin was d/c'ed with increasing INR. She initially had a drop in H/H and CT A/P was performed which was negative for signs of bleeding. After her 2nd extubationa pt's H/H dropped again and required 1 unit of PRBC with adequate response and has been stable in 7's with no evidence of active bleeding. She was not bridged back to coumadin but continued on Hep ggt for MV replacement.  Pt was initially hypertensive in 200's but subsequently required pressor support with NE and diuresis was d/c'ed in the setting of shock. Pt was then weaned off pressors and became hypertensive requiring a nicardipine drip, then blood pressure was controlled with hydralazine and diltiazem. Had an episode of bradycardia to 30's which spontaneously resolved and pacemaker interrogation revealed normal functioning. Pt then required pressure support with vasopressin after the 3rd intubation and is currently not on any anti-hypertensive medications.   Pt was found to have GN coccobacillus (H. influenza) on blood culture (9/14) and continued on cefepime and azithromycin. Pt had negative blood culture on 9/17. Pt finished course of cefepime but pt had continued elevated WBC and evidence of infiltrate on CXR and was given a 5-day course of meropenem. She then had a fever and urine, sputum, and blood cultures were sent but abx were held.  Pt had PEG placed 10/4.    ASSESSMENT & PLAN:   85 y/o F w/ a PMH significant for COPD, JENNIE on BiPAP, multivalvular disease (severe AS, s/p MV replacement), HFpEF/severe diastolic failure, A-fib on coumadin, sick sinus syndrome s/p pacemaker placement, HTN, and CKD3 who was admitted for acute respiratory failure requiring intubation suspected to be 2/2 COPD exacerbation c/b PNA w/ +entero/rhinovirus and GN coccobacillus and H. influenza bacteremia, requiring continued respiratory support and tracheostomy and PEG placement    #Neuro  - fent patch placed and klonapin started  - prop and dex were discontinued    #CV  *HFpEF (40-50%) w/ severe diastolic failure  *multivalvular disease: MV replacement, severe AS  -Hep ggt  *Afib, SSS with PPM,   -off home sotalol, pt has not had rhythm abnormality  *HTN  -no longer requires vasopressin  -will restart low dose of diltiazem  -continue to monitor BP and titrate HTN medications    #Resp  -+enterovirus on RVP with +H.influenza bacteremia, multi-focal PNA  -9/28 CXR:  Ill-defined density seen in the left lung and the right middle lobe compatible with multifocal pneumonia.  -s/p steroid taper  -currently s/p trach  -f/u sputum culture (10/3)  -c/w nebs  -c/w chest PT  -start pulmicort   -start saline nebs    #GI  -will hold coumadin as pt has anemia with history of AVM/GI bleed  -PPI ppx  -s/p PEG placement  -can continue with bowel regimen  -pt was found to be distended and requiring PEG decompression, with possible ileus formation  -continue to move pt as much as possible  -will start trickle feeds to stimulate bowel to prevent ileus formation  -start simethicone   -f/u BMs    #Renal  -LATANYA, likely 2/2 sepsis + HTN urgency; resolved with Cr at baseline  -pt had retention and tejada catheter was placed  -will continue to monitor I&O's  -hypernatremia has resolved    #ID  - +entero/rhinovirus  - GN coccobacillus (H. influenzae) on 9/14 blood culture.  s/p 14 days of abx for H. Influenza pneumonia and bacteremia.   - UClx (9/14): NGTD  - Sputum Clx (9/14): No organisms, repeat combicath (9/21): normal respiratory chastity  - repeat blood cx from 9/17, 9/21, 9/28: NGTD  - legionella negative  - f/u sputum, blood, and urine culture (10/3): prelim no growth  - pt recieved 1g vanc heydi-PEG placement    #Heme:  -DIC panel negative  -LFTs no longer mildly elevated  -H/H drop; CT A/P 9/20 was negative for acute bleeding, 10/1 bedside FAST negative, no signs of acute bleed, hgb has been stable in 7's s/p 1 unit PRBC  -will transfuse if pt hgb <7  - DVT ppx: on hep ggt    #Endo:  - cont SSI    #Ophtho  -c/w home latanoprost drops for glaucoma    #Dispo  -PT/OT eval  -plan to transfer to RCU after PEG placement      For Follow-Up:  [] Hep drip bridge to coumadin when appropriate   [] monitor for HTN and titrate medications  [] will require PT/OT      Vital Signs Last 24 Hrs  T(C): 37.2 (05 Oct 2018 12:00), Max: 37.2 (05 Oct 2018 12:00)  T(F): 99 (05 Oct 2018 12:00), Max: 99 (05 Oct 2018 12:00)  HR: 59 (05 Oct 2018 14:00) (34 - 68)  BP: 144/66 (05 Oct 2018 14:00) (96/46 - 180/93)  BP(mean): 95 (05 Oct 2018 14:00) (67 - 130)  RR: 22 (05 Oct 2018 14:00) (14 - 41)  SpO2: 99% (05 Oct 2018 14:00) (99% - 100%)  I&O's Summary    04 Oct 2018 07:01  -  05 Oct 2018 07:00  --------------------------------------------------------  IN: 786.7 mL / OUT: 1225 mL / NET: -438.3 mL    05 Oct 2018 07:01  -  05 Oct 2018 14:57  --------------------------------------------------------  IN: 89 mL / OUT: 355 mL / NET: -266 mL    MEDICATIONS  (STANDING):  ALBUTerol/ipratropium for Nebulization 3 milliLiter(s) Nebulizer every 6 hours  buDESOnide   0.25 milliGRAM(s) Respule 0.25 milliGRAM(s) Inhalation every 12 hours  clonazePAM Tablet 0.5 milliGRAM(s) Oral every 12 hours  fentaNYL   Patch  25 MICROgram(s)/Hr 1 Patch Transdermal every 72 hours  aspirin  chewable 81 milliGRAM(s) Oral daily  heparin  Infusion. 900 Unit(s)/Hr (9 mL/Hr) IV Continuous <Continuous>  diltiazem    Tablet 30 milliGRAM(s) Oral every 6 hours  latanoprost 0.005% Ophthalmic Solution 1 Drop(s) Both EYES at bedtime  pantoprazole  Injectable 40 milliGRAM(s) IV Push two times a day  sodium chloride 3%  Inhalation 3 milliLiter(s) Inhalation two times a day  senna 2 Tablet(s) Oral at bedtime    LABS                                          7.4                   (10-04 @ 23:58):    6.8  )-----------(118                                                    23.3                                 142    |  110    |  41                  Calcium: 9.0   / iCa: x      (10-04 @ 23:58)    ----------------------------<  131       Magnesium: 2.3                              3.8     |  25     |  0.86             Phosphorous: 3.2      TPro  5.6    /  Alb  2.3    /  TBili  0.7    /  DBili  x      /  AST  11     /  ALT  10     /  AlkPhos  55     04 Oct 2018 23:58    ( 10-05 @ 14:28 )   PT: x    ;   INR: x      aPTT: 31.0 sec MICU Transfer Note    Transfer from: MICU  Transfer to:  (  ) Medicine    (  ) Telemetry    (  x) RCU    (  ) Palliative    (  ) Stroke Unit    (  ) _______________  Accepting physican: Dr Phyllis Turner     MICU COURSE:  85 yo woman with PMH COPD on home O2 (not used for few months), JENNIE on BiPAP, rheumatic heart disease s/p mitral valve replacement, HFpEF, aortic stenosis, A-fib on coumadin, sick sinus syndrome s/p pacemaker placement, HTN, and CKD3 presented w/ dyspnea. Initially placed on BiPAP in the ED but intubated for continued tachypnea, tripoding/increased work of breathing. She was +entero/rhinovirus on RVP, CXR revealed vascular congestion, given 1g ceftriaxone, 500mg azithro, 40mg lasix, multiple duonebs. Admitted to MICU for pulmonary edema 2/2 CHF exacerbation vs COPD exacerbation.   Pt was extubated on 9/17 but required reintubation 9/19 for increased secretions and WOB. She was extubated on 9/25 and continued with home BiPAP setting in evenings. Pt was s/p steroid course. Pt continued to have respiratory distress despite BiPAP and was reintubated 10/1. Pt then had a tracheostomy placed 10/3.  Trops were also initially elevated and uptrended from 21 to 261 and received ASA and plavix loading but troponins downtrended and CK/CKMB negative x 3 and plavix d/c'ed. Pt switched to heparin gtt after home coumadin was d/c'ed with increasing INR. She initially had a drop in H/H and CT A/P was performed which was negative for signs of bleeding. After her 2nd extubationa pt's H/H dropped again and required 1 unit of PRBC with adequate response and has been stable in 7's with no evidence of active bleeding. She was not bridged back to coumadin but continued on Hep ggt for MV replacement.  Pt was initially hypertensive in 200's but subsequently required pressor support with NE and diuresis was d/c'ed in the setting of shock. Pt was then weaned off pressors and became hypertensive requiring a nicardipine drip, then blood pressure was controlled with hydralazine and diltiazem. Had an episode of bradycardia to 30's which spontaneously resolved and pacemaker interrogation revealed normal functioning. Pt then required pressure support with vasopressin after the 3rd intubation and is currently not on any anti-hypertensive medications.   Pt was found to have GN coccobacillus (H. influenza) on blood culture (9/14) and continued on cefepime and azithromycin. Pt had negative blood culture on 9/17. Pt finished course of cefepime but pt had continued elevated WBC and evidence of infiltrate on CXR and was given a 5-day course of meropenem. She then had a fever and urine, sputum, and blood cultures were sent but abx were held.  Pt had PEG placed 10/4.    ASSESSMENT & PLAN:   83 y/o F w/ a PMH significant for COPD, JENNIE on BiPAP, multivalvular disease (severe AS, s/p MV replacement), HFpEF/severe diastolic failure, A-fib on coumadin, sick sinus syndrome s/p pacemaker placement, HTN, and CKD3 who was admitted for acute respiratory failure requiring intubation suspected to be 2/2 COPD exacerbation c/b PNA w/ +entero/rhinovirus and GN coccobacillus and H. influenza bacteremia, requiring continued respiratory support and tracheostomy and PEG placement    #Neuro  - fent patch placed and klonapin started  - prop and dex were discontinued    #CV  *HFpEF (40-50%) w/ severe diastolic failure  *multivalvular disease: MV replacement, severe AS  -Hep ggt  *Afib, SSS with PPM,   -off home sotalol, pt has not had rhythm abnormality  *HTN  -no longer requires vasopressin  -will restart low dose of diltiazem  -continue to monitor BP and titrate HTN medications    #Resp  -+enterovirus on RVP with +H.influenza bacteremia, multi-focal PNA  -9/28 CXR:  Ill-defined density seen in the left lung and the right middle lobe compatible with multifocal pneumonia.  -s/p steroid taper  -currently s/p trach  -f/u sputum culture (10/3)  -c/w nebs  -c/w chest PT  -start pulmicort   -start saline nebs    #GI  -will hold coumadin as pt has anemia with history of AVM/GI bleed  -PPI ppx  -s/p PEG placement  -can continue with bowel regimen  -pt was found to be distended and requiring PEG decompression, with possible ileus formation  -continue to move pt as much as possible  -will start trickle feeds to stimulate bowel to prevent ileus formation  -start simethicone   -f/u BMs    #Renal  -LATANYA, likely 2/2 sepsis + HTN urgency; resolved with Cr at baseline  -pt had retention and tejada catheter was placed  -will continue to monitor I&O's  -hypernatremia has resolved    #ID  - +entero/rhinovirus  - GN coccobacillus (H. influenzae) on 9/14 blood culture.  s/p 14 days of abx for H. Influenza pneumonia and bacteremia.   - UClx (9/14): NGTD  - Sputum Clx (9/14): No organisms, repeat combicath (9/21): normal respiratory chastity  - repeat blood cx from 9/17, 9/21, 9/28: NGTD  - legionella negative  - f/u sputum, blood, and urine culture (10/3): prelim no growth  - pt recieved 1g vanc heydi-PEG placement    #Heme:  -DIC panel negative  -LFTs no longer mildly elevated  -H/H drop; CT A/P 9/20 was negative for acute bleeding, 10/1 bedside FAST negative, no signs of acute bleed, hgb has been stable in 7's s/p 1 unit PRBC  -will transfuse if pt hgb <7  - DVT ppx: on hep ggt    #Endo:  - cont SSI    #Ophtho  -c/w home latanoprost drops for glaucoma    #Dispo  -PT/OT eval  -plan to transfer to [facility] after PEG placement      For Follow-Up:  [] Hep drip bridge to coumadin when appropriate   [] monitor for HTN and titrate medications  [] will require PT/OT      Vital Signs Last 24 Hrs  T(C): 37.2 (05 Oct 2018 12:00), Max: 37.2 (05 Oct 2018 12:00)  T(F): 99 (05 Oct 2018 12:00), Max: 99 (05 Oct 2018 12:00)  HR: 59 (05 Oct 2018 14:00) (34 - 68)  BP: 144/66 (05 Oct 2018 14:00) (96/46 - 180/93)  BP(mean): 95 (05 Oct 2018 14:00) (67 - 130)  RR: 22 (05 Oct 2018 14:00) (14 - 41)  SpO2: 99% (05 Oct 2018 14:00) (99% - 100%)  I&O's Summary    04 Oct 2018 07:01  -  05 Oct 2018 07:00  --------------------------------------------------------  IN: 786.7 mL / OUT: 1225 mL / NET: -438.3 mL    05 Oct 2018 07:01  -  05 Oct 2018 14:57  --------------------------------------------------------  IN: 89 mL / OUT: 355 mL / NET: -266 mL    MEDICATIONS  (STANDING):  ALBUTerol/ipratropium for Nebulization 3 milliLiter(s) Nebulizer every 6 hours  buDESOnide   0.25 milliGRAM(s) Respule 0.25 milliGRAM(s) Inhalation every 12 hours  clonazePAM Tablet 0.5 milliGRAM(s) Oral every 12 hours  fentaNYL   Patch  25 MICROgram(s)/Hr 1 Patch Transdermal every 72 hours  aspirin  chewable 81 milliGRAM(s) Oral daily  heparin  Infusion. 900 Unit(s)/Hr (9 mL/Hr) IV Continuous <Continuous>  diltiazem    Tablet 30 milliGRAM(s) Oral every 6 hours  latanoprost 0.005% Ophthalmic Solution 1 Drop(s) Both EYES at bedtime  pantoprazole  Injectable 40 milliGRAM(s) IV Push two times a day  sodium chloride 3%  Inhalation 3 milliLiter(s) Inhalation two times a day  senna 2 Tablet(s) Oral at bedtime    LABS                                          7.4                   (10-04 @ 23:58):    6.8  )-----------(118                                                    23.3                                 142    |  110    |  41                  Calcium: 9.0   / iCa: x      (10-04 @ 23:58)    ----------------------------<  131       Magnesium: 2.3                              3.8     |  25     |  0.86             Phosphorous: 3.2      TPro  5.6    /  Alb  2.3    /  TBili  0.7    /  DBili  x      /  AST  11     /  ALT  10     /  AlkPhos  55     04 Oct 2018 23:58    ( 10-05 @ 14:28 )   PT: x    ;   INR: x      aPTT: 31.0 sec MICU Transfer Note    Transfer from: MICU  Transfer to:  (  ) Medicine    (  ) Telemetry    (  x) RCU    (  ) Palliative    (  ) Stroke Unit    (  ) _______________  Accepting physican: Dr Phyllis Cody     MICU COURSE:  85 yo woman with PMH COPD on home O2 (not used for few months), JENNIE on BiPAP, rheumatic heart disease s/p mitral valve replacement, HFpEF, aortic stenosis, A-fib on coumadin, sick sinus syndrome s/p pacemaker placement, HTN, and CKD3 presented w/ dyspnea. Initially placed on BiPAP in the ED but intubated for continued tachypnea, tripoding/increased work of breathing. She was +entero/rhinovirus on RVP, CXR revealed vascular congestion, given 1g ceftriaxone, 500mg azithro, 40mg lasix, multiple duonebs. Admitted to MICU for pulmonary edema 2/2 CHF exacerbation vs COPD exacerbation.   Pt was extubated on 9/17 but required reintubation 9/19 for increased secretions and WOB. She was extubated on 9/25 and continued with home BiPAP setting in evenings. Pt was s/p steroid course. Pt continued to have respiratory distress despite BiPAP and was reintubated 10/1. Pt then had a tracheostomy placed 10/3.  Trops were also initially elevated and uptrended from 21 to 261 and received ASA and plavix loading but troponins downtrended and CK/CKMB negative x 3 and plavix d/c'ed. Pt switched to heparin gtt after home coumadin was d/c'ed with increasing INR. She initially had a drop in H/H and CT A/P was performed which was negative for signs of bleeding. After her 2nd extubationa pt's H/H dropped again and required 1 unit of PRBC with adequate response and has been stable in 7's with no evidence of active bleeding. She was not bridged back to coumadin but continued on Hep ggt for MV replacement.  Pt was initially hypertensive in 200's but subsequently required pressor support with NE and diuresis was d/c'ed in the setting of shock. Pt was then weaned off pressors and became hypertensive requiring a nicardipine drip, then blood pressure was controlled with hydralazine and diltiazem. Had an episode of bradycardia to 30's which spontaneously resolved and pacemaker interrogation revealed normal functioning. Pt then required pressure support with vasopressin after the 3rd intubation and is currently not on any anti-hypertensive medications.   Pt was found to have GN coccobacillus (H. influenza) on blood culture (9/14) and continued on cefepime and azithromycin. Pt had negative blood culture on 9/17. Pt finished course of cefepime but pt had continued elevated WBC and evidence of infiltrate on CXR and was given a 5-day course of meropenem. She then had a fever and urine, sputum, and blood cultures were sent but abx were held.  Pt had PEG placed 10/4.    ASSESSMENT & PLAN:   85 y/o F w/ a PMH significant for COPD, JENNIE on BiPAP, multivalvular disease (severe AS, s/p MV replacement), HFpEF/severe diastolic failure, A-fib on coumadin, sick sinus syndrome s/p pacemaker placement, HTN, and CKD3 who was admitted for acute respiratory failure requiring intubation suspected to be 2/2 COPD exacerbation c/b PNA w/ +entero/rhinovirus and GN coccobacillus and H. influenza bacteremia, requiring continued respiratory support and tracheostomy and PEG placement    #Neuro  - fent patch placed and klonapin started  - prop and dex were discontinued    #CV  *HFpEF (40-50%) w/ severe diastolic failure  *multivalvular disease: MV replacement, severe AS  -Hep ggt  *Afib, SSS with PPM,   -off home sotalol, pt has not had rhythm abnormality  *HTN  -no longer requires vasopressin  -will restart low dose of diltiazem  -continue to monitor BP and titrate HTN medications    #Resp  -+enterovirus on RVP with +H.influenza bacteremia, multi-focal PNA  -9/28 CXR:  Ill-defined density seen in the left lung and the right middle lobe compatible with multifocal pneumonia.  -s/p steroid taper  -currently s/p trach  -f/u sputum culture (10/3)  -c/w nebs  -c/w chest PT  -start pulmicort   -start saline nebs    #GI  -will hold coumadin as pt has anemia with history of AVM/GI bleed  -PPI ppx  -s/p PEG placement  -can continue with bowel regimen  -pt was found to be distended and requiring PEG decompression, with possible ileus formation  -continue to move pt as much as possible  -will start trickle feeds to stimulate bowel to prevent ileus formation  -start simethicone   -f/u BMs    #Renal  -LATANYA, likely 2/2 sepsis + HTN urgency; resolved with Cr at baseline  -pt had retention and tejada catheter was placed  -will continue to monitor I&O's  -hypernatremia has resolved    #ID  - +entero/rhinovirus  - GN coccobacillus (H. influenzae) on 9/14 blood culture.  s/p 14 days of abx for H. Influenza pneumonia and bacteremia.   - UClx (9/14): NGTD  - Sputum Clx (9/14): No organisms, repeat combicath (9/21): normal respiratory chastity  - repeat blood cx from 9/17, 9/21, 9/28: NGTD  - legionella negative  - f/u sputum, blood, and urine culture (10/3): prelim no growth  - pt recieved 1g vanc heydi-PEG placement    #Heme:  -DIC panel negative  -LFTs no longer mildly elevated  -H/H drop; CT A/P 9/20 was negative for acute bleeding, 10/1 bedside FAST negative, no signs of acute bleed, hgb has been stable in 7's s/p 1 unit PRBC  -will transfuse if pt hgb <7  - DVT ppx: on hep ggt    #Endo:  - cont SSI    #Ophtho  -c/w home latanoprost drops for glaucoma    #Dispo  -PT/OT eval  -plan to transfer to [facility] after PEG placement      For Follow-Up:  [] Hep drip bridge to coumadin when appropriate   [] monitor for HTN and titrate medications  [] will require PT/OT      Vital Signs Last 24 Hrs  T(C): 37.2 (05 Oct 2018 12:00), Max: 37.2 (05 Oct 2018 12:00)  T(F): 99 (05 Oct 2018 12:00), Max: 99 (05 Oct 2018 12:00)  HR: 59 (05 Oct 2018 14:00) (34 - 68)  BP: 144/66 (05 Oct 2018 14:00) (96/46 - 180/93)  BP(mean): 95 (05 Oct 2018 14:00) (67 - 130)  RR: 22 (05 Oct 2018 14:00) (14 - 41)  SpO2: 99% (05 Oct 2018 14:00) (99% - 100%)  I&O's Summary    04 Oct 2018 07:01  -  05 Oct 2018 07:00  --------------------------------------------------------  IN: 786.7 mL / OUT: 1225 mL / NET: -438.3 mL    05 Oct 2018 07:01  -  05 Oct 2018 14:57  --------------------------------------------------------  IN: 89 mL / OUT: 355 mL / NET: -266 mL    MEDICATIONS  (STANDING):  ALBUTerol/ipratropium for Nebulization 3 milliLiter(s) Nebulizer every 6 hours  buDESOnide   0.25 milliGRAM(s) Respule 0.25 milliGRAM(s) Inhalation every 12 hours  clonazePAM Tablet 0.5 milliGRAM(s) Oral every 12 hours  fentaNYL   Patch  25 MICROgram(s)/Hr 1 Patch Transdermal every 72 hours  aspirin  chewable 81 milliGRAM(s) Oral daily  heparin  Infusion. 900 Unit(s)/Hr (9 mL/Hr) IV Continuous <Continuous>  diltiazem    Tablet 30 milliGRAM(s) Oral every 6 hours  latanoprost 0.005% Ophthalmic Solution 1 Drop(s) Both EYES at bedtime  pantoprazole  Injectable 40 milliGRAM(s) IV Push two times a day  sodium chloride 3%  Inhalation 3 milliLiter(s) Inhalation two times a day  senna 2 Tablet(s) Oral at bedtime    LABS                                          7.4                   (10-04 @ 23:58):    6.8  )-----------(118                                                    23.3                                 142    |  110    |  41                  Calcium: 9.0   / iCa: x      (10-04 @ 23:58)    ----------------------------<  131       Magnesium: 2.3                              3.8     |  25     |  0.86             Phosphorous: 3.2      TPro  5.6    /  Alb  2.3    /  TBili  0.7    /  DBili  x      /  AST  11     /  ALT  10     /  AlkPhos  55     04 Oct 2018 23:58    ( 10-05 @ 14:28 )   PT: x    ;   INR: x      aPTT: 31.0 sec

## 2018-10-03 NOTE — PROGRESS NOTE ADULT - SUBJECTIVE AND OBJECTIVE BOX
INTERVAL HPI/OVERNIGHT EVENTS: pt was tachypneic and was given midazolam 2 mg with not much improvement. and was given 2 doses of 25mcg of fentanyl and dexmedetomidine was increased but has since been decreased. pt was also febrile overnight and was given tylenol. UA was grossly positive. tube feeds were stopped at midnight and hep drip held at 7am for trach at 1pm.    SUBJECTIVE: Patient seen and examined at bedside.     OBJECTIVE:    VITAL SIGNS:  ICU Vital Signs Last 24 Hrs  T(C): 37.4 (03 Oct 2018 04:00), Max: 38.6 (02 Oct 2018 23:15)  T(F): 99.3 (03 Oct 2018 04:00), Max: 101.5 (02 Oct 2018 23:15)  HR: 59 (03 Oct 2018 07:00) (59 - 71)  BP: 101/49 (03 Oct 2018 07:00) (79/43 - 154/70)  BP(mean): 71 (03 Oct 2018 07:00) (52 - 100)  RR: 24 (03 Oct 2018 07:00) (24 - 40)  SpO2: 100% (03 Oct 2018 07:00) (97% - 100%)    Mode: AC/ CMV (Assist Control/ Continuous Mandatory Ventilation), RR (machine): 24, TV (machine): 400, FiO2: 30, PEEP: 5, ITime: 1, MAP: 10, PIP: 25    10-02 @ 07:01  -  10-03 @ 07:00  --------------------------------------------------------  IN: 2030.3 mL / OUT: 2045 mL / NET: -14.7 mL    CAPILLARY BLOOD GLUCOSE  POCT Blood Glucose.: 193 mg/dL (02 Oct 2018 22:15)    PHYSICAL EXAM:  General:   HEENT:   Respiratory:   Cardiovascular:   Abdomen:   Extremities:   Skin:   Neurological:    LINES: PIVs, tejada catheter    MEDICATIONS  (STANDING):  ALBUTerol/ipratropium for Nebulization 3 milliLiter(s) Nebulizer every 6 hours  aspirin  chewable 81 milliGRAM(s) Oral daily  heparin  Infusion. 900 Unit(s)/Hr (9 mL/Hr) IV Continuous <Continuous>  phenylephrine    Infusion 1 MICROgram(s)/kG/Min (23.587 mL/Hr) IV Continuous <Continuous>  dexmedetomidine Infusion 0.01 MICROgram(s)/kG/Hr (0.157 mL/Hr) IV Continuous <Continuous>  propofol Infusion 5 MICROgram(s)/kG/Min (1.887 mL/Hr) IV Continuous <Continuous>  senna 2 Tablet(s) Oral at bedtime  pantoprazole  Injectable 40 milliGRAM(s) IV Push every 12 hours  latanoprost 0.005% Ophthalmic Solution 1 Drop(s) Both EYES at bedtime    ALLERGIES:  penicillin (Rash)    LABS:                        7.9    14.2  )-----------( 173      ( 03 Oct 2018 00:14 )             24.5     Mean Cell Volume : 92.8 fl  Mean Cell Hemoglobin : 30.0 pg  Mean Cell Hemoglobin Concentration : 32.3 gm/dL    10-03    141  |  108  |  62<H>  ----------------------------<  154<H>  4.7   |  22  |  1.13    Ca    9.4      03 Oct 2018 00:14  Phos  3.3     10-  Mg     2.4     10-    TPro  5.6<L>  /  Alb  2.8<L>  /  TBili  0.4  /  DBili  x   /  AST  11  /  ALT  14  /  AlkPhos  70  10    Creatinine Trend: 1.13<--, 1.29<--, 1.34<--, 1.28<--, 0.99<--, 1.04<--  LIVER FUNCTIONS - ( 03 Oct 2018 00:14 )  Alb: 2.8 g/dL / Pro: 5.6 g/dL / ALK PHOS: 70 U/L / ALT: 14 U/L / AST: 11 U/L / GGT: x           PT/INR - ( 03 Oct 2018 00:14 )   PT: 11.4 sec;   INR: 1.04 ratio    PTT - ( 03 Oct 2018 00:14 )  PTT:59.4 sec    ABG - ( 01 Oct 2018 19:59 )  pH, Arterial: 7.36  pH, Blood: x     /  pCO2: 46    /  pO2: 130   / HCO3: 26    / Base Excess: .5    /  SaO2: 99        Lactate, Blood: 0.7 mmol/L (10.03.18 @ 00:14)    Urinalysis Basic - ( 03 Oct 2018 00:14 )    Color: Yellow / Appearance: Turbid / S.020 / pH: x  Gluc: x / Ketone: Negative  / Bili: Negative / Urobili: Negative   Blood: x / Protein: 100 mg/dL / Nitrite: Negative   Leuk Esterase: Large / RBC: 28 /hpf / WBC 1270 /hpf   Sq Epi: x / Non Sq Epi: 7 /hpf / Bacteria: Negative    MICROBIOLOGY:    IMAGING:    RADIOLOGY & ADDITIONAL TESTS: Reviewed. INTERVAL HPI/OVERNIGHT EVENTS: pt was tachypneic and was given midazolam 2 mg with not much improvement. and was given 2 doses of 25mcg of fentanyl and dexmedetomidine was increased but has since been decreased. pt was also febrile overnight and was given tylenol. UA was grossly positive. tube feeds were stopped at midnight and hep drip held at 7am for trach at 1pm.    SUBJECTIVE: Patient seen and examined at bedside.     OBJECTIVE:    VITAL SIGNS:  ICU Vital Signs Last 24 Hrs  T(C): 37.4 (03 Oct 2018 04:00), Max: 38.6 (02 Oct 2018 23:15)  T(F): 99.3 (03 Oct 2018 04:00), Max: 101.5 (02 Oct 2018 23:15)  HR: 59 (03 Oct 2018 07:00) (59 - 71)  BP: 101/49 (03 Oct 2018 07:00) (79/43 - 154/70)  BP(mean): 71 (03 Oct 2018 07:00) (52 - 100)  RR: 24 (03 Oct 2018 07:00) (24 - 40)  SpO2: 100% (03 Oct 2018 07:00) (97% - 100%)    Mode: AC/ CMV (Assist Control/ Continuous Mandatory Ventilation), RR (machine): 24, TV (machine): 400, FiO2: 30, PEEP: 5, ITime: 1, MAP: 10, PIP: 25    10-02 @ 07:01  -  10-03 @ 07:00  --------------------------------------------------------  IN: 2030.3 mL / OUT: 2045 mL / NET: -14.7 mL    CAPILLARY BLOOD GLUCOSE  POCT Blood Glucose.: 193 mg/dL (02 Oct 2018 22:15)    PHYSICAL EXAM:  General: elderly woman, intubated and sedated  HEENT: NC/AT, PERRL  Respiratory: breath sounds bilaterally  Cardiovascular: normal rate, systolic murmur  Abdomen: soft, nondistended  Extremities: 2+ peripheral pulses bilaterally, minimal LE edema  Skin: PIVs, no pressure ulcer  Neurological: sedated    LINES: PIVs, tejada catheter    MEDICATIONS  (STANDING):  ALBUTerol/ipratropium for Nebulization 3 milliLiter(s) Nebulizer every 6 hours  aspirin  chewable 81 milliGRAM(s) Oral daily  heparin  Infusion. 900 Unit(s)/Hr (9 mL/Hr) IV Continuous <Continuous>  phenylephrine    Infusion 1 MICROgram(s)/kG/Min (23.587 mL/Hr) IV Continuous <Continuous>  dexmedetomidine Infusion 0.01 MICROgram(s)/kG/Hr (0.157 mL/Hr) IV Continuous <Continuous>  propofol Infusion 5 MICROgram(s)/kG/Min (1.887 mL/Hr) IV Continuous <Continuous>  senna 2 Tablet(s) Oral at bedtime  pantoprazole  Injectable 40 milliGRAM(s) IV Push every 12 hours  latanoprost 0.005% Ophthalmic Solution 1 Drop(s) Both EYES at bedtime    ALLERGIES:  penicillin (Rash)    LABS:                        7.9    14.2  )-----------( 173      ( 03 Oct 2018 00:14 )             24.5     Mean Cell Volume : 92.8 fl  Mean Cell Hemoglobin : 30.0 pg  Mean Cell Hemoglobin Concentration : 32.3 gm/dL    10-03    141  |  108  |  62<H>  ----------------------------<  154<H>  4.7   |  22  |  1.13    Ca    9.4      03 Oct 2018 00:14  Phos  3.3     10-  Mg     2.4     10-    TPro  5.6<L>  /  Alb  2.8<L>  /  TBili  0.4  /  DBili  x   /  AST  11  /  ALT  14  /  AlkPhos  70  10-    Creatinine Trend: 1.13<--, 1.29<--, 1.34<--, 1.28<--, 0.99<--, 1.04<--  LIVER FUNCTIONS - ( 03 Oct 2018 00:14 )  Alb: 2.8 g/dL / Pro: 5.6 g/dL / ALK PHOS: 70 U/L / ALT: 14 U/L / AST: 11 U/L / GGT: x           PT/INR - ( 03 Oct 2018 00:14 )   PT: 11.4 sec;   INR: 1.04 ratio    PTT - ( 03 Oct 2018 00:14 )  PTT:59.4 sec    ABG - ( 01 Oct 2018 19:59 )  pH, Arterial: 7.36  pH, Blood: x     /  pCO2: 46    /  pO2: 130   / HCO3: 26    / Base Excess: .5    /  SaO2: 99        Lactate, Blood: 0.7 mmol/L (10.03.18 @ 00:14)    Urinalysis Basic - ( 03 Oct 2018 00:14 )    Color: Yellow / Appearance: Turbid / S.020 / pH: x  Gluc: x / Ketone: Negative  / Bili: Negative / Urobili: Negative   Blood: x / Protein: 100 mg/dL / Nitrite: Negative   Leuk Esterase: Large / RBC: 28 /hpf / WBC 1270 /hpf   Sq Epi: x / Non Sq Epi: 7 /hpf / Bacteria: Negative    MICROBIOLOGY:    RADIOLOGY & ADDITIONAL TESTS: Reviewed.

## 2018-10-03 NOTE — PROGRESS NOTE ADULT - ASSESSMENT
85 y/o F w/ a PMH significant for COPD, JENNIE on BiPAP, multivalvular disease (severe AS, s/p MV replacement), HFpEF/severe diastolic failure, A-fib on coumadin, sick sinus syndrome s/p pacemaker placement, HTN, and CKD3 who was admitted for acute respiratory failure requiring intubation suspected to be 2/2 COPD exacerbation c/b PNA w/ +entero/rhinovirus and GN coccobacillus and H. influenza bacteremia, requiring continued respiratory support and tracheostomy.    #Neuro  - sedated on dexmedetomidine and propofol after intubation  - d/c melatonin    #CV  -HFpEF (40-50%) w/ severe diastolic failure  -multivalvular disease: MV replacement, severe AS  - Afib, SSS with PPM  -HTN: hold diltiazem and hydralazine as pt is currently on vasopressin  - continue to monitor BP    #Resp  -+enterovirus on RVP with +H.influenza bacteremia, multi-focal PNA  -9/28 CXR:  Ill-defined density seen in the left lung and the right middle lobe compatible with multifocal pneumonia.  -s/p steroid taper  -currently intubated  -c/w nebs  -scheduled for tracheostomy at 1pm    #GI  -will restart tube feeds  -will hold coumadin as pt has anemia with history of AVM/GI bleed  -PPI ppx  -GI consulted for PEG placement, may request surgery to place PEG at same time as trach to prevent 2nd procedure  -can continue with bowel regimen    #Renal  -LATANYA, likely 2/2 sepsis + HTN urgency; resolved with Cr at baseline  -pt had retention and tejada catheter was placed  -will continue to monitor I&O's    #ID  - +entero/rhinovirus  - GN coccobacillus (H. influenzae) on 9/14 blood culture.  s/p 14 days of abx for H. Influenza pneumonia and bacteremia.   - UClx (9/14): NGTD  - Sputum Clx (9/14): No organisms, repeat combicath (9/21): normal respiratory chastity  - repeat blood cx from 9/17, 9/21, 9/28: NGTD  - legionella negative  - +UA and febrile  - will restart abx    #Heme:  -DIC panel negative  -LFTs no longer mildly elevated  -H/H drop; CT A/P 9/20 was negative for acute bleeding, 10/1 bedside FAST negative, no signs of acute bleed, hgb has been stable in 7's s/p 1 unit PRBC  - held hep drip at 7am for surgery and will restart once tracheostomy is in place  - DVT ppx: on hep ggt    #Endo:  - cont SSI    #Ophtho  -c/w home latanoprost drops for glaucoma 85 y/o F w/ a PMH significant for COPD, JENNIE on BiPAP, multivalvular disease (severe AS, s/p MV replacement), HFpEF/severe diastolic failure, A-fib on coumadin, sick sinus syndrome s/p pacemaker placement, HTN, and CKD3 who was admitted for acute respiratory failure requiring intubation suspected to be 2/2 COPD exacerbation c/b PNA w/ +entero/rhinovirus and GN coccobacillus and H. influenza bacteremia, requiring continued respiratory support and tracheostomy.    #Neuro  - sedated on dexmedetomidine and propofol after intubation  - d/c melatonin    #CV  -HFpEF (40-50%) w/ severe diastolic failure  -multivalvular disease: MV replacement, severe AS  - Afib, SSS with PPM  -HTN: hold diltiazem and hydralazine as pt is currently on vasopressin  - continue to monitor BP    #Resp  -+enterovirus on RVP with +H.influenza bacteremia, multi-focal PNA  -9/28 CXR:  Ill-defined density seen in the left lung and the right middle lobe compatible with multifocal pneumonia.  -s/p steroid taper  -currently intubated  -c/w nebs  -scheduled for tracheostomy at 1pm    #GI  -will restart tube feeds  -will hold coumadin as pt has anemia with history of AVM/GI bleed  -PPI ppx  -GI consulted for PEG placement, may request surgery to place PEG at same time as trach to prevent 2nd procedure  -can continue with bowel regimen    #Renal  -LATANYA, likely 2/2 sepsis + HTN urgency; resolved with Cr at baseline  -pt had retention and tejada catheter was placed  -will continue to monitor I&O's    #ID  - +entero/rhinovirus  - GN coccobacillus (H. influenzae) on 9/14 blood culture.  s/p 14 days of abx for H. Influenza pneumonia and bacteremia.   - UClx (9/14): NGTD  - Sputum Clx (9/14): No organisms, repeat combicath (9/21): normal respiratory chastity  - repeat blood cx from 9/17, 9/21, 9/28: NGTD  - legionella negative  - +UA and febrile  - f/u CXR and procalcitonin  - will restart abx    #Heme:  -DIC panel negative  -LFTs no longer mildly elevated  -H/H drop; CT A/P 9/20 was negative for acute bleeding, 10/1 bedside FAST negative, no signs of acute bleed, hgb has been stable in 7's s/p 1 unit PRBC  - held hep drip at 7am for surgery and will restart once tracheostomy is in place  - DVT ppx: on hep ggt    #Endo:  - cont SSI    #Ophtho  -c/w home latanoprost drops for glaucoma 83 y/o F w/ a PMH significant for COPD, JENNIE on BiPAP, multivalvular disease (severe AS, s/p MV replacement), HFpEF/severe diastolic failure, A-fib on coumadin, sick sinus syndrome s/p pacemaker placement, HTN, and CKD3 who was admitted for acute respiratory failure requiring intubation suspected to be 2/2 COPD exacerbation c/b PNA w/ +entero/rhinovirus and GN coccobacillus and H. influenza bacteremia, requiring continued respiratory support and tracheostomy.    #Neuro  - sedated on dexmedetomidine and propofol after intubation  - will continue sedation for trach and PEG  - d/c melatonin    #CV  -HFpEF (40-50%) w/ severe diastolic failure  -multivalvular disease: MV replacement, severe AS  -Afib, SSS with PPM  -HTN: hold diltiazem and hydralazine as pt is currently on vasopressin  -continue to monitor BP    #Resp  -+enterovirus on RVP with +H.influenza bacteremia, multi-focal PNA  -9/28 CXR:  Ill-defined density seen in the left lung and the right middle lobe compatible with multifocal pneumonia.  -s/p steroid taper  -currently intubated  -c/w nebs  -scheduled for tracheostomy at 1pm  -with fevers and secretions, will send sputum culture    #GI  -will hold coumadin as pt has anemia with history of AVM/GI bleed  -PPI ppx  -will restart tube feeds after tracheostomy  -PEG placement tomorrow per GI, will keep NPO after midnight  -can continue with bowel regimen    #Renal  -LATANYA, likely 2/2 sepsis + HTN urgency; resolved with Cr at baseline  -pt had retention and tejada catheter was placed  -will continue to monitor I&O's    #ID  - +entero/rhinovirus  - GN coccobacillus (H. influenzae) on 9/14 blood culture.  s/p 14 days of abx for H. Influenza pneumonia and bacteremia.   - UClx (9/14): NGTD  - Sputum Clx (9/14): No organisms, repeat combicath (9/21): normal respiratory chastity  - repeat blood cx from 9/17, 9/21, 9/28: NGTD  - legionella negative  - +UA and febrile  - f/u CXR and procalcitonin  - will consider restarting abx if pt continues to have fevers but will wait until cultures result if pt remains afebrile    #Heme:  -DIC panel negative  -LFTs no longer mildly elevated  -H/H drop; CT A/P 9/20 was negative for acute bleeding, 10/1 bedside FAST negative, no signs of acute bleed, hgb has been stable in 7's s/p 1 unit PRBC  - held hep drip at 7am for surgery and will restart once tracheostomy is in place  - DVT ppx: on hep ggt    #Endo:  - cont SSI    #Ophtho  -c/w home latanoprost drops for glaucoma

## 2018-10-03 NOTE — CHART NOTE - NSCHARTNOTEFT_GEN_A_CORE
: Ronna Amezcua    INDICATION: Fever    PROCEDURE:  [x] LIMITED ECHO  [x] LIMITED CHEST  [ ] LIMITED RETROPERITONEAL  [x] LIMITED ABDOMINAL  [x] LIMITED DVT  [ ] NEEDLE GUIDANCE VASCULAR  [ ] NEEDLE GUIDANCE THORACENTESIS  [ ] NEEDLE GUIDANCE PARACENTESIS  [ ] NEEDLE GUIDANCE PERICARDIOCENTESIS  [ ] OTHER    FINDINGS:    ECHO: Normal LV function, no pericardial effusion, no RV enlargement,   CHEST: Bilateral A line pattern with occasional B lines, small bi-lateral pleural effusions with compressive atelectasis  ABDOMINAL: Stomach with no gastric contents. No ascites.   DVT: No DVT seen on bilateral LE DVT study.     INTERPRETATION:  Small bilateral pleural effusions  No LE DVTs

## 2018-10-03 NOTE — PROGRESS NOTE ADULT - SUBJECTIVE AND OBJECTIVE BOX
S: Patient underwent tracheostomy placement and tolerated procedure without issue and sent to PACU. Pt is intubated on a vent.     O:T(C): 36 (10-03-18 @ 16:00), Max: 36.6 (10-03-18 @ 12:00)  HR: 59 (10-03-18 @ 16:45) (59 - 72)  BP: 156/64 (10-03-18 @ 16:30) (85/43 - 166/69)  RR: 24 (10-03-18 @ 16:45) (24 - 24)  SpO2: 100% (10-03-18 @ 16:45) (99% - 100%)  Wt(kg): --                        7.9    14.2  )-----------( 173      ( 03 Oct 2018 00:14 )             24.5        10-03    141  |  108  |  62<H>  ----------------------------<  154<H>  4.7   |  22  |  1.13    Ca    9.4      03 Oct 2018 00:14  Phos  3.3     10-03  Mg     2.4     10-03      Gen: NAD  Abd: Soft, nontender, nondistended.  No palpable masses.         Assessment/Plan:  84y Female s/p Tracheostomy  Intubation of trachea  Central line placement    Pain control  Reg Diet  DVT PPX  Out of bed and encourage early ambulation  Incentive spirometry. S: Patient underwent tracheostomy placement and tolerated procedure without issue and sent to PACU. Pt is intubated on a vent.     O:T(C): 36 (10-03-18 @ 16:00), Max: 36.6 (10-03-18 @ 12:00)  HR: 59 (10-03-18 @ 16:45) (59 - 72)  BP: 156/64 (10-03-18 @ 16:30) (85/43 - 166/69)  RR: 24 (10-03-18 @ 16:45) (24 - 24)  SpO2: 100% (10-03-18 @ 16:45) (99% - 100%)  Wt(kg): --                        7.9    14.2  )-----------( 173      ( 03 Oct 2018 00:14 )             24.5        10-03    141  |  108  |  62<H>  ----------------------------<  154<H>  4.7   |  22  |  1.13    Ca    9.4      03 Oct 2018 00:14  Phos  3.3     10-03  Mg     2.4     10-03      Gen: NAD  HEENT: Neck with tracheostomy in place. Small amount of blood on the bandage on trach site w/o oozing. No palpable masses/hematomas.         Assessment/Plan:  84y Female s/p Tracheostomy    Ok to resume TFs  Ok to resume hep gtt  Pain control  Further care per MICU appreciated S: Patient underwent tracheostomy placement and tolerated procedure without issue and sent to SICU. Pt is intubated on a vent.     O:T(C): 36 (10-03-18 @ 16:00), Max: 36.6 (10-03-18 @ 12:00)  HR: 59 (10-03-18 @ 16:45) (59 - 72)  BP: 156/64 (10-03-18 @ 16:30) (85/43 - 166/69)  RR: 24 (10-03-18 @ 16:45) (24 - 24)  SpO2: 100% (10-03-18 @ 16:45) (99% - 100%)  Wt(kg): --                        7.9    14.2  )-----------( 173      ( 03 Oct 2018 00:14 )             24.5        10-03    141  |  108  |  62<H>  ----------------------------<  154<H>  4.7   |  22  |  1.13    Ca    9.4      03 Oct 2018 00:14  Phos  3.3     10-03  Mg     2.4     10-03      Gen: NAD  HEENT: Neck with tracheostomy in place. Small amount of blood on the bandage on trach site w/o oozing. No palpable masses/hematomas.         Assessment/Plan:  84y Female s/p Tracheostomy    Ok to resume TFs  Ok to resume hep gtt  Pain control  Further care per MICU appreciated

## 2018-10-04 LAB
ALBUMIN SERPL ELPH-MCNC: 2.5 G/DL — LOW (ref 3.3–5)
ALP SERPL-CCNC: 59 U/L — SIGNIFICANT CHANGE UP (ref 40–120)
ALT FLD-CCNC: 12 U/L — SIGNIFICANT CHANGE UP (ref 10–45)
ANION GAP SERPL CALC-SCNC: 11 MMOL/L — SIGNIFICANT CHANGE UP (ref 5–17)
APTT BLD: 61.9 SEC — HIGH (ref 27.5–37.4)
AST SERPL-CCNC: 11 U/L — SIGNIFICANT CHANGE UP (ref 10–40)
BASE EXCESS BLDA CALC-SCNC: 3.8 MMOL/L — HIGH (ref -2–2)
BILIRUB SERPL-MCNC: 0.8 MG/DL — SIGNIFICANT CHANGE UP (ref 0.2–1.2)
BUN SERPL-MCNC: 45 MG/DL — HIGH (ref 7–23)
CALCIUM SERPL-MCNC: 9.2 MG/DL — SIGNIFICANT CHANGE UP (ref 8.4–10.5)
CHLORIDE SERPL-SCNC: 110 MMOL/L — HIGH (ref 96–108)
CO2 BLDA-SCNC: 28 MMOL/L — SIGNIFICANT CHANGE UP (ref 22–30)
CO2 SERPL-SCNC: 25 MMOL/L — SIGNIFICANT CHANGE UP (ref 22–31)
CREAT SERPL-MCNC: 0.88 MG/DL — SIGNIFICANT CHANGE UP (ref 0.5–1.3)
CULTURE RESULTS: NO GROWTH — SIGNIFICANT CHANGE UP
GAS PNL BLDA: SIGNIFICANT CHANGE UP
GAS PNL BLDA: SIGNIFICANT CHANGE UP
GLUCOSE SERPL-MCNC: 132 MG/DL — HIGH (ref 70–99)
HCO3 BLDA-SCNC: 26 MMOL/L — SIGNIFICANT CHANGE UP (ref 21–29)
HCT VFR BLD CALC: 23.7 % — LOW (ref 34.5–45)
HGB BLD-MCNC: 7.7 G/DL — LOW (ref 11.5–15.5)
HOROWITZ INDEX BLDA+IHG-RTO: 30 — SIGNIFICANT CHANGE UP
INR BLD: 1.09 RATIO — SIGNIFICANT CHANGE UP (ref 0.88–1.16)
MAGNESIUM SERPL-MCNC: 2.2 MG/DL — SIGNIFICANT CHANGE UP (ref 1.6–2.6)
MCHC RBC-ENTMCNC: 30 PG — SIGNIFICANT CHANGE UP (ref 27–34)
MCHC RBC-ENTMCNC: 32.4 GM/DL — SIGNIFICANT CHANGE UP (ref 32–36)
MCV RBC AUTO: 92.5 FL — SIGNIFICANT CHANGE UP (ref 80–100)
PCO2 BLDA: 32 MMHG — SIGNIFICANT CHANGE UP (ref 32–46)
PH BLDA: 7.52 — HIGH (ref 7.35–7.45)
PHOSPHATE SERPL-MCNC: 3.5 MG/DL — SIGNIFICANT CHANGE UP (ref 2.5–4.5)
PLATELET # BLD AUTO: 144 K/UL — LOW (ref 150–400)
PO2 BLDA: 97 MMHG — SIGNIFICANT CHANGE UP (ref 74–108)
POTASSIUM SERPL-MCNC: 4.3 MMOL/L — SIGNIFICANT CHANGE UP (ref 3.5–5.3)
POTASSIUM SERPL-SCNC: 4.3 MMOL/L — SIGNIFICANT CHANGE UP (ref 3.5–5.3)
PROCALCITONIN SERPL-MCNC: 0.17 NG/ML — HIGH (ref 0.02–0.1)
PROT SERPL-MCNC: 5.3 G/DL — LOW (ref 6–8.3)
PROTHROM AB SERPL-ACNC: 11.8 SEC — SIGNIFICANT CHANGE UP (ref 9.8–12.7)
RBC # BLD: 2.56 M/UL — LOW (ref 3.8–5.2)
RBC # FLD: 19.2 % — HIGH (ref 10.3–14.5)
SAO2 % BLDA: 99 % — HIGH (ref 92–96)
SODIUM SERPL-SCNC: 146 MMOL/L — HIGH (ref 135–145)
SPECIMEN SOURCE: SIGNIFICANT CHANGE UP
WBC # BLD: 9.8 K/UL — SIGNIFICANT CHANGE UP (ref 3.8–10.5)
WBC # FLD AUTO: 9.8 K/UL — SIGNIFICANT CHANGE UP (ref 3.8–10.5)

## 2018-10-04 PROCEDURE — 76705 ECHO EXAM OF ABDOMEN: CPT | Mod: 26

## 2018-10-04 PROCEDURE — 43246 EGD PLACE GASTROSTOMY TUBE: CPT

## 2018-10-04 PROCEDURE — 93971 EXTREMITY STUDY: CPT | Mod: 26

## 2018-10-04 PROCEDURE — 76604 US EXAM CHEST: CPT | Mod: 26

## 2018-10-04 PROCEDURE — 71045 X-RAY EXAM CHEST 1 VIEW: CPT | Mod: 26

## 2018-10-04 PROCEDURE — 93308 TTE F-UP OR LMTD: CPT | Mod: 26

## 2018-10-04 PROCEDURE — 99291 CRITICAL CARE FIRST HOUR: CPT | Mod: 25

## 2018-10-04 RX ORDER — HEPARIN SODIUM 5000 [USP'U]/ML
900 INJECTION INTRAVENOUS; SUBCUTANEOUS
Qty: 25000 | Refills: 0 | Status: DISCONTINUED | OUTPATIENT
Start: 2018-10-04 | End: 2018-10-05

## 2018-10-04 RX ORDER — MIDAZOLAM HYDROCHLORIDE 1 MG/ML
2 INJECTION, SOLUTION INTRAMUSCULAR; INTRAVENOUS ONCE
Qty: 0 | Refills: 0 | Status: DISCONTINUED | OUTPATIENT
Start: 2018-10-04 | End: 2018-10-04

## 2018-10-04 RX ORDER — HEPARIN SODIUM 5000 [USP'U]/ML
2500 INJECTION INTRAVENOUS; SUBCUTANEOUS EVERY 6 HOURS
Qty: 0 | Refills: 0 | Status: DISCONTINUED | OUTPATIENT
Start: 2018-10-04 | End: 2018-10-04

## 2018-10-04 RX ORDER — FENTANYL CITRATE 50 UG/ML
100 INJECTION INTRAVENOUS ONCE
Qty: 0 | Refills: 0 | Status: DISCONTINUED | OUTPATIENT
Start: 2018-10-04 | End: 2018-10-04

## 2018-10-04 RX ORDER — HEPARIN SODIUM 5000 [USP'U]/ML
5000 INJECTION INTRAVENOUS; SUBCUTANEOUS EVERY 6 HOURS
Qty: 0 | Refills: 0 | Status: DISCONTINUED | OUTPATIENT
Start: 2018-10-04 | End: 2018-10-05

## 2018-10-04 RX ORDER — FENTANYL CITRATE 50 UG/ML
25 INJECTION INTRAVENOUS ONCE
Qty: 0 | Refills: 0 | Status: DISCONTINUED | OUTPATIENT
Start: 2018-10-04 | End: 2018-10-04

## 2018-10-04 RX ORDER — HEPARIN SODIUM 5000 [USP'U]/ML
5000 INJECTION INTRAVENOUS; SUBCUTANEOUS ONCE
Qty: 0 | Refills: 0 | Status: DISCONTINUED | OUTPATIENT
Start: 2018-10-04 | End: 2018-10-04

## 2018-10-04 RX ORDER — HEPARIN SODIUM 5000 [USP'U]/ML
INJECTION INTRAVENOUS; SUBCUTANEOUS
Qty: 25000 | Refills: 0 | Status: DISCONTINUED | OUTPATIENT
Start: 2018-10-04 | End: 2018-10-04

## 2018-10-04 RX ORDER — MIDAZOLAM HYDROCHLORIDE 1 MG/ML
4 INJECTION, SOLUTION INTRAMUSCULAR; INTRAVENOUS ONCE
Qty: 0 | Refills: 0 | Status: DISCONTINUED | OUTPATIENT
Start: 2018-10-04 | End: 2018-10-04

## 2018-10-04 RX ORDER — VANCOMYCIN HCL 1 G
1000 VIAL (EA) INTRAVENOUS ONCE
Qty: 0 | Refills: 0 | Status: COMPLETED | OUTPATIENT
Start: 2018-10-04 | End: 2018-10-04

## 2018-10-04 RX ORDER — HEPARIN SODIUM 5000 [USP'U]/ML
5000 INJECTION INTRAVENOUS; SUBCUTANEOUS EVERY 6 HOURS
Qty: 0 | Refills: 0 | Status: DISCONTINUED | OUTPATIENT
Start: 2018-10-04 | End: 2018-10-04

## 2018-10-04 RX ORDER — HEPARIN SODIUM 5000 [USP'U]/ML
2500 INJECTION INTRAVENOUS; SUBCUTANEOUS EVERY 6 HOURS
Qty: 0 | Refills: 0 | Status: DISCONTINUED | OUTPATIENT
Start: 2018-10-04 | End: 2018-10-05

## 2018-10-04 RX ADMIN — PANTOPRAZOLE SODIUM 40 MILLIGRAM(S): 20 TABLET, DELAYED RELEASE ORAL at 17:35

## 2018-10-04 RX ADMIN — HEPARIN SODIUM 900 UNIT(S)/HR: 5000 INJECTION INTRAVENOUS; SUBCUTANEOUS at 17:32

## 2018-10-04 RX ADMIN — MIDAZOLAM HYDROCHLORIDE 2 MILLIGRAM(S): 1 INJECTION, SOLUTION INTRAMUSCULAR; INTRAVENOUS at 15:50

## 2018-10-04 RX ADMIN — HEPARIN SODIUM 900 UNIT(S)/HR: 5000 INJECTION INTRAVENOUS; SUBCUTANEOUS at 00:37

## 2018-10-04 RX ADMIN — DEXMEDETOMIDINE HYDROCHLORIDE IN 0.9% SODIUM CHLORIDE 3.15 MICROGRAM(S)/KG/HR: 4 INJECTION INTRAVENOUS at 12:30

## 2018-10-04 RX ADMIN — FENTANYL CITRATE 25 MICROGRAM(S): 50 INJECTION INTRAVENOUS at 16:36

## 2018-10-04 RX ADMIN — PROPOFOL 3.77 MICROGRAM(S)/KG/MIN: 10 INJECTION, EMULSION INTRAVENOUS at 10:00

## 2018-10-04 RX ADMIN — Medication 250 MILLIGRAM(S): at 15:50

## 2018-10-04 RX ADMIN — PANTOPRAZOLE SODIUM 40 MILLIGRAM(S): 20 TABLET, DELAYED RELEASE ORAL at 06:57

## 2018-10-04 RX ADMIN — FENTANYL CITRATE 25 MICROGRAM(S): 50 INJECTION INTRAVENOUS at 16:38

## 2018-10-04 RX ADMIN — PROPOFOL 3.77 MICROGRAM(S)/KG/MIN: 10 INJECTION, EMULSION INTRAVENOUS at 16:37

## 2018-10-04 RX ADMIN — DEXMEDETOMIDINE HYDROCHLORIDE IN 0.9% SODIUM CHLORIDE 3.15 MICROGRAM(S)/KG/HR: 4 INJECTION INTRAVENOUS at 16:37

## 2018-10-04 RX ADMIN — PROPOFOL 3.77 MICROGRAM(S)/KG/MIN: 10 INJECTION, EMULSION INTRAVENOUS at 00:38

## 2018-10-04 RX ADMIN — Medication 3 MILLILITER(S): at 05:36

## 2018-10-04 RX ADMIN — LATANOPROST 1 DROP(S): 0.05 SOLUTION/ DROPS OPHTHALMIC; TOPICAL at 23:56

## 2018-10-04 RX ADMIN — Medication 3 MILLILITER(S): at 17:53

## 2018-10-04 RX ADMIN — Medication 3 MILLILITER(S): at 11:25

## 2018-10-04 RX ADMIN — DEXMEDETOMIDINE HYDROCHLORIDE IN 0.9% SODIUM CHLORIDE 3.15 MICROGRAM(S)/KG/HR: 4 INJECTION INTRAVENOUS at 00:38

## 2018-10-04 NOTE — PROGRESS NOTE ADULT - SUBJECTIVE AND OBJECTIVE BOX
INTERVAL HPI/OVERNIGHT EVENTS: no acute overnight events. pt's was NPO after midnight. hep held at 4am for PEG placement.    SUBJECTIVE: Patient seen and examined at bedside.     OBJECTIVE:    VITAL SIGNS:  ICU Vital Signs Last 24 Hrs  T(C): 36.8 (04 Oct 2018 04:00), Max: 36.8 (04 Oct 2018 04:00)  T(F): 98.3 (04 Oct 2018 04:00), Max: 98.3 (04 Oct 2018 04:00)  HR: 59 (04 Oct 2018 06:45) (59 - 75)  BP: 98/48 (04 Oct 2018 06:45) (85/43 - 166/69)  BP(mean): 69 (04 Oct 2018 06:45) (61 - 99)  RR: 24 (04 Oct 2018 06:45) (24 - 48)  SpO2: 100% (04 Oct 2018 06:45) (99% - 100%)    Mode: AC/ CMV (Assist Control/ Continuous Mandatory Ventilation), RR (machine): 24, TV (machine): 400, FiO2: 30, PEEP: 5, ITime: 1, MAP: 11, PIP: 26    10-03 @ 07:01  -  10-04 @ 07:00  --------------------------------------------------------  IN: 524.1 mL / OUT: 2405 mL / NET: -1880.9 mL    CAPILLARY BLOOD GLUCOSE  POCT Blood Glucose.: 130 mg/dL (03 Oct 2018 18:41)    PHYSICAL EXAM:  General:   HEENT:   Respiratory:   Cardiovascular:   Abdomen:   Extremities:   Skin:   Neurological:    LINES:     MEDICATIONS  (STANDING):  ALBUTerol/ipratropium for Nebulization 3 milliLiter(s) Nebulizer every 6 hours  aspirin  chewable 81 milliGRAM(s) Oral daily  heparin  Infusion. 900 Unit(s)/Hr (9 mL/Hr) IV Continuous <Continuous>  phenylephrine    Infusion 0.1 MICROgram(s)/kG/Min (2.359 mL/Hr) IV Continuous <Continuous>  dexmedetomidine Infusion 0.2 MICROgram(s)/kG/Hr (3.145 mL/Hr) IV Continuous <Continuous>  propofol Infusion 10 MICROgram(s)/kG/Min (3.774 mL/Hr) IV Continuous <Continuous>  pantoprazole  Injectable 40 milliGRAM(s) IV Push two times a day  senna 2 Tablet(s) Oral at bedtime  latanoprost 0.005% Ophthalmic Solution 1 Drop(s) Both EYES at bedtime    ALLERGIES:  penicillin (Rash)    LABS:                      7.7    9.8   )-----------( 144      ( 03 Oct 2018 23:55 )             23.7     Mean Cell Volume : 92.5 fl  Mean Cell Hemoglobin : 30.0 pg  Mean Cell Hemoglobin Concentration : 32.4 gm/dL    10-03    146<H>  |  110<H>  |  45<H>  ----------------------------<  132<H>  4.3   |  25  |  0.88    Ca    9.2      03 Oct 2018 23:55  Phos  3.5     10  Mg     2.2     10-03    TPro  5.3<L>  /  Alb  2.5<L>  /  TBili  0.8  /  DBili  x   /  AST  11  /  ALT  12  /  AlkPhos  59  1003    Creatinine Trend: 0.88<--, 1.13<--, 1.29<--, 1.34<--, 1.28<--, 0.99<--    LIVER FUNCTIONS - ( 03 Oct 2018 23:55 )  Alb: 2.5 g/dL / Pro: 5.3 g/dL / ALK PHOS: 59 U/L / ALT: 12 U/L / AST: 11 U/L / GGT: x           PT/INR - ( 03 Oct 2018 23:55 )   PT: 11.8 sec;   INR: 1.09 ratio    PTT - ( 03 Oct 2018 23:55 )  PTT:61.9 sec    Procalcitonin, Serum: 0.17  (10.03.18 @ 23:55)    Urinalysis Basic - ( 03 Oct 2018 00:14 )  Color: Yellow / Appearance: Turbid / S.020 / pH: x  Gluc: x / Ketone: Negative  / Bili: Negative / Urobili: Negative   Blood: x / Protein: 100 mg/dL / Nitrite: Negative   Leuk Esterase: Large / RBC: 28 /hpf / WBC 1270 /hpf   Sq Epi: x / Non Sq Epi: 7 /hpf / Bacteria: Negative    MICROBIOLOGY:    Culture - Sputum (collected 03 Oct 2018 16:29)  Source: .Sputum Sputum, trap  Gram Stain (03 Oct 2018 23:08):    Moderate polymorphonuclear leukocytes per low power field    Rare Squamous epithelial cells per low power field    No organisms seen per oil power field    Culture - Blood (collected 03 Oct 2018 03:28)  Source: .Blood Blood-Peripheral  Preliminary Report (04 Oct 2018 04:01):    No growth to date.    Culture - Blood (collected 03 Oct 2018 03:28)  Source: .Blood Blood-Peripheral  Preliminary Report (04 Oct 2018 04:01):    No growth to date.    RADIOLOGY & ADDITIONAL TESTS: Reviewed. INTERVAL HPI/OVERNIGHT EVENTS: no acute overnight events. pt's was NPO after midnight. hep held at 4am for PEG placement.    SUBJECTIVE: Patient seen and examined at bedside.     OBJECTIVE:    VITAL SIGNS:  ICU Vital Signs Last 24 Hrs  T(C): 36.8 (04 Oct 2018 04:00), Max: 36.8 (04 Oct 2018 04:00)  T(F): 98.3 (04 Oct 2018 04:00), Max: 98.3 (04 Oct 2018 04:00)  HR: 59 (04 Oct 2018 06:45) (59 - 75)  BP: 98/48 (04 Oct 2018 06:45) (85/43 - 166/69)  BP(mean): 69 (04 Oct 2018 06:45) (61 - 99)  RR: 24 (04 Oct 2018 06:45) (24 - 48)  SpO2: 100% (04 Oct 2018 06:45) (99% - 100%)    Mode: AC/ CMV (Assist Control/ Continuous Mandatory Ventilation), RR (machine): 24, TV (machine): 400, FiO2: 30, PEEP: 5, ITime: 1, MAP: 11, PIP: 26    10-03 @ 07:01  -  10-04 @ 07:00  --------------------------------------------------------  IN: 524.1 mL / OUT: 2405 mL / NET: -1880.9 mL    CAPILLARY BLOOD GLUCOSE  POCT Blood Glucose.: 130 mg/dL (03 Oct 2018 18:41)    PHYSICAL EXAM:  General: sedated elderly woman  HEENT: NC/AT PERRL, trach sutured in place,  Respiratory: ventilated breath sounds  Cardiovascular: normal s1, s2, systolic murmur  Abdomen: soft, nondistended  Extremities: 2+ pulses bilaterally, minimal extremity edema  Skin: PIVs  Neurological: currently sedated    LINES: PIVs, tejada cath    MEDICATIONS  (STANDING):  ALBUTerol/ipratropium for Nebulization 3 milliLiter(s) Nebulizer every 6 hours  aspirin  chewable 81 milliGRAM(s) Oral daily  heparin  Infusion. 900 Unit(s)/Hr (9 mL/Hr) IV Continuous <Continuous>  dexmedetomidine Infusion 0.2 MICROgram(s)/kG/Hr (3.145 mL/Hr) IV Continuous <Continuous>  propofol Infusion 10 MICROgram(s)/kG/Min (3.774 mL/Hr) IV Continuous <Continuous>  pantoprazole  Injectable 40 milliGRAM(s) IV Push two times a day  senna 2 Tablet(s) Oral at bedtime  latanoprost 0.005% Ophthalmic Solution 1 Drop(s) Both EYES at bedtime    ALLERGIES:  penicillin (Rash)    LABS:                      7.7    9.8   )-----------( 144      ( 03 Oct 2018 23:55 )             23.7     Mean Cell Volume : 92.5 fl  Mean Cell Hemoglobin : 30.0 pg  Mean Cell Hemoglobin Concentration : 32.4 gm/dL    10-03    146<H>  |  110<H>  |  45<H>  ----------------------------<  132<H>  4.3   |  25  |  0.88    Ca    9.2      03 Oct 2018 23:55  Phos  3.5     10-03  Mg     2.2     10-03    TPro  5.3<L>  /  Alb  2.5<L>  /  TBili  0.8  /  DBili  x   /  AST  11  /  ALT  12  /  AlkPhos  59  10-03    Creatinine Trend: 0.88<--, 1.13<--, 1.29<--, 1.34<--, 1.28<--, 0.99<--    LIVER FUNCTIONS - ( 03 Oct 2018 23:55 )  Alb: 2.5 g/dL / Pro: 5.3 g/dL / ALK PHOS: 59 U/L / ALT: 12 U/L / AST: 11 U/L / GGT: x           PT/INR - ( 03 Oct 2018 23:55 )   PT: 11.8 sec;   INR: 1.09 ratio    PTT - ( 03 Oct 2018 23:55 )  PTT:61.9 sec    Procalcitonin, Serum: 0.17  (10.03.18 @ 23:55)    Urinalysis Basic - ( 03 Oct 2018 00:14 )  Color: Yellow / Appearance: Turbid / S.020 / pH: x  Gluc: x / Ketone: Negative  / Bili: Negative / Urobili: Negative   Blood: x / Protein: 100 mg/dL / Nitrite: Negative   Leuk Esterase: Large / RBC: 28 /hpf / WBC 1270 /hpf   Sq Epi: x / Non Sq Epi: 7 /hpf / Bacteria: Negative    MICROBIOLOGY:    Culture - Sputum (collected 03 Oct 2018 16:29)  Source: .Sputum Sputum, trap  Gram Stain (03 Oct 2018 23:08):    Moderate polymorphonuclear leukocytes per low power field    Rare Squamous epithelial cells per low power field    No organisms seen per oil power field    Culture - Blood (collected 03 Oct 2018 03:28)  Source: .Blood Blood-Peripheral  Preliminary Report (04 Oct 2018 04:01):    No growth to date.    Culture - Blood (collected 03 Oct 2018 03:28)  Source: .Blood Blood-Peripheral  Preliminary Report (04 Oct 2018 04:01):    No growth to date.    RADIOLOGY & ADDITIONAL TESTS: Reviewed.

## 2018-10-04 NOTE — PROGRESS NOTE ADULT - ASSESSMENT
83 y/o F w/ a PMH significant for COPD, JENNIE on BiPAP, multivalvular disease (severe AS, s/p MV replacement), HFpEF/severe diastolic failure, A-fib on coumadin, sick sinus syndrome s/p pacemaker placement, HTN, and CKD3 who was admitted for acute respiratory failure requiring intubation suspected to be 2/2 COPD exacerbation c/b PNA w/ +entero/rhinovirus and GN coccobacillus and H. influenza bacteremia, requiring continued respiratory support and tracheostomy.    #Neuro  - sedated on dexmedetomidine and propofol after intubation  - will continue sedation for PEG    #CV  -HFpEF (40-50%) w/ severe diastolic failure  -multivalvular disease: MV replacement, severe AS  -Afib, SSS with PPM  -HTN: hold diltiazem and hydralazine  -no longer requires vasopressin  -continue to monitor BP and restart HTN medications based upon BP  -Hep ggt for MV replacement held at 4am for PEG placement and will restart once procedure is finished per GI    #Resp  -+enterovirus on RVP with +H.influenza bacteremia, multi-focal PNA  -9/28 CXR:  Ill-defined density seen in the left lung and the right middle lobe compatible with multifocal pneumonia.  -s/p steroid taper  -currently s/p trach  -c/w nebs  -f/u sputum culture (10/3) and repeat CXR    #GI  -will hold coumadin as pt has anemia with history of AVM/GI bleed  -PPI ppx  -PEG placement today per GI  -can continue with bowel regimen  -will restart tube feeds after PEG    #Renal  -LATANYA, likely 2/2 sepsis + HTN urgency; resolved with Cr at baseline  -pt had retention and tejada catheter was placed  -will continue to monitor I&O's    #ID  - +entero/rhinovirus  - GN coccobacillus (H. influenzae) on 9/14 blood culture.  s/p 14 days of abx for H. Influenza pneumonia and bacteremia.   - UClx (9/14): NGTD  - Sputum Clx (9/14): No organisms, repeat combicath (9/21): normal respiratory chastity  - repeat blood cx from 9/17, 9/21, 9/28: NGTD  - legionella negative  - f/u sputum, blood, and urine culture (10/3)  - will restarting abx today as pt has repeated fevers    #Heme:  -DIC panel negative  -LFTs no longer mildly elevated  -H/H drop; CT A/P 9/20 was negative for acute bleeding, 10/1 bedside FAST negative, no signs of acute bleed, hgb has been stable in 7's s/p 1 unit PRBC  - held hep drip at 4am for PEG and will restart once PEG is in place with GI recs  - DVT ppx: on hep ggt    #Endo:  - cont SSI    #Ophtho  -c/w home latanoprost drops for glaucoma    #Dispo  -plan to transfer to [facility] after PEG placement 83 y/o F w/ a PMH significant for COPD, JENNIE on BiPAP, multivalvular disease (severe AS, s/p MV replacement), HFpEF/severe diastolic failure, A-fib on coumadin, sick sinus syndrome s/p pacemaker placement, HTN, and CKD3 who was admitted for acute respiratory failure requiring intubation suspected to be 2/2 COPD exacerbation c/b PNA w/ +entero/rhinovirus and GN coccobacillus and H. influenza bacteremia, requiring continued respiratory support and tracheostomy.    #Neuro  - sedated on dexmedetomidine and propofol after intubation  - will continue sedation for PEG and d/c early tomorrow    #CV  -HFpEF (40-50%) w/ severe diastolic failure  -multivalvular disease: MV replacement, severe AS  -Afib, SSS with PPM  -HTN: hold diltiazem and hydralazine  -no longer requires vasopressin  -continue to monitor BP and restart HTN medications based upon BP  -Hep ggt for MV replacement held at 4am for PEG placement and will restart once procedure is finished per GI    #Resp  -+enterovirus on RVP with +H.influenza bacteremia, multi-focal PNA  -9/28 CXR:  Ill-defined density seen in the left lung and the right middle lobe compatible with multifocal pneumonia.  -s/p steroid taper  -currently s/p trach  -c/w nebs  -f/u sputum culture (10/3)    #GI  -will hold coumadin as pt has anemia with history of AVM/GI bleed  -PPI ppx  -PEG placement today per GI  -can continue with bowel regimen  -will restart tube feeds after PEG    #Renal  -LATANYA, likely 2/2 sepsis + HTN urgency; resolved with Cr at baseline  -pt had retention and tejada catheter was placed  -will continue to monitor I&O's  -mild hypernatremia and will give free water via PEG    #ID  - +entero/rhinovirus  - GN coccobacillus (H. influenzae) on 9/14 blood culture.  s/p 14 days of abx for H. Influenza pneumonia and bacteremia.   - UClx (9/14): NGTD  - Sputum Clx (9/14): No organisms, repeat combicath (9/21): normal respiratory chastity  - repeat blood cx from 9/17, 9/21, 9/28: NGTD  - legionella negative  - f/u sputum, blood, and urine culture (10/3)    #Heme:  -DIC panel negative  -LFTs no longer mildly elevated  -H/H drop; CT A/P 9/20 was negative for acute bleeding, 10/1 bedside FAST negative, no signs of acute bleed, hgb has been stable in 7's s/p 1 unit PRBC  - held hep drip at 4am for PEG and will restart once PEG is in place with GI recs  - DVT ppx: on hep ggt    #Endo:  - cont SSI    #Ophtho  -c/w home latanoprost drops for glaucoma    #Dispo  -PT eval  -plan to transfer to [facility] after PEG placement

## 2018-10-04 NOTE — PROGRESS NOTE ADULT - SUBJECTIVE AND OBJECTIVE BOX
S: pt seen and examined. POD1 from tracheostomy.    O;T(C): 36.6 (10-04-18 @ 07:30), Max: 36.8 (10-04-18 @ 04:00)  HR: 59 (10-04-18 @ 10:00) (59 - 75)  BP: 123/60 (10-04-18 @ 10:00) (85/43 - 166/69)  RR: 24 (10-04-18 @ 10:00) (24 - 48)  SpO2: 100% (10-04-18 @ 10:00) (99% - 100%)  Wt(kg): --  10-03 @ 07:01  -  10-04 @ 07:00  --------------------------------------------------------  IN:    dexmedetomidine Infusion: 65.8 mL    dexmedetomidine Infusion: 150.4 mL    heparin  Infusion.: 90 mL    phenylephrine   Infusion: 21.4 mL    phenylephrine   Infusion: 24 mL    propofol Infusion: 120 mL    propofol Infusion: 52.5 mL  Total IN: 524.1 mL    OUT:    Indwelling Catheter - Urethral: 2405 mL  Total OUT: 2405 mL    Total NET: -1880.9 mL      10-04 @ 07:01  -  10-04 @ 10:23  --------------------------------------------------------  IN:    dexmedetomidine Infusion: 37.6 mL    propofol Infusion: 30 mL  Total IN: 67.6 mL    OUT:    Indwelling Catheter - Urethral: 140 mL  Total OUT: 140 mL  Mode: AC/ CMV (Assist Control/ Continuous Mandatory Ventilation)  RR (machine): 24  TV (machine): 400  FiO2: 50  PEEP: 5  ITime: 1  MAP: 11  PIP: 25    Gen: sedated and ventillated with trach  HEENT: trach in place c/d/i, no surrounding swelling, neck soft, no collections    .  LABS:                         7.7    9.8   )-----------( 144      ( 03 Oct 2018 23:55 )             23.7     10-03    146<H>  |  110<H>  |  45<H>  ----------------------------<  132<H>  4.3   |  25  |  0.88    Ca    9.2      03 Oct 2018 23:55  Phos  3.5     10-03  Mg     2.2     10-03    TPro  5.3<L>  /  Alb  2.5<L>  /  TBili  0.8  /  DBili  x   /  AST  11  /  ALT  12  /  AlkPhos  59  10    PT/INR - ( 03 Oct 2018 23:55 )   PT: 11.8 sec;   INR: 1.09 ratio         PTT - ( 03 Oct 2018 23:55 )  PTT:61.9 sec  Urinalysis Basic - ( 03 Oct 2018 00:14 )    Color: Yellow / Appearance: Turbid / S.020 / pH: x  Gluc: x / Ketone: Negative  / Bili: Negative / Urobili: Negative   Blood: x / Protein: 100 mg/dL / Nitrite: Negative   Leuk Esterase: Large / RBC: 28 /hpf / WBC 1270 /hpf   Sq Epi: x / Non Sq Epi: 7 /hpf / Bacteria: Negative            RADIOLOGY, EKG & ADDITIONAL TESTS: Reviewed.   Total NET: -72.4 mL

## 2018-10-04 NOTE — PROGRESS NOTE ADULT - ATTENDING COMMENTS
Critically ill on vent For PEG today  Frequent bedside visits with therapy change today. Crit Care Time Today 35 min +  Patient examined and case reviewed in detail on bedside rounds

## 2018-10-04 NOTE — PROGRESS NOTE ADULT - ASSESSMENT
A/P 84F with hypoxic respiratory failure 2/2 COPD exacerbation requiring ventilator support, now POD1 from tracheostomy.    -tracheostomy is in place and functioning well  -further management of tracheostomy and remaining pt care per primary  -call back with questions #[phone]    Martin Smith MD PGY2

## 2018-10-05 LAB
ALBUMIN SERPL ELPH-MCNC: 2.3 G/DL — LOW (ref 3.3–5)
ALP SERPL-CCNC: 55 U/L — SIGNIFICANT CHANGE UP (ref 40–120)
ALT FLD-CCNC: 10 U/L — SIGNIFICANT CHANGE UP (ref 10–45)
ANION GAP SERPL CALC-SCNC: 7 MMOL/L — SIGNIFICANT CHANGE UP (ref 5–17)
APTT BLD: 104.4 SEC — HIGH (ref 27.5–37.4)
APTT BLD: 108.4 SEC — HIGH (ref 27.5–37.4)
APTT BLD: 31 SEC — SIGNIFICANT CHANGE UP (ref 27.5–37.4)
APTT BLD: 57.7 SEC — HIGH (ref 27.5–37.4)
AST SERPL-CCNC: 11 U/L — SIGNIFICANT CHANGE UP (ref 10–40)
BASE EXCESS BLDA CALC-SCNC: 2.9 MMOL/L — HIGH (ref -2–2)
BILIRUB SERPL-MCNC: 0.7 MG/DL — SIGNIFICANT CHANGE UP (ref 0.2–1.2)
BUN SERPL-MCNC: 41 MG/DL — HIGH (ref 7–23)
CALCIUM SERPL-MCNC: 9 MG/DL — SIGNIFICANT CHANGE UP (ref 8.4–10.5)
CHLORIDE SERPL-SCNC: 110 MMOL/L — HIGH (ref 96–108)
CO2 BLDA-SCNC: 28 MMOL/L — SIGNIFICANT CHANGE UP (ref 22–30)
CO2 SERPL-SCNC: 25 MMOL/L — SIGNIFICANT CHANGE UP (ref 22–31)
CREAT SERPL-MCNC: 0.86 MG/DL — SIGNIFICANT CHANGE UP (ref 0.5–1.3)
CULTURE RESULTS: SIGNIFICANT CHANGE UP
GAS PNL BLDA: SIGNIFICANT CHANGE UP
GLUCOSE BLDC GLUCOMTR-MCNC: 139 MG/DL — HIGH (ref 70–99)
GLUCOSE SERPL-MCNC: 131 MG/DL — HIGH (ref 70–99)
HCO3 BLDA-SCNC: 27 MMOL/L — SIGNIFICANT CHANGE UP (ref 21–29)
HCT VFR BLD CALC: 23.3 % — LOW (ref 34.5–45)
HGB BLD-MCNC: 7.4 G/DL — LOW (ref 11.5–15.5)
HOROWITZ INDEX BLDA+IHG-RTO: 30 — SIGNIFICANT CHANGE UP
INR BLD: 1.04 RATIO — SIGNIFICANT CHANGE UP (ref 0.88–1.16)
MAGNESIUM SERPL-MCNC: 2.3 MG/DL — SIGNIFICANT CHANGE UP (ref 1.6–2.6)
MCHC RBC-ENTMCNC: 29.3 PG — SIGNIFICANT CHANGE UP (ref 27–34)
MCHC RBC-ENTMCNC: 31.6 GM/DL — LOW (ref 32–36)
MCV RBC AUTO: 92.9 FL — SIGNIFICANT CHANGE UP (ref 80–100)
PCO2 BLDA: 41 MMHG — SIGNIFICANT CHANGE UP (ref 32–46)
PH BLDA: 7.43 — SIGNIFICANT CHANGE UP (ref 7.35–7.45)
PHOSPHATE SERPL-MCNC: 3.2 MG/DL — SIGNIFICANT CHANGE UP (ref 2.5–4.5)
PLATELET # BLD AUTO: 118 K/UL — LOW (ref 150–400)
PO2 BLDA: 158 MMHG — HIGH (ref 74–108)
POTASSIUM SERPL-MCNC: 3.8 MMOL/L — SIGNIFICANT CHANGE UP (ref 3.5–5.3)
POTASSIUM SERPL-SCNC: 3.8 MMOL/L — SIGNIFICANT CHANGE UP (ref 3.5–5.3)
PROT SERPL-MCNC: 5.6 G/DL — LOW (ref 6–8.3)
PROTHROM AB SERPL-ACNC: 11.4 SEC — SIGNIFICANT CHANGE UP (ref 9.8–12.7)
RBC # BLD: 2.51 M/UL — LOW (ref 3.8–5.2)
RBC # FLD: 19 % — HIGH (ref 10.3–14.5)
SAO2 % BLDA: 100 % — HIGH (ref 92–96)
SODIUM SERPL-SCNC: 142 MMOL/L — SIGNIFICANT CHANGE UP (ref 135–145)
SPECIMEN SOURCE: SIGNIFICANT CHANGE UP
WBC # BLD: 6.8 K/UL — SIGNIFICANT CHANGE UP (ref 3.8–10.5)
WBC # FLD AUTO: 6.8 K/UL — SIGNIFICANT CHANGE UP (ref 3.8–10.5)

## 2018-10-05 PROCEDURE — 99232 SBSQ HOSP IP/OBS MODERATE 35: CPT

## 2018-10-05 PROCEDURE — 99291 CRITICAL CARE FIRST HOUR: CPT

## 2018-10-05 PROCEDURE — 74018 RADEX ABDOMEN 1 VIEW: CPT | Mod: 26

## 2018-10-05 RX ORDER — BUDESONIDE, MICRONIZED 100 %
0.25 POWDER (GRAM) MISCELLANEOUS EVERY 12 HOURS
Qty: 0 | Refills: 0 | Status: DISCONTINUED | OUTPATIENT
Start: 2018-10-05 | End: 2018-11-02

## 2018-10-05 RX ORDER — DILTIAZEM HCL 120 MG
120 CAPSULE, EXT RELEASE 24 HR ORAL DAILY
Qty: 0 | Refills: 0 | Status: DISCONTINUED | OUTPATIENT
Start: 2018-10-05 | End: 2018-10-05

## 2018-10-05 RX ORDER — BUDESONIDE AND FORMOTEROL FUMARATE DIHYDRATE 160; 4.5 UG/1; UG/1
2 AEROSOL RESPIRATORY (INHALATION)
Qty: 0 | Refills: 0 | Status: DISCONTINUED | OUTPATIENT
Start: 2018-10-05 | End: 2018-10-05

## 2018-10-05 RX ORDER — SODIUM CHLORIDE 9 MG/ML
3 INJECTION INTRAMUSCULAR; INTRAVENOUS; SUBCUTANEOUS
Qty: 0 | Refills: 0 | Status: DISCONTINUED | OUTPATIENT
Start: 2018-10-05 | End: 2018-11-01

## 2018-10-05 RX ORDER — SIMETHICONE 80 MG/1
80 TABLET, CHEWABLE ORAL ONCE
Qty: 0 | Refills: 0 | Status: COMPLETED | OUTPATIENT
Start: 2018-10-05 | End: 2018-10-05

## 2018-10-05 RX ORDER — HEPARIN SODIUM 5000 [USP'U]/ML
800 INJECTION INTRAVENOUS; SUBCUTANEOUS
Qty: 25000 | Refills: 0 | Status: DISCONTINUED | OUTPATIENT
Start: 2018-10-05 | End: 2018-10-06

## 2018-10-05 RX ORDER — FENTANYL CITRATE 50 UG/ML
1 INJECTION INTRAVENOUS
Qty: 0 | Refills: 0 | Status: DISCONTINUED | OUTPATIENT
Start: 2018-10-05 | End: 2018-10-11

## 2018-10-05 RX ORDER — CLONAZEPAM 1 MG
0.5 TABLET ORAL EVERY 12 HOURS
Qty: 0 | Refills: 0 | Status: DISCONTINUED | OUTPATIENT
Start: 2018-10-05 | End: 2018-10-06

## 2018-10-05 RX ORDER — HYDRALAZINE HCL 50 MG
5 TABLET ORAL ONCE
Qty: 0 | Refills: 0 | Status: COMPLETED | OUTPATIENT
Start: 2018-10-05 | End: 2018-10-05

## 2018-10-05 RX ORDER — HEPARIN SODIUM 5000 [USP'U]/ML
900 INJECTION INTRAVENOUS; SUBCUTANEOUS
Qty: 25000 | Refills: 0 | Status: DISCONTINUED | OUTPATIENT
Start: 2018-10-05 | End: 2018-10-05

## 2018-10-05 RX ORDER — HYDRALAZINE HCL 50 MG
25 TABLET ORAL EVERY 8 HOURS
Qty: 0 | Refills: 0 | Status: DISCONTINUED | OUTPATIENT
Start: 2018-10-05 | End: 2018-10-07

## 2018-10-05 RX ADMIN — SENNA PLUS 2 TABLET(S): 8.6 TABLET ORAL at 21:02

## 2018-10-05 RX ADMIN — HEPARIN SODIUM 8 UNIT(S)/HR: 5000 INJECTION INTRAVENOUS; SUBCUTANEOUS at 23:26

## 2018-10-05 RX ADMIN — Medication 3 MILLILITER(S): at 05:15

## 2018-10-05 RX ADMIN — LATANOPROST 1 DROP(S): 0.05 SOLUTION/ DROPS OPHTHALMIC; TOPICAL at 21:03

## 2018-10-05 RX ADMIN — Medication 81 MILLIGRAM(S): at 13:17

## 2018-10-05 RX ADMIN — DEXMEDETOMIDINE HYDROCHLORIDE IN 0.9% SODIUM CHLORIDE 3.15 MICROGRAM(S)/KG/HR: 4 INJECTION INTRAVENOUS at 03:26

## 2018-10-05 RX ADMIN — HEPARIN SODIUM 5000 UNIT(S): 5000 INJECTION INTRAVENOUS; SUBCUTANEOUS at 15:14

## 2018-10-05 RX ADMIN — Medication 0.5 MILLIGRAM(S): at 21:02

## 2018-10-05 RX ADMIN — HEPARIN SODIUM 900 UNIT(S)/HR: 5000 INJECTION INTRAVENOUS; SUBCUTANEOUS at 22:04

## 2018-10-05 RX ADMIN — Medication 3 MILLILITER(S): at 00:03

## 2018-10-05 RX ADMIN — PANTOPRAZOLE SODIUM 40 MILLIGRAM(S): 20 TABLET, DELAYED RELEASE ORAL at 18:18

## 2018-10-05 RX ADMIN — SIMETHICONE 80 MILLIGRAM(S): 80 TABLET, CHEWABLE ORAL at 13:16

## 2018-10-05 RX ADMIN — Medication 0.5 MILLIGRAM(S): at 05:14

## 2018-10-05 RX ADMIN — Medication 25 MILLIGRAM(S): at 21:02

## 2018-10-05 RX ADMIN — Medication 0.25 MILLIGRAM(S): at 17:10

## 2018-10-05 RX ADMIN — FENTANYL CITRATE 1 PATCH: 50 INJECTION INTRAVENOUS at 03:24

## 2018-10-05 RX ADMIN — HEPARIN SODIUM 1100 UNIT(S)/HR: 5000 INJECTION INTRAVENOUS; SUBCUTANEOUS at 15:00

## 2018-10-05 RX ADMIN — Medication 5 MILLIGRAM(S): at 16:10

## 2018-10-05 RX ADMIN — DEXMEDETOMIDINE HYDROCHLORIDE IN 0.9% SODIUM CHLORIDE 3.15 MICROGRAM(S)/KG/HR: 4 INJECTION INTRAVENOUS at 09:57

## 2018-10-05 RX ADMIN — Medication 3 MILLILITER(S): at 11:27

## 2018-10-05 RX ADMIN — PANTOPRAZOLE SODIUM 40 MILLIGRAM(S): 20 TABLET, DELAYED RELEASE ORAL at 05:14

## 2018-10-05 RX ADMIN — HEPARIN SODIUM 800 UNIT(S)/HR: 5000 INJECTION INTRAVENOUS; SUBCUTANEOUS at 08:48

## 2018-10-05 RX ADMIN — HEPARIN SODIUM 900 UNIT(S)/HR: 5000 INJECTION INTRAVENOUS; SUBCUTANEOUS at 02:18

## 2018-10-05 RX ADMIN — SODIUM CHLORIDE 3 MILLILITER(S): 9 INJECTION INTRAMUSCULAR; INTRAVENOUS; SUBCUTANEOUS at 17:13

## 2018-10-05 RX ADMIN — Medication 3 MILLILITER(S): at 17:09

## 2018-10-05 RX ADMIN — Medication 0.5 MILLIGRAM(S): at 09:56

## 2018-10-05 NOTE — PROGRESS NOTE ADULT - ASSESSMENT
Impression:  85 y/o F w/ a PMH significant for COPD, JENNIE on BiPAP, multivalvular disease, HFpEF/severe diastolic failure, A-fib on coumadin, sick sinus syndrome s/p pacemaker placement, HTN, and CKD3 who was admitted for acute respiratory failure requiring intubation suspected to be 2/2 COPD exacerbation c/b PNA w/ +entero/rhinovirus and GN coccobacillus and H. influenza bacteremia.  GI consulted for PEG placement s/p placement    Recommendations   - Okay to start tube feeds today   - daily PEG site care   - please call GI back with any further questions    Cynthia Dixon, PGY-4  Gastroenterology Fellow

## 2018-10-05 NOTE — PROGRESS NOTE ADULT - SUBJECTIVE AND OBJECTIVE BOX
INTERVAL HPI/OVERNIGHT EVENTS: pt was started on fent patch and klonapin to wean off ggt sedation. ABG was improved with change in respiratory rate.     SUBJECTIVE: Patient seen and examined at bedside.     OBJECTIVE:  VITAL SIGNS:  ICU Vital Signs Last 24 Hrs  T(C): 36.3 (05 Oct 2018 04:00), Max: 37 (04 Oct 2018 17:00)  T(F): 97.3 (05 Oct 2018 04:00), Max: 98.6 (04 Oct 2018 17:00)  HR: 63 (05 Oct 2018 06:00) (59 - 66)  BP: 159/65 (05 Oct 2018 06:00) (94/46 - 180/93)  BP(mean): 93 (05 Oct 2018 06:00) (67 - 130)  RR: 20 (05 Oct 2018 06:00) (14 - 41)  SpO2: 100% (05 Oct 2018 06:00) (96% - 100%)    Mode: AC/ CMV (Assist Control/ Continuous Mandatory Ventilation), RR (machine): 14, TV (machine): 400, FiO2: 30, PEEP: 5, ITime: 1, MAP: 8, PIP: 24    10-04 @ 07:01  -  10-05 @ 07:00  --------------------------------------------------------  IN: 786.7 mL / OUT: 1225 mL / NET: -438.3 mL    CAPILLARY BLOOD GLUCOSE  POCT Blood Glucose.: 130 mg/dL (03 Oct 2018 18:41)    PHYSICAL EXAM:  General:   HEENT:   Respiratory:  Cardiovascular:   Abdomen:  Extremities:  Skin:  Neurological:    LINES:     MEDICATIONS  (STANDING):  ALBUTerol/ipratropium for Nebulization 3 milliLiter(s) Nebulizer every 6 hours  aspirin  chewable 81 milliGRAM(s) Oral daily  clonazePAM Tablet 0.5 milliGRAM(s) Oral every 12 hours  dexmedetomidine Infusion 0.2 MICROgram(s)/kG/Hr (3.145 mL/Hr) IV Continuous <Continuous>  fentaNYL   Patch  25 MICROgram(s)/Hr 1 Patch Transdermal every 72 hours  heparin  Infusion. 900 Unit(s)/Hr (9 mL/Hr) IV Continuous <Continuous>  latanoprost 0.005% Ophthalmic Solution 1 Drop(s) Both EYES at bedtime  pantoprazole  Injectable 40 milliGRAM(s) IV Push two times a day  phenylephrine    Infusion 0.1 MICROgram(s)/kG/Min (2.359 mL/Hr) IV Continuous <Continuous>  propofol Infusion 10 MICROgram(s)/kG/Min (3.774 mL/Hr) IV Continuous <Continuous>  senna 2 Tablet(s) Oral at bedtime    ALLERGIES:  penicillin (Rash)    LABS:                        7.4    6.8   )-----------( 118      ( 04 Oct 2018 23:58 )             23.3     Mean Cell Volume : 92.9 fl  Mean Cell Hemoglobin : 29.3 pg  Mean Cell Hemoglobin Concentration : 31.6 gm/dL    10-04    142  |  110<H>  |  41<H>  ----------------------------<  131<H>  3.8   |  25  |  0.86    Ca    9.0      04 Oct 2018 23:58  Phos  3.2     10-04  Mg     2.3     10-04    TPro  5.6<L>  /  Alb  2.3<L>  /  TBili  0.7  /  DBili  x   /  AST  11  /  ALT  10  /  AlkPhos  55  10-04    Creatinine Trend: 0.86<--, 0.88<--, 1.13<--, 1.29<--, 1.34<--, 1.28<--  LIVER FUNCTIONS - ( 04 Oct 2018 23:58 )  Alb: 2.3 g/dL / Pro: 5.6 g/dL / ALK PHOS: 55 U/L / ALT: 10 U/L / AST: 11 U/L / GGT: x           PT/INR - ( 04 Oct 2018 23:58 )   PT: 11.4 sec;   INR: 1.04 ratio    PTT - ( 04 Oct 2018 23:58 )  PTT:57.7 sec    ABG - ( 04 Oct 2018 23:58 )  pH, Arterial: 7.43  pH, Blood: x     /  pCO2: 41    /  pO2: 158   / HCO3: 27    / Base Excess: 2.9   /  SaO2: 100       MICROBIOLOGY:    Culture - Sputum (collected 03 Oct 2018 16:29)  Source: .Sputum Sputum, trap  Gram Stain (03 Oct 2018 23:08):    Moderate polymorphonuclear leukocytes per low power field    Rare Squamous epithelial cells per low power field    No organisms seen per oil power field  Preliminary Report (04 Oct 2018 17:18):    Normal Respiratory María present    Culture - Urine (collected 03 Oct 2018 13:24)  Source: .Urine Catheterized  Final Report (04 Oct 2018 14:52):    No growth    Culture - Blood (collected 03 Oct 2018 03:28)  Source: .Blood Blood-Peripheral  Preliminary Report (04 Oct 2018 04:01):    No growth to date.    Culture - Blood (collected 03 Oct 2018 03:28)  Source: .Blood Blood-Peripheral  Preliminary Report (04 Oct 2018 04:01):    No growth to date.    Imaging:  < from: Upper Endoscopy (10.04.18 @ 15:44) >  mpression:          - Gastric erythema. Otherwise normal EGD                       - An externally removable PEG placement was successfully completed.    < end of copied text >    RADIOLOGY & ADDITIONAL TESTS: Reviewed. INTERVAL HPI/OVERNIGHT EVENTS: pt was started on fent patch and klonapin to wean off ggt sedation. ABG was improved with change in respiratory rate.     SUBJECTIVE: Patient seen and examined at bedside.     OBJECTIVE:  VITAL SIGNS:  ICU Vital Signs Last 24 Hrs  T(C): 36.3 (05 Oct 2018 04:00), Max: 37 (04 Oct 2018 17:00)  T(F): 97.3 (05 Oct 2018 04:00), Max: 98.6 (04 Oct 2018 17:00)  HR: 63 (05 Oct 2018 06:00) (59 - 66)  BP: 159/65 (05 Oct 2018 06:00) (94/46 - 180/93)  BP(mean): 93 (05 Oct 2018 06:00) (67 - 130)  RR: 20 (05 Oct 2018 06:00) (14 - 41)  SpO2: 100% (05 Oct 2018 06:00) (96% - 100%)    Mode: AC/ CMV (Assist Control/ Continuous Mandatory Ventilation), RR (machine): 14, TV (machine): 400, FiO2: 30, PEEP: 5, ITime: 1, MAP: 8, PIP: 24    10-04 @ 07:01  -  10-05 @ 07:00  --------------------------------------------------------  IN: 786.7 mL / OUT: 1225 mL / NET: -438.3 mL    CAPILLARY BLOOD GLUCOSE  POCT Blood Glucose.: 130 mg/dL (03 Oct 2018 18:41)    PHYSICAL EXAM:  General: elderly woman lying in bed in no acute distress  HEENT: NC/AT, PERRL, tracheostomy in place with bandages with dried blood  Respiratory: lung sounds bilaterally  Cardiovascular: systolic murmur  Abdomen:  Extremities:  Skin:  Neurological:    LINES:     MEDICATIONS  (STANDING):  ALBUTerol/ipratropium for Nebulization 3 milliLiter(s) Nebulizer every 6 hours  aspirin  chewable 81 milliGRAM(s) Oral daily  clonazePAM Tablet 0.5 milliGRAM(s) Oral every 12 hours  dexmedetomidine Infusion 0.2 MICROgram(s)/kG/Hr (3.145 mL/Hr) IV Continuous <Continuous>  fentaNYL   Patch  25 MICROgram(s)/Hr 1 Patch Transdermal every 72 hours  heparin  Infusion. 900 Unit(s)/Hr (9 mL/Hr) IV Continuous <Continuous>  latanoprost 0.005% Ophthalmic Solution 1 Drop(s) Both EYES at bedtime  pantoprazole  Injectable 40 milliGRAM(s) IV Push two times a day  phenylephrine    Infusion 0.1 MICROgram(s)/kG/Min (2.359 mL/Hr) IV Continuous <Continuous>  propofol Infusion 10 MICROgram(s)/kG/Min (3.774 mL/Hr) IV Continuous <Continuous>  senna 2 Tablet(s) Oral at bedtime    ALLERGIES:  penicillin (Rash)    LABS:                        7.4    6.8   )-----------( 118      ( 04 Oct 2018 23:58 )             23.3     Mean Cell Volume : 92.9 fl  Mean Cell Hemoglobin : 29.3 pg  Mean Cell Hemoglobin Concentration : 31.6 gm/dL    10-04    142  |  110<H>  |  41<H>  ----------------------------<  131<H>  3.8   |  25  |  0.86    Ca    9.0      04 Oct 2018 23:58  Phos  3.2     10-04  Mg     2.3     10-04    TPro  5.6<L>  /  Alb  2.3<L>  /  TBili  0.7  /  DBili  x   /  AST  11  /  ALT  10  /  AlkPhos  55  10-04    Creatinine Trend: 0.86<--, 0.88<--, 1.13<--, 1.29<--, 1.34<--, 1.28<--  LIVER FUNCTIONS - ( 04 Oct 2018 23:58 )  Alb: 2.3 g/dL / Pro: 5.6 g/dL / ALK PHOS: 55 U/L / ALT: 10 U/L / AST: 11 U/L / GGT: x           PT/INR - ( 04 Oct 2018 23:58 )   PT: 11.4 sec;   INR: 1.04 ratio    PTT - ( 04 Oct 2018 23:58 )  PTT:57.7 sec    ABG - ( 04 Oct 2018 23:58 )  pH, Arterial: 7.43  pH, Blood: x     /  pCO2: 41    /  pO2: 158   / HCO3: 27    / Base Excess: 2.9   /  SaO2: 100       MICROBIOLOGY:    Culture - Sputum (collected 03 Oct 2018 16:29)  Source: .Sputum Sputum, trap  Gram Stain (03 Oct 2018 23:08):    Moderate polymorphonuclear leukocytes per low power field    Rare Squamous epithelial cells per low power field    No organisms seen per oil power field  Preliminary Report (04 Oct 2018 17:18):    Normal Respiratory María present    Culture - Urine (collected 03 Oct 2018 13:24)  Source: .Urine Catheterized  Final Report (04 Oct 2018 14:52):    No growth    Culture - Blood (collected 03 Oct 2018 03:28)  Source: .Blood Blood-Peripheral  Preliminary Report (04 Oct 2018 04:01):    No growth to date.    Culture - Blood (collected 03 Oct 2018 03:28)  Source: .Blood Blood-Peripheral  Preliminary Report (04 Oct 2018 04:01):    No growth to date.    Imaging:  < from: Upper Endoscopy (10.04.18 @ 15:44) >  mpression:          - Gastric erythema. Otherwise normal EGD                       - An externally removable PEG placement was successfully completed.    < end of copied text >    RADIOLOGY & ADDITIONAL TESTS: Reviewed. INTERVAL HPI/OVERNIGHT EVENTS: pt was started on fent patch and klonapin to wean off ggt sedation. ABG was improved with change in respiratory rate.     SUBJECTIVE: Patient seen and examined at bedside.     OBJECTIVE:  VITAL SIGNS:  ICU Vital Signs Last 24 Hrs  T(C): 36.3 (05 Oct 2018 04:00), Max: 37 (04 Oct 2018 17:00)  T(F): 97.3 (05 Oct 2018 04:00), Max: 98.6 (04 Oct 2018 17:00)  HR: 63 (05 Oct 2018 06:00) (59 - 66)  BP: 159/65 (05 Oct 2018 06:00) (94/46 - 180/93)  BP(mean): 93 (05 Oct 2018 06:00) (67 - 130)  RR: 20 (05 Oct 2018 06:00) (14 - 41)  SpO2: 100% (05 Oct 2018 06:00) (96% - 100%)    Mode: AC/ CMV (Assist Control/ Continuous Mandatory Ventilation), RR (machine): 14, TV (machine): 400, FiO2: 30, PEEP: 5, ITime: 1, MAP: 8, PIP: 24    10-04 @ 07:01  -  10-05 @ 07:00  --------------------------------------------------------  IN: 786.7 mL / OUT: 1225 mL / NET: -438.3 mL    CAPILLARY BLOOD GLUCOSE  POCT Blood Glucose.: 130 mg/dL (03 Oct 2018 18:41)    PHYSICAL EXAM:  General: elderly woman lying in bed in no acute distress  HEENT: NC/AT, PERRL, tracheostomy in place with bandages with dried blood  Respiratory: lung sounds bilaterally  Cardiovascular: systolic murmur  Abdomen:  Extremities:  Skin:  Neurological:    LINES:     MEDICATIONS  (STANDING):  ALBUTerol/ipratropium for Nebulization 3 milliLiter(s) Nebulizer every 6 hours  aspirin  chewable 81 milliGRAM(s) Oral daily  clonazePAM Tablet 0.5 milliGRAM(s) Oral every 12 hours  dexmedetomidine Infusion 0.2 MICROgram(s)/kG/Hr (3.145 mL/Hr) IV Continuous <Continuous>  fentaNYL   Patch  25 MICROgram(s)/Hr 1 Patch Transdermal every 72 hours  heparin  Infusion. 900 Unit(s)/Hr (9 mL/Hr) IV Continuous <Continuous>  latanoprost 0.005% Ophthalmic Solution 1 Drop(s) Both EYES at bedtime  pantoprazole  Injectable 40 milliGRAM(s) IV Push two times a day  phenylephrine    Infusion 0.1 MICROgram(s)/kG/Min (2.359 mL/Hr) IV Continuous <Continuous>  propofol Infusion 10 MICROgram(s)/kG/Min (3.774 mL/Hr) IV Continuous <Continuous>  senna 2 Tablet(s) Oral at bedtime    ALLERGIES:  penicillin (Rash)    LABS:                        7.4    6.8   )-----------( 118      ( 04 Oct 2018 23:58 )             23.3     Mean Cell Volume : 92.9 fl  Mean Cell Hemoglobin : 29.3 pg  Mean Cell Hemoglobin Concentration : 31.6 gm/dL    10-04    142  |  110<H>  |  41<H>  ----------------------------<  131<H>  3.8   |  25  |  0.86    Ca    9.0      04 Oct 2018 23:58  Phos  3.2     10-04  Mg     2.3     10-04    TPro  5.6<L>  /  Alb  2.3<L>  /  TBili  0.7  /  DBili  x   /  AST  11  /  ALT  10  /  AlkPhos  55  10-04    Creatinine Trend: 0.86<--, 0.88<--, 1.13<--, 1.29<--, 1.34<--, 1.28<--  LIVER FUNCTIONS - ( 04 Oct 2018 23:58 )  Alb: 2.3 g/dL / Pro: 5.6 g/dL / ALK PHOS: 55 U/L / ALT: 10 U/L / AST: 11 U/L / GGT: x           PT/INR - ( 04 Oct 2018 23:58 )   PT: 11.4 sec;   INR: 1.04 ratio    PTT - ( 04 Oct 2018 23:58 )  PTT:57.7 sec   Activated Partial Thromboplastin Time: 104.4    ABG - ( 04 Oct 2018 23:58 )  pH, Arterial: 7.43  pH, Blood: x     /  pCO2: 41    /  pO2: 158   / HCO3: 27    / Base Excess: 2.9   /  SaO2: 100       MICROBIOLOGY:    Culture - Sputum (collected 03 Oct 2018 16:29)  Source: .Sputum Sputum, trap  Gram Stain (03 Oct 2018 23:08):    Moderate polymorphonuclear leukocytes per low power field    Rare Squamous epithelial cells per low power field    No organisms seen per oil power field  Preliminary Report (04 Oct 2018 17:18):    Normal Respiratory María present    Culture - Urine (collected 03 Oct 2018 13:24)  Source: .Urine Catheterized  Final Report (04 Oct 2018 14:52):    No growth    Culture - Blood (collected 03 Oct 2018 03:28)  Source: .Blood Blood-Peripheral  Preliminary Report (04 Oct 2018 04:01):    No growth to date.    Culture - Blood (collected 03 Oct 2018 03:28)  Source: .Blood Blood-Peripheral  Preliminary Report (04 Oct 2018 04:01):    No growth to date.    Imaging:  < from: Upper Endoscopy (10.04.18 @ 15:44) >  mpression:          - Gastric erythema. Otherwise normal EGD                       - An externally removable PEG placement was successfully completed.    < end of copied text >    < from: Xray Abdomen 1 View PORTABLE -Urgent (10.05.18 @ 08:39) >  IMPRESSION:     PEG tube is in place in the left hemiabdomen with its tip in the   air-filled stomach.    Multiple nondilated air-filled loops of small and large bowel without   evidence of intraperitoneal free air.    < end of copied text >      RADIOLOGY & ADDITIONAL TESTS: Reviewed.

## 2018-10-05 NOTE — PROGRESS NOTE ADULT - SUBJECTIVE AND OBJECTIVE BOX
Chief Complaint:  Patient is a 84y old  Female who presents with a chief complaint of COPD exacerbation, ADHF (05 Oct 2018 07:18)      Interval Events:   Yesterday PEG tube placed at bedside.      Allergies:  penicillin (Rash)        Hospital Medications:  ALBUTerol/ipratropium for Nebulization 3 milliLiter(s) Nebulizer every 6 hours  aspirin  chewable 81 milliGRAM(s) Oral daily  clonazePAM Tablet 0.5 milliGRAM(s) Oral every 12 hours  dexmedetomidine Infusion 0.2 MICROgram(s)/kG/Hr IV Continuous <Continuous>  fentaNYL   Patch  25 MICROgram(s)/Hr 1 Patch Transdermal every 72 hours  heparin  Infusion. 900 Unit(s)/Hr IV Continuous <Continuous>  heparin  Injectable 5000 Unit(s) IV Push every 6 hours PRN  heparin  Injectable 2500 Unit(s) IV Push every 6 hours PRN  latanoprost 0.005% Ophthalmic Solution 1 Drop(s) Both EYES at bedtime  pantoprazole  Injectable 40 milliGRAM(s) IV Push two times a day  phenylephrine    Infusion 0.1 MICROgram(s)/kG/Min IV Continuous <Continuous>  propofol Infusion 10 MICROgram(s)/kG/Min IV Continuous <Continuous>  senna 2 Tablet(s) Oral at bedtime      PMHX/PSHX:  Aortic stenosis, moderate  Heart failure with preserved ejection fraction  Rheumatic heart disease  Chronic obstructive pulmonary disease, unspecified COPD type  CKD (chronic kidney disease) stage 3, GFR 30-59 ml/min  Chronic atrial fibrillation  Sick sinus syndrome  Cardiac pacemaker recipient  H/O mitral valve replacement      Family history:  No pertinent family history in first degree relatives      PHYSICAL EXAM:   Vital Signs:  Vital Signs Last 24 Hrs  T(C): 36.3 (05 Oct 2018 04:00), Max: 37 (04 Oct 2018 17:00)  T(F): 97.3 (05 Oct 2018 04:00), Max: 98.6 (04 Oct 2018 17:00)  HR: 63 (05 Oct 2018 06:00) (59 - 66)  BP: 159/65 (05 Oct 2018 06:00) (96/46 - 180/93)  BP(mean): 93 (05 Oct 2018 06:00) (67 - 130)  RR: 20 (05 Oct 2018 06:00) (14 - 41)  SpO2: 100% (05 Oct 2018 06:00) (96% - 100%)  Daily     Daily Weight in k.2 (05 Oct 2018 05:00)    GENERAL:  Appears stated age, well-groomed, well-nourished, no distress  CHEST:  no increased effort  ABDOMEN:  Soft, non-tender, non-distended, normoactive bowel sounds,  no masses , no hepato-splenomegaly, no signs of chronic liver disease  EXTEREMITIES:  no cyanosis, clubbing or edema  NEURO:  Alert, oriented, no tremor, no asterixis    LABS:                        7.4    6.8   )-----------( 118      ( 04 Oct 2018 23:58 )             23.3     Mean Cell Volume: 92.9 fl (10-04-18 @ 23:58)    10-    142  |  110<H>  |  41<H>  ----------------------------<  131<H>  3.8   |  25  |  0.86    Ca    9.0      04 Oct 2018 23:58  Phos  3.2     10-  Mg     2.3     10-    TPro  5.6<L>  /  Alb  2.3<L>  /  TBili  0.7  /  DBili  x   /  AST  11  /  ALT  10  /  AlkPhos  55  10-04    LIVER FUNCTIONS - ( 04 Oct 2018 23:58 )  Alb: 2.3 g/dL / Pro: 5.6 g/dL / ALK PHOS: 55 U/L / ALT: 10 U/L / AST: 11 U/L / GGT: x           PT/INR - ( 04 Oct 2018 23:58 )   PT: 11.4 sec;   INR: 1.04 ratio         PTT - ( 04 Oct 2018 23:58 )  PTT:57.7 sec                            7.4    6.8   )-----------( 118      ( 04 Oct 2018 23:58 )             23.3                         7.7    9.8   )-----------( 144      ( 03 Oct 2018 23:55 )             23.7                         7.9    14.2  )-----------( 173      ( 03 Oct 2018 00:14 )             24.5     Imaging: Chief Complaint:  Patient is a 84y old  Female who presents with a chief complaint of COPD exacerbation, ADHF (05 Oct 2018 07:18)      Interval Events:   Yesterday PEG tube placed at bedside.      Allergies:  penicillin (Rash)        Hospital Medications:  ALBUTerol/ipratropium for Nebulization 3 milliLiter(s) Nebulizer every 6 hours  aspirin  chewable 81 milliGRAM(s) Oral daily  clonazePAM Tablet 0.5 milliGRAM(s) Oral every 12 hours  dexmedetomidine Infusion 0.2 MICROgram(s)/kG/Hr IV Continuous <Continuous>  fentaNYL   Patch  25 MICROgram(s)/Hr 1 Patch Transdermal every 72 hours  heparin  Infusion. 900 Unit(s)/Hr IV Continuous <Continuous>  heparin  Injectable 5000 Unit(s) IV Push every 6 hours PRN  heparin  Injectable 2500 Unit(s) IV Push every 6 hours PRN  latanoprost 0.005% Ophthalmic Solution 1 Drop(s) Both EYES at bedtime  pantoprazole  Injectable 40 milliGRAM(s) IV Push two times a day  phenylephrine    Infusion 0.1 MICROgram(s)/kG/Min IV Continuous <Continuous>  propofol Infusion 10 MICROgram(s)/kG/Min IV Continuous <Continuous>  senna 2 Tablet(s) Oral at bedtime      PMHX/PSHX:  Aortic stenosis, moderate  Heart failure with preserved ejection fraction  Rheumatic heart disease  Chronic obstructive pulmonary disease, unspecified COPD type  CKD (chronic kidney disease) stage 3, GFR 30-59 ml/min  Chronic atrial fibrillation  Sick sinus syndrome  Cardiac pacemaker recipient  H/O mitral valve replacement      Family history:  No pertinent family history in first degree relatives      PHYSICAL EXAM:   Vital Signs:  Vital Signs Last 24 Hrs  T(C): 36.3 (05 Oct 2018 04:00), Max: 37 (04 Oct 2018 17:00)  T(F): 97.3 (05 Oct 2018 04:00), Max: 98.6 (04 Oct 2018 17:00)  HR: 63 (05 Oct 2018 06:00) (59 - 66)  BP: 159/65 (05 Oct 2018 06:00) (96/46 - 180/93)  BP(mean): 93 (05 Oct 2018 06:00) (67 - 130)  RR: 20 (05 Oct 2018 06:00) (14 - 41)  SpO2: 100% (05 Oct 2018 06:00) (96% - 100%)  Daily     Daily Weight in k.2 (05 Oct 2018 05:00)    GENERAL:  sedated  CHEST:  no increased effort, trach in place  ABDOMEN:  PEG tube in place, clean  EXTEREMITIES:  no cyanosis, clubbing or edema  NEURO:  Alert, oriented, no tremor, no asterixis    LABS:                        7.4    6.8   )-----------( 118      ( 04 Oct 2018 23:58 )             23.3     Mean Cell Volume: 92.9 fl (10-04-18 @ 23:58)    10-    142  |  110<H>  |  41<H>  ----------------------------<  131<H>  3.8   |  25  |  0.86    Ca    9.0      04 Oct 2018 23:58  Phos  3.2     10-  Mg     2.3     10    TPro  5.6<L>  /  Alb  2.3<L>  /  TBili  0.7  /  DBili  x   /  AST  11  /  ALT  10  /  AlkPhos  55  10-04    LIVER FUNCTIONS - ( 04 Oct 2018 23:58 )  Alb: 2.3 g/dL / Pro: 5.6 g/dL / ALK PHOS: 55 U/L / ALT: 10 U/L / AST: 11 U/L / GGT: x           PT/INR - ( 04 Oct 2018 23:58 )   PT: 11.4 sec;   INR: 1.04 ratio         PTT - ( 04 Oct 2018 23:58 )  PTT:57.7 sec                            7.4    6.8   )-----------( 118      ( 04 Oct 2018 23:58 )             23.3                         7.7    9.8   )-----------( 144      ( 03 Oct 2018 23:55 )             23.7                         7.9    14.2  )-----------( 173      ( 03 Oct 2018 00:14 )             24.5     Imaging: Chief Complaint:  Patient is a 84y old  Female who presents with a chief complaint of COPD exacerbation, ADHF (05 Oct 2018 07:18)      Interval Events:   Yesterday PEG tube placed at bedside.  Abdomen distended on initial exam so decompressed gently via PEG tube.    Allergies:  penicillin (Rash)        Hospital Medications:  ALBUTerol/ipratropium for Nebulization 3 milliLiter(s) Nebulizer every 6 hours  aspirin  chewable 81 milliGRAM(s) Oral daily  clonazePAM Tablet 0.5 milliGRAM(s) Oral every 12 hours  dexmedetomidine Infusion 0.2 MICROgram(s)/kG/Hr IV Continuous <Continuous>  fentaNYL   Patch  25 MICROgram(s)/Hr 1 Patch Transdermal every 72 hours  heparin  Infusion. 900 Unit(s)/Hr IV Continuous <Continuous>  heparin  Injectable 5000 Unit(s) IV Push every 6 hours PRN  heparin  Injectable 2500 Unit(s) IV Push every 6 hours PRN  latanoprost 0.005% Ophthalmic Solution 1 Drop(s) Both EYES at bedtime  pantoprazole  Injectable 40 milliGRAM(s) IV Push two times a day  phenylephrine    Infusion 0.1 MICROgram(s)/kG/Min IV Continuous <Continuous>  propofol Infusion 10 MICROgram(s)/kG/Min IV Continuous <Continuous>  senna 2 Tablet(s) Oral at bedtime      PMHX/PSHX:  Aortic stenosis, moderate  Heart failure with preserved ejection fraction  Rheumatic heart disease  Chronic obstructive pulmonary disease, unspecified COPD type  CKD (chronic kidney disease) stage 3, GFR 30-59 ml/min  Chronic atrial fibrillation  Sick sinus syndrome  Cardiac pacemaker recipient  H/O mitral valve replacement      Family history:  No pertinent family history in first degree relatives      PHYSICAL EXAM:   Vital Signs:  Vital Signs Last 24 Hrs  T(C): 36.3 (05 Oct 2018 04:00), Max: 37 (04 Oct 2018 17:00)  T(F): 97.3 (05 Oct 2018 04:00), Max: 98.6 (04 Oct 2018 17:00)  HR: 63 (05 Oct 2018 06:00) (59 - 66)  BP: 159/65 (05 Oct 2018 06:00) (96/46 - 180/93)  BP(mean): 93 (05 Oct 2018 06:00) (67 - 130)  RR: 20 (05 Oct 2018 06:00) (14 - 41)  SpO2: 100% (05 Oct 2018 06:00) (96% - 100%)  Daily     Daily Weight in k.2 (05 Oct 2018 05:00)    GENERAL:  sedated  CHEST:  no increased effort, trach in place  ABDOMEN:  PEG tube in place, clean  EXTEREMITIES:  no cyanosis, clubbing or edema  NEURO:  Alert, oriented, no tremor, no asterixis    LABS:                        7.4    6.8   )-----------( 118      ( 04 Oct 2018 23:58 )             23.3     Mean Cell Volume: 92.9 fl (10-04-18 @ 23:58)    10-    142  |  110<H>  |  41<H>  ----------------------------<  131<H>  3.8   |  25  |  0.86    Ca    9.0      04 Oct 2018 23:58  Phos  3.2     10-  Mg     2.3     10-    TPro  5.6<L>  /  Alb  2.3<L>  /  TBili  0.7  /  DBili  x   /  AST  11  /  ALT  10  /  AlkPhos  55  10-04    LIVER FUNCTIONS - ( 04 Oct 2018 23:58 )  Alb: 2.3 g/dL / Pro: 5.6 g/dL / ALK PHOS: 55 U/L / ALT: 10 U/L / AST: 11 U/L / GGT: x           PT/INR - ( 04 Oct 2018 23:58 )   PT: 11.4 sec;   INR: 1.04 ratio         PTT - ( 04 Oct 2018 23:58 )  PTT:57.7 sec                            7.4    6.8   )-----------( 118      ( 04 Oct 2018 23:58 )             23.3                         7.7    9.8   )-----------( 144      ( 03 Oct 2018 23:55 )             23.7                         7.9    14.2  )-----------( 173      ( 03 Oct 2018 00:14 )             24.5     Imaging:

## 2018-10-05 NOTE — PROGRESS NOTE ADULT - ATTENDING COMMENTS
Patient examined and case reviewed in detail on bedside rounds. Agree with above.   -Acute Hypoxemic Respiratory Failure requiring mechanical ventilation - now with tracheostomy  -Oropharyngeal dysphagia - s/p PEG placement yesterday - with abdominal distension today- GI followup  -Pain control and agitation - on prop and precedex gtt - will wean off and start standing benzos as needed    Eventual transfer to [facility] once off all drips and will need to plan for transfer to long term vent facility    Critical Care Time 35 minutes

## 2018-10-05 NOTE — PROGRESS NOTE ADULT - ASSESSMENT
83 y/o F w/ a PMH significant for COPD, JENNIE on BiPAP, multivalvular disease (severe AS, s/p MV replacement), HFpEF/severe diastolic failure, A-fib on coumadin, sick sinus syndrome s/p pacemaker placement, HTN, and CKD3 who was admitted for acute respiratory failure requiring intubation suspected to be 2/2 COPD exacerbation c/b PNA w/ +entero/rhinovirus and GN coccobacillus and H. influenza bacteremia, requiring continued respiratory support and tracheostomy and PEG placement    #Neuro  - propofol d/c'ed  - fent patch placed to wean off dexmedetomidine  - will reassess cont to reassess mental status    #CV  *HFpEF (40-50%) w/ severe diastolic failure  *multivalvular disease: MV replacement, severe AS  *Afib, SSS with PPM, off home sotalol today  *MV replacement  -Hep ggt, will reassess aPTT at 2pm as was 104 and decreased ggt to 8mL/hr. if repeat aPTT is >90 will hold for 1 hour and restart at a lower dose.  *HTN  -no longer requires vasopressin  -will restart low dose of diltiazem  -continue to monitor BP and titrate HTN medications    #Resp  -+enterovirus on RVP with +H.influenza bacteremia, multi-focal PNA  -9/28 CXR:  Ill-defined density seen in the left lung and the right middle lobe compatible with multifocal pneumonia.  -s/p steroid taper  -currently s/p trach  -f/u sputum culture (10/3)  -c/w nebs  -c/w symbicort   -c/w chest PT  -start saline nebs    #GI  -will hold coumadin as pt has anemia with history of AVM/GI bleed  -PPI ppx  -s/p PEG placement  -can continue with bowel regimen  -pt was found to be distended and requiring PEG decompression, will f/u with GI to discuss possible causes of air buildup.   -start simethicone   -will hold tube feeds for now until pt's bowels decompress  -f/u BMs    #Renal  -LATANYA, likely 2/2 sepsis + HTN urgency; resolved with Cr at baseline  -pt had retention and tejada catheter was placed  -will continue to monitor I&O's  -hypernatremia has resolved    #ID  - +entero/rhinovirus  - GN coccobacillus (H. influenzae) on 9/14 blood culture.  s/p 14 days of abx for H. Influenza pneumonia and bacteremia.   - UClx (9/14): NGTD  - Sputum Clx (9/14): No organisms, repeat combicath (9/21): normal respiratory chastity  - repeat blood cx from 9/17, 9/21, 9/28: NGTD  - legionella negative  - f/u sputum, blood, and urine culture (10/3): prelim no growth  - pt recieved 1g vanc heydi-PEG placement    #Heme:  -DIC panel negative  -LFTs no longer mildly elevated  -H/H drop; CT A/P 9/20 was negative for acute bleeding, 10/1 bedside FAST negative, no signs of acute bleed, hgb has been stable in 7's s/p 1 unit PRBC  -will transfuse if pt hgb <7  -restarted Hep ggt and will f/u with repeat aPTT  - DVT ppx: on hep ggt    #Endo:  - cont SSI    #Ophtho  -c/w home latanoprost drops for glaucoma    #Dispo  -PT/OT eval  -plan to transfer to [facility] after PEG placement 85 y/o F w/ a PMH significant for COPD, JENNIE on BiPAP, multivalvular disease (severe AS, s/p MV replacement), HFpEF/severe diastolic failure, A-fib on coumadin, sick sinus syndrome s/p pacemaker placement, HTN, and CKD3 who was admitted for acute respiratory failure requiring intubation suspected to be 2/2 COPD exacerbation c/b PNA w/ +entero/rhinovirus and GN coccobacillus and H. influenza bacteremia, requiring continued respiratory support and tracheostomy and PEG placement    #Neuro  - propofol d/c'ed  - fent patch placed to wean off dexmedetomidine  - will reassess cont to reassess mental status    #CV  *HFpEF (40-50%) w/ severe diastolic failure  *multivalvular disease: MV replacement, severe AS  *Afib, SSS with PPM, off home sotalol today  *MV replacement  -Hep ggt, will reassess aPTT at 2pm as was 104 and decreased ggt to 8mL/hr. if repeat aPTT is >90 will hold for 1 hour and restart at a lower dose.  *HTN  -no longer requires vasopressin  -will restart low dose of diltiazem  -continue to monitor BP and titrate HTN medications    #Resp  -+enterovirus on RVP with +H.influenza bacteremia, multi-focal PNA  -9/28 CXR:  Ill-defined density seen in the left lung and the right middle lobe compatible with multifocal pneumonia.  -s/p steroid taper  -currently s/p trach  -f/u sputum culture (10/3)  -c/w nebs  -c/w chest PT  -start pulmicort   -start saline nebs    #GI  -will hold coumadin as pt has anemia with history of AVM/GI bleed  -PPI ppx  -s/p PEG placement  -can continue with bowel regimen  -pt was found to be distended and requiring PEG decompression, with possible ileus formation  -continue to move pt as much as possible  -will start trickle feeds to stimulate bowel to prevent ileus formation  -start simethicone   -f/u BMs    #Renal  -LATANYA, likely 2/2 sepsis + HTN urgency; resolved with Cr at baseline  -pt had retention and tejada catheter was placed  -will continue to monitor I&O's  -hypernatremia has resolved    #ID  - +entero/rhinovirus  - GN coccobacillus (H. influenzae) on 9/14 blood culture.  s/p 14 days of abx for H. Influenza pneumonia and bacteremia.   - UClx (9/14): NGTD  - Sputum Clx (9/14): No organisms, repeat combicath (9/21): normal respiratory chastity  - repeat blood cx from 9/17, 9/21, 9/28: NGTD  - legionella negative  - f/u sputum, blood, and urine culture (10/3): prelim no growth  - pt recieved 1g vanc heydi-PEG placement    #Heme:  -DIC panel negative  -LFTs no longer mildly elevated  -H/H drop; CT A/P 9/20 was negative for acute bleeding, 10/1 bedside FAST negative, no signs of acute bleed, hgb has been stable in 7's s/p 1 unit PRBC  -will transfuse if pt hgb <7  -restarted Hep ggt and will f/u with repeat aPTT  - DVT ppx: on hep ggt    #Endo:  - cont SSI    #Ophtho  -c/w home latanoprost drops for glaucoma    #Dispo  -PT/OT eval  -plan to transfer to [facility] after PEG placement

## 2018-10-06 DIAGNOSIS — J18.9 PNEUMONIA, UNSPECIFIED ORGANISM: ICD-10-CM

## 2018-10-06 DIAGNOSIS — J96.90 RESPIRATORY FAILURE, UNSPECIFIED, UNSPECIFIED WHETHER WITH HYPOXIA OR HYPERCAPNIA: ICD-10-CM

## 2018-10-06 DIAGNOSIS — D64.9 ANEMIA, UNSPECIFIED: ICD-10-CM

## 2018-10-06 DIAGNOSIS — I38 ENDOCARDITIS, VALVE UNSPECIFIED: ICD-10-CM

## 2018-10-06 DIAGNOSIS — Z02.9 ENCOUNTER FOR ADMINISTRATIVE EXAMINATIONS, UNSPECIFIED: ICD-10-CM

## 2018-10-06 DIAGNOSIS — R45.1 RESTLESSNESS AND AGITATION: ICD-10-CM

## 2018-10-06 DIAGNOSIS — I50.30 UNSPECIFIED DIASTOLIC (CONGESTIVE) HEART FAILURE: ICD-10-CM

## 2018-10-06 DIAGNOSIS — E87.0 HYPEROSMOLALITY AND HYPERNATREMIA: ICD-10-CM

## 2018-10-06 DIAGNOSIS — R78.81 BACTEREMIA: ICD-10-CM

## 2018-10-06 DIAGNOSIS — R13.10 DYSPHAGIA, UNSPECIFIED: ICD-10-CM

## 2018-10-06 LAB
ALBUMIN SERPL ELPH-MCNC: 2.7 G/DL — LOW (ref 3.3–5)
ALP SERPL-CCNC: 62 U/L — SIGNIFICANT CHANGE UP (ref 40–120)
ALT FLD-CCNC: 11 U/L — SIGNIFICANT CHANGE UP (ref 10–45)
ANION GAP SERPL CALC-SCNC: 15 MMOL/L — SIGNIFICANT CHANGE UP (ref 5–17)
APTT BLD: 49 SEC — HIGH (ref 27.5–37.4)
APTT BLD: 52.4 SEC — HIGH (ref 27.5–37.4)
APTT BLD: 57.5 SEC — HIGH (ref 27.5–37.4)
AST SERPL-CCNC: 19 U/L — SIGNIFICANT CHANGE UP (ref 10–40)
BILIRUB SERPL-MCNC: 0.7 MG/DL — SIGNIFICANT CHANGE UP (ref 0.2–1.2)
BLD GP AB SCN SERPL QL: NEGATIVE — SIGNIFICANT CHANGE UP
BUN SERPL-MCNC: 38 MG/DL — HIGH (ref 7–23)
CALCIUM SERPL-MCNC: 9.6 MG/DL — SIGNIFICANT CHANGE UP (ref 8.4–10.5)
CHLORIDE SERPL-SCNC: 111 MMOL/L — HIGH (ref 96–108)
CO2 SERPL-SCNC: 21 MMOL/L — LOW (ref 22–31)
CREAT SERPL-MCNC: 0.97 MG/DL — SIGNIFICANT CHANGE UP (ref 0.5–1.3)
GLUCOSE SERPL-MCNC: 120 MG/DL — HIGH (ref 70–99)
HCT VFR BLD CALC: 24 % — LOW (ref 34.5–45)
HGB BLD-MCNC: 7.6 G/DL — LOW (ref 11.5–15.5)
MAGNESIUM SERPL-MCNC: 2.4 MG/DL — SIGNIFICANT CHANGE UP (ref 1.6–2.6)
MCHC RBC-ENTMCNC: 29.7 PG — SIGNIFICANT CHANGE UP (ref 27–34)
MCHC RBC-ENTMCNC: 31.7 GM/DL — LOW (ref 32–36)
MCV RBC AUTO: 93.6 FL — SIGNIFICANT CHANGE UP (ref 80–100)
OB PNL STL: POSITIVE
PHOSPHATE SERPL-MCNC: 3.7 MG/DL — SIGNIFICANT CHANGE UP (ref 2.5–4.5)
PLATELET # BLD AUTO: 135 K/UL — LOW (ref 150–400)
POTASSIUM SERPL-MCNC: 3.9 MMOL/L — SIGNIFICANT CHANGE UP (ref 3.5–5.3)
POTASSIUM SERPL-SCNC: 3.9 MMOL/L — SIGNIFICANT CHANGE UP (ref 3.5–5.3)
PROT SERPL-MCNC: 6.2 G/DL — SIGNIFICANT CHANGE UP (ref 6–8.3)
RBC # BLD: 2.56 M/UL — LOW (ref 3.8–5.2)
RBC # FLD: 18.4 % — HIGH (ref 10.3–14.5)
RH IG SCN BLD-IMP: POSITIVE — SIGNIFICANT CHANGE UP
SODIUM SERPL-SCNC: 147 MMOL/L — HIGH (ref 135–145)
WBC # BLD: 9.5 K/UL — SIGNIFICANT CHANGE UP (ref 3.8–10.5)
WBC # FLD AUTO: 9.5 K/UL — SIGNIFICANT CHANGE UP (ref 3.8–10.5)

## 2018-10-06 PROCEDURE — 99233 SBSQ HOSP IP/OBS HIGH 50: CPT | Mod: GC

## 2018-10-06 RX ORDER — LANOLIN ALCOHOL/MO/W.PET/CERES
1 CREAM (GRAM) TOPICAL AT BEDTIME
Qty: 0 | Refills: 0 | Status: DISCONTINUED | OUTPATIENT
Start: 2018-10-06 | End: 2018-11-02

## 2018-10-06 RX ORDER — CLONAZEPAM 1 MG
0.5 TABLET ORAL EVERY 12 HOURS
Qty: 0 | Refills: 0 | Status: DISCONTINUED | OUTPATIENT
Start: 2018-10-06 | End: 2018-10-06

## 2018-10-06 RX ORDER — HEPARIN SODIUM 5000 [USP'U]/ML
850 INJECTION INTRAVENOUS; SUBCUTANEOUS
Qty: 25000 | Refills: 0 | Status: DISCONTINUED | OUTPATIENT
Start: 2018-10-06 | End: 2018-10-06

## 2018-10-06 RX ORDER — SIMETHICONE 80 MG/1
80 TABLET, CHEWABLE ORAL EVERY 8 HOURS
Qty: 0 | Refills: 0 | Status: DISCONTINUED | OUTPATIENT
Start: 2018-10-06 | End: 2018-11-02

## 2018-10-06 RX ORDER — HEPARIN SODIUM 5000 [USP'U]/ML
1000 INJECTION INTRAVENOUS; SUBCUTANEOUS
Qty: 25000 | Refills: 0 | Status: DISCONTINUED | OUTPATIENT
Start: 2018-10-06 | End: 2018-10-07

## 2018-10-06 RX ORDER — HEPARIN SODIUM 5000 [USP'U]/ML
950 INJECTION INTRAVENOUS; SUBCUTANEOUS
Qty: 25000 | Refills: 0 | Status: DISCONTINUED | OUTPATIENT
Start: 2018-10-06 | End: 2018-10-06

## 2018-10-06 RX ORDER — CLONAZEPAM 1 MG
0.5 TABLET ORAL EVERY 12 HOURS
Qty: 0 | Refills: 0 | Status: DISCONTINUED | OUTPATIENT
Start: 2018-10-06 | End: 2018-10-11

## 2018-10-06 RX ORDER — HEPARIN SODIUM 5000 [USP'U]/ML
900 INJECTION INTRAVENOUS; SUBCUTANEOUS
Qty: 25000 | Refills: 0 | Status: DISCONTINUED | OUTPATIENT
Start: 2018-10-06 | End: 2018-10-06

## 2018-10-06 RX ADMIN — PANTOPRAZOLE SODIUM 40 MILLIGRAM(S): 20 TABLET, DELAYED RELEASE ORAL at 17:19

## 2018-10-06 RX ADMIN — Medication 25 MILLIGRAM(S): at 13:56

## 2018-10-06 RX ADMIN — HEPARIN SODIUM 9.5 UNIT(S)/HR: 5000 INJECTION INTRAVENOUS; SUBCUTANEOUS at 12:42

## 2018-10-06 RX ADMIN — Medication 3 MILLILITER(S): at 06:42

## 2018-10-06 RX ADMIN — SIMETHICONE 80 MILLIGRAM(S): 80 TABLET, CHEWABLE ORAL at 21:43

## 2018-10-06 RX ADMIN — SODIUM CHLORIDE 3 MILLILITER(S): 9 INJECTION INTRAMUSCULAR; INTRAVENOUS; SUBCUTANEOUS at 06:48

## 2018-10-06 RX ADMIN — SENNA PLUS 2 TABLET(S): 8.6 TABLET ORAL at 21:43

## 2018-10-06 RX ADMIN — Medication 81 MILLIGRAM(S): at 12:15

## 2018-10-06 RX ADMIN — Medication 0.5 MILLIGRAM(S): at 09:58

## 2018-10-06 RX ADMIN — PANTOPRAZOLE SODIUM 40 MILLIGRAM(S): 20 TABLET, DELAYED RELEASE ORAL at 05:01

## 2018-10-06 RX ADMIN — LATANOPROST 1 DROP(S): 0.05 SOLUTION/ DROPS OPHTHALMIC; TOPICAL at 21:39

## 2018-10-06 RX ADMIN — Medication 25 MILLIGRAM(S): at 21:43

## 2018-10-06 RX ADMIN — Medication 0.25 MILLIGRAM(S): at 17:00

## 2018-10-06 RX ADMIN — SODIUM CHLORIDE 3 MILLILITER(S): 9 INJECTION INTRAMUSCULAR; INTRAVENOUS; SUBCUTANEOUS at 17:00

## 2018-10-06 RX ADMIN — Medication 3 MILLILITER(S): at 11:49

## 2018-10-06 RX ADMIN — Medication 0.25 MILLIGRAM(S): at 06:46

## 2018-10-06 RX ADMIN — SIMETHICONE 80 MILLIGRAM(S): 80 TABLET, CHEWABLE ORAL at 17:16

## 2018-10-06 RX ADMIN — Medication 3 MILLILITER(S): at 17:00

## 2018-10-06 RX ADMIN — Medication 3 MILLILITER(S): at 00:10

## 2018-10-06 RX ADMIN — HEPARIN SODIUM 8.5 UNIT(S)/HR: 5000 INJECTION INTRAVENOUS; SUBCUTANEOUS at 05:45

## 2018-10-06 RX ADMIN — Medication 25 MILLIGRAM(S): at 05:00

## 2018-10-06 RX ADMIN — HEPARIN SODIUM 10 UNIT(S)/HR: 5000 INJECTION INTRAVENOUS; SUBCUTANEOUS at 20:07

## 2018-10-06 NOTE — PROGRESS NOTE ADULT - PROBLEM SELECTOR PLAN 7
Patient S/p Propofol drip in MICU   Son states patient is too lethargic today and requesting sedation be decreased  Son does not want PRN IVP Ativan, Will change Klonopin to PRN  Continue Fentanyl patch

## 2018-10-06 NOTE — DOWNTIME INTERRUPTION NOTE - WHICH MANUAL FORMS INITIATED?
Documentation hold for POC and A&I flowsheets and Adult patient profile  see paper record for additional documentation recorderd during downtime

## 2018-10-06 NOTE — PROGRESS NOTE ADULT - PROBLEM SELECTOR PLAN 1
Patient Failed Extubation attempt x 2   Patient S/p Tracheostomy 10/3 ( # 6 Cuffed Bert)   Attempt weaning trials as tolerated  Continue Nebulizers and Chest PT

## 2018-10-06 NOTE — PROGRESS NOTE ADULT - PROBLEM SELECTOR PLAN 8
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   Stool Occult 10/6: Positive   No reports of Melena today, will continue Hep Drip as Rx of no AC with Mechanical Valve High, H+H stable today    Will send Type and Screen today and monitor CBC

## 2018-10-06 NOTE — PROGRESS NOTE ADULT - PROBLEM SELECTOR PLAN 9
Sodium 147 today   Will re-start trickle feeds and add free water 150 cc q 12 hrs   Monitor Daily Bmp

## 2018-10-06 NOTE — PROGRESS NOTE ADULT - PROBLEM SELECTOR PLAN 5
Patient with HX of AS ( Not a candidate for TAVR ) Patient with HX of AS ( Not a candidate for TAVR )  Patient S/p Mechanical Mitral Valve Replacement   Continue AC with Heparin Drip to maintain therapeutic INR   Will hold on Coumadin dosing tonight as per MICU report patient with "Dark BM" yesterday and recent stool occult positive, will monitor for H+H Stability prior to restating AC

## 2018-10-06 NOTE — PROGRESS NOTE ADULT - SUBJECTIVE AND OBJECTIVE BOX
Patient is a 84y old  Female who presents with a chief complaint of COPD exacerbation, ADHF (05 Oct 2018 07:47)      Interval Events:    REVIEW OF SYSTEMS:  [ ] Positive  [ ] All other systems negative  [ ] Unable to assess ROS because ________    Vital Signs Last 24 Hrs  T(C): 37.1 (10-06-18 @ 06:45), Max: 37.2 (10-05-18 @ 12:00)  T(F): 98.7 (10-06-18 @ 06:45), Max: 99 (10-05-18 @ 12:00)  HR: 60 (10-06-18 @ 07:49) (59 - 87)  BP: 138/72 (10-06-18 @ 06:45) (138/72 - 188/98)  RR: 20 (10-06-18 @ 06:45) (15 - 42)  SpO2: 100% (10-06-18 @ 07:49) (95% - 100%)PHYSICAL EXAM:  HEENT:   [ ]Tracheostomy:  [ ]Pupils equal  [ ]No oral lesions  [ ]Abnormal        SKIN  [ ]No Rash  [ ] Abnormal  [ ] pressure    CARDIAC  [ ]Regular  [ ]Abnormal    PULMONARY  [ ]Bilateral Clear Breath Sounds  [ ]Normal Excursion  [ ]Abnormal    GI  [ ]PEG      [ ] +BS		              [ ]Soft, nondistended, nontender	  [ ]Abnormal    MUSCULOSKELETAL                                   [ ]Bedbound                 [ ]Abnormal    [ ]Ambulatory/OOB to chair                           EXTREMITIES                                         [ ]Normal  [ ]Edema                           NEUROLOGIC  [ ] Normal, non focal  [ ] Focal findings:    PSYCHIATRIC  [ ]Alert and appropriate  [ ] Sedated	 [ ]Agitated    :  Connie: [ ] Yes, if yes: Date of Placement:                   [  ] No    LINES: Central Lines [ ] Yes, if yes: Date of Placement                                     [  ] No    HOSPITAL MEDICATIONS:  MEDICATIONS  (STANDING):  ALBUTerol/ipratropium for Nebulization 3 milliLiter(s) Nebulizer every 6 hours  aspirin  chewable 81 milliGRAM(s) Oral daily  buDESOnide   0.25 milliGRAM(s) Respule 0.25 milliGRAM(s) Inhalation every 12 hours  clonazePAM Tablet 0.5 milliGRAM(s) Oral every 12 hours  diltiazem    Tablet 30 milliGRAM(s) Oral every 6 hours  fentaNYL   Patch  25 MICROgram(s)/Hr 1 Patch Transdermal every 72 hours  heparin  Infusion 850 Unit(s)/Hr (8.5 mL/Hr) IV Continuous <Continuous>  hydrALAZINE 25 milliGRAM(s) Oral every 8 hours  latanoprost 0.005% Ophthalmic Solution 1 Drop(s) Both EYES at bedtime  pantoprazole  Injectable 40 milliGRAM(s) IV Push two times a day  senna 2 Tablet(s) Oral at bedtime  sodium chloride 3%  Inhalation 3 milliLiter(s) Inhalation two times a day    MEDICATIONS  (PRN):      LABS:                        7.6    9.5   )-----------( 135      ( 06 Oct 2018 05:07 )             24.0     10-06    147<H>  |  111<H>  |  38<H>  ----------------------------<  120<H>  3.9   |  21<L>  |  0.97    Ca    9.6      06 Oct 2018 05:07  Phos  3.7     10-06  Mg     2.4     10-06    TPro  6.2  /  Alb  2.7<L>  /  TBili  0.7  /  DBili  x   /  AST  19  /  ALT  11  /  AlkPhos  62  10-06    PT/INR - ( 04 Oct 2018 23:58 )   PT: 11.4 sec;   INR: 1.04 ratio         PTT - ( 06 Oct 2018 05:07 )  PTT:49.0 sec    Arterial Blood Gas:  10-04 @ 23:58  7.43/41/158/27/100/2.9  ABG lactate: --  Arterial Blood Gas:  10-04 @ 17:39  7.52/32/97/26/99/3.8  ABG lactate: --  Arterial Blood Gas:  10-04 @ 10:09  7.50/35/109/27/99/3.8  ABG lactate: --      CAPILLARY BLOOD GLUCOSE    MICROBIOLOGY:     RADIOLOGY:  [ ] Reviewed and interpreted by me    Mode: AC/ CMV (Assist Control/ Continuous Mandatory Ventilation)  RR (machine): 14  TV (machine): 400  FiO2: 30  PEEP: 5  ITime: 1  MAP: 9  PIP: 23 Patient is a 84y old  Female who presents with a chief complaint of COPD exacerbation, ADHF (05 Oct 2018 07:47)      Interval Events: Patient transferred to RCU yesterday evening     REVIEW OF SYSTEMS:  [ ] Positive  [ ] All other systems negative  [x] Unable to assess ROS because patient is Lethargic     Vital Signs Last 24 Hrs  T(C): 37.1 (10-06-18 @ 06:45), Max: 37.2 (10-05-18 @ 12:00)  T(F): 98.7 (10-06-18 @ 06:45), Max: 99 (10-05-18 @ 12:00)  HR: 60 (10-06-18 @ 07:49) (59 - 87)  BP: 138/72 (10-06-18 @ 06:45) (138/72 - 188/98)  RR: 20 (10-06-18 @ 06:45) (15 - 42)  SpO2: 100% (10-06-18 @ 07:49) (95% - 100%)    PHYSICAL EXAM:  HEENT:   [x]Tracheostomy: # 6 Cuffed Shiley   [x]Pupils equal  [ ]No oral lesions  [ ]Abnormal    SKIN  [x]No Rash  [ ] Abnormal  [ ] pressure    CARDIAC  [x]Regular: + S1, S2, Systolic Murmur   [ ]Abnormal    PULMONARY  [x]Bilateral Coarse breath sounds, Slightly diminished Left base   [ ]Normal Excursion  [ ]Abnormal    GI  [x]PEG      [x] +BS		              [x]Soft, Protuberant, nontender	  [ ]Abnormal    MUSCULOSKELETAL                                   [ ]Bedbound                 [ ]Abnormal    [x]OOB to chair                           EXTREMITIES                                         [ ]Normal  [x]Edema: Trace Pedal edema                           NEUROLOGIC  [x] Normal, non focal: lethargic but responsive   [ ] Focal findings:    PSYCHIATRIC  [ ]Alert and appropriate  [x] Sedated	 [ ]Agitated    :  [place]: [x] Yes, if yes: Date of Placement: Placed in MICU For Urinary retention, will attempt TOV                    [  ] No    LINES: Central Lines [ ] Yes, if yes: Date of Placement                                     [ x] No    HOSPITAL MEDICATIONS:  MEDICATIONS  (STANDING):  ALBUTerol/ipratropium for Nebulization 3 milliLiter(s) Nebulizer every 6 hours  aspirin  chewable 81 milliGRAM(s) Oral daily  buDESOnide   0.25 milliGRAM(s) Respule 0.25 milliGRAM(s) Inhalation every 12 hours  clonazePAM Tablet 0.5 milliGRAM(s) Oral every 12 hours  diltiazem    Tablet 30 milliGRAM(s) Oral every 6 hours  fentaNYL   Patch  25 MICROgram(s)/Hr 1 Patch Transdermal every 72 hours  heparin  Infusion 850 Unit(s)/Hr (8.5 mL/Hr) IV Continuous <Continuous>  hydrALAZINE 25 milliGRAM(s) Oral every 8 hours  latanoprost 0.005% Ophthalmic Solution 1 Drop(s) Both EYES at bedtime  pantoprazole  Injectable 40 milliGRAM(s) IV Push two times a day  senna 2 Tablet(s) Oral at bedtime  sodium chloride 3%  Inhalation 3 milliLiter(s) Inhalation two times a day    MEDICATIONS  (PRN):      LABS:                        7.6    9.5   )-----------( 135      ( 06 Oct 2018 05:07 )             24.0     10-06    147<H>  |  111<H>  |  38<H>  ----------------------------<  120<H>  3.9   |  21<L>  |  0.97    Ca    9.6      06 Oct 2018 05:07  Phos  3.7     10-06  Mg     2.4     10-06    TPro  6.2  /  Alb  2.7<L>  /  TBili  0.7  /  DBili  x   /  AST  19  /  ALT  11  /  AlkPhos  62  10-06    PT/INR - ( 04 Oct 2018 23:58 )   PT: 11.4 sec;   INR: 1.04 ratio         PTT - ( 06 Oct 2018 05:07 )  PTT:49.0 sec    Arterial Blood Gas:  10-04 @ 23:58  7.43/41/158/27/100/2.9  ABG lactate: --  Arterial Blood Gas:  10-04 @ 17:39  7.52/32/97/26/99/3.8  ABG lactate: --  Arterial Blood Gas:  10-04 @ 10:09  7.50/35/109/27/99/3.8  ABG lactate: --      CAPILLARY BLOOD GLUCOSE    MICROBIOLOGY:     RADIOLOGY:  [ ] Reviewed and interpreted by me    Mode: AC/ CMV (Assist Control/ Continuous Mandatory Ventilation)  RR (machine): 14  TV (machine): 400  FiO2: 30  PEEP: 5  ITime: 1  MAP: 9  PIP: 23

## 2018-10-06 NOTE — PROGRESS NOTE ADULT - PROBLEM SELECTOR PLAN 2
CXR 10/1: Multifocal Pneumonia and Pulmonary Vascular Congestion   Sputum Cx 10/3 : Normal Respiratory María   Rapid Viral Panel 9/14: + Enterovirus / Rhinovirus   Patient S/p course of Meropenem

## 2018-10-06 NOTE — PROGRESS NOTE ADULT - PROBLEM SELECTOR PLAN 6
ECHO 9/14: + Mechanical Mitral Valve, Severe AS, Stage 3 Diastolic Dysfxn   Patient With Hx of Sick Sinus Syndrome and PPM  / Atrial Fibrillation   Continue Cardizem 30 mg q 6 hrs   Continue Hydralazine 25 mg q 8 hrs   Continue to monitor BP and Heart Rate

## 2018-10-07 LAB
ANION GAP SERPL CALC-SCNC: 13 MMOL/L — SIGNIFICANT CHANGE UP (ref 5–17)
APTT BLD: 45 SEC — HIGH (ref 27.5–37.4)
APTT BLD: 59.3 SEC — HIGH (ref 27.5–37.4)
APTT BLD: 65.4 SEC — HIGH (ref 27.5–37.4)
APTT BLD: 78.6 SEC — HIGH (ref 27.5–37.4)
BUN SERPL-MCNC: 32 MG/DL — HIGH (ref 7–23)
CALCIUM SERPL-MCNC: 10.3 MG/DL — SIGNIFICANT CHANGE UP (ref 8.4–10.5)
CHLORIDE SERPL-SCNC: 110 MMOL/L — HIGH (ref 96–108)
CO2 SERPL-SCNC: 24 MMOL/L — SIGNIFICANT CHANGE UP (ref 22–31)
CREAT SERPL-MCNC: 0.84 MG/DL — SIGNIFICANT CHANGE UP (ref 0.5–1.3)
GLUCOSE SERPL-MCNC: 122 MG/DL — HIGH (ref 70–99)
HCT VFR BLD CALC: 24.8 % — LOW (ref 34.5–45)
HGB BLD-MCNC: 7.8 G/DL — LOW (ref 11.5–15.5)
INR BLD: 1.11 RATIO — SIGNIFICANT CHANGE UP (ref 0.88–1.16)
MAGNESIUM SERPL-MCNC: 2.4 MG/DL — SIGNIFICANT CHANGE UP (ref 1.6–2.6)
MCHC RBC-ENTMCNC: 29.6 PG — SIGNIFICANT CHANGE UP (ref 27–34)
MCHC RBC-ENTMCNC: 31.6 GM/DL — LOW (ref 32–36)
MCV RBC AUTO: 93.8 FL — SIGNIFICANT CHANGE UP (ref 80–100)
PHOSPHATE SERPL-MCNC: 3.4 MG/DL — SIGNIFICANT CHANGE UP (ref 2.5–4.5)
PLATELET # BLD AUTO: 110 K/UL — LOW (ref 150–400)
POTASSIUM SERPL-MCNC: 3.8 MMOL/L — SIGNIFICANT CHANGE UP (ref 3.5–5.3)
POTASSIUM SERPL-SCNC: 3.8 MMOL/L — SIGNIFICANT CHANGE UP (ref 3.5–5.3)
PROTHROM AB SERPL-ACNC: 12 SEC — SIGNIFICANT CHANGE UP (ref 9.8–12.7)
RBC # BLD: 2.64 M/UL — LOW (ref 3.8–5.2)
RBC # FLD: 18.1 % — HIGH (ref 10.3–14.5)
SODIUM SERPL-SCNC: 147 MMOL/L — HIGH (ref 135–145)
WBC # BLD: 9.1 K/UL — SIGNIFICANT CHANGE UP (ref 3.8–10.5)
WBC # FLD AUTO: 9.1 K/UL — SIGNIFICANT CHANGE UP (ref 3.8–10.5)

## 2018-10-07 PROCEDURE — 99233 SBSQ HOSP IP/OBS HIGH 50: CPT | Mod: GC

## 2018-10-07 PROCEDURE — 74018 RADEX ABDOMEN 1 VIEW: CPT | Mod: 26

## 2018-10-07 RX ORDER — HEPARIN SODIUM 5000 [USP'U]/ML
1200 INJECTION INTRAVENOUS; SUBCUTANEOUS
Qty: 25000 | Refills: 0 | Status: DISCONTINUED | OUTPATIENT
Start: 2018-10-07 | End: 2018-10-10

## 2018-10-07 RX ORDER — ACETAMINOPHEN 500 MG
650 TABLET ORAL EVERY 6 HOURS
Qty: 0 | Refills: 0 | Status: DISCONTINUED | OUTPATIENT
Start: 2018-10-07 | End: 2018-11-02

## 2018-10-07 RX ORDER — WARFARIN SODIUM 2.5 MG/1
5 TABLET ORAL ONCE
Qty: 0 | Refills: 0 | Status: COMPLETED | OUTPATIENT
Start: 2018-10-07 | End: 2018-10-07

## 2018-10-07 RX ORDER — HYDRALAZINE HCL 50 MG
50 TABLET ORAL EVERY 8 HOURS
Qty: 0 | Refills: 0 | Status: DISCONTINUED | OUTPATIENT
Start: 2018-10-07 | End: 2018-10-15

## 2018-10-07 RX ADMIN — Medication 3 MILLILITER(S): at 05:06

## 2018-10-07 RX ADMIN — HEPARIN SODIUM 12 UNIT(S)/HR: 5000 INJECTION INTRAVENOUS; SUBCUTANEOUS at 09:14

## 2018-10-07 RX ADMIN — LATANOPROST 1 DROP(S): 0.05 SOLUTION/ DROPS OPHTHALMIC; TOPICAL at 22:22

## 2018-10-07 RX ADMIN — HEPARIN SODIUM 12 UNIT(S)/HR: 5000 INJECTION INTRAVENOUS; SUBCUTANEOUS at 22:21

## 2018-10-07 RX ADMIN — HEPARIN SODIUM 10 UNIT(S)/HR: 5000 INJECTION INTRAVENOUS; SUBCUTANEOUS at 03:03

## 2018-10-07 RX ADMIN — Medication 81 MILLIGRAM(S): at 11:47

## 2018-10-07 RX ADMIN — HEPARIN SODIUM 12 UNIT(S)/HR: 5000 INJECTION INTRAVENOUS; SUBCUTANEOUS at 16:40

## 2018-10-07 RX ADMIN — SIMETHICONE 80 MILLIGRAM(S): 80 TABLET, CHEWABLE ORAL at 05:42

## 2018-10-07 RX ADMIN — SIMETHICONE 80 MILLIGRAM(S): 80 TABLET, CHEWABLE ORAL at 22:22

## 2018-10-07 RX ADMIN — PANTOPRAZOLE SODIUM 40 MILLIGRAM(S): 20 TABLET, DELAYED RELEASE ORAL at 05:42

## 2018-10-07 RX ADMIN — SIMETHICONE 80 MILLIGRAM(S): 80 TABLET, CHEWABLE ORAL at 13:24

## 2018-10-07 RX ADMIN — Medication 3 MILLILITER(S): at 00:32

## 2018-10-07 RX ADMIN — Medication 650 MILLIGRAM(S): at 15:57

## 2018-10-07 RX ADMIN — Medication 25 MILLIGRAM(S): at 05:42

## 2018-10-07 RX ADMIN — Medication 3 MILLILITER(S): at 18:06

## 2018-10-07 RX ADMIN — SODIUM CHLORIDE 3 MILLILITER(S): 9 INJECTION INTRAMUSCULAR; INTRAVENOUS; SUBCUTANEOUS at 05:07

## 2018-10-07 RX ADMIN — Medication 50 MILLIGRAM(S): at 22:21

## 2018-10-07 RX ADMIN — Medication 0.25 MILLIGRAM(S): at 05:07

## 2018-10-07 RX ADMIN — WARFARIN SODIUM 5 MILLIGRAM(S): 2.5 TABLET ORAL at 22:20

## 2018-10-07 RX ADMIN — Medication 650 MILLIGRAM(S): at 17:00

## 2018-10-07 RX ADMIN — Medication 0.25 MILLIGRAM(S): at 18:06

## 2018-10-07 RX ADMIN — SENNA PLUS 2 TABLET(S): 8.6 TABLET ORAL at 22:22

## 2018-10-07 RX ADMIN — Medication 50 MILLIGRAM(S): at 15:57

## 2018-10-07 RX ADMIN — PANTOPRAZOLE SODIUM 40 MILLIGRAM(S): 20 TABLET, DELAYED RELEASE ORAL at 17:23

## 2018-10-07 RX ADMIN — SODIUM CHLORIDE 3 MILLILITER(S): 9 INJECTION INTRAMUSCULAR; INTRAVENOUS; SUBCUTANEOUS at 18:07

## 2018-10-07 RX ADMIN — Medication 3 MILLILITER(S): at 11:35

## 2018-10-07 NOTE — PROGRESS NOTE ADULT - PROBLEM SELECTOR PLAN 1
Patient Failed Extubation attempt x 2   Patient S/p Tracheostomy 10/3 ( # 6 Cuffed Bert)   Attempt weaning trials as tolerated  Continue Nebulizers and Chest PT Patient Failed Extubation attempt x 2   Patient S/p Tracheostomy 10/3 ( # 6 Cuffed Bert)   Attempt weaning trials as tolerated, tolerating CPAP Today   Continue Nebulizers and Chest PT

## 2018-10-07 NOTE — CHART NOTE - NSCHARTNOTEFT_GEN_A_CORE
Contact Note.    Asked by NP to speak w/ family regarding tube feeds.  Pt is S/P trach 10/3, S/P PEG 10/4. Had abd distention 10/5 w/ decompression via PEG. Tube feeds initiated 10/5 Vital 1.2 @ 10cc/hr x 18 hrs. Per GI, if abd is less distended later today, EN rate can be increased to 20cc/hr. Per RN pt did pass flatus today. Discussed EN w/ pt's son. Contact Note.    Asked by NP to speak w/ family regarding tube feeds.  Pt is S/P trach 10/3, S/P PEG 10/4. Had abd distention 10/5 w/ decompression via PEG. Tube feeds initiated 10/5 Vital 1.2 @ 10cc/hr x 18 hrs. Per GI, if abd is less distended later today, EN rate can be increased to 20cc/hr. Per RN pt did pass flatus today. Discussed EN w/ pt's son. Vital 1.2 goal  60 cc/hr x 18 hrs provides 1296 kcals, 81 gm protein, 876cc free water  meets 21 Kcal/Kg, 1.3Gm/kg dosing wt 62.9kg Contact Note.    Asked by NP to speak w/ family regarding tube feeds.  Pt is S/P trach 10/3, S/P PEG 10/4. Had abd distention 10/5 w/ decompression via PEG. Tube feeds initiated 10/5 Vital 1.2 @ 10cc/hr x 18 hrs. Per GI, if abd is less distended later today, EN rate can be increased to 20cc/hr. Per RN pt did pass flatus today. Discussed EN w/ pt's son.  Recommend advancing tube feeds as tolerated, goal is Vital 1.2  60 cc/hr x 18 hrs provides 1296 kcals, 81 gm protein, 876cc free water  meets 21 Kcal/Kg, 1.3Gm/kg dosing wt 62.9kg Contact Note.    Asked to speak w/ family regarding tube feeds.  Pt is S/P trach 10/3, S/P PEG 10/4. Had abd distention 10/5 w/ decompression via PEG. Tube feeds initiated 10/5 Vital 1.2 @ 10cc/hr x 18 hrs. Per NP GI saw pt this am, if abd is less distended later today, EN rate can be increased to 20cc/hr. Per RN pt did pass flatus today. Discussed EN w/ pt's son.  Recommend advancing tube feeds as tolerated, goal is Vital 1.2  60 cc/hr x 18 hrs provides 1296 kcals, 81 gm protein, 876cc free water  meets 21 Kcal/Kg, 1.3Gm/kg dosing wt 62.9kg

## 2018-10-07 NOTE — PROGRESS NOTE ADULT - ASSESSMENT
84  Year old Female with PMH significant for COPD, JENNIE on BiPAP at home , multivalvular disease (severe AS Not a candidate for TAVR, S/p MV replacement), HFpEF/ Severe diastolic failure, A-fib on coumadin, sick sinus syndrome S/p pacemaker placement, HTN, and CKD3 who was admitted for acute respiratory failure requiring intubation suspected to be 2/2 COPD exacerbation c/b PNA w/ +entero/rhinovirus and GN coccobacillus and H. influenza bacteremia, requiring continued respiratory support and tracheostomy. Patient S/p Trach placement 10/3 and PEG Placement 10/4.     10/7: Patient remains with abdominal distention today but tolerating trickle feeds, 84  Year old Female with PMH significant for COPD, JENNIE on BiPAP at home , multivalvular disease (severe AS Not a candidate for TAVR, S/p MV replacement), HFpEF/ Severe diastolic failure, A-fib on coumadin, sick sinus syndrome S/p pacemaker placement, HTN, and CKD3 who was admitted for acute respiratory failure requiring intubation suspected to be 2/2 COPD exacerbation c/b PNA w/ +entero/rhinovirus and GN coccobacillus and H. influenza bacteremia, requiring continued respiratory support and tracheostomy. Patient S/p Trach placement 10/3 and PEG Placement 10/4.     10/7: Patient remains with abdominal distention today but tolerating trickle feeds, ABD X-ray  performed this morning patient remains with air filled loops of small and large bowel. GI fellow called to re-eval the patient this morning, X-ray  reviewed slightly worsened compared to yesterday. GI fellow recommended to place patient in Left lateral decubitus position and oob to chair later today. Patient with large amount of gas expressed with turning as Per RN. As per GI no role for rectal tube at this time and can continue to advance feeds as tolerated to promote gastric motility. H+H Has remained stable case d/w  will restart coumadin this evening. Pt with elevated bp will increase Hydralazine 50 mg q 8 hr

## 2018-10-07 NOTE — PROGRESS NOTE ADULT - PROBLEM SELECTOR PLAN 9
Sodium 147 today   Will re-start trickle feeds and add free water 150 cc q 12 hrs   Monitor Daily Bmp Sodium 147 today   Cont trickle feeds and free water 150 cc q 12 hrs   Monitor Daily Bmp

## 2018-10-07 NOTE — PROGRESS NOTE ADULT - PROBLEM SELECTOR PLAN 10
D/c Planning when Medically stable D/c Planning when Medically stable  Case d/w son at bedside extensively

## 2018-10-07 NOTE — PROGRESS NOTE ADULT - ATTENDING COMMENTS
Patient remains with abdominal distention today but tolerating trickle feeds, ABD X-ray  performed this morning patient remains with air filled loops of small and large bowel. GI fellow called to re-eval the patient this morning, X-ray  reviewed slightly worsened compared to yesterday. GI fellow recommended to place patient in Left lateral decubitus position and oob to chair later today. Patient with large amount of gas expressed with turning as Per RN. As per GI no role for rectal tube at this time and can continue to advance feeds as tolerated to promote gastric motility. H+H Has remained stable will restart coumadin this evening. Pt with elevated bp will increase Hydralazine 50 mg q 8 hr

## 2018-10-07 NOTE — PROGRESS NOTE ADULT - PROBLEM SELECTOR PLAN 8
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

## 2018-10-07 NOTE — PROGRESS NOTE ADULT - SUBJECTIVE AND OBJECTIVE BOX
Patient is a 84y old  Female who presents with a chief complaint of COPD exacerbation, ADHF (06 Oct 2018 08:50)      Interval Events: No events reported overnight     REVIEW OF SYSTEMS:  [ ] Positive  [ ] All other systems negative  [x] Unable to assess ROS because patient not responding to Verbal questioning     Vital Signs Last 24 Hrs  T(C): 36.9 (10-07-18 @ 04:25), Max: 37 (10-06-18 @ 21:30)  T(F): 98.4 (10-07-18 @ 04:25), Max: 98.6 (10-06-18 @ 21:30)  HR: 60 (10-07-18 @ 07:15) (60 - 85)  BP: 169/69 (10-07-18 @ 04:25) (148/65 - 169/69)  RR: 17 (10-07-18 @ 04:25) (14 - 25)  SpO2: 100% (10-07-18 @ 07:15) (98% - 100%)    PHYSICAL EXAM:  HEENT:   [x]Tracheostomy: # 6 Cuffed Shiley ( Sutures Remain in place )   [x]Pupils equal  [ ]No oral lesions  [ ]Abnormal    SKIN  [x]No Rash  [ ] Abnormal  [ ] pressure    CARDIAC  [x]Regular: + S1, S2, Systolic Murmur   [ ]Abnormal    PULMONARY  [x]Bilateral Coarse breath sounds, Slightly diminished Left base   [ ]Normal Excursion  [ ]Abnormal    GI  [x]PEG      [x] +BS		              [x]Soft, Protuberant, nontender	  [ ]Abnormal    MUSCULOSKELETAL                                   [ ]Bedbound                 [ ]Abnormal    [x]OOB to chair                           EXTREMITIES                                         [ ]Normal  [x]Edema: Trace Pedal edema                           NEUROLOGIC  [x] Normal, non focal: lethargic but responsive   [ ] Focal findings:    PSYCHIATRIC  [ ]Alert and appropriate  [x] Sedated	 [ ]Agitated    :  Rosales: [x] Yes, if yes: Date of Placement: Placed in MICU For Urinary retention, will attempt TOV                    [  ] No    LINES: Central Lines [ ] Yes, if yes: Date of Placement                                     [ x] No    HOSPITAL MEDICATIONS:  MEDICATIONS  (STANDING):  ALBUTerol/ipratropium for Nebulization 3 milliLiter(s) Nebulizer every 6 hours  aspirin  chewable 81 milliGRAM(s) Oral daily  buDESOnide   0.25 milliGRAM(s) Respule 0.25 milliGRAM(s) Inhalation every 12 hours  diltiazem    Tablet 30 milliGRAM(s) Oral every 6 hours  fentaNYL   Patch  25 MICROgram(s)/Hr 1 Patch Transdermal every 72 hours  heparin  Infusion 1200 Unit(s)/Hr (12 mL/Hr) IV Continuous <Continuous>  hydrALAZINE 25 milliGRAM(s) Oral every 8 hours  latanoprost 0.005% Ophthalmic Solution 1 Drop(s) Both EYES at bedtime  pantoprazole  Injectable 40 milliGRAM(s) IV Push two times a day  senna 2 Tablet(s) Oral at bedtime  simethicone 80 milliGRAM(s) Chew every 8 hours  sodium chloride 3%  Inhalation 3 milliLiter(s) Inhalation two times a day    MEDICATIONS  (PRN):  clonazePAM Tablet 0.5 milliGRAM(s) Oral every 12 hours PRN Agitation  melatonin 1 milliGRAM(s) Oral at bedtime PRN Insomnia      LABS:                        7.8    9.1   )-----------( 110      ( 07 Oct 2018 08:06 )             24.8     10-07    147<H>  |  110<H>  |  32<H>  ----------------------------<  122<H>  3.8   |  24  |  0.84    Ca    10.3      07 Oct 2018 08:06  Phos  3.4     10-07  Mg     2.4     10-07    TPro  6.2  /  Alb  2.7<L>  /  TBili  0.7  /  DBili  x   /  AST  19  /  ALT  11  /  AlkPhos  62  10-06    PT/INR - ( 07 Oct 2018 08:06 )   PT: 12.0 sec;   INR: 1.11 ratio         PTT - ( 07 Oct 2018 08:06 )  PTT:45.0 sec        CAPILLARY BLOOD GLUCOSE    MICROBIOLOGY:     RADIOLOGY:  [ ] Reviewed and interpreted by me    Mode: CPAP with PS  FiO2: 30  PEEP: 5  PS: 10  MAP: 7  PIP: 16 Patient is a 84y old  Female who presents with a chief complaint of COPD exacerbation, ADHF (06 Oct 2018 08:50)      Interval Events: No events reported overnight     REVIEW OF SYSTEMS:  [ ] Positive  [ ] All other systems negative  [x] Unable to assess ROS because patient not responding to Verbal questioning     Vital Signs Last 24 Hrs  T(C): 36.9 (10-07-18 @ 04:25), Max: 37 (10-06-18 @ 21:30)  T(F): 98.4 (10-07-18 @ 04:25), Max: 98.6 (10-06-18 @ 21:30)  HR: 60 (10-07-18 @ 07:15) (60 - 85)  BP: 169/69 (10-07-18 @ 04:25) (148/65 - 169/69)  RR: 17 (10-07-18 @ 04:25) (14 - 25)  SpO2: 100% (10-07-18 @ 07:15) (98% - 100%)    PHYSICAL EXAM:  HEENT:   [x]Tracheostomy: # 6 Cuffed Shiley ( Sutures Remain in place )   [x]Pupils equal  [ ]No oral lesions  [ ]Abnormal    SKIN  [x]No Rash  [ ] Abnormal  [ ] pressure    CARDIAC  [x]Regular: + S1, S2, Systolic Murmur   [ ]Abnormal    PULMONARY  [x]Bilateral Coarse breath sounds, Slightly diminished Left base   [ ]Normal Excursion  [ ]Abnormal    GI  [x]PEG      [x] +BS		              [x]Soft, Protuberant, nontender	  [ ]Abnormal    MUSCULOSKELETAL                                   [ ]Bedbound                 [ ]Abnormal    [x]OOB to chair                           EXTREMITIES                                         [ ]Normal  [x]Edema: Trace Pedal edema                           NEUROLOGIC  [x] Normal, non focal: lethargic but responsive   [ ] Focal findings:    PSYCHIATRIC  [ ]Alert and appropriate  [x] Sedated	 [ ]Agitated    :  Rosales: [ ] Yes, if yes: Date of Placement:                     [x] No Rosales removed yesterday     LINES: Central Lines [ ] Yes, if yes: Date of Placement                                     [ x] No    HOSPITAL MEDICATIONS:  MEDICATIONS  (STANDING):  ALBUTerol/ipratropium for Nebulization 3 milliLiter(s) Nebulizer every 6 hours  aspirin  chewable 81 milliGRAM(s) Oral daily  buDESOnide   0.25 milliGRAM(s) Respule 0.25 milliGRAM(s) Inhalation every 12 hours  diltiazem    Tablet 30 milliGRAM(s) Oral every 6 hours  fentaNYL   Patch  25 MICROgram(s)/Hr 1 Patch Transdermal every 72 hours  heparin  Infusion 1200 Unit(s)/Hr (12 mL/Hr) IV Continuous <Continuous>  hydrALAZINE 25 milliGRAM(s) Oral every 8 hours  latanoprost 0.005% Ophthalmic Solution 1 Drop(s) Both EYES at bedtime  pantoprazole  Injectable 40 milliGRAM(s) IV Push two times a day  senna 2 Tablet(s) Oral at bedtime  simethicone 80 milliGRAM(s) Chew every 8 hours  sodium chloride 3%  Inhalation 3 milliLiter(s) Inhalation two times a day    MEDICATIONS  (PRN):  clonazePAM Tablet 0.5 milliGRAM(s) Oral every 12 hours PRN Agitation  melatonin 1 milliGRAM(s) Oral at bedtime PRN Insomnia      LABS:                        7.8    9.1   )-----------( 110      ( 07 Oct 2018 08:06 )             24.8     10-07    147<H>  |  110<H>  |  32<H>  ----------------------------<  122<H>  3.8   |  24  |  0.84    Ca    10.3      07 Oct 2018 08:06  Phos  3.4     10-07  Mg     2.4     10-07    TPro  6.2  /  Alb  2.7<L>  /  TBili  0.7  /  DBili  x   /  AST  19  /  ALT  11  /  AlkPhos  62  10-06    PT/INR - ( 07 Oct 2018 08:06 )   PT: 12.0 sec;   INR: 1.11 ratio         PTT - ( 07 Oct 2018 08:06 )  PTT:45.0 sec        CAPILLARY BLOOD GLUCOSE    MICROBIOLOGY:     RADIOLOGY:  [ ] Reviewed and interpreted by me    Mode: CPAP with PS  FiO2: 30  PEEP: 5  PS: 10  MAP: 7  PIP: 16 Patient is a 84y old  Female who presents with a chief complaint of COPD exacerbation, ADHF (06 Oct 2018 08:50)      Interval Events: No events reported overnight     REVIEW OF SYSTEMS:  [ ] Positive  [ ] All other systems negative  [x] Unable to assess ROS because patient not responding to Verbal questioning     Vital Signs Last 24 Hrs  T(C): 36.9 (10-07-18 @ 04:25), Max: 37 (10-06-18 @ 21:30)  T(F): 98.4 (10-07-18 @ 04:25), Max: 98.6 (10-06-18 @ 21:30)  HR: 60 (10-07-18 @ 07:15) (60 - 85)  BP: 169/69 (10-07-18 @ 04:25) (148/65 - 169/69)  RR: 17 (10-07-18 @ 04:25) (14 - 25)  SpO2: 100% (10-07-18 @ 07:15) (98% - 100%)    PHYSICAL EXAM:  HEENT:   [x]Tracheostomy: # 6 Cuffed Shiley ( Sutures Remain in place )   [x]Pupils equal  [ ]No oral lesions  [ ]Abnormal    SKIN  [x]No Rash  [ ] Abnormal  [ ] pressure    CARDIAC  [x]Regular: + S1, S2, Systolic Murmur   [ ]Abnormal    PULMONARY  [x]Bilateral Coarse breath sounds, Slightly diminished Left base   [ ]Normal Excursion  [ ]Abnormal    GI  [x]PEG      [x] +BS		              [x]Soft, Protuberant, nontender	  [ ]Abnormal    MUSCULOSKELETAL                                   [ ]Bedbound                 [ ]Abnormal    [x]OOB to chair                           EXTREMITIES                                         [ ]Normal  [x]Edema: Trace Pedal edema                           NEUROLOGIC  [x] Normal, non focal, Alert, Responsive   [ ] Focal findings:    PSYCHIATRIC  [x]Alert and appropriate  [ ] Sedated	 [ ]Agitated    :  Rosales: [ ] Yes, if yes: Date of Placement:                     [x] No Rosales removed yesterday     LINES: Central Lines [ ] Yes, if yes: Date of Placement                                     [ x] No    HOSPITAL MEDICATIONS:  MEDICATIONS  (STANDING):  ALBUTerol/ipratropium for Nebulization 3 milliLiter(s) Nebulizer every 6 hours  aspirin  chewable 81 milliGRAM(s) Oral daily  buDESOnide   0.25 milliGRAM(s) Respule 0.25 milliGRAM(s) Inhalation every 12 hours  diltiazem    Tablet 30 milliGRAM(s) Oral every 6 hours  fentaNYL   Patch  25 MICROgram(s)/Hr 1 Patch Transdermal every 72 hours  heparin  Infusion 1200 Unit(s)/Hr (12 mL/Hr) IV Continuous <Continuous>  hydrALAZINE 25 milliGRAM(s) Oral every 8 hours  latanoprost 0.005% Ophthalmic Solution 1 Drop(s) Both EYES at bedtime  pantoprazole  Injectable 40 milliGRAM(s) IV Push two times a day  senna 2 Tablet(s) Oral at bedtime  simethicone 80 milliGRAM(s) Chew every 8 hours  sodium chloride 3%  Inhalation 3 milliLiter(s) Inhalation two times a day    MEDICATIONS  (PRN):  clonazePAM Tablet 0.5 milliGRAM(s) Oral every 12 hours PRN Agitation  melatonin 1 milliGRAM(s) Oral at bedtime PRN Insomnia      LABS:                        7.8    9.1   )-----------( 110      ( 07 Oct 2018 08:06 )             24.8     10-07    147<H>  |  110<H>  |  32<H>  ----------------------------<  122<H>  3.8   |  24  |  0.84    Ca    10.3      07 Oct 2018 08:06  Phos  3.4     10-07  Mg     2.4     10-07    TPro  6.2  /  Alb  2.7<L>  /  TBili  0.7  /  DBili  x   /  AST  19  /  ALT  11  /  AlkPhos  62  10-06    PT/INR - ( 07 Oct 2018 08:06 )   PT: 12.0 sec;   INR: 1.11 ratio         PTT - ( 07 Oct 2018 08:06 )  PTT:45.0 sec        CAPILLARY BLOOD GLUCOSE    MICROBIOLOGY:     RADIOLOGY:  [ ] Reviewed and interpreted by me    Mode: CPAP with PS  FiO2: 30  PEEP: 5  PS: 10  MAP: 7  PIP: 16

## 2018-10-07 NOTE — PROGRESS NOTE ADULT - PROBLEM SELECTOR PLAN 6
ECHO 9/14: + Mechanical Mitral Valve, Severe AS, Stage 3 Diastolic Dysfxn   Patient With Hx of Sick Sinus Syndrome and PPM  / Atrial Fibrillation   Continue Cardizem 30 mg q 6 hrs   Continue Hydralazine 25 mg q 8 hrs   Continue to monitor BP and Heart Rate ECHO 9/14: + Mechanical Mitral Valve, Severe AS, Stage 3 Diastolic Dysfxn   Patient With Hx of Sick Sinus Syndrome and PPM  / Atrial Fibrillation   Patient with elevated BP Hydralazine increased 50 mg q 8 hrs   Continue Cardizem 30 mg q 6 hrs   Continue to monitor BP and Heart Rate

## 2018-10-07 NOTE — PROGRESS NOTE ADULT - PROBLEM SELECTOR PLAN 4
Patient S/p PEG 10/4  Patient with Gaseous distention s/p PEG   KUB 10/5: Multiple Non-dilated loops of small and large bowel   GI follow up appreciated will attempt trickle feeds   Will add ATC Simethicone and monitor for residuals Patient S/p PEG 10/4  Patient with Gaseous distention s/p PEG   KUB 10/5: Multiple Non-dilated loops of small and large bowel   Follow up official KUB report from 10/7  Frequent turning and repositioning, continue Simethicone 80 mg q 8 hrs    Continue Trickle feeds as per GI

## 2018-10-07 NOTE — PROGRESS NOTE ADULT - PROBLEM SELECTOR PLAN 5
Patient with HX of AS ( Not a candidate for TAVR )  Patient S/p Mechanical Mitral Valve Replacement   Continue AC with Heparin Drip to maintain therapeutic INR   Will hold on Coumadin dosing tonight as per MICU report patient with "Dark BM" yesterday and recent stool occult positive, will monitor for H+H Stability prior to restating AC Patient with HX of AS ( Not a candidate for TAVR )  Patient S/p Mechanical Mitral Valve Replacement   Continue AC with Heparin Drip to maintain therapeutic INR   H+H has remained stable will restart coumadin 5 mg this evening

## 2018-10-08 LAB
ANION GAP SERPL CALC-SCNC: 14 MMOL/L — SIGNIFICANT CHANGE UP (ref 5–17)
APTT BLD: 69.2 SEC — HIGH (ref 27.5–37.4)
APTT BLD: > 200 SEC (ref 27.5–37.4)
BUN SERPL-MCNC: 33 MG/DL — HIGH (ref 7–23)
CALCIUM SERPL-MCNC: 10.2 MG/DL — SIGNIFICANT CHANGE UP (ref 8.4–10.5)
CHLORIDE SERPL-SCNC: 104 MMOL/L — SIGNIFICANT CHANGE UP (ref 96–108)
CO2 SERPL-SCNC: 26 MMOL/L — SIGNIFICANT CHANGE UP (ref 22–31)
CREAT SERPL-MCNC: 0.9 MG/DL — SIGNIFICANT CHANGE UP (ref 0.5–1.3)
CULTURE RESULTS: SIGNIFICANT CHANGE UP
CULTURE RESULTS: SIGNIFICANT CHANGE UP
GLUCOSE SERPL-MCNC: 188 MG/DL — HIGH (ref 70–99)
HCT VFR BLD CALC: 22.6 % — LOW (ref 34.5–45)
HCT VFR BLD CALC: 25.2 % — LOW (ref 34.5–45)
HGB BLD-MCNC: 7 G/DL — CRITICAL LOW (ref 11.5–15.5)
HGB BLD-MCNC: 7.7 G/DL — LOW (ref 11.5–15.5)
INR BLD: 1.21 RATIO — HIGH (ref 0.88–1.16)
MAGNESIUM SERPL-MCNC: 2.4 MG/DL — SIGNIFICANT CHANGE UP (ref 1.6–2.6)
MCHC RBC-ENTMCNC: 28.7 PG — SIGNIFICANT CHANGE UP (ref 27–34)
MCHC RBC-ENTMCNC: 29.2 PG — SIGNIFICANT CHANGE UP (ref 27–34)
MCHC RBC-ENTMCNC: 30.7 GM/DL — LOW (ref 32–36)
MCHC RBC-ENTMCNC: 30.9 GM/DL — LOW (ref 32–36)
MCV RBC AUTO: 93.5 FL — SIGNIFICANT CHANGE UP (ref 80–100)
MCV RBC AUTO: 94.4 FL — SIGNIFICANT CHANGE UP (ref 80–100)
PHOSPHATE SERPL-MCNC: 3.9 MG/DL — SIGNIFICANT CHANGE UP (ref 2.5–4.5)
PLATELET # BLD AUTO: 107 K/UL — LOW (ref 150–400)
PLATELET # BLD AUTO: 116 K/UL — LOW (ref 150–400)
POTASSIUM SERPL-MCNC: 3.1 MMOL/L — LOW (ref 3.5–5.3)
POTASSIUM SERPL-SCNC: 3.1 MMOL/L — LOW (ref 3.5–5.3)
PROTHROM AB SERPL-ACNC: 13.1 SEC — HIGH (ref 9.8–12.7)
RBC # BLD: 2.4 M/UL — LOW (ref 3.8–5.2)
RBC # BLD: 2.69 M/UL — LOW (ref 3.8–5.2)
RBC # FLD: 18.2 % — HIGH (ref 10.3–14.5)
RBC # FLD: 18.8 % — HIGH (ref 10.3–14.5)
SODIUM SERPL-SCNC: 144 MMOL/L — SIGNIFICANT CHANGE UP (ref 135–145)
SPECIMEN SOURCE: SIGNIFICANT CHANGE UP
SPECIMEN SOURCE: SIGNIFICANT CHANGE UP
WBC # BLD: 8.3 K/UL — SIGNIFICANT CHANGE UP (ref 3.8–10.5)
WBC # BLD: 8.8 K/UL — SIGNIFICANT CHANGE UP (ref 3.8–10.5)
WBC # FLD AUTO: 8.3 K/UL — SIGNIFICANT CHANGE UP (ref 3.8–10.5)
WBC # FLD AUTO: 8.8 K/UL — SIGNIFICANT CHANGE UP (ref 3.8–10.5)

## 2018-10-08 PROCEDURE — 99233 SBSQ HOSP IP/OBS HIGH 50: CPT | Mod: GC

## 2018-10-08 PROCEDURE — 74018 RADEX ABDOMEN 1 VIEW: CPT | Mod: 26

## 2018-10-08 RX ORDER — POTASSIUM CHLORIDE 20 MEQ
40 PACKET (EA) ORAL
Qty: 0 | Refills: 0 | Status: COMPLETED | OUTPATIENT
Start: 2018-10-08 | End: 2018-10-08

## 2018-10-08 RX ADMIN — SIMETHICONE 80 MILLIGRAM(S): 80 TABLET, CHEWABLE ORAL at 21:24

## 2018-10-08 RX ADMIN — SIMETHICONE 80 MILLIGRAM(S): 80 TABLET, CHEWABLE ORAL at 05:46

## 2018-10-08 RX ADMIN — Medication 3 MILLILITER(S): at 12:01

## 2018-10-08 RX ADMIN — FENTANYL CITRATE 1 PATCH: 50 INJECTION INTRAVENOUS at 01:29

## 2018-10-08 RX ADMIN — Medication 0.5 MILLIGRAM(S): at 22:35

## 2018-10-08 RX ADMIN — PANTOPRAZOLE SODIUM 40 MILLIGRAM(S): 20 TABLET, DELAYED RELEASE ORAL at 17:19

## 2018-10-08 RX ADMIN — Medication 40 MILLIEQUIVALENT(S): at 10:37

## 2018-10-08 RX ADMIN — SODIUM CHLORIDE 3 MILLILITER(S): 9 INJECTION INTRAMUSCULAR; INTRAVENOUS; SUBCUTANEOUS at 18:55

## 2018-10-08 RX ADMIN — Medication 3 MILLILITER(S): at 00:31

## 2018-10-08 RX ADMIN — Medication 0.25 MILLIGRAM(S): at 17:47

## 2018-10-08 RX ADMIN — SIMETHICONE 80 MILLIGRAM(S): 80 TABLET, CHEWABLE ORAL at 13:43

## 2018-10-08 RX ADMIN — HEPARIN SODIUM 12 UNIT(S)/HR: 5000 INJECTION INTRAVENOUS; SUBCUTANEOUS at 10:30

## 2018-10-08 RX ADMIN — Medication 40 MILLIEQUIVALENT(S): at 12:08

## 2018-10-08 RX ADMIN — LATANOPROST 1 DROP(S): 0.05 SOLUTION/ DROPS OPHTHALMIC; TOPICAL at 21:24

## 2018-10-08 RX ADMIN — SENNA PLUS 2 TABLET(S): 8.6 TABLET ORAL at 21:24

## 2018-10-08 RX ADMIN — Medication 50 MILLIGRAM(S): at 05:46

## 2018-10-08 RX ADMIN — Medication 0.25 MILLIGRAM(S): at 05:11

## 2018-10-08 RX ADMIN — Medication 3 MILLILITER(S): at 05:11

## 2018-10-08 RX ADMIN — Medication 50 MILLIGRAM(S): at 21:23

## 2018-10-08 RX ADMIN — Medication 1 MILLIGRAM(S): at 02:10

## 2018-10-08 RX ADMIN — Medication 3 MILLILITER(S): at 17:47

## 2018-10-08 RX ADMIN — SODIUM CHLORIDE 3 MILLILITER(S): 9 INJECTION INTRAMUSCULAR; INTRAVENOUS; SUBCUTANEOUS at 06:06

## 2018-10-08 RX ADMIN — Medication 50 MILLIGRAM(S): at 13:43

## 2018-10-08 RX ADMIN — PANTOPRAZOLE SODIUM 40 MILLIGRAM(S): 20 TABLET, DELAYED RELEASE ORAL at 05:54

## 2018-10-08 RX ADMIN — Medication 81 MILLIGRAM(S): at 12:01

## 2018-10-08 NOTE — OCCUPATIONAL THERAPY INITIAL EVALUATION ADULT - BALANCE DISTURBANCE, IDENTIFIED IMPAIRMENT CONTRIBUTE, REHAB EVAL
impaired coordination/impaired motor control/decreased ROM/decreased strength/impaired postural control

## 2018-10-08 NOTE — PROGRESS NOTE ADULT - ASSESSMENT
84  Year old Female with PMH significant for COPD, JENNIE on BiPAP at home , multivalvular disease (severe AS Not a candidate for TAVR, S/p MV replacement), HFpEF/ Severe diastolic failure, A-fib on coumadin, sick sinus syndrome S/p pacemaker placement, HTN, and CKD3 who was admitted for acute respiratory failure requiring intubation suspected to be 2/2 COPD exacerbation c/b PNA w/ +entero/rhinovirus and GN coccobacillus and H. influenza bacteremia, requiring continued respiratory support and tracheostomy. Patient S/p Trach placement 10/3 and PEG Placement 10/4.     10/7: Patient remains with abdominal distention today but tolerating trickle feeds, ABD X-ray  performed this morning patient remains with air filled loops of small and large bowel. GI fellow called to re-eval the patient this morning, X-ray  reviewed slightly worsened compared to yesterday. GI fellow recommended to place patient in Left lateral decubitus position and oob to chair later today. Patient with large amount of gas expressed with turning as Per RN. As per GI no role for rectal tube at this time and can continue to advance feeds as tolerated to promote gastric motility. H+H Has remained stable case d/w  will restart coumadin this evening. Pt with elevated bp will increase Hydralazine 50 mg q 8 hr   10/8-Abdominal distention noted with hypoactive bowel sounds, Kub noted from this weekend. Daily bowel movements noted. Will repeat PTT as there were 2 therapeutics prior on same dosing, also repeat CBC as well. Will transfuse if remains low. RCU weaning

## 2018-10-08 NOTE — OCCUPATIONAL THERAPY INITIAL EVALUATION ADULT - TRANSFER SAFETY CONCERNS NOTED: BED/CHAIR, REHAB EVAL
decreased sequencing ability/squat pivot/decreased safety awareness/losing balance/decreased balance during turns/decreased weight-shifting ability

## 2018-10-08 NOTE — PROGRESS NOTE ADULT - PROBLEM SELECTOR PLAN 5
Patient with HX of AS ( Not a candidate for TAVR )  Patient S/p Mechanical Mitral Valve Replacement   Continue AC with Heparin Drip to maintain therapeutic INR   H+H has remained stable will restart coumadin 5 mg this evening

## 2018-10-08 NOTE — OCCUPATIONAL THERAPY INITIAL EVALUATION ADULT - DIAGNOSIS, OT EVAL
Pt p/w decreased ADLs and functional mobility due to decreased ROM, strength, coordination, and balance.

## 2018-10-08 NOTE — CHART NOTE - NSCHARTNOTEFT_GEN_A_CORE
RCU follow up.     Requested to see patient secondary to abdominal distension. Patient seen by GI  with abdominal decompression via PEG on 10/5 . Per daughter, patient acknowledges abdominal pressure at this time.  Current tube feeding rate is trickling @ 10ml/hr. Patient receiving senna and simethicone with daily BM.     Admission history:  83 y/o F w/ a PMH COPD, JENNIE on BiPAP, multivalvular disease, severe diastolic failure, A-fib, s/p pacemaker placement, HTN, and CKD3 who was admitted for acute respiratory failure requiring intubation suspected to be 2/2 COPD exacerbation c/b PNA w/ +entero/rhinovirus and GN coccobacillus and H. influenza bacteremia.    S/P  trach, PEG placement. P    Source: Patient [X ]    Family [X ]   daughter  other [X ]  medical record/team       Enteral /Parenteral Nutrition: Vital AF @ 10ml/hr x28hrs    GI BM x1  Current Weight: 59.7kg  Dosing Weight  62.9kg  IBW=50kg    Pertinent Medications: MEDICATIONS  (STANDING):  ALBUTerol/ipratropium for Nebulization 3 milliLiter(s) Nebulizer every 6 hours  aspirin  chewable 81 milliGRAM(s) Oral daily  buDESOnide   0.25 milliGRAM(s) Respule 0.25 milliGRAM(s) Inhalation every 12 hours  diltiazem    Tablet 30 milliGRAM(s) Oral every 6 hours  fentaNYL   Patch  25 MICROgram(s)/Hr 1 Patch Transdermal every 72 hours  heparin  Infusion 1200 Unit(s)/Hr (12 mL/Hr) IV Continuous <Continuous>  hydrALAZINE 50 milliGRAM(s) Oral every 8 hours  latanoprost 0.005% Ophthalmic Solution 1 Drop(s) Both EYES at bedtime  pantoprazole  Injectable 40 milliGRAM(s) IV Push two times a day  potassium chloride   Solution 40 milliEquivalent(s) Enteral Tube every 2 hours  senna 2 Tablet(s) Oral at bedtime  simethicone 80 milliGRAM(s) Chew every 8 hours  sodium chloride 3%  Inhalation 3 milliLiter(s) Inhalation two times a day    MEDICATIONS  (PRN):  acetaminophen    Suspension .. 650 milliGRAM(s) Oral every 6 hours PRN Mild Pain (1 - 3)  clonazePAM Tablet 0.5 milliGRAM(s) Oral every 12 hours PRN Agitation  melatonin 1 milliGRAM(s) Oral at bedtime PRN Insomnia    Pertinent Labs:  10-08 Na144 mmol/L Glu 188 mg/dL<H> K+ 3.1 mmol/L<L> Cr  0.90 mg/dL BUN 33 mg/dL<H> 10-08 Phos 3.9 mg/dL 10-06 Alb 2.7 g/dL<L> 09-22 [id number] 7.2 %<H>      Skin: intact    Estimated Needs:   [X ] no change since previous assessment  [ ] recalculated:       Previous Nutrition Diagnosis: NONE             Recommendations Discussed increase in tube feed with NP and patient's daughter, recommend 20ml/hr x24 hrs       [X ] Enteral Nutrition Support Vital @ 20ml/hr x24 hrs increase 10ml/hr Q6hrs to goal rate @ 45ml/hr x24 hrs to provide total 1080ml,  1296calories, ~21/kg, protein 81gm, 1.28gm/kg based on dosing weight 62.9kg. Formula free water 891ml plus free water 150ml Q 12 hours total free water 1191ml.    [X ] Other: if  abdominal distension persists, consider  further decompression by GI        Monitoring and Evaluation: medical team     [ X] Tolerance to tube feeding [ X] weights [X ] follow up per protocol    [ ] other:

## 2018-10-08 NOTE — PROGRESS NOTE ADULT - PROBLEM SELECTOR PLAN 1
Patient Failed Extubation attempt x 2   Patient S/p Tracheostomy 10/3 ( # 6 Cuffed Bert)   Attempt weaning trials as tolerated daily  Continue Nebulizers and Chest PT

## 2018-10-08 NOTE — PROGRESS NOTE ADULT - PROBLEM SELECTOR PLAN 6
ECHO 9/14: + Mechanical Mitral Valve, Severe AS, Stage 3 Diastolic Dysfxn   Patient With Hx of Sick Sinus Syndrome and PPM  / Atrial Fibrillation   Patient with elevated BP Hydralazine increased 50 mg q 8 hrs   Continue Cardizem 30 mg q 6 hrs   Continue to monitor BP and Heart Rate

## 2018-10-08 NOTE — PROGRESS NOTE ADULT - ATTENDING COMMENTS
Patient examined and case reviewed in detail on bedside rounds. Agree with above.   -Acute Hypoxemic Respiratory Failure requiring mechanical ventilation - now with tracheostomy. Continue PSV as tolerated   -Oropharyngeal dysphagia - s/p PEG placement - with abdominal distension slowly improving. GI followup. Repeat Abd Xray  -Pain control and agitation -appears comfortable. Continue benzodiazepine  -Afib - transition to Coumadin. Monitor hemoglobin  -Hypokalemia - replete as necessary

## 2018-10-08 NOTE — OCCUPATIONAL THERAPY INITIAL EVALUATION ADULT - ADDITIONAL COMMENTS
CT abdomen (9/20): Suggestive of small airway disease at the lung bases. XRAY Chest (9/28): Ill-defined density seen in the left lung and the right middle lobe compatible with multifocal pneumonia. Status post mitral valve annulus repair. Status post sternotomy. All life support devices in good position and unchanged when compared to previous study done September 21, 2018.

## 2018-10-08 NOTE — OCCUPATIONAL THERAPY INITIAL EVALUATION ADULT - IMPAIRMENTS CONTRIBUTING IMPAIRED BED MOBILITY, REHAB EVAL
impaired motor control/impaired balance/decreased strength/decreased flexibility/impaired postural control/impaired coordination/decreased ROM

## 2018-10-08 NOTE — OCCUPATIONAL THERAPY INITIAL EVALUATION ADULT - IMPAIRED TRANSFERS: BED/CHAIR, REHAB EVAL
cognition/impaired coordination/decreased ROM/decreased flexibility/impaired motor control/impaired balance/decreased strength/impaired postural control

## 2018-10-08 NOTE — OCCUPATIONAL THERAPY INITIAL EVALUATION ADULT - PERTINENT HX OF CURRENT PROBLEM, REHAB EVAL
84F, PMH significant for COPD on home O2 (though has not used in past few months), JENNIE on BiPAP, rheumatic heart disease s/p mitral valve replacement, HFpEF, aortic stenosis, A-fib on coumadin, sick sinus syndrome s/p pacemaker placement, HTN, and CKD3 who presented to the ED w/ dyspnea x2 days. She had a known sick contact at home w/ a URI.Patient was initially placed on BiPAP in the ED, but she was persistently tachypneic and tripoding, and was subsequently intubated for work of breathing.

## 2018-10-08 NOTE — PROGRESS NOTE ADULT - PROBLEM SELECTOR PLAN 8
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

## 2018-10-08 NOTE — OCCUPATIONAL THERAPY INITIAL EVALUATION ADULT - LIVES WITH, PROFILE
Son Pt lives in a pvt house with son, there are 5 steps to enter and 10 to get to bedroom/bathroom. Pt owns walker. Pt lives in a pvt house with son, there are 5 steps to enter and 10 to get to bedroom/bathroom. Pt owns walker, HHA assists with bathing 2x a week, 4 hours a day

## 2018-10-08 NOTE — PROGRESS NOTE ADULT - SUBJECTIVE AND OBJECTIVE BOX
Patient is a 84y old  Female who presents with a chief complaint of COPD exacerbation, ADHF (07 Oct 2018 09:58)      Interval Events:    REVIEW OF SYSTEMS:  [ ] Positive  [x ] All other systems negative  [ ] Unable to assess ROS because ________    Vital Signs Last 24 Hrs  T(C): 37 (10-08-18 @ 04:59), Max: 37 (10-07-18 @ 11:33)  T(F): 98.6 (10-08-18 @ 04:59), Max: 98.6 (10-07-18 @ 11:33)  HR: 69 (10-08-18 @ 08:33) (60 - 78)  BP: 152/67 (10-08-18 @ 04:59) (125/56 - 158/55)  RR: 22 (10-08-18 @ 04:59) (16 - 22)  SpO2: 99% (10-08-18 @ 08:33) (97% - 100%)    PHYSICAL EXAM:  HEENT:   [x ]Tracheostomy: 10/3 joshley 6 cuffed by Dr Russell.  [ ]Pupils equal  [ ]No oral lesions  [ ]Abnormal    SKIN  [x ]No Rash  [ ] Abnormal  [ ] pressure    CARDIAC  [x ]Regular  [ ]Abnormal    PULMONARY  [x ]Bilateral Clear Breath Sounds  [ ]Normal Excursion  [ ]Abnormal    GI  [x ]PEG      [x ] +BS		              [ x]Soft, distended, nontender	  [ ]Abnormal    MUSCULOSKELETAL                                   [ ]Bedbound                 [ ]Abnormal    [x ]Ambulatory/OOB to chair                           EXTREMITIES                                         [x ]Normal  [ ]Edema                           NEUROLOGIC  [x ] Normal, non focal  [ ] Focal findings:    PSYCHIATRIC  [ x]Alert and appropriate  [ ] Sedated	 [ ]Agitated    :  Rosales: [ ] Yes, if yes: Date of Placement:                   [  x] No    LINES: Central Lines [ ] Yes, if yes: Date of Placement                                     [ x ] No    HOSPITAL MEDICATIONS:  MEDICATIONS  (STANDING):  ALBUTerol/ipratropium for Nebulization 3 milliLiter(s) Nebulizer every 6 hours  aspirin  chewable 81 milliGRAM(s) Oral daily  buDESOnide   0.25 milliGRAM(s) Respule 0.25 milliGRAM(s) Inhalation every 12 hours  diltiazem    Tablet 30 milliGRAM(s) Oral every 6 hours  fentaNYL   Patch  25 MICROgram(s)/Hr 1 Patch Transdermal every 72 hours  heparin  Infusion 1200 Unit(s)/Hr (12 mL/Hr) IV Continuous <Continuous>  hydrALAZINE 50 milliGRAM(s) Oral every 8 hours  latanoprost 0.005% Ophthalmic Solution 1 Drop(s) Both EYES at bedtime  pantoprazole  Injectable 40 milliGRAM(s) IV Push two times a day  potassium chloride   Solution 40 milliEquivalent(s) Enteral Tube every 2 hours  senna 2 Tablet(s) Oral at bedtime  simethicone 80 milliGRAM(s) Chew every 8 hours  sodium chloride 3%  Inhalation 3 milliLiter(s) Inhalation two times a day    MEDICATIONS  (PRN):  acetaminophen    Suspension .. 650 milliGRAM(s) Oral every 6 hours PRN Mild Pain (1 - 3)  clonazePAM Tablet 0.5 milliGRAM(s) Oral every 12 hours PRN Agitation  melatonin 1 milliGRAM(s) Oral at bedtime PRN Insomnia      LABS:                        7.0    8.8   )-----------( 116      ( 08 Oct 2018 07:23 )             22.6     10-08    144  |  104  |  33<H>  ----------------------------<  188<H>  3.1<L>   |  26  |  0.90    Ca    10.2      08 Oct 2018 07:23  Phos  3.9     10-08  Mg     2.4     10-08      PT/INR - ( 08 Oct 2018 07:23 )   PT: 13.1 sec;   INR: 1.21 ratio         PTT - ( 08 Oct 2018 07:23 )  PTT:> 200 sec        CAPILLARY BLOOD GLUCOSE    MICROBIOLOGY:     RADIOLOGY:  [ ] Reviewed and interpreted by me    Mode: CPAP with PS  FiO2: 30  PEEP: 5  PS: 10  MAP: 8  PIP: 16 Patient is a 84y old  Female who presents with a chief complaint of COPD exacerbation, ADHF (07 Oct 2018 09:58)      Interval Events: OOB to chair  Patient thirsty - wants pepsi    REVIEW OF SYSTEMS:  [ ] Positive  [x ] All other systems negative  [ ] Unable to assess ROS because ________    Vital Signs Last 24 Hrs  T(C): 37 (10-08-18 @ 04:59), Max: 37 (10-07-18 @ 11:33)  T(F): 98.6 (10-08-18 @ 04:59), Max: 98.6 (10-07-18 @ 11:33)  HR: 69 (10-08-18 @ 08:33) (60 - 78)  BP: 152/67 (10-08-18 @ 04:59) (125/56 - 158/55)  RR: 22 (10-08-18 @ 04:59) (16 - 22)  SpO2: 99% (10-08-18 @ 08:33) (97% - 100%)    PHYSICAL EXAM:  HEENT:   [x ]Tracheostomy: 10/3 ju 6 cuffed by Dr Russell.  [ ]Pupils equal  [ ]No oral lesions  [ ]Abnormal    SKIN  [x ]No Rash  [ ] Abnormal  [ ] pressure    CARDIAC  [x ]Regular  [ ]Abnormal    PULMONARY  [x ]Bilateral Clear Breath Sounds  [ ]Normal Excursion  [ ]Abnormal    GI  [x ]PEG      [x ] +BS		              [ x]Soft, distended, nontender	  [ ]Abnormal    MUSCULOSKELETAL                                   [ ]Bedbound                 [ ]Abnormal    [x ]Ambulatory/OOB to chair                           EXTREMITIES                                         [x ]Normal  [ ]Edema                           NEUROLOGIC  [x ] Normal, non focal  [ ] Focal findings:    PSYCHIATRIC  [ x]Alert and appropriate  [ ] Sedated	 [ ]Agitated    :  Rosales: [ ] Yes, if yes: Date of Placement:                   [  x] No    LINES: Central Lines [ ] Yes, if yes: Date of Placement                                     [ x ] No    HOSPITAL MEDICATIONS:  MEDICATIONS  (STANDING):  ALBUTerol/ipratropium for Nebulization 3 milliLiter(s) Nebulizer every 6 hours  aspirin  chewable 81 milliGRAM(s) Oral daily  buDESOnide   0.25 milliGRAM(s) Respule 0.25 milliGRAM(s) Inhalation every 12 hours  diltiazem    Tablet 30 milliGRAM(s) Oral every 6 hours  fentaNYL   Patch  25 MICROgram(s)/Hr 1 Patch Transdermal every 72 hours  heparin  Infusion 1200 Unit(s)/Hr (12 mL/Hr) IV Continuous <Continuous>  hydrALAZINE 50 milliGRAM(s) Oral every 8 hours  latanoprost 0.005% Ophthalmic Solution 1 Drop(s) Both EYES at bedtime  pantoprazole  Injectable 40 milliGRAM(s) IV Push two times a day  potassium chloride   Solution 40 milliEquivalent(s) Enteral Tube every 2 hours  senna 2 Tablet(s) Oral at bedtime  simethicone 80 milliGRAM(s) Chew every 8 hours  sodium chloride 3%  Inhalation 3 milliLiter(s) Inhalation two times a day    MEDICATIONS  (PRN):  acetaminophen    Suspension .. 650 milliGRAM(s) Oral every 6 hours PRN Mild Pain (1 - 3)  clonazePAM Tablet 0.5 milliGRAM(s) Oral every 12 hours PRN Agitation  melatonin 1 milliGRAM(s) Oral at bedtime PRN Insomnia      LABS:                        7.0    8.8   )-----------( 116      ( 08 Oct 2018 07:23 )             22.6     10-08    144  |  104  |  33<H>  ----------------------------<  188<H>  3.1<L>   |  26  |  0.90    Ca    10.2      08 Oct 2018 07:23  Phos  3.9     10-08  Mg     2.4     10-08      PT/INR - ( 08 Oct 2018 07:23 )   PT: 13.1 sec;   INR: 1.21 ratio         PTT - ( 08 Oct 2018 07:23 )  PTT:> 200 sec        CAPILLARY BLOOD GLUCOSE    MICROBIOLOGY:     RADIOLOGY:  [ ] Reviewed and interpreted by me    Mode: CPAP with PS  FiO2: 30  PEEP: 5  PS: 10  MAP: 8  PIP: 16

## 2018-10-08 NOTE — PROGRESS NOTE ADULT - PROBLEM SELECTOR PLAN 4
Patient S/p PEG 10/4  Patient with Gaseous distention s/p PEG   KUB 10/5: Multiple Non-dilated loops of small and large bowel   Follow up official KUB report from 10/7  Frequent turning and repositioning, continue Simethicone 80 mg q 8 hrs    Continue Trickle feeds as per GI

## 2018-10-08 NOTE — OCCUPATIONAL THERAPY INITIAL EVALUATION ADULT - BALANCE TRAINING, PT EVAL
GOAL: Pt will increase dynamic sitting balance to Fair (-) to increase participation in ADLs in 4 weeks

## 2018-10-08 NOTE — OCCUPATIONAL THERAPY INITIAL EVALUATION ADULT - GENERAL OBSERVATIONS, REHAB EVAL
Pt received semi-supine in bed, +trach, +IV. Pt received semi-supine in bed, +trach to vent, +PEG +IV. Pt received semi-supine in bed, +trach to vent, +PEG +IV, +pulse ox.

## 2018-10-09 LAB
ANION GAP SERPL CALC-SCNC: 12 MMOL/L — SIGNIFICANT CHANGE UP (ref 5–17)
APTT BLD: 81.8 SEC — HIGH (ref 27.5–37.4)
BUN SERPL-MCNC: 32 MG/DL — HIGH (ref 7–23)
CALCIUM SERPL-MCNC: 10.5 MG/DL — SIGNIFICANT CHANGE UP (ref 8.4–10.5)
CHLORIDE SERPL-SCNC: 110 MMOL/L — HIGH (ref 96–108)
CO2 SERPL-SCNC: 23 MMOL/L — SIGNIFICANT CHANGE UP (ref 22–31)
CREAT SERPL-MCNC: 0.82 MG/DL — SIGNIFICANT CHANGE UP (ref 0.5–1.3)
GLUCOSE SERPL-MCNC: 163 MG/DL — HIGH (ref 70–99)
HCT VFR BLD CALC: 24.1 % — LOW (ref 34.5–45)
HGB BLD-MCNC: 7.4 G/DL — LOW (ref 11.5–15.5)
INR BLD: 1.25 RATIO — HIGH (ref 0.88–1.16)
MAGNESIUM SERPL-MCNC: 2.2 MG/DL — SIGNIFICANT CHANGE UP (ref 1.6–2.6)
MCHC RBC-ENTMCNC: 29.3 PG — SIGNIFICANT CHANGE UP (ref 27–34)
MCHC RBC-ENTMCNC: 30.8 GM/DL — LOW (ref 32–36)
MCV RBC AUTO: 95.1 FL — SIGNIFICANT CHANGE UP (ref 80–100)
PHOSPHATE SERPL-MCNC: 2.2 MG/DL — LOW (ref 2.5–4.5)
PLATELET # BLD AUTO: 126 K/UL — LOW (ref 150–400)
POTASSIUM SERPL-MCNC: 4.2 MMOL/L — SIGNIFICANT CHANGE UP (ref 3.5–5.3)
POTASSIUM SERPL-SCNC: 4.2 MMOL/L — SIGNIFICANT CHANGE UP (ref 3.5–5.3)
PROTHROM AB SERPL-ACNC: 13.7 SEC — HIGH (ref 9.8–12.7)
RBC # BLD: 2.53 M/UL — LOW (ref 3.8–5.2)
RBC # FLD: 18.1 % — HIGH (ref 10.3–14.5)
SODIUM SERPL-SCNC: 145 MMOL/L — SIGNIFICANT CHANGE UP (ref 135–145)
WBC # BLD: 7.3 K/UL — SIGNIFICANT CHANGE UP (ref 3.8–10.5)
WBC # FLD AUTO: 7.3 K/UL — SIGNIFICANT CHANGE UP (ref 3.8–10.5)

## 2018-10-09 PROCEDURE — 99497 ADVNCD CARE PLAN 30 MIN: CPT

## 2018-10-09 PROCEDURE — 99233 SBSQ HOSP IP/OBS HIGH 50: CPT | Mod: GC,25

## 2018-10-09 RX ORDER — WARFARIN SODIUM 2.5 MG/1
5 TABLET ORAL ONCE
Qty: 0 | Refills: 0 | Status: COMPLETED | OUTPATIENT
Start: 2018-10-09 | End: 2018-10-09

## 2018-10-09 RX ADMIN — SODIUM CHLORIDE 3 MILLILITER(S): 9 INJECTION INTRAMUSCULAR; INTRAVENOUS; SUBCUTANEOUS at 06:03

## 2018-10-09 RX ADMIN — WARFARIN SODIUM 5 MILLIGRAM(S): 2.5 TABLET ORAL at 22:06

## 2018-10-09 RX ADMIN — LATANOPROST 1 DROP(S): 0.05 SOLUTION/ DROPS OPHTHALMIC; TOPICAL at 22:07

## 2018-10-09 RX ADMIN — PANTOPRAZOLE SODIUM 40 MILLIGRAM(S): 20 TABLET, DELAYED RELEASE ORAL at 18:14

## 2018-10-09 RX ADMIN — Medication 50 MILLIGRAM(S): at 22:07

## 2018-10-09 RX ADMIN — Medication 50 MILLIGRAM(S): at 06:21

## 2018-10-09 RX ADMIN — SODIUM CHLORIDE 3 MILLILITER(S): 9 INJECTION INTRAMUSCULAR; INTRAVENOUS; SUBCUTANEOUS at 18:23

## 2018-10-09 RX ADMIN — SIMETHICONE 80 MILLIGRAM(S): 80 TABLET, CHEWABLE ORAL at 22:05

## 2018-10-09 RX ADMIN — Medication 3 MILLILITER(S): at 00:41

## 2018-10-09 RX ADMIN — Medication 81 MILLIGRAM(S): at 12:48

## 2018-10-09 RX ADMIN — SIMETHICONE 80 MILLIGRAM(S): 80 TABLET, CHEWABLE ORAL at 06:21

## 2018-10-09 RX ADMIN — Medication 50 MILLIGRAM(S): at 14:40

## 2018-10-09 RX ADMIN — HEPARIN SODIUM 12 UNIT(S)/HR: 5000 INJECTION INTRAVENOUS; SUBCUTANEOUS at 08:14

## 2018-10-09 RX ADMIN — PANTOPRAZOLE SODIUM 40 MILLIGRAM(S): 20 TABLET, DELAYED RELEASE ORAL at 06:20

## 2018-10-09 RX ADMIN — Medication 0.25 MILLIGRAM(S): at 05:16

## 2018-10-09 RX ADMIN — SIMETHICONE 80 MILLIGRAM(S): 80 TABLET, CHEWABLE ORAL at 14:40

## 2018-10-09 RX ADMIN — Medication 3 MILLILITER(S): at 05:16

## 2018-10-09 RX ADMIN — Medication 0.25 MILLIGRAM(S): at 18:22

## 2018-10-09 RX ADMIN — Medication 1 MILLIGRAM(S): at 22:06

## 2018-10-09 RX ADMIN — Medication 3 MILLILITER(S): at 11:29

## 2018-10-09 RX ADMIN — Medication 3 MILLILITER(S): at 17:31

## 2018-10-09 NOTE — PROGRESS NOTE ADULT - ATTENDING COMMENTS
Patient examined and case reviewed in detail on bedside rounds. Agree with above.   -Acute Hypoxemic Respiratory Failure requiring mechanical ventilation - now with tracheostomy. Continue PSV as tolerated - became tachypneic on 5/5 today  -Oropharyngeal dysphagia - s/p PEG placement - with abdominal distension much improved.   -Pain control and agitation -appears comfortable. Continue benzodiazepine  -Afib and Mechanical MV - transition to Coumadin with goal INR 2.5-3.5    -Discharge planning to vent facility once INR within goal    Advanced Care Planning - 20min - discussed with daughter in person and son-in-law over the phone. Patient remains full code. They are hopeful for full recovery but understand that it may take time given underlying cardiac disease. Ventilator facility list provided again today.

## 2018-10-09 NOTE — PROGRESS NOTE ADULT - PROBLEM SELECTOR PLAN 5
Patient with HX of AS ( Not a candidate for TAVR )  Patient S/p Mechanical Mitral Valve Replacement   Continue AC with Heparin Drip to maintain therapeutic INR

## 2018-10-09 NOTE — PROGRESS NOTE ADULT - SUBJECTIVE AND OBJECTIVE BOX
Patient is a 84y old  Female who presents with a chief complaint of COPD exacerbation, ADHF (08 Oct 2018 09:43)      Interval Events:    REVIEW OF SYSTEMS:  [ ] Positive  [ x] All other systems negative  [ ] Unable to assess ROS because ________    Vital Signs Last 24 Hrs  T(C): 37 (10-09-18 @ 04:54), Max: 37 (10-09-18 @ 04:54)  T(F): 98.6 (10-09-18 @ 04:54), Max: 98.6 (10-09-18 @ 04:54)  HR: 63 (10-09-18 @ 08:54) (60 - 84)  BP: 150/69 (10-09-18 @ 04:54) (150/69 - 171/73)  RR: 12 (10-09-18 @ 04:54) (12 - 61)  SpO2: 99% (10-09-18 @ 08:54) (95% - 100%)    PHYSICAL EXAM:  HEENT:   [ x]Tracheostomy: shiley 6 cuffed  [ ]Pupils equal  [ ]No oral lesions  [ ]Abnormal    SKIN  [x ]No Rash  [ ] Abnormal  [ ] pressure    CARDIAC  [ x]Regular  [ ]Abnormal    PULMONARY  [x ]Bilateral Clear Breath Sounds  [ ]Normal Excursion  [ ]Abnormal    GI  [ x]PEG      [x ] +BS		              [x ]Soft, nondistended, nontender	  [ ]Abnormal    MUSCULOSKELETAL                                   [ ]Bedbound                 [ ]Abnormal    [x ]Ambulatory/OOB to chair                           EXTREMITIES                                         [ x]Normal  [ ]Edema                           NEUROLOGIC  [x ] Normal, non focal  [ ] Focal findings:    PSYCHIATRIC  [ x]Alert and appropriate  [ ] Sedated	 [ ]Agitated    :  Rosales: [ ] Yes, if yes: Date of Placement:                   [ x ] No    LINES: Central Lines [ ] Yes, if yes: Date of Placement                                     [ x ] No    HOSPITAL MEDICATIONS:  MEDICATIONS  (STANDING):  ALBUTerol/ipratropium for Nebulization 3 milliLiter(s) Nebulizer every 6 hours  aspirin  chewable 81 milliGRAM(s) Oral daily  buDESOnide   0.25 milliGRAM(s) Respule 0.25 milliGRAM(s) Inhalation every 12 hours  diltiazem    Tablet 30 milliGRAM(s) Oral every 6 hours  fentaNYL   Patch  25 MICROgram(s)/Hr 1 Patch Transdermal every 72 hours  heparin  Infusion 1200 Unit(s)/Hr (12 mL/Hr) IV Continuous <Continuous>  hydrALAZINE 50 milliGRAM(s) Oral every 8 hours  latanoprost 0.005% Ophthalmic Solution 1 Drop(s) Both EYES at bedtime  pantoprazole  Injectable 40 milliGRAM(s) IV Push two times a day  senna 2 Tablet(s) Oral at bedtime  simethicone 80 milliGRAM(s) Chew every 8 hours  sodium chloride 3%  Inhalation 3 milliLiter(s) Inhalation two times a day  warfarin 5 milliGRAM(s) Oral once    MEDICATIONS  (PRN):  acetaminophen    Suspension .. 650 milliGRAM(s) Oral every 6 hours PRN Mild Pain (1 - 3)  clonazePAM Tablet 0.5 milliGRAM(s) Oral every 12 hours PRN Agitation  melatonin 1 milliGRAM(s) Oral at bedtime PRN Insomnia      LABS:                        7.4    7.3   )-----------( 126      ( 09 Oct 2018 06:46 )             24.1     10-09    145  |  110<H>  |  32<H>  ----------------------------<  163<H>  4.2   |  23  |  0.82    Ca    10.5      09 Oct 2018 06:46  Phos  2.2     10-09  Mg     2.2     10-09      PT/INR - ( 09 Oct 2018 06:46 )   PT: 13.7 sec;   INR: 1.25 ratio         PTT - ( 09 Oct 2018 06:46 )  PTT:81.8 sec        CAPILLARY BLOOD GLUCOSE    MICROBIOLOGY:     RADIOLOGY:  [ ] Reviewed and interpreted by me    Mode: CPAP with PS  FiO2: 30  PEEP: 5  PC: 5 Patient is a 84y old  Female who presents with a chief complaint of COPD exacerbation, ADHF (08 Oct 2018 09:43)      Interval Events: Abdomen much softer. OOB in chair     REVIEW OF SYSTEMS:  [ ] Positive  [ x] All other systems negative  [ ] Unable to assess ROS because ________    Vital Signs Last 24 Hrs  T(C): 37 (10-09-18 @ 04:54), Max: 37 (10-09-18 @ 04:54)  T(F): 98.6 (10-09-18 @ 04:54), Max: 98.6 (10-09-18 @ 04:54)  HR: 63 (10-09-18 @ 08:54) (60 - 84)  BP: 150/69 (10-09-18 @ 04:54) (150/69 - 171/73)  RR: 12 (10-09-18 @ 04:54) (12 - 61)  SpO2: 99% (10-09-18 @ 08:54) (95% - 100%)    PHYSICAL EXAM:  HEENT:   [ x]Tracheostomy: shiley 6 cuffed  [ ]Pupils equal  [ ]No oral lesions  [ ]Abnormal    SKIN  [x ]No Rash  [ ] Abnormal  [ ] pressure    CARDIAC  [ x]Regular  [ ]Abnormal    PULMONARY  [x ]Bilateral Clear Breath Sounds  [ ]Normal Excursion  [ ]Abnormal    GI  [ x]PEG      [x ] +BS		              [x ]Soft, nondistended, nontender	  [ ]Abnormal    MUSCULOSKELETAL                                   [ ]Bedbound                 [ ]Abnormal    [x ]Ambulatory/OOB to chair                           EXTREMITIES                                         [ x]Normal  [ ]Edema                           NEUROLOGIC  [x ] Normal, non focal  [ ] Focal findings:    PSYCHIATRIC  [ x]Alert and appropriate  [ ] Sedated	 [ ]Agitated    :  Rosales: [ ] Yes, if yes: Date of Placement:                   [ x ] No    LINES: Central Lines [ ] Yes, if yes: Date of Placement                                     [ x ] No    HOSPITAL MEDICATIONS:  MEDICATIONS  (STANDING):  ALBUTerol/ipratropium for Nebulization 3 milliLiter(s) Nebulizer every 6 hours  aspirin  chewable 81 milliGRAM(s) Oral daily  buDESOnide   0.25 milliGRAM(s) Respule 0.25 milliGRAM(s) Inhalation every 12 hours  diltiazem    Tablet 30 milliGRAM(s) Oral every 6 hours  fentaNYL   Patch  25 MICROgram(s)/Hr 1 Patch Transdermal every 72 hours  heparin  Infusion 1200 Unit(s)/Hr (12 mL/Hr) IV Continuous <Continuous>  hydrALAZINE 50 milliGRAM(s) Oral every 8 hours  latanoprost 0.005% Ophthalmic Solution 1 Drop(s) Both EYES at bedtime  pantoprazole  Injectable 40 milliGRAM(s) IV Push two times a day  senna 2 Tablet(s) Oral at bedtime  simethicone 80 milliGRAM(s) Chew every 8 hours  sodium chloride 3%  Inhalation 3 milliLiter(s) Inhalation two times a day  warfarin 5 milliGRAM(s) Oral once    MEDICATIONS  (PRN):  acetaminophen    Suspension .. 650 milliGRAM(s) Oral every 6 hours PRN Mild Pain (1 - 3)  clonazePAM Tablet 0.5 milliGRAM(s) Oral every 12 hours PRN Agitation  melatonin 1 milliGRAM(s) Oral at bedtime PRN Insomnia      LABS:                        7.4    7.3   )-----------( 126      ( 09 Oct 2018 06:46 )             24.1     10-09    145  |  110<H>  |  32<H>  ----------------------------<  163<H>  4.2   |  23  |  0.82    Ca    10.5      09 Oct 2018 06:46  Phos  2.2     10-09  Mg     2.2     10-09      PT/INR - ( 09 Oct 2018 06:46 )   PT: 13.7 sec;   INR: 1.25 ratio         PTT - ( 09 Oct 2018 06:46 )  PTT:81.8 sec        CAPILLARY BLOOD GLUCOSE    MICROBIOLOGY:     RADIOLOGY:  [ ] Reviewed and interpreted by me    Mode: CPAP with PS  FiO2: 30  PEEP: 5  PC: 5

## 2018-10-09 NOTE — PROGRESS NOTE ADULT - PROBLEM SELECTOR PLAN 4
Patient S/p PEG 10/4  Patient with Gaseous distention s/p PEG which is now improving, Pt at goal rate and tolerating well.

## 2018-10-09 NOTE — PROGRESS NOTE ADULT - PROBLEM SELECTOR PLAN 6
ECHO 9/14: + Mechanical Mitral Valve, Severe AS, Stage 3 Diastolic Dysfxn   Patient With Hx of Sick Sinus Syndrome and PPM  / Atrial Fibrillation   Continue BP Hydralazine increased 50 mg q 8 hrs   Continue Cardizem 30 mg q 6 hrs   Continue to monitor BP and Heart Rate

## 2018-10-10 LAB
ANION GAP SERPL CALC-SCNC: 11 MMOL/L — SIGNIFICANT CHANGE UP (ref 5–17)
APTT BLD: 53.7 SEC — HIGH (ref 27.5–37.4)
APTT BLD: 80.1 SEC — HIGH (ref 27.5–37.4)
BLD GP AB SCN SERPL QL: NEGATIVE — SIGNIFICANT CHANGE UP
BUN SERPL-MCNC: 34 MG/DL — HIGH (ref 7–23)
CALCIUM SERPL-MCNC: 10.5 MG/DL — SIGNIFICANT CHANGE UP (ref 8.4–10.5)
CHLORIDE SERPL-SCNC: 108 MMOL/L — SIGNIFICANT CHANGE UP (ref 96–108)
CO2 SERPL-SCNC: 25 MMOL/L — SIGNIFICANT CHANGE UP (ref 22–31)
CREAT SERPL-MCNC: 0.82 MG/DL — SIGNIFICANT CHANGE UP (ref 0.5–1.3)
GLUCOSE SERPL-MCNC: 173 MG/DL — HIGH (ref 70–99)
HCT VFR BLD CALC: 22.3 % — LOW (ref 34.5–45)
HCT VFR BLD CALC: 22.5 % — LOW (ref 34.5–45)
HCT VFR BLD CALC: 30.1 % — LOW (ref 34.5–45)
HGB BLD-MCNC: 6.7 G/DL — CRITICAL LOW (ref 11.5–15.5)
HGB BLD-MCNC: 6.9 G/DL — CRITICAL LOW (ref 11.5–15.5)
HGB BLD-MCNC: 9.3 G/DL — LOW (ref 11.5–15.5)
INR BLD: 1.2 RATIO — HIGH (ref 0.88–1.16)
MAGNESIUM SERPL-MCNC: 2.2 MG/DL — SIGNIFICANT CHANGE UP (ref 1.6–2.6)
MCHC RBC-ENTMCNC: 27.9 PG — SIGNIFICANT CHANGE UP (ref 27–34)
MCHC RBC-ENTMCNC: 28 PG — SIGNIFICANT CHANGE UP (ref 27–34)
MCHC RBC-ENTMCNC: 28.8 PG — SIGNIFICANT CHANGE UP (ref 27–34)
MCHC RBC-ENTMCNC: 30 GM/DL — LOW (ref 32–36)
MCHC RBC-ENTMCNC: 30.8 GM/DL — LOW (ref 32–36)
MCHC RBC-ENTMCNC: 30.9 GM/DL — LOW (ref 32–36)
MCV RBC AUTO: 90.7 FL — SIGNIFICANT CHANGE UP (ref 80–100)
MCV RBC AUTO: 93 FL — SIGNIFICANT CHANGE UP (ref 80–100)
MCV RBC AUTO: 93.5 FL — SIGNIFICANT CHANGE UP (ref 80–100)
PHOSPHATE SERPL-MCNC: 2.8 MG/DL — SIGNIFICANT CHANGE UP (ref 2.5–4.5)
PLATELET # BLD AUTO: 121 K/UL — LOW (ref 150–400)
PLATELET # BLD AUTO: 125 K/UL — LOW (ref 150–400)
PLATELET # BLD AUTO: 141 K/UL — LOW (ref 150–400)
POTASSIUM SERPL-MCNC: 3.9 MMOL/L — SIGNIFICANT CHANGE UP (ref 3.5–5.3)
POTASSIUM SERPL-SCNC: 3.9 MMOL/L — SIGNIFICANT CHANGE UP (ref 3.5–5.3)
PROTHROM AB SERPL-ACNC: 13 SEC — HIGH (ref 9.8–12.7)
RBC # BLD: 2.4 M/UL — LOW (ref 3.8–5.2)
RBC # BLD: 2.41 M/UL — LOW (ref 3.8–5.2)
RBC # BLD: 3.32 M/UL — LOW (ref 3.8–5.2)
RBC # FLD: 17.8 % — HIGH (ref 10.3–14.5)
RBC # FLD: 18.5 % — HIGH (ref 10.3–14.5)
RBC # FLD: 18.7 % — HIGH (ref 10.3–14.5)
RH IG SCN BLD-IMP: POSITIVE — SIGNIFICANT CHANGE UP
SODIUM SERPL-SCNC: 144 MMOL/L — SIGNIFICANT CHANGE UP (ref 135–145)
WBC # BLD: 6.9 K/UL — SIGNIFICANT CHANGE UP (ref 3.8–10.5)
WBC # BLD: 7.3 K/UL — SIGNIFICANT CHANGE UP (ref 3.8–10.5)
WBC # BLD: 8 K/UL — SIGNIFICANT CHANGE UP (ref 3.8–10.5)
WBC # FLD AUTO: 6.9 K/UL — SIGNIFICANT CHANGE UP (ref 3.8–10.5)
WBC # FLD AUTO: 7.3 K/UL — SIGNIFICANT CHANGE UP (ref 3.8–10.5)
WBC # FLD AUTO: 8 K/UL — SIGNIFICANT CHANGE UP (ref 3.8–10.5)

## 2018-10-10 PROCEDURE — 99497 ADVNCD CARE PLAN 30 MIN: CPT

## 2018-10-10 PROCEDURE — 99233 SBSQ HOSP IP/OBS HIGH 50: CPT | Mod: GC,25

## 2018-10-10 RX ORDER — FUROSEMIDE 40 MG
20 TABLET ORAL ONCE
Qty: 0 | Refills: 0 | Status: COMPLETED | OUTPATIENT
Start: 2018-10-10 | End: 2018-10-10

## 2018-10-10 RX ORDER — HEPARIN SODIUM 5000 [USP'U]/ML
1000 INJECTION INTRAVENOUS; SUBCUTANEOUS
Qty: 25000 | Refills: 0 | Status: DISCONTINUED | OUTPATIENT
Start: 2018-10-10 | End: 2018-10-14

## 2018-10-10 RX ORDER — PANTOPRAZOLE SODIUM 20 MG/1
8 TABLET, DELAYED RELEASE ORAL
Qty: 80 | Refills: 0 | Status: DISCONTINUED | OUTPATIENT
Start: 2018-10-10 | End: 2018-10-11

## 2018-10-10 RX ADMIN — Medication 3 MILLILITER(S): at 06:05

## 2018-10-10 RX ADMIN — Medication 81 MILLIGRAM(S): at 12:35

## 2018-10-10 RX ADMIN — Medication 1 MILLIGRAM(S): at 22:25

## 2018-10-10 RX ADMIN — Medication 50 MILLIGRAM(S): at 05:28

## 2018-10-10 RX ADMIN — Medication 50 MILLIGRAM(S): at 14:56

## 2018-10-10 RX ADMIN — Medication 0.25 MILLIGRAM(S): at 17:08

## 2018-10-10 RX ADMIN — LATANOPROST 1 DROP(S): 0.05 SOLUTION/ DROPS OPHTHALMIC; TOPICAL at 22:23

## 2018-10-10 RX ADMIN — Medication 50 MILLIGRAM(S): at 22:22

## 2018-10-10 RX ADMIN — PANTOPRAZOLE SODIUM 10 MG/HR: 20 TABLET, DELAYED RELEASE ORAL at 12:39

## 2018-10-10 RX ADMIN — Medication 3 MILLILITER(S): at 00:18

## 2018-10-10 RX ADMIN — SIMETHICONE 80 MILLIGRAM(S): 80 TABLET, CHEWABLE ORAL at 05:28

## 2018-10-10 RX ADMIN — HEPARIN SODIUM 10 UNIT(S)/HR: 5000 INJECTION INTRAVENOUS; SUBCUTANEOUS at 19:36

## 2018-10-10 RX ADMIN — SODIUM CHLORIDE 3 MILLILITER(S): 9 INJECTION INTRAMUSCULAR; INTRAVENOUS; SUBCUTANEOUS at 17:10

## 2018-10-10 RX ADMIN — Medication 20 MILLIGRAM(S): at 15:09

## 2018-10-10 RX ADMIN — PANTOPRAZOLE SODIUM 40 MILLIGRAM(S): 20 TABLET, DELAYED RELEASE ORAL at 05:28

## 2018-10-10 RX ADMIN — Medication 3 MILLILITER(S): at 11:45

## 2018-10-10 RX ADMIN — Medication 0.25 MILLIGRAM(S): at 06:06

## 2018-10-10 RX ADMIN — HEPARIN SODIUM 10 UNIT(S)/HR: 5000 INJECTION INTRAVENOUS; SUBCUTANEOUS at 12:35

## 2018-10-10 RX ADMIN — SIMETHICONE 80 MILLIGRAM(S): 80 TABLET, CHEWABLE ORAL at 14:56

## 2018-10-10 RX ADMIN — HEPARIN SODIUM 12 UNIT(S)/HR: 5000 INJECTION INTRAVENOUS; SUBCUTANEOUS at 09:06

## 2018-10-10 RX ADMIN — SIMETHICONE 80 MILLIGRAM(S): 80 TABLET, CHEWABLE ORAL at 22:25

## 2018-10-10 RX ADMIN — Medication 3 MILLILITER(S): at 17:09

## 2018-10-10 RX ADMIN — SODIUM CHLORIDE 3 MILLILITER(S): 9 INJECTION INTRAMUSCULAR; INTRAVENOUS; SUBCUTANEOUS at 06:06

## 2018-10-10 NOTE — PROGRESS NOTE ADULT - ASSESSMENT
Impression:  1) Anemia - patient noted to have brown stool on our exam and receiving feeds.  Low concern for GIB at this point but will continue to follow    Recommendations   - IV PPI BID   - if concern again for melena please stop feeds   - trend H/H, transfuse if Hg < 7   - heparin will need to be held prior to procedure    Cynthia Dixon, PGY-4  Gastroenterology Fellow  Pager x [phone] or [phone]  (After 5 pm or on weekends please page GI on call)

## 2018-10-10 NOTE — PROGRESS NOTE ADULT - ASSESSMENT
84  Year old Female with PMH significant for COPD, JENNIE on BiPAP at home , multivalvular disease (severe AS Not a candidate for TAVR, S/p MV replacement), HFpEF/ Severe diastolic failure, A-fib on coumadin, sick sinus syndrome S/p pacemaker placement, HTN, and CKD3 who was admitted for acute respiratory failure requiring intubation suspected to be 2/2 COPD exacerbation c/b PNA w/ +entero/rhinovirus and GN coccobacillus and H. influenza bacteremia, requiring continued respiratory support and tracheostomy. Patient S/p Trach placement 10/3 and PEG Placement 10/4.     10/7: Patient remains with abdominal distention today but tolerating trickle feeds, ABD X-ray  performed this morning patient remains with air filled loops of small and large bowel. GI fellow called to re-eval the patient this morning, X-ray  reviewed slightly worsened compared to yesterday. GI fellow recommended to place patient in Left lateral decubitus position and oob to chair later today. Patient with large amount of gas expressed with turning as Per RN. As per GI no role for rectal tube at this time and can continue to advance feeds as tolerated to promote gastric motility. H+H Has remained stable case d/w  will restart coumadin this evening. Pt with elevated bp will increase Hydralazine 50 mg q 8 hr   10/8-Abdominal distention noted with hypoactive bowel sounds, Kub noted from this weekend. Daily bowel movements noted. Will repeat PTT as there were 2 therapeutics prior on same dosing, also repeat CBC as well. Will transfuse if remains low. RCU weaning 84  Year old Female with PMH significant for COPD, JENNIE on BiPAP at home , multivalvular disease (severe AS Not a candidate for TAVR, S/p MV replacement), HFpEF/ Severe diastolic failure, A-fib on coumadin, sick sinus syndrome S/p pacemaker placement, HTN, and CKD3 who was admitted for acute respiratory failure requiring intubation suspected to be 2/2 COPD exacerbation c/b PNA w/ +entero/rhinovirus and GN coccobacillus and H. influenza bacteremia, requiring continued respiratory support and tracheostomy. Patient S/p Trach placement 10/3 and PEG Placement 10/4.     10/7: Patient remains with abdominal distention today but tolerating trickle feeds, ABD X-ray  performed this morning patient remains with air filled loops of small and large bowel. GI fellow called to re-eval the patient this morning, X-ray  reviewed slightly worsened compared to yesterday. GI fellow recommended to place patient in Left lateral decubitus position and oob to chair later today. Patient with large amount of gas expressed with turning as Per RN. As per GI no role for rectal tube at this time and can continue to advance feeds as tolerated to promote gastric motility. H+H Has remained stable case d/w  will restart coumadin this evening. Pt with elevated bp will increase Hydralazine 50 mg q 8 hr   10/8-Abdominal distention noted with hypoactive bowel sounds, Kub noted from this weekend. Daily bowel movements noted. Will repeat PTT as there were 2 therapeutics prior on same dosing, also repeat CBC as well. Will transfuse if remains low. RCU weaning  10/10: 4 dark BMs reported night of 10/9, concerning for melena,  hgb 6.9 in AM,  1U RBC given . Goal heparin reduced to 50-70. GI consulted due to GIB Hx with known AV malformations. No intvn.

## 2018-10-10 NOTE — PROGRESS NOTE ADULT - ATTENDING COMMENTS
Patient examined and case reviewed in detail on bedside rounds. Agree with above.   -Acute Hypoxemic Respiratory Failure requiring mechanical ventilation - now with tracheostomy. Continue PSV as tolerated - currently tolerating 10/5 however became tachypneic on 5/5 yesterday  -Oropharyngeal dysphagia - s/p PEG placement - with abdominal distension much improved.   -Pain control and agitation -appears comfortable. Continue benzodiazepine  -Afib and Mechanical MV - eventual transition to Coumadin with goal INR 2.5-3.5  -Acute Blood Loss anemia - due to GI bleed. patient with melena overnight and history of GI bleed in setting of Heparin. Will reduce PTT target goal. Hold Coumadin tonight. Transfuse PRBC to goal Hgb > 7. GI evaluation    Advanced Care Planning - 20min - discussed with daughter in person and son over the phone. Patient remains full code. They are hopeful for full recovery but understand that it may take time given underlying cardiac disease and GI bleeding. Patient examined and case reviewed in detail on bedside rounds. Agree with above.   -Acute Hypoxemic Respiratory Failure requiring mechanical ventilation - now with tracheostomy. Continue PSV as tolerated - currently tolerating 10/5 however became tachypneic on 5/5 yesterday  -Oropharyngeal dysphagia - s/p PEG placement - with abdominal distension much improved.   -Pain control and agitation -appears comfortable. Continue benzodiazepine  -Afib and Mechanical MV - eventual transition to Coumadin with goal INR 2.5-3.5  -Acute Blood Loss anemia - due to GI bleed. patient with melena overnight and history of GI bleed in setting of Heparin. Will reduce PTT target goal. Hold Coumadin tonight. Transfuse PRBC to goal Hgb > 7. GI evaluation. Protonix gtt and hold feeds.    Advanced Care Planning - 20min - discussed with daughter in person and son over the phone. Patient remains full code. They are hopeful for full recovery but understand that it may take time given underlying cardiac disease and GI bleeding.

## 2018-10-10 NOTE — PROGRESS NOTE ADULT - SUBJECTIVE AND OBJECTIVE BOX
Patient is a 84y old  Female who presents with a chief complaint of COPD exacerbation, ADHF (08 Oct 2018 09:43)      Interval Events: Abdomen much softer. OOB in chair     REVIEW OF SYSTEMS:  [ ] Positive  [ x] All other systems negative  [ ] Unable to assess ROS because ________    Vital Signs Last 24 Hrs  T(C): 37 (10-09-18 @ 04:54), Max: 37 (10-09-18 @ 04:54)  T(F): 98.6 (10-09-18 @ 04:54), Max: 98.6 (10-09-18 @ 04:54)  HR: 63 (10-09-18 @ 08:54) (60 - 84)  BP: 150/69 (10-09-18 @ 04:54) (150/69 - 171/73)  RR: 12 (10-09-18 @ 04:54) (12 - 61)  SpO2: 99% (10-09-18 @ 08:54) (95% - 100%)    PHYSICAL EXAM:  HEENT:   [ x]Tracheostomy: shiley 6 cuffed  [ ]Pupils equal  [ ]No oral lesions  [ ]Abnormal    SKIN  [x ]No Rash  [ ] Abnormal  [ ] pressure    CARDIAC  [ x]Regular  [ ]Abnormal    PULMONARY  [x ]Bilateral Clear Breath Sounds  [ ]Normal Excursion  [ ]Abnormal    GI  [ x]PEG      [x ] +BS		              [x ]Soft, nondistended, nontender	  [ ]Abnormal    MUSCULOSKELETAL                                   [ ]Bedbound                 [ ]Abnormal    [x ]Ambulatory/OOB to chair                           EXTREMITIES                                         [ x]Normal  [ ]Edema                           NEUROLOGIC  [x ] Normal, non focal  [ ] Focal findings:    PSYCHIATRIC  [ x]Alert and appropriate  [ ] Sedated	 [ ]Agitated    :  Rosales: [ ] Yes, if yes: Date of Placement:                   [ x ] No    LINES: Central Lines [ ] Yes, if yes: Date of Placement                                     [ x ] No    HOSPITAL MEDICATIONS:  MEDICATIONS  (STANDING):  ALBUTerol/ipratropium for Nebulization 3 milliLiter(s) Nebulizer every 6 hours  aspirin  chewable 81 milliGRAM(s) Oral daily  buDESOnide   0.25 milliGRAM(s) Respule 0.25 milliGRAM(s) Inhalation every 12 hours  diltiazem    Tablet 30 milliGRAM(s) Oral every 6 hours  fentaNYL   Patch  25 MICROgram(s)/Hr 1 Patch Transdermal every 72 hours  heparin  Infusion 1200 Unit(s)/Hr (12 mL/Hr) IV Continuous <Continuous>  hydrALAZINE 50 milliGRAM(s) Oral every 8 hours  latanoprost 0.005% Ophthalmic Solution 1 Drop(s) Both EYES at bedtime  pantoprazole  Injectable 40 milliGRAM(s) IV Push two times a day  senna 2 Tablet(s) Oral at bedtime  simethicone 80 milliGRAM(s) Chew every 8 hours  sodium chloride 3%  Inhalation 3 milliLiter(s) Inhalation two times a day  warfarin 5 milliGRAM(s) Oral once    MEDICATIONS  (PRN):  acetaminophen    Suspension .. 650 milliGRAM(s) Oral every 6 hours PRN Mild Pain (1 - 3)  clonazePAM Tablet 0.5 milliGRAM(s) Oral every 12 hours PRN Agitation  melatonin 1 milliGRAM(s) Oral at bedtime PRN Insomnia      LABS:                        7.4    7.3   )-----------( 126      ( 09 Oct 2018 06:46 )             24.1     10-09    145  |  110<H>  |  32<H>  ----------------------------<  163<H>  4.2   |  23  |  0.82    Ca    10.5      09 Oct 2018 06:46  Phos  2.2     10-09  Mg     2.2     10-09      PT/INR - ( 09 Oct 2018 06:46 )   PT: 13.7 sec;   INR: 1.25 ratio         PTT - ( 09 Oct 2018 06:46 )  PTT:81.8 sec        CAPILLARY BLOOD GLUCOSE    MICROBIOLOGY:     RADIOLOGY:  [ ] Reviewed and interpreted by me    Mode: CPAP with PS  FiO2: 30  PEEP: 5  PC: 5 Patient is a 84y old  Female who presents with a chief complaint of COPD exacerbation, ADHF (08 Oct 2018 09:43)      Interval Events: Melena overnight  Currently on PSV 15/5 and breathing comfortably at 20/min with TV 300s    REVIEW OF SYSTEMS:  [ ] Positive  [ x] All other systems negative  [ ] Unable to assess ROS because ________    Vital Signs Last 24 Hrs  T(C): 37 (10-09-18 @ 04:54), Max: 37 (10-09-18 @ 04:54)  T(F): 98.6 (10-09-18 @ 04:54), Max: 98.6 (10-09-18 @ 04:54)  HR: 63 (10-09-18 @ 08:54) (60 - 84)  BP: 150/69 (10-09-18 @ 04:54) (150/69 - 171/73)  RR: 12 (10-09-18 @ 04:54) (12 - 61)  SpO2: 99% (10-09-18 @ 08:54) (95% - 100%)    PHYSICAL EXAM:  HEENT:   [ x]Tracheostomy: shiley 6 cuffed  [ ]Pupils equal  [ ]No oral lesions  [ ]Abnormal    SKIN  [x ]No Rash  [ ] Abnormal  [ ] pressure    CARDIAC  [ x]Regular  [ ]Abnormal    PULMONARY  [x ]Bilateral Clear Breath Sounds  [ ]Normal Excursion  [ ]Abnormal    GI  [ x]PEG      [x ] +BS		              [x ]Soft, nondistended, nontender	  [ ]Abnormal    MUSCULOSKELETAL                                   [ ]Bedbound                 [ ]Abnormal    [x ]Ambulatory/OOB to chair                           EXTREMITIES                                         [ x]Normal  [ ]Edema                           NEUROLOGIC  [x ] Normal, non focal  [ ] Focal findings:    PSYCHIATRIC  [ x]Alert and appropriate  [ ] Sedated	 [ ]Agitated    :  Rosales: [ ] Yes, if yes: Date of Placement:                   [ x ] No    LINES: Central Lines [ ] Yes, if yes: Date of Placement                                     [ x ] No    HOSPITAL MEDICATIONS:  MEDICATIONS  (STANDING):  ALBUTerol/ipratropium for Nebulization 3 milliLiter(s) Nebulizer every 6 hours  aspirin  chewable 81 milliGRAM(s) Oral daily  buDESOnide   0.25 milliGRAM(s) Respule 0.25 milliGRAM(s) Inhalation every 12 hours  diltiazem    Tablet 30 milliGRAM(s) Oral every 6 hours  fentaNYL   Patch  25 MICROgram(s)/Hr 1 Patch Transdermal every 72 hours  heparin  Infusion 1200 Unit(s)/Hr (12 mL/Hr) IV Continuous <Continuous>  hydrALAZINE 50 milliGRAM(s) Oral every 8 hours  latanoprost 0.005% Ophthalmic Solution 1 Drop(s) Both EYES at bedtime  pantoprazole  Injectable 40 milliGRAM(s) IV Push two times a day  senna 2 Tablet(s) Oral at bedtime  simethicone 80 milliGRAM(s) Chew every 8 hours  sodium chloride 3%  Inhalation 3 milliLiter(s) Inhalation two times a day  warfarin 5 milliGRAM(s) Oral once    MEDICATIONS  (PRN):  acetaminophen    Suspension .. 650 milliGRAM(s) Oral every 6 hours PRN Mild Pain (1 - 3)  clonazePAM Tablet 0.5 milliGRAM(s) Oral every 12 hours PRN Agitation  melatonin 1 milliGRAM(s) Oral at bedtime PRN Insomnia      LABS:                        7.4    7.3   )-----------( 126      ( 09 Oct 2018 06:46 )             24.1     10-09    145  |  110<H>  |  32<H>  ----------------------------<  163<H>  4.2   |  23  |  0.82    Ca    10.5      09 Oct 2018 06:46  Phos  2.2     10-09  Mg     2.2     10-09      PT/INR - ( 09 Oct 2018 06:46 )   PT: 13.7 sec;   INR: 1.25 ratio         PTT - ( 09 Oct 2018 06:46 )  PTT:81.8 sec        CAPILLARY BLOOD GLUCOSE    MICROBIOLOGY:     RADIOLOGY:  [ ] Reviewed and interpreted by me    Mode: CPAP with PS  FiO2: 30  PEEP: 5  PC: 5 Patient is a 84y old  Female who presents with a chief complaint of COPD exacerbation, ADHF (08 Oct 2018 09:43)      Interval Events: Melena overnight  Currently on PSV 15/5 and breathing comfortably at 20/min with TV 300s    REVIEW OF SYSTEMS:  [ ] Positive  [ x] All other systems negative  [ ] Unable to assess ROS because ________    Vital Signs Last 24 Hrs  T(C): 37 (10-09-18 @ 04:54), Max: 37 (10-09-18 @ 04:54)  T(F): 98.6 (10-09-18 @ 04:54), Max: 98.6 (10-09-18 @ 04:54)  HR: 63 (10-09-18 @ 08:54) (60 - 84)  BP: 150/69 (10-09-18 @ 04:54) (150/69 - 171/73)  RR: 12 (10-09-18 @ 04:54) (12 - 61)  SpO2: 99% (10-09-18 @ 08:54) (95% - 100%)    PHYSICAL EXAM:  HEENT:   [ x]Tracheostomy: shiley 6 cuffed  [ ]Pupils equal  [ ]No oral lesions  [ ]Abnormal    SKIN  [x ]No Rash  [ ] Abnormal  [ ] pressure    CARDIAC  [ x]Regular  [ ]Abnormal    PULMONARY  [x ]Bilateral Clear Breath Sounds  [ ]Normal Excursion  [ ]Abnormal    GI  [ x]PEG      [x ] +BS		              [x ]Soft, nondistended, nontender	  [ ]Abnormal    MUSCULOSKELETAL                                   [ ]Bedbound                 [ ]Abnormal    [x ]Ambulatory/OOB to chair                           EXTREMITIES                                         [ x]Normal  [ ]Edema                           NEUROLOGIC  [x ] Normal, non focal  [ ] Focal findings:    PSYCHIATRIC  [ x]Alert and appropriate  [ ] Sedated	 [ ]Agitated    :  Rosales: [ ] Yes, if yes: Date of Placement:                   [ x ] No    LINES: Central Lines [ ] Yes, if yes: Date of Placement                                     [ x ] No    HOSPITAL MEDICATIONS:  MEDICATIONS  (STANDING):  ALBUTerol/ipratropium for Nebulization 3 milliLiter(s) Nebulizer every 6 hours  aspirin  chewable 81 milliGRAM(s) Oral daily  buDESOnide   0.25 milliGRAM(s) Respule 0.25 milliGRAM(s) Inhalation every 12 hours  diltiazem    Tablet 30 milliGRAM(s) Oral every 6 hours  fentaNYL   Patch  25 MICROgram(s)/Hr 1 Patch Transdermal every 72 hours  heparin  Infusion 1200 Unit(s)/Hr (12 mL/Hr) IV Continuous <Continuous>  hydrALAZINE 50 milliGRAM(s) Oral every 8 hours  latanoprost 0.005% Ophthalmic Solution 1 Drop(s) Both EYES at bedtime  pantoprazole  Injectable 40 milliGRAM(s) IV Push two times a day  senna 2 Tablet(s) Oral at bedtime  simethicone 80 milliGRAM(s) Chew every 8 hours  sodium chloride 3%  Inhalation 3 milliLiter(s) Inhalation two times a day  warfarin 5 milliGRAM(s) Oral once    MEDICATIONS  (PRN):  acetaminophen    Suspension .. 650 milliGRAM(s) Oral every 6 hours PRN Mild Pain (1 - 3)  clonazePAM Tablet 0.5 milliGRAM(s) Oral every 12 hours PRN Agitation  melatonin 1 milliGRAM(s) Oral at bedtime PRN Insomnia      LABS: 10-10    144  |  108  |  34<H>  ----------------------------<  173<H>  3.9   |  25  |  0.82    Ca    10.5      10 Oct 2018 07:09  Phos  2.8     10-10  Mg     2.2     10-10                          6.9    7.3   )-----------( 121      ( 10 Oct 2018 08:52 )             22.5     PT/INR - ( 10 Oct 2018 07:09 )   PT: 13.0 sec;   INR: 1.20 ratio         PTT - ( 10 Oct 2018 07:09 )  PTT: 80.1 sec      CAPILLARY BLOOD GLUCOSE    MICROBIOLOGY:     RADIOLOGY:  [ ] Reviewed and interpreted by me    Mode: CPAP with PS  FiO2: 30  PEEP: 5  PC: 5

## 2018-10-10 NOTE — PROGRESS NOTE ADULT - PROBLEM SELECTOR PLAN 5
Patient with HX of AS ( Not a candidate for TAVR )  Patient S/p Mechanical Mitral Valve Replacement   Continue AC with Heparin Drip to maintain therapeutic INR Patient with HX of AS ( Not a candidate for TAVR )  Patient S/p Mechanical Mitral Valve Replacement   Continue AC with Heparin Drip with goal PTT of 50-70 bridging to coumadin (INR goal 2.5-3.5)

## 2018-10-10 NOTE — PROGRESS NOTE ADULT - SUBJECTIVE AND OBJECTIVE BOX
Chief Complaint:  Patient is a 84y old  Female who presents with a chief complaint of COPD exacerbation, ADHF (10 Oct 2018 09:16)      Interval Events:   Called by team due to drop in H/H from 7's to 6's and reported melena.      Allergies:  penicillin (Rash)        Hospital Medications:  acetaminophen    Suspension .. 650 milliGRAM(s) Oral every 6 hours PRN  ALBUTerol/ipratropium for Nebulization 3 milliLiter(s) Nebulizer every 6 hours  aspirin  chewable 81 milliGRAM(s) Oral daily  buDESOnide   0.25 milliGRAM(s) Respule 0.25 milliGRAM(s) Inhalation every 12 hours  clonazePAM Tablet 0.5 milliGRAM(s) Oral every 12 hours PRN  diltiazem    Tablet 30 milliGRAM(s) Oral every 6 hours  fentaNYL   Patch  25 MICROgram(s)/Hr 1 Patch Transdermal every 72 hours  furosemide   Injectable 20 milliGRAM(s) IV Push once  heparin  Infusion 1000 Unit(s)/Hr IV Continuous <Continuous>  hydrALAZINE 50 milliGRAM(s) Oral every 8 hours  latanoprost 0.005% Ophthalmic Solution 1 Drop(s) Both EYES at bedtime  melatonin 1 milliGRAM(s) Oral at bedtime PRN  pantoprazole Infusion 8 mG/Hr IV Continuous <Continuous>  simethicone 80 milliGRAM(s) Chew every 8 hours  sodium chloride 3%  Inhalation 3 milliLiter(s) Inhalation two times a day      PMHX/PSHX:  Aortic stenosis, moderate  Heart failure with preserved ejection fraction  Rheumatic heart disease  Chronic obstructive pulmonary disease, unspecified COPD type  CKD (chronic kidney disease) stage 3, GFR 30-59 ml/min  Chronic atrial fibrillation  Sick sinus syndrome  Cardiac pacemaker recipient  H/O mitral valve replacement      Family history:  No pertinent family history in first degree relatives      PHYSICAL EXAM:   Vital Signs:  Vital Signs Last 24 Hrs  T(C): 37 (10 Oct 2018 10:41), Max: 37 (10 Oct 2018 10:41)  T(F): 98.6 (10 Oct 2018 10:41), Max: 98.6 (10 Oct 2018 10:41)  HR: 58 (10 Oct 2018 12:14) (58 - 80)  BP: 159/53 (10 Oct 2018 10:41) (125/70 - 159/53)  BP(mean): --  RR: 23 (10 Oct 2018 10:41) (18 - 23)  SpO2: 100% (10 Oct 2018 12:14) (92% - 100%)  Daily     Daily Weight in k.8 (10 Oct 2018 10:41)    GENERAL:  Appears stated age, well-groomed, well-nourished, no distress  CHEST:  no increased effort  ABDOMEN:  Soft, non-tender, slightly distended, normoactive bowel sounds,  no masses , no hepato-splenomegaly, no signs of chronic liver disease, PEG site clean receivind tube feeds  EXTEREMITIES:  no cyanosis, clubbing or edema  NEURO:  Alert, oriented, no tremor, no asterixis  Rectal: brown stool    LABS:                        6.9    7.3   )-----------( 121      ( 10 Oct 2018 08:52 )             22.5     Mean Cell Volume: 93.5 fl (10-10-18 @ 08:52)    10-10    144  |  108  |  34<H>  ----------------------------<  173<H>  3.9   |  25  |  0.82    Ca    10.5      10 Oct 2018 07:09  Phos  2.8     10-10  Mg     2.2     10-10        PT/INR - ( 10 Oct 2018 07:09 )   PT: 13.0 sec;   INR: 1.20 ratio         PTT - ( 10 Oct 2018 07:09 )  PTT:80.1 sec                            6.9    7.3   )-----------( 121      ( 10 Oct 2018 08:52 )             22.5                         6.7    6.9   )-----------( 125      ( 10 Oct 2018 07:13 )             22.3                         7.4    7.3   )-----------( 126      ( 09 Oct 2018 06:46 )             24.1                         7.7    8.3   )-----------( 107      ( 08 Oct 2018 10:17 )             25.2                         7.0    8.8   )-----------( 116      ( 08 Oct 2018 07:23 )             22.6     Imaging:

## 2018-10-10 NOTE — PROGRESS NOTE ADULT - PROBLEM SELECTOR PLAN 8
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   Stool Occult 10/6: Positive, No reports of Melena / Coffee Grounds   H/H lower today - will need PRBC transfusion  GI followup 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   Stool Occult 10/6: Positive, No reports of Melena / Coffee Grounds   10/10: 4 dark BMs reported night of 10/9, concerning for melena,  hgb 6.9 in AM, s/p 1U RBC. Goal heparin reduced to 50-70. Transfuse to hgb >7  Maintain active T/S  H/H lower today - will need PRBC transfusion  GI followup

## 2018-10-11 LAB
ANION GAP SERPL CALC-SCNC: 13 MMOL/L — SIGNIFICANT CHANGE UP (ref 5–17)
APTT BLD: 48.3 SEC — HIGH (ref 27.5–37.4)
APTT BLD: 53.8 SEC — HIGH (ref 27.5–37.4)
APTT BLD: 64.4 SEC — HIGH (ref 27.5–37.4)
BUN SERPL-MCNC: 35 MG/DL — HIGH (ref 7–23)
CALCIUM SERPL-MCNC: 10.4 MG/DL — SIGNIFICANT CHANGE UP (ref 8.4–10.5)
CHLORIDE SERPL-SCNC: 106 MMOL/L — SIGNIFICANT CHANGE UP (ref 96–108)
CO2 SERPL-SCNC: 24 MMOL/L — SIGNIFICANT CHANGE UP (ref 22–31)
CREAT SERPL-MCNC: 0.85 MG/DL — SIGNIFICANT CHANGE UP (ref 0.5–1.3)
GLUCOSE SERPL-MCNC: 197 MG/DL — HIGH (ref 70–99)
HCT VFR BLD CALC: 23.8 % — LOW (ref 34.5–45)
HCT VFR BLD CALC: 26 % — LOW (ref 34.5–45)
HGB BLD-MCNC: 7.7 G/DL — LOW (ref 11.5–15.5)
HGB BLD-MCNC: 8.4 G/DL — LOW (ref 11.5–15.5)
INR BLD: 1.17 RATIO — HIGH (ref 0.88–1.16)
MAGNESIUM SERPL-MCNC: 2 MG/DL — SIGNIFICANT CHANGE UP (ref 1.6–2.6)
MCHC RBC-ENTMCNC: 29 PG — SIGNIFICANT CHANGE UP (ref 27–34)
MCHC RBC-ENTMCNC: 29 PG — SIGNIFICANT CHANGE UP (ref 27–34)
MCHC RBC-ENTMCNC: 32.2 GM/DL — SIGNIFICANT CHANGE UP (ref 32–36)
MCHC RBC-ENTMCNC: 32.5 GM/DL — SIGNIFICANT CHANGE UP (ref 32–36)
MCV RBC AUTO: 89.4 FL — SIGNIFICANT CHANGE UP (ref 80–100)
MCV RBC AUTO: 89.9 FL — SIGNIFICANT CHANGE UP (ref 80–100)
PHOSPHATE SERPL-MCNC: 3.1 MG/DL — SIGNIFICANT CHANGE UP (ref 2.5–4.5)
PLATELET # BLD AUTO: 140 K/UL — LOW (ref 150–400)
PLATELET # BLD AUTO: 160 K/UL — SIGNIFICANT CHANGE UP (ref 150–400)
POTASSIUM SERPL-MCNC: 4.1 MMOL/L — SIGNIFICANT CHANGE UP (ref 3.5–5.3)
POTASSIUM SERPL-SCNC: 4.1 MMOL/L — SIGNIFICANT CHANGE UP (ref 3.5–5.3)
PROTHROM AB SERPL-ACNC: 12.8 SEC — HIGH (ref 9.8–12.7)
RBC # BLD: 2.67 M/UL — LOW (ref 3.8–5.2)
RBC # BLD: 2.89 M/UL — LOW (ref 3.8–5.2)
RBC # FLD: 18.9 % — HIGH (ref 10.3–14.5)
RBC # FLD: 19 % — HIGH (ref 10.3–14.5)
SODIUM SERPL-SCNC: 143 MMOL/L — SIGNIFICANT CHANGE UP (ref 135–145)
WBC # BLD: 10 K/UL — SIGNIFICANT CHANGE UP (ref 3.8–10.5)
WBC # BLD: 8.4 K/UL — SIGNIFICANT CHANGE UP (ref 3.8–10.5)
WBC # FLD AUTO: 10 K/UL — SIGNIFICANT CHANGE UP (ref 3.8–10.5)
WBC # FLD AUTO: 8.4 K/UL — SIGNIFICANT CHANGE UP (ref 3.8–10.5)

## 2018-10-11 PROCEDURE — 99233 SBSQ HOSP IP/OBS HIGH 50: CPT | Mod: GC

## 2018-10-11 RX ORDER — WARFARIN SODIUM 2.5 MG/1
7.5 TABLET ORAL AT BEDTIME
Qty: 0 | Refills: 0 | Status: DISCONTINUED | OUTPATIENT
Start: 2018-10-11 | End: 2018-10-11

## 2018-10-11 RX ORDER — QUETIAPINE FUMARATE 200 MG/1
12.5 TABLET, FILM COATED ORAL EVERY 12 HOURS
Qty: 0 | Refills: 0 | Status: DISCONTINUED | OUTPATIENT
Start: 2018-10-11 | End: 2018-10-12

## 2018-10-11 RX ORDER — PANTOPRAZOLE SODIUM 20 MG/1
40 TABLET, DELAYED RELEASE ORAL EVERY 12 HOURS
Qty: 0 | Refills: 0 | Status: DISCONTINUED | OUTPATIENT
Start: 2018-10-11 | End: 2018-11-02

## 2018-10-11 RX ORDER — WARFARIN SODIUM 2.5 MG/1
7.5 TABLET ORAL ONCE
Qty: 0 | Refills: 0 | Status: COMPLETED | OUTPATIENT
Start: 2018-10-11 | End: 2018-10-11

## 2018-10-11 RX ORDER — QUETIAPINE FUMARATE 200 MG/1
25 TABLET, FILM COATED ORAL EVERY 12 HOURS
Qty: 0 | Refills: 0 | Status: DISCONTINUED | OUTPATIENT
Start: 2018-10-11 | End: 2018-10-11

## 2018-10-11 RX ADMIN — Medication 3 MILLILITER(S): at 05:51

## 2018-10-11 RX ADMIN — SIMETHICONE 80 MILLIGRAM(S): 80 TABLET, CHEWABLE ORAL at 14:05

## 2018-10-11 RX ADMIN — FENTANYL CITRATE 1 PATCH: 50 INJECTION INTRAVENOUS at 02:41

## 2018-10-11 RX ADMIN — Medication 0.25 MILLIGRAM(S): at 17:18

## 2018-10-11 RX ADMIN — SIMETHICONE 80 MILLIGRAM(S): 80 TABLET, CHEWABLE ORAL at 23:19

## 2018-10-11 RX ADMIN — Medication 3 MILLILITER(S): at 11:55

## 2018-10-11 RX ADMIN — Medication 0.5 MILLIGRAM(S): at 00:10

## 2018-10-11 RX ADMIN — PANTOPRAZOLE SODIUM 10 MG/HR: 20 TABLET, DELAYED RELEASE ORAL at 02:42

## 2018-10-11 RX ADMIN — SODIUM CHLORIDE 3 MILLILITER(S): 9 INJECTION INTRAMUSCULAR; INTRAVENOUS; SUBCUTANEOUS at 17:18

## 2018-10-11 RX ADMIN — SIMETHICONE 80 MILLIGRAM(S): 80 TABLET, CHEWABLE ORAL at 07:44

## 2018-10-11 RX ADMIN — PANTOPRAZOLE SODIUM 40 MILLIGRAM(S): 20 TABLET, DELAYED RELEASE ORAL at 17:41

## 2018-10-11 RX ADMIN — Medication 20 MILLIGRAM(S): at 11:48

## 2018-10-11 RX ADMIN — SODIUM CHLORIDE 3 MILLILITER(S): 9 INJECTION INTRAMUSCULAR; INTRAVENOUS; SUBCUTANEOUS at 06:45

## 2018-10-11 RX ADMIN — Medication 50 MILLIGRAM(S): at 10:55

## 2018-10-11 RX ADMIN — HEPARIN SODIUM 10 UNIT(S)/HR: 5000 INJECTION INTRAVENOUS; SUBCUTANEOUS at 02:42

## 2018-10-11 RX ADMIN — HEPARIN SODIUM 11 UNIT(S)/HR: 5000 INJECTION INTRAVENOUS; SUBCUTANEOUS at 15:36

## 2018-10-11 RX ADMIN — QUETIAPINE FUMARATE 12.5 MILLIGRAM(S): 200 TABLET, FILM COATED ORAL at 23:19

## 2018-10-11 RX ADMIN — Medication 81 MILLIGRAM(S): at 11:47

## 2018-10-11 RX ADMIN — HEPARIN SODIUM 11 UNIT(S)/HR: 5000 INJECTION INTRAVENOUS; SUBCUTANEOUS at 22:50

## 2018-10-11 RX ADMIN — Medication 1 MILLIGRAM(S): at 23:19

## 2018-10-11 RX ADMIN — Medication 3 MILLILITER(S): at 00:33

## 2018-10-11 RX ADMIN — FENTANYL CITRATE 1 PATCH: 50 INJECTION INTRAVENOUS at 01:51

## 2018-10-11 RX ADMIN — Medication 50 MILLIGRAM(S): at 14:04

## 2018-10-11 RX ADMIN — Medication 3 MILLILITER(S): at 17:17

## 2018-10-11 RX ADMIN — WARFARIN SODIUM 7.5 MILLIGRAM(S): 2.5 TABLET ORAL at 23:18

## 2018-10-11 RX ADMIN — LATANOPROST 1 DROP(S): 0.05 SOLUTION/ DROPS OPHTHALMIC; TOPICAL at 23:20

## 2018-10-11 RX ADMIN — Medication 50 MILLIGRAM(S): at 23:19

## 2018-10-11 RX ADMIN — Medication 0.25 MILLIGRAM(S): at 05:55

## 2018-10-11 NOTE — CHART NOTE - NSCHARTNOTEFT_GEN_A_CORE
I removed trach sutures. There was mild erythema along suture incision sites however no fluctuance, bleeding or discharge noted. Trach remains in place. RN informed.

## 2018-10-11 NOTE — CHART NOTE - NSCHARTNOTEFT_GEN_A_CORE
Informed by RN that Ms. Santos had a dark bowel movement about 3/4 a cup in amount. Will obtain cbc and PTT tonight.

## 2018-10-11 NOTE — PROGRESS NOTE ADULT - ATTENDING COMMENTS
Patient examined and case reviewed in detail on bedside rounds. Agree with above.   -Acute Hypoxemic Respiratory Failure requiring mechanical ventilation - now with tracheostomy. Continue PSV as tolerated - currently tolerating 15/5 however became tachypneic on 5/5 previously. Trach sutures to be removed today.  -Oropharyngeal dysphagia - s/p PEG placement - with abdominal distension much improved.   -Pain control and agitation -appears comfortable. Continue benzodiazepine  -Afib and Mechanical MV - eventual transition to Coumadin with goal INR 2.5-3.5  -Acute Blood Loss anemia - due to GI bleed now with improvement in Hgb s/p 1 unite PRBC (6.9 --> 9.3). GI followup appreciated. Will continue Hep gtt at lower target. If no melena will resume AC. Will resume diet. Will switch Protonix to BID.

## 2018-10-11 NOTE — PROGRESS NOTE ADULT - PROBLEM SELECTOR PLAN 8
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   Stool Occult 10/6: Positive, No reports of Melena / Coffee Grounds   10/10: 4 dark BMs reported night of 10/9, concerning for melena,  hgb 6.9 in AM, s/p 1U RBC. Goal heparin reduced to 50-70. Transfuse to hgb >7  Per GI: no intvn, PPI BID

## 2018-10-11 NOTE — PROGRESS NOTE ADULT - PROBLEM SELECTOR PLAN 5
Patient with HX of AS ( Not a candidate for TAVR )  Patient S/p Mechanical Mitral Valve Replacement   Continue AC with Heparin Drip with goal PTT of 50-70 bridging to coumadin (INR goal 2.5-3.5)  Coumadin restarted on 10/11. One dose of 7.5mg, then can resume 5mg as prior

## 2018-10-11 NOTE — PROGRESS NOTE ADULT - ASSESSMENT
Impression:  1) Anemia - patient noted to have brown stool on our exam and receiving feeds.  Low concern for GIB at this point but will continue to follow    Recommendations   - IV PPI BID   - if concern again for melena please stop feeds   - trend H/H, transfuse if Hg < 7   - heparin will need to be held prior to any procedure    Cynthia Dixon, PGY-4  Gastroenterology Fellow  Pager x [phone] or [phone]  (After 5 pm or on weekends please page GI on call) Impression:  1) Anemia - patient noted to have brown stool on our exam and receiving feeds.  Low concern for GIB at this point but will continue to follow  2) Respiratory failure s/p trach and PEG    Recommendations   - IV PPI BID   - if concern again for melena please stop feeds and make us aware for possible intervention   - trend H/H, transfuse if Hg < 7   - heparin will need to be held prior to any procedure    Cynthia Dixon, PGY-4  Gastroenterology Fellow  Pager x [phone] or [phone]  (After 5 pm or on weekends please page GI on call)

## 2018-10-11 NOTE — PROGRESS NOTE ADULT - ASSESSMENT
84  Year old Female with PMH significant for COPD, JENNIE on BiPAP at home , multivalvular disease (severe AS Not a candidate for TAVR, S/p MV replacement), HFpEF/ Severe diastolic failure, A-fib on coumadin, sick sinus syndrome S/p pacemaker placement, HTN, and CKD3 who was admitted for acute respiratory failure requiring intubation suspected to be 2/2 COPD exacerbation c/b PNA w/ +entero/rhinovirus and GN coccobacillus and H. influenza bacteremia, requiring continued respiratory support and tracheostomy. Patient S/p Trach placement 10/3 and PEG Placement 10/4.     10/7: Patient remains with abdominal distention today but tolerating trickle feeds, ABD X-ray  performed this morning patient remains with air filled loops of small and large bowel. GI fellow called to re-eval the patient this morning, X-ray  reviewed slightly worsened compared to yesterday. GI fellow recommended to place patient in Left lateral decubitus position and oob to chair later today. Patient with large amount of gas expressed with turning as Per RN. As per GI no role for rectal tube at this time and can continue to advance feeds as tolerated to promote gastric motility. H+H Has remained stable case d/w  will restart coumadin this evening. Pt with elevated bp will increase Hydralazine 50 mg q 8 hr   10/8-Abdominal distention noted with hypoactive bowel sounds, Kub noted from this weekend. Daily bowel movements noted. Will repeat PTT as there were 2 therapeutics prior on same dosing, also repeat CBC as well. Will transfuse if remains low. RCU weaning  10/10: 4 dark BMs reported night of 10/9, concerning for melena,  hgb 6.9 in AM,  1U RBC given . Goal heparin reduced to 50-70. GI consulted due to GIB Hx with known AV malformations. No intvn.  10/11: H/H stable post 1U with no furthers melena or intvn from GI. Resumed coumadin.

## 2018-10-11 NOTE — PROGRESS NOTE ADULT - SUBJECTIVE AND OBJECTIVE BOX
Patient is a 84y old  Female who presents with a chief complaint of COPD exacerbation, ADHF.    Interval Events:    REVIEW OF SYSTEMS:  [ ] Positive  [ ] All other systems negative  [ ] Unable to assess ROS because ________    Vital Signs Last 24 Hrs  T(C): 36.9 (10-11-18 @ 05:27), Max: 37 (10-10-18 @ 10:41)  T(F): 98.5 (10-11-18 @ 05:27), Max: 98.6 (10-10-18 @ 10:41)  HR: 60 (10-11-18 @ 05:57) (58 - 80)  BP: 152/62 (10-11-18 @ 05:27) (121/50 - 167/66)  RR: 22 (10-11-18 @ 05:27) (22 - 24)  SpO2: 98% (10-11-18 @ 05:57) (98% - 100%)    PHYSICAL EXAM:  HEENT:   [ ]Tracheostomy:  [ ]Pupils equal  [ ]No oral lesions  [ ]Abnormal    SKIN  [ ]No Rash  [ ] Abnormal  [ ] pressure    CARDIAC  [ ]Regular  [ ]Abnormal    PULMONARY  [ ]Bilateral Clear Breath Sounds  [ ]Normal Excursion  [ ]Abnormal    GI  [ ]PEG      [ ] +BS		              [ ]Soft, nondistended, nontender	  [ ]Abnormal    MUSCULOSKELETAL                                   [ ]Bedbound                 [ ]Abnormal    [ ]Ambulatory/OOB to chair                           EXTREMITIES                                         [ ]Normal  [ ]Edema                           NEUROLOGIC  [ ] Normal, non focal  [ ] Focal findings:    PSYCHIATRIC  [ ]Alert and appropriate  [ ] Sedated	 [ ]Agitated    :  Rosales: [ ] Yes, if yes: Date of Placement:                   [  ] No    LINES: Central Lines [ ] Yes, if yes: Date of Placement                                     [  ] No    HOSPITAL MEDICATIONS:  MEDICATIONS  (STANDING):  ALBUTerol/ipratropium for Nebulization 3 milliLiter(s) Nebulizer every 6 hours  aspirin  chewable 81 milliGRAM(s) Oral daily  buDESOnide   0.25 milliGRAM(s) Respule 0.25 milliGRAM(s) Inhalation every 12 hours  diltiazem    Tablet 30 milliGRAM(s) Oral every 6 hours  heparin  Infusion 1000 Unit(s)/Hr (10 mL/Hr) IV Continuous <Continuous>  hydrALAZINE 50 milliGRAM(s) Oral every 8 hours  latanoprost 0.005% Ophthalmic Solution 1 Drop(s) Both EYES at bedtime  pantoprazole  Injectable 40 milliGRAM(s) IV Push every 12 hours  simethicone 80 milliGRAM(s) Chew every 8 hours  sodium chloride 3%  Inhalation 3 milliLiter(s) Inhalation two times a day    MEDICATIONS  (PRN):  acetaminophen    Suspension .. 650 milliGRAM(s) Oral every 6 hours PRN Mild Pain (1 - 3)  clonazePAM Tablet 0.5 milliGRAM(s) Oral every 12 hours PRN Agitation  melatonin 1 milliGRAM(s) Oral at bedtime PRN Insomnia      LABS:                        9.3    8.0   )-----------( 141      ( 10 Oct 2018 19:00 )             30.1     10-10    144  |  108  |  34<H>  ----------------------------<  173<H>  3.9   |  25  |  0.82    Ca    10.5      10 Oct 2018 07:09  Phos  2.8     10-10  Mg     2.2     10-10      PT/INR - ( 10 Oct 2018 07:09 )   PT: 13.0 sec;   INR: 1.20 ratio         PTT - ( 11 Oct 2018 01:34 )  PTT:53.8 sec        CAPILLARY BLOOD GLUCOSE    MICROBIOLOGY:     RADIOLOGY:  [ ] Reviewed and interpreted by me    Mode: AC/ CMV (Assist Control/ Continuous Mandatory Ventilation)  RR (machine): 14  TV (machine): 400  FiO2: 30  PEEP: 5  ITime: 1  MAP: 9  PIP: 25 Patient is a 84y old  Female who presents with a chief complaint of COPD exacerbation, ADHF.     Interval Events:    REVIEW OF SYSTEMS:  [ ] Positive  [ ] All other systems negative  [ ] Unable to assess ROS because ________    Vital Signs Last 24 Hrs  T(C): 36.9 (10-11-18 @ 05:27), Max: 37 (10-10-18 @ 10:41)  T(F): 98.5 (10-11-18 @ 05:27), Max: 98.6 (10-10-18 @ 10:41)  HR: 60 (10-11-18 @ 05:57) (58 - 80)  BP: 152/62 (10-11-18 @ 05:27) (121/50 - 167/66)  RR: 22 (10-11-18 @ 05:27) (22 - 24)  SpO2: 98% (10-11-18 @ 05:57) (98% - 100%)    PHYSICAL EXAM:  HEENT:   [ ]Tracheostomy:  [ ]Pupils equal  [ ]No oral lesions  [ ]Abnormal    SKIN  [ ]No Rash  [ ] Abnormal  [ ] pressure    CARDIAC  [ ]Regular  [ ]Abnormal    PULMONARY  [ ]Bilateral Clear Breath Sounds  [ ]Normal Excursion  [ ]Abnormal    GI  [ ]PEG      [ ] +BS		              [ ]Soft, nondistended, nontender	  [ ]Abnormal    MUSCULOSKELETAL                                   [ ]Bedbound                 [ ]Abnormal    [ ]Ambulatory/OOB to chair                           EXTREMITIES                                         [ ]Normal  [ ]Edema                           NEUROLOGIC  [ ] Normal, non focal  [ ] Focal findings:    PSYCHIATRIC  [ ]Alert and appropriate  [ ] Sedated	 [ ]Agitated    :  Rosales: [ ] Yes, if yes: Date of Placement:                   [  ] No    LINES: Central Lines [ ] Yes, if yes: Date of Placement                                     [  ] No    HOSPITAL MEDICATIONS:  MEDICATIONS  (STANDING):  ALBUTerol/ipratropium for Nebulization 3 milliLiter(s) Nebulizer every 6 hours  aspirin  chewable 81 milliGRAM(s) Oral daily  buDESOnide   0.25 milliGRAM(s) Respule 0.25 milliGRAM(s) Inhalation every 12 hours  diltiazem    Tablet 30 milliGRAM(s) Oral every 6 hours  heparin  Infusion 1000 Unit(s)/Hr (10 mL/Hr) IV Continuous <Continuous>  hydrALAZINE 50 milliGRAM(s) Oral every 8 hours  latanoprost 0.005% Ophthalmic Solution 1 Drop(s) Both EYES at bedtime  pantoprazole  Injectable 40 milliGRAM(s) IV Push every 12 hours  simethicone 80 milliGRAM(s) Chew every 8 hours  sodium chloride 3%  Inhalation 3 milliLiter(s) Inhalation two times a day    MEDICATIONS  (PRN):  acetaminophen    Suspension .. 650 milliGRAM(s) Oral every 6 hours PRN Mild Pain (1 - 3)  clonazePAM Tablet 0.5 milliGRAM(s) Oral every 12 hours PRN Agitation  melatonin 1 milliGRAM(s) Oral at bedtime PRN Insomnia      LABS:                        9.3    8.0   )-----------( 141      ( 10 Oct 2018 19:00 )             30.1     10-10    144  |  108  |  34<H>  ----------------------------<  173<H>  3.9   |  25  |  0.82    Ca    10.5      10 Oct 2018 07:09  Phos  2.8     10-10  Mg     2.2     10-10      PT/INR - ( 10 Oct 2018 07:09 )   PT: 13.0 sec;   INR: 1.20 ratio         PTT - ( 11 Oct 2018 01:34 )  PTT:53.8 sec        CAPILLARY BLOOD GLUCOSE    MICROBIOLOGY:     RADIOLOGY:  [ ] Reviewed and interpreted by me    Mode: AC/ CMV (Assist Control/ Continuous Mandatory Ventilation)  RR (machine): 14  TV (machine): 400  FiO2: 30  PEEP: 5  ITime: 1  MAP: 9  PIP: 25 Patient is a 84y old  Female who presents with a chief complaint of COPD exacerbation, ADHF. Observed sitting in chair awake with daughter bedside. Appears comfortable without acute or apparent distress.    Interval Events: No accounts of bowel movements overnight    REVIEW OF SYSTEMS:  [ ] Positive  [ ] All other systems negative  [ X] Unable to assess ROS because patient not reliable in responses, mostly non-verbal but awake and seemingly aware.    Vital Signs Last 24 Hrs  T(C): 36.9 (10-11-18 @ 05:27), Max: 37 (10-10-18 @ 10:41)  T(F): 98.5 (10-11-18 @ 05:27), Max: 98.6 (10-10-18 @ 10:41)  HR: 60 (10-11-18 @ 05:57) (58 - 80)  BP: 152/62 (10-11-18 @ 05:27) (121/50 - 167/66)  RR: 22 (10-11-18 @ 05:27) (22 - 24)  SpO2: 98% (10-11-18 @ 05:57) (98% - 100%)    PHYSICAL EXAM:  HEENT:    [ X]Tracheostomy:  #6 shiley, suture secured  [X ]Pupils equal  [ ]No oral lesions  [ ]Abnormal    SKIN  [X ]No Rash  [ ] Abnormal  [ ] pressure    CARDIAC  [ X]Regular: S1 S2 present at regular intervals  [ ]Abnormal    PULMONARY  [X ]Bilateral Clear Breath Sounds: auscultated anteriorly  [ ]Normal Excursion  [ ]Abnormal    GI  [X ]PEG      [X ] +BS		              [ X]Soft, nondistended, nontender	  [ ]Abnormal    MUSCULOSKELETAL                                   [ ]Bedbound                 [ ]Abnormal    [X ]Ambulatory/OOB to chair: OOB to chair                          EXTREMITIES                                         [X]Normal  [ ]Edema                           NEUROLOGIC  [X] Normal, non focal: limited exam as patient does not follow commands reliably even when communicated by daughter; moves all limbs  [ ] Focal findings:    PSYCHIATRIC  [ X]Alert and appropriate  [ ] Sedated	 [ ]Agitated    :  Rosales: [ ] Yes, if yes: Date of Placement:                   [ X ] No    LINES: Central Lines [ ] Yes, if yes: Date of Placement                                     [X  ] No    HOSPITAL MEDICATIONS:  MEDICATIONS  (STANDING):  ALBUTerol/ipratropium for Nebulization 3 milliLiter(s) Nebulizer every 6 hours  aspirin  chewable 81 milliGRAM(s) Oral daily  buDESOnide   0.25 milliGRAM(s) Respule 0.25 milliGRAM(s) Inhalation every 12 hours  diltiazem    Tablet 30 milliGRAM(s) Oral every 6 hours  heparin  Infusion 1000 Unit(s)/Hr (10 mL/Hr) IV Continuous <Continuous>  hydrALAZINE 50 milliGRAM(s) Oral every 8 hours  latanoprost 0.005% Ophthalmic Solution 1 Drop(s) Both EYES at bedtime  pantoprazole  Injectable 40 milliGRAM(s) IV Push every 12 hours  simethicone 80 milliGRAM(s) Chew every 8 hours  sodium chloride 3%  Inhalation 3 milliLiter(s) Inhalation two times a day    MEDICATIONS  (PRN):  acetaminophen    Suspension .. 650 milliGRAM(s) Oral every 6 hours PRN Mild Pain (1 - 3)  clonazePAM Tablet 0.5 milliGRAM(s) Oral every 12 hours PRN Agitation  melatonin 1 milliGRAM(s) Oral at bedtime PRN Insomnia      LABS:                      ----PENDING      PT/INR - ( 10 Oct 2018 07:09 )   PT: 13.0 sec;   INR: 1.20 ratio         PTT - ( 11 Oct 2018 01:34 )  PTT:53.8 sec    CAPILLARY BLOOD GLUCOSE    MICROBIOLOGY:     RADIOLOGY:  [ ] Reviewed and interpreted by me    Mode: AC/ CMV (Assist Control/ Continuous Mandatory Ventilation)  RR (machine): 14  TV (machine): 400  FiO2: 30  PEEP: 5  ITime: 1  MAP: 9  PIP: 25

## 2018-10-11 NOTE — PROGRESS NOTE ADULT - SUBJECTIVE AND OBJECTIVE BOX
Chief Complaint:  Patient is a 84y old  Female who presents with a chief complaint of COPD exacerbation, ADHF (10 Oct 2018 13:01)      Interval Events:   Patient received one unit of PRBC's yesterday due to hemoglobin of  6.9 and corrected to 9.3 this morning,      Allergies:  penicillin (Rash)        Hospital Medications:  acetaminophen    Suspension .. 650 milliGRAM(s) Oral every 6 hours PRN  ALBUTerol/ipratropium for Nebulization 3 milliLiter(s) Nebulizer every 6 hours  aspirin  chewable 81 milliGRAM(s) Oral daily  buDESOnide   0.25 milliGRAM(s) Respule 0.25 milliGRAM(s) Inhalation every 12 hours  clonazePAM Tablet 0.5 milliGRAM(s) Oral every 12 hours PRN  diltiazem    Tablet 30 milliGRAM(s) Oral every 6 hours  heparin  Infusion 1000 Unit(s)/Hr IV Continuous <Continuous>  hydrALAZINE 50 milliGRAM(s) Oral every 8 hours  latanoprost 0.005% Ophthalmic Solution 1 Drop(s) Both EYES at bedtime  melatonin 1 milliGRAM(s) Oral at bedtime PRN  pantoprazole Infusion 8 mG/Hr IV Continuous <Continuous>  simethicone 80 milliGRAM(s) Chew every 8 hours  sodium chloride 3%  Inhalation 3 milliLiter(s) Inhalation two times a day      PMHX/PSHX:  Aortic stenosis, moderate  Heart failure with preserved ejection fraction  Rheumatic heart disease  Chronic obstructive pulmonary disease, unspecified COPD type  CKD (chronic kidney disease) stage 3, GFR 30-59 ml/min  Chronic atrial fibrillation  Sick sinus syndrome  Cardiac pacemaker recipient  H/O mitral valve replacement      Family history:  No pertinent family history in first degree relatives      PHYSICAL EXAM:   Vital Signs:  Vital Signs Last 24 Hrs  T(C): 36.9 (11 Oct 2018 05:27), Max: 37 (10 Oct 2018 10:41)  T(F): 98.5 (11 Oct 2018 05:27), Max: 98.6 (10 Oct 2018 10:41)  HR: 60 (11 Oct 2018 05:57) (58 - 80)  BP: 152/62 (11 Oct 2018 05:27) (121/50 - 167/66)  BP(mean): --  RR: 22 (11 Oct 2018 05:27) (22 - 24)  SpO2: 98% (11 Oct 2018 05:57) (98% - 100%)  Daily     Daily Weight in k.8 (10 Oct 2018 10:41)    GENERAL:  tached but alert  CHEST:  no increased effort  ABDOMEN:  Soft, non-tender, non-distended, normoactive bowel sounds,  no masses , no hepato-splenomegaly, no signs of chronic liver disease  EXTEREMITIES:  no cyanosis, clubbing or edema    LABS:                        9.3    8.0   )-----------( 141      ( 10 Oct 2018 19:00 )             30.1     Mean Cell Volume: 90.7 fl (10-10-18 @ 19:00)    10-10    144  |  108  |  34<H>  ----------------------------<  173<H>  3.9   |  25  |  0.82    Ca    10.5      10 Oct 2018 07:09  Phos  2.8     10-10  Mg     2.2     1010        PT/INR - ( 10 Oct 2018 07:09 )   PT: 13.0 sec;   INR: 1.20 ratio         PTT - ( 11 Oct 2018 01:34 )  PTT:53.8 sec                            9.3    8.0   )-----------( 141      ( 10 Oct 2018 19:00 )             30.1                         6.9    7.3   )-----------( 121      ( 10 Oct 2018 08:52 )             22.5                         6.7    6.9   )-----------( 125      ( 10 Oct 2018 07:13 )             22.3                         7.4    7.3   )-----------( 126      ( 09 Oct 2018 06:46 )             24.1                         7.7    8.3   )-----------( 107      ( 08 Oct 2018 10:17 )             25.2     Imaging: Chief Complaint:  Patient is a 84y old  Female who presents with a chief complaint of COPD exacerbation, ADHF (10 Oct 2018 13:01)      Interval Events:   Patient received one unit of PRBC's yesterday due to hemoglobin of  6.9 and corrected to 9.3 this morning. NO melena or clinical bleeding reported by nursing team, no BM today.    Allergies:  penicillin (Rash)        Hospital Medications:  acetaminophen    Suspension .. 650 milliGRAM(s) Oral every 6 hours PRN  ALBUTerol/ipratropium for Nebulization 3 milliLiter(s) Nebulizer every 6 hours  aspirin  chewable 81 milliGRAM(s) Oral daily  buDESOnide   0.25 milliGRAM(s) Respule 0.25 milliGRAM(s) Inhalation every 12 hours  clonazePAM Tablet 0.5 milliGRAM(s) Oral every 12 hours PRN  diltiazem    Tablet 30 milliGRAM(s) Oral every 6 hours  heparin  Infusion 1000 Unit(s)/Hr IV Continuous <Continuous>  hydrALAZINE 50 milliGRAM(s) Oral every 8 hours  latanoprost 0.005% Ophthalmic Solution 1 Drop(s) Both EYES at bedtime  melatonin 1 milliGRAM(s) Oral at bedtime PRN  pantoprazole Infusion 8 mG/Hr IV Continuous <Continuous>  simethicone 80 milliGRAM(s) Chew every 8 hours  sodium chloride 3%  Inhalation 3 milliLiter(s) Inhalation two times a day      PMHX/PSHX:  Aortic stenosis, moderate  Heart failure with preserved ejection fraction  Rheumatic heart disease  Chronic obstructive pulmonary disease, unspecified COPD type  CKD (chronic kidney disease) stage 3, GFR 30-59 ml/min  Chronic atrial fibrillation  Sick sinus syndrome  Cardiac pacemaker recipient  H/O mitral valve replacement      Family history:  No pertinent family history in first degree relatives      PHYSICAL EXAM:   Vital Signs:  Vital Signs Last 24 Hrs  T(C): 36.9 (11 Oct 2018 05:27), Max: 37 (10 Oct 2018 10:41)  T(F): 98.5 (11 Oct 2018 05:27), Max: 98.6 (10 Oct 2018 10:41)  HR: 60 (11 Oct 2018 05:57) (58 - 80)  BP: 152/62 (11 Oct 2018 05:27) (121/50 - 167/66)  BP(mean): --  RR: 22 (11 Oct 2018 05:27) (22 - 24)  SpO2: 98% (11 Oct 2018 05:57) (98% - 100%)  Daily     Daily Weight in k.8 (10 Oct 2018 10:41)    GENERAL:  tached but alert  CHEST:  no increased effort  ABDOMEN:  Soft, non-tender, non-distended, normoactive bowel sounds,  no masses , no hepato-splenomegaly, no signs of chronic liver disease  EXTEREMITIES:  no cyanosis, clubbing or edema    LABS:                        9.3    8.0   )-----------( 141      ( 10 Oct 2018 19:00 )             30.1     Mean Cell Volume: 90.7 fl (10-10-18 @ 19:00)    10-10    144  |  108  |  34<H>  ----------------------------<  173<H>  3.9   |  25  |  0.82    Ca    10.5      10 Oct 2018 07:09  Phos  2.8     10-10  Mg     2.2     10-10        PT/INR - ( 10 Oct 2018 07:09 )   PT: 13.0 sec;   INR: 1.20 ratio         PTT - ( 11 Oct 2018 01:34 )  PTT:53.8 sec                            9.3    8.0   )-----------( 141      ( 10 Oct 2018 19:00 )             30.1                         6.9    7.3   )-----------( 121      ( 10 Oct 2018 08:52 )             22.5                         6.7    6.9   )-----------( 125      ( 10 Oct 2018 07:13 )             22.3                         7.4    7.3   )-----------( 126      ( 09 Oct 2018 06:46 )             24.1                         7.7    8.3   )-----------( 107      ( 08 Oct 2018 10:17 )             25.2     Imaging:

## 2018-10-12 LAB
ANION GAP SERPL CALC-SCNC: 13 MMOL/L — SIGNIFICANT CHANGE UP (ref 5–17)
APTT BLD: 57.1 SEC — HIGH (ref 27.5–37.4)
APTT BLD: 63.8 SEC — HIGH (ref 27.5–37.4)
BUN SERPL-MCNC: 38 MG/DL — HIGH (ref 7–23)
CALCIUM SERPL-MCNC: 9.9 MG/DL — SIGNIFICANT CHANGE UP (ref 8.4–10.5)
CHLORIDE SERPL-SCNC: 104 MMOL/L — SIGNIFICANT CHANGE UP (ref 96–108)
CO2 SERPL-SCNC: 24 MMOL/L — SIGNIFICANT CHANGE UP (ref 22–31)
CREAT SERPL-MCNC: 0.91 MG/DL — SIGNIFICANT CHANGE UP (ref 0.5–1.3)
GLUCOSE SERPL-MCNC: 201 MG/DL — HIGH (ref 70–99)
HCT VFR BLD CALC: 22.9 % — LOW (ref 34.5–45)
HCT VFR BLD CALC: 24.2 % — LOW (ref 34.5–45)
HGB BLD-MCNC: 7.3 G/DL — LOW (ref 11.5–15.5)
HGB BLD-MCNC: 7.6 G/DL — LOW (ref 11.5–15.5)
INR BLD: 1.14 RATIO — SIGNIFICANT CHANGE UP (ref 0.88–1.16)
MCHC RBC-ENTMCNC: 27.7 PG — SIGNIFICANT CHANGE UP (ref 27–34)
MCHC RBC-ENTMCNC: 29.7 PG — SIGNIFICANT CHANGE UP (ref 27–34)
MCHC RBC-ENTMCNC: 30.2 GM/DL — LOW (ref 32–36)
MCHC RBC-ENTMCNC: 33.2 GM/DL — SIGNIFICANT CHANGE UP (ref 32–36)
MCV RBC AUTO: 89.6 FL — SIGNIFICANT CHANGE UP (ref 80–100)
MCV RBC AUTO: 91.7 FL — SIGNIFICANT CHANGE UP (ref 80–100)
PLATELET # BLD AUTO: 127 K/UL — LOW (ref 150–400)
PLATELET # BLD AUTO: 130 K/UL — LOW (ref 150–400)
POTASSIUM SERPL-MCNC: 4 MMOL/L — SIGNIFICANT CHANGE UP (ref 3.5–5.3)
POTASSIUM SERPL-SCNC: 4 MMOL/L — SIGNIFICANT CHANGE UP (ref 3.5–5.3)
PROTHROM AB SERPL-ACNC: 12.5 SEC — SIGNIFICANT CHANGE UP (ref 9.8–12.7)
RBC # BLD: 2.56 M/UL — LOW (ref 3.8–5.2)
RBC # BLD: 2.64 M/UL — LOW (ref 3.8–5.2)
RBC # FLD: 18.8 % — HIGH (ref 10.3–14.5)
RBC # FLD: 22.4 % — HIGH (ref 10.3–14.5)
SODIUM SERPL-SCNC: 141 MMOL/L — SIGNIFICANT CHANGE UP (ref 135–145)
WBC # BLD: 7.14 K/UL — SIGNIFICANT CHANGE UP (ref 3.8–10.5)
WBC # BLD: 9.7 K/UL — SIGNIFICANT CHANGE UP (ref 3.8–10.5)
WBC # FLD AUTO: 7.14 K/UL — SIGNIFICANT CHANGE UP (ref 3.8–10.5)
WBC # FLD AUTO: 9.7 K/UL — SIGNIFICANT CHANGE UP (ref 3.8–10.5)

## 2018-10-12 PROCEDURE — 99233 SBSQ HOSP IP/OBS HIGH 50: CPT | Mod: GC

## 2018-10-12 RX ORDER — WARFARIN SODIUM 2.5 MG/1
7.5 TABLET ORAL ONCE
Qty: 0 | Refills: 0 | Status: COMPLETED | OUTPATIENT
Start: 2018-10-12 | End: 2018-10-12

## 2018-10-12 RX ORDER — CLONAZEPAM 1 MG
0.5 TABLET ORAL EVERY 8 HOURS
Qty: 0 | Refills: 0 | Status: DISCONTINUED | OUTPATIENT
Start: 2018-10-12 | End: 2018-10-16

## 2018-10-12 RX ADMIN — WARFARIN SODIUM 7.5 MILLIGRAM(S): 2.5 TABLET ORAL at 23:06

## 2018-10-12 RX ADMIN — HEPARIN SODIUM 11 UNIT(S)/HR: 5000 INJECTION INTRAVENOUS; SUBCUTANEOUS at 06:06

## 2018-10-12 RX ADMIN — Medication 0.5 MILLIGRAM(S): at 10:13

## 2018-10-12 RX ADMIN — Medication 50 MILLIGRAM(S): at 13:15

## 2018-10-12 RX ADMIN — Medication 3 MILLILITER(S): at 05:30

## 2018-10-12 RX ADMIN — PANTOPRAZOLE SODIUM 40 MILLIGRAM(S): 20 TABLET, DELAYED RELEASE ORAL at 06:51

## 2018-10-12 RX ADMIN — LATANOPROST 1 DROP(S): 0.05 SOLUTION/ DROPS OPHTHALMIC; TOPICAL at 23:03

## 2018-10-12 RX ADMIN — Medication 0.25 MILLIGRAM(S): at 05:31

## 2018-10-12 RX ADMIN — Medication 0.5 MILLIGRAM(S): at 23:02

## 2018-10-12 RX ADMIN — Medication 3 MILLILITER(S): at 00:27

## 2018-10-12 RX ADMIN — Medication 3 MILLILITER(S): at 18:11

## 2018-10-12 RX ADMIN — SIMETHICONE 80 MILLIGRAM(S): 80 TABLET, CHEWABLE ORAL at 06:51

## 2018-10-12 RX ADMIN — SODIUM CHLORIDE 3 MILLILITER(S): 9 INJECTION INTRAMUSCULAR; INTRAVENOUS; SUBCUTANEOUS at 05:34

## 2018-10-12 RX ADMIN — PANTOPRAZOLE SODIUM 40 MILLIGRAM(S): 20 TABLET, DELAYED RELEASE ORAL at 17:37

## 2018-10-12 RX ADMIN — SIMETHICONE 80 MILLIGRAM(S): 80 TABLET, CHEWABLE ORAL at 23:02

## 2018-10-12 RX ADMIN — Medication 50 MILLIGRAM(S): at 23:02

## 2018-10-12 RX ADMIN — Medication 0.25 MILLIGRAM(S): at 18:10

## 2018-10-12 RX ADMIN — SODIUM CHLORIDE 3 MILLILITER(S): 9 INJECTION INTRAMUSCULAR; INTRAVENOUS; SUBCUTANEOUS at 18:26

## 2018-10-12 RX ADMIN — Medication 20 MILLIGRAM(S): at 09:35

## 2018-10-12 RX ADMIN — SIMETHICONE 80 MILLIGRAM(S): 80 TABLET, CHEWABLE ORAL at 13:15

## 2018-10-12 RX ADMIN — HEPARIN SODIUM 11 UNIT(S)/HR: 5000 INJECTION INTRAVENOUS; SUBCUTANEOUS at 12:53

## 2018-10-12 RX ADMIN — Medication 81 MILLIGRAM(S): at 12:21

## 2018-10-12 RX ADMIN — Medication 50 MILLIGRAM(S): at 06:51

## 2018-10-12 RX ADMIN — Medication 3 MILLILITER(S): at 12:10

## 2018-10-12 NOTE — PROGRESS NOTE ADULT - ATTENDING COMMENTS
Patient examined and case reviewed in detail on bedside rounds. Agree with above.   -Acute Hypoxemic Respiratory Failure requiring mechanical ventilation - now with tracheostomy. Continue PSV as tolerated - currently tolerating 15/5 however became tachypneic on 5/5 previously. Trach sutures to be removed today.  -Oropharyngeal dysphagia - s/p PEG placement - with abdominal distension much improved.   -Pain control and agitation -appears comfortable. Continue benzodiazepine  -Afib and Mechanical MV - eventual transition to Coumadin with goal INR 2.5-3.5  -Acute Blood Loss anemia - due to GI bleed required PRBC 10/10. GI followup appreciated. Will continue Hep gtt at lower target. Continue Protonix. Patient with slow decrease in Hgb but remains >7. By family report, she has a long history of GI bleed which is made worse when she requires Heparin.

## 2018-10-12 NOTE — PROGRESS NOTE ADULT - ASSESSMENT
84  Year old Female with PMH significant for COPD, JENNIE on BiPAP at home , multivalvular disease (severe AS Not a candidate for TAVR, S/p MV replacement), HFpEF/ Severe diastolic failure, A-fib on coumadin, sick sinus syndrome S/p pacemaker placement, HTN, and CKD3 who was admitted for acute respiratory failure requiring intubation suspected to be 2/2 COPD exacerbation c/b PNA w/ +entero/rhinovirus and GN coccobacillus and H. influenza bacteremia, requiring continued respiratory support and tracheostomy. Patient S/p Trach placement 10/3 and PEG Placement 10/4.     10/7: Patient remains with abdominal distention today but tolerating trickle feeds, ABD X-ray  performed this morning patient remains with air filled loops of small and large bowel. GI fellow called to re-eval the patient this morning, X-ray  reviewed slightly worsened compared to yesterday. GI fellow recommended to place patient in Left lateral decubitus position and oob to chair later today. Patient with large amount of gas expressed with turning as Per RN. As per GI no role for rectal tube at this time and can continue to advance feeds as tolerated to promote gastric motility. H+H Has remained stable case d/w  will restart coumadin this evening. Pt with elevated bp will increase Hydralazine 50 mg q 8 hr   10/8-Abdominal distention noted with hypoactive bowel sounds, Kub noted from this weekend. Daily bowel movements noted. Will repeat PTT as there were 2 therapeutics prior on same dosing, also repeat CBC as well. Will transfuse if remains low. RCU weaning  10/10: 4 dark BMs reported night of 10/9, concerning for melena,  hgb 6.9 in AM,  1U RBC given . Goal heparin reduced to 50-70. GI consulted due to GIB Hx with known AV malformations. No intvn.  10/11: H/H stable post 1U with no furthers melena or intvn from GI. Resumed coumadin.  10/12: agitated this am. seroquel not working changed back to klonopin

## 2018-10-13 LAB
ANION GAP SERPL CALC-SCNC: 14 MMOL/L — SIGNIFICANT CHANGE UP (ref 5–17)
APTT BLD: 54.2 SEC — HIGH (ref 27.5–37.4)
BUN SERPL-MCNC: 39 MG/DL — HIGH (ref 7–23)
CALCIUM SERPL-MCNC: 10.2 MG/DL — SIGNIFICANT CHANGE UP (ref 8.4–10.5)
CHLORIDE SERPL-SCNC: 98 MMOL/L — SIGNIFICANT CHANGE UP (ref 96–108)
CO2 SERPL-SCNC: 25 MMOL/L — SIGNIFICANT CHANGE UP (ref 22–31)
CREAT SERPL-MCNC: 0.93 MG/DL — SIGNIFICANT CHANGE UP (ref 0.5–1.3)
GLUCOSE SERPL-MCNC: 152 MG/DL — HIGH (ref 70–99)
HCT VFR BLD CALC: 24.2 % — LOW (ref 34.5–45)
HGB BLD-MCNC: 7.8 G/DL — LOW (ref 11.5–15.5)
INR BLD: 1.45 RATIO — HIGH (ref 0.88–1.16)
MCHC RBC-ENTMCNC: 28.9 PG — SIGNIFICANT CHANGE UP (ref 27–34)
MCHC RBC-ENTMCNC: 32.3 GM/DL — SIGNIFICANT CHANGE UP (ref 32–36)
MCV RBC AUTO: 89.5 FL — SIGNIFICANT CHANGE UP (ref 80–100)
PLATELET # BLD AUTO: 129 K/UL — LOW (ref 150–400)
POTASSIUM SERPL-MCNC: 3.7 MMOL/L — SIGNIFICANT CHANGE UP (ref 3.5–5.3)
POTASSIUM SERPL-SCNC: 3.7 MMOL/L — SIGNIFICANT CHANGE UP (ref 3.5–5.3)
PROTHROM AB SERPL-ACNC: 15.8 SEC — HIGH (ref 9.8–12.7)
RBC # BLD: 2.71 M/UL — LOW (ref 3.8–5.2)
RBC # FLD: 18.8 % — HIGH (ref 10.3–14.5)
SODIUM SERPL-SCNC: 137 MMOL/L — SIGNIFICANT CHANGE UP (ref 135–145)
WBC # BLD: 8.8 K/UL — SIGNIFICANT CHANGE UP (ref 3.8–10.5)
WBC # FLD AUTO: 8.8 K/UL — SIGNIFICANT CHANGE UP (ref 3.8–10.5)

## 2018-10-13 PROCEDURE — 99233 SBSQ HOSP IP/OBS HIGH 50: CPT

## 2018-10-13 RX ORDER — WARFARIN SODIUM 2.5 MG/1
7.5 TABLET ORAL ONCE
Qty: 0 | Refills: 0 | Status: COMPLETED | OUTPATIENT
Start: 2018-10-13 | End: 2018-10-13

## 2018-10-13 RX ADMIN — Medication 50 MILLIGRAM(S): at 06:58

## 2018-10-13 RX ADMIN — Medication 650 MILLIGRAM(S): at 08:12

## 2018-10-13 RX ADMIN — Medication 3 MILLILITER(S): at 17:30

## 2018-10-13 RX ADMIN — Medication 3 MILLILITER(S): at 00:09

## 2018-10-13 RX ADMIN — Medication 0.5 MILLIGRAM(S): at 08:43

## 2018-10-13 RX ADMIN — SODIUM CHLORIDE 3 MILLILITER(S): 9 INJECTION INTRAMUSCULAR; INTRAVENOUS; SUBCUTANEOUS at 17:35

## 2018-10-13 RX ADMIN — PANTOPRAZOLE SODIUM 40 MILLIGRAM(S): 20 TABLET, DELAYED RELEASE ORAL at 17:16

## 2018-10-13 RX ADMIN — Medication 50 MILLIGRAM(S): at 21:43

## 2018-10-13 RX ADMIN — WARFARIN SODIUM 7.5 MILLIGRAM(S): 2.5 TABLET ORAL at 21:43

## 2018-10-13 RX ADMIN — Medication 81 MILLIGRAM(S): at 11:54

## 2018-10-13 RX ADMIN — Medication 0.25 MILLIGRAM(S): at 05:20

## 2018-10-13 RX ADMIN — HEPARIN SODIUM 11 UNIT(S)/HR: 5000 INJECTION INTRAVENOUS; SUBCUTANEOUS at 08:06

## 2018-10-13 RX ADMIN — Medication 3 MILLILITER(S): at 23:22

## 2018-10-13 RX ADMIN — Medication 650 MILLIGRAM(S): at 22:15

## 2018-10-13 RX ADMIN — Medication 0.25 MILLIGRAM(S): at 17:30

## 2018-10-13 RX ADMIN — Medication 20 MILLIGRAM(S): at 06:58

## 2018-10-13 RX ADMIN — LATANOPROST 1 DROP(S): 0.05 SOLUTION/ DROPS OPHTHALMIC; TOPICAL at 21:43

## 2018-10-13 RX ADMIN — SIMETHICONE 80 MILLIGRAM(S): 80 TABLET, CHEWABLE ORAL at 13:10

## 2018-10-13 RX ADMIN — SIMETHICONE 80 MILLIGRAM(S): 80 TABLET, CHEWABLE ORAL at 06:59

## 2018-10-13 RX ADMIN — Medication 1 MILLIGRAM(S): at 23:55

## 2018-10-13 RX ADMIN — Medication 650 MILLIGRAM(S): at 23:10

## 2018-10-13 RX ADMIN — Medication 50 MILLIGRAM(S): at 13:11

## 2018-10-13 RX ADMIN — Medication 650 MILLIGRAM(S): at 10:00

## 2018-10-13 RX ADMIN — Medication 0.5 MILLIGRAM(S): at 19:34

## 2018-10-13 RX ADMIN — PANTOPRAZOLE SODIUM 40 MILLIGRAM(S): 20 TABLET, DELAYED RELEASE ORAL at 06:59

## 2018-10-13 RX ADMIN — Medication 3 MILLILITER(S): at 05:20

## 2018-10-13 RX ADMIN — SIMETHICONE 80 MILLIGRAM(S): 80 TABLET, CHEWABLE ORAL at 21:43

## 2018-10-13 RX ADMIN — SODIUM CHLORIDE 3 MILLILITER(S): 9 INJECTION INTRAMUSCULAR; INTRAVENOUS; SUBCUTANEOUS at 05:21

## 2018-10-13 RX ADMIN — Medication 3 MILLILITER(S): at 12:37

## 2018-10-13 NOTE — PROGRESS NOTE ADULT - ATTENDING COMMENTS
-Acute Hypoxemic Respiratory Failure requiring mechanical ventilation - now with tracheostomy. Continue PSV as tolerated - currently tolerating 15/5 however became tachypneic on 5/5 previously. Trach sutures removed 10/12  -Oropharyngeal dysphagia - s/p PEG placement - with abdominal distension much improved.   -Pain control and agitation -appears comfortable. Continue benzodiazepine  -Afib and Mechanical MV - eventual transition to Coumadin with goal INR 2.5-3.5  -Acute Blood Loss anemia - due to GI bleed required PRBC 10/10. GI followup appreciated. Will continue Hep gtt at lower target. Continue Protonix. Patient with slow decrease in Hgb but remains >7. By family report, she has a long history of GI bleed which is made worse when she requires Heparin.

## 2018-10-13 NOTE — PROGRESS NOTE ADULT - ASSESSMENT
84  Year old Female with PMH significant for COPD, JENNIE on BiPAP at home , multivalvular disease (severe AS Not a candidate for TAVR, S/p MV replacement), HFpEF/ Severe diastolic failure, A-fib on coumadin, sick sinus syndrome S/p pacemaker placement, HTN, and CKD3 who was admitted for acute respiratory failure requiring intubation suspected to be 2/2 COPD exacerbation c/b PNA w/ +entero/rhinovirus and GN coccobacillus and H. influenza bacteremia, requiring continued respiratory support and tracheostomy. Patient S/p Trach placement 10/3 and PEG Placement 10/4.     10/7: Patient remains with abdominal distention today but tolerating trickle feeds, ABD X-ray  performed this morning patient remains with air filled loops of small and large bowel. GI fellow called to re-eval the patient this morning, X-ray  reviewed slightly worsened compared to yesterday. GI fellow recommended to place patient in Left lateral decubitus position and oob to chair later today. Patient with large amount of gas expressed with turning as Per RN. As per GI no role for rectal tube at this time and can continue to advance feeds as tolerated to promote gastric motility. H+H Has remained stable case d/w  will restart coumadin this evening. Pt with elevated bp will increase Hydralazine 50 mg q 8 hr   10/8-Abdominal distention noted with hypoactive bowel sounds, Kub noted from this weekend. Daily bowel movements noted. Will repeat PTT as there were 2 therapeutics prior on same dosing, also repeat CBC as well. Will transfuse if remains low. RCU weaning  10/10: 4 dark BMs reported night of 10/9, concerning for melena,  hgb 6.9 in AM,  1U RBC given . Goal heparin reduced to 50-70. GI consulted due to GIB Hx with known AV malformations. No intvn.  10/11: H/H stable post 1U with no furthers melena or intvn from GI. Resumed coumadin.  10/12: agitated this am. seroquel not working changed back to klonopin  10/13 No events overnight. Continues on heparin gtt/coumadin bridge. INR 84  Year old Female with PMH significant for COPD, JENNIE on BiPAP at home , multivalvular disease (severe AS Not a candidate for TAVR, S/p MV replacement), HFpEF/ Severe diastolic failure, A-fib on coumadin, sick sinus syndrome S/p pacemaker placement, HTN, and CKD3 who was admitted for acute respiratory failure requiring intubation suspected to be 2/2 COPD exacerbation c/b PNA w/ +entero/rhinovirus and GN coccobacillus and H. influenza bacteremia, requiring continued respiratory support and tracheostomy. Patient S/p Trach placement 10/3 and PEG Placement 10/4.     10/7: Patient remains with abdominal distention today but tolerating trickle feeds, ABD X-ray  performed this morning patient remains with air filled loops of small and large bowel. GI fellow called to re-eval the patient this morning, X-ray  reviewed slightly worsened compared to yesterday. GI fellow recommended to place patient in Left lateral decubitus position and oob to chair later today. Patient with large amount of gas expressed with turning as Per RN. As per GI no role for rectal tube at this time and can continue to advance feeds as tolerated to promote gastric motility. H+H Has remained stable case d/w  will restart coumadin this evening. Pt with elevated bp will increase Hydralazine 50 mg q 8 hr   10/8-Abdominal distention noted with hypoactive bowel sounds, Kub noted from this weekend. Daily bowel movements noted. Will repeat PTT as there were 2 therapeutics prior on same dosing, also repeat CBC as well. Will transfuse if remains low. RCU weaning  10/10: 4 dark BMs reported night of 10/9, concerning for melena,  hgb 6.9 in AM,  1U RBC given . Goal heparin reduced to 50-70. GI consulted due to GIB Hx with known AV malformations. No intvn.  10/11: H/H stable post 1U with no furthers melena or intvn from GI. Resumed coumadin.  10/12: agitated this am. seroquel not working changed back to klonopin  10/13 No events overnight. Continues on heparin gtt/coumadin bridge. INR 1.45, coumadin 7.5 mg x1. Continue with PS trials as tolerated.

## 2018-10-13 NOTE — PROGRESS NOTE ADULT - SUBJECTIVE AND OBJECTIVE BOX
Patient is a 84y old  Female who presents with a chief complaint of COPD exacerbation, ADHF (12 Oct 2018 11:50)      Interval Events:    REVIEW OF SYSTEMS:  [ ] Positive  [ ] All other systems negative  [ ] Unable to assess ROS because ________    Vital Signs Last 24 Hrs  T(C): 36.6 (10-13-18 @ 09:40), Max: 37.2 (10-12-18 @ 13:16)  T(F): 97.8 (10-13-18 @ 09:40), Max: 98.9 (10-12-18 @ 13:16)  HR: 62 (10-13-18 @ 09:40) (59 - 741)  BP: 103/54 (10-13-18 @ 09:40) (102/56 - 138/78)  RR: 22 (10-13-18 @ 09:40) (18 - 26)  SpO2: 98% (10-13-18 @ 09:40) (97% - 100%)    PHYSICAL EXAM:  HEENT:   [ ]Tracheostomy:  [ ]Pupils equal  [ ]No oral lesions  [ ]Abnormal    SKIN  [ ]No Rash  [ ] Abnormal  [ ] pressure    CARDIAC  [ ]Regular  [ ]Abnormal    PULMONARY  [ ]Bilateral Clear Breath Sounds  [ ]Normal Excursion  [ ]Abnormal    GI  [ ]PEG      [ ] +BS		              [ ]Soft, nondistended, nontender	  [ ]Abnormal    MUSCULOSKELETAL                                   [ ]Bedbound                 [ ]Abnormal    [ ]Ambulatory/OOB to chair                           EXTREMITIES                                         [ ]Normal  [ ]Edema                           NEUROLOGIC  [ ] Normal, non focal  [ ] Focal findings:    PSYCHIATRIC  [ ]Alert and appropriate  [ ] Sedated	 [ ]Agitated    :  Connie: [ ] Yes, if yes: Date of Placement:                   [  ] No    LINES: Central Lines [ ] Yes, if yes: Date of Placement                                     [  ] No    HOSPITAL MEDICATIONS:  MEDICATIONS  (STANDING):  ALBUTerol/ipratropium for Nebulization 3 milliLiter(s) Nebulizer every 6 hours  aspirin  chewable 81 milliGRAM(s) Oral daily  buDESOnide   0.25 milliGRAM(s) Respule 0.25 milliGRAM(s) Inhalation every 12 hours  diltiazem    Tablet 30 milliGRAM(s) Oral every 6 hours  heparin  Infusion 1000 Unit(s)/Hr (11 mL/Hr) IV Continuous <Continuous>  hydrALAZINE 50 milliGRAM(s) Oral every 8 hours  latanoprost 0.005% Ophthalmic Solution 1 Drop(s) Both EYES at bedtime  pantoprazole  Injectable 40 milliGRAM(s) IV Push every 12 hours  simethicone 80 milliGRAM(s) Chew every 8 hours  sodium chloride 3%  Inhalation 3 milliLiter(s) Inhalation two times a day  torsemide 20 milliGRAM(s) Oral daily    MEDICATIONS  (PRN):  acetaminophen    Suspension .. 650 milliGRAM(s) Oral every 6 hours PRN Mild Pain (1 - 3)  clonazePAM Tablet 0.5 milliGRAM(s) Oral every 8 hours PRN agitation  melatonin 1 milliGRAM(s) Oral at bedtime PRN Insomnia      LABS:                        7.8    8.8   )-----------( 129      ( 13 Oct 2018 06:32 )             24.2     10-13    137  |  98  |  39<H>  ----------------------------<  152<H>  3.7   |  25  |  0.93    Ca    10.2      13 Oct 2018 06:32  Phos  3.1     10-11  Mg     2.0     10-11      PT/INR - ( 13 Oct 2018 06:32 )   PT: 15.8 sec;   INR: 1.45 ratio         PTT - ( 13 Oct 2018 06:32 )  PTT:54.2 sec        CAPILLARY BLOOD GLUCOSE    MICROBIOLOGY:     RADIOLOGY:  [ ] Reviewed and interpreted by me    Mode: AC/ CMV (Assist Control/ Continuous Mandatory Ventilation)  RR (machine): 14  TV (machine): 400  FiO2: 30  PEEP: 5  ITime: 1  MAP: 8  PIP: 21 Patient is a 84y old  Female who presents with a chief complaint of COPD exacerbation, ADHF (12 Oct 2018 11:50)      Interval Events:    No events overnight.     REVIEW OF SYSTEMS:  [ ] Positive  [X ] All other systems negative  [ ] Unable to assess ROS because ________    Vital Signs Last 24 Hrs  T(C): 36.6 (10-13-18 @ 09:40), Max: 37.2 (10-12-18 @ 13:16)  T(F): 97.8 (10-13-18 @ 09:40), Max: 98.9 (10-12-18 @ 13:16)  HR: 62 (10-13-18 @ 09:40) (59 - 741)  BP: 103/54 (10-13-18 @ 09:40) (102/56 - 138/78)  RR: 22 (10-13-18 @ 09:40) (18 - 26)  SpO2: 98% (10-13-18 @ 09:40) (97% - 100%)    PHYSICAL EXAM:  HEENT:   [X ]Tracheostomy:  [ ]Pupils equal  [ ]No oral lesions  [ ]Abnormal    SKIN  [X ]No Rash  [ ] Abnormal  [ ] pressure    CARDIAC  [X ]Regular  [ ]Abnormal    PULMONARY  [ X]Bilateral Clear Breath Sounds  [ ]Normal Excursion  [ ]Abnormal    GI  [ ]PEG      [ ] +BS		              [ ]Soft, nondistended, nontender	  [ ]Abnormal    MUSCULOSKELETAL                                   [ ]Bedbound                 [ ]Abnormal    [X ] OOB to chair with lift / assist     EXTREMITIES                                         [ ]Normal  [ ]Edema                           NEUROLOGIC  [X ] Normal, non focal  [ ] Focal findings:    PSYCHIATRIC  [ ]Alert and appropriate  [ ] Sedated	 [ ]Agitated    :  Rosales: [ ] Yes, if yes: Date of Placement:                   [X  ] No    LINES: Central Lines [ ] Yes, if yes: Date of Placement                                     [X  ] No    HOSPITAL MEDICATIONS:  MEDICATIONS  (STANDING):  ALBUTerol/ipratropium for Nebulization 3 milliLiter(s) Nebulizer every 6 hours  aspirin  chewable 81 milliGRAM(s) Oral daily  buDESOnide   0.25 milliGRAM(s) Respule 0.25 milliGRAM(s) Inhalation every 12 hours  diltiazem    Tablet 30 milliGRAM(s) Oral every 6 hours  heparin  Infusion 1000 Unit(s)/Hr (11 mL/Hr) IV Continuous <Continuous>  hydrALAZINE 50 milliGRAM(s) Oral every 8 hours  latanoprost 0.005% Ophthalmic Solution 1 Drop(s) Both EYES at bedtime  pantoprazole  Injectable 40 milliGRAM(s) IV Push every 12 hours  simethicone 80 milliGRAM(s) Chew every 8 hours  sodium chloride 3%  Inhalation 3 milliLiter(s) Inhalation two times a day  torsemide 20 milliGRAM(s) Oral daily    MEDICATIONS  (PRN):  acetaminophen    Suspension .. 650 milliGRAM(s) Oral every 6 hours PRN Mild Pain (1 - 3)  clonazePAM Tablet 0.5 milliGRAM(s) Oral every 8 hours PRN agitation  melatonin 1 milliGRAM(s) Oral at bedtime PRN Insomnia      LABS:                        7.8    8.8   )-----------( 129      ( 13 Oct 2018 06:32 )             24.2     10-13    137  |  98  |  39<H>  ----------------------------<  152<H>  3.7   |  25  |  0.93    Ca    10.2      13 Oct 2018 06:32  Phos  3.1     10-11  Mg     2.0     10-11      PT/INR - ( 13 Oct 2018 06:32 )   PT: 15.8 sec;   INR: 1.45 ratio         PTT - ( 13 Oct 2018 06:32 )  PTT:54.2 sec        CAPILLARY BLOOD GLUCOSE    MICROBIOLOGY:     RADIOLOGY:  [ ] Reviewed and interpreted by me    Mode: AC/ CMV (Assist Control/ Continuous Mandatory Ventilation)  RR (machine): 14  TV (machine): 400  FiO2: 30  PEEP: 5  ITime: 1  MAP: 8  PIP: 21

## 2018-10-14 LAB
ANION GAP SERPL CALC-SCNC: 11 MMOL/L — SIGNIFICANT CHANGE UP (ref 5–17)
APTT BLD: 77.6 SEC — HIGH (ref 27.5–37.4)
BUN SERPL-MCNC: 42 MG/DL — HIGH (ref 7–23)
CALCIUM SERPL-MCNC: 10.2 MG/DL — SIGNIFICANT CHANGE UP (ref 8.4–10.5)
CHLORIDE SERPL-SCNC: 98 MMOL/L — SIGNIFICANT CHANGE UP (ref 96–108)
CO2 SERPL-SCNC: 26 MMOL/L — SIGNIFICANT CHANGE UP (ref 22–31)
CREAT SERPL-MCNC: 0.93 MG/DL — SIGNIFICANT CHANGE UP (ref 0.5–1.3)
GLUCOSE SERPL-MCNC: 168 MG/DL — HIGH (ref 70–99)
HCT VFR BLD CALC: 24.2 % — LOW (ref 34.5–45)
HGB BLD-MCNC: 7.7 G/DL — LOW (ref 11.5–15.5)
INR BLD: 2.82 RATIO — HIGH (ref 0.88–1.16)
MCHC RBC-ENTMCNC: 29.1 PG — SIGNIFICANT CHANGE UP (ref 27–34)
MCHC RBC-ENTMCNC: 32 GM/DL — SIGNIFICANT CHANGE UP (ref 32–36)
MCV RBC AUTO: 91.1 FL — SIGNIFICANT CHANGE UP (ref 80–100)
PLATELET # BLD AUTO: 125 K/UL — LOW (ref 150–400)
POTASSIUM SERPL-MCNC: 3.8 MMOL/L — SIGNIFICANT CHANGE UP (ref 3.5–5.3)
POTASSIUM SERPL-SCNC: 3.8 MMOL/L — SIGNIFICANT CHANGE UP (ref 3.5–5.3)
PROTHROM AB SERPL-ACNC: 31.1 SEC — HIGH (ref 9.8–12.7)
RBC # BLD: 2.65 M/UL — LOW (ref 3.8–5.2)
RBC # FLD: 18.8 % — HIGH (ref 10.3–14.5)
SODIUM SERPL-SCNC: 135 MMOL/L — SIGNIFICANT CHANGE UP (ref 135–145)
WBC # BLD: 7.2 K/UL — SIGNIFICANT CHANGE UP (ref 3.8–10.5)
WBC # FLD AUTO: 7.2 K/UL — SIGNIFICANT CHANGE UP (ref 3.8–10.5)

## 2018-10-14 PROCEDURE — 99233 SBSQ HOSP IP/OBS HIGH 50: CPT | Mod: GC

## 2018-10-14 RX ORDER — WARFARIN SODIUM 2.5 MG/1
1 TABLET ORAL ONCE
Qty: 0 | Refills: 0 | Status: COMPLETED | OUTPATIENT
Start: 2018-10-14 | End: 2018-10-14

## 2018-10-14 RX ORDER — FUROSEMIDE 40 MG
20 TABLET ORAL ONCE
Qty: 0 | Refills: 0 | Status: COMPLETED | OUTPATIENT
Start: 2018-10-14 | End: 2018-10-14

## 2018-10-14 RX ORDER — MORPHINE SULFATE 50 MG/1
1 CAPSULE, EXTENDED RELEASE ORAL ONCE
Qty: 0 | Refills: 0 | Status: DISCONTINUED | OUTPATIENT
Start: 2018-10-14 | End: 2018-10-14

## 2018-10-14 RX ORDER — FENTANYL CITRATE 50 UG/ML
1 INJECTION INTRAVENOUS
Qty: 0 | Refills: 0 | Status: DISCONTINUED | OUTPATIENT
Start: 2018-10-14 | End: 2018-10-20

## 2018-10-14 RX ADMIN — SODIUM CHLORIDE 3 MILLILITER(S): 9 INJECTION INTRAMUSCULAR; INTRAVENOUS; SUBCUTANEOUS at 05:21

## 2018-10-14 RX ADMIN — Medication 3 MILLILITER(S): at 05:14

## 2018-10-14 RX ADMIN — Medication 0.25 MILLIGRAM(S): at 05:20

## 2018-10-14 RX ADMIN — SIMETHICONE 80 MILLIGRAM(S): 80 TABLET, CHEWABLE ORAL at 05:16

## 2018-10-14 RX ADMIN — Medication 20 MILLIGRAM(S): at 13:48

## 2018-10-14 RX ADMIN — Medication 0.5 MILLIGRAM(S): at 07:21

## 2018-10-14 RX ADMIN — PANTOPRAZOLE SODIUM 40 MILLIGRAM(S): 20 TABLET, DELAYED RELEASE ORAL at 05:16

## 2018-10-14 RX ADMIN — Medication 3 MILLILITER(S): at 12:08

## 2018-10-14 RX ADMIN — LATANOPROST 1 DROP(S): 0.05 SOLUTION/ DROPS OPHTHALMIC; TOPICAL at 21:58

## 2018-10-14 RX ADMIN — WARFARIN SODIUM 1 MILLIGRAM(S): 2.5 TABLET ORAL at 21:58

## 2018-10-14 RX ADMIN — Medication 50 MILLIGRAM(S): at 14:58

## 2018-10-14 RX ADMIN — MORPHINE SULFATE 1 MILLIGRAM(S): 50 CAPSULE, EXTENDED RELEASE ORAL at 08:11

## 2018-10-14 RX ADMIN — Medication 0.25 MILLIGRAM(S): at 17:36

## 2018-10-14 RX ADMIN — Medication 50 MILLIGRAM(S): at 21:58

## 2018-10-14 RX ADMIN — Medication 1 MILLIGRAM(S): at 23:09

## 2018-10-14 RX ADMIN — HEPARIN SODIUM 11 UNIT(S)/HR: 5000 INJECTION INTRAVENOUS; SUBCUTANEOUS at 08:38

## 2018-10-14 RX ADMIN — Medication 0.5 MILLIGRAM(S): at 18:26

## 2018-10-14 RX ADMIN — SODIUM CHLORIDE 3 MILLILITER(S): 9 INJECTION INTRAMUSCULAR; INTRAVENOUS; SUBCUTANEOUS at 17:43

## 2018-10-14 RX ADMIN — Medication 3 MILLILITER(S): at 17:36

## 2018-10-14 RX ADMIN — SIMETHICONE 80 MILLIGRAM(S): 80 TABLET, CHEWABLE ORAL at 21:58

## 2018-10-14 RX ADMIN — Medication 81 MILLIGRAM(S): at 11:55

## 2018-10-14 RX ADMIN — Medication 20 MILLIGRAM(S): at 05:16

## 2018-10-14 RX ADMIN — PANTOPRAZOLE SODIUM 40 MILLIGRAM(S): 20 TABLET, DELAYED RELEASE ORAL at 17:29

## 2018-10-14 RX ADMIN — SIMETHICONE 80 MILLIGRAM(S): 80 TABLET, CHEWABLE ORAL at 13:48

## 2018-10-14 RX ADMIN — MORPHINE SULFATE 1 MILLIGRAM(S): 50 CAPSULE, EXTENDED RELEASE ORAL at 08:20

## 2018-10-14 RX ADMIN — FENTANYL CITRATE 1 PATCH: 50 INJECTION INTRAVENOUS at 08:10

## 2018-10-14 RX ADMIN — FENTANYL CITRATE 1 PATCH: 50 INJECTION INTRAVENOUS at 02:36

## 2018-10-14 RX ADMIN — Medication 50 MILLIGRAM(S): at 05:16

## 2018-10-14 NOTE — PROGRESS NOTE ADULT - ASSESSMENT
84  Year old Female with PMH significant for COPD, JENNIE on BiPAP at home , multivalvular disease (severe AS Not a candidate for TAVR, S/p MV replacement), HFpEF/ Severe diastolic failure, A-fib on coumadin, sick sinus syndrome S/p pacemaker placement, HTN, and CKD3 who was admitted for acute respiratory failure requiring intubation suspected to be 2/2 COPD exacerbation c/b PNA w/ +entero/rhinovirus and GN coccobacillus and H. influenza bacteremia, requiring continued respiratory support and tracheostomy. Patient S/p Trach placement 10/3 and PEG Placement 10/4.     10/7: Patient remains with abdominal distention today but tolerating trickle feeds, ABD X-ray  performed this morning patient remains with air filled loops of small and large bowel. GI fellow called to re-eval the patient this morning, X-ray  reviewed slightly worsened compared to yesterday. GI fellow recommended to place patient in Left lateral decubitus position and oob to chair later today. Patient with large amount of gas expressed with turning as Per RN. As per GI no role for rectal tube at this time and can continue to advance feeds as tolerated to promote gastric motility. H+H Has remained stable case d/w  will restart coumadin this evening. Pt with elevated bp will increase Hydralazine 50 mg q 8 hr   10/8-Abdominal distention noted with hypoactive bowel sounds, Kub noted from this weekend. Daily bowel movements noted. Will repeat PTT as there were 2 therapeutics prior on same dosing, also repeat CBC as well. Will transfuse if remains low. RCU weaning  10/10: 4 dark BMs reported night of 10/9, concerning for melena,  hgb 6.9 in AM,  1U RBC given . Goal heparin reduced to 50-70. GI consulted due to GIB Hx with known AV malformations. No intvn.  10/11: H/H stable post 1U with no furthers melena or intvn from GI. Resumed coumadin.  10/12: agitated this am. seroquel not working changed back to klonopin  10/13 No events overnight. Continues on heparin gtt/coumadin bridge. INR 1.45, coumadin 7.5 mg x1. Continue with PS trials as tolerated.   10/14 Heparin gtt d/c, INR 2.82, no coumadin today. Continue with PS trials as tolerated. 84  Year old Female with PMH significant for COPD, JENNIE on BiPAP at home , multivalvular disease (severe AS Not a candidate for TAVR, S/p MV replacement), HFpEF/ Severe diastolic failure, A-fib on coumadin, sick sinus syndrome S/p pacemaker placement, HTN, and CKD3 who was admitted for acute respiratory failure requiring intubation suspected to be 2/2 COPD exacerbation c/b PNA w/ +entero/rhinovirus and GN coccobacillus and H. influenza bacteremia, requiring continued respiratory support and tracheostomy. Patient S/p Trach placement 10/3 and PEG Placement 10/4.     10/7: Patient remains with abdominal distention today but tolerating trickle feeds, ABD X-ray  performed this morning patient remains with air filled loops of small and large bowel. GI fellow called to re-eval the patient this morning, X-ray  reviewed slightly worsened compared to yesterday. GI fellow recommended to place patient in Left lateral decubitus position and oob to chair later today. Patient with large amount of gas expressed with turning as Per RN. As per GI no role for rectal tube at this time and can continue to advance feeds as tolerated to promote gastric motility. H+H Has remained stable case d/w  will restart coumadin this evening. Pt with elevated bp will increase Hydralazine 50 mg q 8 hr   10/8-Abdominal distention noted with hypoactive bowel sounds, Kub noted from this weekend. Daily bowel movements noted. Will repeat PTT as there were 2 therapeutics prior on same dosing, also repeat CBC as well. Will transfuse if remains low. RCU weaning  10/10: 4 dark BMs reported night of 10/9, concerning for melena,  hgb 6.9 in AM,  1U RBC given . Goal heparin reduced to 50-70. GI consulted due to GIB Hx with known AV malformations. No intvn.  10/11: H/H stable post 1U with no furthers melena or intvn from GI. Resumed coumadin.  10/12: agitated this am. seroquel not working changed back to klonopin  10/13 No events overnight. Continues on heparin gtt/coumadin bridge. INR 1.45, coumadin 7.5 mg x1. Continue with PS trials as tolerated.   10/14 Heparin gtt d/c, INR 2.82, coumadin 1mg x1. Continue with PS trials as tolerated. 84  Year old Female with PMH significant for COPD, JENNIE on BiPAP at home , multivalvular disease (severe AS Not a candidate for TAVR, S/p MV replacement), HFpEF/ Severe diastolic failure, A-fib on coumadin, sick sinus syndrome S/p pacemaker placement, HTN, and CKD3 who was admitted for acute respiratory failure requiring intubation suspected to be 2/2 COPD exacerbation c/b PNA w/ +entero/rhinovirus and GN coccobacillus and H. influenza bacteremia, requiring continued respiratory support and tracheostomy. Patient S/p Trach placement 10/3 and PEG Placement 10/4.     10/7: Patient remains with abdominal distention today but tolerating trickle feeds, ABD X-ray  performed this morning patient remains with air filled loops of small and large bowel. GI fellow called to re-eval the patient this morning, X-ray  reviewed slightly worsened compared to yesterday. GI fellow recommended to place patient in Left lateral decubitus position and oob to chair later today. Patient with large amount of gas expressed with turning as Per RN. As per GI no role for rectal tube at this time and can continue to advance feeds as tolerated to promote gastric motility. H+H Has remained stable case d/w  will restart coumadin this evening. Pt with elevated bp will increase Hydralazine 50 mg q 8 hr   10/8-Abdominal distention noted with hypoactive bowel sounds, Kub noted from this weekend. Daily bowel movements noted. Will repeat PTT as there were 2 therapeutics prior on same dosing, also repeat CBC as well. Will transfuse if remains low. RCU weaning  10/10: 4 dark BMs reported night of 10/9, concerning for melena,  hgb 6.9 in AM,  1U RBC given . Goal heparin reduced to 50-70. GI consulted due to GIB Hx with known AV malformations. No intvn.  10/11: H/H stable post 1U with no furthers melena or intvn from GI. Resumed coumadin.  10/12: agitated this am. seroquel not working changed back to klonopin  10/13 No events overnight. Continues on heparin gtt/coumadin bridge. INR 1.45, coumadin 7.5 mg x1. Continue with PS trials as tolerated.   10/14 Heparin gtt d/c, INR 2.82, coumadin 1mg x1. Continue with PS trials as tolerated. LE edema, lasix 20 mg po x1. Hg 7.7 stable.

## 2018-10-14 NOTE — PROGRESS NOTE ADULT - PROBLEM SELECTOR PLAN 7
Continue Klonopin No signs of agitation  Continue Fentanyl patch Continue Klonopin No signs of agitation  Continue Fentanyl patch  10/14 Morphine 1 mg x1 for breakthrough pain

## 2018-10-14 NOTE — PROGRESS NOTE ADULT - SUBJECTIVE AND OBJECTIVE BOX
Patient is a 84y old  Female who presents with a chief complaint of COPD exacerbation, ADHF (13 Oct 2018 09:55)    Interval Events:    No events overnight.     REVIEW OF SYSTEMS:  [ ] Positive  [X ] All other systems negative  [ ] Unable to assess ROS because ________    Vital Signs Last 24 Hrs  T(C): 37.1 (10-14-18 @ 08:18), Max: 37.3 (10-13-18 @ 21:32)  T(F): 98.7 (10-14-18 @ 08:18), Max: 99.1 (10-13-18 @ 21:32)  HR: 66 (10-14-18 @ 08:50) (53 - 78)  BP: 148/74 (10-14-18 @ 08:18) (109/64 - 149/61)  RR: 19 (10-14-18 @ 08:49) (15 - 32)  SpO2: 100% (10-14-18 @ 08:50) (97% - 100%)    PHYSICAL EXAM:  HEENT:   [X ]Tracheostomy:  #6 Cuffed Shiley   [ ]Pupils equal  [ ]No oral lesions  [ ]Abnormal    SKIN  [X ]No Rash  [ ] Abnormal  [ ] pressure    CARDIAC  [X ]Regular  [ ]Abnormal    PULMONARY  [ X]Bilateral Clear Breath Sounds  [ ]Normal Excursion  [ ]Abnormal    GI  [X ]PEG      [X ] +BS		              [X ]Soft, nondistended, nontender	  [ ]Abnormal    MUSCULOSKELETAL                                   [ ]Bedbound                 [ ]Abnormal    [X ] OOB to chair with lift / asist     EXTREMITIES                                         [X ]Normal  [ ]Edema                           NEUROLOGIC  [X ] Normal, non focal, alert, follows simple commands   [ ] Focal findings:    PSYCHIATRIC  [ ]Alert and appropriate  [ ] Sedated	 [ ]Agitated    :  Rosales: [ ] Yes, if yes: Date of Placement:                   [X  ] No    LINES: Central Lines [ ] Yes, if yes: Date of Placement                                     [  X] No    HOSPITAL MEDICATIONS:  MEDICATIONS  (STANDING):  ALBUTerol/ipratropium for Nebulization 3 milliLiter(s) Nebulizer every 6 hours  aspirin  chewable 81 milliGRAM(s) Oral daily  buDESOnide   0.25 milliGRAM(s) Respule 0.25 milliGRAM(s) Inhalation every 12 hours  diltiazem    Tablet 30 milliGRAM(s) Oral every 6 hours  fentaNYL   Patch  25 MICROgram(s)/Hr 1 Patch Transdermal every 72 hours  hydrALAZINE 50 milliGRAM(s) Oral every 8 hours  latanoprost 0.005% Ophthalmic Solution 1 Drop(s) Both EYES at bedtime  pantoprazole  Injectable 40 milliGRAM(s) IV Push every 12 hours  simethicone 80 milliGRAM(s) Chew every 8 hours  sodium chloride 3%  Inhalation 3 milliLiter(s) Inhalation two times a day  torsemide 20 milliGRAM(s) Oral daily    MEDICATIONS  (PRN):  acetaminophen    Suspension .. 650 milliGRAM(s) Oral every 6 hours PRN Mild Pain (1 - 3)  clonazePAM Tablet 0.5 milliGRAM(s) Oral every 8 hours PRN agitation  melatonin 1 milliGRAM(s) Oral at bedtime PRN Insomnia    LABS:                        7.7    7.2   )-----------( 125      ( 14 Oct 2018 06:53 )             24.2     10-14    135  |  98  |  42<H>  ----------------------------<  168<H>  3.8   |  26  |  0.93    Ca    10.2      14 Oct 2018 06:53    PT/INR - ( 14 Oct 2018 06:56 )   PT: 31.1 sec;   INR: 2.82 ratio       PTT - ( 14 Oct 2018 06:56 )  PTT:77.6 sec    CAPILLARY BLOOD GLUCOSE    MICROBIOLOGY:     RADIOLOGY:  [ ] Reviewed and interpreted by me    Mode: AC/ CMV (Assist Control/ Continuous Mandatory Ventilation)  RR (machine): 14  TV (machine): 400  FiO2: 30  PEEP: 5  ITime: 1  MAP: 11  PIP: 22

## 2018-10-14 NOTE — PROGRESS NOTE ADULT - PROBLEM SELECTOR PLAN 5
Patient with HX of AS ( Not a candidate for TAVR )  Patient S/p Mechanical Mitral Valve Replacement   Continue AC with Heparin Drip with goal PTT of 50-70 bridging to coumadin (INR goal 2.5-3.5)  Coumadin restarted on 10/11. INR 1.45, coumadin 7.5 mg x1  10/14 INR 2.82, heparin gtt d/c, low dose coumadin today Patient with HX of AS ( Not a candidate for TAVR )  Patient S/p Mechanical Mitral Valve Replacement   Continue AC with Heparin Drip with goal PTT of 50-70 bridging to coumadin (INR goal 2.5-3.5)  10/13 Coumadin restarted on 10/11. INR 1.45, coumadin 7.5 mg x1  10/14 INR 2.82, heparin gtt d/c, coumadin 1mg x1 today Patient with HX of AS ( Not a candidate for TAVR )  Patient S/p Mechanical Mitral Valve Replacement   Heparin Drip with goal PTT of 50-70 bridging to coumadin (INR goal 2.5-3.5)  10/13 Coumadin restarted on 10/11. INR 1.45, coumadin 7.5 mg x1  10/14 INR 2.82, heparin gtt d/c, coumadin 1mg x1 today

## 2018-10-15 LAB
ANION GAP SERPL CALC-SCNC: 11 MMOL/L — SIGNIFICANT CHANGE UP (ref 5–17)
APTT BLD: 31 SEC — SIGNIFICANT CHANGE UP (ref 27.5–37.4)
BUN SERPL-MCNC: 43 MG/DL — HIGH (ref 7–23)
CALCIUM SERPL-MCNC: 10.2 MG/DL — SIGNIFICANT CHANGE UP (ref 8.4–10.5)
CHLORIDE SERPL-SCNC: 96 MMOL/L — SIGNIFICANT CHANGE UP (ref 96–108)
CO2 SERPL-SCNC: 25 MMOL/L — SIGNIFICANT CHANGE UP (ref 22–31)
CREAT SERPL-MCNC: 0.95 MG/DL — SIGNIFICANT CHANGE UP (ref 0.5–1.3)
GLUCOSE SERPL-MCNC: 136 MG/DL — HIGH (ref 70–99)
HCT VFR BLD CALC: 25.3 % — LOW (ref 34.5–45)
HGB BLD-MCNC: 7.9 G/DL — LOW (ref 11.5–15.5)
INR BLD: 2.34 RATIO — HIGH (ref 0.88–1.16)
MCHC RBC-ENTMCNC: 28.9 PG — SIGNIFICANT CHANGE UP (ref 27–34)
MCHC RBC-ENTMCNC: 31.3 GM/DL — LOW (ref 32–36)
MCV RBC AUTO: 92.3 FL — SIGNIFICANT CHANGE UP (ref 80–100)
PLATELET # BLD AUTO: 134 K/UL — LOW (ref 150–400)
POTASSIUM SERPL-MCNC: 4.2 MMOL/L — SIGNIFICANT CHANGE UP (ref 3.5–5.3)
POTASSIUM SERPL-SCNC: 4.2 MMOL/L — SIGNIFICANT CHANGE UP (ref 3.5–5.3)
PROTHROM AB SERPL-ACNC: 25.9 SEC — HIGH (ref 9.8–12.7)
RBC # BLD: 2.74 M/UL — LOW (ref 3.8–5.2)
RBC # FLD: 18.6 % — HIGH (ref 10.3–14.5)
SODIUM SERPL-SCNC: 132 MMOL/L — LOW (ref 135–145)
WBC # BLD: 5.4 K/UL — SIGNIFICANT CHANGE UP (ref 3.8–10.5)
WBC # FLD AUTO: 5.4 K/UL — SIGNIFICANT CHANGE UP (ref 3.8–10.5)

## 2018-10-15 PROCEDURE — 99233 SBSQ HOSP IP/OBS HIGH 50: CPT | Mod: GC

## 2018-10-15 RX ORDER — WARFARIN SODIUM 2.5 MG/1
7.5 TABLET ORAL AT BEDTIME
Qty: 0 | Refills: 0 | Status: DISCONTINUED | OUTPATIENT
Start: 2018-10-15 | End: 2018-10-15

## 2018-10-15 RX ORDER — HYDRALAZINE HCL 50 MG
25 TABLET ORAL EVERY 8 HOURS
Qty: 0 | Refills: 0 | Status: DISCONTINUED | OUTPATIENT
Start: 2018-10-15 | End: 2018-11-02

## 2018-10-15 RX ORDER — WARFARIN SODIUM 2.5 MG/1
5 TABLET ORAL AT BEDTIME
Qty: 0 | Refills: 0 | Status: DISCONTINUED | OUTPATIENT
Start: 2018-10-15 | End: 2018-10-16

## 2018-10-15 RX ADMIN — Medication 81 MILLIGRAM(S): at 11:48

## 2018-10-15 RX ADMIN — SODIUM CHLORIDE 3 MILLILITER(S): 9 INJECTION INTRAMUSCULAR; INTRAVENOUS; SUBCUTANEOUS at 18:01

## 2018-10-15 RX ADMIN — SIMETHICONE 80 MILLIGRAM(S): 80 TABLET, CHEWABLE ORAL at 13:34

## 2018-10-15 RX ADMIN — Medication 0.25 MILLIGRAM(S): at 17:56

## 2018-10-15 RX ADMIN — Medication 20 MILLIGRAM(S): at 05:36

## 2018-10-15 RX ADMIN — Medication 25 MILLIGRAM(S): at 21:21

## 2018-10-15 RX ADMIN — Medication 3 MILLILITER(S): at 17:56

## 2018-10-15 RX ADMIN — Medication 3 MILLILITER(S): at 06:14

## 2018-10-15 RX ADMIN — LATANOPROST 1 DROP(S): 0.05 SOLUTION/ DROPS OPHTHALMIC; TOPICAL at 21:21

## 2018-10-15 RX ADMIN — Medication 3 MILLILITER(S): at 00:40

## 2018-10-15 RX ADMIN — Medication 3 MILLILITER(S): at 12:51

## 2018-10-15 RX ADMIN — PANTOPRAZOLE SODIUM 40 MILLIGRAM(S): 20 TABLET, DELAYED RELEASE ORAL at 05:37

## 2018-10-15 RX ADMIN — Medication 0.5 MILLIGRAM(S): at 02:55

## 2018-10-15 RX ADMIN — Medication 0.25 MILLIGRAM(S): at 06:15

## 2018-10-15 RX ADMIN — Medication 0.5 MILLIGRAM(S): at 21:20

## 2018-10-15 RX ADMIN — SIMETHICONE 80 MILLIGRAM(S): 80 TABLET, CHEWABLE ORAL at 21:21

## 2018-10-15 RX ADMIN — Medication 50 MILLIGRAM(S): at 05:36

## 2018-10-15 RX ADMIN — Medication 0.5 MILLIGRAM(S): at 11:48

## 2018-10-15 RX ADMIN — SODIUM CHLORIDE 3 MILLILITER(S): 9 INJECTION INTRAMUSCULAR; INTRAVENOUS; SUBCUTANEOUS at 06:15

## 2018-10-15 RX ADMIN — PANTOPRAZOLE SODIUM 40 MILLIGRAM(S): 20 TABLET, DELAYED RELEASE ORAL at 17:23

## 2018-10-15 RX ADMIN — WARFARIN SODIUM 5 MILLIGRAM(S): 2.5 TABLET ORAL at 21:20

## 2018-10-15 RX ADMIN — Medication 3 MILLILITER(S): at 23:21

## 2018-10-15 RX ADMIN — SIMETHICONE 80 MILLIGRAM(S): 80 TABLET, CHEWABLE ORAL at 05:36

## 2018-10-15 NOTE — PROGRESS NOTE ADULT - ATTENDING COMMENTS
Patient seen and examined. Patient appears comfortable with family-friend at bedside.   -Acute Hypoxemic Respiratory Failure requiring mechanical ventilation - now with tracheostomy. Continue PSV as tolerated. Trach sutures removed 10/12  -Oropharyngeal dysphagia - s/p PEG placement - with abdominal distension much improved.   -Pain control and agitation -appears comfortable. Continue benzodiazepine and increase as needed  -Afib and Mechanical MV - continue coumadin with goal INR 2.5-3.5. Will give 5mg today  -Acute Blood Loss anemia - due to GI bleed required PRBC 10/10. GI followup appreciated. Will continue Hep gtt at lower target. Continue Protonix. Patient with slow decrease in Hgb but remains >7. By family report, she has a long history of GI bleed which is made worse when she requires Heparin.    -Discharge planning. Family has only provided 1 facility therefore will have to send out a global referral. Hopeful for discharge this week.

## 2018-10-15 NOTE — PROGRESS NOTE ADULT - PROBLEM SELECTOR PLAN 7
Continue Klonopin No signs of agitation  Continue Fentanyl patch  10/14 Morphine 1 mg x1 for breakthrough pain

## 2018-10-15 NOTE — PROGRESS NOTE ADULT - PROBLEM SELECTOR PLAN 5
Patient with HX of AS ( Not a candidate for TAVR )  Patient S/p Mechanical Mitral Valve Replacement   Was on Heparin infusion with goal PTT of 50-70 bridging to coumadin (INR goal 2.5-3.5). Coumadin restarted on 10/11 at 7.5mg then decreased to 5mg 10/15 as INR jumped to 2.8. No longer on heparin. Daily INRs

## 2018-10-15 NOTE — PROGRESS NOTE ADULT - ASSESSMENT
84  Year old Female with PMH significant for COPD, JENNIE on BiPAP at home , multivalvular disease (severe AS Not a candidate for TAVR, S/p MV replacement), HFpEF/ Severe diastolic failure, A-fib on coumadin, sick sinus syndrome S/p pacemaker placement, HTN, and CKD3 who was admitted for acute respiratory failure requiring intubation suspected to be 2/2 COPD exacerbation c/b PNA w/ +entero/rhinovirus and GN coccobacillus and H. influenza bacteremia, requiring continued respiratory support and tracheostomy. Patient S/p Trach placement 10/3 and PEG Placement 10/4.     10/7: Patient remains with abdominal distention today but tolerating trickle feeds, ABD X-ray  performed this morning patient remains with air filled loops of small and large bowel. GI fellow called to re-eval the patient this morning, X-ray  reviewed slightly worsened compared to yesterday. GI fellow recommended to place patient in Left lateral decubitus position and oob to chair later today. Patient with large amount of gas expressed with turning as Per RN. As per GI no role for rectal tube at this time and can continue to advance feeds as tolerated to promote gastric motility. H+H Has remained stable case d/w  will restart coumadin this evening. Pt with elevated bp will increase Hydralazine 50 mg q 8 hr   10/8-Abdominal distention noted with hypoactive bowel sounds, Kub noted from this weekend. Daily bowel movements noted. Will repeat PTT as there were 2 therapeutics prior on same dosing, also repeat CBC as well. Will transfuse if remains low. RCU weaning  10/10: 4 dark BMs reported night of 10/9, concerning for melena,  hgb 6.9 in AM,  1U RBC given . Goal heparin reduced to 50-70. GI consulted due to GIB Hx with known AV malformations. No intvn.  10/11: H/H stable post 1U with no furthers melena or intvn from GI. Resumed coumadin.  10/12: agitated this am. seroquel not working changed back to klonopin  10/13 No events overnight. Continues on heparin gtt/coumadin bridge. INR 1.45, coumadin 7.5 mg x1. Continue with PS trials as tolerated.   10/14 Heparin gtt d/c, INR 2.82, coumadin 1mg x1. Continue with PS trials as tolerated. LE edema, lasix 20 mg po x1. Hg 7.7 stable.   10/15:  INR 2.3, reduced coumadin to 5mg

## 2018-10-15 NOTE — PROGRESS NOTE ADULT - SUBJECTIVE AND OBJECTIVE BOX
Patient is a 84y old  Female who presents with a chief complaint of COPD exacerbation, ADHF (14 Oct 2018 09:57)      Interval Events:    REVIEW OF SYSTEMS:  [ ] Positive  [ ] All other systems negative  [ ] Unable to assess ROS because ________    Vital Signs Last 24 Hrs  T(C): 36.3 (10-15-18 @ 05:34), Max: 36.9 (10-14-18 @ 21:29)  T(F): 97.4 (10-15-18 @ 05:34), Max: 98.4 (10-14-18 @ 21:29)  HR: 70 (10-15-18 @ 06:15) (58 - 76)  BP: 106/59 (10-15-18 @ 05:34) (94/56 - 155/73)  RR: 14 (10-15-18 @ 05:34) (14 - 29)  SpO2: 97% (10-15-18 @ 06:15) (90% - 100%)    PHYSICAL EXAM:  HEENT:   [ ]Tracheostomy:  [ ]Pupils equal  [ ]No oral lesions  [ ]Abnormal    SKIN  [ ]No Rash  [ ] Abnormal  [ ] pressure    CARDIAC  [ ]Regular  [ ]Abnormal    PULMONARY  [ ]Bilateral Clear Breath Sounds  [ ]Normal Excursion  [ ]Abnormal    GI  [ ]PEG      [ ] +BS		              [ ]Soft, nondistended, nontender	  [ ]Abnormal    MUSCULOSKELETAL                                   [ ]Bedbound                 [ ]Abnormal    [ ]Ambulatory/OOB to chair                           EXTREMITIES                                         [ ]Normal  [ ]Edema                           NEUROLOGIC  [ ] Normal, non focal  [ ] Focal findings:    PSYCHIATRIC  [ ]Alert and appropriate  [ ] Sedated	 [ ]Agitated    :  Connie: [ ] Yes, if yes: Date of Placement:                   [  ] No    LINES: Central Lines [ ] Yes, if yes: Date of Placement                                     [  ] No    HOSPITAL MEDICATIONS:  MEDICATIONS  (STANDING):  ALBUTerol/ipratropium for Nebulization 3 milliLiter(s) Nebulizer every 6 hours  aspirin  chewable 81 milliGRAM(s) Oral daily  buDESOnide   0.25 milliGRAM(s) Respule 0.25 milliGRAM(s) Inhalation every 12 hours  diltiazem    Tablet 30 milliGRAM(s) Oral every 6 hours  fentaNYL   Patch  25 MICROgram(s)/Hr 1 Patch Transdermal every 72 hours  hydrALAZINE 50 milliGRAM(s) Oral every 8 hours  latanoprost 0.005% Ophthalmic Solution 1 Drop(s) Both EYES at bedtime  pantoprazole  Injectable 40 milliGRAM(s) IV Push every 12 hours  simethicone 80 milliGRAM(s) Chew every 8 hours  sodium chloride 3%  Inhalation 3 milliLiter(s) Inhalation two times a day  torsemide 20 milliGRAM(s) Oral daily    MEDICATIONS  (PRN):  acetaminophen    Suspension .. 650 milliGRAM(s) Oral every 6 hours PRN Mild Pain (1 - 3)  clonazePAM Tablet 0.5 milliGRAM(s) Oral every 8 hours PRN agitation  melatonin 1 milliGRAM(s) Oral at bedtime PRN Insomnia      LABS:                        7.9    5.4   )-----------( 134      ( 15 Oct 2018 06:39 )             25.3     10-15    132<L>  |  96  |  43<H>  ----------------------------<  136<H>  4.2   |  25  |  0.95    Ca    10.2      15 Oct 2018 06:37      PT/INR - ( 15 Oct 2018 06:44 )   PT: 25.9 sec;   INR: 2.34 ratio         PTT - ( 15 Oct 2018 06:44 )  PTT:31.0 sec        CAPILLARY BLOOD GLUCOSE    MICROBIOLOGY:     RADIOLOGY:  [ ] Reviewed and interpreted by me    Mode: AC/ CMV (Assist Control/ Continuous Mandatory Ventilation)  RR (machine): 14  TV (machine): 400  FiO2: 30  PEEP: 5  ITime: 1  MAP: 9  PIP: 29 Patient is a 84y old  Female who presents with a chief complaint of COPD exacerbation, ADHF. Today, She is awake and alert, gesturing to be placed in another position; does not appear to be in acute or apparent distress; during exam she pulled at her trach tubing but settled down after reassurance.       Interval Events:    REVIEW OF SYSTEMS:  [ ] Positive  [ ] All other systems negative  [ X] Unable to assess ROS because __non-verbal and unclear how much she comprehends even in presence of family.    Vital Signs Last 24 Hrs  T(C): 36.3 (10-15-18 @ 05:34), Max: 36.9 (10-14-18 @ 21:29)  T(F): 97.4 (10-15-18 @ 05:34), Max: 98.4 (10-14-18 @ 21:29)  HR: 70 (10-15-18 @ 06:15) (58 - 76)  BP: 106/59 (10-15-18 @ 05:34) (94/56 - 155/73)  RR: 14 (10-15-18 @ 05:34) (14 - 29)  SpO2: 97% (10-15-18 @ 06:15) (90% - 100%)    PHYSICAL EXAM:  HEENT:   [X ]Tracheostomy:  Size 6 shiley cuffed  [X ]Pupils equal  [ ]No oral lesions  [ ]Abnormal    SKIN  [X]No Rash  [ ] Abnormal  [ ] pressure    CARDIAC  [ ]Regular  [ X]Abnormal:  S1 S2 present, systolic murmur present    PULMONARY  [ ]Bilateral Clear Breath Sounds  [ ]Normal Excursion  [X ]Abnormal: mild ronchi anteriorly    GI  [ X]PEG      [ ] +BS		              [ X]Soft, nondistended, nontender	  [ ]Abnormal    MUSCULOSKELETAL                                   [ ]Bedbound                 [ ]Abnormal    [ X] OOB to chair with assistance                         EXTREMITIES                                         [ X]Normal  [ ]Edema                           NEUROLOGIC  [X ] Normal, non focal  [ ] Focal findings:    PSYCHIATRIC  [ X ]Alert  [ ] Sedated	 [ X]Agitated, occasionally    :  Rosales: [ ] Yes, if yes: Date of Placement:                   [ X ] No    LINES: Central Lines [ ] Yes, if yes: Date of Placement                                     [ X ] No    HOSPITAL MEDICATIONS:  MEDICATIONS  (STANDING):  ALBUTerol/ipratropium for Nebulization 3 milliLiter(s) Nebulizer every 6 hours  aspirin  chewable 81 milliGRAM(s) Oral daily  buDESOnide   0.25 milliGRAM(s) Respule 0.25 milliGRAM(s) Inhalation every 12 hours  diltiazem    Tablet 30 milliGRAM(s) Oral every 6 hours  fentaNYL   Patch  25 MICROgram(s)/Hr 1 Patch Transdermal every 72 hours  hydrALAZINE 50 milliGRAM(s) Oral every 8 hours  latanoprost 0.005% Ophthalmic Solution 1 Drop(s) Both EYES at bedtime  pantoprazole  Injectable 40 milliGRAM(s) IV Push every 12 hours  simethicone 80 milliGRAM(s) Chew every 8 hours  sodium chloride 3%  Inhalation 3 milliLiter(s) Inhalation two times a day  torsemide 20 milliGRAM(s) Oral daily    MEDICATIONS  (PRN):  acetaminophen    Suspension .. 650 milliGRAM(s) Oral every 6 hours PRN Mild Pain (1 - 3)  clonazePAM Tablet 0.5 milliGRAM(s) Oral every 8 hours PRN agitation  melatonin 1 milliGRAM(s) Oral at bedtime PRN Insomnia      LABS:                        7.9    5.4   )-----------( 134      ( 15 Oct 2018 06:39 )             25.3     10-15    132<L>  |  96  |  43<H>  ----------------------------<  136<H>  4.2   |  25  |  0.95    Ca    10.2      15 Oct 2018 06:37      PT/INR - ( 15 Oct 2018 06:44 )   PT: 25.9 sec;   INR: 2.34 ratio         PTT - ( 15 Oct 2018 06:44 )  PTT:31.0 sec        CAPILLARY BLOOD GLUCOSE    MICROBIOLOGY:     RADIOLOGY:  [ ] Reviewed and interpreted by me    Mode: AC/ CMV (Assist Control/ Continuous Mandatory Ventilation)  RR (machine): 14  TV (machine): 400  FiO2: 30  PEEP: 5  ITime: 1  MAP: 9  PIP: 29

## 2018-10-16 LAB
ANION GAP SERPL CALC-SCNC: 14 MMOL/L — SIGNIFICANT CHANGE UP (ref 5–17)
APTT BLD: 76.8 SEC — HIGH (ref 27.5–37.4)
BUN SERPL-MCNC: 45 MG/DL — HIGH (ref 7–23)
CALCIUM SERPL-MCNC: 10.2 MG/DL — SIGNIFICANT CHANGE UP (ref 8.4–10.5)
CHLORIDE SERPL-SCNC: 95 MMOL/L — LOW (ref 96–108)
CO2 SERPL-SCNC: 25 MMOL/L — SIGNIFICANT CHANGE UP (ref 22–31)
CREAT SERPL-MCNC: 0.94 MG/DL — SIGNIFICANT CHANGE UP (ref 0.5–1.3)
GLUCOSE SERPL-MCNC: 148 MG/DL — HIGH (ref 70–99)
HCT VFR BLD CALC: 23.6 % — LOW (ref 34.5–45)
HGB BLD-MCNC: 7.5 G/DL — LOW (ref 11.5–15.5)
INR BLD: 1.93 RATIO — HIGH (ref 0.88–1.16)
MCHC RBC-ENTMCNC: 29 PG — SIGNIFICANT CHANGE UP (ref 27–34)
MCHC RBC-ENTMCNC: 31.9 GM/DL — LOW (ref 32–36)
MCV RBC AUTO: 90.9 FL — SIGNIFICANT CHANGE UP (ref 80–100)
PLATELET # BLD AUTO: 156 K/UL — SIGNIFICANT CHANGE UP (ref 150–400)
POTASSIUM SERPL-MCNC: 4.1 MMOL/L — SIGNIFICANT CHANGE UP (ref 3.5–5.3)
POTASSIUM SERPL-SCNC: 4.1 MMOL/L — SIGNIFICANT CHANGE UP (ref 3.5–5.3)
PROTHROM AB SERPL-ACNC: 21.1 SEC — HIGH (ref 9.8–12.7)
RBC # BLD: 2.6 M/UL — LOW (ref 3.8–5.2)
RBC # FLD: 18.6 % — HIGH (ref 10.3–14.5)
SODIUM SERPL-SCNC: 134 MMOL/L — LOW (ref 135–145)
WBC # BLD: 5.7 K/UL — SIGNIFICANT CHANGE UP (ref 3.8–10.5)
WBC # FLD AUTO: 5.7 K/UL — SIGNIFICANT CHANGE UP (ref 3.8–10.5)

## 2018-10-16 PROCEDURE — 99233 SBSQ HOSP IP/OBS HIGH 50: CPT | Mod: GC

## 2018-10-16 PROCEDURE — 71045 X-RAY EXAM CHEST 1 VIEW: CPT | Mod: 26

## 2018-10-16 RX ORDER — HEPARIN SODIUM 5000 [USP'U]/ML
1000 INJECTION INTRAVENOUS; SUBCUTANEOUS
Qty: 25000 | Refills: 0 | Status: DISCONTINUED | OUTPATIENT
Start: 2018-10-16 | End: 2018-10-16

## 2018-10-16 RX ORDER — WARFARIN SODIUM 2.5 MG/1
7.5 TABLET ORAL AT BEDTIME
Qty: 0 | Refills: 0 | Status: DISCONTINUED | OUTPATIENT
Start: 2018-10-16 | End: 2018-10-16

## 2018-10-16 RX ORDER — FUROSEMIDE 40 MG
40 TABLET ORAL ONCE
Qty: 0 | Refills: 0 | Status: COMPLETED | OUTPATIENT
Start: 2018-10-16 | End: 2018-10-16

## 2018-10-16 RX ORDER — CLONAZEPAM 1 MG
1 TABLET ORAL EVERY 8 HOURS
Qty: 0 | Refills: 0 | Status: DISCONTINUED | OUTPATIENT
Start: 2018-10-16 | End: 2018-10-23

## 2018-10-16 RX ORDER — CLONAZEPAM 1 MG
0.5 TABLET ORAL ONCE
Qty: 0 | Refills: 0 | Status: DISCONTINUED | OUTPATIENT
Start: 2018-10-16 | End: 2018-10-16

## 2018-10-16 RX ORDER — HEPARIN SODIUM 5000 [USP'U]/ML
900 INJECTION INTRAVENOUS; SUBCUTANEOUS
Qty: 25000 | Refills: 0 | Status: DISCONTINUED | OUTPATIENT
Start: 2018-10-16 | End: 2018-10-19

## 2018-10-16 RX ORDER — WARFARIN SODIUM 2.5 MG/1
7.5 TABLET ORAL ONCE
Qty: 0 | Refills: 0 | Status: COMPLETED | OUTPATIENT
Start: 2018-10-16 | End: 2018-10-16

## 2018-10-16 RX ADMIN — Medication 3 MILLILITER(S): at 11:59

## 2018-10-16 RX ADMIN — Medication 0.5 MILLIGRAM(S): at 08:44

## 2018-10-16 RX ADMIN — Medication 25 MILLIGRAM(S): at 05:52

## 2018-10-16 RX ADMIN — HEPARIN SODIUM 9 UNIT(S)/HR: 5000 INJECTION INTRAVENOUS; SUBCUTANEOUS at 18:22

## 2018-10-16 RX ADMIN — PANTOPRAZOLE SODIUM 40 MILLIGRAM(S): 20 TABLET, DELAYED RELEASE ORAL at 05:53

## 2018-10-16 RX ADMIN — Medication 0.25 MILLIGRAM(S): at 05:28

## 2018-10-16 RX ADMIN — Medication 1 MILLIGRAM(S): at 01:01

## 2018-10-16 RX ADMIN — Medication 81 MILLIGRAM(S): at 11:37

## 2018-10-16 RX ADMIN — Medication 650 MILLIGRAM(S): at 08:07

## 2018-10-16 RX ADMIN — SODIUM CHLORIDE 3 MILLILITER(S): 9 INJECTION INTRAMUSCULAR; INTRAVENOUS; SUBCUTANEOUS at 17:36

## 2018-10-16 RX ADMIN — Medication 20 MILLIGRAM(S): at 05:51

## 2018-10-16 RX ADMIN — HEPARIN SODIUM 10 UNIT(S)/HR: 5000 INJECTION INTRAVENOUS; SUBCUTANEOUS at 09:19

## 2018-10-16 RX ADMIN — Medication 650 MILLIGRAM(S): at 09:00

## 2018-10-16 RX ADMIN — WARFARIN SODIUM 7.5 MILLIGRAM(S): 2.5 TABLET ORAL at 22:35

## 2018-10-16 RX ADMIN — Medication 0.25 MILLIGRAM(S): at 17:36

## 2018-10-16 RX ADMIN — SIMETHICONE 80 MILLIGRAM(S): 80 TABLET, CHEWABLE ORAL at 13:09

## 2018-10-16 RX ADMIN — Medication 25 MILLIGRAM(S): at 22:35

## 2018-10-16 RX ADMIN — Medication 0.5 MILLIGRAM(S): at 05:52

## 2018-10-16 RX ADMIN — Medication 1 MILLIGRAM(S): at 17:40

## 2018-10-16 RX ADMIN — LATANOPROST 1 DROP(S): 0.05 SOLUTION/ DROPS OPHTHALMIC; TOPICAL at 22:36

## 2018-10-16 RX ADMIN — SIMETHICONE 80 MILLIGRAM(S): 80 TABLET, CHEWABLE ORAL at 22:36

## 2018-10-16 RX ADMIN — Medication 3 MILLILITER(S): at 23:23

## 2018-10-16 RX ADMIN — PANTOPRAZOLE SODIUM 40 MILLIGRAM(S): 20 TABLET, DELAYED RELEASE ORAL at 17:26

## 2018-10-16 RX ADMIN — SODIUM CHLORIDE 3 MILLILITER(S): 9 INJECTION INTRAMUSCULAR; INTRAVENOUS; SUBCUTANEOUS at 05:34

## 2018-10-16 RX ADMIN — Medication 40 MILLIGRAM(S): at 19:04

## 2018-10-16 RX ADMIN — Medication 3 MILLILITER(S): at 17:36

## 2018-10-16 RX ADMIN — Medication 3 MILLILITER(S): at 05:28

## 2018-10-16 RX ADMIN — SIMETHICONE 80 MILLIGRAM(S): 80 TABLET, CHEWABLE ORAL at 05:51

## 2018-10-16 NOTE — PROGRESS NOTE ADULT - PROBLEM SELECTOR PLAN 1
Patient Failed Extubation attempt x 2   Patient S/p Tracheostomy 10/3 ( # 6 Cuffed Bert)   Attempt weaning trials as tolerated daily; will attempt trach collar today  Continue Nebulizers and Chest PT

## 2018-10-16 NOTE — CHART NOTE - NSCHARTNOTEFT_GEN_A_CORE
Of note, Ms. Santos tolerated 40 mins of trach collar, O2 sat 93%.  Was placed back on ventilator as her breaths were becoming more labored.

## 2018-10-16 NOTE — PROGRESS NOTE ADULT - ASSESSMENT
84  Year old Female with PMH significant for COPD, JENNIE on BiPAP at home , multivalvular disease (severe AS Not a candidate for TAVR, S/p MV replacement), HFpEF/ Severe diastolic failure, A-fib on coumadin, sick sinus syndrome S/p pacemaker placement, HTN, and CKD3 who was admitted for acute respiratory failure requiring intubation suspected to be 2/2 COPD exacerbation c/b PNA w/ +entero/rhinovirus and GN coccobacillus and H. influenza bacteremia, requiring continued respiratory support and tracheostomy. Patient S/p Trach placement 10/3 and PEG Placement 10/4.     10/7: Patient remains with abdominal distention today but tolerating trickle feeds, ABD X-ray  performed this morning patient remains with air filled loops of small and large bowel. GI fellow called to re-eval the patient this morning, X-ray  reviewed slightly worsened compared to yesterday. GI fellow recommended to place patient in Left lateral decubitus position and oob to chair later today. Patient with large amount of gas expressed with turning as Per RN. As per GI no role for rectal tube at this time and can continue to advance feeds as tolerated to promote gastric motility. H+H Has remained stable case d/w  will restart coumadin this evening. Pt with elevated bp will increase Hydralazine 50 mg q 8 hr   10/8-Abdominal distention noted with hypoactive bowel sounds, Kub noted from this weekend. Daily bowel movements noted. Will repeat PTT as there were 2 therapeutics prior on same dosing, also repeat CBC as well. Will transfuse if remains low. RCU weaning  10/10: 4 dark BMs reported night of 10/9, concerning for melena,  hgb 6.9 in AM,  1U RBC given . Goal heparin reduced to 50-70. GI consulted due to GIB Hx with known AV malformations. No intvn.  10/11: H/H stable post 1U with no furthers melena or intvn from GI. Resumed coumadin.  10/12: agitated this am. seroquel not working changed back to klonopin  10/13 No events overnight. Continues on heparin gtt/coumadin bridge. INR 1.45, coumadin 7.5 mg x1. Continue with PS trials as tolerated.   10/14 Heparin gtt d/c, INR 2.82, coumadin 1mg x1. Continue with PS trials as tolerated. LE edema, lasix 20 mg po x1. Hg 7.7 stable.   10/15:  INR 2.3, reduced coumadin to 5mg  10/16: INR 1.8, resumed heparin, coumadin increased to 7.5mg tonight. Tolerated trach collar for 40mins

## 2018-10-16 NOTE — PROGRESS NOTE ADULT - PROBLEM SELECTOR PLAN 6
ECHO 9/14: + Mechanical Mitral Valve, Severe AS, Stage 3 Diastolic Dysfxn   Patient With Hx of Sick Sinus Syndrome and PPM  / Atrial Fibrillation   Continue BP Hydralazine increased 25 mg q 8 hrs   Continue Cardizem 30 mg q 6 hrs for AFIBB rate control (hold if systolic BP less than 100)  Continue to monitor BP and Heart Rate

## 2018-10-16 NOTE — PROGRESS NOTE ADULT - SUBJECTIVE AND OBJECTIVE BOX
Patient is a 84y old  Female who presents with a chief complaint of COPD exacerbation, ADHF (15 Oct 2018 08:41)      Interval Events:    REVIEW OF SYSTEMS:  [ ] Positive  [ ] All other systems negative  [ ] Unable to assess ROS because ________    Vital Signs Last 24 Hrs  T(C): 36.5 (10-16-18 @ 04:56), Max: 36.8 (10-15-18 @ 20:54)  T(F): 97.7 (10-16-18 @ 04:56), Max: 98.2 (10-15-18 @ 20:54)  HR: 67 (10-16-18 @ 09:43) (60 - 73)  BP: 159/69 (10-16-18 @ 04:56) (100/59 - 159/69)  RR: 21 (10-16-18 @ 04:56) (16 - 21)  SpO2: 96% (10-16-18 @ 09:43) (95% - 98%)    PHYSICAL EXAM:  HEENT:   [ ]Tracheostomy:  [ ]Pupils equal  [ ]No oral lesions  [ ]Abnormal    SKIN  [ ]No Rash  [ ] Abnormal  [ ] pressure    CARDIAC  [ ]Regular  [ ]Abnormal    PULMONARY  [ ]Bilateral Clear Breath Sounds  [ ]Normal Excursion  [ ]Abnormal    GI  [ ]PEG      [ ] +BS		              [ ]Soft, nondistended, nontender	  [ ]Abnormal    MUSCULOSKELETAL                                   [ ]Bedbound                 [ ]Abnormal    [ ]Ambulatory/OOB to chair                           EXTREMITIES                                         [ ]Normal  [ ]Edema                           NEUROLOGIC  [ ] Normal, non focal  [ ] Focal findings:    PSYCHIATRIC  [ ]Alert and appropriate  [ ] Sedated	 [ ]Agitated    :  Rosales: [ ] Yes, if yes: Date of Placement:                   [  ] No    LINES: Central Lines [ ] Yes, if yes: Date of Placement                                     [  ] No    HOSPITAL MEDICATIONS:  MEDICATIONS  (STANDING):  ALBUTerol/ipratropium for Nebulization 3 milliLiter(s) Nebulizer every 6 hours  aspirin  chewable 81 milliGRAM(s) Oral daily  buDESOnide   0.25 milliGRAM(s) Respule 0.25 milliGRAM(s) Inhalation every 12 hours  diltiazem    Tablet 30 milliGRAM(s) Oral every 6 hours  fentaNYL   Patch  25 MICROgram(s)/Hr 1 Patch Transdermal every 72 hours  heparin  Infusion 1000 Unit(s)/Hr (10 mL/Hr) IV Continuous <Continuous>  hydrALAZINE 25 milliGRAM(s) Oral every 8 hours  latanoprost 0.005% Ophthalmic Solution 1 Drop(s) Both EYES at bedtime  pantoprazole  Injectable 40 milliGRAM(s) IV Push every 12 hours  simethicone 80 milliGRAM(s) Chew every 8 hours  sodium chloride 3%  Inhalation 3 milliLiter(s) Inhalation two times a day  torsemide 20 milliGRAM(s) Oral daily  warfarin 7.5 milliGRAM(s) Oral once    MEDICATIONS  (PRN):  acetaminophen    Suspension .. 650 milliGRAM(s) Oral every 6 hours PRN Mild Pain (1 - 3)  clonazePAM Tablet 1 milliGRAM(s) Oral every 8 hours PRN agitation  melatonin 1 milliGRAM(s) Oral at bedtime PRN Insomnia      LABS:                        7.5    5.7   )-----------( 156      ( 16 Oct 2018 07:53 )             23.6     10-16    134<L>  |  95<L>  |  45<H>  ----------------------------<  148<H>  4.1   |  25  |  0.94    Ca    10.2      16 Oct 2018 07:53      PT/INR - ( 16 Oct 2018 07:53 )   PT: 21.1 sec;   INR: 1.93 ratio         PTT - ( 15 Oct 2018 06:44 )  PTT:31.0 sec        CAPILLARY BLOOD GLUCOSE    MICROBIOLOGY:     RADIOLOGY:  [ ] Reviewed and interpreted by me    Mode: AC/ CMV (Assist Control/ Continuous Mandatory Ventilation)  RR (machine): 14  TV (machine): 400  FiO2: 30  PEEP: 5  ITime: 1  MAP: 12  PIP: 32 Patient is a 84y old  Female who presents with a chief complaint of COPD exacerbation, ADHF (15 Oct 2018 08:41)      Interval Events:     REVIEW OF SYSTEMS:  [ ] Positive  [ ] All other systems negative  [ ] Unable to assess ROS because ________    Vital Signs Last 24 Hrs  T(C): 36.5 (10-16-18 @ 04:56), Max: 36.8 (10-15-18 @ 20:54)  T(F): 97.7 (10-16-18 @ 04:56), Max: 98.2 (10-15-18 @ 20:54)  HR: 67 (10-16-18 @ 09:43) (60 - 73)  BP: 159/69 (10-16-18 @ 04:56) (100/59 - 159/69)  RR: 21 (10-16-18 @ 04:56) (16 - 21)  SpO2: 96% (10-16-18 @ 09:43) (95% - 98%)    PHYSICAL EXAM:  HEENT:   [ ]Tracheostomy:  [ ]Pupils equal  [ ]No oral lesions  [ ]Abnormal    SKIN  [ ]No Rash  [ ] Abnormal  [ ] pressure    CARDIAC  [ ]Regular  [ ]Abnormal    PULMONARY  [ ]Bilateral Clear Breath Sounds  [ ]Normal Excursion  [ ]Abnormal    GI  [ ]PEG      [ ] +BS		              [ ]Soft, nondistended, nontender	  [ ]Abnormal    MUSCULOSKELETAL                                   [ ]Bedbound                 [ ]Abnormal    [ ]Ambulatory/OOB to chair                           EXTREMITIES                                         [ ]Normal  [ ]Edema                           NEUROLOGIC  [ ] Normal, non focal  [ ] Focal findings:    PSYCHIATRIC  [ ]Alert and appropriate  [ ] Sedated	 [ ]Agitated    :  Rosales: [ ] Yes, if yes: Date of Placement:                   [  ] No    LINES: Central Lines [ ] Yes, if yes: Date of Placement                                     [  ] No    HOSPITAL MEDICATIONS:  MEDICATIONS  (STANDING):  ALBUTerol/ipratropium for Nebulization 3 milliLiter(s) Nebulizer every 6 hours  aspirin  chewable 81 milliGRAM(s) Oral daily  buDESOnide   0.25 milliGRAM(s) Respule 0.25 milliGRAM(s) Inhalation every 12 hours  diltiazem    Tablet 30 milliGRAM(s) Oral every 6 hours  fentaNYL   Patch  25 MICROgram(s)/Hr 1 Patch Transdermal every 72 hours  heparin  Infusion 1000 Unit(s)/Hr (10 mL/Hr) IV Continuous <Continuous>  hydrALAZINE 25 milliGRAM(s) Oral every 8 hours  latanoprost 0.005% Ophthalmic Solution 1 Drop(s) Both EYES at bedtime  pantoprazole  Injectable 40 milliGRAM(s) IV Push every 12 hours  simethicone 80 milliGRAM(s) Chew every 8 hours  sodium chloride 3%  Inhalation 3 milliLiter(s) Inhalation two times a day  torsemide 20 milliGRAM(s) Oral daily  warfarin 7.5 milliGRAM(s) Oral once    MEDICATIONS  (PRN):  acetaminophen    Suspension .. 650 milliGRAM(s) Oral every 6 hours PRN Mild Pain (1 - 3)  clonazePAM Tablet 1 milliGRAM(s) Oral every 8 hours PRN agitation  melatonin 1 milliGRAM(s) Oral at bedtime PRN Insomnia      LABS:                        7.5    5.7   )-----------( 156      ( 16 Oct 2018 07:53 )             23.6     10-16    134<L>  |  95<L>  |  45<H>  ----------------------------<  148<H>  4.1   |  25  |  0.94    Ca    10.2      16 Oct 2018 07:53      PT/INR - ( 16 Oct 2018 07:53 )   PT: 21.1 sec;   INR: 1.93 ratio         PTT - ( 15 Oct 2018 06:44 )  PTT:31.0 sec        CAPILLARY BLOOD GLUCOSE    MICROBIOLOGY:     RADIOLOGY:  [ ] Reviewed and interpreted by me    Mode: AC/ CMV (Assist Control/ Continuous Mandatory Ventilation)  RR (machine): 14  TV (machine): 400  FiO2: 30  PEEP: 5  ITime: 1  MAP: 12  PIP: 32 Patient is a 84y old  Female who presents with a chief complaint of COPD exacerbation.  Today observed in bed, awake, on trach to vent. Appears to be physically uncomfortable in bed but tolerating vent. Appeared more comfortable when bed leg was lowered. Was able to follow commands simple commands (raises arms when demonstrated)      Interval Events:     REVIEW OF SYSTEMS:  [ ] Positive  [ ] All other systems negative  [X ] Unable to assess ROS because __Non-verbal with trach    Vital Signs Last 24 Hrs  T(C): 36.5 (10-16-18 @ 04:56), Max: 36.8 (10-15-18 @ 20:54)  T(F): 97.7 (10-16-18 @ 04:56), Max: 98.2 (10-15-18 @ 20:54)  HR: 67 (10-16-18 @ 09:43) (60 - 73)  BP: 159/69 (10-16-18 @ 04:56) (100/59 - 159/69)  RR: 21 (10-16-18 @ 04:56) (16 - 21)  SpO2: 96% (10-16-18 @ 09:43) (95% - 98%)    PHYSICAL EXAM:  HEENT:   [X ]Tracheostomy: size 6 shiley cuffed  [ X]Pupils equal  [ ]No oral lesions  [ ]Abnormal    SKIN  [X ]No Rash  [ ] Abnormal  [ ] pressure    CARDIAC  [ X]Regular: S1 S2 present with soft systolic murmur  [ ]Abnormal    PULMONARY  [ X]Bilateral Clear Breath Sounds  [ ]Normal Excursion  [ ]Abnormal    GI  [X ]PEG      [ X] +BS		              [ ]Soft, nondistended, nontender	  [ X]Abnormal: Feels distended    MUSCULOSKELETAL                                   [ ]Bedbound                 [ ]Abnormal    [X ] OOB to chair with assistance    EXTREMITIES                                         []Normal  [X ]Edema: trace B/L LE    NEUROLOGIC  [X ] Normal, non focal  [ ] Focal findings:    PSYCHIATRIC  [X ]Alert and appropriate  [ ] Sedated	 [ ]Agitated    :  Connie: [ ] Yes, if yes: Date of Placement:                   [ X ] No    LINES: Central Lines [ ] Yes, if yes: Date of Placement                                     [ X ] No    HOSPITAL MEDICATIONS:  MEDICATIONS  (STANDING):  ALBUTerol/ipratropium for Nebulization 3 milliLiter(s) Nebulizer every 6 hours  aspirin  chewable 81 milliGRAM(s) Oral daily  buDESOnide   0.25 milliGRAM(s) Respule 0.25 milliGRAM(s) Inhalation every 12 hours  diltiazem    Tablet 30 milliGRAM(s) Oral every 6 hours  fentaNYL   Patch  25 MICROgram(s)/Hr 1 Patch Transdermal every 72 hours  heparin  Infusion 1000 Unit(s)/Hr (10 mL/Hr) IV Continuous <Continuous>  hydrALAZINE 25 milliGRAM(s) Oral every 8 hours  latanoprost 0.005% Ophthalmic Solution 1 Drop(s) Both EYES at bedtime  pantoprazole  Injectable 40 milliGRAM(s) IV Push every 12 hours  simethicone 80 milliGRAM(s) Chew every 8 hours  sodium chloride 3%  Inhalation 3 milliLiter(s) Inhalation two times a day  torsemide 20 milliGRAM(s) Oral daily  warfarin 7.5 milliGRAM(s) Oral once    MEDICATIONS  (PRN):  acetaminophen    Suspension .. 650 milliGRAM(s) Oral every 6 hours PRN Mild Pain (1 - 3)  clonazePAM Tablet 1 milliGRAM(s) Oral every 8 hours PRN agitation  melatonin 1 milliGRAM(s) Oral at bedtime PRN Insomnia      LABS:                        7.5    5.7   )-----------( 156      ( 16 Oct 2018 07:53 )             23.6     10-16    134<L>  |  95<L>  |  45<H>  ----------------------------<  148<H>  4.1   |  25  |  0.94    Ca    10.2      16 Oct 2018 07:53      PT/INR - ( 16 Oct 2018 07:53 )   PT: 21.1 sec;   INR: 1.93 ratio         PTT - ( 15 Oct 2018 06:44 )  PTT:31.0 sec        CAPILLARY BLOOD GLUCOSE    MICROBIOLOGY:     RADIOLOGY:  [ ] Reviewed and interpreted by me    Mode: AC/ CMV (Assist Control/ Continuous Mandatory Ventilation)  RR (machine): 14  TV (machine): 400  FiO2: 30  PEEP: 5  ITime: 1  MAP: 12  PIP: 32

## 2018-10-16 NOTE — PROGRESS NOTE ADULT - PROBLEM SELECTOR PLAN 5
Patient with HX of AS ( Not a candidate for TAVR )  Patient S/p Mechanical Mitral Valve Replacement   10/16: INR 1.9, resumed Heparin infusion with goal PTT of 50-70 bridging to coumadin (INR goal 2.5-3.5). Coumadin 7.5mg to be given tonight. PTT f/u.

## 2018-10-16 NOTE — PROGRESS NOTE ADULT - ATTENDING COMMENTS
Patient seen and examined. Patient appears comfortable with family-friend at bedside.   -Acute Hypoxemic Respiratory Failure requiring mechanical ventilation - now with tracheostomy. Continue PSV as tolerated. Trach sutures removed 10/12  -Oropharyngeal dysphagia - s/p PEG placement - with abdominal distension much improved.   -Pain control and agitation -continues to exhibit delirium. Has had good response to Klonopin but last week son (cheyenne-psych) requested Seroquel which patient did not tolerate and mad her agitation worse. Will call psych evaluation  -Afib and Mechanical MV - continue coumadin with goal INR 2.5-3.5. Given subtherapeutic iNR will restart Heparin gtt and give coumadin 7.5mg  -Acute Blood Loss anemia - due to GI bleed required PRBC 10/10. GI followup appreciated. Will continue Hep gtt at lower target. Continue Protonix. Patient with slow decrease in Hgb but remains >7. By family report, she has a long history of GI bleed which is made worse when she requires Heparin.

## 2018-10-17 LAB
ANION GAP SERPL CALC-SCNC: 16 MMOL/L — SIGNIFICANT CHANGE UP (ref 5–17)
APTT BLD: 41.5 SEC — HIGH (ref 27.5–37.4)
APTT BLD: 64.5 SEC — HIGH (ref 27.5–37.4)
APTT BLD: 71.9 SEC — HIGH (ref 27.5–37.4)
BUN SERPL-MCNC: 42 MG/DL — HIGH (ref 7–23)
CALCIUM SERPL-MCNC: 10.1 MG/DL — SIGNIFICANT CHANGE UP (ref 8.4–10.5)
CHLORIDE SERPL-SCNC: 97 MMOL/L — SIGNIFICANT CHANGE UP (ref 96–108)
CO2 SERPL-SCNC: 25 MMOL/L — SIGNIFICANT CHANGE UP (ref 22–31)
CREAT SERPL-MCNC: 0.93 MG/DL — SIGNIFICANT CHANGE UP (ref 0.5–1.3)
GLUCOSE SERPL-MCNC: 155 MG/DL — HIGH (ref 70–99)
HCT VFR BLD CALC: 24 % — LOW (ref 34.5–45)
HGB BLD-MCNC: 7.1 G/DL — LOW (ref 11.5–15.5)
INR BLD: 2.13 RATIO — HIGH (ref 0.88–1.16)
MCHC RBC-ENTMCNC: 27 PG — SIGNIFICANT CHANGE UP (ref 27–34)
MCHC RBC-ENTMCNC: 29.6 GM/DL — LOW (ref 32–36)
MCV RBC AUTO: 91.3 FL — SIGNIFICANT CHANGE UP (ref 80–100)
PLATELET # BLD AUTO: 194 K/UL — SIGNIFICANT CHANGE UP (ref 150–400)
POTASSIUM SERPL-MCNC: 4 MMOL/L — SIGNIFICANT CHANGE UP (ref 3.5–5.3)
POTASSIUM SERPL-SCNC: 4 MMOL/L — SIGNIFICANT CHANGE UP (ref 3.5–5.3)
PROTHROM AB SERPL-ACNC: 24.5 SEC — HIGH (ref 10–13.1)
RBC # BLD: 2.63 M/UL — LOW (ref 3.8–5.2)
RBC # FLD: 21 % — HIGH (ref 10.3–14.5)
SODIUM SERPL-SCNC: 138 MMOL/L — SIGNIFICANT CHANGE UP (ref 135–145)
WBC # BLD: 6.75 K/UL — SIGNIFICANT CHANGE UP (ref 3.8–10.5)
WBC # FLD AUTO: 6.75 K/UL — SIGNIFICANT CHANGE UP (ref 3.8–10.5)

## 2018-10-17 PROCEDURE — 99233 SBSQ HOSP IP/OBS HIGH 50: CPT | Mod: GC

## 2018-10-17 RX ORDER — WARFARIN SODIUM 2.5 MG/1
7.5 TABLET ORAL ONCE
Qty: 0 | Refills: 0 | Status: COMPLETED | OUTPATIENT
Start: 2018-10-17 | End: 2018-10-17

## 2018-10-17 RX ADMIN — SIMETHICONE 80 MILLIGRAM(S): 80 TABLET, CHEWABLE ORAL at 23:01

## 2018-10-17 RX ADMIN — SIMETHICONE 80 MILLIGRAM(S): 80 TABLET, CHEWABLE ORAL at 14:40

## 2018-10-17 RX ADMIN — SIMETHICONE 80 MILLIGRAM(S): 80 TABLET, CHEWABLE ORAL at 06:16

## 2018-10-17 RX ADMIN — Medication 1 MILLIGRAM(S): at 10:03

## 2018-10-17 RX ADMIN — FENTANYL CITRATE 1 PATCH: 50 INJECTION INTRAVENOUS at 07:27

## 2018-10-17 RX ADMIN — Medication 3 MILLILITER(S): at 05:13

## 2018-10-17 RX ADMIN — Medication 3 MILLILITER(S): at 11:49

## 2018-10-17 RX ADMIN — PANTOPRAZOLE SODIUM 40 MILLIGRAM(S): 20 TABLET, DELAYED RELEASE ORAL at 18:15

## 2018-10-17 RX ADMIN — SODIUM CHLORIDE 3 MILLILITER(S): 9 INJECTION INTRAMUSCULAR; INTRAVENOUS; SUBCUTANEOUS at 05:17

## 2018-10-17 RX ADMIN — LATANOPROST 1 DROP(S): 0.05 SOLUTION/ DROPS OPHTHALMIC; TOPICAL at 23:02

## 2018-10-17 RX ADMIN — Medication 0.25 MILLIGRAM(S): at 05:13

## 2018-10-17 RX ADMIN — Medication 25 MILLIGRAM(S): at 14:34

## 2018-10-17 RX ADMIN — Medication 3 MILLILITER(S): at 23:38

## 2018-10-17 RX ADMIN — WARFARIN SODIUM 7.5 MILLIGRAM(S): 2.5 TABLET ORAL at 23:00

## 2018-10-17 RX ADMIN — HEPARIN SODIUM 9 UNIT(S)/HR: 5000 INJECTION INTRAVENOUS; SUBCUTANEOUS at 16:36

## 2018-10-17 RX ADMIN — Medication 3 MILLILITER(S): at 18:18

## 2018-10-17 RX ADMIN — HEPARIN SODIUM 9 UNIT(S)/HR: 5000 INJECTION INTRAVENOUS; SUBCUTANEOUS at 09:38

## 2018-10-17 RX ADMIN — Medication 20 MILLIGRAM(S): at 06:15

## 2018-10-17 RX ADMIN — Medication 10 MILLIGRAM(S): at 12:29

## 2018-10-17 RX ADMIN — Medication 1 MILLIGRAM(S): at 01:52

## 2018-10-17 RX ADMIN — FENTANYL CITRATE 1 PATCH: 50 INJECTION INTRAVENOUS at 07:26

## 2018-10-17 RX ADMIN — Medication 81 MILLIGRAM(S): at 12:30

## 2018-10-17 RX ADMIN — SODIUM CHLORIDE 3 MILLILITER(S): 9 INJECTION INTRAMUSCULAR; INTRAVENOUS; SUBCUTANEOUS at 18:18

## 2018-10-17 RX ADMIN — PANTOPRAZOLE SODIUM 40 MILLIGRAM(S): 20 TABLET, DELAYED RELEASE ORAL at 06:14

## 2018-10-17 RX ADMIN — HEPARIN SODIUM 9 UNIT(S)/HR: 5000 INJECTION INTRAVENOUS; SUBCUTANEOUS at 00:19

## 2018-10-17 RX ADMIN — Medication 1 MILLIGRAM(S): at 18:15

## 2018-10-17 RX ADMIN — Medication 0.25 MILLIGRAM(S): at 18:18

## 2018-10-17 RX ADMIN — FENTANYL CITRATE 1 PATCH: 50 INJECTION INTRAVENOUS at 19:13

## 2018-10-17 RX ADMIN — Medication 25 MILLIGRAM(S): at 23:09

## 2018-10-17 RX ADMIN — Medication 25 MILLIGRAM(S): at 06:14

## 2018-10-17 NOTE — PROGRESS NOTE ADULT - ATTENDING COMMENTS
Patient seen and examined. Patient appears comfortable with family-friend at bedside.   -Acute Hypoxemic Respiratory Failure requiring mechanical ventilation - now with tracheostomy. Continue PSV as tolerated. Trach sutures removed 10/12  -Oropharyngeal dysphagia - s/p PEG placement - with abdominal distension much improved.   -Pain control and agitation -continues to exhibit delirium. Has had good response to Klonopin but last week son (cheyenne-psych) requested Seroquel which patient did not tolerate and mad her agitation worse. Looks much better today  -Afib and Mechanical MV - continue coumadin with goal INR 2.5-3.5. Given subtherapeutic iNR will continue Heparin gtt and give coumadin 7.5mg  -Acute Blood Loss anemia - due to GI bleed required PRBC 10/10. GI followup appreciated. Will continue Hep gtt at lower target. Continue Protonix. Patient with slow decrease in Hgb but remains >7. By family report, she has a long history of GI bleed which is made worse when she requires Heparin.    Discharge planning - I had a lengthy conversation with patient's son Dr. hernandez over the phone again today. Patient is approaching discharge and he still has provided only 1 facility. I told him in clear terms that if she remains stable and has an acceptable INR we may have to put in a discharge order if he is unwilling to work with us.

## 2018-10-17 NOTE — CHART NOTE - NSCHARTNOTEFT_GEN_A_CORE
Patient tolerating Enteral nutrition support at goal rate. Followed by GI for GIB now resolved.     Admission history:  85 y/o F w/ a PMH COPD, JENNIE on BiPAP, multivalvular disease, severe diastolic failure, A-fib, s/p pacemaker placement, HTN, and CKD3 who was admitted for acute respiratory failure requiring intubation suspected to be 2/2 COPD exacerbation c/b PNA w/ +entero/rhinovirus and GN coccobacillus and H. influenza bacteremia.    S/P  trach, PEG placement.P        Source: Patient [ ]    Family [ ]     other [ ]    Diet : NPO with tube feeding      Patient reports [ ] nausea  [ ] vomiting [ ] diarrhea [ ] constipation  [ ]chewing problems [ ] swallowing issues  [ ] other:      PO intake:  < 50% [ ] 50-75% [ ]   % [ ]  other :     Source for PO intake [ ] Patient [ ] family [ ] chart [ ] staff [ ] other     Enteral /Parenteral Nutrition: Vital AF @45ml/hr x24hrs      Current Weight: 162.6kg  Dosing Weight 62.9    Pertinent Medications: MEDICATIONS  (STANDING):  ALBUTerol/ipratropium for Nebulization 3 milliLiter(s) Nebulizer every 6 hours  aspirin  chewable 81 milliGRAM(s) Oral daily  buDESOnide   0.25 milliGRAM(s) Respule 0.25 milliGRAM(s) Inhalation every 12 hours  diltiazem    Tablet 30 milliGRAM(s) Oral every 6 hours  fentaNYL   Patch  25 MICROgram(s)/Hr 1 Patch Transdermal every 72 hours  heparin  Infusion 900 Unit(s)/Hr (9 mL/Hr) IV Continuous <Continuous>  hydrALAZINE 25 milliGRAM(s) Oral every 8 hours  latanoprost 0.005% Ophthalmic Solution 1 Drop(s) Both EYES at bedtime  pantoprazole  Injectable 40 milliGRAM(s) IV Push every 12 hours  simethicone 80 milliGRAM(s) Chew every 8 hours  sodium chloride 3%  Inhalation 3 milliLiter(s) Inhalation two times a day  torsemide 10 milliGRAM(s) Oral once    MEDICATIONS  (PRN):  acetaminophen    Suspension .. 650 milliGRAM(s) Oral every 6 hours PRN Mild Pain (1 - 3)  clonazePAM Tablet 1 milliGRAM(s) Oral every 8 hours PRN agitation  melatonin 1 milliGRAM(s) Oral at bedtime PRN Insomnia    Pertinent Labs:  10-17 Na138 mmol/L Glu 155 mg/dL<H> K+ 4.0 mmol/L Cr  0.93 mg/dL BUN 42 mg/dL<H> 10-11 Phos 3.1 mg/dL 09-22 [id number] 7.2 %<H>      Skin: no pressure breakdown    Estimated Needs:   [X ] no change since previous assessment  [ ] recalculated:       Previous Nutrition Diagnosis:     [ ] Inadequate Energy Intake [ ]Inadequate Oral Intake [ ] Excessive Energy Intake     [ ] Underweight [ ] Increased Nutrient Needs [ ] Overweight/Obesity     [ ] Altered GI Function [ ] Unintended Weight Loss [ ] Food & Nutrition Related Knowledge Deficit [ ] Malnutrition          Nutrition Diagnosis is [ ] ongoing  [ ] resolved [ ] not applicable          New Nutrition Diagnosis: [ X] not applicable      Recommendations        [X ] Enteral Nutrition Support Vital @ 20ml/hr x24 hrs increase 10ml/hr Q6hrs to goal rate @ 45ml/hr x24 hrs to provide total 1080ml,  1296calories, ~21/kg, protein 81gm, 1.28gm/kg based on dosing weight 62.9kg. Formula free water 891ml plus free water 150ml Q 12 hours total free water 1191ml.        [ ] Other:        Monitoring and Evaluation:     [ ] PO intake [ ] Tolerance to diet prescription [ ] weights [ ] follow up per protocol    [ ] other: Patient tolerating Enteral nutrition  support via PEG  at goal rate. Followed by GI for GIB now resolved.     Admission history:  83 y/o F w/ a PMH COPD, JENNIE on BiPAP, multivalvular disease, severe diastolic failure, A-fib, s/p pacemaker placement, HTN, and CKD3, s/p trach for acute respiratory failure now tolerating trach colllar. P    Source: Patient [ ]    Family friend, patient has no complaints ]    Diet : NPO with tube feeding         Enteral /Parenteral Nutrition: Vital AF @45ml/hr x24hrs  GI BM x4    Current Weight: 162.6kg  Dosing Weight 62.9    Pertinent Medications: MEDICATIONS  (STANDING):  ALBUTerol/ipratropium for Nebulization 3 milliLiter(s) Nebulizer every 6 hours  aspirin  chewable 81 milliGRAM(s) Oral daily  buDESOnide   0.25 milliGRAM(s) Respule 0.25 milliGRAM(s) Inhalation every 12 hours  diltiazem    Tablet 30 milliGRAM(s) Oral every 6 hours  fentaNYL   Patch  25 MICROgram(s)/Hr 1 Patch Transdermal every 72 hours  heparin  Infusion 900 Unit(s)/Hr (9 mL/Hr) IV Continuous <Continuous>  hydrALAZINE 25 milliGRAM(s) Oral every 8 hours  latanoprost 0.005% Ophthalmic Solution 1 Drop(s) Both EYES at bedtime  pantoprazole  Injectable 40 milliGRAM(s) IV Push every 12 hours  simethicone 80 milliGRAM(s) Chew every 8 hours  sodium chloride 3%  Inhalation 3 milliLiter(s) Inhalation two times a day  torsemide 10 milliGRAM(s) Oral once    MEDICATIONS  (PRN):  acetaminophen    Suspension .. 650 milliGRAM(s) Oral every 6 hours PRN Mild Pain (1 - 3)  clonazePAM Tablet 1 milliGRAM(s) Oral every 8 hours PRN agitation  melatonin 1 milliGRAM(s) Oral at bedtime PRN Insomnia    Pertinent Labs:  10-17 Na138 mmol/L Glu 155 mg/dL<H> K+ 4.0 mmol/L Cr  0.93 mg/dL BUN 42 mg/dL<H> 10-11 Phos 3.1 mg/dL 09-22 [id number] 7.2 %<H>      Skin: no pressure breakdown    Estimated Needs:   [X ] no change since previous assessment  [ ] recalculated:       Previous Nutrition Diagnosis: NONE             New Nutrition Diagnosis: [ X] not applicable      Recommendations        [X ] Enteral Nutrition Support Vital AF  @ 45ml/hr x24 hrs to provide total 1080ml,  1296calories, ~21/kg, protein 81gm, 1.3gm/kg based on dosing weight 62.9kg. Formula free water 891ml plus free water 150ml Q 12 hours total free water 1191ml.        [ ] Other:        Monitoring and Evaluation:     [ ] PO intake [ ] Tolerance to diet prescription [ ] weights [ ] follow up per protocol    [ ] other:

## 2018-10-17 NOTE — PROGRESS NOTE ADULT - PROBLEM SELECTOR PLAN 10
D/c Planning initiated, will be ready once INR therapeutic. Awaiting more options from family in regards to SNF.

## 2018-10-17 NOTE — PROGRESS NOTE ADULT - PROBLEM SELECTOR PLAN 1
Patient Failed Extubation attempt x 2   Patient S/p Tracheostomy 10/3 ( # 6 Cuffed Bert)   Attempt weaning trials as tolerated daily; trials of trach collar as tolerated since 10/16  Continue Nebulizers and Chest PT

## 2018-10-17 NOTE — PROGRESS NOTE ADULT - SUBJECTIVE AND OBJECTIVE BOX
Patient is a 84y old  Female who presents with a chief complaint of COPD exacerbation, ADHF....Today...    Interval Events:    REVIEW OF SYSTEMS:  [ ] Positive  [ ] All other systems negative  [ ] Unable to assess ROS because ________    Vital Signs Last 24 Hrs  T(C): 37 (10-17-18 @ 04:24), Max: 37 (10-17-18 @ 04:24)  T(F): 98.6 (10-17-18 @ 04:24), Max: 98.6 (10-17-18 @ 04:24)  HR: 72 (10-17-18 @ 05:13) (60 - 83)  BP: 147/82 (10-17-18 @ 04:24) (95/60 - 155/88)  RR: 20 (10-17-18 @ 04:24) (14 - 21)  SpO2: 100% (10-17-18 @ 05:13) (93% - 100%)    PHYSICAL EXAM:  HEENT:   [ ]Tracheostomy:  [ ]Pupils equal  [ ]No oral lesions  [ ]Abnormal    SKIN  [ ]No Rash  [ ] Abnormal  [ ] pressure    CARDIAC  [ ]Regular  [ ]Abnormal    PULMONARY  [ ]Bilateral Clear Breath Sounds  [ ]Normal Excursion  [ ]Abnormal    GI  [ ]PEG      [ ] +BS		              [ ]Soft, nondistended, nontender	  [ ]Abnormal    MUSCULOSKELETAL                                   [ ]Bedbound                 [ ]Abnormal    [ ]Ambulatory/OOB to chair                           EXTREMITIES                                         [ ]Normal  [ ]Edema                           NEUROLOGIC  [ ] Normal, non focal  [ ] Focal findings:    PSYCHIATRIC  [ ]Alert and appropriate  [ ] Sedated	 [ ]Agitated    :  Connie: [ ] Yes, if yes: Date of Placement:                   [  ] No    LINES: Central Lines [ ] Yes, if yes: Date of Placement                                     [  ] No    HOSPITAL MEDICATIONS:  MEDICATIONS  (STANDING):  ALBUTerol/ipratropium for Nebulization 3 milliLiter(s) Nebulizer every 6 hours  aspirin  chewable 81 milliGRAM(s) Oral daily  buDESOnide   0.25 milliGRAM(s) Respule 0.25 milliGRAM(s) Inhalation every 12 hours  diltiazem    Tablet 30 milliGRAM(s) Oral every 6 hours  fentaNYL   Patch  25 MICROgram(s)/Hr 1 Patch Transdermal every 72 hours  heparin  Infusion 900 Unit(s)/Hr (9 mL/Hr) IV Continuous <Continuous>  hydrALAZINE 25 milliGRAM(s) Oral every 8 hours  latanoprost 0.005% Ophthalmic Solution 1 Drop(s) Both EYES at bedtime  pantoprazole  Injectable 40 milliGRAM(s) IV Push every 12 hours  simethicone 80 milliGRAM(s) Chew every 8 hours  sodium chloride 3%  Inhalation 3 milliLiter(s) Inhalation two times a day  torsemide 20 milliGRAM(s) Oral daily    MEDICATIONS  (PRN):  acetaminophen    Suspension .. 650 milliGRAM(s) Oral every 6 hours PRN Mild Pain (1 - 3)  clonazePAM Tablet 1 milliGRAM(s) Oral every 8 hours PRN agitation  melatonin 1 milliGRAM(s) Oral at bedtime PRN Insomnia      LABS:                        7.5    5.7   )-----------( 156      ( 16 Oct 2018 07:53 )             23.6     10-16    134<L>  |  95<L>  |  45<H>  ----------------------------<  148<H>  4.1   |  25  |  0.94    Ca    10.2      16 Oct 2018 07:53      PT/INR - ( 16 Oct 2018 07:53 )   PT: 21.1 sec;   INR: 1.93 ratio         PTT - ( 16 Oct 2018 23:45 )  PTT:64.5 sec        CAPILLARY BLOOD GLUCOSE    MICROBIOLOGY:     RADIOLOGY:  [ ] Reviewed and interpreted by me    Mode: AC/ CMV (Assist Control/ Continuous Mandatory Ventilation)  RR (machine): 14  TV (machine): 400  FiO2: 30  PEEP: 5  ITime: 0.8  MAP: 7  PIP: 23 Patient is a 84y old  Female who presents with a chief complaint of COPD exacerbation, ADHF....Today, she's observed on bed vent via trach eyes closed. Arousable when name called, attempts to wave as greeted.    Interval Events:    REVIEW OF SYSTEMS:  [ ] Positive  [ ] All other systems negative  [ X] Unable to assess ROS because __non-verbal on trach    Vital Signs Last 24 Hrs  T(C): 37 (10-17-18 @ 04:24), Max: 37 (10-17-18 @ 04:24)  T(F): 98.6 (10-17-18 @ 04:24), Max: 98.6 (10-17-18 @ 04:24)  HR: 72 (10-17-18 @ 05:13) (60 - 83)  BP: 147/82 (10-17-18 @ 04:24) (95/60 - 155/88)  RR: 20 (10-17-18 @ 04:24) (14 - 21)  SpO2: 100% (10-17-18 @ 05:13) (93% - 100%)    PHYSICAL EXAM:  HEENT:   [ X]Tracheostomy:  [X ]Pupils equal: PERRL  [ ]No oral lesions  [ ]Abnormal    SKIN  [ ]No Rash  [ X] Abnormal: B/L forearm spotty ecchymosis from prior IV access  [ ] pressure    CARDIAC  [ ]Regular  [ X]Abnormal: S1 S2 present, with systolic murmur; heart rate at regular intervals    PULMONARY  [ X]Bilateral Clear Breath Sounds: Bilateral basilar crackles from yesterday has resolved  [ ]Normal Excursion  [ ]Abnormal    GI  [X ]PEG      [X ] +BS		              [ ]Soft, nondistended, nontender	  [X ]Abnormal: soft with mild distention    MUSCULOSKELETAL                                   [ ]Bedbound                 [ ]Abnormal    [X ] OOB to chair to chair with assistance    EXTREMITIES                                         [ ]Normal  [X ]Edema: trace pitting edema B/L legs    NEUROLOGIC  [ ] Normal, non focal  [X ] Focal findings: moves both arms, overall weak extremities; awake and alert; B/L facial symmetry    PSYCHIATRIC: Alert with occasional agitation throughout the day.  [ ]Alert and appropriate  [ ] Sedated	 [ ]Agitated    :  Rosales: [ ] Yes, if yes: Date of Placement:                   [X  ] No    LINES: Central Lines [ ] Yes, if yes: Date of Placement                                     [X  ] No    HOSPITAL MEDICATIONS:  MEDICATIONS  (STANDING):  ALBUTerol/ipratropium for Nebulization 3 milliLiter(s) Nebulizer every 6 hours  aspirin  chewable 81 milliGRAM(s) Oral daily  buDESOnide   0.25 milliGRAM(s) Respule 0.25 milliGRAM(s) Inhalation every 12 hours  diltiazem    Tablet 30 milliGRAM(s) Oral every 6 hours  fentaNYL   Patch  25 MICROgram(s)/Hr 1 Patch Transdermal every 72 hours  heparin  Infusion 900 Unit(s)/Hr (9 mL/Hr) IV Continuous <Continuous>  hydrALAZINE 25 milliGRAM(s) Oral every 8 hours  latanoprost 0.005% Ophthalmic Solution 1 Drop(s) Both EYES at bedtime  pantoprazole  Injectable 40 milliGRAM(s) IV Push every 12 hours  simethicone 80 milliGRAM(s) Chew every 8 hours  sodium chloride 3%  Inhalation 3 milliLiter(s) Inhalation two times a day  torsemide 20 milliGRAM(s) Oral daily    MEDICATIONS  (PRN):  acetaminophen    Suspension .. 650 milliGRAM(s) Oral every 6 hours PRN Mild Pain (1 - 3)  clonazePAM Tablet 1 milliGRAM(s) Oral every 8 hours PRN agitation  melatonin 1 milliGRAM(s) Oral at bedtime PRN Insomnia      LABS:                        7.5    5.7   )-----------( 156      ( 16 Oct 2018 07:53 )             23.6     10-16    134<L>  |  95<L>  |  45<H>  ----------------------------<  148<H>  4.1   |  25  |  0.94    Ca    10.2      16 Oct 2018 07:53      PT/INR - ( 16 Oct 2018 07:53 )   PT: 21.1 sec;   INR: 1.93 ratio         PTT - ( 16 Oct 2018 23:45 )  PTT:64.5 sec        CAPILLARY BLOOD GLUCOSE    MICROBIOLOGY:     RADIOLOGY:  [ ] Reviewed and interpreted by me    Mode: AC/ CMV (Assist Control/ Continuous Mandatory Ventilation)  RR (machine): 14  TV (machine): 400  FiO2: 30  PEEP: 5  ITime: 0.8  MAP: 7  PIP: 23 Patient is a 84y old  Female who presents with a chief complaint of COPD exacerbation, ADHF....Today, she's observed on bed vent via trach eyes closed. Arousable when name called, attempts to wave as greeted. Does not appear tachypneic. Yesterday she had bilateral crackles on lung exam. Chest Xray revealed pulm edema and B/L effusion unchanged from prior. Was given 40mg lasix IV once yesterday. No crackles appreciated on this exam.    Interval Events:    REVIEW OF SYSTEMS:  [ ] Positive  [ ] All other systems negative  [ X] Unable to assess ROS because __non-verbal on trach    Vital Signs Last 24 Hrs  T(C): 37 (10-17-18 @ 04:24), Max: 37 (10-17-18 @ 04:24)  T(F): 98.6 (10-17-18 @ 04:24), Max: 98.6 (10-17-18 @ 04:24)  HR: 72 (10-17-18 @ 05:13) (60 - 83)  BP: 147/82 (10-17-18 @ 04:24) (95/60 - 155/88)  RR: 20 (10-17-18 @ 04:24) (14 - 21)  SpO2: 100% (10-17-18 @ 05:13) (93% - 100%)    PHYSICAL EXAM:  HEENT:   [ X]Tracheostomy:  [X ]Pupils equal: PERRL  [ ]No oral lesions  [ ]Abnormal    SKIN  [ ]No Rash  [ X] Abnormal: B/L forearm spotty ecchymosis from prior IV access  [ ] pressure    CARDIAC  [ ]Regular  [ X]Abnormal: S1 S2 present, with systolic murmur; heart rate at regular intervals    PULMONARY  [ X]Bilateral Clear Breath Sounds: Bilateral basilar crackles from yesterday has resolved  [ ]Normal Excursion  [ ]Abnormal    GI  [X ]PEG      [X ] +BS		              [ ]Soft, nondistended, nontender	  [X ]Abnormal: soft with mild distention    MUSCULOSKELETAL                                   [ ]Bedbound                 [ ]Abnormal    [X ] OOB to chair to chair with assistance    EXTREMITIES                                         [ ]Normal  [X ]Edema: trace pitting edema B/L legs    NEUROLOGIC  [ ] Normal, non focal  [X ] Focal findings: moves both arms, overall weak extremities; awake and alert; B/L facial symmetry    PSYCHIATRIC: Alert with occasional agitation throughout the day.  [ ]Alert and appropriate  [ ] Sedated	 [ ]Agitated    :  Connie: [ ] Yes, if yes: Date of Placement:                   [X  ] No    LINES: Central Lines [ ] Yes, if yes: Date of Placement                                     [X  ] No    HOSPITAL MEDICATIONS:  MEDICATIONS  (STANDING):  ALBUTerol/ipratropium for Nebulization 3 milliLiter(s) Nebulizer every 6 hours  aspirin  chewable 81 milliGRAM(s) Oral daily  buDESOnide   0.25 milliGRAM(s) Respule 0.25 milliGRAM(s) Inhalation every 12 hours  diltiazem    Tablet 30 milliGRAM(s) Oral every 6 hours  fentaNYL   Patch  25 MICROgram(s)/Hr 1 Patch Transdermal every 72 hours  heparin  Infusion 900 Unit(s)/Hr (9 mL/Hr) IV Continuous <Continuous>  hydrALAZINE 25 milliGRAM(s) Oral every 8 hours  latanoprost 0.005% Ophthalmic Solution 1 Drop(s) Both EYES at bedtime  pantoprazole  Injectable 40 milliGRAM(s) IV Push every 12 hours  simethicone 80 milliGRAM(s) Chew every 8 hours  sodium chloride 3%  Inhalation 3 milliLiter(s) Inhalation two times a day  torsemide 20 milliGRAM(s) Oral daily    MEDICATIONS  (PRN):  acetaminophen    Suspension .. 650 milliGRAM(s) Oral every 6 hours PRN Mild Pain (1 - 3)  clonazePAM Tablet 1 milliGRAM(s) Oral every 8 hours PRN agitation  melatonin 1 milliGRAM(s) Oral at bedtime PRN Insomnia      LABS:                        7.5    5.7   )-----------( 156      ( 16 Oct 2018 07:53 )             23.6     10-16    134<L>  |  95<L>  |  45<H>  ----------------------------<  148<H>  4.1   |  25  |  0.94    Ca    10.2      16 Oct 2018 07:53      PT/INR - ( 16 Oct 2018 07:53 )   PT: 21.1 sec;   INR: 1.93 ratio         PTT - ( 16 Oct 2018 23:45 )  PTT:64.5 sec        CAPILLARY BLOOD GLUCOSE    MICROBIOLOGY:     RADIOLOGY:  [ ] Reviewed and interpreted by me    Mode: AC/ CMV (Assist Control/ Continuous Mandatory Ventilation)  RR (machine): 14  TV (machine): 400  FiO2: 30  PEEP: 5  ITime: 0.8  MAP: 7  PIP: 23

## 2018-10-18 LAB
APTT BLD: 57.9 SEC — HIGH (ref 27.5–37.4)
HCT VFR BLD CALC: 23.8 % — LOW (ref 34.5–45)
HGB BLD-MCNC: 7.2 G/DL — LOW (ref 11.5–15.5)
INR BLD: 2.22 RATIO — HIGH (ref 0.88–1.16)
INR BLD: 2.64 RATIO — HIGH (ref 0.88–1.16)
MCHC RBC-ENTMCNC: 27.9 PG — SIGNIFICANT CHANGE UP (ref 27–34)
MCHC RBC-ENTMCNC: 30.3 GM/DL — LOW (ref 32–36)
MCV RBC AUTO: 92.2 FL — SIGNIFICANT CHANGE UP (ref 80–100)
PLATELET # BLD AUTO: 207 K/UL — SIGNIFICANT CHANGE UP (ref 150–400)
PROTHROM AB SERPL-ACNC: 24.6 SEC — HIGH (ref 9.8–12.7)
PROTHROM AB SERPL-ACNC: 29.4 SEC — HIGH (ref 9.8–12.7)
RBC # BLD: 2.58 M/UL — LOW (ref 3.8–5.2)
RBC # FLD: 20.4 % — HIGH (ref 10.3–14.5)
WBC # BLD: 6.55 K/UL — SIGNIFICANT CHANGE UP (ref 3.8–10.5)
WBC # FLD AUTO: 6.55 K/UL — SIGNIFICANT CHANGE UP (ref 3.8–10.5)

## 2018-10-18 PROCEDURE — 99233 SBSQ HOSP IP/OBS HIGH 50: CPT | Mod: GC

## 2018-10-18 RX ORDER — WARFARIN SODIUM 2.5 MG/1
6 TABLET ORAL ONCE
Qty: 0 | Refills: 0 | Status: COMPLETED | OUTPATIENT
Start: 2018-10-18 | End: 2018-10-18

## 2018-10-18 RX ADMIN — PANTOPRAZOLE SODIUM 40 MILLIGRAM(S): 20 TABLET, DELAYED RELEASE ORAL at 17:08

## 2018-10-18 RX ADMIN — Medication 3 MILLILITER(S): at 17:14

## 2018-10-18 RX ADMIN — SIMETHICONE 80 MILLIGRAM(S): 80 TABLET, CHEWABLE ORAL at 06:49

## 2018-10-18 RX ADMIN — SODIUM CHLORIDE 3 MILLILITER(S): 9 INJECTION INTRAMUSCULAR; INTRAVENOUS; SUBCUTANEOUS at 06:00

## 2018-10-18 RX ADMIN — LATANOPROST 1 DROP(S): 0.05 SOLUTION/ DROPS OPHTHALMIC; TOPICAL at 22:17

## 2018-10-18 RX ADMIN — SIMETHICONE 80 MILLIGRAM(S): 80 TABLET, CHEWABLE ORAL at 14:14

## 2018-10-18 RX ADMIN — Medication 650 MILLIGRAM(S): at 22:28

## 2018-10-18 RX ADMIN — Medication 0.25 MILLIGRAM(S): at 06:00

## 2018-10-18 RX ADMIN — Medication 650 MILLIGRAM(S): at 23:00

## 2018-10-18 RX ADMIN — Medication 3 MILLILITER(S): at 23:01

## 2018-10-18 RX ADMIN — Medication 25 MILLIGRAM(S): at 14:14

## 2018-10-18 RX ADMIN — FENTANYL CITRATE 1 PATCH: 50 INJECTION INTRAVENOUS at 18:47

## 2018-10-18 RX ADMIN — Medication 3 MILLILITER(S): at 05:59

## 2018-10-18 RX ADMIN — Medication 81 MILLIGRAM(S): at 12:47

## 2018-10-18 RX ADMIN — PANTOPRAZOLE SODIUM 40 MILLIGRAM(S): 20 TABLET, DELAYED RELEASE ORAL at 06:51

## 2018-10-18 RX ADMIN — Medication 0.25 MILLIGRAM(S): at 17:13

## 2018-10-18 RX ADMIN — FENTANYL CITRATE 1 PATCH: 50 INJECTION INTRAVENOUS at 06:51

## 2018-10-18 RX ADMIN — Medication 1 MILLIGRAM(S): at 17:08

## 2018-10-18 RX ADMIN — Medication 25 MILLIGRAM(S): at 22:17

## 2018-10-18 RX ADMIN — Medication 30 MILLIGRAM(S): at 06:51

## 2018-10-18 RX ADMIN — SIMETHICONE 80 MILLIGRAM(S): 80 TABLET, CHEWABLE ORAL at 22:17

## 2018-10-18 RX ADMIN — SODIUM CHLORIDE 3 MILLILITER(S): 9 INJECTION INTRAMUSCULAR; INTRAVENOUS; SUBCUTANEOUS at 17:14

## 2018-10-18 RX ADMIN — Medication 3 MILLILITER(S): at 11:14

## 2018-10-18 RX ADMIN — Medication 25 MILLIGRAM(S): at 06:49

## 2018-10-18 RX ADMIN — Medication 1 MILLIGRAM(S): at 22:29

## 2018-10-18 RX ADMIN — HEPARIN SODIUM 9 UNIT(S)/HR: 5000 INJECTION INTRAVENOUS; SUBCUTANEOUS at 08:23

## 2018-10-18 RX ADMIN — WARFARIN SODIUM 6 MILLIGRAM(S): 2.5 TABLET ORAL at 22:18

## 2018-10-18 NOTE — PROGRESS NOTE ADULT - SUBJECTIVE AND OBJECTIVE BOX
Patient is a 84y old  Female who presents with a chief complaint of COPD exacerbation, ADHF (17 Oct 2018 07:40)      Interval Events:    REVIEW OF SYSTEMS:  [ ] Positive  [ x] All other systems negative  [ ] Unable to assess ROS because ________    Vital Signs Last 24 Hrs  T(C): 36.9 (10-18-18 @ 04:45), Max: 36.9 (10-17-18 @ 18:11)  T(F): 98.4 (10-18-18 @ 04:45), Max: 98.4 (10-17-18 @ 18:11)  HR: 61 (10-18-18 @ 10:30) (59 - 77)  BP: 118/66 (10-18-18 @ 04:45) (105/57 - 169/67)  RR: 14 (10-18-18 @ 04:45) (14 - 20)  SpO2: 97% (10-18-18 @ 10:30) (92% - 100%)    PHYSICAL EXAM:  HEENT:   [x ]Tracheostomy: shiley 6 cuffed  [ ]Pupils equal  [ ]No oral lesions  [ ]Abnormal    SKIN  [x ]No Rash  [ ] Abnormal  [ ] pressure    CARDIAC  [x ]Regular  [ ]Abnormal    PULMONARY  [x ]Bilateral Clear Breath Sounds  [ ]Normal Excursion  [ ]Abnormal    GI  [ x]PEG      [x ] +BS		              [ x]Soft, nondistended, nontender	  [ ]Abnormal    MUSCULOSKELETAL                                   [ ]Bedbound                 [ ]Abnormal    [x ]Ambulatory/OOB to chair                           EXTREMITIES                                         [x ]Normal  [ ]Edema                           NEUROLOGIC  [ x] Normal, non focal  [ ] Focal findings:    PSYCHIATRIC  [ x]Alert and appropriate  [ ] Sedated	 [ ]Agitated    :  Rosales: [ ] Yes, if yes: Date of Placement:                   [ x ] No    LINES: Central Lines [ ] Yes, if yes: Date of Placement                                     [ x ] No    HOSPITAL MEDICATIONS:  MEDICATIONS  (STANDING):  ALBUTerol/ipratropium for Nebulization 3 milliLiter(s) Nebulizer every 6 hours  aspirin  chewable 81 milliGRAM(s) Oral daily  buDESOnide   0.25 milliGRAM(s) Respule 0.25 milliGRAM(s) Inhalation every 12 hours  diltiazem    Tablet 30 milliGRAM(s) Oral every 6 hours  fentaNYL   Patch  25 MICROgram(s)/Hr 1 Patch Transdermal every 72 hours  heparin  Infusion 900 Unit(s)/Hr (9 mL/Hr) IV Continuous <Continuous>  hydrALAZINE 25 milliGRAM(s) Oral every 8 hours  latanoprost 0.005% Ophthalmic Solution 1 Drop(s) Both EYES at bedtime  pantoprazole  Injectable 40 milliGRAM(s) IV Push every 12 hours  simethicone 80 milliGRAM(s) Chew every 8 hours  sodium chloride 3%  Inhalation 3 milliLiter(s) Inhalation two times a day  torsemide 30 milliGRAM(s) Oral daily    MEDICATIONS  (PRN):  acetaminophen    Suspension .. 650 milliGRAM(s) Oral every 6 hours PRN Mild Pain (1 - 3)  clonazePAM Tablet 1 milliGRAM(s) Oral every 8 hours PRN agitation  melatonin 1 milliGRAM(s) Oral at bedtime PRN Insomnia      LABS:                        7.2    6.55  )-----------( 207      ( 18 Oct 2018 07:51 )             23.8     10-17    138  |  97  |  42<H>  ----------------------------<  155<H>  4.0   |  25  |  0.93    Ca    10.1      17 Oct 2018 07:31      PT/INR - ( 17 Oct 2018 08:59 )   PT: 24.5 sec;   INR: 2.13 ratio         PTT - ( 18 Oct 2018 07:54 )  PTT:57.9 sec        CAPILLARY BLOOD GLUCOSE    MICROBIOLOGY:     RADIOLOGY:  [ ] Reviewed and interpreted by me    Mode: OFF

## 2018-10-18 NOTE — CHART NOTE - NSCHARTNOTEFT_GEN_A_CORE
INR: 2.64: RECOMMENDED RANGES FOR THERAPEUTIC INR:    2.0-3.0 for most medical and surgical thromboembolic states    2.0-3.0 for atrial fibrillation    2.0-3.0 for bileaflet mechanical valve in aortic position    2.5-3.5 for mechanical heart valves   Chest 2004;126:G030-219  The presence of direct thrombin inhibitors (argatroban, refludan)  may falsely increase results. ratio (10.18.18 @ 14:34)  Activated Partial Thromboplastin Time: 57.9: The recommended therapeutic heparin range (full dose) is 58-99 seconds.  Recommended therapeutic Argatroban range is 1.5 to 3.0 times the baseline  APTT value, not to exceed 100 seconds. Recommended therapeutic Refludan  range is 1.5 to 2.5 times thebaseline APTT. sec (10.18.18 @ 07:54)      INR: 2.22: RECOMMENDED RANGES FOR THERAPEUTIC INR:    2.0-3.0 for most medical and surgical thromboembolic states    2.0-3.0 for atrial fibrillation    2.0-3.0 for bileaflet mechanical valve in aortic position    2.5-3.5 for mechanical heart valves   Chest 2004;126:[id number]  The presence of direct thrombin inhibitors (argatroban, refludan)  may falsely increase results. ratio (10.18.18 @ 07:54)    Will dose Coumadin and continue Heparin gtt

## 2018-10-18 NOTE — PROGRESS NOTE ADULT - ATTENDING COMMENTS
Patient seen and examined. Patient appears comfortable with family-friend at bedside.   -Acute Hypoxemic Respiratory Failure requiring mechanical ventilation - now with tracheostomy. Continue PSV as tolerated. Trach sutures removed 10/12  -Oropharyngeal dysphagia - s/p PEG placement - with abdominal distension much improved.   -Pain control and agitation -continues to exhibit delirium. Has had good response to Klonopin but last week son (cheyenne-psych) requested Seroquel which patient did not tolerate and mad her agitation worse. Looks much better today  -Afib and Mechanical MV - continue coumadin with goal INR 2.5-3.5. Given subtherapeutic iNR will continue Heparin gtt and give coumadin 7.5mg  -Acute Blood Loss anemia - due to GI bleed required PRBC 10/10. GI followup appreciated. Will continue Hep gtt at lower target. Continue Protonix. Patient with slow decrease in Hgb but remains >7. By family report, she has a long history of GI bleed which is made worse when she requires Heparin.    Discharge planning - I had a conversation with Artem Galdamez the Admissions Supervisor at [facility] who told me that unfortunately they are not able to accept Mrs. Santos's insurance. In no uncertain terms she told me that [facility] was not a possibility for discharge facility at this time.

## 2018-10-18 NOTE — PROGRESS NOTE ADULT - PROBLEM SELECTOR PLAN 1
Patient Failed Extubation attempt x 2   Patient S/p Tracheostomy 10/3 ( # 6 Cuffed Bert)   Attempt weaning trials as tolerated daily; trials of trach collar as tolerated - No real progress on trach collar  Continue Nebulizers and Chest PT

## 2018-10-19 LAB
ANION GAP SERPL CALC-SCNC: 13 MMOL/L — SIGNIFICANT CHANGE UP (ref 5–17)
APTT BLD: 55.7 SEC — HIGH (ref 27.5–37.4)
BLD GP AB SCN SERPL QL: NEGATIVE — SIGNIFICANT CHANGE UP
BUN SERPL-MCNC: 51 MG/DL — HIGH (ref 7–23)
CALCIUM SERPL-MCNC: 10 MG/DL — SIGNIFICANT CHANGE UP (ref 8.4–10.5)
CHLORIDE SERPL-SCNC: 96 MMOL/L — SIGNIFICANT CHANGE UP (ref 96–108)
CO2 SERPL-SCNC: 26 MMOL/L — SIGNIFICANT CHANGE UP (ref 22–31)
CREAT SERPL-MCNC: 0.92 MG/DL — SIGNIFICANT CHANGE UP (ref 0.5–1.3)
GLUCOSE SERPL-MCNC: 168 MG/DL — HIGH (ref 70–99)
HCT VFR BLD CALC: 25.5 % — LOW (ref 34.5–45)
HGB BLD-MCNC: 8.1 G/DL — LOW (ref 11.5–15.5)
INR BLD: 2.56 RATIO — HIGH (ref 0.88–1.16)
INR BLD: 2.77 RATIO — HIGH (ref 0.88–1.16)
MCHC RBC-ENTMCNC: 28.6 PG — SIGNIFICANT CHANGE UP (ref 27–34)
MCHC RBC-ENTMCNC: 31.7 GM/DL — LOW (ref 32–36)
MCV RBC AUTO: 90.3 FL — SIGNIFICANT CHANGE UP (ref 80–100)
PLATELET # BLD AUTO: 213 K/UL — SIGNIFICANT CHANGE UP (ref 150–400)
POTASSIUM SERPL-MCNC: 4.1 MMOL/L — SIGNIFICANT CHANGE UP (ref 3.5–5.3)
POTASSIUM SERPL-SCNC: 4.1 MMOL/L — SIGNIFICANT CHANGE UP (ref 3.5–5.3)
PROTHROM AB SERPL-ACNC: 29.5 SEC — HIGH (ref 10–13.1)
PROTHROM AB SERPL-ACNC: 30.6 SEC — HIGH (ref 9.8–12.7)
RBC # BLD: 2.82 M/UL — LOW (ref 3.8–5.2)
RBC # FLD: 17.8 % — HIGH (ref 10.3–14.5)
RH IG SCN BLD-IMP: POSITIVE — SIGNIFICANT CHANGE UP
SODIUM SERPL-SCNC: 135 MMOL/L — SIGNIFICANT CHANGE UP (ref 135–145)
WBC # BLD: 6.4 K/UL — SIGNIFICANT CHANGE UP (ref 3.8–10.5)
WBC # FLD AUTO: 6.4 K/UL — SIGNIFICANT CHANGE UP (ref 3.8–10.5)

## 2018-10-19 PROCEDURE — 99232 SBSQ HOSP IP/OBS MODERATE 35: CPT | Mod: GC

## 2018-10-19 RX ORDER — WARFARIN SODIUM 2.5 MG/1
7.5 TABLET ORAL ONCE
Qty: 0 | Refills: 0 | Status: COMPLETED | OUTPATIENT
Start: 2018-10-19 | End: 2018-10-19

## 2018-10-19 RX ADMIN — SODIUM CHLORIDE 3 MILLILITER(S): 9 INJECTION INTRAMUSCULAR; INTRAVENOUS; SUBCUTANEOUS at 17:29

## 2018-10-19 RX ADMIN — PANTOPRAZOLE SODIUM 40 MILLIGRAM(S): 20 TABLET, DELAYED RELEASE ORAL at 17:55

## 2018-10-19 RX ADMIN — SIMETHICONE 80 MILLIGRAM(S): 80 TABLET, CHEWABLE ORAL at 05:34

## 2018-10-19 RX ADMIN — Medication 1 MILLIGRAM(S): at 01:16

## 2018-10-19 RX ADMIN — SIMETHICONE 80 MILLIGRAM(S): 80 TABLET, CHEWABLE ORAL at 22:21

## 2018-10-19 RX ADMIN — FENTANYL CITRATE 1 PATCH: 50 INJECTION INTRAVENOUS at 19:20

## 2018-10-19 RX ADMIN — Medication 0.25 MILLIGRAM(S): at 17:26

## 2018-10-19 RX ADMIN — Medication 3 MILLILITER(S): at 17:26

## 2018-10-19 RX ADMIN — Medication 25 MILLIGRAM(S): at 05:34

## 2018-10-19 RX ADMIN — Medication 0.25 MILLIGRAM(S): at 05:51

## 2018-10-19 RX ADMIN — Medication 1 MILLIGRAM(S): at 23:41

## 2018-10-19 RX ADMIN — PANTOPRAZOLE SODIUM 40 MILLIGRAM(S): 20 TABLET, DELAYED RELEASE ORAL at 05:34

## 2018-10-19 RX ADMIN — Medication 25 MILLIGRAM(S): at 14:54

## 2018-10-19 RX ADMIN — Medication 3 MILLILITER(S): at 11:48

## 2018-10-19 RX ADMIN — Medication 81 MILLIGRAM(S): at 12:28

## 2018-10-19 RX ADMIN — LATANOPROST 1 DROP(S): 0.05 SOLUTION/ DROPS OPHTHALMIC; TOPICAL at 22:21

## 2018-10-19 RX ADMIN — WARFARIN SODIUM 7.5 MILLIGRAM(S): 2.5 TABLET ORAL at 22:21

## 2018-10-19 RX ADMIN — Medication 3 MILLILITER(S): at 05:50

## 2018-10-19 RX ADMIN — SODIUM CHLORIDE 3 MILLILITER(S): 9 INJECTION INTRAMUSCULAR; INTRAVENOUS; SUBCUTANEOUS at 05:52

## 2018-10-19 RX ADMIN — Medication 1 MILLIGRAM(S): at 17:55

## 2018-10-19 RX ADMIN — Medication 1 MILLIGRAM(S): at 08:20

## 2018-10-19 RX ADMIN — Medication 30 MILLIGRAM(S): at 05:34

## 2018-10-19 RX ADMIN — SIMETHICONE 80 MILLIGRAM(S): 80 TABLET, CHEWABLE ORAL at 14:54

## 2018-10-19 RX ADMIN — FENTANYL CITRATE 1 PATCH: 50 INJECTION INTRAVENOUS at 07:07

## 2018-10-19 NOTE — PROGRESS NOTE ADULT - ATTENDING COMMENTS
Patient seen and examined. Patient appears comfortable with family-friend at bedside.   -Acute Hypoxemic Respiratory Failure requiring mechanical ventilation - now with tracheostomy. Continue PSV as tolerated. Trach sutures removed 10/12  -Oropharyngeal dysphagia - s/p PEG placement - with abdominal distension much improved.   -Pain control and agitation -continues to exhibit delirium. Has had good response to Klonopin but last week son (cheyenne-psych) requested Seroquel which patient did not tolerate and mad her agitation worse. Looks much better today  -Afib and Mechanical MV - continue coumadin with goal INR 2.5-3.5. INR therapeutic and will continue Coumadin 7.5mg daily  -Acute Blood Loss anemia - due to GI bleed required PRBC 10/10.  Continue Protonix. Patient with slow decrease in Hgb but remains >7. By family report, she has a long history of GI bleed which is made worse when she requires Heparin.    Patient has been accepted to a facility ([facility]) and is medically stable for discharge once they have a bed. Patient was NOT accepted to [facility] and family is aware of this fact.

## 2018-10-19 NOTE — PROGRESS NOTE ADULT - SUBJECTIVE AND OBJECTIVE BOX
Patient is a 84y old  Female who presents with a chief complaint of COPD exacerbation, ADHF (18 Oct 2018 10:39)      Interval Events:    REVIEW OF SYSTEMS:  [ ] Positive  [x ] All other systems negative  [ ] Unable to assess ROS because ________    Vital Signs Last 24 Hrs  T(C): 36.4 (10-19-18 @ 04:13), Max: 36.5 (10-18-18 @ 12:45)  T(F): 97.5 (10-19-18 @ 04:13), Max: 97.7 (10-18-18 @ 12:45)  HR: 62 (10-19-18 @ 08:07) (59 - 84)  BP: 148/67 (10-19-18 @ 05:30) (107/65 - 153/64)  RR: 17 (10-19-18 @ 05:30) (14 - 19)  SpO2: 97% (10-19-18 @ 08:07) (89% - 100%)    PHYSICAL EXAM:  HEENT:   [x ]Tracheostomy: shiley 6 cuffed   [ ]Pupils equal  [ ]No oral lesions  [ ]Abnormal    SKIN  [x ]No Rash  [ ] Abnormal  [ ] pressure    CARDIAC  [x ]Regular  [ ]Abnormal    PULMONARY  [x ]Bilateral Clear Breath Sounds  [ ]Normal Excursion  [ ]Abnormal    GI  [x ]PEG      [x ] +BS		              [x ]Soft, nondistended, nontender	  [ ]Abnormal    MUSCULOSKELETAL                                   [ ]Bedbound                 [ ]Abnormal    [x ]Ambulatory/OOB to chair                           EXTREMITIES                                         [ x]Normal  [ ]Edema                           NEUROLOGIC  [x ] Normal, non focal  [ ] Focal findings:    PSYCHIATRIC  [x ]Alert and somewhat aggitated  [ ] Sedated	 [ ]Agitated    :  Rosales: [ ] Yes, if yes: Date of Placement:                   [x  ] No    LINES: Central Lines [ ] Yes, if yes: Date of Placement                                     [ x ] No    HOSPITAL MEDICATIONS:  MEDICATIONS  (STANDING):  ALBUTerol/ipratropium for Nebulization 3 milliLiter(s) Nebulizer every 6 hours  aspirin  chewable 81 milliGRAM(s) Oral daily  buDESOnide   0.25 milliGRAM(s) Respule 0.25 milliGRAM(s) Inhalation every 12 hours  diltiazem    Tablet 30 milliGRAM(s) Oral every 6 hours  fentaNYL   Patch  25 MICROgram(s)/Hr 1 Patch Transdermal every 72 hours  heparin  Infusion 900 Unit(s)/Hr (9 mL/Hr) IV Continuous <Continuous>  hydrALAZINE 25 milliGRAM(s) Oral every 8 hours  latanoprost 0.005% Ophthalmic Solution 1 Drop(s) Both EYES at bedtime  pantoprazole  Injectable 40 milliGRAM(s) IV Push every 12 hours  simethicone 80 milliGRAM(s) Chew every 8 hours  sodium chloride 3%  Inhalation 3 milliLiter(s) Inhalation two times a day  torsemide 30 milliGRAM(s) Oral daily    MEDICATIONS  (PRN):  acetaminophen    Suspension .. 650 milliGRAM(s) Oral every 6 hours PRN Mild Pain (1 - 3)  clonazePAM Tablet 1 milliGRAM(s) Oral every 8 hours PRN agitation  melatonin 1 milliGRAM(s) Oral at bedtime PRN Insomnia      LABS:                        7.2    6.55  )-----------( 207      ( 18 Oct 2018 07:51 )             23.8           PT/INR - ( 18 Oct 2018 14:34 )   PT: 29.4 sec;   INR: 2.64 ratio         PTT - ( 18 Oct 2018 07:54 )  PTT:57.9 sec        CAPILLARY BLOOD GLUCOSE    MICROBIOLOGY:     RADIOLOGY:  [ ] Reviewed and interpreted by me    Mode: AC/ CMV (Assist Control/ Continuous Mandatory Ventilation)  RR (machine): 14  TV (machine): 400  FiO2: 30  PEEP: 5  ITime: 1  MAP: 12  PIP: 30

## 2018-10-19 NOTE — CHART NOTE - NSCHARTNOTEFT_GEN_A_CORE
Per families request, urology called [phone] to evaluate patient 2/2 urinary retention. Currently, bladder scaned q 6 hours for >350 retention. It is her son's wishes not to have her discharged until she is able to void on her own. Discussed with Dr. Lyons. Pending urology consult.     Deana Rolon, AGNP-c  [phone] Per family's request, urology called [phone] to evaluate patient 2/2 urinary retention. Currently, bladder scaned q 6 hours for >350 retention. It is her son's wishes not to have her discharged until she is able to void on her own. Discussed with Dr. Lyons. Pending urology consult.     Deana Rolon, AGNP-c  [phone]

## 2018-10-19 NOTE — PROGRESS NOTE ADULT - ASSESSMENT
84  Year old Female with PMH significant for COPD, JENNIE on BiPAP at home , multivalvular disease (severe AS Not a candidate for TAVR, S/p MV replacement), HFpEF/ Severe diastolic failure, A-fib on coumadin, sick sinus syndrome S/p pacemaker placement, HTN, and CKD3 who was admitted for acute respiratory failure requiring intubation suspected to be 2/2 COPD exacerbation c/b PNA w/ +entero/rhinovirus and GN coccobacillus and H. influenza bacteremia, requiring continued respiratory support and tracheostomy. Patient S/p Trach placement 10/3 and PEG Placement 10/4.     10/7: Patient remains with abdominal distention today but tolerating trickle feeds, ABD X-ray  performed this morning patient remains with air filled loops of small and large bowel. GI fellow called to re-eval the patient this morning, X-ray  reviewed slightly worsened compared to yesterday. GI fellow recommended to place patient in Left lateral decubitus position and oob to chair later today. Patient with large amount of gas expressed with turning as Per RN. As per GI no role for rectal tube at this time and can continue to advance feeds as tolerated to promote gastric motility. H+H Has remained stable case d/w  will restart coumadin this evening. Pt with elevated bp will increase Hydralazine 50 mg q 8 hr   10/8-Abdominal distention noted with hypoactive bowel sounds, Kub noted from this weekend. Daily bowel movements noted. Will repeat PTT as there were 2 therapeutics prior on same dosing, also repeat CBC as well. Will transfuse if remains low. RCU weaning  10/10: 4 dark BMs reported night of 10/9, concerning for melena,  hgb 6.9 in AM,  1U RBC given . Goal heparin reduced to 50-70. GI consulted due to GIB Hx with known AV malformations. No intvn.  10/11: H/H stable post 1U with no furthers melena or intvn from GI. Resumed coumadin.  10/12: agitated this am. seroquel not working changed back to klonopin  10/13 No events overnight. Continues on heparin gtt/coumadin bridge. INR 1.45, coumadin 7.5 mg x1. Continue with PS trials as tolerated.   10/14 Heparin gtt d/c, INR 2.82, coumadin 1mg x1. Continue with PS trials as tolerated. LE edema, lasix 20 mg po x1. Hg 7.7 stable.   10/15:  INR 2.3, reduced coumadin to 5mg  10/16: INR 1.8, resumed heparin, coumadin increased to 7.5mg tonight. Tolerated trach collar for 40mins  10/18: Await INR results today.  10/19 no events overnight. F/u labs today. d/c planning

## 2018-10-19 NOTE — PROGRESS NOTE ADULT - PROBLEM SELECTOR PLAN 1
Patient Failed Extubation attempt x 2   Patient S/p Tracheostomy 10/3 ( # 6 Cuffed Bert)   Attempt weaning trials as tolerated daily; trials of trach collar as tolerated - No real progress on trach collar currently  Continue Nebulizers and Chest PT

## 2018-10-19 NOTE — PROGRESS NOTE ADULT - PROBLEM SELECTOR PLAN 5
Patient with HX of AS ( Not a candidate for TAVR )  Patient S/p Mechanical Mitral Valve Replacement   10/16: INR 1.9, resumed Heparin infusion with goal PTT of 50-70 bridging to coumadin (INR goal 2.5-3.5).

## 2018-10-20 DIAGNOSIS — N81.4 UTEROVAGINAL PROLAPSE, UNSPECIFIED: ICD-10-CM

## 2018-10-20 DIAGNOSIS — R33.9 RETENTION OF URINE, UNSPECIFIED: ICD-10-CM

## 2018-10-20 LAB
APTT BLD: 42.7 SEC — HIGH (ref 27.5–37.4)
INR BLD: 3.58 RATIO — HIGH (ref 0.88–1.16)
PROTHROM AB SERPL-ACNC: 39.7 SEC — HIGH (ref 9.8–12.7)

## 2018-10-20 PROCEDURE — 99232 SBSQ HOSP IP/OBS MODERATE 35: CPT | Mod: GC

## 2018-10-20 RX ORDER — DOXAZOSIN MESYLATE 4 MG
2 TABLET ORAL AT BEDTIME
Qty: 0 | Refills: 0 | Status: DISCONTINUED | OUTPATIENT
Start: 2018-10-20 | End: 2018-11-02

## 2018-10-20 RX ORDER — WARFARIN SODIUM 2.5 MG/1
1 TABLET ORAL ONCE
Qty: 0 | Refills: 0 | Status: COMPLETED | OUTPATIENT
Start: 2018-10-20 | End: 2018-10-20

## 2018-10-20 RX ORDER — FENTANYL CITRATE 50 UG/ML
1 INJECTION INTRAVENOUS
Qty: 0 | Refills: 0 | Status: DISCONTINUED | OUTPATIENT
Start: 2018-10-20 | End: 2018-10-26

## 2018-10-20 RX ADMIN — Medication 25 MILLIGRAM(S): at 21:43

## 2018-10-20 RX ADMIN — SODIUM CHLORIDE 3 MILLILITER(S): 9 INJECTION INTRAMUSCULAR; INTRAVENOUS; SUBCUTANEOUS at 17:30

## 2018-10-20 RX ADMIN — FENTANYL CITRATE 1 PATCH: 50 INJECTION INTRAVENOUS at 07:10

## 2018-10-20 RX ADMIN — FENTANYL CITRATE 1 PATCH: 50 INJECTION INTRAVENOUS at 07:14

## 2018-10-20 RX ADMIN — Medication 0.25 MILLIGRAM(S): at 05:24

## 2018-10-20 RX ADMIN — Medication 2 MILLIGRAM(S): at 21:43

## 2018-10-20 RX ADMIN — Medication 25 MILLIGRAM(S): at 13:18

## 2018-10-20 RX ADMIN — Medication 0.25 MILLIGRAM(S): at 17:29

## 2018-10-20 RX ADMIN — Medication 30 MILLIGRAM(S): at 05:16

## 2018-10-20 RX ADMIN — Medication 81 MILLIGRAM(S): at 12:45

## 2018-10-20 RX ADMIN — PANTOPRAZOLE SODIUM 40 MILLIGRAM(S): 20 TABLET, DELAYED RELEASE ORAL at 05:17

## 2018-10-20 RX ADMIN — SIMETHICONE 80 MILLIGRAM(S): 80 TABLET, CHEWABLE ORAL at 21:43

## 2018-10-20 RX ADMIN — Medication 3 MILLILITER(S): at 11:09

## 2018-10-20 RX ADMIN — SIMETHICONE 80 MILLIGRAM(S): 80 TABLET, CHEWABLE ORAL at 13:18

## 2018-10-20 RX ADMIN — Medication 1 MILLIGRAM(S): at 02:22

## 2018-10-20 RX ADMIN — LATANOPROST 1 DROP(S): 0.05 SOLUTION/ DROPS OPHTHALMIC; TOPICAL at 21:43

## 2018-10-20 RX ADMIN — WARFARIN SODIUM 1 MILLIGRAM(S): 2.5 TABLET ORAL at 21:44

## 2018-10-20 RX ADMIN — PANTOPRAZOLE SODIUM 40 MILLIGRAM(S): 20 TABLET, DELAYED RELEASE ORAL at 18:45

## 2018-10-20 RX ADMIN — SIMETHICONE 80 MILLIGRAM(S): 80 TABLET, CHEWABLE ORAL at 05:16

## 2018-10-20 RX ADMIN — Medication 25 MILLIGRAM(S): at 05:16

## 2018-10-20 RX ADMIN — Medication 3 MILLILITER(S): at 17:29

## 2018-10-20 RX ADMIN — FENTANYL CITRATE 1 PATCH: 50 INJECTION INTRAVENOUS at 19:52

## 2018-10-20 RX ADMIN — Medication 3 MILLILITER(S): at 00:09

## 2018-10-20 RX ADMIN — Medication 3 MILLILITER(S): at 05:23

## 2018-10-20 RX ADMIN — Medication 1 MILLIGRAM(S): at 10:18

## 2018-10-20 RX ADMIN — SODIUM CHLORIDE 3 MILLILITER(S): 9 INJECTION INTRAMUSCULAR; INTRAVENOUS; SUBCUTANEOUS at 05:23

## 2018-10-20 NOTE — CONSULT NOTE ADULT - SUBJECTIVE AND OBJECTIVE BOX
HPI  Patient is not able to communicate with verbal response given general condition.  Her son was present at bedside and able to provide history and reached sister by phone re gu history.  She is currently in RCU with urinary retention since discharge from [facility].  She has not had any spontaneous voids but required cic with 400 cc residuals  Her family is reluctant to have tejada placed and denies prior retention.  She has had prolapse and failed with pessary and prior history of uti.     PAST MEDICAL & SURGICAL HISTORY:  Aortic stenosis, moderate  Heart failure with preserved ejection fraction  Rheumatic heart disease  Chronic obstructive pulmonary disease, unspecified COPD type  CKD (chronic kidney disease) stage 3, GFR 30-59 ml/min  Chronic atrial fibrillation  Sick sinus syndrome  Cardiac pacemaker recipient  H/O mitral valve replacement      MEDICATIONS  (STANDING):  ALBUTerol/ipratropium for Nebulization 3 milliLiter(s) Nebulizer every 6 hours  aspirin  chewable 81 milliGRAM(s) Oral daily  buDESOnide   0.25 milliGRAM(s) Respule 0.25 milliGRAM(s) Inhalation every 12 hours  diltiazem    Tablet 30 milliGRAM(s) Oral every 6 hours  doxazosin 2 milliGRAM(s) Oral at bedtime  fentaNYL   Patch  25 MICROgram(s)/Hr 1 Patch Transdermal every 72 hours  hydrALAZINE 25 milliGRAM(s) Oral every 8 hours  latanoprost 0.005% Ophthalmic Solution 1 Drop(s) Both EYES at bedtime  pantoprazole  Injectable 40 milliGRAM(s) IV Push every 12 hours  simethicone 80 milliGRAM(s) Chew every 8 hours  sodium chloride 3%  Inhalation 3 milliLiter(s) Inhalation two times a day  torsemide 30 milliGRAM(s) Oral daily  warfarin 1 milliGRAM(s) Oral once    MEDICATIONS  (PRN):  acetaminophen    Suspension .. 650 milliGRAM(s) Oral every 6 hours PRN Mild Pain (1 - 3)  clonazePAM Tablet 1 milliGRAM(s) Oral every 8 hours PRN agitation  melatonin 1 milliGRAM(s) Oral at bedtime PRN Insomnia      FAMILY HISTORY:  No pertinent family history in first degree relatives      Allergies    penicillin (Rash)    SOCIAL HISTORY:  no tobacco etoh    REVIEW OF SYSTEMS: unable to provide ros due to mental status    Physical Exam  Vital signs  T(C): 36.3 (10-20-18 @ 12:44), Max: 36.8 (10-19-18 @ 20:27)  HR: 65 (10-20-18 @ 12:44)  BP: 120/65 (10-20-18 @ 12:44)  SpO2: 98% (10-20-18 @ 12:44)    Gen:  ill appearing female in chair  heent ncat neck symm supple cor reg  chest dec bs  gi soft nd nt   gu no masses    LABS:      10-19 @ 09:06    WBC 6.4   / Hct 25.5  / SCr 0.92     10-19    135  |  96  |  51<H>  ----------------------------<  168<H>  4.1   |  26  |  0.92    Ca    10.0      19 Oct 2018 09:06      PT/INR - ( 20 Oct 2018 06:27 )   PT: 39.7 sec;   INR: 3.58 ratio         PTT - ( 20 Oct 2018 06:27 )  PTT:42.7 sec

## 2018-10-20 NOTE — PHARMACOTHERAPY INTERVENTION NOTE - COMMENTS
Patient on warfarin therapy, mechanical valve INR goal 2.5-3.5. Last dose 10/19 7.5 mg. Delta INR on 10/20 AM is 1.02. Spoke with NP, reduced dose will be given tonight.

## 2018-10-20 NOTE — PROGRESS NOTE ADULT - PROBLEM SELECTOR PLAN 10
D/c Planning initiated, will be ready once INR therapeutic. Awaiting more options from family in regards to SNF. Tentative dc to rehab on 10/22  Urology consulted  per family request. Cardura added for retention

## 2018-10-20 NOTE — CONSULT NOTE ADULT - PROBLEM SELECTOR RECOMMENDATION 9
continue with cic evary 6 hours and check pvr  hiprex 1 gram daily  tejada  if unable to transfer to NH with cic

## 2018-10-20 NOTE — PROGRESS NOTE ADULT - PROBLEM SELECTOR PLAN 5
Patient with HX of AS ( Not a candidate for TAVR )  Patient S/p Mechanical Mitral Valve Replacement   10/16: INR 1.9, resumed Heparin infusion with goal PTT of 50-70 bridging to coumadin (INR goal 2.5-3.5). Patient with HX of AS ( Not a candidate for TAVR )  Patient S/p Mechanical Mitral Valve Replacement   Coumadin for INR 2.5-3.5

## 2018-10-20 NOTE — PROGRESS NOTE ADULT - SUBJECTIVE AND OBJECTIVE BOX
Patient is a 84y old  Female who presents with a chief complaint of COPD exacerbation, ADHF (19 Oct 2018 08:13)      Interval Events:    REVIEW OF SYSTEMS:  [ ] Positive  [ ] All other systems negative  [ ] Unable to assess ROS because ________    Vital Signs Last 24 Hrs  T(C): 36.5 (10-20-18 @ 04:21), Max: 36.8 (10-19-18 @ 20:27)  T(F): 97.7 (10-20-18 @ 04:21), Max: 98.3 (10-19-18 @ 20:27)  HR: 64 (10-20-18 @ 08:17) (57 - 95)  BP: 128/71 (10-20-18 @ 05:20) (107/66 - 150/69)  RR: 21 (10-20-18 @ 05:20) (16 - 22)  SpO2: 91% (10-20-18 @ 08:17) (91% - 100%)    PHYSICAL EXAM:  HEENT:   [ ]Tracheostomy:  [ ]Pupils equal  [ ]No oral lesions  [ ]Abnormal    SKIN  [ ]No Rash  [ ] Abnormal  [ ] pressure    CARDIAC  [ ]Regular  [ ]Abnormal    PULMONARY  [ ]Bilateral Clear Breath Sounds  [ ]Normal Excursion  [ ]Abnormal    GI  [ ]PEG      [ ] +BS		              [ ]Soft, nondistended, nontender	  [ ]Abnormal    MUSCULOSKELETAL                                   [ ]Bedbound                 [ ]Abnormal    [ ]Ambulatory/OOB to chair                           EXTREMITIES                                         [ ]Normal  [ ]Edema                           NEUROLOGIC  [ ] Normal, non focal  [ ] Focal findings:    PSYCHIATRIC  [ ]Alert and appropriate  [ ] Sedated	 [ ]Agitated    :  Connie: [ ] YES, if yes: Date of Placement                        [  ] NO      LINES: TLC [ ]YES, if yes: Date of Placement                    [ ] No    HOSPITAL MEDICATIONS:  MEDICATIONS  (STANDING):  ALBUTerol/ipratropium for Nebulization 3 milliLiter(s) Nebulizer every 6 hours  aspirin  chewable 81 milliGRAM(s) Oral daily  buDESOnide   0.25 milliGRAM(s) Respule 0.25 milliGRAM(s) Inhalation every 12 hours  diltiazem    Tablet 30 milliGRAM(s) Oral every 6 hours  hydrALAZINE 25 milliGRAM(s) Oral every 8 hours  latanoprost 0.005% Ophthalmic Solution 1 Drop(s) Both EYES at bedtime  pantoprazole  Injectable 40 milliGRAM(s) IV Push every 12 hours  simethicone 80 milliGRAM(s) Chew every 8 hours  sodium chloride 3%  Inhalation 3 milliLiter(s) Inhalation two times a day  torsemide 30 milliGRAM(s) Oral daily    MEDICATIONS  (PRN):  acetaminophen    Suspension .. 650 milliGRAM(s) Oral every 6 hours PRN Mild Pain (1 - 3)  clonazePAM Tablet 1 milliGRAM(s) Oral every 8 hours PRN agitation  melatonin 1 milliGRAM(s) Oral at bedtime PRN Insomnia      LABS:                        8.1    6.4   )-----------( 213      ( 19 Oct 2018 09:06 )             25.5     10-19    135  |  96  |  51<H>  ----------------------------<  168<H>  4.1   |  26  |  0.92    Ca    10.0      19 Oct 2018 09:06      PT/INR - ( 20 Oct 2018 06:27 )   PT: 39.7 sec;   INR: 3.58 ratio         PTT - ( 20 Oct 2018 06:27 )  PTT:42.7 sec        CAPILLARY BLOOD GLUCOSE    MICROBIOLOGY:     RADIOLOGY:  [ ] Reviewed and interpreted by me    Mode: AC/ CMV (Assist Control/ Continuous Mandatory Ventilation)  RR (machine): 14  TV (machine): 400  FiO2: 30  PEEP: 5  ITime: 1  MAP: 11  PIP: 30 Patient is a 84y old  Female who presents with a chief complaint of COPD exacerbation, ADHF (19 Oct 2018 08:13)      Interval Events: none    REVIEW OF SYSTEMS:  [ ] Positive  [x ] All  systems negative  [ ] Unable to assess ROS because ________    Vital Signs Last 24 Hrs  T(C): 36.5 (10-20-18 @ 04:21), Max: 36.8 (10-19-18 @ 20:27)  T(F): 97.7 (10-20-18 @ 04:21), Max: 98.3 (10-19-18 @ 20:27)  HR: 64 (10-20-18 @ 08:17) (57 - 95)  BP: 128/71 (10-20-18 @ 05:20) (107/66 - 150/69)  RR: 21 (10-20-18 @ 05:20) (16 - 22)  SpO2: 91% (10-20-18 @ 08:17) (91% - 100%)    PHYSICAL EXAM:  HEENT:   [x ]Tracheostomy:  [ ]Pupils equal  [x ]No oral lesions  [ ]Abnormal    SKIN  [x ]No Rash  [ ] Abnormal  [ ] pressure    CARDIAC  [x ]Regular  [ ]Abnormal    PULMONARY  [x ]Bilateral Clear Breath Sounds  [ ]Normal Excursion  [ ]Abnormal    GI  [x ]PEG      [x ] +BS		              [x ]Soft, nondistended, nontender	  [ ]Abnormal    MUSCULOSKELETAL                                   [ ]Bedbound                 [ ]Abnormal    [x ]Ambulatory/OOB to chair                           EXTREMITIES                                         [ ]Normal  [ ]Edema                           NEUROLOGIC  [x ] Normal, non focal  [ ] Focal findings:    PSYCHIATRIC  [ x]Alert and appropriate  [ ] Sedated	 [ ]Agitated    :  Rosales: [ ] YES, if yes: Date of Placement                        [x  ] NO      LINES: TLC [ ]YES, if yes: Date of Placement                    [x ] No    HOSPITAL MEDICATIONS:  MEDICATIONS  (STANDING):  ALBUTerol/ipratropium for Nebulization 3 milliLiter(s) Nebulizer every 6 hours  aspirin  chewable 81 milliGRAM(s) Oral daily  buDESOnide   0.25 milliGRAM(s) Respule 0.25 milliGRAM(s) Inhalation every 12 hours  diltiazem    Tablet 30 milliGRAM(s) Oral every 6 hours  hydrALAZINE 25 milliGRAM(s) Oral every 8 hours  latanoprost 0.005% Ophthalmic Solution 1 Drop(s) Both EYES at bedtime  pantoprazole  Injectable 40 milliGRAM(s) IV Push every 12 hours  simethicone 80 milliGRAM(s) Chew every 8 hours  sodium chloride 3%  Inhalation 3 milliLiter(s) Inhalation two times a day  torsemide 30 milliGRAM(s) Oral daily    MEDICATIONS  (PRN):  acetaminophen    Suspension .. 650 milliGRAM(s) Oral every 6 hours PRN Mild Pain (1 - 3)  clonazePAM Tablet 1 milliGRAM(s) Oral every 8 hours PRN agitation  melatonin 1 milliGRAM(s) Oral at bedtime PRN Insomnia      LABS:                        8.1    6.4   )-----------( 213      ( 19 Oct 2018 09:06 )             25.5     10-19    135  |  96  |  51<H>  ----------------------------<  168<H>  4.1   |  26  |  0.92    Ca    10.0      19 Oct 2018 09:06      PT/INR - ( 20 Oct 2018 06:27 )   PT: 39.7 sec;   INR: 3.58 ratio         PTT - ( 20 Oct 2018 06:27 )  PTT:42.7 sec        CAPILLARY BLOOD GLUCOSE    MICROBIOLOGY:     RADIOLOGY:  [ ] Reviewed and interpreted by me    Mode: AC/ CMV (Assist Control/ Continuous Mandatory Ventilation)  RR (machine): 14  TV (machine): 400  FiO2: 30  PEEP: 5  ITime: 1  MAP: 11  PIP: 30

## 2018-10-20 NOTE — PROGRESS NOTE ADULT - ATTENDING COMMENTS
pt is stable, awaiting discharge  requiring straight cath for failure to void.  family refused tejada, pending gu eval

## 2018-10-21 LAB
ANION GAP SERPL CALC-SCNC: 15 MMOL/L — SIGNIFICANT CHANGE UP (ref 5–17)
APPEARANCE UR: ABNORMAL
BACTERIA # UR AUTO: ABNORMAL
BILIRUB UR-MCNC: NEGATIVE — SIGNIFICANT CHANGE UP
BUN SERPL-MCNC: 62 MG/DL — HIGH (ref 7–23)
CALCIUM SERPL-MCNC: 9.9 MG/DL — SIGNIFICANT CHANGE UP (ref 8.4–10.5)
CHLORIDE SERPL-SCNC: 94 MMOL/L — LOW (ref 96–108)
CO2 SERPL-SCNC: 27 MMOL/L — SIGNIFICANT CHANGE UP (ref 22–31)
COLOR SPEC: YELLOW — SIGNIFICANT CHANGE UP
CREAT SERPL-MCNC: 1.18 MG/DL — SIGNIFICANT CHANGE UP (ref 0.5–1.3)
DIFF PNL FLD: ABNORMAL
EPI CELLS # UR: 3 /HPF — SIGNIFICANT CHANGE UP
GLUCOSE SERPL-MCNC: 165 MG/DL — HIGH (ref 70–99)
GLUCOSE UR QL: NEGATIVE — SIGNIFICANT CHANGE UP
HCT VFR BLD CALC: 23.7 % — LOW (ref 34.5–45)
HGB BLD-MCNC: 7.1 G/DL — LOW (ref 11.5–15.5)
HYALINE CASTS # UR AUTO: 0 /LPF — SIGNIFICANT CHANGE UP (ref 0–2)
INR BLD: 4.97 RATIO — HIGH (ref 0.88–1.16)
KETONES UR-MCNC: NEGATIVE — SIGNIFICANT CHANGE UP
LEUKOCYTE ESTERASE UR-ACNC: ABNORMAL
MAGNESIUM SERPL-MCNC: 2.5 MG/DL — SIGNIFICANT CHANGE UP (ref 1.6–2.6)
MCHC RBC-ENTMCNC: 27.1 PG — SIGNIFICANT CHANGE UP (ref 27–34)
MCHC RBC-ENTMCNC: 30 GM/DL — LOW (ref 32–36)
MCV RBC AUTO: 90.5 FL — SIGNIFICANT CHANGE UP (ref 80–100)
NITRITE UR-MCNC: NEGATIVE — SIGNIFICANT CHANGE UP
PH UR: 5.5 — SIGNIFICANT CHANGE UP (ref 5–8)
PHOSPHATE SERPL-MCNC: 4.7 MG/DL — HIGH (ref 2.5–4.5)
PLATELET # BLD AUTO: 259 K/UL — SIGNIFICANT CHANGE UP (ref 150–400)
POTASSIUM SERPL-MCNC: 5 MMOL/L — SIGNIFICANT CHANGE UP (ref 3.5–5.3)
POTASSIUM SERPL-SCNC: 5 MMOL/L — SIGNIFICANT CHANGE UP (ref 3.5–5.3)
PROT UR-MCNC: ABNORMAL
PROTHROM AB SERPL-ACNC: 56 SEC — HIGH (ref 9.8–12.7)
RBC # BLD: 2.62 M/UL — LOW (ref 3.8–5.2)
RBC # FLD: 19.9 % — HIGH (ref 10.3–14.5)
RBC CASTS # UR COMP ASSIST: 16 /HPF — HIGH (ref 0–4)
SODIUM SERPL-SCNC: 136 MMOL/L — SIGNIFICANT CHANGE UP (ref 135–145)
SP GR SPEC: 1.02 — SIGNIFICANT CHANGE UP (ref 1.01–1.02)
UROBILINOGEN FLD QL: ABNORMAL
WBC # BLD: 13.72 K/UL — HIGH (ref 3.8–10.5)
WBC # FLD AUTO: 13.72 K/UL — HIGH (ref 3.8–10.5)
WBC UR QL: 300 /HPF — HIGH (ref 0–5)

## 2018-10-21 PROCEDURE — 71045 X-RAY EXAM CHEST 1 VIEW: CPT | Mod: 26

## 2018-10-21 PROCEDURE — 99233 SBSQ HOSP IP/OBS HIGH 50: CPT | Mod: GC

## 2018-10-21 RX ORDER — SODIUM CHLORIDE 9 MG/ML
1000 INJECTION INTRAMUSCULAR; INTRAVENOUS; SUBCUTANEOUS
Qty: 0 | Refills: 0 | Status: DISCONTINUED | OUTPATIENT
Start: 2018-10-21 | End: 2018-10-22

## 2018-10-21 RX ADMIN — FENTANYL CITRATE 1 PATCH: 50 INJECTION INTRAVENOUS at 19:38

## 2018-10-21 RX ADMIN — Medication 2 MILLIGRAM(S): at 21:21

## 2018-10-21 RX ADMIN — Medication 0.25 MILLIGRAM(S): at 05:04

## 2018-10-21 RX ADMIN — SODIUM CHLORIDE 50 MILLILITER(S): 9 INJECTION INTRAMUSCULAR; INTRAVENOUS; SUBCUTANEOUS at 13:39

## 2018-10-21 RX ADMIN — Medication 81 MILLIGRAM(S): at 11:44

## 2018-10-21 RX ADMIN — Medication 3 MILLILITER(S): at 23:29

## 2018-10-21 RX ADMIN — Medication 25 MILLIGRAM(S): at 21:21

## 2018-10-21 RX ADMIN — SIMETHICONE 80 MILLIGRAM(S): 80 TABLET, CHEWABLE ORAL at 21:21

## 2018-10-21 RX ADMIN — Medication 3 MILLILITER(S): at 05:04

## 2018-10-21 RX ADMIN — Medication 3 MILLILITER(S): at 00:22

## 2018-10-21 RX ADMIN — SIMETHICONE 80 MILLIGRAM(S): 80 TABLET, CHEWABLE ORAL at 05:15

## 2018-10-21 RX ADMIN — SODIUM CHLORIDE 3 MILLILITER(S): 9 INJECTION INTRAMUSCULAR; INTRAVENOUS; SUBCUTANEOUS at 17:13

## 2018-10-21 RX ADMIN — Medication 0.25 MILLIGRAM(S): at 17:12

## 2018-10-21 RX ADMIN — LATANOPROST 1 DROP(S): 0.05 SOLUTION/ DROPS OPHTHALMIC; TOPICAL at 21:21

## 2018-10-21 RX ADMIN — SIMETHICONE 80 MILLIGRAM(S): 80 TABLET, CHEWABLE ORAL at 13:39

## 2018-10-21 RX ADMIN — SODIUM CHLORIDE 3 MILLILITER(S): 9 INJECTION INTRAMUSCULAR; INTRAVENOUS; SUBCUTANEOUS at 05:06

## 2018-10-21 RX ADMIN — Medication 3 MILLILITER(S): at 12:20

## 2018-10-21 RX ADMIN — PANTOPRAZOLE SODIUM 40 MILLIGRAM(S): 20 TABLET, DELAYED RELEASE ORAL at 18:02

## 2018-10-21 RX ADMIN — Medication 25 MILLIGRAM(S): at 05:15

## 2018-10-21 RX ADMIN — Medication 3 MILLILITER(S): at 17:12

## 2018-10-21 RX ADMIN — PANTOPRAZOLE SODIUM 40 MILLIGRAM(S): 20 TABLET, DELAYED RELEASE ORAL at 05:15

## 2018-10-21 RX ADMIN — Medication 30 MILLIGRAM(S): at 05:15

## 2018-10-21 NOTE — CHART NOTE - NSCHARTNOTEFT_GEN_A_CORE
Urinalysis + Microscopic Examination (10.21.18 @ 17:21)    Glucose Qualitative, Urine: Negative    Blood, Urine: Moderate    Urine Appearance: Turbid    Bilirubin: Negative    Color: Yellow    Urobilinogen: 2 mg/dL    Specific Gravity: 1.018    Protein, Urine: 100 mg/dL    pH Urine: 5.5    Leukocyte Esterase Concentration: Large    Nitrite: Negative    Ketone - Urine: Negative    Red Blood Cell - Urine: 16 /hpf    White Blood Cell - Urine: 300 /hpf    Epithelial Cells: 3 /hpf    Hyaline Casts: 0 /lpf    Bacteria: Many    + UA for UTI  will send urine cx  treat if fever or + urine cx

## 2018-10-21 NOTE — PROGRESS NOTE ADULT - PROBLEM SELECTOR PLAN 2
CXR 10/1: Multifocal Pneumonia and Pulmonary Vascular Congestion   Sputum Cx 10/3 : Normal Respiratory María   Rapid Viral Panel 9/14: + Enterovirus / Rhinovirus   Patient S/p course of Meropenem CXR 10/1: Multifocal Pneumonia and Pulmonary Vascular Congestion   Sputum Cx 10/3 : Normal Respiratory María   Rapid Viral Panel 9/14: + Enterovirus / Rhinovirus   Patient S/p course of Meropenem  Repeat CXR due tyo increased WBC, and less alert per family

## 2018-10-21 NOTE — PROGRESS NOTE ADULT - SUBJECTIVE AND OBJECTIVE BOX
Patient is a 84y old  Female who presents with a chief complaint of COPD exacerbation, ADHF (20 Oct 2018 14:19)      Interval Events:    REVIEW OF SYSTEMS:  [ ] Positive  [ ] All other systems negative  [ ] Unable to assess ROS because ________    Vital Signs Last 24 Hrs  T(C): 36.8 (10-21-18 @ 04:11), Max: 37.2 (10-20-18 @ 20:55)  T(F): 98.3 (10-21-18 @ 04:11), Max: 99 (10-20-18 @ 20:55)  HR: 65 (10-21-18 @ 05:11) (60 - 89)  BP: 118/68 (10-21-18 @ 04:11) (115/55 - 154/52)  RR: 16 (10-21-18 @ 04:11) (16 - 24)  SpO2: 99% (10-21-18 @ 05:11) (92% - 100%)    PHYSICAL EXAM:  HEENT:   [ ]Tracheostomy:  [ ]Pupils equal  [ ]No oral lesions  [ ]Abnormal    SKIN  [ ]No Rash  [ ] Abnormal  [ ] pressure    CARDIAC  [ ]Regular  [ ]Abnormal    PULMONARY  [ ]Bilateral Clear Breath Sounds  [ ]Normal Excursion  [ ]Abnormal    GI  [ ]PEG      [ ] +BS		              [ ]Soft, nondistended, nontender	  [ ]Abnormal    MUSCULOSKELETAL                                   [ ]Bedbound                 [ ]Abnormal    [ ]Ambulatory/OOB to chair                           EXTREMITIES                                         [ ]Normal  [ ]Edema                           NEUROLOGIC  [ ] Normal, non focal  [ ] Focal findings:    PSYCHIATRIC  [ ]Alert and appropriate  [ ] Sedated	 [ ]Agitated    :  Connie: [ ] YES, if yes: Date of Placement                        [  ] NO      LINES: TLC [ ]YES, if yes: Date of Placement                    [ ] No    HOSPITAL MEDICATIONS:  MEDICATIONS  (STANDING):  ALBUTerol/ipratropium for Nebulization 3 milliLiter(s) Nebulizer every 6 hours  aspirin  chewable 81 milliGRAM(s) Oral daily  buDESOnide   0.25 milliGRAM(s) Respule 0.25 milliGRAM(s) Inhalation every 12 hours  diltiazem    Tablet 30 milliGRAM(s) Oral every 6 hours  doxazosin 2 milliGRAM(s) Oral at bedtime  fentaNYL   Patch  25 MICROgram(s)/Hr 1 Patch Transdermal every 72 hours  hydrALAZINE 25 milliGRAM(s) Oral every 8 hours  latanoprost 0.005% Ophthalmic Solution 1 Drop(s) Both EYES at bedtime  pantoprazole  Injectable 40 milliGRAM(s) IV Push every 12 hours  simethicone 80 milliGRAM(s) Chew every 8 hours  sodium chloride 3%  Inhalation 3 milliLiter(s) Inhalation two times a day  torsemide 30 milliGRAM(s) Oral daily    MEDICATIONS  (PRN):  acetaminophen    Suspension .. 650 milliGRAM(s) Oral every 6 hours PRN Mild Pain (1 - 3)  clonazePAM Tablet 1 milliGRAM(s) Oral every 8 hours PRN agitation  melatonin 1 milliGRAM(s) Oral at bedtime PRN Insomnia      LABS:                        8.1    6.4   )-----------( 213      ( 19 Oct 2018 09:06 )             25.5     10-21    136  |  94<L>  |  62<H>  ----------------------------<  165<H>  5.0   |  27  |  1.18    Ca    9.9      21 Oct 2018 06:59  Phos  4.7     10-21  Mg     2.5     10-21      PT/INR - ( 21 Oct 2018 07:00 )   PT: 56.0 sec;   INR: 4.97 ratio         PTT - ( 20 Oct 2018 06:27 )  PTT:42.7 sec        CAPILLARY BLOOD GLUCOSE    MICROBIOLOGY:     RADIOLOGY:  [ ] Reviewed and interpreted by me    Mode: AC/ CMV (Assist Control/ Continuous Mandatory Ventilation)  RR (machine): 14  TV (machine): 400  FiO2: 10  PEEP: 5  ITime: 1  MAP: 11  PIP: 30 Patient is a 84y old  Female who presents with a chief complaint of COPD exacerbation, ADHF (20 Oct 2018 14:19)      Interval Events: none    REVIEW OF SYSTEMS:  [ ] Positive  [ ] All other systems negative  [ ] Unable to assess ROS because ________    Vital Signs Last 24 Hrs  T(C): 36.8 (10-21-18 @ 04:11), Max: 37.2 (10-20-18 @ 20:55)  T(F): 98.3 (10-21-18 @ 04:11), Max: 99 (10-20-18 @ 20:55)  HR: 65 (10-21-18 @ 05:11) (60 - 89)  BP: 118/68 (10-21-18 @ 04:11) (115/55 - 154/52)  RR: 16 (10-21-18 @ 04:11) (16 - 24)  SpO2: 99% (10-21-18 @ 05:11) (92% - 100%)    PHYSICAL EXAM:  HEENT:   [x ]Tracheostomy:  [ ]Pupils equal  [x ]No oral lesions  [ ]Abnormal    SKIN  [x ]No Rash  [ ] Abnormal  [ ] pressure    CARDIAC  [x ]Regular  [ ]Abnormal    PULMONARY  [x ]Bilateral Clear Breath Sounds  [ ]Normal Excursion  [ ]Abnormal    GI  [x ]PEG      [x ] +BS		              [x ]Soft, nondistended, nontender	  [ ]Abnormal    MUSCULOSKELETAL                                   [ ]Bedbound                 [ ]Abnormal    [x ]Ambulatory/OOB to chair                           EXTREMITIES                                         [x ]Normal  [ ]Edema                           NEUROLOGIC  [ ] Normal, non focal  [ ] Focal findings:  eyes open spontaneously, follows commands, moves extremities  PSYCHIATRIC  [x ]Alert and appropriate  [ ] Sedated	 [ ]Agitated    :  Rosales: [ ] YES, if yes: Date of Placement                        [x  ] NO      LINES: TLC [ ]YES, if yes: Date of Placement                    [x ] No    HOSPITAL MEDICATIONS:  MEDICATIONS  (STANDING):  ALBUTerol/ipratropium for Nebulization 3 milliLiter(s) Nebulizer every 6 hours  aspirin  chewable 81 milliGRAM(s) Oral daily  buDESOnide   0.25 milliGRAM(s) Respule 0.25 milliGRAM(s) Inhalation every 12 hours  diltiazem    Tablet 30 milliGRAM(s) Oral every 6 hours  doxazosin 2 milliGRAM(s) Oral at bedtime  fentaNYL   Patch  25 MICROgram(s)/Hr 1 Patch Transdermal every 72 hours  hydrALAZINE 25 milliGRAM(s) Oral every 8 hours  latanoprost 0.005% Ophthalmic Solution 1 Drop(s) Both EYES at bedtime  pantoprazole  Injectable 40 milliGRAM(s) IV Push every 12 hours  simethicone 80 milliGRAM(s) Chew every 8 hours  sodium chloride 3%  Inhalation 3 milliLiter(s) Inhalation two times a day  torsemide 30 milliGRAM(s) Oral daily    MEDICATIONS  (PRN):  acetaminophen    Suspension .. 650 milliGRAM(s) Oral every 6 hours PRN Mild Pain (1 - 3)  clonazePAM Tablet 1 milliGRAM(s) Oral every 8 hours PRN agitation  melatonin 1 milliGRAM(s) Oral at bedtime PRN Insomnia      LABS:                                             7.1    13.72 )-----------( 259      ( 21 Oct 2018 08:13 )             23.7     10-21    136  |  94<L>  |  62<H>  ----------------------------<  165<H>  5.0   |  27  |  1.18    Ca    9.9      21 Oct 2018 06:59  Phos  4.7     10-21  Mg     2.5     10-21      PT/INR - ( 21 Oct 2018 07:00 )   PT: 56.0 sec;   INR: 4.97 ratio         PTT - ( 20 Oct 2018 06:27 )  PTT:42.7 sec        CAPILLARY BLOOD GLUCOSE    MICROBIOLOGY:     RADIOLOGY:  [ ] Reviewed and interpreted by me    Mode: AC/ CMV (Assist Control/ Continuous Mandatory Ventilation)  RR (machine): 14  TV (machine): 400  FiO2: 10  PEEP: 5  ITime: 1  MAP: 11  PIP: 30

## 2018-10-21 NOTE — PROGRESS NOTE ADULT - ATTENDING COMMENTS
pt is stable, awaiting discharge  requiring straight cath for failure to void.  family refused tejada, pending gu eval hemodyn stable, afebrile.  tolerated TC 10 hours.  labs reviewed.    ALKA roman appreciated.   She has been urinating somewhat on her own. if not sufficient, will need tejada for d/c    WBC 13. urine malodorous. will check a UA/ cx is pos. afebrile, no increase in tracheal secretions.    slight bump in creatinine, will give 50cc /hr for 6 hours ns. repeat in am.    hold coumadin. no active bleeding.  anemia of chronic disease . Hb 7.1

## 2018-10-21 NOTE — PROGRESS NOTE ADULT - PROBLEM SELECTOR PLAN 1
Patient Failed Extubation attempt x 2   Patient S/p Tracheostomy 10/3 ( # 6 Cuffed Bert)   Attempt weaning trials as tolerated daily; trials of trach collar as tolerated - No real progress on trach collar currently  Continue Nebulizers and Chest PT Patient Failed Extubation attempt x 2   Patient S/p Tracheostomy 10/3 ( # 6 Cuffed Shiley)   Attempt weaning trials as tolerated daily; trials of trach collar as tolerated. Did 10 hrs TC on 10/20  Continue Nebulizers and Chest PT

## 2018-10-21 NOTE — PROGRESS NOTE ADULT - PROBLEM SELECTOR PLAN 5
Patient with HX of AS ( Not a candidate for TAVR )  Patient S/p Mechanical Mitral Valve Replacement   Coumadin for INR 2.5-3.5

## 2018-10-21 NOTE — PROGRESS NOTE ADULT - PROBLEM SELECTOR PLAN 10
Tentative dc to rehab on 10/22  Urology consulted  per family request. Cardura added for retention Urology consulted  per family request. will not start Hiprex per DR Lisker.  Cardura added for retention  DC to rehab when INR<=3.5 and Cr stable

## 2018-10-21 NOTE — PROGRESS NOTE ADULT - ASSESSMENT
84  Year old Female with PMH significant for COPD, JENNIE on BiPAP at home , multivalvular disease (severe AS Not a candidate for TAVR, S/p MV replacement), HFpEF/ Severe diastolic failure, A-fib on coumadin, sick sinus syndrome S/p pacemaker placement, HTN, and CKD3 who was admitted for acute respiratory failure requiring intubation suspected to be 2/2 COPD exacerbation c/b PNA w/ +entero/rhinovirus and GN coccobacillus and H. influenza bacteremia, requiring continued respiratory support and tracheostomy. Patient S/p Trach placement 10/3 and PEG Placement 10/4.     10/7: Patient remains with abdominal distention today but tolerating trickle feeds, ABD X-ray  performed this morning patient remains with air filled loops of small and large bowel. GI fellow called to re-eval the patient this morning, X-ray  reviewed slightly worsened compared to yesterday. GI fellow recommended to place patient in Left lateral decubitus position and oob to chair later today. Patient with large amount of gas expressed with turning as Per RN. As per GI no role for rectal tube at this time and can continue to advance feeds as tolerated to promote gastric motility. H+H Has remained stable case d/w  will restart coumadin this evening. Pt with elevated bp will increase Hydralazine 50 mg q 8 hr   10/8-Abdominal distention noted with hypoactive bowel sounds, Kub noted from this weekend. Daily bowel movements noted. Will repeat PTT as there were 2 therapeutics prior on same dosing, also repeat CBC as well. Will transfuse if remains low. RCU weaning  10/10: 4 dark BMs reported night of 10/9, concerning for melena,  hgb 6.9 in AM,  1U RBC given . Goal heparin reduced to 50-70. GI consulted due to GIB Hx with known AV malformations. No intvn.  10/11: H/H stable post 1U with no furthers melena or intvn from GI. Resumed coumadin.  10/12: agitated this am. seroquel not working changed back to klonopin  10/13 No events overnight. Continues on heparin gtt/coumadin bridge. INR 1.45, coumadin 7.5 mg x1. Continue with PS trials as tolerated.   10/14 Heparin gtt d/c, INR 2.82, coumadin 1mg x1. Continue with PS trials as tolerated. LE edema, lasix 20 mg po x1. Hg 7.7 stable.   10/15:  INR 2.3, reduced coumadin to 5mg  10/16: INR 1.8, resumed heparin, coumadin increased to 7.5mg tonight. Tolerated trach collar for 40mins  10/18: Await INR results today.  10/19 no events overnight. F/u labs today. d/c planning 84  Year old Female with PMH significant for COPD, JENNIE on BiPAP at home , multivalvular disease (severe AS Not a candidate for TAVR, S/p MV replacement), HFpEF/ Severe diastolic failure, A-fib on coumadin, sick sinus syndrome S/p pacemaker placement, HTN, and CKD3 who was admitted for acute respiratory failure requiring intubation suspected to be 2/2 COPD exacerbation c/b PNA w/ +entero/rhinovirus and GN coccobacillus and H. influenza bacteremia, requiring continued respiratory support and tracheostomy. Patient S/p Trach placement 10/3 and PEG Placement 10/4.     10/7: Patient remains with abdominal distention today but tolerating trickle feeds, ABD X-ray  performed this morning patient remains with air filled loops of small and large bowel. GI fellow called to re-eval the patient this morning, X-ray  reviewed slightly worsened compared to yesterday. GI fellow recommended to place patient in Left lateral decubitus position and oob to chair later today. Patient with large amount of gas expressed with turning as Per RN. As per GI no role for rectal tube at this time and can continue to advance feeds as tolerated to promote gastric motility. H+H Has remained stable case d/w  will restart coumadin this evening. Pt with elevated bp will increase Hydralazine 50 mg q 8 hr   10/8-Abdominal distention noted with hypoactive bowel sounds, Kub noted from this weekend. Daily bowel movements noted. Will repeat PTT as there were 2 therapeutics prior on same dosing, also repeat CBC as well. Will transfuse if remains low. RCU weaning  10/10: 4 dark BMs reported night of 10/9, concerning for melena,  hgb 6.9 in AM,  1U RBC given . Goal heparin reduced to 50-70. GI consulted due to GIB Hx with known AV malformations. No intvn.  10/11: H/H stable post 1U with no furthers melena or intvn from GI. Resumed coumadin.  10/12: agitated this am. seroquel not working changed back to klonopin  10/13 No events overnight. Continues on heparin gtt/coumadin bridge. INR 1.45, coumadin 7.5 mg x1. Continue with PS trials as tolerated.   10/14 Heparin gtt d/c, INR 2.82, coumadin 1mg x1. Continue with PS trials as tolerated. LE edema, lasix 20 mg po x1. Hg 7.7 stable.   10/15:  INR 2.3, reduced coumadin to 5mg  10/16: INR 1.8, resumed heparin, coumadin increased to 7.5mg tonight. Tolerated trach collar for 40mins  10/18: Await INR results today.  10/19 no events overnight. F/u labs today. d/c planning   10/21 INR 4.97, will hold Coumadin today  + cloudy urine per RN, increased WBC, will send UA and obtain CXR . Cr 1.18, add IVF NS 50 cc/hr x 6 hrs

## 2018-10-22 DIAGNOSIS — N17.9 ACUTE KIDNEY FAILURE, UNSPECIFIED: ICD-10-CM

## 2018-10-22 LAB
ANION GAP SERPL CALC-SCNC: 15 MMOL/L — SIGNIFICANT CHANGE UP (ref 5–17)
BASOPHILS # BLD AUTO: 0 K/UL — SIGNIFICANT CHANGE UP (ref 0–0.2)
BASOPHILS NFR BLD AUTO: 0.2 % — SIGNIFICANT CHANGE UP (ref 0–2)
BUN SERPL-MCNC: 80 MG/DL — HIGH (ref 7–23)
CALCIUM SERPL-MCNC: 9.8 MG/DL — SIGNIFICANT CHANGE UP (ref 8.4–10.5)
CHLORIDE SERPL-SCNC: 93 MMOL/L — LOW (ref 96–108)
CO2 SERPL-SCNC: 26 MMOL/L — SIGNIFICANT CHANGE UP (ref 22–31)
CREAT SERPL-MCNC: 1.44 MG/DL — HIGH (ref 0.5–1.3)
EOSINOPHIL # BLD AUTO: 0 K/UL — SIGNIFICANT CHANGE UP (ref 0–0.5)
EOSINOPHIL NFR BLD AUTO: 0.2 % — SIGNIFICANT CHANGE UP (ref 0–6)
GLUCOSE SERPL-MCNC: 213 MG/DL — HIGH (ref 70–99)
HCT VFR BLD CALC: 22.7 % — LOW (ref 34.5–45)
HCT VFR BLD CALC: 22.9 % — LOW (ref 34.5–45)
HGB BLD-MCNC: 6.8 G/DL — CRITICAL LOW (ref 11.5–15.5)
HGB BLD-MCNC: 7 G/DL — CRITICAL LOW (ref 11.5–15.5)
INR BLD: 3.16 RATIO — HIGH (ref 0.88–1.16)
LYMPHOCYTES # BLD AUTO: 0.5 K/UL — LOW (ref 1–3.3)
LYMPHOCYTES # BLD AUTO: 4.6 % — LOW (ref 13–44)
MAGNESIUM SERPL-MCNC: 2.6 MG/DL — SIGNIFICANT CHANGE UP (ref 1.6–2.6)
MCHC RBC-ENTMCNC: 26.9 PG — LOW (ref 27–34)
MCHC RBC-ENTMCNC: 27 PG — SIGNIFICANT CHANGE UP (ref 27–34)
MCHC RBC-ENTMCNC: 30.1 GM/DL — LOW (ref 32–36)
MCHC RBC-ENTMCNC: 30.4 GM/DL — LOW (ref 32–36)
MCV RBC AUTO: 88.5 FL — SIGNIFICANT CHANGE UP (ref 80–100)
MCV RBC AUTO: 89.7 FL — SIGNIFICANT CHANGE UP (ref 80–100)
MONOCYTES # BLD AUTO: 0.9 K/UL — SIGNIFICANT CHANGE UP (ref 0–0.9)
MONOCYTES NFR BLD AUTO: 7.7 % — SIGNIFICANT CHANGE UP (ref 2–14)
NEUTROPHILS # BLD AUTO: 10.2 K/UL — HIGH (ref 1.8–7.4)
NEUTROPHILS NFR BLD AUTO: 87.4 % — HIGH (ref 43–77)
PHOSPHATE SERPL-MCNC: 3.6 MG/DL — SIGNIFICANT CHANGE UP (ref 2.5–4.5)
PLATELET # BLD AUTO: 222 K/UL — SIGNIFICANT CHANGE UP (ref 150–400)
PLATELET # BLD AUTO: 240 K/UL — SIGNIFICANT CHANGE UP (ref 150–400)
POTASSIUM SERPL-MCNC: 4.9 MMOL/L — SIGNIFICANT CHANGE UP (ref 3.5–5.3)
POTASSIUM SERPL-SCNC: 4.9 MMOL/L — SIGNIFICANT CHANGE UP (ref 3.5–5.3)
PROTHROM AB SERPL-ACNC: 34.9 SEC — HIGH (ref 9.8–12.7)
RBC # BLD: 2.54 M/UL — LOW (ref 3.8–5.2)
RBC # BLD: 2.59 M/UL — LOW (ref 3.8–5.2)
RBC # FLD: 17.7 % — HIGH (ref 10.3–14.5)
RBC # FLD: 17.9 % — HIGH (ref 10.3–14.5)
SODIUM SERPL-SCNC: 134 MMOL/L — LOW (ref 135–145)
WBC # BLD: 11.6 K/UL — HIGH (ref 3.8–10.5)
WBC # BLD: 12.8 K/UL — HIGH (ref 3.8–10.5)
WBC # FLD AUTO: 11.6 K/UL — HIGH (ref 3.8–10.5)
WBC # FLD AUTO: 12.8 K/UL — HIGH (ref 3.8–10.5)

## 2018-10-22 PROCEDURE — 99233 SBSQ HOSP IP/OBS HIGH 50: CPT | Mod: GC

## 2018-10-22 RX ORDER — WARFARIN SODIUM 2.5 MG/1
5 TABLET ORAL AT BEDTIME
Qty: 0 | Refills: 0 | Status: DISCONTINUED | OUTPATIENT
Start: 2018-10-22 | End: 2018-10-23

## 2018-10-22 RX ADMIN — WARFARIN SODIUM 5 MILLIGRAM(S): 2.5 TABLET ORAL at 22:53

## 2018-10-22 RX ADMIN — Medication 30 MILLIGRAM(S): at 05:44

## 2018-10-22 RX ADMIN — SIMETHICONE 80 MILLIGRAM(S): 80 TABLET, CHEWABLE ORAL at 05:44

## 2018-10-22 RX ADMIN — Medication 81 MILLIGRAM(S): at 12:53

## 2018-10-22 RX ADMIN — SODIUM CHLORIDE 3 MILLILITER(S): 9 INJECTION INTRAMUSCULAR; INTRAVENOUS; SUBCUTANEOUS at 05:05

## 2018-10-22 RX ADMIN — Medication 1 MILLIGRAM(S): at 02:09

## 2018-10-22 RX ADMIN — LATANOPROST 1 DROP(S): 0.05 SOLUTION/ DROPS OPHTHALMIC; TOPICAL at 23:55

## 2018-10-22 RX ADMIN — PANTOPRAZOLE SODIUM 40 MILLIGRAM(S): 20 TABLET, DELAYED RELEASE ORAL at 17:24

## 2018-10-22 RX ADMIN — Medication 3 MILLILITER(S): at 12:45

## 2018-10-22 RX ADMIN — SIMETHICONE 80 MILLIGRAM(S): 80 TABLET, CHEWABLE ORAL at 22:53

## 2018-10-22 RX ADMIN — Medication 3 MILLILITER(S): at 17:50

## 2018-10-22 RX ADMIN — Medication 25 MILLIGRAM(S): at 22:53

## 2018-10-22 RX ADMIN — Medication 0.25 MILLIGRAM(S): at 05:03

## 2018-10-22 RX ADMIN — Medication 3 MILLILITER(S): at 05:02

## 2018-10-22 RX ADMIN — SIMETHICONE 80 MILLIGRAM(S): 80 TABLET, CHEWABLE ORAL at 14:46

## 2018-10-22 RX ADMIN — Medication 3 MILLILITER(S): at 23:17

## 2018-10-22 RX ADMIN — SODIUM CHLORIDE 3 MILLILITER(S): 9 INJECTION INTRAMUSCULAR; INTRAVENOUS; SUBCUTANEOUS at 17:50

## 2018-10-22 RX ADMIN — Medication 25 MILLIGRAM(S): at 14:46

## 2018-10-22 RX ADMIN — Medication 2 MILLIGRAM(S): at 22:53

## 2018-10-22 RX ADMIN — Medication 0.25 MILLIGRAM(S): at 18:35

## 2018-10-22 RX ADMIN — FENTANYL CITRATE 1 PATCH: 50 INJECTION INTRAVENOUS at 07:25

## 2018-10-22 RX ADMIN — PANTOPRAZOLE SODIUM 40 MILLIGRAM(S): 20 TABLET, DELAYED RELEASE ORAL at 05:44

## 2018-10-22 RX ADMIN — Medication 25 MILLIGRAM(S): at 05:44

## 2018-10-22 NOTE — PROGRESS NOTE ADULT - PROBLEM SELECTOR PLAN 10
Cr increased from 0.9 to 1.4 over 2 days. Noted to be on torsemide 30mg QD. Will hold torsemide and monitr for improvement in Cr with hopes that the 1U of RBCs will add to volume and renal perfusion.

## 2018-10-22 NOTE — PROGRESS NOTE ADULT - PROBLEM SELECTOR PLAN 9
Urology consulted  per family request. will not start Hiprex per DR Lisker.  Cardura added for retention  DC to rehab when INR 2.5-3.5 and Cr stable

## 2018-10-22 NOTE — PROGRESS NOTE ADULT - SUBJECTIVE AND OBJECTIVE BOX
DATE: 05/18/2017



SUBJECTIVE:  This patient was seen and evaluated earlier.  The patient still 
remains  on vent.  Discuss with intensivist.



PHYSICAL EXAMINATION:

VITAL SIGNS:  Temperature is afebrile.  Blood pressure 110/80, heart rate 89.

HEENT:  Atraumatic, anicteric,  jaundiced.

NECK:  Supple.

HEART:  S1, S2 heard.

LUNGS:  Bilateral air entry present.

ABDOMEN:  Softly distended.

EXTREMITIES:  Mild edema present.  

NEUROLOGIC: Neurologically  _____.



LABORATORY DATA:  Labs are not available at the time of examination because of 
the [facility] which was subsequently reviewed.



IMPRESSION: A 59-year-old patient with end-stage renal disease on hemodialysis, 
admitted with acute liver failure, sepsis on vent awaiting for transfer to [facility].
  Possible sepsis probably secondary to pneumonia.



RECOMMENDATIONS:  

1. Followup of the labs.

2. Continue the antibiotics as per ID.



Thank you very much for allowing us to participate in the care of the patient.





__________________________________________

Leydi Morales MD







cc:   



DD: 05/19/2017 00:21:58  [id number]

TT: 05/19/2017 01:05:12

Confirmation # [id number]

Dictation # [id number]

jn

PAT Patient is a 84y old  Female who presents with a chief complaint of COPD exacerbation, ADHF (21 Oct 2018 08:47)      Interval Events:    REVIEW OF SYSTEMS:  [ ] Positive  [ ] All other systems negative  [ ] Unable to assess ROS because ________    Vital Signs Last 24 Hrs  T(C): 37 (10-22-18 @ 04:52), Max: 37.4 (10-21-18 @ 17:54)  T(F): 98.6 (10-22-18 @ 04:52), Max: 99.3 (10-21-18 @ 17:54)  HR: 68 (10-22-18 @ 10:12) (59 - 79)  BP: 114/64 (10-22-18 @ 04:52) (100/62 - 145/72)  RR: 25 (10-22-18 @ 10:07) (14 - 25)  SpO2: 97% (10-22-18 @ 10:12) (65% - 100%)    PHYSICAL EXAM:  HEENT:   [ ]Tracheostomy:  [ ]Pupils equal  [ ]No oral lesions  [ ]Abnormal    SKIN  [ ]No Rash  [ ] Abnormal  [ ] pressure    CARDIAC  [ ]Regular  [ ]Abnormal    PULMONARY  [ ]Bilateral Clear Breath Sounds  [ ]Normal Excursion  [ ]Abnormal    GI  [ ]PEG      [ ] +BS		              [ ]Soft, nondistended, nontender	  [ ]Abnormal    MUSCULOSKELETAL                                   [ ]Bedbound                 [ ]Abnormal    [ ]Ambulatory/OOB to chair                           EXTREMITIES                                         [ ]Normal  [ ]Edema                           NEUROLOGIC  [ ] Normal, non focal  [ ] Focal findings:    PSYCHIATRIC  [ ]Alert and appropriate  [ ] Sedated	 [ ]Agitated    :  Connie: [ ] Yes, if yes: Date of Placement:                   [  ] No    LINES: Central Lines [ ] Yes, if yes: Date of Placement                                     [  ] No    HOSPITAL MEDICATIONS:  MEDICATIONS  (STANDING):  ALBUTerol/ipratropium for Nebulization 3 milliLiter(s) Nebulizer every 6 hours  aspirin  chewable 81 milliGRAM(s) Oral daily  buDESOnide   0.25 milliGRAM(s) Respule 0.25 milliGRAM(s) Inhalation every 12 hours  diltiazem    Tablet 30 milliGRAM(s) Oral every 6 hours  doxazosin 2 milliGRAM(s) Oral at bedtime  fentaNYL   Patch  25 MICROgram(s)/Hr 1 Patch Transdermal every 72 hours  hydrALAZINE 25 milliGRAM(s) Oral every 8 hours  latanoprost 0.005% Ophthalmic Solution 1 Drop(s) Both EYES at bedtime  pantoprazole  Injectable 40 milliGRAM(s) IV Push every 12 hours  simethicone 80 milliGRAM(s) Chew every 8 hours  sodium chloride 0.9%. 1000 milliLiter(s) (50 mL/Hr) IV Continuous <Continuous>  sodium chloride 3%  Inhalation 3 milliLiter(s) Inhalation two times a day  torsemide 30 milliGRAM(s) Oral daily    MEDICATIONS  (PRN):  acetaminophen    Suspension .. 650 milliGRAM(s) Oral every 6 hours PRN Mild Pain (1 - 3)  clonazePAM Tablet 1 milliGRAM(s) Oral every 8 hours PRN agitation  melatonin 1 milliGRAM(s) Oral at bedtime PRN Insomnia      LABS:                        6.8    11.6  )-----------( 222      ( 22 Oct 2018 07:04 )             22.7     10-22    134<L>  |  93<L>  |  80<H>  ----------------------------<  213<H>  4.9   |  26  |  1.44<H>    Ca    9.8      22 Oct 2018 07:04  Phos  3.6     10-22  Mg     2.6     10-22      PT/INR - ( 22 Oct 2018 07:04 )   PT: 34.9 sec;   INR: 3.16 ratio           Urinalysis Basic - ( 21 Oct 2018 17:21 )    Color: Yellow / Appearance: Turbid / S.018 / pH: x  Gluc: x / Ketone: Negative  / Bili: Negative / Urobili: 2 mg/dL   Blood: x / Protein: 100 mg/dL / Nitrite: Negative   Leuk Esterase: Large / RBC: 16 /hpf /  /hpf   Sq Epi: x / Non Sq Epi: 3 /hpf / Bacteria: Many          CAPILLARY BLOOD GLUCOSE    MICROBIOLOGY:     RADIOLOGY:  [ ] Reviewed and interpreted by me    Mode: AC/ CMV (Assist Control/ Continuous Mandatory Ventilation)  RR (machine): 14  TV (machine): 400  FiO2: 30  PEEP: 5  ITime: 1  MAP: 10  PIP: 28 Patient is a 84y old  Female who presents with a chief complaint of COPD exacerbation, ADHF. Remains ventilated via trach with intermittent trach collar sessions as tolerated. Today she appears lethargic, eyes closed during exam but is arousable. Nursing reports that her vent alarmed most of the night.      Interval Events:    REVIEW OF SYSTEMS:  [ ] Positive  [ ] All other systems negative  [X] Unable to assess ROS because      Vital Signs Last 24 Hrs  T(C): 37 (10-22-18 @ 04:52), Max: 37.4 (10-21-18 @ 17:54)  T(F): 98.6 (10-22-18 @ 04:52), Max: 99.3 (10-21-18 @ 17:54)  HR: 68 (10-22-18 @ 10:12) (59 - 79)  BP: 114/64 (10-22-18 @ 04:52) (100/62 - 145/72)  RR: 25 (10-22-18 @ 10:07) (14 - 25)  SpO2: 97% (10-22-18 @ 10:12) (65% - 100%)    PHYSICAL EXAM:  HEENT:   [ ]Tracheostomy:  [ ]Pupils equal  [ ]No oral lesions  [ ]Abnormal    SKIN  [ ]No Rash  [ ] Abnormal  [ ] pressure    CARDIAC  [ ]Regular  [ ]Abnormal    PULMONARY  [ ]Bilateral Clear Breath Sounds  [ ]Normal Excursion  [ ]Abnormal    GI  [ ]PEG      [ ] +BS		              [ ]Soft, nondistended, nontender	  [ ]Abnormal    MUSCULOSKELETAL                                   [ ]Bedbound                 [ ]Abnormal    [ ]Ambulatory/OOB to chair                           EXTREMITIES                                         [ ]Normal  [ ]Edema                           NEUROLOGIC  [ ] Normal, non focal  [ ] Focal findings:    PSYCHIATRIC  [ ]Alert and appropriate  [ ] Sedated	 [ ]Agitated    :  Connie: [ ] Yes, if yes: Date of Placement:                   [  ] No    LINES: Central Lines [ ] Yes, if yes: Date of Placement                                     [  ] No    HOSPITAL MEDICATIONS:  MEDICATIONS  (STANDING):  ALBUTerol/ipratropium for Nebulization 3 milliLiter(s) Nebulizer every 6 hours  aspirin  chewable 81 milliGRAM(s) Oral daily  buDESOnide   0.25 milliGRAM(s) Respule 0.25 milliGRAM(s) Inhalation every 12 hours  diltiazem    Tablet 30 milliGRAM(s) Oral every 6 hours  doxazosin 2 milliGRAM(s) Oral at bedtime  fentaNYL   Patch  25 MICROgram(s)/Hr 1 Patch Transdermal every 72 hours  hydrALAZINE 25 milliGRAM(s) Oral every 8 hours  latanoprost 0.005% Ophthalmic Solution 1 Drop(s) Both EYES at bedtime  pantoprazole  Injectable 40 milliGRAM(s) IV Push every 12 hours  simethicone 80 milliGRAM(s) Chew every 8 hours  sodium chloride 0.9%. 1000 milliLiter(s) (50 mL/Hr) IV Continuous <Continuous>  sodium chloride 3%  Inhalation 3 milliLiter(s) Inhalation two times a day  torsemide 30 milliGRAM(s) Oral daily    MEDICATIONS  (PRN):  acetaminophen    Suspension .. 650 milliGRAM(s) Oral every 6 hours PRN Mild Pain (1 - 3)  clonazePAM Tablet 1 milliGRAM(s) Oral every 8 hours PRN agitation  melatonin 1 milliGRAM(s) Oral at bedtime PRN Insomnia      LABS:                        6.8    11.6  )-----------( 222      ( 22 Oct 2018 07:04 )             22.7     10-22    134<L>  |  93<L>  |  80<H>  ----------------------------<  213<H>  4.9   |  26  |  1.44<H>    Ca    9.8      22 Oct 2018 07:04  Phos  3.6     10-22  Mg     2.6     10-22      PT/INR - ( 22 Oct 2018 07:04 )   PT: 34.9 sec;   INR: 3.16 ratio           Urinalysis Basic - ( 21 Oct 2018 17:21 )    Color: Yellow / Appearance: Turbid / S.018 / pH: x  Gluc: x / Ketone: Negative  / Bili: Negative / Urobili: 2 mg/dL   Blood: x / Protein: 100 mg/dL / Nitrite: Negative   Leuk Esterase: Large / RBC: 16 /hpf /  /hpf   Sq Epi: x / Non Sq Epi: 3 /hpf / Bacteria: Many          CAPILLARY BLOOD GLUCOSE    MICROBIOLOGY:     RADIOLOGY:  [ ] Reviewed and interpreted by me    Mode: AC/ CMV (Assist Control/ Continuous Mandatory Ventilation)  RR (machine): 14  TV (machine): 400  FiO2: 30  PEEP: 5  ITime: 1  MAP: 10  PIP: 28 Patient is a 84y old  Female who presents with a chief complaint of COPD exacerbation, ADHF. Remains ventilated via trach with intermittent trach collar sessions as tolerated. Today she appears lethargic, eyes closed during exam but is arousable. Nursing reports that her vent alarmed most of the night.      Interval Events:    REVIEW OF SYSTEMS:  [ ] Positive  [ ] All other systems negative  [X] Unable to assess ROS because      Vital Signs Last 24 Hrs  T(C): 37 (10-22-18 @ 04:52), Max: 37.4 (10-21-18 @ 17:54)  T(F): 98.6 (10-22-18 @ 04:52), Max: 99.3 (10-21-18 @ 17:54)  HR: 68 (10-22-18 @ 10:12) (59 - 79)  BP: 114/64 (10-22-18 @ 04:52) (100/62 - 145/72)  RR: 25 (10-22-18 @ 10:07) (14 - 25)  SpO2: 97% (10-22-18 @ 10:12) (65% - 100%)    PHYSICAL EXAM:  HEENT:   [ X]Tracheostomy:  [X ]Pupils equal  [ ]No oral lesions  [ ]Abnormal    SKIN  [X ]No Rash  [ ] Abnormal  [ ] pressure    CARDIAC  [X ]Regular: S1 S2 present with systolic murmur  [ ]Abnormal    PULMONARY  [X ]Bilateral Clear Breath Sounds  [ ]Normal Excursion  [ ]Abnormal    GI  [ X]PEG      [X ] +BS		              [ X]Soft, nondistended, nontender	  [ ]Abnormal    MUSCULOSKELETAL                                   [ ]Bedbound                 [ ]Abnormal    [X ] OOB to chair with assistance    EXTREMITIES                                         [ ]Normal  [X ]Edema: Trace B/L LE edema    NEUROLOGIC  [ ] Normal, non focal  [X ] Focal findings: non-verbal, unclear how much she comprehends even when family speaks to her. was lethargic today and unable to cooperate with simple commands or gestures    PSYCHIATRIC:  Lethargic  [ ]Alert and appropriate  [ ] Sedated	 [ ]Agitated    :  Connie: [ ] Yes, if yes: Date of Placement:                   [X  ] No    LINES: Central Lines [ ] Yes, if yes: Date of Placement                                     [  ] No    HOSPITAL MEDICATIONS:  MEDICATIONS  (STANDING):  ALBUTerol/ipratropium for Nebulization 3 milliLiter(s) Nebulizer every 6 hours  aspirin  chewable 81 milliGRAM(s) Oral daily  buDESOnide   0.25 milliGRAM(s) Respule 0.25 milliGRAM(s) Inhalation every 12 hours  diltiazem    Tablet 30 milliGRAM(s) Oral every 6 hours  doxazosin 2 milliGRAM(s) Oral at bedtime  fentaNYL   Patch  25 MICROgram(s)/Hr 1 Patch Transdermal every 72 hours  hydrALAZINE 25 milliGRAM(s) Oral every 8 hours  latanoprost 0.005% Ophthalmic Solution 1 Drop(s) Both EYES at bedtime  pantoprazole  Injectable 40 milliGRAM(s) IV Push every 12 hours  simethicone 80 milliGRAM(s) Chew every 8 hours  sodium chloride 0.9%. 1000 milliLiter(s) (50 mL/Hr) IV Continuous <Continuous>  sodium chloride 3%  Inhalation 3 milliLiter(s) Inhalation two times a day  torsemide 30 milliGRAM(s) Oral daily    MEDICATIONS  (PRN):  acetaminophen    Suspension .. 650 milliGRAM(s) Oral every 6 hours PRN Mild Pain (1 - 3)  clonazePAM Tablet 1 milliGRAM(s) Oral every 8 hours PRN agitation  melatonin 1 milliGRAM(s) Oral at bedtime PRN Insomnia      LABS:                        6.8    11.6  )-----------( 222      ( 22 Oct 2018 07:04 )             22.7     10-22    134<L>  |  93<L>  |  80<H>  ----------------------------<  213<H>  4.9   |  26  |  1.44<H>    Ca    9.8      22 Oct 2018 07:04  Phos  3.6     10-22  Mg     2.6     10-22      PT/INR - ( 22 Oct 2018 07:04 )   PT: 34.9 sec;   INR: 3.16 ratio           Urinalysis Basic - ( 21 Oct 2018 17:21 )    Color: Yellow / Appearance: Turbid / S.018 / pH: x  Gluc: x / Ketone: Negative  / Bili: Negative / Urobili: 2 mg/dL   Blood: x / Protein: 100 mg/dL / Nitrite: Negative   Leuk Esterase: Large / RBC: 16 /hpf /  /hpf   Sq Epi: x / Non Sq Epi: 3 /hpf / Bacteria: Many          CAPILLARY BLOOD GLUCOSE    MICROBIOLOGY:     RADIOLOGY:  [ ] Reviewed and interpreted by me    Mode: AC/ CMV (Assist Control/ Continuous Mandatory Ventilation)  RR (machine): 14  TV (machine): 400  FiO2: 30  PEEP: 5  ITime: 1  MAP: 10  PIP: 28 Patient is a 84y old  Female who presents with a chief complaint of COPD exacerbation, ADHF. Remains ventilated via trach with intermittent trach collar sessions as tolerated. Today she appears lethargic, eyes closed during exam but is arousable. Nursing reports that her vent alarmed most of the night.      Interval Events: Hb trended down to 6.8 today. No bleeding. Stable hemodynamics. Cr trending up    REVIEW OF SYSTEMS:  [ ] Positive  [ ] All other systems negative  [X] Unable to assess ROS because      Vital Signs Last 24 Hrs  T(C): 37 (10-22-18 @ 04:52), Max: 37.4 (10-21-18 @ 17:54)  T(F): 98.6 (10-22-18 @ 04:52), Max: 99.3 (10-21-18 @ 17:54)  HR: 68 (10-22-18 @ 10:12) (59 - 79)  BP: 114/64 (10-22-18 @ 04:52) (100/62 - 145/72)  RR: 25 (10-22-18 @ 10:07) (14 - 25)  SpO2: 97% (10-22-18 @ 10:12) (65% - 100%)    PHYSICAL EXAM:  HEENT:   [ X]Tracheostomy:  [X ]Pupils equal  [ ]No oral lesions  [ ]Abnormal    SKIN  [X ]No Rash  [ ] Abnormal  [ ] pressure    CARDIAC  [X ]Regular: S1 S2 present with systolic murmur  [ ]Abnormal    PULMONARY  [X ]Bilateral Clear Breath Sounds  [ ]Normal Excursion  [ ]Abnormal    GI  [ X]PEG      [X ] +BS		              [ X]Soft, nondistended, nontender	  [ ]Abnormal    MUSCULOSKELETAL                                   [ ]Bedbound                 [ ]Abnormal    [X ] OOB to chair with assistance    EXTREMITIES                                         [ ]Normal  [X ]Edema: Trace B/L LE edema    NEUROLOGIC  [ ] Normal, non focal  [X ] Focal findings: non-verbal, unclear how much she comprehends even when family speaks to her. was lethargic today and unable to cooperate with simple commands or gestures    PSYCHIATRIC:  Lethargic  [ ]Alert and appropriate  [ ] Sedated	 [ ]Agitated    :  Rosales: [ ] Yes, if yes: Date of Placement:                   [X  ] No    LINES: Central Lines [ ] Yes, if yes: Date of Placement                                     [  ] No    HOSPITAL MEDICATIONS:  MEDICATIONS  (STANDING):  ALBUTerol/ipratropium for Nebulization 3 milliLiter(s) Nebulizer every 6 hours  aspirin  chewable 81 milliGRAM(s) Oral daily  buDESOnide   0.25 milliGRAM(s) Respule 0.25 milliGRAM(s) Inhalation every 12 hours  diltiazem    Tablet 30 milliGRAM(s) Oral every 6 hours  doxazosin 2 milliGRAM(s) Oral at bedtime  fentaNYL   Patch  25 MICROgram(s)/Hr 1 Patch Transdermal every 72 hours  hydrALAZINE 25 milliGRAM(s) Oral every 8 hours  latanoprost 0.005% Ophthalmic Solution 1 Drop(s) Both EYES at bedtime  pantoprazole  Injectable 40 milliGRAM(s) IV Push every 12 hours  simethicone 80 milliGRAM(s) Chew every 8 hours  sodium chloride 0.9%. 1000 milliLiter(s) (50 mL/Hr) IV Continuous <Continuous>  sodium chloride 3%  Inhalation 3 milliLiter(s) Inhalation two times a day  torsemide 30 milliGRAM(s) Oral daily    MEDICATIONS  (PRN):  acetaminophen    Suspension .. 650 milliGRAM(s) Oral every 6 hours PRN Mild Pain (1 - 3)  clonazePAM Tablet 1 milliGRAM(s) Oral every 8 hours PRN agitation  melatonin 1 milliGRAM(s) Oral at bedtime PRN Insomnia      LABS:                        6.8    11.6  )-----------( 222      ( 22 Oct 2018 07:04 )             22.7     10-22    134<L>  |  93<L>  |  80<H>  ----------------------------<  213<H>  4.9   |  26  |  1.44<H>    Ca    9.8      22 Oct 2018 07:04  Phos  3.6     10-22  Mg     2.6     10-22      PT/INR - ( 22 Oct 2018 07:04 )   PT: 34.9 sec;   INR: 3.16 ratio           Urinalysis Basic - ( 21 Oct 2018 17:21 )    Color: Yellow / Appearance: Turbid / S.018 / pH: x  Gluc: x / Ketone: Negative  / Bili: Negative / Urobili: 2 mg/dL   Blood: x / Protein: 100 mg/dL / Nitrite: Negative   Leuk Esterase: Large / RBC: 16 /hpf /  /hpf   Sq Epi: x / Non Sq Epi: 3 /hpf / Bacteria: Many          CAPILLARY BLOOD GLUCOSE    MICROBIOLOGY:     RADIOLOGY:  [ ] Reviewed and interpreted by me    Mode: AC/ CMV (Assist Control/ Continuous Mandatory Ventilation)  RR (machine): 14  TV (machine): 400  FiO2: 30  PEEP: 5  ITime: 1  MAP: 10  PIP: 28

## 2018-10-22 NOTE — PROGRESS NOTE ADULT - ATTENDING COMMENTS
Patient seen and examined.   1. Acute resp failure with hypoxia:  - Remains mostly vent dependent  - Tolerates short durations of PS and TC trials  - Cont pulm toilet   2. HFpEF/ s/p mech MVR/ AS/ a fib:  - Cont ASA, cardizem and hydralazine  - Cont coumadin (INR goal 2.5-3.5)  3. LATANYA:  - Cr trending up  - Hold diuresis  - Follow UO/ electrolytes  4. Anemia:  - No obvious bleeding  - Transfuse 1 unit PRBC, FUP repeat CBC  - If Hb continues to trend down will check FOBT, and possible a CT abd/ pelvis w/o cont to r/o RP bleed   5. Agitation:  - Well controlled on Klonopin  6. Gen:  - D-c planning to vent facility   -

## 2018-10-22 NOTE — PROGRESS NOTE ADULT - PROBLEM SELECTOR PLAN 5
Patient with HX of AS ( Not a candidate for TAVR )  Patient S/p Mechanical Mitral Valve Replacement. Goal INR 2.5-3.5  INR today 3.1, will dose coumadin 5mg tonight

## 2018-10-22 NOTE — PROGRESS NOTE ADULT - SUBJECTIVE AND OBJECTIVE BOX
Patient is a 84y old  Female who presents with a chief complaint of COPD exacerbation, ADHF (21 Oct 2018 08:47)      Interval Events:    REVIEW OF SYSTEMS:  [ ] Positive  [ ] All other systems negative  [ ] Unable to assess ROS because ________    Vital Signs Last 24 Hrs  T(C): 37 (10-22-18 @ 04:52), Max: 37.4 (10-21-18 @ 17:54)  T(F): 98.6 (10-22-18 @ 04:52), Max: 99.3 (10-21-18 @ 17:54)  HR: 60 (10-22-18 @ 05:06) (59 - 79)  BP: 114/64 (10-22-18 @ 04:52) (100/62 - 145/72)  RR: 16 (10-22-18 @ 04:52) (14 - 25)  SpO2: 100% (10-22-18 @ 05:06) (65% - 100%)    PHYSICAL EXAM:  HEENT:   [ ]Tracheostomy:  [ ]Pupils equal  [ ]No oral lesions  [ ]Abnormal    SKIN  [ ]No Rash  [ ] Abnormal  [ ] pressure    CARDIAC  [ ]Regular  [ ]Abnormal    PULMONARY  [ ]Bilateral Clear Breath Sounds  [ ]Normal Excursion  [ ]Abnormal    GI  [ ]PEG      [ ] +BS		              [ ]Soft, nondistended, nontender	  [ ]Abnormal    MUSCULOSKELETAL                                   [ ]Bedbound                 [ ]Abnormal    [ ]Ambulatory/OOB to chair                           EXTREMITIES                                         [ ]Normal  [ ]Edema                           NEUROLOGIC  [ ] Normal, non focal  [ ] Focal findings:    PSYCHIATRIC  [ ]Alert and appropriate  [ ] Sedated	 [ ]Agitated    :  Connie: [ ] Yes, if yes: Date of Placement:                   [  ] No    LINES: Central Lines [ ] Yes, if yes: Date of Placement                                     [  ] No    HOSPITAL MEDICATIONS:  MEDICATIONS  (STANDING):  ALBUTerol/ipratropium for Nebulization 3 milliLiter(s) Nebulizer every 6 hours  aspirin  chewable 81 milliGRAM(s) Oral daily  buDESOnide   0.25 milliGRAM(s) Respule 0.25 milliGRAM(s) Inhalation every 12 hours  diltiazem    Tablet 30 milliGRAM(s) Oral every 6 hours  doxazosin 2 milliGRAM(s) Oral at bedtime  fentaNYL   Patch  25 MICROgram(s)/Hr 1 Patch Transdermal every 72 hours  hydrALAZINE 25 milliGRAM(s) Oral every 8 hours  latanoprost 0.005% Ophthalmic Solution 1 Drop(s) Both EYES at bedtime  pantoprazole  Injectable 40 milliGRAM(s) IV Push every 12 hours  simethicone 80 milliGRAM(s) Chew every 8 hours  sodium chloride 0.9%. 1000 milliLiter(s) (50 mL/Hr) IV Continuous <Continuous>  sodium chloride 3%  Inhalation 3 milliLiter(s) Inhalation two times a day  torsemide 30 milliGRAM(s) Oral daily    MEDICATIONS  (PRN):  acetaminophen    Suspension .. 650 milliGRAM(s) Oral every 6 hours PRN Mild Pain (1 - 3)  clonazePAM Tablet 1 milliGRAM(s) Oral every 8 hours PRN agitation  melatonin 1 milliGRAM(s) Oral at bedtime PRN Insomnia      LABS:                        7.1    13.72 )-----------( 259      ( 21 Oct 2018 08:13 )             23.7     10-21    136  |  94<L>  |  62<H>  ----------------------------<  165<H>  5.0   |  27  |  1.18    Ca    9.9      21 Oct 2018 06:59  Phos  4.7     10-21  Mg     2.5     10-21      PT/INR - ( 21 Oct 2018 07:00 )   PT: 56.0 sec;   INR: 4.97 ratio           Urinalysis Basic - ( 21 Oct 2018 17:21 )    Color: Yellow / Appearance: Turbid / S.018 / pH: x  Gluc: x / Ketone: Negative  / Bili: Negative / Urobili: 2 mg/dL   Blood: x / Protein: 100 mg/dL / Nitrite: Negative   Leuk Esterase: Large / RBC: 16 /hpf /  /hpf   Sq Epi: x / Non Sq Epi: 3 /hpf / Bacteria: Many          CAPILLARY BLOOD GLUCOSE    MICROBIOLOGY:     RADIOLOGY:  [ ] Reviewed and interpreted by me    Mode: AC/ CMV (Assist Control/ Continuous Mandatory Ventilation)  RR (machine): 14  TV (machine): 400  FiO2: 30  PEEP: 5  ITime: 1  MAP: 10  PIP: 25

## 2018-10-22 NOTE — PROGRESS NOTE ADULT - PROBLEM SELECTOR PLAN 10
Urology consulted  per family request. will not start Hiprex per DR Lisker.  Cardura added for retention  DC to rehab when INR<=3.5 and Cr stable

## 2018-10-22 NOTE — PROGRESS NOTE ADULT - ASSESSMENT
84  Year old Female with PMH significant for COPD, JENNIE on BiPAP at home , multivalvular disease (severe AS Not a candidate for TAVR, S/p MV replacement), HFpEF/ Severe diastolic failure, A-fib on coumadin, sick sinus syndrome S/p pacemaker placement, HTN, and CKD3 who was admitted for acute respiratory failure requiring intubation suspected to be 2/2 COPD exacerbation c/b PNA w/ +entero/rhinovirus and GN coccobacillus and H. influenza bacteremia, requiring continued respiratory support and tracheostomy. Patient S/p Trach placement 10/3 and PEG Placement 10/4.     10/7: Patient remains with abdominal distention today but tolerating trickle feeds, ABD X-ray  performed this morning patient remains with air filled loops of small and large bowel. GI fellow called to re-eval the patient this morning, X-ray  reviewed slightly worsened compared to yesterday. GI fellow recommended to place patient in Left lateral decubitus position and oob to chair later today. Patient with large amount of gas expressed with turning as Per RN. As per GI no role for rectal tube at this time and can continue to advance feeds as tolerated to promote gastric motility. H+H Has remained stable case d/w  will restart coumadin this evening. Pt with elevated bp will increase Hydralazine 50 mg q 8 hr   10/8-Abdominal distention noted with hypoactive bowel sounds, Kub noted from this weekend. Daily bowel movements noted. Will repeat PTT as there were 2 therapeutics prior on same dosing, also repeat CBC as well. Will transfuse if remains low. RCU weaning  10/10: 4 dark BMs reported night of 10/9, concerning for melena,  hgb 6.9 in AM,  1U RBC given . Goal heparin reduced to 50-70. GI consulted due to GIB Hx with known AV malformations. No intvn.  10/11: H/H stable post 1U with no furthers melena or intvn from GI. Resumed coumadin.  10/12: agitated this am. seroquel not working changed back to klonopin  10/13 No events overnight. Continues on heparin gtt/coumadin bridge. INR 1.45, coumadin 7.5 mg x1. Continue with PS trials as tolerated.   10/14 Heparin gtt d/c, INR 2.82, coumadin 1mg x1. Continue with PS trials as tolerated. LE edema, lasix 20 mg po x1. Hg 7.7 stable.   10/15:  INR 2.3, reduced coumadin to 5mg  10/16: INR 1.8, resumed heparin, coumadin increased to 7.5mg tonight. Tolerated trach collar for 40mins  10/18: Await INR results today.  10/19 no events overnight. F/u labs today. d/c planning   10/21 INR 4.97, will hold Coumadin today  + cloudy urine per RN, increased WBC, will send UA and obtain CXR . Cr 1.18, add IVF NS 50 cc/hr x 6 hrs

## 2018-10-22 NOTE — PROGRESS NOTE ADULT - PROBLEM SELECTOR PLAN 1
Patient Failed Extubation attempt x 2   Patient S/p Tracheostomy 10/3 ( # 6 Cuffed Shiley)   Attempt weaning trials as tolerated daily; trials of trach collar as tolerated. Did 10 hrs TC on 10/20  Continue Nebulizers and Chest PT

## 2018-10-22 NOTE — PROGRESS NOTE ADULT - PROBLEM SELECTOR PLAN 8
EGD 10/4: Gastric Erythema, No evidence of Active bleeding. 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    Stool Occult 10/6: Positive, No reports of Melena / Coffee Grounds   10/10: 4 dark BMs reported night of 10/9, concerning for melena,  hgb 6.9 in AM, s/p 1U RBC. Goal heparin reduced to 50-70. Transfuse to hgb >7  Per GI: no intvn, PPI BID  10/22: Hgb 6.8, transfused 1U RBCs

## 2018-10-22 NOTE — PROGRESS NOTE ADULT - PROBLEM SELECTOR PLAN 2
CXR 10/1: Multifocal Pneumonia and Pulmonary Vascular Congestion   Sputum Cx 10/3 : Normal Respiratory María   Rapid Viral Panel 9/14: + Enterovirus / Rhinovirus   Patient S/p course of Meropenem  Repeat CXR due tyo increased WBC, and less alert per family

## 2018-10-22 NOTE — PROGRESS NOTE ADULT - ASSESSMENT
84  Year old Female with PMH significant for COPD, JENNIE on BiPAP at home , multivalvular disease (severe AS Not a candidate for TAVR, S/p MV replacement), HFpEF/ Severe diastolic failure, A-fib on coumadin, sick sinus syndrome S/p pacemaker placement, HTN, and CKD3 who was admitted for acute respiratory failure requiring intubation suspected to be 2/2 COPD exacerbation c/b PNA w/ +entero/rhinovirus and GN coccobacillus and H. influenza bacteremia, requiring continued respiratory support and tracheostomy. Patient S/p Trach placement 10/3 and PEG Placement 10/4.     10/7: Patient remains with abdominal distention today but tolerating trickle feeds, ABD X-ray  performed this morning patient remains with air filled loops of small and large bowel. GI fellow called to re-eval the patient this morning, X-ray  reviewed slightly worsened compared to yesterday. GI fellow recommended to place patient in Left lateral decubitus position and oob to chair later today. Patient with large amount of gas expressed with turning as Per RN. As per GI no role for rectal tube at this time and can continue to advance feeds as tolerated to promote gastric motility. H+H Has remained stable case d/w  will restart coumadin this evening. Pt with elevated bp will increase Hydralazine 50 mg q 8 hr   10/8-Abdominal distention noted with hypoactive bowel sounds, Kub noted from this weekend. Daily bowel movements noted. Will repeat PTT as there were 2 therapeutics prior on same dosing, also repeat CBC as well. Will transfuse if remains low. RCU weaning  10/10: 4 dark BMs reported night of 10/9, concerning for melena,  hgb 6.9 in AM,  1U RBC given . Goal heparin reduced to 50-70. GI consulted due to GIB Hx with known AV malformations. No intvn.  10/11: H/H stable post 1U with no furthers melena or intvn from GI. Resumed coumadin.  10/12: agitated this am. seroquel not working changed back to klonopin  10/13 No events overnight. Continues on heparin gtt/coumadin bridge. INR 1.45, coumadin 7.5 mg x1. Continue with PS trials as tolerated.   10/14 Heparin gtt d/c, INR 2.82, coumadin 1mg x1. Continue with PS trials as tolerated. LE edema, lasix 20 mg po x1. Hg 7.7 stable.   10/15:  INR 2.3, reduced coumadin to 5mg  10/16: INR 1.8, resumed heparin, coumadin increased to 7.5mg tonight. Tolerated trach collar for 40mins  10/18: Await INR results today.  10/19 no events overnight. F/u labs today. d/c planning   10/21 INR 4.97, will hold Coumadin today  + cloudy urine per RN, increased WBC, will send UA and obtain CXR . Cr 1.18, add IVF NS 50 cc/hr x 6 hrs   10/22: hgb 6.8, transfused 1U RBC.  LATANYA with Cr 1.9

## 2018-10-23 LAB
ANION GAP SERPL CALC-SCNC: 15 MMOL/L — SIGNIFICANT CHANGE UP (ref 5–17)
APTT BLD: 42.1 SEC — HIGH (ref 27.5–37.4)
BLD GP AB SCN SERPL QL: NEGATIVE — SIGNIFICANT CHANGE UP
BUN SERPL-MCNC: 89 MG/DL — HIGH (ref 7–23)
CALCIUM SERPL-MCNC: 9.5 MG/DL — SIGNIFICANT CHANGE UP (ref 8.4–10.5)
CHLORIDE SERPL-SCNC: 92 MMOL/L — LOW (ref 96–108)
CO2 SERPL-SCNC: 25 MMOL/L — SIGNIFICANT CHANGE UP (ref 22–31)
CREAT SERPL-MCNC: 1.58 MG/DL — HIGH (ref 0.5–1.3)
GLUCOSE SERPL-MCNC: 202 MG/DL — HIGH (ref 70–99)
HCT VFR BLD CALC: 25.5 % — LOW (ref 34.5–45)
HGB BLD-MCNC: 8.1 G/DL — LOW (ref 11.5–15.5)
INR BLD: 1.9 RATIO — HIGH (ref 0.88–1.16)
MAGNESIUM SERPL-MCNC: 2.7 MG/DL — HIGH (ref 1.6–2.6)
MCHC RBC-ENTMCNC: 27.5 PG — SIGNIFICANT CHANGE UP (ref 27–34)
MCHC RBC-ENTMCNC: 31.7 GM/DL — LOW (ref 32–36)
MCV RBC AUTO: 86.7 FL — SIGNIFICANT CHANGE UP (ref 80–100)
PLATELET # BLD AUTO: 235 K/UL — SIGNIFICANT CHANGE UP (ref 150–400)
POTASSIUM SERPL-MCNC: 4.8 MMOL/L — SIGNIFICANT CHANGE UP (ref 3.5–5.3)
POTASSIUM SERPL-SCNC: 4.8 MMOL/L — SIGNIFICANT CHANGE UP (ref 3.5–5.3)
PROTHROM AB SERPL-ACNC: 21 SEC — HIGH (ref 9.8–12.7)
RBC # BLD: 2.94 M/UL — LOW (ref 3.8–5.2)
RBC # FLD: 17.6 % — HIGH (ref 10.3–14.5)
RH IG SCN BLD-IMP: POSITIVE — SIGNIFICANT CHANGE UP
SODIUM SERPL-SCNC: 132 MMOL/L — LOW (ref 135–145)
WBC # BLD: 11 K/UL — HIGH (ref 3.8–10.5)
WBC # FLD AUTO: 11 K/UL — HIGH (ref 3.8–10.5)

## 2018-10-23 PROCEDURE — 99233 SBSQ HOSP IP/OBS HIGH 50: CPT | Mod: GC

## 2018-10-23 RX ORDER — HEPARIN SODIUM 5000 [USP'U]/ML
900 INJECTION INTRAVENOUS; SUBCUTANEOUS
Qty: 25000 | Refills: 0 | Status: DISCONTINUED | OUTPATIENT
Start: 2018-10-23 | End: 2018-10-23

## 2018-10-23 RX ORDER — AZTREONAM 2 G
1000 VIAL (EA) INJECTION EVERY 12 HOURS
Qty: 0 | Refills: 0 | Status: DISCONTINUED | OUTPATIENT
Start: 2018-10-24 | End: 2018-10-24

## 2018-10-23 RX ORDER — AZTREONAM 2 G
1000 VIAL (EA) INJECTION ONCE
Qty: 0 | Refills: 0 | Status: COMPLETED | OUTPATIENT
Start: 2018-10-23 | End: 2018-10-23

## 2018-10-23 RX ORDER — HEPARIN SODIUM 5000 [USP'U]/ML
950 INJECTION INTRAVENOUS; SUBCUTANEOUS
Qty: 25000 | Refills: 0 | Status: DISCONTINUED | OUTPATIENT
Start: 2018-10-23 | End: 2018-10-24

## 2018-10-23 RX ORDER — WARFARIN SODIUM 2.5 MG/1
6 TABLET ORAL AT BEDTIME
Qty: 0 | Refills: 0 | Status: DISCONTINUED | OUTPATIENT
Start: 2018-10-23 | End: 2018-10-24

## 2018-10-23 RX ORDER — AZTREONAM 2 G
VIAL (EA) INJECTION
Qty: 0 | Refills: 0 | Status: DISCONTINUED | OUTPATIENT
Start: 2018-10-23 | End: 2018-10-24

## 2018-10-23 RX ORDER — SODIUM CHLORIDE 9 MG/ML
750 INJECTION INTRAMUSCULAR; INTRAVENOUS; SUBCUTANEOUS ONCE
Qty: 0 | Refills: 0 | Status: COMPLETED | OUTPATIENT
Start: 2018-10-23 | End: 2018-10-23

## 2018-10-23 RX ADMIN — FENTANYL CITRATE 1 PATCH: 50 INJECTION INTRAVENOUS at 05:40

## 2018-10-23 RX ADMIN — Medication 0.25 MILLIGRAM(S): at 05:28

## 2018-10-23 RX ADMIN — Medication 3 MILLILITER(S): at 05:26

## 2018-10-23 RX ADMIN — SIMETHICONE 80 MILLIGRAM(S): 80 TABLET, CHEWABLE ORAL at 15:01

## 2018-10-23 RX ADMIN — Medication 50 MILLIGRAM(S): at 15:05

## 2018-10-23 RX ADMIN — Medication 25 MILLIGRAM(S): at 15:00

## 2018-10-23 RX ADMIN — PANTOPRAZOLE SODIUM 40 MILLIGRAM(S): 20 TABLET, DELAYED RELEASE ORAL at 05:37

## 2018-10-23 RX ADMIN — Medication 2 MILLIGRAM(S): at 22:34

## 2018-10-23 RX ADMIN — Medication 650 MILLIGRAM(S): at 05:38

## 2018-10-23 RX ADMIN — Medication 25 MILLIGRAM(S): at 22:33

## 2018-10-23 RX ADMIN — HEPARIN SODIUM 9 UNIT(S)/HR: 5000 INJECTION INTRAVENOUS; SUBCUTANEOUS at 10:18

## 2018-10-23 RX ADMIN — Medication 81 MILLIGRAM(S): at 11:29

## 2018-10-23 RX ADMIN — PANTOPRAZOLE SODIUM 40 MILLIGRAM(S): 20 TABLET, DELAYED RELEASE ORAL at 17:42

## 2018-10-23 RX ADMIN — SIMETHICONE 80 MILLIGRAM(S): 80 TABLET, CHEWABLE ORAL at 05:37

## 2018-10-23 RX ADMIN — LATANOPROST 1 DROP(S): 0.05 SOLUTION/ DROPS OPHTHALMIC; TOPICAL at 22:34

## 2018-10-23 RX ADMIN — SODIUM CHLORIDE 3 MILLILITER(S): 9 INJECTION INTRAMUSCULAR; INTRAVENOUS; SUBCUTANEOUS at 17:07

## 2018-10-23 RX ADMIN — Medication 3 MILLILITER(S): at 11:16

## 2018-10-23 RX ADMIN — Medication 1 MILLIGRAM(S): at 22:33

## 2018-10-23 RX ADMIN — SIMETHICONE 80 MILLIGRAM(S): 80 TABLET, CHEWABLE ORAL at 22:34

## 2018-10-23 RX ADMIN — Medication 3 MILLILITER(S): at 17:07

## 2018-10-23 RX ADMIN — WARFARIN SODIUM 6 MILLIGRAM(S): 2.5 TABLET ORAL at 22:33

## 2018-10-23 RX ADMIN — SODIUM CHLORIDE 75 MILLILITER(S): 9 INJECTION INTRAMUSCULAR; INTRAVENOUS; SUBCUTANEOUS at 17:41

## 2018-10-23 RX ADMIN — Medication 650 MILLIGRAM(S): at 06:45

## 2018-10-23 RX ADMIN — FENTANYL CITRATE 1 PATCH: 50 INJECTION INTRAVENOUS at 07:54

## 2018-10-23 RX ADMIN — FENTANYL CITRATE 1 PATCH: 50 INJECTION INTRAVENOUS at 05:37

## 2018-10-23 RX ADMIN — Medication 0.25 MILLIGRAM(S): at 17:07

## 2018-10-23 RX ADMIN — Medication 25 MILLIGRAM(S): at 05:37

## 2018-10-23 RX ADMIN — SODIUM CHLORIDE 3 MILLILITER(S): 9 INJECTION INTRAMUSCULAR; INTRAVENOUS; SUBCUTANEOUS at 05:27

## 2018-10-23 RX ADMIN — Medication 1 MILLIGRAM(S): at 08:07

## 2018-10-23 NOTE — PROGRESS NOTE ADULT - PROBLEM SELECTOR PLAN 5
Patient with HX of AS ( Not a candidate for TAVR )  Patient S/p Mechanical Mitral Valve Replacement. Goal INR 2.5-3.5  INR 1.9, started heparin infusion with Goal PTT 50-70. Coumadin to be dosed at 6mg tonight

## 2018-10-23 NOTE — PROGRESS NOTE ADULT - SUBJECTIVE AND OBJECTIVE BOX
Patient is a 84y old  Female who presents with a chief complaint of COPD exacerbation, ADHF (23 Oct 2018 09:11)      Interval Events:    REVIEW OF SYSTEMS:  [ ] Positive  [ ] All other systems negative  [ ] Unable to assess ROS because ________    Vital Signs Last 24 Hrs  T(C): 36.4 (10-23-18 @ 12:27), Max: 37.2 (10-22-18 @ 20:02)  T(F): 97.6 (10-23-18 @ 12:27), Max: 98.9 (10-22-18 @ 20:02)  HR: 68 (10-23-18 @ 12:27) (60 - 83)  BP: 107/57 (10-23-18 @ 12:27) (107/57 - 129/62)  RR: 18 (10-23-18 @ 12:27) (18 - 18)  SpO2: 98% (10-23-18 @ 12:27) (94% - 100%)    PHYSICAL EXAM:  HEENT:   [ ]Tracheostomy:  [ ]Pupils equal  [ ]No oral lesions  [ ]Abnormal    SKIN  [ ]No Rash  [ ] Abnormal  [ ] pressure    CARDIAC  [ ]Regular  [ ]Abnormal    PULMONARY  [ ]Bilateral Clear Breath Sounds  [ ]Normal Excursion  [ ]Abnormal    GI  [ ]PEG      [ ] +BS		              [ ]Soft, nondistended, nontender	  [ ]Abnormal    MUSCULOSKELETAL                                   [ ]Bedbound                 [ ]Abnormal    [ ]Ambulatory/OOB to chair                           EXTREMITIES                                         [ ]Normal  [ ]Edema                           NEUROLOGIC  [ ] Normal, non focal  [ ] Focal findings:    PSYCHIATRIC  [ ]Alert and appropriate  [ ] Sedated	 [ ]Agitated    :  Connie: [ ] Yes, if yes: Date of Placement:                   [  ] No    LINES: Central Lines [ ] Yes, if yes: Date of Placement                                     [  ] No    HOSPITAL MEDICATIONS:  MEDICATIONS  (STANDING):  ALBUTerol/ipratropium for Nebulization 3 milliLiter(s) Nebulizer every 6 hours  aspirin  chewable 81 milliGRAM(s) Oral daily  aztreonam  IVPB      aztreonam  IVPB 1000 milliGRAM(s) IV Intermittent once  buDESOnide   0.25 milliGRAM(s) Respule 0.25 milliGRAM(s) Inhalation every 12 hours  diltiazem    Tablet 30 milliGRAM(s) Oral every 6 hours  doxazosin 2 milliGRAM(s) Oral at bedtime  fentaNYL   Patch  25 MICROgram(s)/Hr 1 Patch Transdermal every 72 hours  heparin  Infusion 900 Unit(s)/Hr (9 mL/Hr) IV Continuous <Continuous>  hydrALAZINE 25 milliGRAM(s) Oral every 8 hours  latanoprost 0.005% Ophthalmic Solution 1 Drop(s) Both EYES at bedtime  pantoprazole  Injectable 40 milliGRAM(s) IV Push every 12 hours  simethicone 80 milliGRAM(s) Chew every 8 hours  sodium chloride 0.9% Bolus 750 milliLiter(s) IV Bolus once  sodium chloride 3%  Inhalation 3 milliLiter(s) Inhalation two times a day  warfarin 6 milliGRAM(s) Oral at bedtime    MEDICATIONS  (PRN):  acetaminophen    Suspension .. 650 milliGRAM(s) Oral every 6 hours PRN Mild Pain (1 - 3)  clonazePAM Tablet 1 milliGRAM(s) Oral every 8 hours PRN agitation  melatonin 1 milliGRAM(s) Oral at bedtime PRN Insomnia      LABS:                        8.1    11.0  )-----------( 235      ( 23 Oct 2018 07:24 )             25.5     10-23    132<L>  |  92<L>  |  89<H>  ----------------------------<  202<H>  4.8   |  25  |  1.58<H>    Ca    9.5      23 Oct 2018 07:22  Phos  3.6     10-22  Mg     2.7     10-23      PT/INR - ( 23 Oct 2018 07:24 )   PT: 21.0 sec;   INR: 1.90 ratio           Urinalysis Basic - ( 21 Oct 2018 17:21 )    Color: Yellow / Appearance: Turbid / S.018 / pH: x  Gluc: x / Ketone: Negative  / Bili: Negative / Urobili: 2 mg/dL   Blood: x / Protein: 100 mg/dL / Nitrite: Negative   Leuk Esterase: Large / RBC: 16 /hpf /  /hpf   Sq Epi: x / Non Sq Epi: 3 /hpf / Bacteria: Many          CAPILLARY BLOOD GLUCOSE    MICROBIOLOGY:     RADIOLOGY:  [ ] Reviewed and interpreted by me    Mode: AC/ CMV (Assist Control/ Continuous Mandatory Ventilation)  RR (machine): 14  TV (machine): 400  FiO2: 30  PEEP: 5  ITime: 1  MAP: 12  PIP: 34 Patient is a 84y old  Female who presents with a chief complaint of COPD exacerbation, ADHF. Remains ventilated via trach with intermittent trach collar sessions as tolerated. She appears similar to yesterday, lethargic but more easily arousable; observed tolerating vent via trach.      Interval Events:    REVIEW OF SYSTEMS:  [ ] Positive  [ ] All other systems negative  [ X] Unable to assess ROS because she's lethargic, non-verbal    Vital Signs Last 24 Hrs  T(C): 36.7 (23 Oct 2018 17:28), Max: 37.2 (22 Oct 2018 20:02)  T(F): 98 (23 Oct 2018 17:28), Max: 98.9 (22 Oct 2018 20:02)  HR: 64 (23 Oct 2018 17:28) (60 - 83)  BP: 120/68 (23 Oct 2018 17:28) (107/57 - 129/62)  BP(mean): --  RR: 18 (23 Oct 2018 17:28) (18 - 18)  SpO2: 99% (23 Oct 2018 17:28) (94% - 100%)    PHYSICAL EXAM:  HEENT:   [ X]Tracheostomy:  #6 cuffed shiley  [X ]Pupils equal  [ ]No oral lesions  [ ]Abnormal    SKIN  [X ]No Rash  [ ] Abnormal  [ ] pressure    CARDIAC  [X ]Regular: S1 S2 present with systolic murmur  [ ]Abnormal    PULMONARY  [X ]Bilateral Clear Breath Sounds  [ ]Normal Excursion  [ ]Abnormal    GI  [ X]PEG      [X ] +BS		              [ X]Soft, nondistended, nontender	  [ ]Abnormal    MUSCULOSKELETAL                                   [ ]Bedbound                 [ ]Abnormal    [X ] OOB to chair with assistance    EXTREMITIES                                         [ ]Normal  [X ]Edema: Trace B/L LE edema    NEUROLOGIC  [ ] Normal, non focal  [X ] Focal findings: non-verbal, unclear how much she comprehends even when family speaks to her. was lethargic today and unable to cooperate with simple commands or gestures    PSYCHIATRIC:  Lethargic  [ ]Alert and appropriate  [ ] Sedated	 [ ]Agitated    :  Connie: [ ] Yes, if yes: Date of Placement:                   [X  ] No    LINES: Central Lines [ ] Yes, if yes: Date of Placement                                     [  ] No    HOSPITAL MEDICATIONS:  MEDICATIONS  (STANDING):  ALBUTerol/ipratropium for Nebulization 3 milliLiter(s) Nebulizer every 6 hours  aspirin  chewable 81 milliGRAM(s) Oral daily  aztreonam  IVPB      aztreonam  IVPB 1000 milliGRAM(s) IV Intermittent once  buDESOnide   0.25 milliGRAM(s) Respule 0.25 milliGRAM(s) Inhalation every 12 hours  diltiazem    Tablet 30 milliGRAM(s) Oral every 6 hours  doxazosin 2 milliGRAM(s) Oral at bedtime  fentaNYL   Patch  25 MICROgram(s)/Hr 1 Patch Transdermal every 72 hours  heparin  Infusion 900 Unit(s)/Hr (9 mL/Hr) IV Continuous <Continuous>  hydrALAZINE 25 milliGRAM(s) Oral every 8 hours  latanoprost 0.005% Ophthalmic Solution 1 Drop(s) Both EYES at bedtime  pantoprazole  Injectable 40 milliGRAM(s) IV Push every 12 hours  simethicone 80 milliGRAM(s) Chew every 8 hours  sodium chloride 0.9% Bolus 750 milliLiter(s) IV Bolus once  sodium chloride 3%  Inhalation 3 milliLiter(s) Inhalation two times a day  warfarin 6 milliGRAM(s) Oral at bedtime    MEDICATIONS  (PRN):  acetaminophen    Suspension .. 650 milliGRAM(s) Oral every 6 hours PRN Mild Pain (1 - 3)  clonazePAM Tablet 1 milliGRAM(s) Oral every 8 hours PRN agitation  melatonin 1 milliGRAM(s) Oral at bedtime PRN Insomnia      LABS:                        8.1    11.0  )-----------( 235      ( 23 Oct 2018 07:24 )             25.5     10-23    132<L>  |  92<L>  |  89<H>  ----------------------------<  202<H>  4.8   |  25  |  1.58<H>    Ca    9.5      23 Oct 2018 07:22  Phos  3.6     10-22  Mg     2.7     10-23      PT/INR - ( 23 Oct 2018 07:24 )   PT: 21.0 sec;   INR: 1.90 ratio           Urinalysis Basic - ( 21 Oct 2018 17:21 )    Color: Yellow / Appearance: Turbid / S.018 / pH: x  Gluc: x / Ketone: Negative  / Bili: Negative / Urobili: 2 mg/dL   Blood: x / Protein: 100 mg/dL / Nitrite: Negative   Leuk Esterase: Large / RBC: 16 /hpf /  /hpf   Sq Epi: x / Non Sq Epi: 3 /hpf / Bacteria: Many          CAPILLARY BLOOD GLUCOSE    MICROBIOLOGY:     RADIOLOGY:  [ ] Reviewed and interpreted by me    Mode: AC/ CMV (Assist Control/ Continuous Mandatory Ventilation)  RR (machine): 14  TV (machine): 400  FiO2: 30  PEEP: 5  ITime: 1  MAP: 12  PIP: 34

## 2018-10-23 NOTE — PROGRESS NOTE ADULT - PROBLEM SELECTOR PLAN 10
Cr increased from 0.9 to 1.4 over 2 days. Noted to be on torsemide 30mg QD. Will hold torsemide; s/p 1U RBC 10/22.  10/23: added NS 75ml/[id number], requested KUB US, Renal (Dr. Koch) consulted

## 2018-10-23 NOTE — PROGRESS NOTE ADULT - ASSESSMENT
84  Year old Female with PMH significant for COPD, JENNIE on BiPAP at home , multivalvular disease (severe AS Not a candidate for TAVR, S/p MV replacement), HFpEF/ Severe diastolic failure, A-fib on coumadin, sick sinus syndrome S/p pacemaker placement, HTN, and CKD3 who was admitted for acute respiratory failure requiring intubation suspected to be 2/2 COPD exacerbation c/b PNA w/ +entero/rhinovirus and GN coccobacillus and H. influenza bacteremia, requiring continued respiratory support and tracheostomy. Patient S/p Trach placement 10/3 and PEG Placement 10/4.     10/7: Patient remains with abdominal distention today but tolerating trickle feeds, ABD X-ray  performed this morning patient remains with air filled loops of small and large bowel. GI fellow called to re-eval the patient this morning, X-ray  reviewed slightly worsened compared to yesterday. GI fellow recommended to place patient in Left lateral decubitus position and oob to chair later today. Patient with large amount of gas expressed with turning as Per RN. As per GI no role for rectal tube at this time and can continue to advance feeds as tolerated to promote gastric motility. H+H Has remained stable case d/w  will restart coumadin this evening. Pt with elevated bp will increase Hydralazine 50 mg q 8 hr   10/8-Abdominal distention noted with hypoactive bowel sounds, Kub noted from this weekend. Daily bowel movements noted. Will repeat PTT as there were 2 therapeutics prior on same dosing, also repeat CBC as well. Will transfuse if remains low. RCU weaning  10/10: 4 dark BMs reported night of 10/9, concerning for melena,  hgb 6.9 in AM,  1U RBC given . Goal heparin reduced to 50-70. GI consulted due to GIB Hx with known AV malformations. No intvn.  10/11: H/H stable post 1U with no furthers melena or intvn from GI. Resumed coumadin.  10/12: agitated this am. seroquel not working changed back to klonopin  10/13 No events overnight. Continues on heparin gtt/coumadin bridge. INR 1.45, coumadin 7.5 mg x1. Continue with PS trials as tolerated.   10/14 Heparin gtt d/c, INR 2.82, coumadin 1mg x1. Continue with PS trials as tolerated. LE edema, lasix 20 mg po x1. Hg 7.7 stable.   10/15:  INR 2.3, reduced coumadin to 5mg  10/16: INR 1.8, resumed heparin, coumadin increased to 7.5mg tonight. Tolerated trach collar for 40mins  10/18: Await INR results today.  10/19 no events overnight. F/u labs today. d/c planning   10/21 INR 4.97, will hold Coumadin today  + cloudy urine per RN, increased WBC, will send UA and obtain CXR . Cr 1.18, add IVF NS 50 cc/hr x 6 hrs   10/22: hgb 6.8, transfused 1U RBC.  LATANYA with Cr 1.4.  10/23: Renal consulted for Cr 1.5. Started mild IVF 75ml/lym18icy. bladder US ordered. Started renal dose Aztreonam for UTI (10/22 UCx +E.Coli). f/u sensitivities

## 2018-10-23 NOTE — PROGRESS NOTE ADULT - ATTENDING COMMENTS
Patient seen and examined.   1. Acute resp failure with hypoxia:  - Remains vent dependent  - Tolerates short durations of PS and TC trials  - Cont pulm toilet   2. HFpEF/ s/p mech MVR/ AS/ a fib:  - Cont ASA, cardizem and hydralazine  - INR subtherapeutic. Now on a Heparin gtt. Cont coumadin (INR goal 2.5-3.5)  3. LATANYA:  - Cr trending up  - Check renal US  - ? pre-renal? give fluid challenge  - Follow UO/ electrolytes  4. Anemia:  - No obvious bleeding  - FUP repeat CBC post transfusion  - If Hb continues to trend down will check FOBT, and possible a CT abd/ pelvis w/o cont to r/o RP bleed   5. Agitation:  - Well controlled on Klonopin  6. UTI:  - Ucx  with E coli  - Start zosyn, FUP sensitivity  7. Gen:  - D-c planning to vent facility once acute issues resolved Patient seen and examined.   1. Acute resp failure with hypoxia:  - Remains vent dependent  - Tolerates short durations of PS and TC trials  - Cont pulm toilet   2. HFpEF/ s/p mech MVR/ AS/ a fib:  - Cont ASA, cardizem and hydralazine  - INR subtherapeutic. Now on a Heparin gtt. Cont coumadin (INR goal 2.5-3.5)  3. LATANYA:  - Cr trending up  - Renal evalv. Check renal US  - ? pre-renal? give fluid challenge  - Follow UO/ electrolytes  4. Anemia:  - No obvious bleeding  - FUP repeat CBC post transfusion  - If Hb continues to trend down will check FOBT, and possible a CT abd/ pelvis w/o cont to r/o RP bleed   5. Agitation:  - Well controlled on Klonopin  6. UTI:  - Ucx  with E coli  - Start zosyn, FUP sensitivity  7. Gen:  - D-c planning to vent facility once acute issues resolved

## 2018-10-23 NOTE — PROGRESS NOTE ADULT - SUBJECTIVE AND OBJECTIVE BOX
Patient is a 84y old  Female who presents with a chief complaint of COPD exacerbation, ADHF. s/p trach placement 10/3 with trials of trach collar as tolerated. Today she is observed shortyl after receiving clonazepam. Per Nursing, she did not receive clonazepam overnight but was very agitated earlier. During my exam.....      Interval Events:    REVIEW OF SYSTEMS:  [ ] Positive  [ ] All other systems negative  [ ] Unable to assess ROS because ________    Vital Signs Last 24 Hrs  T(C): 36.5 (10-23-18 @ 05:36), Max: 37.2 (10-22-18 @ 20:02)  T(F): 97.7 (10-23-18 @ 05:36), Max: 98.9 (10-22-18 @ 20:02)  HR: 67 (10-23-18 @ 08:09) (60 - 83)  BP: 126/61 (10-23-18 @ 05:36) (113/61 - 126/61)  RR: 18 (10-23-18 @ 05:36) (18 - 25)  SpO2: 100% (10-23-18 @ 08:09) (94% - 100%)    PHYSICAL EXAM:  HEENT:   [ ]Tracheostomy:  [ ]Pupils equal  [ ]No oral lesions  [ ]Abnormal    SKIN  [ ]No Rash  [ ] Abnormal  [ ] pressure    CARDIAC  [ ]Regular  [ ]Abnormal    PULMONARY  [ ]Bilateral Clear Breath Sounds  [ ]Normal Excursion  [ ]Abnormal    GI  [ ]PEG      [ ] +BS		              [ ]Soft, nondistended, nontender	  [ ]Abnormal    MUSCULOSKELETAL                                   [ ]Bedbound                 [ ]Abnormal    [ ]Ambulatory/OOB to chair                           EXTREMITIES                                         [ ]Normal  [ ]Edema                           NEUROLOGIC  [ ] Normal, non focal  [ ] Focal findings:    PSYCHIATRIC  [ ]Alert and appropriate  [ ] Sedated	 [ ]Agitated    :  Rosales: [ ] Yes, if yes: Date of Placement:                   [  ] No    LINES: Central Lines [ ] Yes, if yes: Date of Placement                                     [  ] No    HOSPITAL MEDICATIONS:  MEDICATIONS  (STANDING):  ALBUTerol/ipratropium for Nebulization 3 milliLiter(s) Nebulizer every 6 hours  aspirin  chewable 81 milliGRAM(s) Oral daily  buDESOnide   0.25 milliGRAM(s) Respule 0.25 milliGRAM(s) Inhalation every 12 hours  diltiazem    Tablet 30 milliGRAM(s) Oral every 6 hours  doxazosin 2 milliGRAM(s) Oral at bedtime  fentaNYL   Patch  25 MICROgram(s)/Hr 1 Patch Transdermal every 72 hours  heparin  Infusion 900 Unit(s)/Hr (9 mL/Hr) IV Continuous <Continuous>  hydrALAZINE 25 milliGRAM(s) Oral every 8 hours  latanoprost 0.005% Ophthalmic Solution 1 Drop(s) Both EYES at bedtime  pantoprazole  Injectable 40 milliGRAM(s) IV Push every 12 hours  simethicone 80 milliGRAM(s) Chew every 8 hours  sodium chloride 3%  Inhalation 3 milliLiter(s) Inhalation two times a day  warfarin 5 milliGRAM(s) Enteral Tube at bedtime    MEDICATIONS  (PRN):  acetaminophen    Suspension .. 650 milliGRAM(s) Oral every 6 hours PRN Mild Pain (1 - 3)  clonazePAM Tablet 1 milliGRAM(s) Oral every 8 hours PRN agitation  melatonin 1 milliGRAM(s) Oral at bedtime PRN Insomnia      LABS:                        8.1    11.0  )-----------( 235      ( 23 Oct 2018 07:24 )             25.5     10-23    132<L>  |  92<L>  |  89<H>  ----------------------------<  202<H>  4.8   |  25  |  1.58<H>    Ca    9.5      23 Oct 2018 07:22  Phos  3.6     10-22  Mg     2.7     10-23      PT/INR - ( 23 Oct 2018 07:24 )   PT: 21.0 sec;   INR: 1.90 ratio           Urinalysis Basic - ( 21 Oct 2018 17:21 )    Color: Yellow / Appearance: Turbid / S.018 / pH: x  Gluc: x / Ketone: Negative  / Bili: Negative / Urobili: 2 mg/dL   Blood: x / Protein: 100 mg/dL / Nitrite: Negative   Leuk Esterase: Large / RBC: 16 /hpf /  /hpf   Sq Epi: x / Non Sq Epi: 3 /hpf / Bacteria: Many          CAPILLARY BLOOD GLUCOSE    MICROBIOLOGY:     RADIOLOGY:  [ ] Reviewed and interpreted by me    Mode: AC/ CMV (Assist Control/ Continuous Mandatory Ventilation)  RR (machine): 14  TV (machine): 400  FiO2: 30  PEEP: 5  ITime: 1  MAP: 10  PIP: 32

## 2018-10-23 NOTE — CONSULT NOTE ADULT - REASON FOR ADMISSION
COPD exacerbation, ADHF

## 2018-10-23 NOTE — CONSULT NOTE ADULT - SUBJECTIVE AND OBJECTIVE BOX
[facility]   [phone]  RENAL FOLLOW UP NOTE  --------------------------------------------------------------------------------  Chief Complaint:    24 hour events/subjective:    84  Year old Female with PMH significant for COPD, JENNIE on BiPAP at home , multivalvular disease (severe AS Not a candidate for TAVR, S/p MV replacement), HFpEF/ Severe diastolic failure, A-fib on coumadin, sick sinus syndrome S/p pacemaker placement, HTN, and CKD3 who was admitted for acute respiratory failure requiring intubation suspected to be 2/2 COPD exacerbation c/b PNA w/ +entero/rhinovirus and GN coccobacillus and H. influenza bacteremia, requiring continued respiratory support and tracheostomy. Patient S/p Trach placement 10/3 and PEG Placement 10/4.     10/7: Patient remains with abdominal distention today but tolerating trickle feeds, ABD X-ray  performed this morning patient remains with air filled loops of small and large bowel. GI fellow called to re-eval the patient this morning, X-ray  reviewed slightly worsened compared to yesterday. GI fellow recommended to place patient in Left lateral decubitus position and oob to chair later today. Patient with large amount of gas expressed with turning as Per RN. As per GI no role for rectal tube at this time and can continue to advance feeds as tolerated to promote gastric motility. H+H Has remained stable case d/w  will restart coumadin this evening. Pt with elevated bp will increase Hydralazine 50 mg q 8 hr   10/8-Abdominal distention noted with hypoactive bowel sounds, Kub noted from this weekend. Daily bowel movements noted. Will repeat PTT as there were 2 therapeutics prior on same dosing, also repeat CBC as well. Will transfuse if remains low. RCU weaning  10/10: 4 dark BMs reported night of 10/9, concerning for melena,  hgb 6.9 in AM,  1U RBC given . Goal heparin reduced to 50-70. GI consulted due to GIB Hx with known AV malformations. No intvn.  10/11: H/H stable post 1U with no furthers melena or intvn from GI. Resumed coumadin.  10/12: agitated this am. seroquel not working changed back to klonopin  10/13 No events overnight. Continues on heparin gtt/coumadin bridge. INR 1.45, coumadin 7.5 mg x1. Continue with PS trials as tolerated.   10/14 Heparin gtt d/c, INR 2.82, coumadin 1mg x1. Continue with PS trials as tolerated. LE edema, lasix 20 mg po x1. Hg 7.7 stable.   10/21 INR 4.97, will hold Coumadin today  + cloudy urine per RN, increased WBC, will send UA and obtain CXR . Cr 1.18, add IVF NS 50 cc/hr x 6 hrs   10/22: hgb 6.8, transfused 1U RBC.    LATANYA with Cr 1.4.    PAST HISTORY  --------------------------------------------------------------------------------  No significant changes to PMH, PSH, FHx, SHx, unless otherwise noted    ALLERGIES & MEDICATIONS  --------------------------------------------------------------------------------  Allergies    penicillin (Rash)    Intolerances      Standing Inpatient Medications  ALBUTerol/ipratropium for Nebulization 3 milliLiter(s) Nebulizer every 6 hours  aspirin  chewable 81 milliGRAM(s) Oral daily  aztreonam  IVPB      buDESOnide   0.25 milliGRAM(s) Respule 0.25 milliGRAM(s) Inhalation every 12 hours  diltiazem    Tablet 30 milliGRAM(s) Oral every 6 hours  doxazosin 2 milliGRAM(s) Oral at bedtime  fentaNYL   Patch  25 MICROgram(s)/Hr 1 Patch Transdermal every 72 hours  heparin  Infusion 950 Unit(s)/Hr IV Continuous <Continuous>  hydrALAZINE 25 milliGRAM(s) Oral every 8 hours  latanoprost 0.005% Ophthalmic Solution 1 Drop(s) Both EYES at bedtime  pantoprazole  Injectable 40 milliGRAM(s) IV Push every 12 hours  simethicone 80 milliGRAM(s) Chew every 8 hours  sodium chloride 3%  Inhalation 3 milliLiter(s) Inhalation two times a day  warfarin 6 milliGRAM(s) Oral at bedtime    PRN Inpatient Medications  acetaminophen    Suspension .. 650 milliGRAM(s) Oral every 6 hours PRN  clonazePAM Tablet 1 milliGRAM(s) Oral every 8 hours PRN  melatonin 1 milliGRAM(s) Oral at bedtime PRN      REVIEW OF SYSTEMS  --------------------------------------------------------------------------------  pt unable     VITALS/PHYSICAL EXAM  --------------------------------------------------------------------------------  T(C): 36.7 (10-23-18 @ 20:55), Max: 36.7 (10-23-18 @ 17:28)  HR: 65 (10-23-18 @ 20:55) (60 - 73)  BP: 149/72 (10-23-18 @ 20:55) (107/57 - 149/72)  RR: 19 (10-23-18 @ 20:55) (18 - 19)  SpO2: 99% (10-23-18 @ 20:55) (95% - 100%)  Wt(kg): --        10-22-18 @ 07:01  -  10-23-18 @ 07:00  --------------------------------------------------------  IN: 990 mL / OUT: 0 mL / NET: 990 mL    10-23-18 @ 07:01  -  10-23-18 @ 21:04  --------------------------------------------------------  IN: 975 mL / OUT: 0 mL / NET: 975 mL      Physical Exam:  	    Physical Exam:  	Gen: trach   	Pulm: Decreased breath sounds b/l bases. no rales +  ronchi - wheezing  	CV: RRR, S1/S2. no rub  	Abd: +BS, soft, nontender/nondistended  	: No suprapubic tenderness.               Extremity: No cyanosis, trace edema no clubbing  	no decrease skin turgor       LABS/STUDIES  --------------------------------------------------------------------------------              8.1    11.0  >-----------<  235      [10-23-18 @ 07:24]              25.5     132  |  92  |  89  ----------------------------<  202      [10-23-18 @ 07:22]  4.8   |  25  |  1.58        Ca     9.5     [10-23-18 @ 07:22]      Mg     2.7     [10-23-18 @ 07:22]      Phos  3.6     [10-22-18 @ 07:04]      PT/INR: PT 21.0 , INR 1.90       [10-23-18 @ 07:24]  PTT: 42.1       [10-23-18 @ 17:03]      Creatinine Trend:  SCr 1.58 [10-23 @ 07:22]  SCr 1.44 [10-22 @ 07:04]  SCr 1.18 [10-21 @ 06:59]  SCr 0.92 [10-19 @ 09:06]  SCr 0.93 [10-17 @ 07:31]              Urinalysis - [10-21-18 @ 17:21]      Color Yellow / Appearance Turbid / SG 1.018 / pH 5.5      Gluc Negative / Ketone Negative  / Bili Negative / Urobili 2 mg/dL       Blood Moderate / Protein 100 mg/dL / Leuk Est Large / Nitrite Negative      RBC 16 /  / Hyaline 0 / Gran  / Sq Epi  / Non Sq Epi 3 / Bacteria Many      PTH -- (Ca 11.1)      [09-28-18 @ 09:27]   67  HbA1c 7.2      [09-22-18 @ 02:44]

## 2018-10-23 NOTE — CONSULT NOTE ADULT - ASSESSMENT
4  Year old Female with PMH significant for COPD, JENNIE on BiPAP at home , multivalvular disease (severe AS Not a candidate for TAVR, S/p MV replacement), HFpEF/ Severe diastolic failure, A-fib on coumadin, sick sinus syndrome S/p pacemaker placement, HTN, and CKD3 who was admitted for acute respiratory failure requiring intubation suspected to be 2/2 COPD exacerbation c/b PNA w/ +entero/rhinovirus and GN coccobacillus and H. influenza bacteremia, requiring continued respiratory support and tracheostomy. Patient S/p Trach placement 10/3 and PEG Placement 10/4. pt required diuretics. in addition also developed UTI as well     1- LATANYA   2- chf  3- UTI     suspect LATANYA in setting of diuretic use as well as UTI   cont aztreonam  hold torsemide  s/p prbc trend hb  d/w RCU when seen this evening   check alb check uric acid
83 y/o F w/ a PMH significant for COPD on home O2 (though has not used in past few months), JENNIE on BiPAP, rheumatic heart disease s/p mitral valve replacement, HFpEF, aortic stenosis, A-fib on coumadin, sick sinus syndrome s/p pacemaker placement, HTN, and CKD3 who presented to the ED w/ dyspnea x2 days. Admitted w/ COPD exacerbation, ADHF, and +entero/rhinovirus.     #NSTEMI   - At this time it is unclear if it is a type I or type II NSTEMI  - Pt is septic w/ worsening Cr, which could be attributing to trop leak.   - Check CK CKMB and trop q8  - Pt already therapeutic on INR  - If INR falls below 2 would start heparin gtt (for NSTEMI, a-fib, and MV)  - CXR reading as pulm edema, however, would not give lasix in the setting of sepsis and LATANYA  - XR findings could also be due to PNA related to enterovirus.   - Check TTE.     Barb Ray MD  Cardiology Fellow - PGY-4  LAST: [id number]  NS: [phone]  [id number]
A/P: 84F with respiratory failure 2/2 COPD exacerbation, now in need of a tracheostomy.    -NPOpMN, stop TFs  -hold heparin gtt at midnight   -continued care per MICU  -discussed with Dr. Jordan
urinary retention  pelvic prolapse
Impression:  83 y/o F w/ a PMH significant for COPD, JENNIE on BiPAP, multivalvular disease, HFpEF/severe diastolic failure, A-fib on coumadin, sick sinus syndrome s/p pacemaker placement, HTN, and CKD3 who was admitted for acute respiratory failure requiring intubation suspected to be 2/2 COPD exacerbation c/b PNA w/ +entero/rhinovirus and GN coccobacillus and H. influenza bacteremia.  GI consulted for PEG placement.  Patient currently on minimal pressor support and planned for trach tomorrow.    Recommendations   - PEG with family consent   - tentative plan for Thursday given OR plan for tomorrow   - NPO after midnight Wednesday   - heparin will need to be held prior to procedure    Cynthia Dixon, PGY-4  Gastroenterology Fellow  Pager x [phone] or [phone]  (After 5 pm or on weekends please page GI on call)

## 2018-10-24 LAB
-  AMIKACIN: SIGNIFICANT CHANGE UP
-  AMOXICILLIN/CLAVULANIC ACID: SIGNIFICANT CHANGE UP
-  AMPICILLIN/SULBACTAM: SIGNIFICANT CHANGE UP
-  AMPICILLIN: SIGNIFICANT CHANGE UP
-  AZTREONAM: SIGNIFICANT CHANGE UP
-  CEFAZOLIN: SIGNIFICANT CHANGE UP
-  CEFEPIME: SIGNIFICANT CHANGE UP
-  CEFOXITIN: SIGNIFICANT CHANGE UP
-  CEFTRIAXONE: SIGNIFICANT CHANGE UP
-  CIPROFLOXACIN: SIGNIFICANT CHANGE UP
-  ERTAPENEM: SIGNIFICANT CHANGE UP
-  GENTAMICIN: SIGNIFICANT CHANGE UP
-  IMIPENEM: SIGNIFICANT CHANGE UP
-  LEVOFLOXACIN: SIGNIFICANT CHANGE UP
-  MEROPENEM: SIGNIFICANT CHANGE UP
-  NITROFURANTOIN: SIGNIFICANT CHANGE UP
-  PIPERACILLIN/TAZOBACTAM: SIGNIFICANT CHANGE UP
-  TIGECYCLINE: SIGNIFICANT CHANGE UP
-  TOBRAMYCIN: SIGNIFICANT CHANGE UP
-  TRIMETHOPRIM/SULFAMETHOXAZOLE: SIGNIFICANT CHANGE UP
ANION GAP SERPL CALC-SCNC: 17 MMOL/L — SIGNIFICANT CHANGE UP (ref 5–17)
APTT BLD: 41.6 SEC — HIGH (ref 27.5–37.4)
APTT BLD: 51.1 SEC — HIGH (ref 27.5–37.4)
APTT BLD: 63.9 SEC — HIGH (ref 27.5–37.4)
BUN SERPL-MCNC: 94 MG/DL — HIGH (ref 7–23)
CALCIUM SERPL-MCNC: 9.5 MG/DL — SIGNIFICANT CHANGE UP (ref 8.4–10.5)
CHLORIDE SERPL-SCNC: 93 MMOL/L — LOW (ref 96–108)
CO2 SERPL-SCNC: 26 MMOL/L — SIGNIFICANT CHANGE UP (ref 22–31)
CREAT SERPL-MCNC: 1.57 MG/DL — HIGH (ref 0.5–1.3)
CULTURE RESULTS: SIGNIFICANT CHANGE UP
GLUCOSE SERPL-MCNC: 164 MG/DL — HIGH (ref 70–99)
HCT VFR BLD CALC: 26 % — LOW (ref 34.5–45)
HGB BLD-MCNC: 8.1 G/DL — LOW (ref 11.5–15.5)
INR BLD: 2.85 RATIO — HIGH (ref 0.88–1.16)
MAGNESIUM SERPL-MCNC: 2.9 MG/DL — HIGH (ref 1.6–2.6)
MCHC RBC-ENTMCNC: 27.2 PG — SIGNIFICANT CHANGE UP (ref 27–34)
MCHC RBC-ENTMCNC: 31.2 GM/DL — LOW (ref 32–36)
MCV RBC AUTO: 87.3 FL — SIGNIFICANT CHANGE UP (ref 80–100)
METHOD TYPE: SIGNIFICANT CHANGE UP
ORGANISM # SPEC MICROSCOPIC CNT: SIGNIFICANT CHANGE UP
ORGANISM # SPEC MICROSCOPIC CNT: SIGNIFICANT CHANGE UP
PLATELET # BLD AUTO: 233 K/UL — SIGNIFICANT CHANGE UP (ref 150–400)
POTASSIUM SERPL-MCNC: 4 MMOL/L — SIGNIFICANT CHANGE UP (ref 3.5–5.3)
POTASSIUM SERPL-SCNC: 4 MMOL/L — SIGNIFICANT CHANGE UP (ref 3.5–5.3)
PROTHROM AB SERPL-ACNC: 31.5 SEC — HIGH (ref 9.8–12.7)
RBC # BLD: 2.98 M/UL — LOW (ref 3.8–5.2)
RBC # FLD: 17.6 % — HIGH (ref 10.3–14.5)
SODIUM SERPL-SCNC: 136 MMOL/L — SIGNIFICANT CHANGE UP (ref 135–145)
SPECIMEN SOURCE: SIGNIFICANT CHANGE UP
WBC # BLD: 8.7 K/UL — SIGNIFICANT CHANGE UP (ref 3.8–10.5)
WBC # FLD AUTO: 8.7 K/UL — SIGNIFICANT CHANGE UP (ref 3.8–10.5)

## 2018-10-24 PROCEDURE — 99233 SBSQ HOSP IP/OBS HIGH 50: CPT | Mod: GC

## 2018-10-24 RX ORDER — ERTAPENEM SODIUM 1 G/1
1000 INJECTION, POWDER, LYOPHILIZED, FOR SOLUTION INTRAMUSCULAR; INTRAVENOUS ONCE
Qty: 0 | Refills: 0 | Status: COMPLETED | OUTPATIENT
Start: 2018-10-24 | End: 2018-10-24

## 2018-10-24 RX ORDER — ERTAPENEM SODIUM 1 G/1
1000 INJECTION, POWDER, LYOPHILIZED, FOR SOLUTION INTRAMUSCULAR; INTRAVENOUS EVERY 24 HOURS
Qty: 0 | Refills: 0 | Status: DISCONTINUED | OUTPATIENT
Start: 2018-10-25 | End: 2018-10-30

## 2018-10-24 RX ORDER — WARFARIN SODIUM 2.5 MG/1
5 TABLET ORAL ONCE
Qty: 0 | Refills: 0 | Status: COMPLETED | OUTPATIENT
Start: 2018-10-24 | End: 2018-10-24

## 2018-10-24 RX ORDER — HEPARIN SODIUM 5000 [USP'U]/ML
1050 INJECTION INTRAVENOUS; SUBCUTANEOUS
Qty: 25000 | Refills: 0 | Status: DISCONTINUED | OUTPATIENT
Start: 2018-10-24 | End: 2018-10-28

## 2018-10-24 RX ORDER — ERTAPENEM SODIUM 1 G/1
INJECTION, POWDER, LYOPHILIZED, FOR SOLUTION INTRAMUSCULAR; INTRAVENOUS
Qty: 0 | Refills: 0 | Status: DISCONTINUED | OUTPATIENT
Start: 2018-10-24 | End: 2018-10-30

## 2018-10-24 RX ORDER — SODIUM CHLORIDE 9 MG/ML
1000 INJECTION INTRAMUSCULAR; INTRAVENOUS; SUBCUTANEOUS
Qty: 0 | Refills: 0 | Status: DISCONTINUED | OUTPATIENT
Start: 2018-10-24 | End: 2018-10-26

## 2018-10-24 RX ADMIN — SODIUM CHLORIDE 75 MILLILITER(S): 9 INJECTION INTRAMUSCULAR; INTRAVENOUS; SUBCUTANEOUS at 12:02

## 2018-10-24 RX ADMIN — ERTAPENEM SODIUM 120 MILLIGRAM(S): 1 INJECTION, POWDER, LYOPHILIZED, FOR SOLUTION INTRAMUSCULAR; INTRAVENOUS at 14:03

## 2018-10-24 RX ADMIN — SIMETHICONE 80 MILLIGRAM(S): 80 TABLET, CHEWABLE ORAL at 14:03

## 2018-10-24 RX ADMIN — Medication 3 MILLILITER(S): at 00:07

## 2018-10-24 RX ADMIN — Medication 650 MILLIGRAM(S): at 14:04

## 2018-10-24 RX ADMIN — HEPARIN SODIUM 10.5 UNIT(S)/HR: 5000 INJECTION INTRAVENOUS; SUBCUTANEOUS at 18:56

## 2018-10-24 RX ADMIN — FENTANYL CITRATE 1 PATCH: 50 INJECTION INTRAVENOUS at 20:15

## 2018-10-24 RX ADMIN — WARFARIN SODIUM 5 MILLIGRAM(S): 2.5 TABLET ORAL at 22:56

## 2018-10-24 RX ADMIN — Medication 650 MILLIGRAM(S): at 00:35

## 2018-10-24 RX ADMIN — Medication 25 MILLIGRAM(S): at 06:00

## 2018-10-24 RX ADMIN — SIMETHICONE 80 MILLIGRAM(S): 80 TABLET, CHEWABLE ORAL at 06:00

## 2018-10-24 RX ADMIN — Medication 2 MILLIGRAM(S): at 22:55

## 2018-10-24 RX ADMIN — LATANOPROST 1 DROP(S): 0.05 SOLUTION/ DROPS OPHTHALMIC; TOPICAL at 22:56

## 2018-10-24 RX ADMIN — Medication 3 MILLILITER(S): at 05:58

## 2018-10-24 RX ADMIN — PANTOPRAZOLE SODIUM 40 MILLIGRAM(S): 20 TABLET, DELAYED RELEASE ORAL at 06:00

## 2018-10-24 RX ADMIN — SODIUM CHLORIDE 3 MILLILITER(S): 9 INJECTION INTRAMUSCULAR; INTRAVENOUS; SUBCUTANEOUS at 06:00

## 2018-10-24 RX ADMIN — Medication 1 MILLIGRAM(S): at 00:35

## 2018-10-24 RX ADMIN — HEPARIN SODIUM 10.5 UNIT(S)/HR: 5000 INJECTION INTRAVENOUS; SUBCUTANEOUS at 12:03

## 2018-10-24 RX ADMIN — Medication 0.25 MILLIGRAM(S): at 17:41

## 2018-10-24 RX ADMIN — Medication 50 MILLIGRAM(S): at 05:59

## 2018-10-24 RX ADMIN — Medication 25 MILLIGRAM(S): at 22:55

## 2018-10-24 RX ADMIN — SIMETHICONE 80 MILLIGRAM(S): 80 TABLET, CHEWABLE ORAL at 22:56

## 2018-10-24 RX ADMIN — Medication 650 MILLIGRAM(S): at 01:00

## 2018-10-24 RX ADMIN — PANTOPRAZOLE SODIUM 40 MILLIGRAM(S): 20 TABLET, DELAYED RELEASE ORAL at 17:56

## 2018-10-24 RX ADMIN — Medication 0.25 MILLIGRAM(S): at 05:58

## 2018-10-24 RX ADMIN — Medication 3 MILLILITER(S): at 17:41

## 2018-10-24 RX ADMIN — Medication 25 MILLIGRAM(S): at 14:04

## 2018-10-24 RX ADMIN — Medication 3 MILLILITER(S): at 23:55

## 2018-10-24 RX ADMIN — Medication 81 MILLIGRAM(S): at 12:02

## 2018-10-24 RX ADMIN — SODIUM CHLORIDE 3 MILLILITER(S): 9 INJECTION INTRAMUSCULAR; INTRAVENOUS; SUBCUTANEOUS at 17:41

## 2018-10-24 RX ADMIN — Medication 3 MILLILITER(S): at 11:37

## 2018-10-24 NOTE — PROGRESS NOTE ADULT - ATTENDING COMMENTS
Patient seen and examined.   1. Acute resp failure with hypoxia:  - Remains vent dependent  - Tolerates short durations of PS and TC trials  - Cont pulm toilet   2. HFpEF/ s/p mech MVR/ AS/ a fib:  - Cont ASA, cardizem and hydralazine  - INR therapeutic. Cont coumadin (INR goal 2.5-3.5)  3. LATANYA:  - Cr trending down  - Cont gentle IV hydration  - Follow UO/ electrolytes  4. Anemia:  - Hb stable  5. Agitation:  - Well controlled on Klonopin  6. UTI:  - Ucx  with E coli  - ESBL E coli. Change to Ertapenem  7. Gen:  - D-c planning to vent facility once acute issues resolved

## 2018-10-24 NOTE — PROGRESS NOTE ADULT - PROBLEM SELECTOR PLAN 10
Cr increased from 0.9 to 1.4 over 2 days. Noted to be on torsemide 30mg QD. Will hold torsemide; s/p 1U RBC 10/22.  Add NS times 6 hours today

## 2018-10-24 NOTE — PROGRESS NOTE ADULT - SUBJECTIVE AND OBJECTIVE BOX
[facility]   [phone]  RENAL FOLLOW UP NOTE  --------------------------------------------------------------------------------  Chief Complaint:    24 hour events/subjective:    seen earlier and d/w rcu team when seen     PAST HISTORY  --------------------------------------------------------------------------------  No significant changes to PMH, PSH, FHx, SHx, unless otherwise noted    ALLERGIES & MEDICATIONS  --------------------------------------------------------------------------------  Allergies    penicillin (Rash)    Intolerances      Standing Inpatient Medications  ALBUTerol/ipratropium for Nebulization 3 milliLiter(s) Nebulizer every 6 hours  aspirin  chewable 81 milliGRAM(s) Oral daily  buDESOnide   0.25 milliGRAM(s) Respule 0.25 milliGRAM(s) Inhalation every 12 hours  diltiazem    Tablet 30 milliGRAM(s) Oral every 6 hours  doxazosin 2 milliGRAM(s) Oral at bedtime  ertapenem  IVPB      fentaNYL   Patch  25 MICROgram(s)/Hr 1 Patch Transdermal every 72 hours  heparin  Infusion 1050 Unit(s)/Hr IV Continuous <Continuous>  hydrALAZINE 25 milliGRAM(s) Oral every 8 hours  latanoprost 0.005% Ophthalmic Solution 1 Drop(s) Both EYES at bedtime  pantoprazole  Injectable 40 milliGRAM(s) IV Push every 12 hours  simethicone 80 milliGRAM(s) Chew every 8 hours  sodium chloride 0.9%. 1000 milliLiter(s) IV Continuous <Continuous>  sodium chloride 3%  Inhalation 3 milliLiter(s) Inhalation two times a day  warfarin 5 milliGRAM(s) Oral once    PRN Inpatient Medications  acetaminophen    Suspension .. 650 milliGRAM(s) Oral every 6 hours PRN  melatonin 1 milliGRAM(s) Oral at bedtime PRN      REVIEW OF SYSTEMS  --------------------------------------------------------------------------------    trach/vent     VITALS/PHYSICAL EXAM  --------------------------------------------------------------------------------  T(C): 37 (10-24-18 @ 13:53), Max: 37 (10-24-18 @ 13:53)  HR: 66 (10-24-18 @ 20:37) (60 - 73)  BP: 120/61 (10-24-18 @ 13:53) (116/65 - 149/72)  RR: 22 (10-24-18 @ 13:53) (14 - 22)  SpO2: 99% (10-24-18 @ 20:37) (88% - 100%)  Wt(kg): --        10-23-18 @ 07:01  -  10-24-18 @ 07:00  --------------------------------------------------------  IN: 1879 mL / OUT: 0 mL / NET: 1879 mL    10-24-18 @ 07:01  -  10-24-18 @ 20:52  --------------------------------------------------------  IN: 150 mL / OUT: 0 mL / NET: 150 mL      Physical Exam:  	  Gen: trach   	Pulm: Decreased breath sounds b/l bases. no rales +  ronchi - wheezing  	CV: RRR, S1/S2. no rub  	Abd: +BS, soft, nontender/nondistended  	: No suprapubic tenderness.               Extremity: No cyanosis, trace edema no clubbing  	no decrease skin turgor   	      LABS/STUDIES  --------------------------------------------------------------------------------              8.1    8.7   >-----------<  233      [10-24-18 @ 06:54]              26.0     136  |  93  |  94  ----------------------------<  164      [10-24-18 @ 06:53]  4.0   |  26  |  1.57        Ca     9.5     [10-24-18 @ 06:53]      Mg     2.9     [10-24-18 @ 06:53]      PT/INR: PT 31.5 , INR 2.85       [10-24-18 @ 08:35]  PTT: 63.9       [10-24-18 @ 18:05]      Creatinine Trend:  SCr 1.57 [10-24 @ 06:53]  SCr 1.58 [10-23 @ 07:22]  SCr 1.44 [10-22 @ 07:04]  SCr 1.18 [10-21 @ 06:59]  SCr 0.92 [10-19 @ 09:06]              Urinalysis - [10-21-18 @ 17:21]      Color Yellow / Appearance Turbid / SG 1.018 / pH 5.5      Gluc Negative / Ketone Negative  / Bili Negative / Urobili 2 mg/dL       Blood Moderate / Protein 100 mg/dL / Leuk Est Large / Nitrite Negative      RBC 16 /  / Hyaline 0 / Gran  / Sq Epi  / Non Sq Epi 3 / Bacteria Many      PTH -- (Ca 11.1)      [09-28-18 @ 09:27]   67  HbA1c 7.2      [09-22-18 @ 02:44]

## 2018-10-24 NOTE — PROGRESS NOTE ADULT - ASSESSMENT
84  Year old Female with PMH significant for COPD, JENNIE on BiPAP at home , multivalvular disease (severe AS Not a candidate for TAVR, S/p MV replacement), HFpEF/ Severe diastolic failure, A-fib on coumadin, sick sinus syndrome S/p pacemaker placement, HTN, and CKD3 who was admitted for acute respiratory failure requiring intubation suspected to be 2/2 COPD exacerbation c/b PNA w/ +entero/rhinovirus and GN coccobacillus and H. influenza bacteremia, requiring continued respiratory support and tracheostomy. Patient S/p Trach placement 10/3 and PEG Placement 10/4.     10/7: Patient remains with abdominal distention today but tolerating trickle feeds, ABD X-ray  performed this morning patient remains with air filled loops of small and large bowel. GI fellow called to re-eval the patient this morning, X-ray  reviewed slightly worsened compared to yesterday. GI fellow recommended to place patient in Left lateral decubitus position and oob to chair later today. Patient with large amount of gas expressed with turning as Per RN. As per GI no role for rectal tube at this time and can continue to advance feeds as tolerated to promote gastric motility. H+H Has remained stable case d/w  will restart coumadin this evening. Pt with elevated bp will increase Hydralazine 50 mg q 8 hr   10/8-Abdominal distention noted with hypoactive bowel sounds, Kub noted from this weekend. Daily bowel movements noted. Will repeat PTT as there were 2 therapeutics prior on same dosing, also repeat CBC as well. Will transfuse if remains low. RCU weaning  10/10: 4 dark BMs reported night of 10/9, concerning for melena,  hgb 6.9 in AM,  1U RBC given . Goal heparin reduced to 50-70. GI consulted due to GIB Hx with known AV malformations. No intvn.  10/11: H/H stable post 1U with no furthers melena or intvn from GI. Resumed coumadin.  10/12: agitated this am. seroquel not working changed back to klonopin  10/13 No events overnight. Continues on heparin gtt/coumadin bridge. INR 1.45, coumadin 7.5 mg x1. Continue with PS trials as tolerated.   10/14 Heparin gtt d/c, INR 2.82, coumadin 1mg x1. Continue with PS trials as tolerated. LE edema, lasix 20 mg po x1. Hg 7.7 stable.   10/15:  INR 2.3, reduced coumadin to 5mg  10/16: INR 1.8, resumed heparin, coumadin increased to 7.5mg tonight. Tolerated trach collar for 40mins  10/18: Await INR results today.  10/19 no events overnight. F/u labs today. d/c planning   10/21 INR 4.97, will hold Coumadin today  + cloudy urine per RN, increased WBC, will send UA and obtain CXR . Cr 1.18, add IVF NS 50 cc/hr x 6 hrs   10/22: hgb 6.8, transfused 1U RBC.  LATANYA with Cr 1.4.  10/23: Renal consulted for Cr 1.5. Started mild IVF 75ml/ydv12pgd. bladder US ordered. Started renal dose Aztreonam for UTI (10/22 UCx +E.Coli). f/u sensitivities  10/24: Aztreonam changed to Ertapenem as ecoli is ESBL. Watch for allergic cross sensitivity reaction as pt is allergic to pencillin. Continue to dose coumadin daily. Pt retarted on heparin gtt yesterday for subtherapeutic INR.

## 2018-10-24 NOTE — PROGRESS NOTE ADULT - PROBLEM SELECTOR PLAN 1
Patient Failed Extubation attempt x 2   Patient S/p Tracheostomy 10/3 ( # 6 Cuffed Bert)   Attempt weaning trials as tolerated daily; trials of trach collar as tolerated.   Continue Nebulizers and Chest PT

## 2018-10-24 NOTE — PROGRESS NOTE ADULT - PROBLEM SELECTOR PROBLEM 10
Discharge planning issues
Discharge planning issues
LATANYA (acute kidney injury)
Discharge planning issues
LATANYA (acute kidney injury)
Discharge planning issues
LATANYA (acute kidney injury)
Discharge planning issues

## 2018-10-24 NOTE — PROGRESS NOTE ADULT - ASSESSMENT
4  Year old Female with PMH significant for COPD, JENNIE on BiPAP at home , multivalvular disease (severe AS Not a candidate for TAVR, S/p MV replacement), HFpEF/ Severe diastolic failure, A-fib on coumadin, sick sinus syndrome S/p pacemaker placement, HTN, and CKD3 who was admitted for acute respiratory failure requiring intubation suspected to be 2/2 COPD exacerbation c/b PNA w/ +entero/rhinovirus and GN coccobacillus and H. influenza bacteremia, requiring continued respiratory support and tracheostomy. Patient S/p Trach placement 10/3 and PEG Placement 10/4. pt required diuretics. in addition also developed UTI as well     1- LATANYA   2- chf  3- UTI     suspect LATANYA in setting of diuretic use as well as UTI   cont aztreonam  hold torsemide today as well    trend hb  check alb check uric acid  d/ daughter this am

## 2018-10-24 NOTE — PROGRESS NOTE ADULT - SUBJECTIVE AND OBJECTIVE BOX
Patient is a 84y old  Female who presents with a chief complaint of COPD exacerbation, ADHF (23 Oct 2018 21:02)      Interval Events:    REVIEW OF SYSTEMS:  [ ] Positive  [ ] All other systems negative  [ x] Unable to assess ROS because __no-verbal______    Vital Signs Last 24 Hrs  T(C): 36.8 (10-24-18 @ 09:19), Max: 36.8 (10-24-18 @ 09:19)  T(F): 98.2 (10-24-18 @ 09:19), Max: 98.2 (10-24-18 @ 09:19)  HR: 67 (10-24-18 @ 09:19) (60 - 73)  BP: 140/51 (10-24-18 @ 09:19) (107/57 - 149/72)  RR: 14 (10-24-18 @ 09:19) (14 - 22)  SpO2: 99% (10-24-18 @ 09:19) (95% - 100%)    PHYSICAL EXAM:  HEENT:   [x ]Tracheostomy: shiley 6 cuffed  [ ]Pupils equal  [ ]No oral lesions  [ ]Abnormal    SKIN  [x ]No Rash  [ ] Abnormal  [ ] pressure    CARDIAC  [ x]Regular  [ ]Abnormal    PULMONARY  [x ]Bilateral Clear Breath Sounds  [ ]Normal Excursion  [ ]Abnormal    GI  [x ]PEG      [ ] +BS		              [ ]Soft, nondistended, nontender	  [ ]Abnormal    MUSCULOSKELETAL                                   [ ]Bedbound                 [ ]Abnormal    [ ]Ambulatory/OOB to chair                           EXTREMITIES                                         [ x]Normal  [ ]Edema                           NEUROLOGIC  [ x] Normal, non focal  [ ] Focal findings:    PSYCHIATRIC  [x ]Alert and appropriate  [ ] Sedated	 [ ]Agitated    :  Rosales: [ ] Yes, if yes: Date of Placement:                   [ x ] No    LINES: Central Lines [ ] Yes, if yes: Date of Placement                                     [ x ] No    HOSPITAL MEDICATIONS:  MEDICATIONS  (STANDING):  ALBUTerol/ipratropium for Nebulization 3 milliLiter(s) Nebulizer every 6 hours  aspirin  chewable 81 milliGRAM(s) Oral daily  buDESOnide   0.25 milliGRAM(s) Respule 0.25 milliGRAM(s) Inhalation every 12 hours  diltiazem    Tablet 30 milliGRAM(s) Oral every 6 hours  doxazosin 2 milliGRAM(s) Oral at bedtime  ertapenem  IVPB      fentaNYL   Patch  25 MICROgram(s)/Hr 1 Patch Transdermal every 72 hours  heparin  Infusion 1050 Unit(s)/Hr (10.5 mL/Hr) IV Continuous <Continuous>  hydrALAZINE 25 milliGRAM(s) Oral every 8 hours  latanoprost 0.005% Ophthalmic Solution 1 Drop(s) Both EYES at bedtime  pantoprazole  Injectable 40 milliGRAM(s) IV Push every 12 hours  simethicone 80 milliGRAM(s) Chew every 8 hours  sodium chloride 3%  Inhalation 3 milliLiter(s) Inhalation two times a day  warfarin 5 milliGRAM(s) Oral once    MEDICATIONS  (PRN):  acetaminophen    Suspension .. 650 milliGRAM(s) Oral every 6 hours PRN Mild Pain (1 - 3)  melatonin 1 milliGRAM(s) Oral at bedtime PRN Insomnia      LABS:                        8.1    8.7   )-----------( 233      ( 24 Oct 2018 06:54 )             26.0     10-24    136  |  93<L>  |  94<H>  ----------------------------<  164<H>  4.0   |  26  |  1.57<H>    Ca    9.5      24 Oct 2018 06:53  Mg     2.9     10-24      PT/INR - ( 24 Oct 2018 08:35 )   PT: 31.5 sec;   INR: 2.85 ratio         PTT - ( 24 Oct 2018 08:35 )  PTT:41.6 sec        CAPILLARY BLOOD GLUCOSE    MICROBIOLOGY:     RADIOLOGY:  [ ] Reviewed and interpreted by me    Mode: AC/ CMV (Assist Control/ Continuous Mandatory Ventilation)  RR (machine): 14  TV (machine): 400  FiO2: 30  PEEP: 5  ITime: 1  MAP: 10  PIP: 28 Patient is a 84y old  Female who presents with a chief complaint of COPD exacerbation, ADHF (23 Oct 2018 21:02)      Interval Events: No acute events overnight     REVIEW OF SYSTEMS:  [ ] Positive  [ ] All other systems negative  [ x] Unable to assess ROS because __no-verbal______    Vital Signs Last 24 Hrs  T(C): 36.8 (10-24-18 @ 09:19), Max: 36.8 (10-24-18 @ 09:19)  T(F): 98.2 (10-24-18 @ 09:19), Max: 98.2 (10-24-18 @ 09:19)  HR: 67 (10-24-18 @ 09:19) (60 - 73)  BP: 140/51 (10-24-18 @ 09:19) (107/57 - 149/72)  RR: 14 (10-24-18 @ 09:19) (14 - 22)  SpO2: 99% (10-24-18 @ 09:19) (95% - 100%)    PHYSICAL EXAM:  HEENT:   [x ]Tracheostomy: shiley 6 cuffed  [ ]Pupils equal  [ ]No oral lesions  [ ]Abnormal    SKIN  [x ]No Rash  [ ] Abnormal  [ ] pressure    CARDIAC  [ x]Regular  [ ]Abnormal    PULMONARY  [x ]Bilateral Clear Breath Sounds  [ ]Normal Excursion  [ ]Abnormal    GI  [x ]PEG      [ ] +BS		              [ ]Soft, nondistended, nontender	  [ ]Abnormal    MUSCULOSKELETAL                                   [ ]Bedbound                 [ ]Abnormal    [ ]Ambulatory/OOB to chair                           EXTREMITIES                                         [ x]Normal  [ ]Edema                           NEUROLOGIC  [ x] Normal, non focal  [ ] Focal findings:    PSYCHIATRIC  [x ]Alert and appropriate  [ ] Sedated	 [ ]Agitated    :  Rosales: [ ] Yes, if yes: Date of Placement:                   [ x ] No    LINES: Central Lines [ ] Yes, if yes: Date of Placement                                     [ x ] No    HOSPITAL MEDICATIONS:  MEDICATIONS  (STANDING):  ALBUTerol/ipratropium for Nebulization 3 milliLiter(s) Nebulizer every 6 hours  aspirin  chewable 81 milliGRAM(s) Oral daily  buDESOnide   0.25 milliGRAM(s) Respule 0.25 milliGRAM(s) Inhalation every 12 hours  diltiazem    Tablet 30 milliGRAM(s) Oral every 6 hours  doxazosin 2 milliGRAM(s) Oral at bedtime  ertapenem  IVPB      fentaNYL   Patch  25 MICROgram(s)/Hr 1 Patch Transdermal every 72 hours  heparin  Infusion 1050 Unit(s)/Hr (10.5 mL/Hr) IV Continuous <Continuous>  hydrALAZINE 25 milliGRAM(s) Oral every 8 hours  latanoprost 0.005% Ophthalmic Solution 1 Drop(s) Both EYES at bedtime  pantoprazole  Injectable 40 milliGRAM(s) IV Push every 12 hours  simethicone 80 milliGRAM(s) Chew every 8 hours  sodium chloride 3%  Inhalation 3 milliLiter(s) Inhalation two times a day  warfarin 5 milliGRAM(s) Oral once    MEDICATIONS  (PRN):  acetaminophen    Suspension .. 650 milliGRAM(s) Oral every 6 hours PRN Mild Pain (1 - 3)  melatonin 1 milliGRAM(s) Oral at bedtime PRN Insomnia      LABS:                        8.1    8.7   )-----------( 233      ( 24 Oct 2018 06:54 )             26.0     10-24    136  |  93<L>  |  94<H>  ----------------------------<  164<H>  4.0   |  26  |  1.57<H>    Ca    9.5      24 Oct 2018 06:53  Mg     2.9     10-24      PT/INR - ( 24 Oct 2018 08:35 )   PT: 31.5 sec;   INR: 2.85 ratio         PTT - ( 24 Oct 2018 08:35 )  PTT:41.6 sec        CAPILLARY BLOOD GLUCOSE    MICROBIOLOGY:     RADIOLOGY:  [ ] Reviewed and interpreted by me    Mode: AC/ CMV (Assist Control/ Continuous Mandatory Ventilation)  RR (machine): 14  TV (machine): 400  FiO2: 30  PEEP: 5  ITime: 1  MAP: 10  PIP: 28

## 2018-10-24 NOTE — PROGRESS NOTE ADULT - PROBLEM SELECTOR PLAN 6
ECHO 9/14: + Mechanical Mitral Valve, Severe AS, Stage 3 Diastolic Dysfxn   Patient With Hx of Sick Sinus Syndrome and PPM  / Atrial Fibrillation   Continue Hydralazine   Continue Cardizem 30 mg q 6 hrs for AFIBB rate control (hold if systolic BP less than 100)  Continue to monitor BP and Heart Rate

## 2018-10-24 NOTE — PROGRESS NOTE ADULT - PROBLEM SELECTOR PLAN 5
Patient with HX of AS ( Not a candidate for TAVR )  Patient S/p Mechanical Mitral Valve Replacement. Goal INR 2.5-3.5  Continue coumadin dosing and heparin gtt if appropriate

## 2018-10-25 LAB
ANION GAP SERPL CALC-SCNC: 15 MMOL/L — SIGNIFICANT CHANGE UP (ref 5–17)
APTT BLD: 54.9 SEC — HIGH (ref 27.5–37.4)
APTT BLD: 57.1 SEC — HIGH (ref 27.5–37.4)
BUN SERPL-MCNC: 100 MG/DL — HIGH (ref 7–23)
CALCIUM SERPL-MCNC: 9.1 MG/DL — SIGNIFICANT CHANGE UP (ref 8.4–10.5)
CHLORIDE SERPL-SCNC: 95 MMOL/L — LOW (ref 96–108)
CO2 SERPL-SCNC: 26 MMOL/L — SIGNIFICANT CHANGE UP (ref 22–31)
CREAT SERPL-MCNC: 1.47 MG/DL — HIGH (ref 0.5–1.3)
GLUCOSE SERPL-MCNC: 183 MG/DL — HIGH (ref 70–99)
HCT VFR BLD CALC: 26.1 % — LOW (ref 34.5–45)
HGB BLD-MCNC: 8.1 G/DL — LOW (ref 11.5–15.5)
INR BLD: 2.24 RATIO — HIGH (ref 0.88–1.16)
MCHC RBC-ENTMCNC: 27 PG — SIGNIFICANT CHANGE UP (ref 27–34)
MCHC RBC-ENTMCNC: 30.9 GM/DL — LOW (ref 32–36)
MCV RBC AUTO: 87.4 FL — SIGNIFICANT CHANGE UP (ref 80–100)
PLATELET # BLD AUTO: 214 K/UL — SIGNIFICANT CHANGE UP (ref 150–400)
POTASSIUM SERPL-MCNC: 4.4 MMOL/L — SIGNIFICANT CHANGE UP (ref 3.5–5.3)
POTASSIUM SERPL-SCNC: 4.4 MMOL/L — SIGNIFICANT CHANGE UP (ref 3.5–5.3)
PROTHROM AB SERPL-ACNC: 24.8 SEC — HIGH (ref 9.8–12.7)
RBC # BLD: 2.99 M/UL — LOW (ref 3.8–5.2)
RBC # FLD: 17.6 % — HIGH (ref 10.3–14.5)
SODIUM SERPL-SCNC: 136 MMOL/L — SIGNIFICANT CHANGE UP (ref 135–145)
WBC # BLD: 9 K/UL — SIGNIFICANT CHANGE UP (ref 3.8–10.5)
WBC # FLD AUTO: 9 K/UL — SIGNIFICANT CHANGE UP (ref 3.8–10.5)

## 2018-10-25 PROCEDURE — 76770 US EXAM ABDO BACK WALL COMP: CPT | Mod: 26

## 2018-10-25 PROCEDURE — 99233 SBSQ HOSP IP/OBS HIGH 50: CPT | Mod: GC

## 2018-10-25 RX ORDER — WARFARIN SODIUM 2.5 MG/1
6.5 TABLET ORAL ONCE
Qty: 0 | Refills: 0 | Status: COMPLETED | OUTPATIENT
Start: 2018-10-25 | End: 2018-10-25

## 2018-10-25 RX ADMIN — PANTOPRAZOLE SODIUM 40 MILLIGRAM(S): 20 TABLET, DELAYED RELEASE ORAL at 18:18

## 2018-10-25 RX ADMIN — Medication 3 MILLILITER(S): at 11:22

## 2018-10-25 RX ADMIN — HEPARIN SODIUM 10.5 UNIT(S)/HR: 5000 INJECTION INTRAVENOUS; SUBCUTANEOUS at 01:07

## 2018-10-25 RX ADMIN — SIMETHICONE 80 MILLIGRAM(S): 80 TABLET, CHEWABLE ORAL at 06:28

## 2018-10-25 RX ADMIN — WARFARIN SODIUM 6.5 MILLIGRAM(S): 2.5 TABLET ORAL at 22:18

## 2018-10-25 RX ADMIN — Medication 650 MILLIGRAM(S): at 08:39

## 2018-10-25 RX ADMIN — Medication 1 MILLIGRAM(S): at 22:18

## 2018-10-25 RX ADMIN — Medication 25 MILLIGRAM(S): at 06:28

## 2018-10-25 RX ADMIN — LATANOPROST 1 DROP(S): 0.05 SOLUTION/ DROPS OPHTHALMIC; TOPICAL at 22:19

## 2018-10-25 RX ADMIN — ERTAPENEM SODIUM 120 MILLIGRAM(S): 1 INJECTION, POWDER, LYOPHILIZED, FOR SOLUTION INTRAMUSCULAR; INTRAVENOUS at 13:18

## 2018-10-25 RX ADMIN — PANTOPRAZOLE SODIUM 40 MILLIGRAM(S): 20 TABLET, DELAYED RELEASE ORAL at 06:29

## 2018-10-25 RX ADMIN — Medication 25 MILLIGRAM(S): at 13:18

## 2018-10-25 RX ADMIN — SIMETHICONE 80 MILLIGRAM(S): 80 TABLET, CHEWABLE ORAL at 13:18

## 2018-10-25 RX ADMIN — Medication 3 MILLILITER(S): at 05:59

## 2018-10-25 RX ADMIN — Medication 81 MILLIGRAM(S): at 13:18

## 2018-10-25 RX ADMIN — SODIUM CHLORIDE 3 MILLILITER(S): 9 INJECTION INTRAMUSCULAR; INTRAVENOUS; SUBCUTANEOUS at 05:59

## 2018-10-25 RX ADMIN — Medication 25 MILLIGRAM(S): at 22:18

## 2018-10-25 RX ADMIN — Medication 0.25 MILLIGRAM(S): at 18:00

## 2018-10-25 RX ADMIN — FENTANYL CITRATE 1 PATCH: 50 INJECTION INTRAVENOUS at 06:27

## 2018-10-25 RX ADMIN — Medication 0.25 MILLIGRAM(S): at 05:59

## 2018-10-25 RX ADMIN — Medication 3 MILLILITER(S): at 18:00

## 2018-10-25 RX ADMIN — Medication 2 MILLIGRAM(S): at 22:18

## 2018-10-25 RX ADMIN — SIMETHICONE 80 MILLIGRAM(S): 80 TABLET, CHEWABLE ORAL at 22:18

## 2018-10-25 NOTE — PROGRESS NOTE ADULT - ASSESSMENT
4  Year old Female with PMH significant for COPD, JENNIE on BiPAP at home , multivalvular disease (severe AS Not a candidate for TAVR, S/p MV replacement), HFpEF/ Severe diastolic failure, A-fib on coumadin, sick sinus syndrome S/p pacemaker placement, HTN, and CKD3 who was admitted for acute respiratory failure requiring intubation suspected to be 2/2 COPD exacerbation c/b PNA w/ +entero/rhinovirus and GN coccobacillus and H. influenza bacteremia, requiring continued respiratory support and tracheostomy. Patient S/p Trach placement 10/3 and PEG Placement 10/4. pt required diuretics. in addition also developed UTI as well     1- LATANYA   2- chf  3- UTI     suspect LATANYA in setting of diuretic use as well as UTI   cont aztreonam  hold torsemide today as well    trend hb  cont hydralazine  given hyperglycemia change tube feeds to glucerna   bun still elevated

## 2018-10-25 NOTE — CHART NOTE - NSCHARTNOTEFT_GEN_A_CORE
Follow up.  Source: Patient [ ]    Family [ ]     other [X ]  medical record/team  :   85 y/o F w/ a PMH COPD, JENNIE on BiPAP, multivalvular disease, severe diastolic failure, A-fib, s/p pacemaker placement, HTN, and CKD3,   s/p trach for acute respiratory failure now trials with  trach collar.      :      Enteral Nutrition Support Vital AF  @ 45ml/hr x24 hrs to provide total 1080ml,  1296calories, ~21/kg, protein 81gm, 1.3gm/kg based on dosing weight 62.9kg. Formula free water 891ml plus free water 150ml Q 12 hours total free water 1191ml.          Current Weight: 63.9kg  Dosing Weight Change 62.9kg    Pertinent Medications: MEDICATIONS  (STANDING):  ALBUTerol/ipratropium for Nebulization 3 milliLiter(s) Nebulizer every 6 hours  aspirin  chewable 81 milliGRAM(s) Oral daily  buDESOnide   0.25 milliGRAM(s) Respule 0.25 milliGRAM(s) Inhalation every 12 hours  diltiazem    Tablet 30 milliGRAM(s) Oral every 6 hours  doxazosin 2 milliGRAM(s) Oral at bedtime  ertapenem  IVPB      ertapenem  IVPB 1000 milliGRAM(s) IV Intermittent every 24 hours  fentaNYL   Patch  25 MICROgram(s)/Hr 1 Patch Transdermal every 72 hours  heparin  Infusion 1050 Unit(s)/Hr (10.5 mL/Hr) IV Continuous <Continuous>  hydrALAZINE 25 milliGRAM(s) Oral every 8 hours  latanoprost 0.005% Ophthalmic Solution 1 Drop(s) Both EYES at bedtime  pantoprazole  Injectable 40 milliGRAM(s) IV Push every 12 hours  simethicone 80 milliGRAM(s) Chew every 8 hours  sodium chloride 0.9%. 1000 milliLiter(s) (75 mL/Hr) IV Continuous <Continuous>  sodium chloride 3%  Inhalation 3 milliLiter(s) Inhalation two times a day  warfarin 6.5 milliGRAM(s) Oral once    MEDICATIONS  (PRN):  acetaminophen    Suspension .. 650 milliGRAM(s) Oral every 6 hours PRN Mild Pain (1 - 3)  melatonin 1 milliGRAM(s) Oral at bedtime PRN Insomnia    Pertinent Labs:  10-24 Na136 mmol/L Glu 164 mg/dL<H> K+ 4.0 mmol/L Cr  1.57 mg/dL<H> BUN 94 mg/dL<H> 10-22 Phos 3.6 mg/dL      Skin: wnl, no pressure breakdown noted in chart    Estimated Needs:   [ X] no change since previous assessment  [ ] recalculated:       Previous Nutrition Diagnosis: none      New Nutrition Diagnosis: [ X] not applicable    [    Recommendations consider change to lower protein, glucose controlled formula, discussed with NP, message left for nephrologist, Dr Koch      [X ] Enteral Nutrition Support    Glucerna 1.2 @ 45ml/hr x24 hrs providing 1080ml,  1296 calories, 20.6/kg protein 64gm,1.0gm/kg, based on dosing weight 62.9kg.   Free water in formula  891ml plus free water 150ml Q 12 hours total free water 1191ml.            [ ] Other:        Monitoring and Evaluation:     [ ] PO intake [ ] Tolerance to diet prescription [ ] weights [ ] follow up per protocol    [ ] other:

## 2018-10-25 NOTE — PROGRESS NOTE ADULT - ATTENDING COMMENTS
Patient seen and examined.   1. Acute resp failure with hypoxia:  - Remains essentially vent dependent  - Tolerates very short durations of PS and TC trials  - Cont pulm toilet   2. HFpEF/ s/p mech MVR/ AS/ a fib:  - Cont ASA, cardizem and hydralazine  - INR therapeutic. Cont coumadin (INR goal 2.5-3.5)  3. LATANYA:  - Cr trending down  - Follow UO/ electrolytes  4. Anemia:  - Hb stable  5. Agitation:  - Well controlled on Klonopin  6. UTI:  - Ucx  with ESBL E coli  - Cont Ertapenem  7. Gen:  - D-c planning to vent facility once acute issues resolved

## 2018-10-25 NOTE — PROGRESS NOTE ADULT - ASSESSMENT
84  Year old Female with PMH significant for COPD, JENNIE on BiPAP at home , multivalvular disease (severe AS Not a candidate for TAVR, S/p MV replacement), HFpEF/ Severe diastolic failure, A-fib on coumadin, sick sinus syndrome S/p pacemaker placement, HTN, and CKD3 who was admitted for acute respiratory failure requiring intubation suspected to be 2/2 COPD exacerbation c/b PNA w/ +entero/rhinovirus and GN coccobacillus and H. influenza bacteremia, requiring continued respiratory support and tracheostomy. Patient S/p Trach placement 10/3 and PEG Placement 10/4.     10/7: Patient remains with abdominal distention today but tolerating trickle feeds, ABD X-ray  performed this morning patient remains with air filled loops of small and large bowel. GI fellow called to re-eval the patient this morning, X-ray  reviewed slightly worsened compared to yesterday. GI fellow recommended to place patient in Left lateral decubitus position and oob to chair later today. Patient with large amount of gas expressed with turning as Per RN. As per GI no role for rectal tube at this time and can continue to advance feeds as tolerated to promote gastric motility. H+H Has remained stable case d/w  will restart coumadin this evening. Pt with elevated bp will increase Hydralazine 50 mg q 8 hr   10/8-Abdominal distention noted with hypoactive bowel sounds, Kub noted from this weekend. Daily bowel movements noted. Will repeat PTT as there were 2 therapeutics prior on same dosing, also repeat CBC as well. Will transfuse if remains low. RCU weaning  10/10: 4 dark BMs reported night of 10/9, concerning for melena,  hgb 6.9 in AM,  1U RBC given . Goal heparin reduced to 50-70. GI consulted due to GIB Hx with known AV malformations. No intvn.  10/11: H/H stable post 1U with no furthers melena or intvn from GI. Resumed coumadin.  10/12: agitated this am. seroquel not working changed back to klonopin  10/13 No events overnight. Continues on heparin gtt/coumadin bridge. INR 1.45, coumadin 7.5 mg x1. Continue with PS trials as tolerated.   10/14 Heparin gtt d/c, INR 2.82, coumadin 1mg x1. Continue with PS trials as tolerated. LE edema, lasix 20 mg po x1. Hg 7.7 stable.   10/15:  INR 2.3, reduced coumadin to 5mg  10/16: INR 1.8, resumed heparin, coumadin increased to 7.5mg tonight. Tolerated trach collar for 40mins  10/18: Await INR results today.  10/19 no events overnight. F/u labs today. d/c planning   10/21 INR 4.97, will hold Coumadin today  + cloudy urine per RN, increased WBC, will send UA and obtain CXR . Cr 1.18, add IVF NS 50 cc/hr x 6 hrs   10/22: hgb 6.8, transfused 1U RBC.  LATANYA with Cr 1.4.  10/23: Renal consulted for Cr 1.5. Started mild IVF 75ml/[id number]. bladder US ordered. Started renal dose Aztreonam for UTI (10/22 UCx +E.Coli). f/u sensitivities  10/24: Aztreonam changed to Ertapenem as ecoli is ESBL. Watch for allergic cross sensitivity reaction as pt is allergic to pencillin. Continue to dose coumadin daily. Pt retarted on heparin gtt yesterday for subtherapeutic INR.   10/25: Continue IV abx for ecoli uti. Countine heparin gtt as INR needs to be 2.5 or greater.

## 2018-10-25 NOTE — PROGRESS NOTE ADULT - SUBJECTIVE AND OBJECTIVE BOX
[facility]   [phone]  RENAL FOLLOW UP NOTE  --------------------------------------------------------------------------------  Chief Complaint:    24 hour events/subjective:    seen earlier. d/w rcu team when seen  trach    PAST HISTORY  --------------------------------------------------------------------------------  No significant changes to PMH, PSH, FHx, SHx, unless otherwise noted    ALLERGIES & MEDICATIONS  --------------------------------------------------------------------------------  Allergies    penicillin (Rash)    Intolerances      Standing Inpatient Medications  ALBUTerol/ipratropium for Nebulization 3 milliLiter(s) Nebulizer every 6 hours  aspirin  chewable 81 milliGRAM(s) Oral daily  buDESOnide   0.25 milliGRAM(s) Respule 0.25 milliGRAM(s) Inhalation every 12 hours  diltiazem    Tablet 30 milliGRAM(s) Oral every 6 hours  doxazosin 2 milliGRAM(s) Oral at bedtime  ertapenem  IVPB      ertapenem  IVPB 1000 milliGRAM(s) IV Intermittent every 24 hours  fentaNYL   Patch  25 MICROgram(s)/Hr 1 Patch Transdermal every 72 hours  heparin  Infusion 1050 Unit(s)/Hr IV Continuous <Continuous>  hydrALAZINE 25 milliGRAM(s) Oral every 8 hours  latanoprost 0.005% Ophthalmic Solution 1 Drop(s) Both EYES at bedtime  pantoprazole  Injectable 40 milliGRAM(s) IV Push every 12 hours  simethicone 80 milliGRAM(s) Chew every 8 hours  sodium chloride 0.9%. 1000 milliLiter(s) IV Continuous <Continuous>  sodium chloride 3%  Inhalation 3 milliLiter(s) Inhalation two times a day  warfarin 6.5 milliGRAM(s) Oral once    PRN Inpatient Medications  acetaminophen    Suspension .. 650 milliGRAM(s) Oral every 6 hours PRN  melatonin 1 milliGRAM(s) Oral at bedtime PRN      REVIEW OF SYSTEMS  --------------------------------------------------------------------------------    trach    VITALS/PHYSICAL EXAM  --------------------------------------------------------------------------------  T(C): 36.7 (10-25-18 @ 13:10), Max: 36.8 (10-25-18 @ 04:27)  HR: 60 (10-25-18 @ 20:26) (58 - 80)  BP: 108/57 (10-25-18 @ 15:17) (108/57 - 132/66)  RR: 21 (10-25-18 @ 13:10) (18 - 21)  SpO2: 100% (10-25-18 @ 20:26) (96% - 100%)  Wt(kg): --        10-24-18 @ 07:01  -  10-25-18 @ 07:00  --------------------------------------------------------  IN: 1026 mL / OUT: 0 mL / NET: 1026 mL    10-25-18 @ 07:01  -  10-25-18 @ 21:54  --------------------------------------------------------  IN: 700 mL / OUT: 500 mL / NET: 200 mL      Physical Exam:  	  Gen: trach   	Pulm: Decreased breath sounds b/l bases. no rales +  ronchi - wheezing  	CV: RRR, S1/S2. no rub  	Abd: +BS, soft, nontender/nondistended  	: No suprapubic tenderness.               Extremity: No cyanosis, trace edema no clubbing  	no decrease skin turgor   		      LABS/STUDIES  --------------------------------------------------------------------------------              8.1    9.0   >-----------<  214      [10-25-18 @ 07:22]              26.1     136  |  95  |  100  ----------------------------<  183      [10-25-18 @ 13:50]  4.4   |  26  |  1.47        Ca     9.1     [10-25-18 @ 13:50]      Mg     2.9     [10-24-18 @ 06:53]      PT/INR: PT 24.8 , INR 2.24       [10-25-18 @ 07:22]  PTT: 57.1       [10-25-18 @ 07:22]      Creatinine Trend:  SCr 1.47 [10-25 @ 13:50]  SCr 1.57 [10-24 @ 06:53]  SCr 1.58 [10-23 @ 07:22]  SCr 1.44 [10-22 @ 07:04]  SCr 1.18 [10-21 @ 06:59]              Urinalysis - [10-21-18 @ 17:21]      Color Yellow / Appearance Turbid / SG 1.018 / pH 5.5      Gluc Negative / Ketone Negative  / Bili Negative / Urobili 2 mg/dL       Blood Moderate / Protein 100 mg/dL / Leuk Est Large / Nitrite Negative      RBC 16 /  / Hyaline 0 / Gran  / Sq Epi  / Non Sq Epi 3 / Bacteria Many      PTH -- (Ca 11.1)      [09-28-18 @ 09:27]   67  HbA1c 7.2      [09-22-18 @ 02:44]

## 2018-10-25 NOTE — PROGRESS NOTE ADULT - PROBLEM SELECTOR PLAN 5
EGD 10/4: Gastric Erythema, No evidence of Active bleeding. 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    Stool Occult 10/6: Positive, No reports of Melena / Coffee Grounds   10/10: 4 dark BMs reported night of 10/9, concerning for melena,  hgb 6.9 in AM, s/p 1U RBC. Goal heparin reduced to goal 50-70. Transfuse to hgb >7  Per GI: no intvn, PPI BID  10/22: Hgb 6.8, transfused 1U RBCs

## 2018-10-25 NOTE — PROGRESS NOTE ADULT - SUBJECTIVE AND OBJECTIVE BOX
Patient is a 84y old  Female who presents with a chief complaint of COPD exacerbation, ADHF (24 Oct 2018 10:40)      Interval Events:    REVIEW OF SYSTEMS:  [ ] Positive  [ x] All other systems negative  [ ] Unable to assess ROS because ________    Vital Signs Last 24 Hrs  T(C): 36.6 (10-25-18 @ 05:36), Max: 37 (10-24-18 @ 13:53)  T(F): 97.9 (10-25-18 @ 05:36), Max: 98.6 (10-24-18 @ 13:53)  HR: 66 (10-25-18 @ 06:01) (59 - 80)  BP: 132/66 (10-25-18 @ 05:36) (112/67 - 133/62)  RR: 18 (10-25-18 @ 05:36) (14 - 22)  SpO2: 99% (10-25-18 @ 06:01) (88% - 100%)    PHYSICAL EXAM:  HEENT:   [ x]Tracheostomy: shiley 6 cuffed  [ ]Pupils equal  [ ]No oral lesions  [ ]Abnormal    SKIN  [x ]No Rash  [ ] Abnormal  [ ] pressure    CARDIAC  [x ]Regular  [ ]Abnormal    PULMONARY  [ ]Bilateral Clear Breath Sounds  [ ]Normal Excursion  [ x]Abnormal-coarse    GI  [x ]PEG      [x ] +BS		              [x]Soft, nondistended, nontender	  [ ]Abnormal    MUSCULOSKELETAL                                   [ ]Bedbound                 [ ]Abnormal    [x ]Ambulatory/OOB to chair                           EXTREMITIES                                         [ ]Normal  [x ]Edema                           NEUROLOGIC  [x ] Normal, non focal  [ ] Focal findings:    PSYCHIATRIC  [x ]Alert and appropriate  [ ] Sedated	 [ ]Agitated    :  Rosales: [ ] Yes, if yes: Date of Placement:                   [x  ] No    LINES: Central Lines [ ] Yes, if yes: Date of Placement                                     [ x ] No    HOSPITAL MEDICATIONS:  MEDICATIONS  (STANDING):  ALBUTerol/ipratropium for Nebulization 3 milliLiter(s) Nebulizer every 6 hours  aspirin  chewable 81 milliGRAM(s) Oral daily  buDESOnide   0.25 milliGRAM(s) Respule 0.25 milliGRAM(s) Inhalation every 12 hours  diltiazem    Tablet 30 milliGRAM(s) Oral every 6 hours  doxazosin 2 milliGRAM(s) Oral at bedtime  ertapenem  IVPB      ertapenem  IVPB 1000 milliGRAM(s) IV Intermittent every 24 hours  fentaNYL   Patch  25 MICROgram(s)/Hr 1 Patch Transdermal every 72 hours  heparin  Infusion 1050 Unit(s)/Hr (10.5 mL/Hr) IV Continuous <Continuous>  hydrALAZINE 25 milliGRAM(s) Oral every 8 hours  latanoprost 0.005% Ophthalmic Solution 1 Drop(s) Both EYES at bedtime  pantoprazole  Injectable 40 milliGRAM(s) IV Push every 12 hours  simethicone 80 milliGRAM(s) Chew every 8 hours  sodium chloride 0.9%. 1000 milliLiter(s) (75 mL/Hr) IV Continuous <Continuous>  sodium chloride 3%  Inhalation 3 milliLiter(s) Inhalation two times a day    MEDICATIONS  (PRN):  acetaminophen    Suspension .. 650 milliGRAM(s) Oral every 6 hours PRN Mild Pain (1 - 3)  melatonin 1 milliGRAM(s) Oral at bedtime PRN Insomnia      LABS:                        8.1    9.0   )-----------( 214      ( 25 Oct 2018 07:22 )             26.1     10-24    136  |  93<L>  |  94<H>  ----------------------------<  164<H>  4.0   |  26  |  1.57<H>    Ca    9.5      24 Oct 2018 06:53  Mg     2.9     10-24      PT/INR - ( 25 Oct 2018 07:22 )   PT: 24.8 sec;   INR: 2.24 ratio         PTT - ( 25 Oct 2018 07:22 )  PTT:57.1 sec        CAPILLARY BLOOD GLUCOSE    MICROBIOLOGY:     RADIOLOGY:  [ ] Reviewed and interpreted by me    Mode: AC/ CMV (Assist Control/ Continuous Mandatory Ventilation)  RR (machine): 14  TV (machine): 400  FiO2: 30  PEEP: 5  ITime: 1  MAP: 11  PIP: 26 Patient is a 84y old  Female who presents with a chief complaint of COPD exacerbation, ADHF (24 Oct 2018 10:40)      Interval Events: No acute events overnight     REVIEW OF SYSTEMS:  [ ] Positive  [ x] All other systems negative  [ ] Unable to assess ROS because ________    Vital Signs Last 24 Hrs  T(C): 36.6 (10-25-18 @ 05:36), Max: 37 (10-24-18 @ 13:53)  T(F): 97.9 (10-25-18 @ 05:36), Max: 98.6 (10-24-18 @ 13:53)  HR: 66 (10-25-18 @ 06:01) (59 - 80)  BP: 132/66 (10-25-18 @ 05:36) (112/67 - 133/62)  RR: 18 (10-25-18 @ 05:36) (14 - 22)  SpO2: 99% (10-25-18 @ 06:01) (88% - 100%)    PHYSICAL EXAM:  HEENT:   [ x]Tracheostomy: shiley 6 cuffed  [ ]Pupils equal  [ ]No oral lesions  [ ]Abnormal    SKIN  [x ]No Rash  [ ] Abnormal  [ ] pressure    CARDIAC  [x ]Regular  [ ]Abnormal    PULMONARY  [ ]Bilateral Clear Breath Sounds  [ ]Normal Excursion  [ x]Abnormal-coarse    GI  [x ]PEG      [x ] +BS		              [x]Soft, nondistended, nontender	  [ ]Abnormal    MUSCULOSKELETAL                                   [ ]Bedbound                 [ ]Abnormal    [x ]Ambulatory/OOB to chair                           EXTREMITIES                                         [ ]Normal  [x ]Edema                           NEUROLOGIC  [x ] Normal, non focal  [ ] Focal findings:    PSYCHIATRIC  [x ]Alert and appropriate  [ ] Sedated	 [ ]Agitated    :  Rosales: [ ] Yes, if yes: Date of Placement:                   [x  ] No    LINES: Central Lines [ ] Yes, if yes: Date of Placement                                     [ x ] No    HOSPITAL MEDICATIONS:  MEDICATIONS  (STANDING):  ALBUTerol/ipratropium for Nebulization 3 milliLiter(s) Nebulizer every 6 hours  aspirin  chewable 81 milliGRAM(s) Oral daily  buDESOnide   0.25 milliGRAM(s) Respule 0.25 milliGRAM(s) Inhalation every 12 hours  diltiazem    Tablet 30 milliGRAM(s) Oral every 6 hours  doxazosin 2 milliGRAM(s) Oral at bedtime  ertapenem  IVPB      ertapenem  IVPB 1000 milliGRAM(s) IV Intermittent every 24 hours  fentaNYL   Patch  25 MICROgram(s)/Hr 1 Patch Transdermal every 72 hours  heparin  Infusion 1050 Unit(s)/Hr (10.5 mL/Hr) IV Continuous <Continuous>  hydrALAZINE 25 milliGRAM(s) Oral every 8 hours  latanoprost 0.005% Ophthalmic Solution 1 Drop(s) Both EYES at bedtime  pantoprazole  Injectable 40 milliGRAM(s) IV Push every 12 hours  simethicone 80 milliGRAM(s) Chew every 8 hours  sodium chloride 0.9%. 1000 milliLiter(s) (75 mL/Hr) IV Continuous <Continuous>  sodium chloride 3%  Inhalation 3 milliLiter(s) Inhalation two times a day    MEDICATIONS  (PRN):  acetaminophen    Suspension .. 650 milliGRAM(s) Oral every 6 hours PRN Mild Pain (1 - 3)  melatonin 1 milliGRAM(s) Oral at bedtime PRN Insomnia      LABS:                        8.1    9.0   )-----------( 214      ( 25 Oct 2018 07:22 )             26.1     10-24    136  |  93<L>  |  94<H>  ----------------------------<  164<H>  4.0   |  26  |  1.57<H>    Ca    9.5      24 Oct 2018 06:53  Mg     2.9     10-24      PT/INR - ( 25 Oct 2018 07:22 )   PT: 24.8 sec;   INR: 2.24 ratio         PTT - ( 25 Oct 2018 07:22 )  PTT:57.1 sec        CAPILLARY BLOOD GLUCOSE    MICROBIOLOGY:     RADIOLOGY:  [ ] Reviewed and interpreted by me    Mode: AC/ CMV (Assist Control/ Continuous Mandatory Ventilation)  RR (machine): 14  TV (machine): 400  FiO2: 30  PEEP: 5  ITime: 1  MAP: 11  PIP: 26

## 2018-10-26 LAB
ANION GAP SERPL CALC-SCNC: 15 MMOL/L — SIGNIFICANT CHANGE UP (ref 5–17)
APTT BLD: 54.7 SEC — HIGH (ref 27.5–37.4)
APTT BLD: 68.9 SEC — HIGH (ref 27.5–37.4)
BLD GP AB SCN SERPL QL: NEGATIVE — SIGNIFICANT CHANGE UP
BUN SERPL-MCNC: 102 MG/DL — HIGH (ref 7–23)
CALCIUM SERPL-MCNC: 9 MG/DL — SIGNIFICANT CHANGE UP (ref 8.4–10.5)
CHLORIDE SERPL-SCNC: 94 MMOL/L — LOW (ref 96–108)
CO2 SERPL-SCNC: 25 MMOL/L — SIGNIFICANT CHANGE UP (ref 22–31)
CREAT SERPL-MCNC: 1.49 MG/DL — HIGH (ref 0.5–1.3)
GLUCOSE SERPL-MCNC: 166 MG/DL — HIGH (ref 70–99)
HCT VFR BLD CALC: 25.6 % — LOW (ref 34.5–45)
HCT VFR BLD CALC: 26.7 % — LOW (ref 34.5–45)
HGB BLD-MCNC: 7.8 G/DL — LOW (ref 11.5–15.5)
HGB BLD-MCNC: 8.2 G/DL — LOW (ref 11.5–15.5)
INR BLD: 1.93 RATIO — HIGH (ref 0.88–1.16)
INR BLD: 2.1 RATIO — HIGH (ref 0.88–1.16)
MCHC RBC-ENTMCNC: 26.6 PG — LOW (ref 27–34)
MCHC RBC-ENTMCNC: 26.8 PG — LOW (ref 27–34)
MCHC RBC-ENTMCNC: 30.6 GM/DL — LOW (ref 32–36)
MCHC RBC-ENTMCNC: 30.7 GM/DL — LOW (ref 32–36)
MCV RBC AUTO: 87.2 FL — SIGNIFICANT CHANGE UP (ref 80–100)
MCV RBC AUTO: 87.4 FL — SIGNIFICANT CHANGE UP (ref 80–100)
PLATELET # BLD AUTO: 222 K/UL — SIGNIFICANT CHANGE UP (ref 150–400)
PLATELET # BLD AUTO: 243 K/UL — SIGNIFICANT CHANGE UP (ref 150–400)
POTASSIUM SERPL-MCNC: 4.2 MMOL/L — SIGNIFICANT CHANGE UP (ref 3.5–5.3)
POTASSIUM SERPL-SCNC: 4.2 MMOL/L — SIGNIFICANT CHANGE UP (ref 3.5–5.3)
PROTHROM AB SERPL-ACNC: 21.3 SEC — HIGH (ref 9.8–12.7)
PROTHROM AB SERPL-ACNC: 23 SEC — HIGH (ref 9.8–12.7)
RBC # BLD: 2.94 M/UL — LOW (ref 3.8–5.2)
RBC # BLD: 3.06 M/UL — LOW (ref 3.8–5.2)
RBC # FLD: 17.7 % — HIGH (ref 10.3–14.5)
RBC # FLD: 18.2 % — HIGH (ref 10.3–14.5)
RH IG SCN BLD-IMP: POSITIVE — SIGNIFICANT CHANGE UP
SODIUM SERPL-SCNC: 134 MMOL/L — LOW (ref 135–145)
WBC # BLD: 11.5 K/UL — HIGH (ref 3.8–10.5)
WBC # BLD: 9.6 K/UL — SIGNIFICANT CHANGE UP (ref 3.8–10.5)
WBC # FLD AUTO: 11.5 K/UL — HIGH (ref 3.8–10.5)
WBC # FLD AUTO: 9.6 K/UL — SIGNIFICANT CHANGE UP (ref 3.8–10.5)

## 2018-10-26 PROCEDURE — 99233 SBSQ HOSP IP/OBS HIGH 50: CPT | Mod: GC

## 2018-10-26 RX ORDER — WARFARIN SODIUM 2.5 MG/1
7.5 TABLET ORAL ONCE
Qty: 0 | Refills: 0 | Status: COMPLETED | OUTPATIENT
Start: 2018-10-26 | End: 2018-10-26

## 2018-10-26 RX ORDER — FENTANYL CITRATE 50 UG/ML
1 INJECTION INTRAVENOUS
Qty: 0 | Refills: 0 | Status: DISCONTINUED | OUTPATIENT
Start: 2018-10-26 | End: 2018-11-01

## 2018-10-26 RX ORDER — SODIUM CHLORIDE 9 MG/ML
1000 INJECTION INTRAMUSCULAR; INTRAVENOUS; SUBCUTANEOUS
Qty: 0 | Refills: 0 | Status: DISCONTINUED | OUTPATIENT
Start: 2018-10-26 | End: 2018-10-26

## 2018-10-26 RX ADMIN — HEPARIN SODIUM 10.5 UNIT(S)/HR: 5000 INJECTION INTRAVENOUS; SUBCUTANEOUS at 19:00

## 2018-10-26 RX ADMIN — FENTANYL CITRATE 1 PATCH: 50 INJECTION INTRAVENOUS at 20:23

## 2018-10-26 RX ADMIN — SIMETHICONE 80 MILLIGRAM(S): 80 TABLET, CHEWABLE ORAL at 13:53

## 2018-10-26 RX ADMIN — Medication 3 MILLILITER(S): at 00:00

## 2018-10-26 RX ADMIN — Medication 0.25 MILLIGRAM(S): at 05:01

## 2018-10-26 RX ADMIN — FENTANYL CITRATE 1 PATCH: 50 INJECTION INTRAVENOUS at 05:07

## 2018-10-26 RX ADMIN — FENTANYL CITRATE 1 PATCH: 50 INJECTION INTRAVENOUS at 05:00

## 2018-10-26 RX ADMIN — Medication 25 MILLIGRAM(S): at 05:09

## 2018-10-26 RX ADMIN — PANTOPRAZOLE SODIUM 40 MILLIGRAM(S): 20 TABLET, DELAYED RELEASE ORAL at 05:09

## 2018-10-26 RX ADMIN — HEPARIN SODIUM 10.5 UNIT(S)/HR: 5000 INJECTION INTRAVENOUS; SUBCUTANEOUS at 09:25

## 2018-10-26 RX ADMIN — Medication 1 MILLIGRAM(S): at 22:24

## 2018-10-26 RX ADMIN — WARFARIN SODIUM 7.5 MILLIGRAM(S): 2.5 TABLET ORAL at 22:24

## 2018-10-26 RX ADMIN — Medication 3 MILLILITER(S): at 23:22

## 2018-10-26 RX ADMIN — SIMETHICONE 80 MILLIGRAM(S): 80 TABLET, CHEWABLE ORAL at 22:24

## 2018-10-26 RX ADMIN — SIMETHICONE 80 MILLIGRAM(S): 80 TABLET, CHEWABLE ORAL at 05:08

## 2018-10-26 RX ADMIN — Medication 0.25 MILLIGRAM(S): at 17:11

## 2018-10-26 RX ADMIN — Medication 2 MILLIGRAM(S): at 22:24

## 2018-10-26 RX ADMIN — Medication 81 MILLIGRAM(S): at 11:27

## 2018-10-26 RX ADMIN — FENTANYL CITRATE 1 PATCH: 50 INJECTION INTRAVENOUS at 07:11

## 2018-10-26 RX ADMIN — SODIUM CHLORIDE 3 MILLILITER(S): 9 INJECTION INTRAMUSCULAR; INTRAVENOUS; SUBCUTANEOUS at 17:15

## 2018-10-26 RX ADMIN — FENTANYL CITRATE 1 PATCH: 50 INJECTION INTRAVENOUS at 19:26

## 2018-10-26 RX ADMIN — LATANOPROST 1 DROP(S): 0.05 SOLUTION/ DROPS OPHTHALMIC; TOPICAL at 22:24

## 2018-10-26 RX ADMIN — Medication 3 MILLILITER(S): at 17:10

## 2018-10-26 RX ADMIN — Medication 3 MILLILITER(S): at 12:13

## 2018-10-26 RX ADMIN — Medication 25 MILLIGRAM(S): at 13:53

## 2018-10-26 RX ADMIN — ERTAPENEM SODIUM 120 MILLIGRAM(S): 1 INJECTION, POWDER, LYOPHILIZED, FOR SOLUTION INTRAMUSCULAR; INTRAVENOUS at 11:27

## 2018-10-26 RX ADMIN — Medication 3 MILLILITER(S): at 05:01

## 2018-10-26 RX ADMIN — PANTOPRAZOLE SODIUM 40 MILLIGRAM(S): 20 TABLET, DELAYED RELEASE ORAL at 17:52

## 2018-10-26 RX ADMIN — SODIUM CHLORIDE 3 MILLILITER(S): 9 INJECTION INTRAMUSCULAR; INTRAVENOUS; SUBCUTANEOUS at 05:05

## 2018-10-26 NOTE — PROGRESS NOTE ADULT - ASSESSMENT
patient with ? neurogenic bladder  family does not want tejada   continue cic 4 x day    hiprex for uti prevention

## 2018-10-26 NOTE — PROGRESS NOTE ADULT - ATTENDING COMMENTS
Patient seen and examined.   1. Acute resp failure with hypoxia:  - Remains essentially vent dependent  - Tolerates very short durations of PS and TC trials  - Cont pulm toilet   2. HFpEF/ s/p mech MVR/ AS/ a fib:  - Cont ASA, cardizem and hydralazine  - INR subtherapeutic. Remains on heparin gtt. Cont coumadin (INR goal 2.5-3.5)  3. LATANYA:  - Cr trending down  - Follow UO/ electrolytes  4. Anemia:  - Hb stable  5. Agitation:  - Well controlled on Klonopin  6. UTI:  - Ucx  with ESBL E coli  - Cont Ertapenem  7. Gen:  - D-c planning to vent facility once acute issues resolved. Daughter updated at bedside

## 2018-10-26 NOTE — CHART NOTE - NSCHARTNOTEFT_GEN_A_CORE
trach tubing  noticed yeny blood  Vital Signs Last 24 Hrs  T(C): 35.6 (26 Oct 2018 18:00), Max: 36.8 (26 Oct 2018 15:58)  T(F): 96.1 (26 Oct 2018 18:00), Max: 98.3 (26 Oct 2018 15:58)  HR: 60 (26 Oct 2018 18:00) (59 - 66)  BP: 104/51 (26 Oct 2018 18:00) (103/57 - 119/53)  BP(mean): --  RR: 17 (26 Oct 2018 18:00) (16 - 18)  SpO2: 100% (26 Oct 2018 18:00) (97% - 100%)                        7.8    11.5  )-----------( 243      ( 26 Oct 2018 17:46 )             25.6   Activated Partial Thromboplastin Time (10.26.18 @ 17:46)    Activated Partial Thromboplastin Time: 68.9: The recommended therapeutic heparin range (full dose) is 58-99 seconds.  Recommended therapeutic Argatroban range is 1.5 to 3.0 times the baseline  APTT value, not to exceed 100 seconds. Recommended therapeutic Refludan  range is 1.5 to 2.5 times thebaseline APTT. sec  INR: 1.93: RECOMMENDED RANGES FOR THERAPEUTIC INR:    2.0-3.0 for most medical and surgical thromboembolic states    2.0-3.0 for atrial fibrillation    2.0-3.0 for bileaflet mechanical valve in aortic position    2.5-3.5 for mechanical heart valves   Chest 2004;126:[id number]  The presence of direct thrombin inhibitors (argatroban, refludan)  may falsely increase results. ratio (10.26.18 @ 17:46)      trach site bleed subsided  labs stable and repeat in am  red rubber cath suction only  keep same dose of Coumadin and heparin gtt rate since pt has mech MVR

## 2018-10-26 NOTE — PROGRESS NOTE ADULT - ASSESSMENT
84  Year old Female with PMH significant for COPD, JENNIE on BiPAP at home , multivalvular disease (severe AS Not a candidate for TAVR, S/p MV replacement), HFpEF/ Severe diastolic failure, A-fib on coumadin, sick sinus syndrome S/p pacemaker placement, HTN, and CKD3 who was admitted for acute respiratory failure requiring intubation suspected to be 2/2 COPD exacerbation c/b PNA w/ +entero/rhinovirus and GN coccobacillus and H. influenza bacteremia, requiring continued respiratory support and tracheostomy. Patient S/p Trach placement 10/3 and PEG Placement 10/4.     10/7: Patient remains with abdominal distention today but tolerating trickle feeds, ABD X-ray  performed this morning patient remains with air filled loops of small and large bowel. GI fellow called to re-eval the patient this morning, X-ray  reviewed slightly worsened compared to yesterday. GI fellow recommended to place patient in Left lateral decubitus position and oob to chair later today. Patient with large amount of gas expressed with turning as Per RN. As per GI no role for rectal tube at this time and can continue to advance feeds as tolerated to promote gastric motility. H+H Has remained stable case d/w  will restart coumadin this evening. Pt with elevated bp will increase Hydralazine 50 mg q 8 hr   10/8-Abdominal distention noted with hypoactive bowel sounds, Kub noted from this weekend. Daily bowel movements noted. Will repeat PTT as there were 2 therapeutics prior on same dosing, also repeat CBC as well. Will transfuse if remains low. RCU weaning  10/10: 4 dark BMs reported night of 10/9, concerning for melena,  hgb 6.9 in AM,  1U RBC given . Goal heparin reduced to 50-70. GI consulted due to GIB Hx with known AV malformations. EGD 10/4: Gastric Erythema, No evidence of Active bleeding. 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   No intvn.  10/11: H/H stable post 1U with no furthers melena or intvn from GI. Resumed coumadin.  10/12: agitated this am. seroquel not working changed back to klonopin  10/13 No events overnight. Continues on heparin gtt/coumadin bridge. INR 1.45, coumadin 7.5 mg x1. Continue with PS trials as tolerated.   10/14 Heparin gtt d/c, INR 2.82, coumadin 1mg x1. Continue with PS trials as tolerated. LE edema, lasix 20 mg po x1. Hg 7.7 stable.   10/15:  INR 2.3, reduced coumadin to 5mg  10/16: INR 1.8, resumed heparin, coumadin increased to 7.5mg tonight. Tolerated trach collar for 40mins  10/18: Await INR results today.  10/19 no events overnight. F/u labs today. d/c planning   10/21 INR 4.97, will hold Coumadin today  + cloudy urine per RN, increased WBC, will send UA and obtain CXR . Cr 1.18, add IVF NS 50 cc/hr x 6 hrs   10/22: hgb 6.8, transfused 1U RBC.  LATANYA with Cr 1.4.  10/23: Renal consulted for Cr 1.5. Started mild IVF [id number]. bladder US ordered. Started renal dose Aztreonam for UTI (10/22 UCx +E.Coli). f/u sensitivities  10/24: Aztreonam changed to Ertapenem as ecoli is ESBL. Watch for allergic cross sensitivity reaction as pt is allergic to pencillin. Continue to dose coumadin daily. Pt retarted on heparin gtt yesterday for subtherapeutic INR.   10/25: Continue IV abx for ecoli uti. Countine heparin gtt as INR needs to be 2.5 or greater.  10/26 Continue ertapenem day 3 /5 for esbl uti. Continue heparin gtt as inr remains subtherapeutic.

## 2018-10-26 NOTE — PROGRESS NOTE ADULT - PROBLEM SELECTOR PLAN 4
ECHO 9/14: + Mechanical Mitral Valve, Severe AS, Stage 3 Diastolic Dysfxn   Patient With Hx of Sick Sinus Syndrome and PPM  / Atrial Fibrillation   Diuretics held 2/2 ace  Continue Cardizem 30 mg q 6 hrs for AFIBB rate control (hold if systolic BP less than 100)  Continue to monitor BP and Heart Rate

## 2018-10-26 NOTE — PROGRESS NOTE ADULT - SUBJECTIVE AND OBJECTIVE BOX
Patient is a 84y old  Female who presents with a chief complaint of COPD exacerbation, ADHF (26 Oct 2018 07:49)      Interval Events:    REVIEW OF SYSTEMS:  [ ] Positive  [ ] All other systems negative  [x ] Unable to assess ROS because ________    Vital Signs Last 24 Hrs  T(C): 36.7 (10-26-18 @ 08:00), Max: 36.7 (10-25-18 @ 13:10)  T(F): 98 (10-26-18 @ 08:00), Max: 98.1 (10-25-18 @ 13:10)  HR: 60 (10-26-18 @ 08:34) (58 - 69)  BP: 103/57 (10-26-18 @ 08:00) (103/57 - 119/68)  RR: 16 (10-26-18 @ 08:00) (16 - 21)  SpO2: 100% (10-26-18 @ 08:34) (96% - 100%)    PHYSICAL EXAM:  HEENT:   [x ]Tracheostomy: shiley 6 cuffed  [ ]Pupils equal  [ ]No oral lesions  [ ]Abnormal    SKIN  [ x]No Rash  [ ] Abnormal  [ ] pressure    CARDIAC  [x ]Regular  [ ]Abnormal    PULMONARY  [ ]Bilateral Clear Breath Sounds  [ ]Normal Excursion  [ x]Abnormal-coarse but improved after suctioning    GI  [x ]PEG      [x ] +BS		              [x]Soft, nondistended, nontender	  [ ]Abnormal    MUSCULOSKELETAL                                   [ ]Bedbound                 [ ]Abnormal    [x ]Ambulatory/OOB to chair                           EXTREMITIES                                         [ ]Normal  [ x]Edema    2+                       NEUROLOGIC  [x ] Normal, non focal  [ ] Focal findings:    PSYCHIATRIC  [x ]Alert and interactive  [ ] Sedated	 [ ]Agitated    :  Rosales: [ ] Yes, if yes: Date of Placement:---straight cathed                   [x  ] No    LINES: Central Lines [ ] Yes, if yes: Date of Placement                                     [ x ] No    HOSPITAL MEDICATIONS:  MEDICATIONS  (STANDING):  ALBUTerol/ipratropium for Nebulization 3 milliLiter(s) Nebulizer every 6 hours  aspirin  chewable 81 milliGRAM(s) Oral daily  buDESOnide   0.25 milliGRAM(s) Respule 0.25 milliGRAM(s) Inhalation every 12 hours  diltiazem    Tablet 30 milliGRAM(s) Oral every 6 hours  doxazosin 2 milliGRAM(s) Oral at bedtime  ertapenem  IVPB 1000 milliGRAM(s) IV Intermittent every 24 hours  ertapenem  IVPB      heparin  Infusion 1050 Unit(s)/Hr (10.5 mL/Hr) IV Continuous <Continuous>  hydrALAZINE 25 milliGRAM(s) Oral every 8 hours  latanoprost 0.005% Ophthalmic Solution 1 Drop(s) Both EYES at bedtime  pantoprazole  Injectable 40 milliGRAM(s) IV Push every 12 hours  simethicone 80 milliGRAM(s) Chew every 8 hours  sodium chloride 0.9%. 1000 milliLiter(s) (75 mL/Hr) IV Continuous <Continuous>  sodium chloride 3%  Inhalation 3 milliLiter(s) Inhalation two times a day  warfarin 7.5 milliGRAM(s) Enteral Tube once    MEDICATIONS  (PRN):  acetaminophen    Suspension .. 650 milliGRAM(s) Oral every 6 hours PRN Mild Pain (1 - 3)  melatonin 1 milliGRAM(s) Oral at bedtime PRN Insomnia      LABS:                        8.2    9.6   )-----------( 222      ( 26 Oct 2018 07:21 )             26.7     10-26    134<L>  |  94<L>  |  102<H>  ----------------------------<  166<H>  4.2   |  25  |  1.49<H>    Ca    9.0      26 Oct 2018 07:20      PT/INR - ( 26 Oct 2018 07:21 )   PT: 23.0 sec;   INR: 2.10 ratio         PTT - ( 26 Oct 2018 07:21 )  PTT:54.7 sec        CAPILLARY BLOOD GLUCOSE    MICROBIOLOGY:     RADIOLOGY:  [ ] Reviewed and interpreted by me    Mode: AC/ CMV (Assist Control/ Continuous Mandatory Ventilation)  RR (machine): 14  TV (machine): 400  FiO2: 30  PEEP: 5  ITime: 1  MAP: 9  PC:   PIP: 28 Patient is a 84y old  Female who presents with a chief complaint of COPD exacerbation, ADHF (26 Oct 2018 07:49)      Interval Events: No acute events ON    REVIEW OF SYSTEMS:  [ ] Positive  [ ] All other systems negative  [x ] Unable to assess ROS because ________    Vital Signs Last 24 Hrs  T(C): 36.7 (10-26-18 @ 08:00), Max: 36.7 (10-25-18 @ 13:10)  T(F): 98 (10-26-18 @ 08:00), Max: 98.1 (10-25-18 @ 13:10)  HR: 60 (10-26-18 @ 08:34) (58 - 69)  BP: 103/57 (10-26-18 @ 08:00) (103/57 - 119/68)  RR: 16 (10-26-18 @ 08:00) (16 - 21)  SpO2: 100% (10-26-18 @ 08:34) (96% - 100%)    PHYSICAL EXAM:  HEENT:   [x ]Tracheostomy: shiley 6 cuffed  [ ]Pupils equal  [ ]No oral lesions  [ ]Abnormal    SKIN  [ x]No Rash  [ ] Abnormal  [ ] pressure    CARDIAC  [x ]Regular  [ ]Abnormal    PULMONARY  [ ]Bilateral Clear Breath Sounds  [ ]Normal Excursion  [ x]Abnormal-coarse but improved after suctioning    GI  [x ]PEG      [x ] +BS		              [x]Soft, nondistended, nontender	  [ ]Abnormal    MUSCULOSKELETAL                                   [ ]Bedbound                 [ ]Abnormal    [x ]Ambulatory/OOB to chair                           EXTREMITIES                                         [ ]Normal  [ x]Edema    2+                       NEUROLOGIC  [x ] Normal, non focal  [ ] Focal findings:    PSYCHIATRIC  [x ]Alert and interactive  [ ] Sedated	 [ ]Agitated    :  Rosales: [ ] Yes, if yes: Date of Placement:---straight cathed                   [x  ] No    LINES: Central Lines [ ] Yes, if yes: Date of Placement                                     [ x ] No    HOSPITAL MEDICATIONS:  MEDICATIONS  (STANDING):  ALBUTerol/ipratropium for Nebulization 3 milliLiter(s) Nebulizer every 6 hours  aspirin  chewable 81 milliGRAM(s) Oral daily  buDESOnide   0.25 milliGRAM(s) Respule 0.25 milliGRAM(s) Inhalation every 12 hours  diltiazem    Tablet 30 milliGRAM(s) Oral every 6 hours  doxazosin 2 milliGRAM(s) Oral at bedtime  ertapenem  IVPB 1000 milliGRAM(s) IV Intermittent every 24 hours  ertapenem  IVPB      heparin  Infusion 1050 Unit(s)/Hr (10.5 mL/Hr) IV Continuous <Continuous>  hydrALAZINE 25 milliGRAM(s) Oral every 8 hours  latanoprost 0.005% Ophthalmic Solution 1 Drop(s) Both EYES at bedtime  pantoprazole  Injectable 40 milliGRAM(s) IV Push every 12 hours  simethicone 80 milliGRAM(s) Chew every 8 hours  sodium chloride 0.9%. 1000 milliLiter(s) (75 mL/Hr) IV Continuous <Continuous>  sodium chloride 3%  Inhalation 3 milliLiter(s) Inhalation two times a day  warfarin 7.5 milliGRAM(s) Enteral Tube once    MEDICATIONS  (PRN):  acetaminophen    Suspension .. 650 milliGRAM(s) Oral every 6 hours PRN Mild Pain (1 - 3)  melatonin 1 milliGRAM(s) Oral at bedtime PRN Insomnia      LABS:                        8.2    9.6   )-----------( 222      ( 26 Oct 2018 07:21 )             26.7     10-26    134<L>  |  94<L>  |  102<H>  ----------------------------<  166<H>  4.2   |  25  |  1.49<H>    Ca    9.0      26 Oct 2018 07:20      PT/INR - ( 26 Oct 2018 07:21 )   PT: 23.0 sec;   INR: 2.10 ratio         PTT - ( 26 Oct 2018 07:21 )  PTT:54.7 sec        CAPILLARY BLOOD GLUCOSE    MICROBIOLOGY:     RADIOLOGY:  [ ] Reviewed and interpreted by me    Mode: AC/ CMV (Assist Control/ Continuous Mandatory Ventilation)  RR (machine): 14  TV (machine): 400  FiO2: 30  PEEP: 5  ITime: 1  MAP: 9  PC:   PIP: 28

## 2018-10-26 NOTE — PROGRESS NOTE ADULT - ASSESSMENT
84  Year old Female with PMH significant for COPD, JENNIE on BiPAP at home , multivalvular disease (severe AS Not a candidate for TAVR, S/p MV replacement), HFpEF/ Severe diastolic failure, A-fib on coumadin, sick sinus syndrome S/p pacemaker placement, HTN, and CKD3 who was admitted for acute respiratory failure requiring intubation suspected to be 2/2 COPD exacerbation c/b PNA w/ +entero/rhinovirus and GN coccobacillus and H. influenza bacteremia, requiring continued respiratory support and tracheostomy. Patient S/p Trach placement 10/3 and PEG Placement 10/4. pt required diuretics. in addition also developed UTI as well     1- LATANYA   2- chf  3- UTI     suspect LATANYA in setting of diuretic use as well as UTI   cr steady now however, bun is still elevated   cont aztreonam  hold torsemide but will need in near future    trend hb  cont hydralazine

## 2018-10-26 NOTE — PROGRESS NOTE ADULT - SUBJECTIVE AND OBJECTIVE BOX
[facility]   [phone]  RENAL FOLLOW UP NOTE  --------------------------------------------------------------------------------  Chief Complaint:    24 hour events/subjective:    seen earlier. daughter at bedside.   d/w rcu team     PAST HISTORY  --------------------------------------------------------------------------------  No significant changes to PMH, PSH, FHx, SHx, unless otherwise noted    ALLERGIES & MEDICATIONS  --------------------------------------------------------------------------------  Allergies    penicillin (Rash)    Intolerances      Standing Inpatient Medications  ALBUTerol/ipratropium for Nebulization 3 milliLiter(s) Nebulizer every 6 hours  aspirin  chewable 81 milliGRAM(s) Oral daily  buDESOnide   0.25 milliGRAM(s) Respule 0.25 milliGRAM(s) Inhalation every 12 hours  diltiazem    Tablet 30 milliGRAM(s) Oral every 6 hours  doxazosin 2 milliGRAM(s) Oral at bedtime  ertapenem  IVPB 1000 milliGRAM(s) IV Intermittent every 24 hours  ertapenem  IVPB      fentaNYL   Patch  25 MICROgram(s)/Hr 1 Patch Transdermal every 72 hours  heparin  Infusion 1050 Unit(s)/Hr IV Continuous <Continuous>  hydrALAZINE 25 milliGRAM(s) Oral every 8 hours  latanoprost 0.005% Ophthalmic Solution 1 Drop(s) Both EYES at bedtime  pantoprazole  Injectable 40 milliGRAM(s) IV Push every 12 hours  simethicone 80 milliGRAM(s) Chew every 8 hours  sodium chloride 3%  Inhalation 3 milliLiter(s) Inhalation two times a day  warfarin 7.5 milliGRAM(s) Enteral Tube once    PRN Inpatient Medications  acetaminophen    Suspension .. 650 milliGRAM(s) Oral every 6 hours PRN  melatonin 1 milliGRAM(s) Oral at bedtime PRN      REVIEW OF SYSTEMS  --------------------------------------------------------------------------------    trach/vent     VITALS/PHYSICAL EXAM  --------------------------------------------------------------------------------  T(C): 35.6 (10-26-18 @ 18:00), Max: 36.8 (10-26-18 @ 15:58)  HR: 60 (10-26-18 @ 20:30) (59 - 66)  BP: 104/51 (10-26-18 @ 18:00) (103/57 - 119/53)  RR: 17 (10-26-18 @ 18:00) (16 - 18)  SpO2: 100% (10-26-18 @ 20:30) (97% - 100%)  Wt(kg): --        10-25-18 @ 07:01  -  10-26-18 @ 07:00  --------------------------------------------------------  IN: 1385.5 mL / OUT: 1000 mL / NET: 385.5 mL    10-26-18 @ 07:01  -  10-26-18 @ 22:14  --------------------------------------------------------  IN: 941 mL / OUT: 725 mL / NET: 216 mL      Physical Exam:  	  Gen: trach   	Pulm: Decreased breath sounds b/l bases. no rales +  ronchi - wheezing  	CV: RRR, S1/S2. no rub  	Abd: +BS, soft, nontender/nondistended  	: No suprapubic tenderness.               Extremity: No cyanosis, trace edema no clubbing    	  LABS/STUDIES  --------------------------------------------------------------------------------              7.8    11.5  >-----------<  243      [10-26-18 @ 17:46]              25.6     134  |  94  |  102  ----------------------------<  166      [10-26-18 @ 07:20]  4.2   |  25  |  1.49        Ca     9.0     [10-26-18 @ 07:20]      PT/INR: PT 21.3 , INR 1.93       [10-26-18 @ 17:46]  PTT: 68.9       [10-26-18 @ 17:46]      Creatinine Trend:  SCr 1.49 [10-26 @ 07:20]  SCr 1.47 [10-25 @ 13:50]  SCr 1.57 [10-24 @ 06:53]  SCr 1.58 [10-23 @ 07:22]  SCr 1.44 [10-22 @ 07:04]              Urinalysis - [10-21-18 @ 17:21]      Color Yellow / Appearance Turbid / SG 1.018 / pH 5.5      Gluc Negative / Ketone Negative  / Bili Negative / Urobili 2 mg/dL       Blood Moderate / Protein 100 mg/dL / Leuk Est Large / Nitrite Negative      RBC 16 /  / Hyaline 0 / Gran  / Sq Epi  / Non Sq Epi 3 / Bacteria Many      PTH -- (Ca 11.1)      [09-28-18 @ 09:27]   67  HbA1c 7.2      [09-22-18 @ 02:44]

## 2018-10-27 DIAGNOSIS — E87.1 HYPO-OSMOLALITY AND HYPONATREMIA: ICD-10-CM

## 2018-10-27 LAB
ANION GAP SERPL CALC-SCNC: 17 MMOL/L — SIGNIFICANT CHANGE UP (ref 5–17)
APTT BLD: 66 SEC — HIGH (ref 27.5–37.4)
BUN SERPL-MCNC: 104 MG/DL — HIGH (ref 7–23)
CALCIUM SERPL-MCNC: 9.3 MG/DL — SIGNIFICANT CHANGE UP (ref 8.4–10.5)
CHLORIDE SERPL-SCNC: 92 MMOL/L — LOW (ref 96–108)
CO2 SERPL-SCNC: 24 MMOL/L — SIGNIFICANT CHANGE UP (ref 22–31)
CREAT SERPL-MCNC: 1.43 MG/DL — HIGH (ref 0.5–1.3)
GLUCOSE SERPL-MCNC: 171 MG/DL — HIGH (ref 70–99)
HCT VFR BLD CALC: 24 % — LOW (ref 34.5–45)
HGB BLD-MCNC: 7.6 G/DL — LOW (ref 11.5–15.5)
INR BLD: 1.86 RATIO — HIGH (ref 0.88–1.16)
MCHC RBC-ENTMCNC: 27.3 PG — SIGNIFICANT CHANGE UP (ref 27–34)
MCHC RBC-ENTMCNC: 31.5 GM/DL — LOW (ref 32–36)
MCV RBC AUTO: 86.7 FL — SIGNIFICANT CHANGE UP (ref 80–100)
PLATELET # BLD AUTO: 204 K/UL — SIGNIFICANT CHANGE UP (ref 150–400)
POTASSIUM SERPL-MCNC: 4.4 MMOL/L — SIGNIFICANT CHANGE UP (ref 3.5–5.3)
POTASSIUM SERPL-SCNC: 4.4 MMOL/L — SIGNIFICANT CHANGE UP (ref 3.5–5.3)
PROTHROM AB SERPL-ACNC: 20.5 SEC — HIGH (ref 9.8–12.7)
RBC # BLD: 2.77 M/UL — LOW (ref 3.8–5.2)
RBC # FLD: 17.9 % — HIGH (ref 10.3–14.5)
SODIUM SERPL-SCNC: 133 MMOL/L — LOW (ref 135–145)
WBC # BLD: 10.1 K/UL — SIGNIFICANT CHANGE UP (ref 3.8–10.5)
WBC # FLD AUTO: 10.1 K/UL — SIGNIFICANT CHANGE UP (ref 3.8–10.5)

## 2018-10-27 PROCEDURE — 99233 SBSQ HOSP IP/OBS HIGH 50: CPT

## 2018-10-27 RX ORDER — CLONAZEPAM 1 MG
0.5 TABLET ORAL ONCE
Qty: 0 | Refills: 0 | Status: DISCONTINUED | OUTPATIENT
Start: 2018-10-27 | End: 2018-10-27

## 2018-10-27 RX ORDER — WARFARIN SODIUM 2.5 MG/1
7.5 TABLET ORAL AT BEDTIME
Qty: 0 | Refills: 0 | Status: DISCONTINUED | OUTPATIENT
Start: 2018-10-27 | End: 2018-10-27

## 2018-10-27 RX ORDER — CLONAZEPAM 1 MG
1 TABLET ORAL EVERY 8 HOURS
Qty: 0 | Refills: 0 | Status: DISCONTINUED | OUTPATIENT
Start: 2018-10-27 | End: 2018-10-27

## 2018-10-27 RX ORDER — ONDANSETRON 8 MG/1
4 TABLET, FILM COATED ORAL ONCE
Qty: 0 | Refills: 0 | Status: COMPLETED | OUTPATIENT
Start: 2018-10-27 | End: 2018-10-27

## 2018-10-27 RX ORDER — WARFARIN SODIUM 2.5 MG/1
7.5 TABLET ORAL AT BEDTIME
Qty: 0 | Refills: 0 | Status: COMPLETED | OUTPATIENT
Start: 2018-10-27 | End: 2018-10-27

## 2018-10-27 RX ORDER — CLONAZEPAM 1 MG
1 TABLET ORAL EVERY 8 HOURS
Qty: 0 | Refills: 0 | Status: DISCONTINUED | OUTPATIENT
Start: 2018-10-27 | End: 2018-10-28

## 2018-10-27 RX ADMIN — Medication 3 MILLILITER(S): at 05:10

## 2018-10-27 RX ADMIN — Medication 0.25 MILLIGRAM(S): at 05:12

## 2018-10-27 RX ADMIN — WARFARIN SODIUM 7.5 MILLIGRAM(S): 2.5 TABLET ORAL at 21:46

## 2018-10-27 RX ADMIN — Medication 3 MILLILITER(S): at 23:48

## 2018-10-27 RX ADMIN — SIMETHICONE 80 MILLIGRAM(S): 80 TABLET, CHEWABLE ORAL at 21:46

## 2018-10-27 RX ADMIN — Medication 0.25 MILLIGRAM(S): at 17:00

## 2018-10-27 RX ADMIN — Medication 30 MILLIGRAM(S): at 21:46

## 2018-10-27 RX ADMIN — FENTANYL CITRATE 1 PATCH: 50 INJECTION INTRAVENOUS at 20:22

## 2018-10-27 RX ADMIN — SODIUM CHLORIDE 3 MILLILITER(S): 9 INJECTION INTRAMUSCULAR; INTRAVENOUS; SUBCUTANEOUS at 05:10

## 2018-10-27 RX ADMIN — PANTOPRAZOLE SODIUM 40 MILLIGRAM(S): 20 TABLET, DELAYED RELEASE ORAL at 05:41

## 2018-10-27 RX ADMIN — ERTAPENEM SODIUM 120 MILLIGRAM(S): 1 INJECTION, POWDER, LYOPHILIZED, FOR SOLUTION INTRAMUSCULAR; INTRAVENOUS at 14:13

## 2018-10-27 RX ADMIN — Medication 1 MILLIGRAM(S): at 21:47

## 2018-10-27 RX ADMIN — SIMETHICONE 80 MILLIGRAM(S): 80 TABLET, CHEWABLE ORAL at 14:13

## 2018-10-27 RX ADMIN — SODIUM CHLORIDE 3 MILLILITER(S): 9 INJECTION INTRAMUSCULAR; INTRAVENOUS; SUBCUTANEOUS at 17:01

## 2018-10-27 RX ADMIN — Medication 25 MILLIGRAM(S): at 14:13

## 2018-10-27 RX ADMIN — Medication 2 MILLIGRAM(S): at 21:46

## 2018-10-27 RX ADMIN — SIMETHICONE 80 MILLIGRAM(S): 80 TABLET, CHEWABLE ORAL at 05:41

## 2018-10-27 RX ADMIN — ONDANSETRON 4 MILLIGRAM(S): 8 TABLET, FILM COATED ORAL at 21:47

## 2018-10-27 RX ADMIN — Medication 1 MILLIGRAM(S): at 16:31

## 2018-10-27 RX ADMIN — Medication 3 MILLILITER(S): at 17:00

## 2018-10-27 RX ADMIN — Medication 81 MILLIGRAM(S): at 12:40

## 2018-10-27 RX ADMIN — Medication 3 MILLILITER(S): at 11:44

## 2018-10-27 RX ADMIN — Medication 25 MILLIGRAM(S): at 21:47

## 2018-10-27 RX ADMIN — Medication 0.5 MILLIGRAM(S): at 02:20

## 2018-10-27 RX ADMIN — LATANOPROST 1 DROP(S): 0.05 SOLUTION/ DROPS OPHTHALMIC; TOPICAL at 21:47

## 2018-10-27 RX ADMIN — HEPARIN SODIUM 10.5 UNIT(S)/HR: 5000 INJECTION INTRAVENOUS; SUBCUTANEOUS at 08:05

## 2018-10-27 RX ADMIN — PANTOPRAZOLE SODIUM 40 MILLIGRAM(S): 20 TABLET, DELAYED RELEASE ORAL at 16:41

## 2018-10-27 RX ADMIN — FENTANYL CITRATE 1 PATCH: 50 INJECTION INTRAVENOUS at 08:04

## 2018-10-27 NOTE — PROGRESS NOTE ADULT - SUBJECTIVE AND OBJECTIVE BOX
Patient is a 84y old  Female who presents with a chief complaint of COPD exacerbation, ADHF (26 Oct 2018 22:13)      Interval Events:    REVIEW OF SYSTEMS:  [ ] Positive  [ ] All other systems negative  [ ] Unable to assess ROS because ________    Vital Signs Last 24 Hrs  T(C): 36.4 (10-27-18 @ 05:13), Max: 36.8 (10-26-18 @ 15:58)  T(F): 97.5 (10-27-18 @ 05:13), Max: 98.3 (10-26-18 @ 15:58)  HR: 67 (10-27-18 @ 08:46) (60 - 70)  BP: 108/61 (10-27-18 @ 05:13) (103/59 - 113/62)  RR: 18 (10-27-18 @ 05:13) (16 - 18)  SpO2: 99% (10-27-18 @ 08:46) (97% - 100%)    PHYSICAL EXAM:  HEENT:   [ ]Tracheostomy:  [ ]Pupils equal  [ ]No oral lesions  [ ]Abnormal    SKIN  [ ]No Rash  [ ] Abnormal  [ ] pressure    CARDIAC  [ ]Regular  [ ]Abnormal    PULMONARY  [ ]Bilateral Clear Breath Sounds  [ ]Normal Excursion  [ ]Abnormal    GI  [ ]PEG      [ ] +BS		              [ ]Soft, nondistended, nontender	  [ ]Abnormal    MUSCULOSKELETAL                                   [ ]Bedbound                 [ ]Abnormal    [ ]Ambulatory/OOB to chair                           EXTREMITIES                                         [ ]Normal  [ ]Edema                           NEUROLOGIC  [ ] Normal, non focal  [ ] Focal findings:    PSYCHIATRIC  [ ]Alert and appropriate  [ ] Sedated	 [ ]Agitated    :  Rosales: [ ] Yes, if yes: Date of Placement:                   [  ] No    LINES: Central Lines [ ] Yes, if yes: Date of Placement                                     [  ] No    HOSPITAL MEDICATIONS:  MEDICATIONS  (STANDING):  ALBUTerol/ipratropium for Nebulization 3 milliLiter(s) Nebulizer every 6 hours  aspirin  chewable 81 milliGRAM(s) Oral daily  buDESOnide   0.25 milliGRAM(s) Respule 0.25 milliGRAM(s) Inhalation every 12 hours  diltiazem    Tablet 30 milliGRAM(s) Oral every 6 hours  doxazosin 2 milliGRAM(s) Oral at bedtime  ertapenem  IVPB      ertapenem  IVPB 1000 milliGRAM(s) IV Intermittent every 24 hours  fentaNYL   Patch  25 MICROgram(s)/Hr 1 Patch Transdermal every 72 hours  heparin  Infusion 1050 Unit(s)/Hr (10.5 mL/Hr) IV Continuous <Continuous>  hydrALAZINE 25 milliGRAM(s) Oral every 8 hours  latanoprost 0.005% Ophthalmic Solution 1 Drop(s) Both EYES at bedtime  pantoprazole  Injectable 40 milliGRAM(s) IV Push every 12 hours  simethicone 80 milliGRAM(s) Chew every 8 hours  sodium chloride 3%  Inhalation 3 milliLiter(s) Inhalation two times a day    MEDICATIONS  (PRN):  acetaminophen    Suspension .. 650 milliGRAM(s) Oral every 6 hours PRN Mild Pain (1 - 3)  melatonin 1 milliGRAM(s) Oral at bedtime PRN Insomnia      LABS:                        7.6    10.1  )-----------( 204      ( 27 Oct 2018 06:41 )             24.0     10-27    133<L>  |  92<L>  |  104<H>  ----------------------------<  171<H>  4.4   |  24  |  1.43<H>    Ca    9.3      27 Oct 2018 06:39      PT/INR - ( 27 Oct 2018 06:40 )   PT: 20.5 sec;   INR: 1.86 ratio         PTT - ( 27 Oct 2018 06:40 )  PTT:66.0 sec        CAPILLARY BLOOD GLUCOSE    MICROBIOLOGY:     RADIOLOGY:  [ ] Reviewed and interpreted by me    Mode: AC/ CMV (Assist Control/ Continuous Mandatory Ventilation)  RR (machine): 14  TV (machine): 400  FiO2: 30  PEEP: 5  ITime: 1  MAP: 10  PIP: 26 Patient is a 84y old  Female who presents with a chief complaint of COPD exacerbation, ADHF....Today she observed in bed seemingly a bit frantic and gesturing but did not appear to be in acute or apparent distress and was tolerating vent well.    Interval Events: Patient with reported minimal bleeding from tracheostomy from overnight and this morning.    REVIEW OF SYSTEMS:  [ ] Positive  [ ] All other systems negative  [X ] Unable to assess ROS because _non-verbal    Vital Signs Last 24 Hrs  T(C): 36.4 (10-27-18 @ 05:13), Max: 36.8 (10-26-18 @ 15:58)  T(F): 97.5 (10-27-18 @ 05:13), Max: 98.3 (10-26-18 @ 15:58)  HR: 67 (10-27-18 @ 08:46) (60 - 70)  BP: 108/61 (10-27-18 @ 05:13) (103/59 - 113/62)  RR: 18 (10-27-18 @ 05:13) (16 - 18)  SpO2: 99% (10-27-18 @ 08:46) (97% - 100%)    PHYSICAL EXAM:  HEENT:   [ X]Tracheostomy:  [ X]Pupils equal  [ ]No oral lesions  [ ]Abnormal    SKIN  [ X]No Rash  [ ] Abnormal  [ ] pressure    CARDIAC  [ X] Regular: S1 S2 present, +systolic murmur  [ ]Abnormal    PULMONARY  [ ]Bilateral Clear Breath Sounds  [ ]Normal Excursion  [ X]Abnormal: mild ronchi B/L    GI  [X ]PEG      [X ] +BS		              [X ]Soft, nondistended, nontender	  [ ]Abnormal    MUSCULOSKELETAL                                   [ ]Bedbound                 [ ]Abnormal    [ X] OOB to chair with assistance    EXTREMITIES                                         [ ]Normal  [X ]Edema: 1+ pitting edema of B/L feet, trace edema on shins    NEUROLOGIC  [ ] Normal, non focal  [X ] Focal findings: Alert but unclear how much she comprehends, moves all limbs    PSYCHIATRIC  [ ]Alert and appropriate  [ ] Sedated	 [X ]Agitated    :  Rosales: [ ] Yes, if yes: Date of Placement:                   [  ] No    LINES: Central Lines [ ] Yes, if yes: Date of Placement                                     [ X ] No    HOSPITAL MEDICATIONS:  MEDICATIONS  (STANDING):  ALBUTerol/ipratropium for Nebulization 3 milliLiter(s) Nebulizer every 6 hours  aspirin  chewable 81 milliGRAM(s) Oral daily  buDESOnide   0.25 milliGRAM(s) Respule 0.25 milliGRAM(s) Inhalation every 12 hours  diltiazem    Tablet 30 milliGRAM(s) Oral every 6 hours  doxazosin 2 milliGRAM(s) Oral at bedtime  ertapenem  IVPB      ertapenem  IVPB 1000 milliGRAM(s) IV Intermittent every 24 hours  fentaNYL   Patch  25 MICROgram(s)/Hr 1 Patch Transdermal every 72 hours  heparin  Infusion 1050 Unit(s)/Hr (10.5 mL/Hr) IV Continuous <Continuous>  hydrALAZINE 25 milliGRAM(s) Oral every 8 hours  latanoprost 0.005% Ophthalmic Solution 1 Drop(s) Both EYES at bedtime  pantoprazole  Injectable 40 milliGRAM(s) IV Push every 12 hours  simethicone 80 milliGRAM(s) Chew every 8 hours  sodium chloride 3%  Inhalation 3 milliLiter(s) Inhalation two times a day    MEDICATIONS  (PRN):  acetaminophen    Suspension .. 650 milliGRAM(s) Oral every 6 hours PRN Mild Pain (1 - 3)  melatonin 1 milliGRAM(s) Oral at bedtime PRN Insomnia      LABS:                        7.6    10.1  )-----------( 204      ( 27 Oct 2018 06:41 )             24.0     10-27    133<L>  |  92<L>  |  104<H>  ----------------------------<  171<H>  4.4   |  24  |  1.43<H>    Ca    9.3      27 Oct 2018 06:39      PT/INR - ( 27 Oct 2018 06:40 )   PT: 20.5 sec;   INR: 1.86 ratio         PTT - ( 27 Oct 2018 06:40 )  PTT:66.0 sec        CAPILLARY BLOOD GLUCOSE    MICROBIOLOGY:     RADIOLOGY:  [ ] Reviewed and interpreted by me    Mode: AC/ CMV (Assist Control/ Continuous Mandatory Ventilation)  RR (machine): 14  TV (machine): 400  FiO2: 30  PEEP: 5  ITime: 1  MAP: 10  PIP: 26

## 2018-10-27 NOTE — PROGRESS NOTE ADULT - ATTENDING COMMENTS
I have seen and examined the patient. I agree with the above history, physical exam, and plan of care except for as detailed below.    Wean from vent as tolerated  Continue coumadin and heparin for mechanical valve despite bloody secretions  Pain control for abdominal pain  Continue Klonopin  D/C free water for hyponatremia  LATANYA stable, trend cr, avoid nephrotoxins

## 2018-10-27 NOTE — PROVIDER CONTACT NOTE (OTHER) - ASSESSMENT
Pt is resting in bed. no s/s of distress noted.
Bloody secretions assessed at tracheal ventilator tubing site.
Pt woke sleeping during early evening, now awake and very anxious, restless
altered mental status, restless. heparin gtt @ 900 units. no bleeding noted.
pt full vent support no signs of distress noted
pt. without further bloody secretions assessed presently.

## 2018-10-27 NOTE — PROVIDER CONTACT NOTE (OTHER) - ACTION/TREATMENT ORDERED:
NP notified. Keep Heparin infusing at 9ml/hr and recheck aPTT in AM. Will continue to monitor patient.
PA notified. Keep Heparin drip infusing at 9ml/hr. Next aPTT can be drawn in AM. Will continue to monitor patient.
Provider notified, no change in treatment cont. at current rate.
CANDIDA Gonzalez given all lab results Aptt 68.9, PT 21.3 and INR 1.93. Hemoglobin 7.8 Hematocrit 25.6. No further orders given presently.
Continue to hold feeds, will come to unit to assess pt.
NP Ana María Gonzalez notified of bloody tracheal secretions found at the level of tracheal ventilator tubing. Stat CBC, Aptt, INR and PT as per order.
Will order klonopin 0.5mg x1dose. continue to monitor pt
d/c heparin gtt.

## 2018-10-27 NOTE — PROGRESS NOTE ADULT - PROBLEM SELECTOR PLAN 4
ECHO 9/14: + Mechanical Mitral Valve, Severe AS, Stage 3 Diastolic Dysfxn   Patient With Hx of Sick Sinus Syndrome and PPM  / Atrial Fibrillation   Resumed torsemide 30mg via PEG  Continue Cardizem 30 mg q 6 hrs for AFIBB rate control (hold if systolic BP less than 100)  Continue to monitor BP and Heart Rate

## 2018-10-27 NOTE — PROGRESS NOTE ADULT - PROBLEM SELECTOR PLAN 7
stabilizing , renal on board  Avoid nephrotoxins.  Case d/w Dr. Lawton as patient appeared fluid overloaded. Agrees to resume diuretic.

## 2018-10-27 NOTE — PROGRESS NOTE ADULT - PROBLEM SELECTOR PLAN 6
Acupuncture/TCM Progress Note    Treatment number:   74  Precautions   Precautions: none    SUBJECTIVE:     Current Symptoms:   Patient [id number]% in right hand, with no regression. Reports  level of neuropathy and pain is bad this week. Impact of symptoms on daily life  Peripheral neuropathy   8/10  Pain  8/10  Stress   2/10       Self-Reported current state of Emotional Well being      8/10        OBJECTIVE:     Tongue: thick white coat, red body  Pulses: strong    Traditional [place] Medicine (TCM) Diagnosis:     Qi Deficiency   Qi Stagnation    TODAY'S ACUPUNCTURE/TCM TREATMENT:              TREATMENT PLAN  Acupuncture points/merdians:   yintang, ear shenmen R, LI 4, LI 5L,  HT 7L, ST 36, SP 6, LV 3,  baxie L, bafeng    Acupuncture: # of needles used:  21   # of needles out: 21    Other Modalities Utilized: E-Stim  ST 36- SP 6, hand LI 5 - HT 7       Patient Education:  Discussed acupuncture and plan of care  Patient Verbalizes understanding of education. ASSESSMENT OF TREATMENTS   Â· Post treatment comments: I always feel that my hand and feet are pretty normal after treatment   Peripheral neuropathy    1/10  Pain    1/10  Stress   1/10       GOALS:   To alleviate peripheral neuropathy  To feel better overall     PLAN OF CARE:   Frequency/Duration: 1x/week  Continuation plan of care and goals were established with the patient who concurs.     Acupuncture/TCM Daily Billing:    Acupuncture CPT Codes reflect billing claims DC to rehab when INR 2.5-3.5 and Cr stable

## 2018-10-27 NOTE — PROGRESS NOTE ADULT - ASSESSMENT
84  Year old Female with PMH significant for COPD, JENNIE on BiPAP at home , multivalvular disease (severe AS Not a candidate for TAVR, S/p MV replacement), HFpEF/ Severe diastolic failure, A-fib on coumadin, sick sinus syndrome S/p pacemaker placement, HTN, and CKD3 who was admitted for acute respiratory failure requiring intubation suspected to be 2/2 COPD exacerbation c/b PNA w/ +entero/rhinovirus and GN coccobacillus and H. influenza bacteremia, requiring continued respiratory support and tracheostomy. Patient S/p Trach placement 10/3 and PEG Placement 10/4.     10/7: Patient remains with abdominal distention today but tolerating trickle feeds, ABD X-ray  performed this morning patient remains with air filled loops of small and large bowel. GI fellow called to re-eval the patient this morning, X-ray  reviewed slightly worsened compared to yesterday. GI fellow recommended to place patient in Left lateral decubitus position and oob to chair later today. Patient with large amount of gas expressed with turning as Per RN. As per GI no role for rectal tube at this time and can continue to advance feeds as tolerated to promote gastric motility. H+H Has remained stable case d/w  will restart coumadin this evening. Pt with elevated bp will increase Hydralazine 50 mg q 8 hr   10/8-Abdominal distention noted with hypoactive bowel sounds, Kub noted from this weekend. Daily bowel movements noted. Will repeat PTT as there were 2 therapeutics prior on same dosing, also repeat CBC as well. Will transfuse if remains low. RCU weaning  10/10: 4 dark BMs reported night of 10/9, concerning for melena,  hgb 6.9 in AM,  1U RBC given . Goal heparin reduced to 50-70. GI consulted due to GIB Hx with known AV malformations. EGD 10/4: Gastric Erythema, No evidence of Active bleeding. 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   No intvn.  10/11: H/H stable post 1U with no furthers melena or intvn from GI. Resumed coumadin.  10/12: agitated this am. seroquel not working changed back to klonopin  10/13 No events overnight. Continues on heparin gtt/coumadin bridge. INR 1.45, coumadin 7.5 mg x1. Continue with PS trials as tolerated.   10/14 Heparin gtt d/c, INR 2.82, coumadin 1mg x1. Continue with PS trials as tolerated. LE edema, lasix 20 mg po x1. Hg 7.7 stable.   10/15:  INR 2.3, reduced coumadin to 5mg  10/16: INR 1.8, resumed heparin, coumadin increased to 7.5mg tonight. Tolerated trach collar for 40mins  10/18: Await INR results today.  10/19 no events overnight. F/u labs today. d/c planning   10/21 INR 4.97, will hold Coumadin today  + cloudy urine per RN, increased WBC, will send UA and obtain CXR . Cr 1.18, add IVF NS 50 cc/hr x 6 hrs   10/22: hgb 6.8, transfused 1U RBC.  LATANYA with Cr 1.4.  10/23: Renal consulted for Cr 1.5. Started mild IVF 75ml/[id number]. bladder US ordered. Started renal dose Aztreonam for UTI (10/22 UCx +E.Coli). f/u sensitivities  10/24: Aztreonam changed to Ertapenem as ecoli is ESBL. Watch for allergic cross sensitivity reaction as pt is allergic to pencillin. Continue to dose coumadin daily. Pt retarted on heparin gtt yesterday for subtherapeutic INR.   10/25: Continue IV abx for ecoli uti. Countine heparin gtt as INR needs to be 2.5 or greater.  10/26 Continue ertapenem day 3 /5 for esbl uti. Continue heparin gtt as inr remains subtherapeutic.  10/27: Resumed klonopin 1gm Q8H prn. minimal trach bleeding, will continue with heparin and coumadin. Resumed torsemide 30mg.

## 2018-10-27 NOTE — PROVIDER CONTACT NOTE (OTHER) - SITUATION
Pt with episode of emesis of peg feeds & bile. pt suctioned orally, pt had 10cc residual via peg. tube feeds held.  bp122/70 p60 t97.7

## 2018-10-28 DIAGNOSIS — Z22.39 CARRIER OF OTHER SPECIFIED BACTERIAL DISEASES: ICD-10-CM

## 2018-10-28 LAB
ANION GAP SERPL CALC-SCNC: 16 MMOL/L — SIGNIFICANT CHANGE UP (ref 5–17)
APTT BLD: 49.3 SEC — HIGH (ref 27.5–37.4)
BUN SERPL-MCNC: 97 MG/DL — HIGH (ref 7–23)
CALCIUM SERPL-MCNC: 9.2 MG/DL — SIGNIFICANT CHANGE UP (ref 8.4–10.5)
CHLORIDE SERPL-SCNC: 95 MMOL/L — LOW (ref 96–108)
CO2 SERPL-SCNC: 24 MMOL/L — SIGNIFICANT CHANGE UP (ref 22–31)
CREAT SERPL-MCNC: 1.35 MG/DL — HIGH (ref 0.5–1.3)
GLUCOSE BLDC GLUCOMTR-MCNC: 114 MG/DL — HIGH (ref 70–99)
GLUCOSE SERPL-MCNC: 147 MG/DL — HIGH (ref 70–99)
HCT VFR BLD CALC: 25.7 % — LOW (ref 34.5–45)
HGB BLD-MCNC: 8.2 G/DL — LOW (ref 11.5–15.5)
INR BLD: 2.7 RATIO — HIGH (ref 0.88–1.16)
MCHC RBC-ENTMCNC: 27.5 PG — SIGNIFICANT CHANGE UP (ref 27–34)
MCHC RBC-ENTMCNC: 32 GM/DL — SIGNIFICANT CHANGE UP (ref 32–36)
MCV RBC AUTO: 86.1 FL — SIGNIFICANT CHANGE UP (ref 80–100)
PLATELET # BLD AUTO: 216 K/UL — SIGNIFICANT CHANGE UP (ref 150–400)
POTASSIUM SERPL-MCNC: 4.2 MMOL/L — SIGNIFICANT CHANGE UP (ref 3.5–5.3)
POTASSIUM SERPL-SCNC: 4.2 MMOL/L — SIGNIFICANT CHANGE UP (ref 3.5–5.3)
PROTHROM AB SERPL-ACNC: 30 SEC — HIGH (ref 9.8–12.7)
RBC # BLD: 2.98 M/UL — LOW (ref 3.8–5.2)
RBC # FLD: 17.8 % — HIGH (ref 10.3–14.5)
SODIUM SERPL-SCNC: 135 MMOL/L — SIGNIFICANT CHANGE UP (ref 135–145)
WBC # BLD: 11.3 K/UL — HIGH (ref 3.8–10.5)
WBC # FLD AUTO: 11.3 K/UL — HIGH (ref 3.8–10.5)

## 2018-10-28 PROCEDURE — 99233 SBSQ HOSP IP/OBS HIGH 50: CPT

## 2018-10-28 RX ORDER — WARFARIN SODIUM 2.5 MG/1
7 TABLET ORAL ONCE
Qty: 0 | Refills: 0 | Status: COMPLETED | OUTPATIENT
Start: 2018-10-28 | End: 2018-10-28

## 2018-10-28 RX ORDER — CLONAZEPAM 1 MG
0.5 TABLET ORAL EVERY 8 HOURS
Qty: 0 | Refills: 0 | Status: DISCONTINUED | OUTPATIENT
Start: 2018-10-28 | End: 2018-11-02

## 2018-10-28 RX ADMIN — Medication 3 MILLILITER(S): at 11:57

## 2018-10-28 RX ADMIN — ERTAPENEM SODIUM 120 MILLIGRAM(S): 1 INJECTION, POWDER, LYOPHILIZED, FOR SOLUTION INTRAMUSCULAR; INTRAVENOUS at 13:06

## 2018-10-28 RX ADMIN — Medication 25 MILLIGRAM(S): at 05:43

## 2018-10-28 RX ADMIN — Medication 2 MILLIGRAM(S): at 22:11

## 2018-10-28 RX ADMIN — WARFARIN SODIUM 7 MILLIGRAM(S): 2.5 TABLET ORAL at 22:11

## 2018-10-28 RX ADMIN — PANTOPRAZOLE SODIUM 40 MILLIGRAM(S): 20 TABLET, DELAYED RELEASE ORAL at 05:43

## 2018-10-28 RX ADMIN — SIMETHICONE 80 MILLIGRAM(S): 80 TABLET, CHEWABLE ORAL at 15:07

## 2018-10-28 RX ADMIN — Medication 0.25 MILLIGRAM(S): at 17:59

## 2018-10-28 RX ADMIN — Medication 25 MILLIGRAM(S): at 22:11

## 2018-10-28 RX ADMIN — SIMETHICONE 80 MILLIGRAM(S): 80 TABLET, CHEWABLE ORAL at 05:43

## 2018-10-28 RX ADMIN — Medication 0.25 MILLIGRAM(S): at 05:02

## 2018-10-28 RX ADMIN — FENTANYL CITRATE 1 PATCH: 50 INJECTION INTRAVENOUS at 20:13

## 2018-10-28 RX ADMIN — LATANOPROST 1 DROP(S): 0.05 SOLUTION/ DROPS OPHTHALMIC; TOPICAL at 22:11

## 2018-10-28 RX ADMIN — SIMETHICONE 80 MILLIGRAM(S): 80 TABLET, CHEWABLE ORAL at 22:11

## 2018-10-28 RX ADMIN — Medication 81 MILLIGRAM(S): at 13:06

## 2018-10-28 RX ADMIN — SODIUM CHLORIDE 3 MILLILITER(S): 9 INJECTION INTRAMUSCULAR; INTRAVENOUS; SUBCUTANEOUS at 05:05

## 2018-10-28 RX ADMIN — PANTOPRAZOLE SODIUM 40 MILLIGRAM(S): 20 TABLET, DELAYED RELEASE ORAL at 18:44

## 2018-10-28 RX ADMIN — SODIUM CHLORIDE 3 MILLILITER(S): 9 INJECTION INTRAMUSCULAR; INTRAVENOUS; SUBCUTANEOUS at 19:28

## 2018-10-28 RX ADMIN — Medication 3 MILLILITER(S): at 17:59

## 2018-10-28 RX ADMIN — Medication 3 MILLILITER(S): at 05:02

## 2018-10-28 RX ADMIN — Medication 1 MILLIGRAM(S): at 01:41

## 2018-10-28 NOTE — PROGRESS NOTE ADULT - SUBJECTIVE AND OBJECTIVE BOX
Patient is a 84y old  Female who presents with a chief complaint of COPD exacerbation, ADHF (27 Oct 2018 09:21)      Interval Events:    REVIEW OF SYSTEMS:  [ ] Positive  [ ] All other systems negative  [ ] Unable to assess ROS because ________    Vital Signs Last 24 Hrs  T(C): 36.3 (10-28-18 @ 05:38), Max: 36.7 (10-27-18 @ 14:12)  T(F): 97.3 (10-28-18 @ 05:38), Max: 98 (10-27-18 @ 14:12)  HR: 63 (10-28-18 @ 08:18) (59 - 84)  BP: 133/63 (10-28-18 @ 05:38) (113/52 - 144/75)  RR: 14 (10-28-18 @ 05:38) (14 - 20)  SpO2: 100% (10-28-18 @ 08:18) (98% - 100%)    PHYSICAL EXAM:  HEENT:   [ ]Tracheostomy:  [ ]Pupils equal  [ ]No oral lesions  [ ]Abnormal    SKIN  [ ]No Rash  [ ] Abnormal  [ ] pressure    CARDIAC  [ ]Regular  [ ]Abnormal    PULMONARY  [ ]Bilateral Clear Breath Sounds  [ ]Normal Excursion  [ ]Abnormal    GI  [ ]PEG      [ ] +BS		              [ ]Soft, nondistended, nontender	  [ ]Abnormal    MUSCULOSKELETAL                                   [ ]Bedbound                 [ ]Abnormal    [ ]Ambulatory/OOB to chair                           EXTREMITIES                                         [ ]Normal  [ ]Edema                           NEUROLOGIC  [ ] Normal, non focal  [ ] Focal findings:    PSYCHIATRIC  [ ]Alert and appropriate  [ ] Sedated	 [ ]Agitated    :  Connie: [ ] Yes, if yes: Date of Placement:                   [  ] No    LINES: Central Lines [ ] Yes, if yes: Date of Placement                                     [  ] No    HOSPITAL MEDICATIONS:  MEDICATIONS  (STANDING):  ALBUTerol/ipratropium for Nebulization 3 milliLiter(s) Nebulizer every 6 hours  aspirin  chewable 81 milliGRAM(s) Oral daily  buDESOnide   0.25 milliGRAM(s) Respule 0.25 milliGRAM(s) Inhalation every 12 hours  diltiazem    Tablet 30 milliGRAM(s) Oral every 6 hours  doxazosin 2 milliGRAM(s) Oral at bedtime  ertapenem  IVPB      ertapenem  IVPB 1000 milliGRAM(s) IV Intermittent every 24 hours  fentaNYL   Patch  25 MICROgram(s)/Hr 1 Patch Transdermal every 72 hours  heparin  Infusion 1050 Unit(s)/Hr (10.5 mL/Hr) IV Continuous <Continuous>  hydrALAZINE 25 milliGRAM(s) Oral every 8 hours  latanoprost 0.005% Ophthalmic Solution 1 Drop(s) Both EYES at bedtime  pantoprazole  Injectable 40 milliGRAM(s) IV Push every 12 hours  simethicone 80 milliGRAM(s) Chew every 8 hours  sodium chloride 3%  Inhalation 3 milliLiter(s) Inhalation two times a day    MEDICATIONS  (PRN):  acetaminophen    Suspension .. 650 milliGRAM(s) Oral every 6 hours PRN Mild Pain (1 - 3)  clonazePAM Tablet 1 milliGRAM(s) Oral every 8 hours PRN anxiety  melatonin 1 milliGRAM(s) Oral at bedtime PRN Insomnia      LABS:                        7.6    10.1  )-----------( 204      ( 27 Oct 2018 06:41 )             24.0     10-27    133<L>  |  92<L>  |  104<H>  ----------------------------<  171<H>  4.4   |  24  |  1.43<H>    Ca    9.3      27 Oct 2018 06:39      PT/INR - ( 27 Oct 2018 06:40 )   PT: 20.5 sec;   INR: 1.86 ratio         PTT - ( 27 Oct 2018 06:40 )  PTT:66.0 sec        CAPILLARY BLOOD GLUCOSE    MICROBIOLOGY:     RADIOLOGY:  [ ] Reviewed and interpreted by me    Mode: AC/ CMV (Assist Control/ Continuous Mandatory Ventilation)  RR (machine): 14  TV (machine): 400  FiO2: 30  PEEP: 5  ITime: 1  MAP: 9  PIP: 27 Patient is a 84y old  Female who presents with a chief complaint of COPD exacerbation, ADHF. Today she is observed awake and alert in bed on vent. She appears comfortable.    Interval Events:  There was a report of vomiting overnight. Was mentioned that bedside family member had patient in supine position.    Vital Signs Last 24 Hrs  T(C): 36.3 (10-28-18 @ 05:38), Max: 36.7 (10-27-18 @ 14:12)  T(F): 97.3 (10-28-18 @ 05:38), Max: 98 (10-27-18 @ 14:12)  HR: 63 (10-28-18 @ 08:18) (59 - 84)  BP: 133/63 (10-28-18 @ 05:38) (113/52 - 144/75)  RR: 14 (10-28-18 @ 05:38) (14 - 20)  SpO2: 100% (10-28-18 @ 08:18) (98% - 100%)    REVIEW OF SYSTEMS:  [ ] Positive  [ ] All other systems negative  [X ] Unable to assess ROS because _non-verbal    PHYSICAL EXAM:  HEENT:   [ X]Tracheostomy:  [ X]Pupils equal  [ ]No oral lesions  [ ]Abnormal    SKIN  [ X]No Rash  [ ] Abnormal  [ ] pressure    CARDIAC  [ X] Regular: S1 S2 present, +systolic murmur  [ ]Abnormal    PULMONARY  [ ]Bilateral Clear Breath Sounds  [ ]Normal Excursion  [ X]Abnormal: mild ronchi B/L    GI  [X ]PEG      [X ] +BS		              [ ]Soft, nondistended, nontender	  [X ]Abnormal: appears and feels more distended than yesterday    MUSCULOSKELETAL                                   [ ]Bedbound                 [ ]Abnormal    [ X] OOB to chair with assistance    EXTREMITIES                                         [ ]Normal  [X ]Edema: 1+ pitting edema of B/L feet, trace edema on shins    NEUROLOGIC  [ ] Normal, non focal  [X ] Focal findings: Alert but unclear how much she comprehends, moves all limbs    PSYCHIATRIC  [ ]Alert and appropriate  [ ] Sedated	 [X ]Agitated    :  Rosales: [ ] Yes, if yes: Date of Placement:                   [  ] No    LINES: Central Lines [ ] Yes, if yes: Date of Placement                                     [ X ] No              HOSPITAL MEDICATIONS:  MEDICATIONS  (STANDING):  ALBUTerol/ipratropium for Nebulization 3 milliLiter(s) Nebulizer every 6 hours  aspirin  chewable 81 milliGRAM(s) Oral daily  buDESOnide   0.25 milliGRAM(s) Respule 0.25 milliGRAM(s) Inhalation every 12 hours  diltiazem    Tablet 30 milliGRAM(s) Oral every 6 hours  doxazosin 2 milliGRAM(s) Oral at bedtime  ertapenem  IVPB      ertapenem  IVPB 1000 milliGRAM(s) IV Intermittent every 24 hours  fentaNYL   Patch  25 MICROgram(s)/Hr 1 Patch Transdermal every 72 hours  heparin  Infusion 1050 Unit(s)/Hr (10.5 mL/Hr) IV Continuous <Continuous>  hydrALAZINE 25 milliGRAM(s) Oral every 8 hours  latanoprost 0.005% Ophthalmic Solution 1 Drop(s) Both EYES at bedtime  pantoprazole  Injectable 40 milliGRAM(s) IV Push every 12 hours  simethicone 80 milliGRAM(s) Chew every 8 hours  sodium chloride 3%  Inhalation 3 milliLiter(s) Inhalation two times a day    MEDICATIONS  (PRN):  acetaminophen    Suspension .. 650 milliGRAM(s) Oral every 6 hours PRN Mild Pain (1 - 3)  clonazePAM Tablet 1 milliGRAM(s) Oral every 8 hours PRN anxiety  melatonin 1 milliGRAM(s) Oral at bedtime PRN Insomnia      LABS:                        8.2    11.3  )-----------( 216      ( 28 Oct 2018 12:19 )             25.7     10-28    135  |  95<L>  |  97<H>  ----------------------------<  147<H>  4.2   |  24  |  1.35<H>    Ca    9.2      28 Oct 2018 12:19      PT/INR - ( 28 Oct 2018 12:19 )   PT: 30.0 sec;   INR: 2.70 ratio         PTT - ( 28 Oct 2018 12:19 )  PTT:49.3 sec      CAPILLARY BLOOD GLUCOSE      MICROBIOLOGY:     RADIOLOGY:  [ ] Reviewed and interpreted by me    Mode: AC/ CMV (Assist Control/ Continuous Mandatory Ventilation)  RR (machine): 14  TV (machine): 400  FiO2: 30  PEEP: 5  ITime: 1  MAP: 9  PIP: 27

## 2018-10-28 NOTE — PROGRESS NOTE ADULT - PROBLEM SELECTOR PLAN 2
Patient S/p PEG 10/4  tolerating tube feeds at goal rate Patient S/p PEG 10/4.   Had an episode of vomiting overnight. Abdomen seems more distendd than yesterday. PEG was placed to vent for a few hours with some subsequent improvement in distention.   PEG feeds resumed at lower rate of 20ml/hr with titration as tolerated by 10ml every 4 hours to goal.

## 2018-10-28 NOTE — PROGRESS NOTE ADULT - ASSESSMENT
84  Year old Female with PMH significant for COPD, JENNIE on BiPAP at home , multivalvular disease (severe AS Not a candidate for TAVR, S/p MV replacement), HFpEF/ Severe diastolic failure, A-fib on coumadin, sick sinus syndrome S/p pacemaker placement, HTN, and CKD3 who was admitted for acute respiratory failure requiring intubation suspected to be 2/2 COPD exacerbation c/b PNA w/ +entero/rhinovirus and GN coccobacillus and H. influenza bacteremia, requiring continued respiratory support and tracheostomy. Patient S/p Trach placement 10/3 and PEG Placement 10/4.     10/7: Patient remains with abdominal distention today but tolerating trickle feeds, ABD X-ray  performed this morning patient remains with air filled loops of small and large bowel. GI fellow called to re-eval the patient this morning, X-ray  reviewed slightly worsened compared to yesterday. GI fellow recommended to place patient in Left lateral decubitus position and oob to chair later today. Patient with large amount of gas expressed with turning as Per RN. As per GI no role for rectal tube at this time and can continue to advance feeds as tolerated to promote gastric motility. H+H Has remained stable case d/w  will restart coumadin this evening. Pt with elevated bp will increase Hydralazine 50 mg q 8 hr   10/8-Abdominal distention noted with hypoactive bowel sounds, Kub noted from this weekend. Daily bowel movements noted. Will repeat PTT as there were 2 therapeutics prior on same dosing, also repeat CBC as well. Will transfuse if remains low. RCU weaning  10/10: 4 dark BMs reported night of 10/9, concerning for melena,  hgb 6.9 in AM,  1U RBC given . Goal heparin reduced to 50-70. GI consulted due to GIB Hx with known AV malformations. EGD 10/4: Gastric Erythema, No evidence of Active bleeding. 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   No intvn.  10/11: H/H stable post 1U with no furthers melena or intvn from GI. Resumed coumadin.  10/12: agitated this am. seroquel not working changed back to klonopin  10/13 No events overnight. Continues on heparin gtt/coumadin bridge. INR 1.45, coumadin 7.5 mg x1. Continue with PS trials as tolerated.   10/14 Heparin gtt d/c, INR 2.82, coumadin 1mg x1. Continue with PS trials as tolerated. LE edema, lasix 20 mg po x1. Hg 7.7 stable.   10/15:  INR 2.3, reduced coumadin to 5mg  10/16: INR 1.8, resumed heparin, coumadin increased to 7.5mg tonight. Tolerated trach collar for 40mins  10/18: Await INR results today.  10/19 no events overnight. F/u labs today. d/c planning   10/21 INR 4.97, will hold Coumadin today  + cloudy urine per RN, increased WBC, will send UA and obtain CXR . Cr 1.18, add IVF NS 50 cc/hr x 6 hrs   10/22: hgb 6.8, transfused 1U RBC.  LATANYA with Cr 1.4.  10/23: Renal consulted for Cr 1.5. Started mild IVF 75ml/[id number]. bladder US ordered. Started renal dose Aztreonam for UTI (10/22 UCx +E.Coli). f/u sensitivities  10/24: Aztreonam changed to Ertapenem as ecoli is ESBL. Watch for allergic cross sensitivity reaction as pt is allergic to pencillin. Continue to dose coumadin daily. Pt retarted on heparin gtt yesterday for subtherapeutic INR.   10/25: Continue IV abx for ecoli uti. Countine heparin gtt as INR needs to be 2.5 or greater.  10/26 Continue ertapenem day 3 /5 for esbl uti. Continue heparin gtt as inr remains subtherapeutic.  10/27: Resumed klonopin 1gm Q8H prn. minimal trach bleeding, will continue with heparin and coumadin. Resumed torsemide 30mg. 84  Year old Female with PMH significant for COPD, JENNIE on BiPAP at home , multivalvular disease (severe AS Not a candidate for TAVR, S/p MV replacement), HFpEF/ Severe diastolic failure, A-fib on coumadin, sick sinus syndrome S/p pacemaker placement, HTN, and CKD3 who was admitted for acute respiratory failure requiring intubation suspected to be 2/2 COPD exacerbation c/b PNA w/ +entero/rhinovirus and GN coccobacillus and H. influenza bacteremia, requiring continued respiratory support and tracheostomy. Patient S/p Trach placement 10/3 and PEG Placement 10/4.     10/7: Patient remains with abdominal distention today but tolerating trickle feeds, ABD X-ray  performed this morning patient remains with air filled loops of small and large bowel. GI fellow called to re-eval the patient this morning, X-ray  reviewed slightly worsened compared to yesterday. GI fellow recommended to place patient in Left lateral decubitus position and oob to chair later today. Patient with large amount of gas expressed with turning as Per RN. As per GI no role for rectal tube at this time and can continue to advance feeds as tolerated to promote gastric motility. H+H Has remained stable case d/w  will restart coumadin this evening. Pt with elevated bp will increase Hydralazine 50 mg q 8 hr   10/8-Abdominal distention noted with hypoactive bowel sounds, Kub noted from this weekend. Daily bowel movements noted. Will repeat PTT as there were 2 therapeutics prior on same dosing, also repeat CBC as well. Will transfuse if remains low. RCU weaning  10/10: 4 dark BMs reported night of 10/9, concerning for melena,  hgb 6.9 in AM,  1U RBC given . Goal heparin reduced to 50-70. GI consulted due to GIB Hx with known AV malformations. EGD 10/4: Gastric Erythema, No evidence of Active bleeding. 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   No intvn.  10/11: H/H stable post 1U with no furthers melena or intvn from GI. Resumed coumadin.  10/12: agitated this am. seroquel not working changed back to klonopin  10/13 No events overnight. Continues on heparin gtt/coumadin bridge. INR 1.45, coumadin 7.5 mg x1. Continue with PS trials as tolerated.   10/14 Heparin gtt d/c, INR 2.82, coumadin 1mg x1. Continue with PS trials as tolerated. LE edema, lasix 20 mg po x1. Hg 7.7 stable.   10/15:  INR 2.3, reduced coumadin to 5mg  10/16: INR 1.8, resumed heparin, coumadin increased to 7.5mg tonight. Tolerated trach collar for 40mins  10/18: Await INR results today.  10/19 no events overnight. F/u labs today. d/c planning   10/21 INR 4.97, will hold Coumadin today  + cloudy urine per RN, increased WBC, will send UA and obtain CXR . Cr 1.18, add IVF NS 50 cc/hr x 6 hrs   10/22: hgb 6.8, transfused 1U RBC.  LATANYA with Cr 1.4.  10/23: Renal consulted for Cr 1.5. Started mild IVF 75ml/[id number]. bladder US ordered. Started renal dose Aztreonam for UTI (10/22 UCx +E.Coli). f/u sensitivities  10/24: Aztreonam changed to Ertapenem as ecoli is ESBL. Watch for allergic cross sensitivity reaction as pt is allergic to pencillin. Continue to dose coumadin daily. Pt retarted on heparin gtt yesterday for subtherapeutic INR.   10/25: Continue IV abx for ecoli uti. Countine heparin gtt as INR needs to be 2.5 or greater.  10/26 Continue ertapenem day 3 /5 for esbl uti. Continue heparin gtt as inr remains subtherapeutic.  10/27: Resumed klonopin 1gm Q8H prn. minimal trach bleeding, will continue with heparin and coumadin. Resumed torsemide 30mg.  10/28: Pt vomited overnight and abd distention appears worse. PEG placed to vent today with some relief in distention and feeds resumed at 20ml/hr with titration by 10 Q4hrs.

## 2018-10-29 LAB
ANION GAP SERPL CALC-SCNC: 14 MMOL/L — SIGNIFICANT CHANGE UP (ref 5–17)
BUN SERPL-MCNC: 91 MG/DL — HIGH (ref 7–23)
CALCIUM SERPL-MCNC: 9.4 MG/DL — SIGNIFICANT CHANGE UP (ref 8.4–10.5)
CHLORIDE SERPL-SCNC: 94 MMOL/L — LOW (ref 96–108)
CO2 SERPL-SCNC: 27 MMOL/L — SIGNIFICANT CHANGE UP (ref 22–31)
CREAT SERPL-MCNC: 1.4 MG/DL — HIGH (ref 0.5–1.3)
GLUCOSE BLDC GLUCOMTR-MCNC: 144 MG/DL — HIGH (ref 70–99)
GLUCOSE BLDC GLUCOMTR-MCNC: 149 MG/DL — HIGH (ref 70–99)
GLUCOSE SERPL-MCNC: 110 MG/DL — HIGH (ref 70–99)
HCT VFR BLD CALC: 26 % — LOW (ref 34.5–45)
HGB BLD-MCNC: 8.1 G/DL — LOW (ref 11.5–15.5)
INR BLD: 3.27 RATIO — HIGH (ref 0.88–1.16)
MCHC RBC-ENTMCNC: 26.7 PG — LOW (ref 27–34)
MCHC RBC-ENTMCNC: 31 GM/DL — LOW (ref 32–36)
MCV RBC AUTO: 86.2 FL — SIGNIFICANT CHANGE UP (ref 80–100)
PLATELET # BLD AUTO: 225 K/UL — SIGNIFICANT CHANGE UP (ref 150–400)
POTASSIUM SERPL-MCNC: 4.3 MMOL/L — SIGNIFICANT CHANGE UP (ref 3.5–5.3)
POTASSIUM SERPL-SCNC: 4.3 MMOL/L — SIGNIFICANT CHANGE UP (ref 3.5–5.3)
PROTHROM AB SERPL-ACNC: 36.5 SEC — HIGH (ref 9.8–12.7)
RBC # BLD: 3.02 M/UL — LOW (ref 3.8–5.2)
RBC # FLD: 18.5 % — HIGH (ref 10.3–14.5)
SODIUM SERPL-SCNC: 135 MMOL/L — SIGNIFICANT CHANGE UP (ref 135–145)
WBC # BLD: 10.7 K/UL — HIGH (ref 3.8–10.5)
WBC # FLD AUTO: 10.7 K/UL — HIGH (ref 3.8–10.5)

## 2018-10-29 PROCEDURE — 99233 SBSQ HOSP IP/OBS HIGH 50: CPT | Mod: GC

## 2018-10-29 RX ORDER — SODIUM CHLORIDE 9 MG/ML
1000 INJECTION, SOLUTION INTRAVENOUS
Qty: 0 | Refills: 0 | Status: DISCONTINUED | OUTPATIENT
Start: 2018-10-29 | End: 2018-11-02

## 2018-10-29 RX ORDER — DEXTROSE 50 % IN WATER 50 %
25 SYRINGE (ML) INTRAVENOUS ONCE
Qty: 0 | Refills: 0 | Status: DISCONTINUED | OUTPATIENT
Start: 2018-10-29 | End: 2018-11-02

## 2018-10-29 RX ORDER — INSULIN LISPRO 100/ML
VIAL (ML) SUBCUTANEOUS
Qty: 0 | Refills: 0 | Status: DISCONTINUED | OUTPATIENT
Start: 2018-10-29 | End: 2018-11-02

## 2018-10-29 RX ORDER — DEXTROSE 50 % IN WATER 50 %
15 SYRINGE (ML) INTRAVENOUS ONCE
Qty: 0 | Refills: 0 | Status: DISCONTINUED | OUTPATIENT
Start: 2018-10-29 | End: 2018-11-02

## 2018-10-29 RX ORDER — METOCLOPRAMIDE HCL 10 MG
5 TABLET ORAL EVERY 8 HOURS
Qty: 0 | Refills: 0 | Status: DISCONTINUED | OUTPATIENT
Start: 2018-10-29 | End: 2018-11-02

## 2018-10-29 RX ORDER — WARFARIN SODIUM 2.5 MG/1
6 TABLET ORAL ONCE
Qty: 0 | Refills: 0 | Status: COMPLETED | OUTPATIENT
Start: 2018-10-29 | End: 2018-10-29

## 2018-10-29 RX ORDER — DEXTROSE 50 % IN WATER 50 %
12.5 SYRINGE (ML) INTRAVENOUS ONCE
Qty: 0 | Refills: 0 | Status: DISCONTINUED | OUTPATIENT
Start: 2018-10-29 | End: 2018-11-02

## 2018-10-29 RX ORDER — GLUCAGON INJECTION, SOLUTION 0.5 MG/.1ML
1 INJECTION, SOLUTION SUBCUTANEOUS ONCE
Qty: 0 | Refills: 0 | Status: DISCONTINUED | OUTPATIENT
Start: 2018-10-29 | End: 2018-11-02

## 2018-10-29 RX ADMIN — Medication 25 MILLIGRAM(S): at 13:24

## 2018-10-29 RX ADMIN — FENTANYL CITRATE 1 PATCH: 50 INJECTION INTRAVENOUS at 06:54

## 2018-10-29 RX ADMIN — Medication 3 MILLILITER(S): at 00:29

## 2018-10-29 RX ADMIN — Medication 3 MILLILITER(S): at 18:13

## 2018-10-29 RX ADMIN — FENTANYL CITRATE 1 PATCH: 50 INJECTION INTRAVENOUS at 06:51

## 2018-10-29 RX ADMIN — PANTOPRAZOLE SODIUM 40 MILLIGRAM(S): 20 TABLET, DELAYED RELEASE ORAL at 17:41

## 2018-10-29 RX ADMIN — PANTOPRAZOLE SODIUM 40 MILLIGRAM(S): 20 TABLET, DELAYED RELEASE ORAL at 06:00

## 2018-10-29 RX ADMIN — SODIUM CHLORIDE 3 MILLILITER(S): 9 INJECTION INTRAMUSCULAR; INTRAVENOUS; SUBCUTANEOUS at 06:01

## 2018-10-29 RX ADMIN — FENTANYL CITRATE 1 PATCH: 50 INJECTION INTRAVENOUS at 19:06

## 2018-10-29 RX ADMIN — Medication 0.25 MILLIGRAM(S): at 06:01

## 2018-10-29 RX ADMIN — LATANOPROST 1 DROP(S): 0.05 SOLUTION/ DROPS OPHTHALMIC; TOPICAL at 22:57

## 2018-10-29 RX ADMIN — Medication 5 MILLIGRAM(S): at 23:01

## 2018-10-29 RX ADMIN — SODIUM CHLORIDE 3 MILLILITER(S): 9 INJECTION INTRAMUSCULAR; INTRAVENOUS; SUBCUTANEOUS at 18:31

## 2018-10-29 RX ADMIN — WARFARIN SODIUM 6 MILLIGRAM(S): 2.5 TABLET ORAL at 22:56

## 2018-10-29 RX ADMIN — Medication 25 MILLIGRAM(S): at 22:56

## 2018-10-29 RX ADMIN — Medication 3 MILLILITER(S): at 06:01

## 2018-10-29 RX ADMIN — Medication 3 MILLILITER(S): at 11:34

## 2018-10-29 RX ADMIN — Medication 5 MILLIGRAM(S): at 13:24

## 2018-10-29 RX ADMIN — SIMETHICONE 80 MILLIGRAM(S): 80 TABLET, CHEWABLE ORAL at 13:24

## 2018-10-29 RX ADMIN — Medication 25 MILLIGRAM(S): at 06:00

## 2018-10-29 RX ADMIN — Medication 0.5 MILLIGRAM(S): at 02:11

## 2018-10-29 RX ADMIN — Medication 3 MILLILITER(S): at 23:24

## 2018-10-29 RX ADMIN — Medication 0.25 MILLIGRAM(S): at 19:29

## 2018-10-29 RX ADMIN — Medication 10 MILLIGRAM(S): at 12:11

## 2018-10-29 RX ADMIN — Medication 81 MILLIGRAM(S): at 12:07

## 2018-10-29 RX ADMIN — ERTAPENEM SODIUM 120 MILLIGRAM(S): 1 INJECTION, POWDER, LYOPHILIZED, FOR SOLUTION INTRAMUSCULAR; INTRAVENOUS at 12:11

## 2018-10-29 RX ADMIN — Medication 2 MILLIGRAM(S): at 22:56

## 2018-10-29 RX ADMIN — SIMETHICONE 80 MILLIGRAM(S): 80 TABLET, CHEWABLE ORAL at 06:00

## 2018-10-29 RX ADMIN — SIMETHICONE 80 MILLIGRAM(S): 80 TABLET, CHEWABLE ORAL at 22:56

## 2018-10-29 NOTE — PROGRESS NOTE ADULT - ASSESSMENT
84  Year old Female with PMH significant for COPD, JENNIE on BiPAP at home , multivalvular disease (severe AS Not a candidate for TAVR, S/p MV replacement), HFpEF/ Severe diastolic failure, A-fib on coumadin, sick sinus syndrome S/p pacemaker placement, HTN, and CKD3 who was admitted for acute respiratory failure requiring intubation suspected to be 2/2 COPD exacerbation c/b PNA w/ +entero/rhinovirus and GN coccobacillus and H. influenza bacteremia, requiring continued respiratory support and tracheostomy. Patient S/p Trach placement 10/3 and PEG Placement 10/4.     10/7: Patient remains with abdominal distention today but tolerating trickle feeds, ABD X-ray  performed this morning patient remains with air filled loops of small and large bowel. GI fellow called to re-eval the patient this morning, X-ray  reviewed slightly worsened compared to yesterday. GI fellow recommended to place patient in Left lateral decubitus position and oob to chair later today. Patient with large amount of gas expressed with turning as Per RN. As per GI no role for rectal tube at this time and can continue to advance feeds as tolerated to promote gastric motility. H+H Has remained stable case d/w  will restart coumadin this evening. Pt with elevated bp will increase Hydralazine 50 mg q 8 hr   10/8-Abdominal distention noted with hypoactive bowel sounds, Kub noted from this weekend. Daily bowel movements noted. Will repeat PTT as there were 2 therapeutics prior on same dosing, also repeat CBC as well. Will transfuse if remains low. RCU weaning  10/10: 4 dark BMs reported night of 10/9, concerning for melena,  hgb 6.9 in AM,  1U RBC given . Goal heparin reduced to 50-70. GI consulted due to GIB Hx with known AV malformations. EGD 10/4: Gastric Erythema, No evidence of Active bleeding. 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   No intvn.  10/11: H/H stable post 1U with no furthers melena or intvn from GI. Resumed coumadin.  10/12: agitated this am. seroquel not working changed back to klonopin  10/13 No events overnight. Continues on heparin gtt/coumadin bridge. INR 1.45, coumadin 7.5 mg x1. Continue with PS trials as tolerated.   10/14 Heparin gtt d/c, INR 2.82, coumadin 1mg x1. Continue with PS trials as tolerated. LE edema, lasix 20 mg po x1. Hg 7.7 stable.   10/15:  INR 2.3, reduced coumadin to 5mg  10/16: INR 1.8, resumed heparin, coumadin increased to 7.5mg tonight. Tolerated trach collar for 40mins  10/18: Await INR results today.  10/19 no events overnight. F/u labs today. d/c planning   10/21 INR 4.97, will hold Coumadin today  + cloudy urine per RN, increased WBC, will send UA and obtain CXR . Cr 1.18, add IVF NS 50 cc/hr x 6 hrs   10/22: hgb 6.8, transfused 1U RBC.  LATANYA with Cr 1.4.  10/23: Renal consulted for Cr 1.5. Started mild IVF 75ml/[id number]. bladder US ordered. Started renal dose Aztreonam for UTI (10/22 UCx +E.Coli). f/u sensitivities  10/24: Aztreonam changed to Ertapenem as ecoli is ESBL. Watch for allergic cross sensitivity reaction as pt is allergic to pencillin. Continue to dose coumadin daily. Pt retarted on heparin gtt yesterday for subtherapeutic INR.   10/25: Continue IV abx for ecoli uti. Countine heparin gtt as INR needs to be 2.5 or greater.  10/26 Continue ertapenem day 3 /5 for esbl uti. Continue heparin gtt as inr remains subtherapeutic.  10/27: Resumed klonopin 1gm Q8H prn. minimal trach bleeding, will continue with heparin and coumadin. Resumed torsemide 30mg.  10/28: Pt vomited overnight and abd distention appears worse. PEG placed to vent today with some relief in distention and feeds resumed at 20ml/hr with titration by 10 Q4hrs.  10/29: Pt close to goal rate with TF no events overnight. No stool in 2 days suppository ordered for today. Reglan started as pt is diabetic with likely gastroparesis. will watch today.

## 2018-10-29 NOTE — PROGRESS NOTE ADULT - ATTENDING COMMENTS
Patient seen and examined.   1. Acute resp failure with hypoxia:  - Remains essentially vent dependent  - Tolerates very short durations of PS and TC trials  - Cont pulm toilet   2. HFpEF/ s/p mech MVR/ AS/ a fib:  - Cont ASA, cardizem and hydralazine  - INR therapeutic. Continue AC with coumadin  3. LATANYA:  - Cr trending down  - Follow UO/ electrolytes  - Nephrology followup - patient still off Torsemide  4. Anemia:  - Hb stable  5. Agitation:  - Well controlled on Klonopin  6. UTI:  - Ucx  with ESBL E coli  - Cont Ertapenem through today  7. Diabetes:  - Continue sliding scale  - Monitor fingersticks  8. Gen:  - D-c planning to vent facility once acute issues resolved. Daughter updated at bedside

## 2018-10-29 NOTE — PROGRESS NOTE ADULT - PROBLEM SELECTOR PLAN 2
Patient S/p PEG 10/4.   Had vomiting episode overweekend.   Will add reglan and [facility] also dulcolax suppository

## 2018-10-29 NOTE — CHART NOTE - NSCHARTNOTEFT_GEN_A_CORE
Requested by NP to review Tube feeding.  Chart reviewed events noted.    Patient noted with 30ml residuals.  Otherwise tolerating formula change to Glucerna1.2 from Vital.  Glucose control improved, D5W added for elevated BUN.    85 y/o F w/ a PMH COPD, JENNIE on BiPAP, multivalvular disease, severe diastolic failure, A-fib, s/p pacemaker placement, HTN, and CKD3,   s/p trach for acute respiratory failure now trials with  trach collar.       Enteral Nutrition Support   Currently Glucerna 1.2 @ 45ml/hr x24 hrs       Current Weight: 63.9kg, new weight tomorrow  Dosing Weight  62.9kg    Pertinent Medications: MEDICATIONS  (STANDING):  ALBUTerol/ipratropium for Nebulization 3 milliLiter(s) Nebulizer every 6 hours  aspirin  chewable 81 milliGRAM(s) Oral daily  bisacodyl Suppository 10 milliGRAM(s) Rectal once  buDESOnide   0.25 milliGRAM(s) Respule 0.25 milliGRAM(s) Inhalation every 12 hours  dextrose 5%. 1000 milliLiter(s) (50 mL/Hr) IV Continuous <Continuous>  dextrose 50% Injectable 12.5 Gram(s) IV Push once  dextrose 50% Injectable 25 Gram(s) IV Push once  dextrose 50% Injectable 25 Gram(s) IV Push once  diltiazem    Tablet 30 milliGRAM(s) Oral every 6 hours  doxazosin 2 milliGRAM(s) Oral at bedtime  ertapenem  IVPB      ertapenem  IVPB 1000 milliGRAM(s) IV Intermittent every 24 hours  fentaNYL   Patch  25 MICROgram(s)/Hr 1 Patch Transdermal every 72 hours  hydrALAZINE 25 milliGRAM(s) Oral every 8 hours  insulin lispro (HumaLOG) corrective regimen sliding scale   SubCutaneous three times a day before meals  latanoprost 0.005% Ophthalmic Solution 1 Drop(s) Both EYES at bedtime  metoclopramide Injectable 5 milliGRAM(s) IV Push every 8 hours  pantoprazole  Injectable 40 milliGRAM(s) IV Push every 12 hours  simethicone 80 milliGRAM(s) Chew every 8 hours  sodium chloride 3%  Inhalation 3 milliLiter(s) Inhalation two times a day  warfarin 6 milliGRAM(s) Oral once    MEDICATIONS  (PRN):  acetaminophen    Suspension .. 650 milliGRAM(s) Oral every 6 hours PRN Mild Pain (1 - 3)  clonazePAM Tablet 0.5 milliGRAM(s) Oral every 8 hours PRN Anxiety  dextrose 40% Gel 15 Gram(s) Oral once PRN Blood Glucose LESS THAN 70 milliGRAM(s)/deciliter  glucagon  Injectable 1 milliGRAM(s) IntraMuscular once PRN Glucose LESS THAN 70 milligrams/deciliter  melatonin 1 milliGRAM(s) Oral at bedtime PRN Insomnia    Pertinent Labs:  10-29 Na135 mmol/L Glu 110 mg/dL<H> K+ 4.3 mmol/L Cr  1.40 mg/dL<H> BUN 91 mg/dL<H>    GI: BM 2 10/27, dulcolax suppository added  Skin: no pressure breakdown    Estimated Needs:   [X ] no change since previous assessment  [ ] recalculated:       Previous Nutrition Diagnosis: NONE        New Nutrition Diagnosis: [X ] not applicable      Recommendations     Enteral Nutrition Support  Glucerna1.2  40ml/hr x24 hrs providing 960ml,  1152 calories, 18.3/kg protein 64gm, 1.0gm/kg, based on dosing weight 62.9kg.   Free water in formula  672ml  patient receiving D5W@50ml/hr           Monitoring and Evaluation:     1. Monitor residual, GI output.   2. Tolerance to tube feeding: glucose levels, renal function, hydration   3. weights   4. follow up per protocol    [ ] other:

## 2018-10-29 NOTE — PROGRESS NOTE ADULT - PROBLEM SELECTOR PLAN 4
ECHO 9/14: + Mechanical Mitral Valve, Severe AS, Stage 3 Diastolic Dysfxn   Patient With Hx of Sick Sinus Syndrome and PPM  / Atrial Fibrillation   Continue Cardizem   Continue to monitor BP and Heart Rate

## 2018-10-29 NOTE — PROGRESS NOTE ADULT - ASSESSMENT
continue cic 4 x day  will require outpatient urodynamics if status improves  tejada catheter if family consents

## 2018-10-29 NOTE — PROGRESS NOTE ADULT - SUBJECTIVE AND OBJECTIVE BOX
[facility]   [phone]  RENAL FOLLOW UP NOTE  --------------------------------------------------------------------------------  Chief Complaint:    24 hour events/subjective:    seen earlier  trach vent       PAST HISTORY  --------------------------------------------------------------------------------  No significant changes to PMH, PSH, FHx, SHx, unless otherwise noted    ALLERGIES & MEDICATIONS  --------------------------------------------------------------------------------  Allergies    penicillin (Rash)    Intolerances      Standing Inpatient Medications  ALBUTerol/ipratropium for Nebulization 3 milliLiter(s) Nebulizer every 6 hours  aspirin  chewable 81 milliGRAM(s) Oral daily  buDESOnide   0.25 milliGRAM(s) Respule 0.25 milliGRAM(s) Inhalation every 12 hours  dextrose 5%. 1000 milliLiter(s) IV Continuous <Continuous>  dextrose 50% Injectable 12.5 Gram(s) IV Push once  dextrose 50% Injectable 25 Gram(s) IV Push once  dextrose 50% Injectable 25 Gram(s) IV Push once  diltiazem    Tablet 30 milliGRAM(s) Oral every 6 hours  doxazosin 2 milliGRAM(s) Oral at bedtime  ertapenem  IVPB      ertapenem  IVPB 1000 milliGRAM(s) IV Intermittent every 24 hours  fentaNYL   Patch  25 MICROgram(s)/Hr 1 Patch Transdermal every 72 hours  hydrALAZINE 25 milliGRAM(s) Oral every 8 hours  insulin lispro (HumaLOG) corrective regimen sliding scale   SubCutaneous three times a day before meals  latanoprost 0.005% Ophthalmic Solution 1 Drop(s) Both EYES at bedtime  metoclopramide Injectable 5 milliGRAM(s) IV Push every 8 hours  pantoprazole  Injectable 40 milliGRAM(s) IV Push every 12 hours  simethicone 80 milliGRAM(s) Chew every 8 hours  sodium chloride 3%  Inhalation 3 milliLiter(s) Inhalation two times a day  warfarin 6 milliGRAM(s) Oral once    PRN Inpatient Medications  acetaminophen    Suspension .. 650 milliGRAM(s) Oral every 6 hours PRN  clonazePAM Tablet 0.5 milliGRAM(s) Oral every 8 hours PRN  dextrose 40% Gel 15 Gram(s) Oral once PRN  glucagon  Injectable 1 milliGRAM(s) IntraMuscular once PRN  melatonin 1 milliGRAM(s) Oral at bedtime PRN      REVIEW OF SYSTEMS  --------------------------------------------------------------------------------    vented     VITALS/PHYSICAL EXAM  --------------------------------------------------------------------------------  T(C): 36.3 (10-29-18 @ 20:50), Max: 36.5 (10-29-18 @ 10:20)  HR: 73 (10-29-18 @ 20:50) (60 - 100)  BP: 127/69 (10-29-18 @ 20:50) (103/61 - 131/64)  RR: 15 (10-29-18 @ 20:50) (14 - 18)  SpO2: 100% (10-29-18 @ 20:50) (97% - 100%)  Wt(kg): --        10-28-18 @ 07:01  -  10-29-18 @ 07:00  --------------------------------------------------------  IN: 610 mL / OUT: 500 mL / NET: 110 mL    10-29-18 @ 07:01  -  10-29-18 @ 22:02  --------------------------------------------------------  IN: 685 mL / OUT: 0 mL / NET: 685 mL      Physical Exam:  	  Gen: trach   	Pulm: Decreased breath sounds b/l bases. no rales +  ronchi - wheezing  	CV: RRR, S1/S2. no rub  	Abd: +BS, soft, nontender/nondistended  	: No suprapubic tenderness.               Extremity: No cyanosis, 2+ pitting  edema no clubbing    	  LABS/STUDIES  --------------------------------------------------------------------------------              8.1    10.7  >-----------<  225      [10-29-18 @ 07:16]              26.0     135  |  94  |  91  ----------------------------<  110      [10-29-18 @ 07:14]  4.3   |  27  |  1.40        Ca     9.4     [10-29-18 @ 07:14]      PT/INR: PT 36.5 , INR 3.27       [10-29-18 @ 07:15]  PTT: 49.3       [10-28-18 @ 12:19]      Creatinine Trend:  SCr 1.40 [10-29 @ 07:14]  SCr 1.35 [10-28 @ 12:19]  SCr 1.43 [10-27 @ 06:39]  SCr 1.49 [10-26 @ 07:20]  SCr 1.47 [10-25 @ 13:50]              Urinalysis - [10-21-18 @ 17:21]      Color Yellow / Appearance Turbid / SG 1.018 / pH 5.5      Gluc Negative / Ketone Negative  / Bili Negative / Urobili 2 mg/dL       Blood Moderate / Protein 100 mg/dL / Leuk Est Large / Nitrite Negative      RBC 16 /  / Hyaline 0 / Gran  / Sq Epi  / Non Sq Epi 3 / Bacteria Many      PTH -- (Ca 11.1)      [09-28-18 @ 09:27]   67  HbA1c 7.2      [09-22-18 @ 02:44]

## 2018-10-29 NOTE — PROGRESS NOTE ADULT - ASSESSMENT
84  Year old Female with PMH significant for COPD, JENNIE on BiPAP at home , multivalvular disease (severe AS Not a candidate for TAVR, S/p MV replacement), HFpEF/ Severe diastolic failure, A-fib on coumadin, sick sinus syndrome S/p pacemaker placement, HTN, and CKD3 who was admitted for acute respiratory failure requiring intubation suspected to be 2/2 COPD exacerbation c/b PNA w/ +entero/rhinovirus and GN coccobacillus and H. influenza bacteremia, requiring continued respiratory support and tracheostomy. Patient S/p Trach placement 10/3 and PEG Placement 10/4. pt required diuretics. in addition also developed UTI as well     1- LATANYA   2- chf  3- UTI   4- htn     suspect LATANYA in setting of diuretic use as well as UTI   cr steady now however, bun is still elevated   cont invanz  resume diuretics keep O>I  cardizem and hydralazine to cont    trend hb

## 2018-10-29 NOTE — PROGRESS NOTE ADULT - SUBJECTIVE AND OBJECTIVE BOX
Patient is a 84y old  Female who presents with a chief complaint of COPD exacerbation, ADHF (29 Oct 2018 08:14)      Interval Events:    REVIEW OF SYSTEMS:  [ ] Positive  [ ] All other systems negative  [x ] Unable to assess ROS because ________    Vital Signs Last 24 Hrs  T(C): 36.4 (10-29-18 @ 04:35), Max: 36.4 (10-28-18 @ 13:47)  T(F): 97.5 (10-29-18 @ 04:35), Max: 97.5 (10-28-18 @ 13:47)  HR: 60 (10-29-18 @ 08:28) (60 - 72)  BP: 118/58 (10-29-18 @ 04:35) (108/51 - 126/65)  RR: 16 (10-29-18 @ 06:34) (14 - 18)  SpO2: 100% (10-29-18 @ 08:28) (99% - 100%)    PHYSICAL EXAM:  HEENT:   [x ]Tracheostomy: shiley cuffed 6  [ ]Pupils equal  [ ]No oral lesions  [ ]Abnormal    SKIN  [x ]No Rash  [ ] Abnormal  [ ] pressure    CARDIAC  [ x]Regular  [ ]Abnormal    PULMONARY  [x ]Bilateral Clear Breath Sounds  [ ]Normal Excursion  [ ]Abnormal    GI  [x ]PEG      [x ] +BS		              [x ]Soft, nondistended, nontender	  [ ]Abnormal    MUSCULOSKELETAL                                   [ ]Bedbound                 [ ]Abnormal    [x ]Ambulatory/OOB to chair                           EXTREMITIES                                         [ ]Normal  [x ]Edema       1-2+                    NEUROLOGIC  [x ] Normal, non focal  [ ] Focal findings:    PSYCHIATRIC  [ x]Alert and appropriate  [ ] Sedated	 [ ]Agitated    :  Rosales: [ ] Yes, if yes: Date of Placement:                   [ x ] No    LINES: Central Lines [ ] Yes, if yes: Date of Placement                                     [ x ] No    HOSPITAL MEDICATIONS:  MEDICATIONS  (STANDING):  ALBUTerol/ipratropium for Nebulization 3 milliLiter(s) Nebulizer every 6 hours  aspirin  chewable 81 milliGRAM(s) Oral daily  buDESOnide   0.25 milliGRAM(s) Respule 0.25 milliGRAM(s) Inhalation every 12 hours  diltiazem    Tablet 30 milliGRAM(s) Oral every 6 hours  doxazosin 2 milliGRAM(s) Oral at bedtime  ertapenem  IVPB      ertapenem  IVPB 1000 milliGRAM(s) IV Intermittent every 24 hours  fentaNYL   Patch  25 MICROgram(s)/Hr 1 Patch Transdermal every 72 hours  hydrALAZINE 25 milliGRAM(s) Oral every 8 hours  latanoprost 0.005% Ophthalmic Solution 1 Drop(s) Both EYES at bedtime  pantoprazole  Injectable 40 milliGRAM(s) IV Push every 12 hours  simethicone 80 milliGRAM(s) Chew every 8 hours  sodium chloride 3%  Inhalation 3 milliLiter(s) Inhalation two times a day  warfarin 6 milliGRAM(s) Oral once    MEDICATIONS  (PRN):  acetaminophen    Suspension .. 650 milliGRAM(s) Oral every 6 hours PRN Mild Pain (1 - 3)  clonazePAM Tablet 0.5 milliGRAM(s) Oral every 8 hours PRN Anxiety  melatonin 1 milliGRAM(s) Oral at bedtime PRN Insomnia      LABS:                        8.1    10.7  )-----------( 225      ( 29 Oct 2018 07:16 )             26.0     10-29    135  |  94<L>  |  91<H>  ----------------------------<  110<H>  4.3   |  27  |  1.40<H>    Ca    9.4      29 Oct 2018 07:14      PT/INR - ( 29 Oct 2018 07:15 )   PT: 36.5 sec;   INR: 3.27 ratio         PTT - ( 28 Oct 2018 12:19 )  PTT:49.3 sec        CAPILLARY BLOOD GLUCOSE    MICROBIOLOGY:     RADIOLOGY:  [ ] Reviewed and interpreted by me    Mode: AC/ CMV (Assist Control/ Continuous Mandatory Ventilation)  RR (machine): 14  TV (machine): 400  FiO2: 30  PEEP: 5  ITime: 1  MAP: 10  PIP: 31 Patient is a 84y old  Female who presents with a chief complaint of COPD exacerbation, ADHF (29 Oct 2018 08:14)      Interval Events: No events    REVIEW OF SYSTEMS:  [ ] Positive  [ ] All other systems negative  [x ] Unable to assess ROS because ________    Vital Signs Last 24 Hrs  T(C): 36.4 (10-29-18 @ 04:35), Max: 36.4 (10-28-18 @ 13:47)  T(F): 97.5 (10-29-18 @ 04:35), Max: 97.5 (10-28-18 @ 13:47)  HR: 60 (10-29-18 @ 08:28) (60 - 72)  BP: 118/58 (10-29-18 @ 04:35) (108/51 - 126/65)  RR: 16 (10-29-18 @ 06:34) (14 - 18)  SpO2: 100% (10-29-18 @ 08:28) (99% - 100%)    PHYSICAL EXAM:  HEENT:   [x ]Tracheostomy: shiley cuffed 6  [ ]Pupils equal  [ ]No oral lesions  [ ]Abnormal    SKIN  [x ]No Rash  [ ] Abnormal  [ ] pressure    CARDIAC  [ x]Regular  [ ]Abnormal    PULMONARY  [x ]Bilateral Clear Breath Sounds  [ ]Normal Excursion  [ ]Abnormal    GI  [x ]PEG      [x ] +BS		              [x ]Soft, nondistended, nontender	  [ ]Abnormal    MUSCULOSKELETAL                                   [ ]Bedbound                 [ ]Abnormal    [x ]Ambulatory/OOB to chair                           EXTREMITIES                                         [ ]Normal  [x ]Edema       1-2+                    NEUROLOGIC  [x ] Normal, non focal  [ ] Focal findings:    PSYCHIATRIC  [ x]Alert and appropriate  [ ] Sedated	 [ ]Agitated    :  Rosales: [ ] Yes, if yes: Date of Placement:                   [ x ] No    LINES: Central Lines [ ] Yes, if yes: Date of Placement                                     [ x ] No    HOSPITAL MEDICATIONS:  MEDICATIONS  (STANDING):  ALBUTerol/ipratropium for Nebulization 3 milliLiter(s) Nebulizer every 6 hours  aspirin  chewable 81 milliGRAM(s) Oral daily  buDESOnide   0.25 milliGRAM(s) Respule 0.25 milliGRAM(s) Inhalation every 12 hours  diltiazem    Tablet 30 milliGRAM(s) Oral every 6 hours  doxazosin 2 milliGRAM(s) Oral at bedtime  ertapenem  IVPB      ertapenem  IVPB 1000 milliGRAM(s) IV Intermittent every 24 hours  fentaNYL   Patch  25 MICROgram(s)/Hr 1 Patch Transdermal every 72 hours  hydrALAZINE 25 milliGRAM(s) Oral every 8 hours  latanoprost 0.005% Ophthalmic Solution 1 Drop(s) Both EYES at bedtime  pantoprazole  Injectable 40 milliGRAM(s) IV Push every 12 hours  simethicone 80 milliGRAM(s) Chew every 8 hours  sodium chloride 3%  Inhalation 3 milliLiter(s) Inhalation two times a day  warfarin 6 milliGRAM(s) Oral once    MEDICATIONS  (PRN):  acetaminophen    Suspension .. 650 milliGRAM(s) Oral every 6 hours PRN Mild Pain (1 - 3)  clonazePAM Tablet 0.5 milliGRAM(s) Oral every 8 hours PRN Anxiety  melatonin 1 milliGRAM(s) Oral at bedtime PRN Insomnia      LABS:                        8.1    10.7  )-----------( 225      ( 29 Oct 2018 07:16 )             26.0     10-29    135  |  94<L>  |  91<H>  ----------------------------<  110<H>  4.3   |  27  |  1.40<H>    Ca    9.4      29 Oct 2018 07:14      PT/INR - ( 29 Oct 2018 07:15 )   PT: 36.5 sec;   INR: 3.27 ratio         PTT - ( 28 Oct 2018 12:19 )  PTT:49.3 sec        CAPILLARY BLOOD GLUCOSE    MICROBIOLOGY:     RADIOLOGY:  [ ] Reviewed and interpreted by me    Mode: AC/ CMV (Assist Control/ Continuous Mandatory Ventilation)  RR (machine): 14  TV (machine): 400  FiO2: 30  PEEP: 5  ITime: 1  MAP: 10  PIP: 31

## 2018-10-30 LAB
ANION GAP SERPL CALC-SCNC: 13 MMOL/L — SIGNIFICANT CHANGE UP (ref 5–17)
APTT BLD: 48.2 SEC — HIGH (ref 27.5–37.4)
BUN SERPL-MCNC: 94 MG/DL — HIGH (ref 7–23)
CALCIUM SERPL-MCNC: 9.4 MG/DL — SIGNIFICANT CHANGE UP (ref 8.4–10.5)
CHLORIDE SERPL-SCNC: 95 MMOL/L — LOW (ref 96–108)
CO2 SERPL-SCNC: 28 MMOL/L — SIGNIFICANT CHANGE UP (ref 22–31)
CREAT SERPL-MCNC: 1.41 MG/DL — HIGH (ref 0.5–1.3)
GLUCOSE BLDC GLUCOMTR-MCNC: 129 MG/DL — HIGH (ref 70–99)
GLUCOSE BLDC GLUCOMTR-MCNC: 167 MG/DL — HIGH (ref 70–99)
GLUCOSE BLDC GLUCOMTR-MCNC: 182 MG/DL — HIGH (ref 70–99)
GLUCOSE BLDC GLUCOMTR-MCNC: 197 MG/DL — HIGH (ref 70–99)
GLUCOSE SERPL-MCNC: 154 MG/DL — HIGH (ref 70–99)
HCT VFR BLD CALC: 26 % — LOW (ref 34.5–45)
HGB BLD-MCNC: 7.8 G/DL — LOW (ref 11.5–15.5)
INR BLD: 3.83 RATIO — HIGH (ref 0.88–1.16)
MCHC RBC-ENTMCNC: 25.9 PG — LOW (ref 27–34)
MCHC RBC-ENTMCNC: 30 GM/DL — LOW (ref 32–36)
MCV RBC AUTO: 86.4 FL — SIGNIFICANT CHANGE UP (ref 80–100)
PLATELET # BLD AUTO: 240 K/UL — SIGNIFICANT CHANGE UP (ref 150–400)
POTASSIUM SERPL-MCNC: 4.3 MMOL/L — SIGNIFICANT CHANGE UP (ref 3.5–5.3)
POTASSIUM SERPL-SCNC: 4.3 MMOL/L — SIGNIFICANT CHANGE UP (ref 3.5–5.3)
PROTHROM AB SERPL-ACNC: 42.5 SEC — HIGH (ref 9.8–12.7)
RBC # BLD: 3.01 M/UL — LOW (ref 3.8–5.2)
RBC # FLD: 17.8 % — HIGH (ref 10.3–14.5)
SODIUM SERPL-SCNC: 136 MMOL/L — SIGNIFICANT CHANGE UP (ref 135–145)
WBC # BLD: 8.7 K/UL — SIGNIFICANT CHANGE UP (ref 3.8–10.5)
WBC # FLD AUTO: 8.7 K/UL — SIGNIFICANT CHANGE UP (ref 3.8–10.5)

## 2018-10-30 PROCEDURE — 99233 SBSQ HOSP IP/OBS HIGH 50: CPT | Mod: GC

## 2018-10-30 RX ORDER — FLUCONAZOLE 150 MG/1
100 TABLET ORAL DAILY
Qty: 0 | Refills: 0 | Status: DISCONTINUED | OUTPATIENT
Start: 2018-10-30 | End: 2018-10-30

## 2018-10-30 RX ORDER — WARFARIN SODIUM 2.5 MG/1
2.5 TABLET ORAL ONCE
Qty: 0 | Refills: 0 | Status: COMPLETED | OUTPATIENT
Start: 2018-10-30 | End: 2018-10-30

## 2018-10-30 RX ORDER — FLUCONAZOLE 150 MG/1
200 TABLET ORAL DAILY
Qty: 0 | Refills: 0 | Status: DISCONTINUED | OUTPATIENT
Start: 2018-10-30 | End: 2018-10-30

## 2018-10-30 RX ORDER — POLYETHYLENE GLYCOL 3350 17 G/17G
17 POWDER, FOR SOLUTION ORAL DAILY
Qty: 0 | Refills: 0 | Status: DISCONTINUED | OUTPATIENT
Start: 2018-10-30 | End: 2018-11-02

## 2018-10-30 RX ADMIN — Medication 2 MILLIGRAM(S): at 22:27

## 2018-10-30 RX ADMIN — Medication 2: at 06:37

## 2018-10-30 RX ADMIN — Medication 5 MILLIGRAM(S): at 06:30

## 2018-10-30 RX ADMIN — Medication 25 MILLIGRAM(S): at 14:13

## 2018-10-30 RX ADMIN — WARFARIN SODIUM 2.5 MILLIGRAM(S): 2.5 TABLET ORAL at 22:34

## 2018-10-30 RX ADMIN — PANTOPRAZOLE SODIUM 40 MILLIGRAM(S): 20 TABLET, DELAYED RELEASE ORAL at 17:25

## 2018-10-30 RX ADMIN — Medication 25 MILLIGRAM(S): at 06:34

## 2018-10-30 RX ADMIN — Medication 0.25 MILLIGRAM(S): at 17:11

## 2018-10-30 RX ADMIN — Medication 2: at 17:55

## 2018-10-30 RX ADMIN — Medication 0.25 MILLIGRAM(S): at 05:11

## 2018-10-30 RX ADMIN — LATANOPROST 1 DROP(S): 0.05 SOLUTION/ DROPS OPHTHALMIC; TOPICAL at 22:35

## 2018-10-30 RX ADMIN — SIMETHICONE 80 MILLIGRAM(S): 80 TABLET, CHEWABLE ORAL at 22:26

## 2018-10-30 RX ADMIN — SODIUM CHLORIDE 3 MILLILITER(S): 9 INJECTION INTRAMUSCULAR; INTRAVENOUS; SUBCUTANEOUS at 17:45

## 2018-10-30 RX ADMIN — Medication 5 MILLIGRAM(S): at 14:13

## 2018-10-30 RX ADMIN — SODIUM CHLORIDE 3 MILLILITER(S): 9 INJECTION INTRAMUSCULAR; INTRAVENOUS; SUBCUTANEOUS at 05:11

## 2018-10-30 RX ADMIN — SIMETHICONE 80 MILLIGRAM(S): 80 TABLET, CHEWABLE ORAL at 06:30

## 2018-10-30 RX ADMIN — PANTOPRAZOLE SODIUM 40 MILLIGRAM(S): 20 TABLET, DELAYED RELEASE ORAL at 06:31

## 2018-10-30 RX ADMIN — Medication 25 MILLIGRAM(S): at 22:26

## 2018-10-30 RX ADMIN — Medication 3 MILLILITER(S): at 05:11

## 2018-10-30 RX ADMIN — Medication 3 MILLILITER(S): at 11:26

## 2018-10-30 RX ADMIN — Medication 3 MILLILITER(S): at 23:44

## 2018-10-30 RX ADMIN — FLUCONAZOLE 100 MILLIGRAM(S): 150 TABLET ORAL at 11:56

## 2018-10-30 RX ADMIN — FENTANYL CITRATE 1 PATCH: 50 INJECTION INTRAVENOUS at 06:26

## 2018-10-30 RX ADMIN — POLYETHYLENE GLYCOL 3350 17 GRAM(S): 17 POWDER, FOR SOLUTION ORAL at 11:55

## 2018-10-30 RX ADMIN — Medication 5 MILLIGRAM(S): at 22:31

## 2018-10-30 RX ADMIN — SIMETHICONE 80 MILLIGRAM(S): 80 TABLET, CHEWABLE ORAL at 14:21

## 2018-10-30 RX ADMIN — FENTANYL CITRATE 1 PATCH: 50 INJECTION INTRAVENOUS at 19:04

## 2018-10-30 RX ADMIN — Medication 81 MILLIGRAM(S): at 11:55

## 2018-10-30 RX ADMIN — Medication 0.5 MILLIGRAM(S): at 22:25

## 2018-10-30 RX ADMIN — Medication 3 MILLILITER(S): at 17:11

## 2018-10-30 RX ADMIN — Medication 1 ENEMA: at 10:19

## 2018-10-30 NOTE — PROGRESS NOTE ADULT - ASSESSMENT
84  Year old Female with PMH significant for COPD, JENNIE on BiPAP at home , multivalvular disease (severe AS Not a candidate for TAVR, S/p MV replacement), HFpEF/ Severe diastolic failure, A-fib on coumadin, sick sinus syndrome S/p pacemaker placement, HTN, and CKD3 who was admitted for acute respiratory failure requiring intubation suspected to be 2/2 COPD exacerbation c/b PNA w/ +entero/rhinovirus and GN coccobacillus and H. influenza bacteremia, requiring continued respiratory support and tracheostomy. Patient S/p Trach placement 10/3 and PEG Placement 10/4.     10/7: Patient remains with abdominal distention today but tolerating trickle feeds, ABD X-ray  performed this morning patient remains with air filled loops of small and large bowel. GI fellow called to re-eval the patient this morning, X-ray  reviewed slightly worsened compared to yesterday. GI fellow recommended to place patient in Left lateral decubitus position and oob to chair later today. Patient with large amount of gas expressed with turning as Per RN. As per GI no role for rectal tube at this time and can continue to advance feeds as tolerated to promote gastric motility. H+H Has remained stable case d/w  will restart coumadin this evening. Pt with elevated bp will increase Hydralazine 50 mg q 8 hr   10/8-Abdominal distention noted with hypoactive bowel sounds, Kub noted from this weekend. Daily bowel movements noted. Will repeat PTT as there were 2 therapeutics prior on same dosing, also repeat CBC as well. Will transfuse if remains low. RCU weaning  10/10: 4 dark BMs reported night of 10/9, concerning for melena,  hgb 6.9 in AM,  1U RBC given . Goal heparin reduced to 50-70. GI consulted due to GIB Hx with known AV malformations. EGD 10/4: Gastric Erythema, No evidence of Active bleeding. 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   No intvn.  10/11: H/H stable post 1U with no furthers melena or intvn from GI. Resumed coumadin.  10/12: agitated this am. seroquel not working changed back to klonopin  10/13 No events overnight. Continues on heparin gtt/coumadin bridge. INR 1.45, coumadin 7.5 mg x1. Continue with PS trials as tolerated.   10/14 Heparin gtt d/c, INR 2.82, coumadin 1mg x1. Continue with PS trials as tolerated. LE edema, lasix 20 mg po x1. Hg 7.7 stable.   10/15:  INR 2.3, reduced coumadin to 5mg  10/16: INR 1.8, resumed heparin, coumadin increased to 7.5mg tonight. Tolerated trach collar for 40mins  10/18: Await INR results today.  10/19 no events overnight. F/u labs today. d/c planning   10/21 INR 4.97, will hold Coumadin today  + cloudy urine per RN, increased WBC, will send UA and obtain CXR . Cr 1.18, add IVF NS 50 cc/hr x 6 hrs   10/22: hgb 6.8, transfused 1U RBC.  LATANYA with Cr 1.4.  10/23: Renal consulted for Cr 1.5. Started mild IVF 75ml/[id number]. bladder US ordered. Started renal dose Aztreonam for UTI (10/22 UCx +E.Coli). f/u sensitivities  10/24: Aztreonam changed to Ertapenem as ecoli is ESBL. Watch for allergic cross sensitivity reaction as pt is allergic to pencillin. Continue to dose coumadin daily. Pt retarted on heparin gtt yesterday for subtherapeutic INR.   10/25: Continue IV abx for ecoli uti. Countine heparin gtt as INR needs to be 2.5 or greater.  10/26 Continue ertapenem day 3 /5 for esbl uti. Continue heparin gtt as inr remains subtherapeutic.  10/27: Resumed klonopin 1gm Q8H prn. minimal trach bleeding, will continue with heparin and coumadin. Resumed torsemide 30mg.  10/28: Pt vomited overnight and abd distention appears worse. PEG placed to vent today with some relief in distention and feeds resumed at 20ml/hr with titration by 10 Q4hrs.  10/29: Pt close to goal rate with TF no events overnight. No stool in 2 days suppository ordered for today. Reglan started as pt is diabetic with likely gastroparesis. will watch today.  10/30: fleet enema. increase bowel regimen will add miralax

## 2018-10-30 NOTE — PROGRESS NOTE ADULT - PROBLEM SELECTOR PROBLEM 9
Hypernatremia
Hypernatremia
Discharge planning issues
Discharge planning issues
Hypernatremia
Discharge planning issues
ESBL E. coli carrier
ESBL E. coli carrier
Hypernatremia
ESBL E. coli carrier
Hypernatremia

## 2018-10-30 NOTE — PROGRESS NOTE ADULT - ATTENDING COMMENTS
Patient seen and examined.   1. Acute resp failure with hypoxia:  - Remains essentially vent dependent  - Tolerates very short durations of PS and TC trials  - Cont pulm toilet   2. HFpEF/ s/p mech MVR/ AS/ a fib:  - Cont ASA, cardizem and hydralazine  - INR therapeutic. Continue AC with coumadin at lower dose today  - Will resume diuretics today and monitor  3. LATANYA:  - Cr stable today  - Follow UO/ electrolytes  - Nephrology followup - will resume torsemide today  4. Anemia:  - Hb stable  5. Agitation:  - Well controlled on Klonopin  6. UTI:  - Ucx  with ESBL E coli  - Completed Ertapenem 10/29  7. Diabetes:  - Continue sliding scale  - Monitor fingersticks  8. Gen:  - D-c planning to vent facility once acute issues resolved. Daughter updated at bedside  - Family now requesting medical transfer to [facility]. We have asked them to provide us with an accepting physician name and the [facility] number to initiate this process. If unable to be initiated then will proceed with discharge planning if Cr remains stable

## 2018-10-30 NOTE — PROGRESS NOTE ADULT - SUBJECTIVE AND OBJECTIVE BOX
Patient is a 84y old  Female who presents with a chief complaint of COPD exacerbation, ADHF (29 Oct 2018 22:01)      Interval Events:    REVIEW OF SYSTEMS:  [ ] Positive  [ ] All other systems negative  [ x] Unable to assess ROS because _non verbal_______    Vital Signs Last 24 Hrs  T(C): 36.3 (10-30-18 @ 05:16), Max: 36.5 (10-29-18 @ 10:20)  T(F): 97.3 (10-30-18 @ 05:16), Max: 97.7 (10-29-18 @ 10:20)  HR: 60 (10-30-18 @ 08:32) (60 - 100)  BP: 135/64 (10-30-18 @ 05:16) (103/61 - 135/64)  RR: 16 (10-30-18 @ 05:16) (14 - 18)  SpO2: 98% (10-30-18 @ 08:32) (97% - 100%)    PHYSICAL EXAM:  HEENT:   [x ]Tracheostomy: shiley 6 cuffed  [ ]Pupils equal  [ ]No oral lesions  [ ]Abnormal    SKIN  [ x]No Rash  [ ] Abnormal  [ ] pressure    CARDIAC  [ x]Regular  [ ]Abnormal    PULMONARY  [x ]Bilateral Clear Breath Sounds  [ ]Normal Excursion  [ ]Abnormal    GI  [ x]PEG      [x ] +BS		              [ x]Soft, nondistended, nontender	  [ ]Abnormal    MUSCULOSKELETAL                                   [xBedbound                 [ ]Abnormal    [ ]Ambulatory/OOB to chair                           EXTREMITIES                                         [ x]Normal  [ ]Edema                           NEUROLOGIC  [ x] Normal, non focal  [ ] Focal findings:    PSYCHIATRIC  [ x]Alert and interactive  [ ] Sedated	 [ ]Agitated    :  Rosales: [ ] Yes, if yes: Date of Placement:                   [ x ] No    LINES: Central Lines [ ] Yes, if yes: Date of Placement                                     [ x ] No    HOSPITAL MEDICATIONS:  MEDICATIONS  (STANDING):  ALBUTerol/ipratropium for Nebulization 3 milliLiter(s) Nebulizer every 6 hours  aspirin  chewable 81 milliGRAM(s) Oral daily  buDESOnide   0.25 milliGRAM(s) Respule 0.25 milliGRAM(s) Inhalation every 12 hours  dextrose 5%. 1000 milliLiter(s) (50 mL/Hr) IV Continuous <Continuous>  dextrose 50% Injectable 12.5 Gram(s) IV Push once  dextrose 50% Injectable 25 Gram(s) IV Push once  dextrose 50% Injectable 25 Gram(s) IV Push once  diltiazem    Tablet 30 milliGRAM(s) Oral every 6 hours  doxazosin 2 milliGRAM(s) Oral at bedtime  ertapenem  IVPB      ertapenem  IVPB 1000 milliGRAM(s) IV Intermittent every 24 hours  fentaNYL   Patch  25 MICROgram(s)/Hr 1 Patch Transdermal every 72 hours  hydrALAZINE 25 milliGRAM(s) Oral every 8 hours  insulin lispro (HumaLOG) corrective regimen sliding scale   SubCutaneous three times a day before meals  latanoprost 0.005% Ophthalmic Solution 1 Drop(s) Both EYES at bedtime  metoclopramide Injectable 5 milliGRAM(s) IV Push every 8 hours  pantoprazole  Injectable 40 milliGRAM(s) IV Push every 12 hours  simethicone 80 milliGRAM(s) Chew every 8 hours  sodium chloride 3%  Inhalation 3 milliLiter(s) Inhalation two times a day    MEDICATIONS  (PRN):  acetaminophen    Suspension .. 650 milliGRAM(s) Oral every 6 hours PRN Mild Pain (1 - 3)  clonazePAM Tablet 0.5 milliGRAM(s) Oral every 8 hours PRN Anxiety  dextrose 40% Gel 15 Gram(s) Oral once PRN Blood Glucose LESS THAN 70 milliGRAM(s)/deciliter  glucagon  Injectable 1 milliGRAM(s) IntraMuscular once PRN Glucose LESS THAN 70 milligrams/deciliter  melatonin 1 milliGRAM(s) Oral at bedtime PRN Insomnia      LABS:                        7.8    8.7   )-----------( 240      ( 30 Oct 2018 07:03 )             26.0     10-30    136  |  95<L>  |  94<H>  ----------------------------<  154<H>  4.3   |  28  |  1.41<H>    Ca    9.4      30 Oct 2018 07:03      PT/INR - ( 30 Oct 2018 07:03 )   PT: 42.5 sec;   INR: 3.83 ratio         PTT - ( 30 Oct 2018 07:03 )  PTT:48.2 sec        CAPILLARY BLOOD GLUCOSE    MICROBIOLOGY:     RADIOLOGY:  [ ] Reviewed and interpreted by me    Mode: CPAP with PS  FiO2: 30  PEEP: 5  PS: 12  MAP: 8  PIP: 17

## 2018-10-31 LAB
ANION GAP SERPL CALC-SCNC: 11 MMOL/L — SIGNIFICANT CHANGE UP (ref 5–17)
APTT BLD: 52.9 SEC — HIGH (ref 27.5–36.3)
BUN SERPL-MCNC: 85 MG/DL — HIGH (ref 7–23)
CALCIUM SERPL-MCNC: 9.6 MG/DL — SIGNIFICANT CHANGE UP (ref 8.4–10.5)
CHLORIDE SERPL-SCNC: 96 MMOL/L — SIGNIFICANT CHANGE UP (ref 96–108)
CO2 SERPL-SCNC: 30 MMOL/L — SIGNIFICANT CHANGE UP (ref 22–31)
CREAT SERPL-MCNC: 1.35 MG/DL — HIGH (ref 0.5–1.3)
GLUCOSE BLDC GLUCOMTR-MCNC: 120 MG/DL — HIGH (ref 70–99)
GLUCOSE BLDC GLUCOMTR-MCNC: 137 MG/DL — HIGH (ref 70–99)
GLUCOSE BLDC GLUCOMTR-MCNC: 164 MG/DL — HIGH (ref 70–99)
GLUCOSE BLDC GLUCOMTR-MCNC: 184 MG/DL — HIGH (ref 70–99)
GLUCOSE BLDC GLUCOMTR-MCNC: 218 MG/DL — HIGH (ref 70–99)
GLUCOSE SERPL-MCNC: 182 MG/DL — HIGH (ref 70–99)
HCT VFR BLD CALC: 27.1 % — LOW (ref 34.5–45)
HGB BLD-MCNC: 8.5 G/DL — LOW (ref 11.5–15.5)
INR BLD: 4 RATIO — HIGH (ref 0.88–1.16)
INR BLD: 4.35 RATIO — HIGH (ref 0.88–1.16)
MCHC RBC-ENTMCNC: 27 PG — SIGNIFICANT CHANGE UP (ref 27–34)
MCHC RBC-ENTMCNC: 31.3 GM/DL — LOW (ref 32–36)
MCV RBC AUTO: 86.5 FL — SIGNIFICANT CHANGE UP (ref 80–100)
PLATELET # BLD AUTO: 223 K/UL — SIGNIFICANT CHANGE UP (ref 150–400)
POTASSIUM SERPL-MCNC: 4.7 MMOL/L — SIGNIFICANT CHANGE UP (ref 3.5–5.3)
POTASSIUM SERPL-SCNC: 4.7 MMOL/L — SIGNIFICANT CHANGE UP (ref 3.5–5.3)
PROTHROM AB SERPL-ACNC: 47.6 SEC — HIGH (ref 10–12.9)
PROTHROM AB SERPL-ACNC: 51.9 SEC — HIGH (ref 10–12.9)
RBC # BLD: 3.13 M/UL — LOW (ref 3.8–5.2)
RBC # FLD: 18.1 % — HIGH (ref 10.3–14.5)
SODIUM SERPL-SCNC: 137 MMOL/L — SIGNIFICANT CHANGE UP (ref 135–145)
WBC # BLD: 10.1 K/UL — SIGNIFICANT CHANGE UP (ref 3.8–10.5)
WBC # FLD AUTO: 10.1 K/UL — SIGNIFICANT CHANGE UP (ref 3.8–10.5)

## 2018-10-31 PROCEDURE — 99233 SBSQ HOSP IP/OBS HIGH 50: CPT | Mod: GC

## 2018-10-31 RX ADMIN — Medication 0.25 MILLIGRAM(S): at 17:15

## 2018-10-31 RX ADMIN — Medication 25 MILLIGRAM(S): at 05:49

## 2018-10-31 RX ADMIN — SIMETHICONE 80 MILLIGRAM(S): 80 TABLET, CHEWABLE ORAL at 13:34

## 2018-10-31 RX ADMIN — FENTANYL CITRATE 1 PATCH: 50 INJECTION INTRAVENOUS at 07:35

## 2018-10-31 RX ADMIN — Medication 2 MILLIGRAM(S): at 21:54

## 2018-10-31 RX ADMIN — POLYETHYLENE GLYCOL 3350 17 GRAM(S): 17 POWDER, FOR SOLUTION ORAL at 12:05

## 2018-10-31 RX ADMIN — SIMETHICONE 80 MILLIGRAM(S): 80 TABLET, CHEWABLE ORAL at 05:48

## 2018-10-31 RX ADMIN — SODIUM CHLORIDE 3 MILLILITER(S): 9 INJECTION INTRAMUSCULAR; INTRAVENOUS; SUBCUTANEOUS at 17:16

## 2018-10-31 RX ADMIN — FENTANYL CITRATE 1 PATCH: 50 INJECTION INTRAVENOUS at 19:35

## 2018-10-31 RX ADMIN — Medication 25 MILLIGRAM(S): at 13:34

## 2018-10-31 RX ADMIN — Medication 4: at 13:33

## 2018-10-31 RX ADMIN — Medication 3 MILLILITER(S): at 12:25

## 2018-10-31 RX ADMIN — Medication 5 MILLIGRAM(S): at 13:34

## 2018-10-31 RX ADMIN — SIMETHICONE 80 MILLIGRAM(S): 80 TABLET, CHEWABLE ORAL at 21:54

## 2018-10-31 RX ADMIN — SODIUM CHLORIDE 3 MILLILITER(S): 9 INJECTION INTRAMUSCULAR; INTRAVENOUS; SUBCUTANEOUS at 06:29

## 2018-10-31 RX ADMIN — Medication 81 MILLIGRAM(S): at 12:06

## 2018-10-31 RX ADMIN — Medication 2: at 18:52

## 2018-10-31 RX ADMIN — Medication 5 MILLIGRAM(S): at 05:47

## 2018-10-31 RX ADMIN — Medication 3 MILLILITER(S): at 17:15

## 2018-10-31 RX ADMIN — Medication 25 MILLIGRAM(S): at 21:54

## 2018-10-31 RX ADMIN — Medication 20 MILLIGRAM(S): at 13:34

## 2018-10-31 RX ADMIN — PANTOPRAZOLE SODIUM 40 MILLIGRAM(S): 20 TABLET, DELAYED RELEASE ORAL at 18:51

## 2018-10-31 RX ADMIN — Medication 0.5 MILLIGRAM(S): at 20:02

## 2018-10-31 RX ADMIN — Medication 3 MILLILITER(S): at 06:30

## 2018-10-31 RX ADMIN — Medication 3 MILLILITER(S): at 23:15

## 2018-10-31 RX ADMIN — Medication 5 MILLIGRAM(S): at 21:54

## 2018-10-31 RX ADMIN — PANTOPRAZOLE SODIUM 40 MILLIGRAM(S): 20 TABLET, DELAYED RELEASE ORAL at 05:48

## 2018-10-31 RX ADMIN — Medication 0.25 MILLIGRAM(S): at 06:30

## 2018-10-31 RX ADMIN — LATANOPROST 1 DROP(S): 0.05 SOLUTION/ DROPS OPHTHALMIC; TOPICAL at 21:53

## 2018-10-31 NOTE — PROGRESS NOTE ADULT - PROBLEM SELECTOR PLAN 1
Patient Failed Extubation attempt x 2   Patient S/p Tracheostomy 10/3 ( # 6 Cuffed Bert)   Attempt weaning trials as tolerated daily; trials of trach collar as tolerated  Continue Nebulizers and Chest PT

## 2018-10-31 NOTE — PROGRESS NOTE ADULT - ASSESSMENT
84  Year old Female with PMH significant for COPD, JENNIE on BiPAP at home , multivalvular disease (severe AS Not a candidate for TAVR, S/p MV replacement), HFpEF/ Severe diastolic failure, A-fib on coumadin, sick sinus syndrome S/p pacemaker placement, HTN, and CKD3 who was admitted for acute respiratory failure requiring intubation suspected to be 2/2 COPD exacerbation c/b PNA w/ +entero/rhinovirus and GN coccobacillus and H. influenza bacteremia, requiring continued respiratory support and tracheostomy. Patient S/p Trach placement 10/3 and PEG Placement 10/4.     10/7: Patient remains with abdominal distention today but tolerating trickle feeds, ABD X-ray  performed this morning patient remains with air filled loops of small and large bowel. GI fellow called to re-eval the patient this morning, X-ray  reviewed slightly worsened compared to yesterday. GI fellow recommended to place patient in Left lateral decubitus position and oob to chair later today. Patient with large amount of gas expressed with turning as Per RN. As per GI no role for rectal tube at this time and can continue to advance feeds as tolerated to promote gastric motility. H+H Has remained stable case d/w  will restart coumadin this evening. Pt with elevated bp will increase Hydralazine 50 mg q 8 hr   10/8-Abdominal distention noted with hypoactive bowel sounds, Kub noted from this weekend. Daily bowel movements noted. Will repeat PTT as there were 2 therapeutics prior on same dosing, also repeat CBC as well. Will transfuse if remains low. RCU weaning  10/10: 4 dark BMs reported night of 10/9, concerning for melena,  hgb 6.9 in AM,  1U RBC given . Goal heparin reduced to 50-70. GI consulted due to GIB Hx with known AV malformations. EGD 10/4: Gastric Erythema, No evidence of Active bleeding. 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   No intvn.  10/11: H/H stable post 1U with no furthers melena or intvn from GI. Resumed coumadin.  10/12: agitated this am. seroquel not working changed back to klonopin  10/13 No events overnight. Continues on heparin gtt/coumadin bridge. INR 1.45, coumadin 7.5 mg x1. Continue with PS trials as tolerated.   10/14 Heparin gtt d/c, INR 2.82, coumadin 1mg x1. Continue with PS trials as tolerated. LE edema, lasix 20 mg po x1. Hg 7.7 stable.   10/15:  INR 2.3, reduced coumadin to 5mg  10/16: INR 1.8, resumed heparin, coumadin increased to 7.5mg tonight. Tolerated trach collar for 40mins  10/18: Await INR results today.  10/19 no events overnight. F/u labs today. d/c planning   10/21 INR 4.97, will hold Coumadin today  + cloudy urine per RN, increased WBC, will send UA and obtain CXR . Cr 1.18, add IVF NS 50 cc/hr x 6 hrs   10/22: hgb 6.8, transfused 1U RBC.  LATANYA with Cr 1.4.  10/23: Renal consulted for Cr 1.5. Started mild IVF 75ml/alh38ill. bladder US ordered. Started renal dose Aztreonam for UTI (10/22 UCx +E.Coli). f/u sensitivities  10/24: Aztreonam changed to Ertapenem as ecoli is ESBL. Watch for allergic cross sensitivity reaction as pt is allergic to pencillin. Continue to dose coumadin daily. Pt retarted on heparin gtt yesterday for subtherapeutic INR.   10/25: Continue IV abx for ecoli uti. Countine heparin gtt as INR needs to be 2.5 or greater.  10/26 Continue ertapenem day 3 /5 for esbl uti. Continue heparin gtt as inr remains subtherapeutic.  10/27: Resumed klonopin 1gm Q8H prn. minimal trach bleeding, will continue with heparin and coumadin. Resumed torsemide 30mg.  10/28: Pt vomited overnight and abd distention appears worse. PEG placed to vent today with some relief in distention and feeds resumed at 20ml/hr with titration by 10 Q4hrs.  10/29: Pt close to goal rate with TF no events overnight. No stool in 2 days suppository ordered for today. Reglan started as pt is diabetic with likely gastroparesis. will watch today.  10/30: fleet enema. increase bowel regimen will add miralax  10/31: Patient stable, having BMs, Will restart torsemide 20 mg daily

## 2018-10-31 NOTE — PROGRESS NOTE ADULT - PROBLEM SELECTOR PLAN 2
Patient S/p PEG 10/4  Had vomiting episode over weekend , has since Resolved   Continue Reglan and Bowel Regimen   Patient tolerating feeds at goal rate

## 2018-10-31 NOTE — PROGRESS NOTE ADULT - SUBJECTIVE AND OBJECTIVE BOX
Patient is a 84y old  Female who presents with a chief complaint of COPD exacerbation, ADHF (30 Oct 2018 08:53)    Interval Events:    REVIEW OF SYSTEMS:  [ ] Positive  [ ] All other systems negative  [ ] Unable to assess ROS because ________    Vital Signs Last 24 Hrs  T(C): 36.5 (10-31-18 @ 05:38), Max: 36.7 (10-30-18 @ 09:30)  T(F): 97.7 (10-31-18 @ 05:38), Max: 98.1 (10-30-18 @ 09:30)  HR: 62 (10-31-18 @ 08:51) (59 - 74)  BP: 136/61 (10-31-18 @ 05:38) (128/58 - 147/54)  RR: 17 (10-31-18 @ 05:38) (17 - 20)  SpO2: 98% (10-31-18 @ 08:51) (96% - 100%)PHYSICAL EXAM:  HEENT:   [ ]Tracheostomy:  [ ]Pupils equal  [ ]No oral lesions  [ ]Abnormal        SKIN  [ ]No Rash  [ ] Abnormal  [ ] pressure    CARDIAC  [ ]Regular  [ ]Abnormal    PULMONARY  [ ]Bilateral Clear Breath Sounds  [ ]Normal Excursion  [ ]Abnormal    GI  [ ]PEG      [ ] +BS		              [ ]Soft, nondistended, nontender	  [ ]Abnormal    MUSCULOSKELETAL                                   [ ]Bedbound                 [ ]Abnormal    [ ]Ambulatory/OOB to chair                           EXTREMITIES                                         [ ]Normal  [ ]Edema                           NEUROLOGIC  [ ] Normal, non focal  [ ] Focal findings:    PSYCHIATRIC  [ ]Alert and appropriate  [ ] Sedated	 [ ]Agitated    :  Connie: [ ] Yes, if yes: Date of Placement:                   [  ] No    LINES: Central Lines [ ] Yes, if yes: Date of Placement                                     [  ] No    HOSPITAL MEDICATIONS:  MEDICATIONS  (STANDING):  ALBUTerol/ipratropium for Nebulization 3 milliLiter(s) Nebulizer every 6 hours  aspirin  chewable 81 milliGRAM(s) Oral daily  buDESOnide   0.25 milliGRAM(s) Respule 0.25 milliGRAM(s) Inhalation every 12 hours  dextrose 5%. 1000 milliLiter(s) (50 mL/Hr) IV Continuous <Continuous>  dextrose 50% Injectable 12.5 Gram(s) IV Push once  dextrose 50% Injectable 25 Gram(s) IV Push once  dextrose 50% Injectable 25 Gram(s) IV Push once  diltiazem    Tablet 30 milliGRAM(s) Oral every 6 hours  doxazosin 2 milliGRAM(s) Oral at bedtime  fentaNYL   Patch  25 MICROgram(s)/Hr 1 Patch Transdermal every 72 hours  hydrALAZINE 25 milliGRAM(s) Oral every 8 hours  insulin lispro (HumaLOG) corrective regimen sliding scale   SubCutaneous three times a day before meals  latanoprost 0.005% Ophthalmic Solution 1 Drop(s) Both EYES at bedtime  metoclopramide Injectable 5 milliGRAM(s) IV Push every 8 hours  pantoprazole  Injectable 40 milliGRAM(s) IV Push every 12 hours  polyethylene glycol 3350 17 Gram(s) Oral daily  simethicone 80 milliGRAM(s) Chew every 8 hours  sodium chloride 3%  Inhalation 3 milliLiter(s) Inhalation two times a day    MEDICATIONS  (PRN):  acetaminophen    Suspension .. 650 milliGRAM(s) Oral every 6 hours PRN Mild Pain (1 - 3)  clonazePAM Tablet 0.5 milliGRAM(s) Oral every 8 hours PRN Anxiety  dextrose 40% Gel 15 Gram(s) Oral once PRN Blood Glucose LESS THAN 70 milliGRAM(s)/deciliter  glucagon  Injectable 1 milliGRAM(s) IntraMuscular once PRN Glucose LESS THAN 70 milligrams/deciliter  melatonin 1 milliGRAM(s) Oral at bedtime PRN Insomnia      LABS:                        8.5    10.1  )-----------( 223      ( 31 Oct 2018 07:08 )             27.1     10-30    136  |  95<L>  |  94<H>  ----------------------------<  154<H>  4.3   |  28  |  1.41<H>    Ca    9.4      30 Oct 2018 07:03      PT/INR - ( 31 Oct 2018 07:08 )   PT: 47.6 sec;   INR: 4.00 ratio         PTT - ( 30 Oct 2018 07:03 )  PTT:48.2 sec        CAPILLARY BLOOD GLUCOSE    MICROBIOLOGY:     RADIOLOGY:  [ ] Reviewed and interpreted by me    Mode: CPAP with PS  FiO2: 30  PEEP: 5  PS: 10  MAP: 7  PIP: 16 Patient is a 84y old  Female who presents with a chief complaint of COPD exacerbation, ADHF (30 Oct 2018 08:53)    Interval Events: No events reported overnight     REVIEW OF SYSTEMS:  [ ] Positive  [ ] All other systems negative  [x] Unable to assess ROS because patient does not respond to verbal questioning     Vital Signs Last 24 Hrs  T(C): 36.5 (10-31-18 @ 05:38), Max: 36.7 (10-30-18 @ 09:30)  T(F): 97.7 (10-31-18 @ 05:38), Max: 98.1 (10-30-18 @ 09:30)  HR: 62 (10-31-18 @ 08:51) (59 - 74)  BP: 136/61 (10-31-18 @ 05:38) (128/58 - 147/54)  RR: 17 (10-31-18 @ 05:38) (17 - 20)  SpO2: 98% (10-31-18 @ 08:51) (96% - 100%)    PHYSICAL EXAM:  HEENT:   [x]Tracheostomy: # 6 Cuffed Shiley   [x]Pupils equal  [ ]No oral lesions  [ ]Abnormal    SKIN  [x]No Rash  [ ] Abnormal  [ ] pressure    CARDIAC  [x]Regular  [ ]Abnormal    PULMONARY  [x]Bilateral Coarse Breath Sounds, decreased at bases bilaterally   [ ]Normal Excursion  [ ]Abnormal    GI  [x]PEG      [x] +BS		              [x]Soft, nondistended, nontender	  [ ]Abnormal    MUSCULOSKELETAL                                   [ ]Bedbound                 [ ]Abnormal    [x]OOB to chair                           EXTREMITIES                                         [ ]Normal  [x]Edema: + Bilaterally pedal and lower extremity edema                           NEUROLOGIC  [x] Normal, non focal  [ ] Focal findings:    PSYCHIATRIC  [ ]Alert and appropriate  [ ] Sedated	 [x]Agitated/ Appears Anxious     :  Connie: [ ] Yes, if yes: Date of Placement:                   [x] No    LINES: Central Lines [ ] Yes, if yes: Date of Placement                                     [x] No    HOSPITAL MEDICATIONS:  MEDICATIONS  (STANDING):  ALBUTerol/ipratropium for Nebulization 3 milliLiter(s) Nebulizer every 6 hours  aspirin  chewable 81 milliGRAM(s) Oral daily  buDESOnide   0.25 milliGRAM(s) Respule 0.25 milliGRAM(s) Inhalation every 12 hours  dextrose 5%. 1000 milliLiter(s) (50 mL/Hr) IV Continuous <Continuous>  dextrose 50% Injectable 12.5 Gram(s) IV Push once  dextrose 50% Injectable 25 Gram(s) IV Push once  dextrose 50% Injectable 25 Gram(s) IV Push once  diltiazem    Tablet 30 milliGRAM(s) Oral every 6 hours  doxazosin 2 milliGRAM(s) Oral at bedtime  fentaNYL   Patch  25 MICROgram(s)/Hr 1 Patch Transdermal every 72 hours  hydrALAZINE 25 milliGRAM(s) Oral every 8 hours  insulin lispro (HumaLOG) corrective regimen sliding scale   SubCutaneous three times a day before meals  latanoprost 0.005% Ophthalmic Solution 1 Drop(s) Both EYES at bedtime  metoclopramide Injectable 5 milliGRAM(s) IV Push every 8 hours  pantoprazole  Injectable 40 milliGRAM(s) IV Push every 12 hours  polyethylene glycol 3350 17 Gram(s) Oral daily  simethicone 80 milliGRAM(s) Chew every 8 hours  sodium chloride 3%  Inhalation 3 milliLiter(s) Inhalation two times a day    MEDICATIONS  (PRN):  acetaminophen    Suspension .. 650 milliGRAM(s) Oral every 6 hours PRN Mild Pain (1 - 3)  clonazePAM Tablet 0.5 milliGRAM(s) Oral every 8 hours PRN Anxiety  dextrose 40% Gel 15 Gram(s) Oral once PRN Blood Glucose LESS THAN 70 milliGRAM(s)/deciliter  glucagon  Injectable 1 milliGRAM(s) IntraMuscular once PRN Glucose LESS THAN 70 milligrams/deciliter  melatonin 1 milliGRAM(s) Oral at bedtime PRN Insomnia      LABS:                        8.5    10.1  )-----------( 223      ( 31 Oct 2018 07:08 )             27.1     10-30    136  |  95<L>  |  94<H>  ----------------------------<  154<H>  4.3   |  28  |  1.41<H>    Ca    9.4      30 Oct 2018 07:03      PT/INR - ( 31 Oct 2018 07:08 )   PT: 47.6 sec;   INR: 4.00 ratio         PTT - ( 30 Oct 2018 07:03 )  PTT:48.2 sec        CAPILLARY BLOOD GLUCOSE    MICROBIOLOGY:     RADIOLOGY:  [ ] Reviewed and interpreted by me    Mode: CPAP with PS  FiO2: 30  PEEP: 5  PS: 10  MAP: 7  PIP: 16

## 2018-10-31 NOTE — PROGRESS NOTE ADULT - PROBLEM SELECTOR PLAN 4
ECHO 9/14: + Mechanical Mitral Valve, Severe AS, Stage 3 Diastolic Dysfxn   Patient With Hx of Sick Sinus Syndrome and PPM  / Atrial Fibrillation   Continue Cardizem , Torsemide restarted   Continue to monitor BP and Heart Rate

## 2018-10-31 NOTE — PROGRESS NOTE ADULT - PROBLEM SELECTOR PLAN 8
DC to rehab when INR 2.5-3.5 and Cr stable  Family has decided not proceed with dc planning to [facility] and has requested dc to [facility].   Candace working on patients insurance issues

## 2018-10-31 NOTE — PROGRESS NOTE ADULT - ATTENDING COMMENTS
Patient seen and examined.   1. Acute resp failure with hypoxia:  - Wean as tolerated - was able to tolerate a few hours of TC yesterday. Will continue TC trials  - Cont pulm toilet   2. HFpEF/ s/p mech MVR/ AS/ a fib:  - Cont ASA, cardizem and hydralazine  - INR supratherapeutic. Will repeat INR to determine Coumadin dosing  - Will resume diuretics today and monitor  3. LATANYA:  - Await BMP today  - Follow UO/ electrolytes  - Nephrology followup - will resume torsemide today  4. Anemia:  - Hb stable  5. Agitation:  - Well controlled on Klonopin  6. UTI:  - Ucx  with ESBL E coli  - Completed Ertapenem 10/29  7. Diabetes:  - Continue sliding scale  - Monitor fingersticks  8. Gen:  - D-c planning to vent facility once acute issues resolved. Daughter updated at bedside  - Family now requesting medical transfer to [facility]. We have asked them to provide us with an accepting physician name and the [facility] number to initiate this process. If unable to be initiated then will proceed with discharge planning if Cr remains stable

## 2018-11-01 LAB
ANION GAP SERPL CALC-SCNC: 14 MMOL/L — SIGNIFICANT CHANGE UP (ref 5–17)
BUN SERPL-MCNC: 84 MG/DL — HIGH (ref 7–23)
CALCIUM SERPL-MCNC: 9.2 MG/DL — SIGNIFICANT CHANGE UP (ref 8.4–10.5)
CHLORIDE SERPL-SCNC: 96 MMOL/L — SIGNIFICANT CHANGE UP (ref 96–108)
CO2 SERPL-SCNC: 28 MMOL/L — SIGNIFICANT CHANGE UP (ref 22–31)
CREAT SERPL-MCNC: 1.34 MG/DL — HIGH (ref 0.5–1.3)
GLUCOSE BLDC GLUCOMTR-MCNC: 125 MG/DL — HIGH (ref 70–99)
GLUCOSE BLDC GLUCOMTR-MCNC: 155 MG/DL — HIGH (ref 70–99)
GLUCOSE BLDC GLUCOMTR-MCNC: 176 MG/DL — HIGH (ref 70–99)
GLUCOSE BLDC GLUCOMTR-MCNC: 195 MG/DL — HIGH (ref 70–99)
GLUCOSE SERPL-MCNC: 139 MG/DL — HIGH (ref 70–99)
HCT VFR BLD CALC: 26.3 % — LOW (ref 34.5–45)
HGB BLD-MCNC: 8.2 G/DL — LOW (ref 11.5–15.5)
INR BLD: 4.25 RATIO — HIGH (ref 0.88–1.16)
INR BLD: 4.5 RATIO — HIGH (ref 0.88–1.16)
MCHC RBC-ENTMCNC: 26.8 PG — LOW (ref 27–34)
MCHC RBC-ENTMCNC: 31 GM/DL — LOW (ref 32–36)
MCV RBC AUTO: 86.4 FL — SIGNIFICANT CHANGE UP (ref 80–100)
PLATELET # BLD AUTO: 232 K/UL — SIGNIFICANT CHANGE UP (ref 150–400)
POTASSIUM SERPL-MCNC: 4.9 MMOL/L — SIGNIFICANT CHANGE UP (ref 3.5–5.3)
POTASSIUM SERPL-SCNC: 4.9 MMOL/L — SIGNIFICANT CHANGE UP (ref 3.5–5.3)
PROTHROM AB SERPL-ACNC: 50.7 SEC — HIGH (ref 10–12.9)
PROTHROM AB SERPL-ACNC: 53.7 SEC — HIGH (ref 10–12.9)
RBC # BLD: 3.04 M/UL — LOW (ref 3.8–5.2)
RBC # FLD: 18.2 % — HIGH (ref 10.3–14.5)
SODIUM SERPL-SCNC: 138 MMOL/L — SIGNIFICANT CHANGE UP (ref 135–145)
WBC # BLD: 11.3 K/UL — HIGH (ref 3.8–10.5)
WBC # FLD AUTO: 11.3 K/UL — HIGH (ref 3.8–10.5)

## 2018-11-01 PROCEDURE — 99233 SBSQ HOSP IP/OBS HIGH 50: CPT | Mod: GC

## 2018-11-01 RX ORDER — FENTANYL CITRATE 50 UG/ML
1 INJECTION INTRAVENOUS
Qty: 0 | Refills: 0 | Status: DISCONTINUED | OUTPATIENT
Start: 2018-11-01 | End: 2018-11-02

## 2018-11-01 RX ADMIN — Medication 2: at 06:52

## 2018-11-01 RX ADMIN — Medication 20 MILLIGRAM(S): at 05:32

## 2018-11-01 RX ADMIN — SIMETHICONE 80 MILLIGRAM(S): 80 TABLET, CHEWABLE ORAL at 05:32

## 2018-11-01 RX ADMIN — Medication 0.25 MILLIGRAM(S): at 05:23

## 2018-11-01 RX ADMIN — Medication 2: at 17:40

## 2018-11-01 RX ADMIN — Medication 2: at 13:29

## 2018-11-01 RX ADMIN — FENTANYL CITRATE 1 PATCH: 50 INJECTION INTRAVENOUS at 06:45

## 2018-11-01 RX ADMIN — Medication 1 MILLIGRAM(S): at 22:48

## 2018-11-01 RX ADMIN — FENTANYL CITRATE 1 PATCH: 50 INJECTION INTRAVENOUS at 06:42

## 2018-11-01 RX ADMIN — Medication 25 MILLIGRAM(S): at 22:49

## 2018-11-01 RX ADMIN — FENTANYL CITRATE 1 PATCH: 50 INJECTION INTRAVENOUS at 19:01

## 2018-11-01 RX ADMIN — LATANOPROST 1 DROP(S): 0.05 SOLUTION/ DROPS OPHTHALMIC; TOPICAL at 22:49

## 2018-11-01 RX ADMIN — Medication 3 MILLILITER(S): at 11:02

## 2018-11-01 RX ADMIN — SIMETHICONE 80 MILLIGRAM(S): 80 TABLET, CHEWABLE ORAL at 22:48

## 2018-11-01 RX ADMIN — Medication 0.25 MILLIGRAM(S): at 17:03

## 2018-11-01 RX ADMIN — Medication 5 MILLIGRAM(S): at 05:31

## 2018-11-01 RX ADMIN — FENTANYL CITRATE 1 PATCH: 50 INJECTION INTRAVENOUS at 07:16

## 2018-11-01 RX ADMIN — Medication 3 MILLILITER(S): at 05:22

## 2018-11-01 RX ADMIN — SODIUM CHLORIDE 3 MILLILITER(S): 9 INJECTION INTRAMUSCULAR; INTRAVENOUS; SUBCUTANEOUS at 05:24

## 2018-11-01 RX ADMIN — Medication 25 MILLIGRAM(S): at 05:32

## 2018-11-01 RX ADMIN — Medication 5 MILLIGRAM(S): at 22:50

## 2018-11-01 RX ADMIN — Medication 5 MILLIGRAM(S): at 13:29

## 2018-11-01 RX ADMIN — Medication 0.5 MILLIGRAM(S): at 22:55

## 2018-11-01 RX ADMIN — Medication 20 MILLIGRAM(S): at 22:47

## 2018-11-01 RX ADMIN — PANTOPRAZOLE SODIUM 40 MILLIGRAM(S): 20 TABLET, DELAYED RELEASE ORAL at 05:32

## 2018-11-01 RX ADMIN — Medication 2 MILLIGRAM(S): at 22:49

## 2018-11-01 RX ADMIN — PANTOPRAZOLE SODIUM 40 MILLIGRAM(S): 20 TABLET, DELAYED RELEASE ORAL at 17:33

## 2018-11-01 RX ADMIN — Medication 25 MILLIGRAM(S): at 13:30

## 2018-11-01 RX ADMIN — FENTANYL CITRATE 1 PATCH: 50 INJECTION INTRAVENOUS at 06:30

## 2018-11-01 RX ADMIN — Medication 3 MILLILITER(S): at 17:03

## 2018-11-01 RX ADMIN — SIMETHICONE 80 MILLIGRAM(S): 80 TABLET, CHEWABLE ORAL at 13:29

## 2018-11-01 NOTE — PROGRESS NOTE ADULT - PROBLEM SELECTOR PROBLEM 5
Valvular disease
Anemia
Valvular disease
Anemia
Valvular disease
Anemia
Valvular disease

## 2018-11-01 NOTE — PROGRESS NOTE ADULT - ATTENDING COMMENTS
Patient seen and examined.   1. Acute resp failure with hypoxia:  - Wean as tolerated - was able to tolerate 10 hours of TC yesterday. Will continue TC trials  - Cont pulm toilet   2. HFpEF/ s/p mech MVR/ AS/ a fib:  - Cont ASA, cardizem and hydralazine  - INR supratherapeutic. Will repeat INR to determine Coumadin dosing  - Coumadin dosing likely to be different than prior to admission given change in diet from tube feeds  - Will resume diuretics today and monitor  3. LATANYA:  - Await BMP today  - Follow UO/ electrolytes  - Nephrology followup - continue Torsemide  4. Anemia:  - Hb stable  5. Agitation:  - Well controlled on Klonopin  6. UTI:  - Ucx  with ESBL E coli  - Completed Ertapenem 10/29  7. Diabetes:  - Continue sliding scale  - Monitor fingersticks  8. Gen:  - D-c planning to vent facility once acute issues resolved. Daughter updated at bedside

## 2018-11-01 NOTE — PROGRESS NOTE ADULT - PROBLEM SELECTOR PLAN 8
DC to rehab when INR 2.5-3.5 and Cr stable  Family has decided not proceed with dc planning to [facility] and has requested dc to [facility].   Candace working on patients insurance issues

## 2018-11-01 NOTE — PROGRESS NOTE ADULT - PROBLEM SELECTOR PROBLEM 3
Bacteremia
Cystocele with prolapse
Cystocele with prolapse
Bacteremia
Valvular disease
Bacteremia
Valvular disease
Bacteremia
Valvular disease
Bacteremia

## 2018-11-01 NOTE — PROGRESS NOTE ADULT - ASSESSMENT
84  Year old Female with PMH significant for COPD, JENNIE on BiPAP at home , multivalvular disease (severe AS Not a candidate for TAVR, S/p MV replacement), HFpEF/ Severe diastolic failure, A-fib on coumadin, sick sinus syndrome S/p pacemaker placement, HTN, and CKD3 who was admitted for acute respiratory failure requiring intubation suspected to be 2/2 COPD exacerbation c/b PNA w/ +entero/rhinovirus and GN coccobacillus and H. influenza bacteremia, requiring continued respiratory support and tracheostomy. Patient S/p Trach placement 10/3 and PEG Placement 10/4.     10/7: Patient remains with abdominal distention today but tolerating trickle feeds, ABD X-ray  performed this morning patient remains with air filled loops of small and large bowel. GI fellow called to re-eval the patient this morning, X-ray  reviewed slightly worsened compared to yesterday. GI fellow recommended to place patient in Left lateral decubitus position and oob to chair later today. Patient with large amount of gas expressed with turning as Per RN. As per GI no role for rectal tube at this time and can continue to advance feeds as tolerated to promote gastric motility. H+H Has remained stable case d/w  will restart coumadin this evening. Pt with elevated bp will increase Hydralazine 50 mg q 8 hr   10/8-Abdominal distention noted with hypoactive bowel sounds, Kub noted from this weekend. Daily bowel movements noted. Will repeat PTT as there were 2 therapeutics prior on same dosing, also repeat CBC as well. Will transfuse if remains low. RCU weaning  10/10: 4 dark BMs reported night of 10/9, concerning for melena,  hgb 6.9 in AM,  1U RBC given . Goal heparin reduced to 50-70. GI consulted due to GIB Hx with known AV malformations. EGD 10/4: Gastric Erythema, No evidence of Active bleeding. 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   No intvn.  10/11: H/H stable post 1U with no furthers melena or intvn from GI. Resumed coumadin.  10/12: agitated this am. seroquel not working changed back to klonopin  10/13 No events overnight. Continues on heparin gtt/coumadin bridge. INR 1.45, coumadin 7.5 mg x1. Continue with PS trials as tolerated.   10/14 Heparin gtt d/c, INR 2.82, coumadin 1mg x1. Continue with PS trials as tolerated. LE edema, lasix 20 mg po x1. Hg 7.7 stable.   10/15:  INR 2.3, reduced coumadin to 5mg  10/16: INR 1.8, resumed heparin, coumadin increased to 7.5mg tonight. Tolerated trach collar for 40mins  10/18: Await INR results today.  10/19 no events overnight. F/u labs today. d/c planning   10/21 INR 4.97, will hold Coumadin today  + cloudy urine per RN, increased WBC, will send UA and obtain CXR . Cr 1.18, add IVF NS 50 cc/hr x 6 hrs   10/22: hgb 6.8, transfused 1U RBC.  LATANYA with Cr 1.4.  10/23: Renal consulted for Cr 1.5. Started mild IVF 75ml/[id number]. bladder US ordered. Started renal dose Aztreonam for UTI (10/22 UCx +E.Coli). f/u sensitivities  10/24: Aztreonam changed to Ertapenem as ecoli is ESBL. Watch for allergic cross sensitivity reaction as pt is allergic to pencillin. Continue to dose coumadin daily. Pt retarted on heparin gtt yesterday for subtherapeutic INR.   10/25: Continue IV abx for ecoli uti. Countine heparin gtt as INR needs to be 2.5 or greater.  10/26 Continue ertapenem day 3 /5 for esbl uti. Continue heparin gtt as inr remains subtherapeutic.  10/27: Resumed klonopin 1gm Q8H prn. minimal trach bleeding, will continue with heparin and coumadin. Resumed torsemide 30mg.  10/28: Pt vomited overnight and abd distention appears worse. PEG placed to vent today with some relief in distention and feeds resumed at 20ml/hr with titration by 10 Q4hrs.  10/29: Pt close to goal rate with TF no events overnight. No stool in 2 days suppository ordered for today. Reglan started as pt is diabetic with likely gastroparesis. will watch today.  10/30: fleet enema. increase bowel regimen will add miralax  10/31: Patient stable, having BMs, Will restart torsemide 20 mg daily

## 2018-11-01 NOTE — PROGRESS NOTE ADULT - PROBLEM SELECTOR PROBLEM 2
Pneumonia
Urinary retention with incomplete bladder emptying
Urinary retention with incomplete bladder emptying
Dysphagia
Pneumonia
Dysphagia
Pneumonia
Dysphagia
Pneumonia

## 2018-11-01 NOTE — PROGRESS NOTE ADULT - PROBLEM SELECTOR PROBLEM 7
LATANYA (acute kidney injury)
Agitation
LATANYA (acute kidney injury)
Agitation
ESBL E. coli carrier
LATANYA (acute kidney injury)
LATANYA (acute kidney injury)
ESBL E. coli carrier
Agitation

## 2018-11-01 NOTE — PROGRESS NOTE ADULT - PROBLEM SELECTOR PLAN 3
Bld cx 9/14: H. influenza   Repeat Bld cx 10/3: No growth to date  Patient S/p Course of Meropenem
Patient with HX of AS ( Not a candidate for TAVR )  Patient S/p Mechanical Mitral Valve Replacement. Goal INR 2.5-3.5  Continue coumadin  dosing
Patient with HX of AS ( Not a candidate for TAVR )  Patient S/p Mechanical Mitral Valve Replacement. Goal INR 2.5-3.5  Continue coumadin  dosing
Patient with HX of AS ( Not a candidate for TAVR )  Patient S/p Mechanical Mitral Valve Replacement. Goal INR 2.5-3.5  Continue coumadin dosing and heparin gtt until stable INR levels
Bld cx 9/14: H. influenza   Repeat Bld cx 10/3: No growth to date  Patient S/p Course of Meropenem
Patient with HX of AS ( Not a candidate for TAVR )  Patient S/p Mechanical Mitral Valve Replacement, Goal INR 2.5-3.5  Patient with Supratheraputic INR today 4.2 but trending down, will hold Coumadin dosing tonight
Patient with HX of AS ( Not a candidate for TAVR )  Patient S/p Mechanical Mitral Valve Replacement. Goal INR 2.5-3.5  Continue coumadin dosing and heparin gtt until stable INR levels
Patient with HX of AS ( Not a candidate for TAVR )  Patient S/p Mechanical Mitral Valve Replacement. Goal INR 2.5-3.5  INR 2.7 today, will DC heparin infusion and dose coumadin at 7mg tonight.
Patient with HX of AS ( Not a candidate for TAVR )  Patient S/p Mechanical Mitral Valve Replacement, Goal INR 2.5-3.5  Patient with Supratheraputic INR today, will hold Coumadin dosing tonight
Bld cx 9/14: H. influenza   Repeat Bld cx 10/3: No growth to date  Patient S/p Course of Meropenem
Patient with HX of AS ( Not a candidate for TAVR )  Patient S/p Mechanical Mitral Valve Replacement. Goal INR 2.5-3.5  Continue coumadin dosing and heparin gtt until stable INR levels
Bld cx 9/14: H. influenza   Repeat Bld cx 10/3: No growth to date  Patient S/p Course of Meropenem
Bld cx 9/14: H. influenza   Repeat Bld cx 10/3: No growth to date  Patient S/p Course of Meropenem

## 2018-11-01 NOTE — PROGRESS NOTE ADULT - PROBLEM SELECTOR PROBLEM 6
Discharge planning issues
Heart failure with preserved ejection fraction
Discharge planning issues
Heart failure with preserved ejection fraction
Discharge planning issues
LATANYA (acute kidney injury)
Discharge planning issues
LATANYA (acute kidney injury)
Heart failure with preserved ejection fraction

## 2018-11-01 NOTE — PROGRESS NOTE ADULT - SUBJECTIVE AND OBJECTIVE BOX
Patient is a 84y old  Female who presents with a chief complaint of COPD exacerbation, ADHF....      Interval Events:    REVIEW OF SYSTEMS:  [ ] Positive  [ ] All other systems negative  [ ] Unable to assess ROS because ________    Vital Signs Last 24 Hrs  T(C): 36.9 (11-01-18 @ 04:39), Max: 36.9 (11-01-18 @ 04:39)  T(F): 98.4 (11-01-18 @ 04:39), Max: 98.4 (11-01-18 @ 04:39)  HR: 68 (11-01-18 @ 06:54) (59 - 71)  BP: 139/54 (11-01-18 @ 04:39) (105/42 - 139/54)  RR: 18 (11-01-18 @ 04:39) (18 - 21)  SpO2: 99% (11-01-18 @ 06:54) (95% - 100%)    PHYSICAL EXAM:  HEENT:   [ ]Tracheostomy:  [ ]Pupils equal  [ ]No oral lesions  [ ]Abnormal    SKIN  [ ]No Rash  [ ] Abnormal  [ ] pressure    CARDIAC  [ ]Regular  [ ]Abnormal    PULMONARY  [ ]Bilateral Clear Breath Sounds  [ ]Normal Excursion  [ ]Abnormal    GI  [ ]PEG      [ ] +BS		              [ ]Soft, nondistended, nontender	  [ ]Abnormal    MUSCULOSKELETAL                                   [ ]Bedbound                 [ ]Abnormal    [ ]Ambulatory/OOB to chair                           EXTREMITIES                                         [ ]Normal  [ ]Edema                           NEUROLOGIC  [ ] Normal, non focal  [ ] Focal findings:    PSYCHIATRIC  [ ]Alert and appropriate  [ ] Sedated	 [ ]Agitated    :  Connie: [ ] Yes, if yes: Date of Placement:                   [  ] No    LINES: Central Lines [ ] Yes, if yes: Date of Placement                                     [  ] No    HOSPITAL MEDICATIONS:  MEDICATIONS  (STANDING):  ALBUTerol/ipratropium for Nebulization 3 milliLiter(s) Nebulizer every 6 hours  aspirin  chewable 81 milliGRAM(s) Oral daily  buDESOnide   0.25 milliGRAM(s) Respule 0.25 milliGRAM(s) Inhalation every 12 hours  dextrose 5%. 1000 milliLiter(s) (50 mL/Hr) IV Continuous <Continuous>  dextrose 50% Injectable 12.5 Gram(s) IV Push once  dextrose 50% Injectable 25 Gram(s) IV Push once  dextrose 50% Injectable 25 Gram(s) IV Push once  diltiazem    Tablet 30 milliGRAM(s) Oral every 6 hours  doxazosin 2 milliGRAM(s) Oral at bedtime  hydrALAZINE 25 milliGRAM(s) Oral every 8 hours  insulin lispro (HumaLOG) corrective regimen sliding scale   SubCutaneous three times a day before meals  latanoprost 0.005% Ophthalmic Solution 1 Drop(s) Both EYES at bedtime  metoclopramide Injectable 5 milliGRAM(s) IV Push every 8 hours  pantoprazole  Injectable 40 milliGRAM(s) IV Push every 12 hours  polyethylene glycol 3350 17 Gram(s) Oral daily  simethicone 80 milliGRAM(s) Chew every 8 hours  sodium chloride 3%  Inhalation 3 milliLiter(s) Inhalation two times a day  torsemide 20 milliGRAM(s) Oral daily    MEDICATIONS  (PRN):  acetaminophen    Suspension .. 650 milliGRAM(s) Oral every 6 hours PRN Mild Pain (1 - 3)  clonazePAM Tablet 0.5 milliGRAM(s) Oral every 8 hours PRN Anxiety  dextrose 40% Gel 15 Gram(s) Oral once PRN Blood Glucose LESS THAN 70 milliGRAM(s)/deciliter  glucagon  Injectable 1 milliGRAM(s) IntraMuscular once PRN Glucose LESS THAN 70 milligrams/deciliter  melatonin 1 milliGRAM(s) Oral at bedtime PRN Insomnia      LABS:                        8.2    11.3  )-----------( 232      ( 01 Nov 2018 07:14 )             26.3     10-31    137  |  96  |  85<H>  ----------------------------<  182<H>  4.7   |  30  |  1.35<H>    Ca    9.6      31 Oct 2018 14:04      PT/INR - ( 31 Oct 2018 14:04 )   PT: 51.9 sec;   INR: 4.35 ratio         PTT - ( 31 Oct 2018 14:04 )  PTT:52.9 sec        CAPILLARY BLOOD GLUCOSE    MICROBIOLOGY:     RADIOLOGY:  [ ] Reviewed and interpreted by me    Mode: off Patient is a 84y old  Female who presents with a chief complaint of COPD exacerbation, ADHF.... Today she is observed awake with relative bedside. She appears comfortable on vent. Of note, there are scanty bloody secretions in the suction tubing. INR is 4.5      Interval Events: No events endorsed from overnight      Vital Signs Last 24 Hrs  T(C): 36.9 (11-01-18 @ 04:39), Max: 36.9 (11-01-18 @ 04:39)  T(F): 98.4 (11-01-18 @ 04:39), Max: 98.4 (11-01-18 @ 04:39)  HR: 68 (11-01-18 @ 06:54) (59 - 71)  BP: 139/54 (11-01-18 @ 04:39) (105/42 - 139/54)  RR: 18 (11-01-18 @ 04:39) (18 - 21)  SpO2: 99% (11-01-18 @ 06:54) (95% - 100%)    REVIEW OF SYSTEMS:  [ ] Positive  [ ] All other systems negative  [x] Unable to assess ROS because patient does not respond to verbal questioning       PHYSICAL EXAM:  HEENT:   [x]Tracheostomy: # 6 Cuffed Shiley   [x]Pupils equal  [ ]No oral lesions  [ ]Abnormal    SKIN  [x]No Rash  [ ] Abnormal  [ ] pressure    CARDIAC  [x]Regular  [ ]Abnormal    PULMONARY  [x]Bilateral Coarse Breath Sounds, decreased at bases bilaterally   [ ]Normal Excursion  [X ]Abnormal: Crackles B/L    GI  [x]PEG      [x] +BS		              [x]Soft, nondistended, nontender	  [ ]Abnormal    MUSCULOSKELETAL                                   [ ]Bedbound                 [ ]Abnormal    [x]OOB to chair                           EXTREMITIES                                         [ ]Normal  [x]Edema: + Bilaterally pedal and lower extremity edema                          NEUROLOGIC  [x] Normal, non focal  [ ] Focal findings:    PSYCHIATRIC  [ ]Alert and appropriate  [ ] Sedated	 [x]Agitated/ Appears Anxious     :  Connie: [ ] Yes, if yes: Date of Placement:                   [x] No    LINES: Central Lines [ ] Yes, if yes: Date of Placement                                     [x] No        HOSPITAL MEDICATIONS:  MEDICATIONS  (STANDING):  ALBUTerol/ipratropium for Nebulization 3 milliLiter(s) Nebulizer every 6 hours  aspirin  chewable 81 milliGRAM(s) Oral daily  buDESOnide   0.25 milliGRAM(s) Respule 0.25 milliGRAM(s) Inhalation every 12 hours  dextrose 5%. 1000 milliLiter(s) (50 mL/Hr) IV Continuous <Continuous>  dextrose 50% Injectable 12.5 Gram(s) IV Push once  dextrose 50% Injectable 25 Gram(s) IV Push once  dextrose 50% Injectable 25 Gram(s) IV Push once  diltiazem    Tablet 30 milliGRAM(s) Oral every 6 hours  doxazosin 2 milliGRAM(s) Oral at bedtime  hydrALAZINE 25 milliGRAM(s) Oral every 8 hours  insulin lispro (HumaLOG) corrective regimen sliding scale   SubCutaneous three times a day before meals  latanoprost 0.005% Ophthalmic Solution 1 Drop(s) Both EYES at bedtime  metoclopramide Injectable 5 milliGRAM(s) IV Push every 8 hours  pantoprazole  Injectable 40 milliGRAM(s) IV Push every 12 hours  polyethylene glycol 3350 17 Gram(s) Oral daily  simethicone 80 milliGRAM(s) Chew every 8 hours  sodium chloride 3%  Inhalation 3 milliLiter(s) Inhalation two times a day  torsemide 20 milliGRAM(s) Oral daily    MEDICATIONS  (PRN):  acetaminophen    Suspension .. 650 milliGRAM(s) Oral every 6 hours PRN Mild Pain (1 - 3)  clonazePAM Tablet 0.5 milliGRAM(s) Oral every 8 hours PRN Anxiety  dextrose 40% Gel 15 Gram(s) Oral once PRN Blood Glucose LESS THAN 70 milliGRAM(s)/deciliter  glucagon  Injectable 1 milliGRAM(s) IntraMuscular once PRN Glucose LESS THAN 70 milligrams/deciliter  melatonin 1 milliGRAM(s) Oral at bedtime PRN Insomnia      LABS:                        8.2    11.3  )-----------( 232      ( 01 Nov 2018 07:14 )             26.3     10-31    137  |  96  |  85<H>  ----------------------------<  182<H>  4.7   |  30  |  1.35<H>    Ca    9.6      31 Oct 2018 14:04      PT/INR - ( 01 Nov 2018 14:42 )   PT: 50.7 sec;   INR: 4.25 ratio         CAPILLARY BLOOD GLUCOSE: 176    MICROBIOLOGY:     RADIOLOGY:  [ ] Reviewed and interpreted by me    Mode: off

## 2018-11-01 NOTE — PROGRESS NOTE ADULT - PROBLEM SELECTOR PROBLEM 1
Respiratory failure
LATANYA (acute kidney injury)
LATANYA (acute kidney injury)
Respiratory failure

## 2018-11-01 NOTE — PROGRESS NOTE ADULT - PROBLEM SELECTOR PROBLEM 8
Anemia
Hyponatremia
Hyponatremia
Anemia
Discharge planning issues
Hyponatremia
Hyponatremia
Discharge planning issues
Anemia

## 2018-11-01 NOTE — PROGRESS NOTE ADULT - PROBLEM SELECTOR PROBLEM 4
Dysphagia
Heart failure with preserved ejection fraction
Dysphagia
Heart failure with preserved ejection fraction
Dysphagia
Heart failure with preserved ejection fraction
Dysphagia
Dysphagia

## 2018-11-02 VITALS — OXYGEN SATURATION: 98 %

## 2018-11-02 LAB
ANION GAP SERPL CALC-SCNC: 11 MMOL/L — SIGNIFICANT CHANGE UP (ref 5–17)
BUN SERPL-MCNC: 83 MG/DL — HIGH (ref 7–23)
CALCIUM SERPL-MCNC: 9.4 MG/DL — SIGNIFICANT CHANGE UP (ref 8.4–10.5)
CHLORIDE SERPL-SCNC: 99 MMOL/L — SIGNIFICANT CHANGE UP (ref 96–108)
CO2 SERPL-SCNC: 29 MMOL/L — SIGNIFICANT CHANGE UP (ref 22–31)
CREAT SERPL-MCNC: 1.45 MG/DL — HIGH (ref 0.5–1.3)
GLUCOSE BLDC GLUCOMTR-MCNC: 119 MG/DL — HIGH (ref 70–99)
GLUCOSE BLDC GLUCOMTR-MCNC: 182 MG/DL — HIGH (ref 70–99)
GLUCOSE BLDC GLUCOMTR-MCNC: 215 MG/DL — HIGH (ref 70–99)
GLUCOSE SERPL-MCNC: 144 MG/DL — HIGH (ref 70–99)
HCT VFR BLD CALC: 29.1 % — LOW (ref 34.5–45)
HGB BLD-MCNC: 8.7 G/DL — LOW (ref 11.5–15.5)
INR BLD: 3.56 RATIO — HIGH (ref 0.88–1.16)
MCHC RBC-ENTMCNC: 25.9 PG — LOW (ref 27–34)
MCHC RBC-ENTMCNC: 30 GM/DL — LOW (ref 32–36)
MCV RBC AUTO: 86.1 FL — SIGNIFICANT CHANGE UP (ref 80–100)
PLATELET # BLD AUTO: 244 K/UL — SIGNIFICANT CHANGE UP (ref 150–400)
POTASSIUM SERPL-MCNC: 5.1 MMOL/L — SIGNIFICANT CHANGE UP (ref 3.5–5.3)
POTASSIUM SERPL-SCNC: 5.1 MMOL/L — SIGNIFICANT CHANGE UP (ref 3.5–5.3)
PROTHROM AB SERPL-ACNC: 42.2 SEC — HIGH (ref 10–12.9)
RBC # BLD: 3.37 M/UL — LOW (ref 3.8–5.2)
RBC # FLD: 18 % — HIGH (ref 10.3–14.5)
SODIUM SERPL-SCNC: 139 MMOL/L — SIGNIFICANT CHANGE UP (ref 135–145)
WBC # BLD: 12.8 K/UL — HIGH (ref 3.8–10.5)
WBC # FLD AUTO: 12.8 K/UL — HIGH (ref 3.8–10.5)

## 2018-11-02 PROCEDURE — 99239 HOSP IP/OBS DSCHRG MGMT >30: CPT

## 2018-11-02 RX ORDER — IPRATROPIUM/ALBUTEROL SULFATE 18-103MCG
3 AEROSOL WITH ADAPTER (GRAM) INHALATION EVERY 6 HOURS
Qty: 0 | Refills: 0 | Status: DISCONTINUED | OUTPATIENT
Start: 2018-11-02 | End: 2018-11-02

## 2018-11-02 RX ORDER — INSULIN LISPRO 100/ML
0 VIAL (ML) SUBCUTANEOUS
Qty: 0 | Refills: 0 | COMMUNITY

## 2018-11-02 RX ORDER — ASPIRIN/CALCIUM CARB/MAGNESIUM 324 MG
1 TABLET ORAL
Qty: 0 | Refills: 0 | COMMUNITY

## 2018-11-02 RX ORDER — SOTALOL HCL 120 MG
1 TABLET ORAL
Qty: 0 | Refills: 0 | COMMUNITY

## 2018-11-02 RX ORDER — WARFARIN SODIUM 2.5 MG/1
1 TABLET ORAL
Qty: 0 | Refills: 0 | COMMUNITY

## 2018-11-02 RX ORDER — IPRATROPIUM/ALBUTEROL SULFATE 18-103MCG
3 AEROSOL WITH ADAPTER (GRAM) INHALATION
Qty: 0 | Refills: 0 | COMMUNITY
Start: 2018-11-02

## 2018-11-02 RX ORDER — DILTIAZEM HCL 120 MG
1 CAPSULE, EXT RELEASE 24 HR ORAL
Qty: 0 | Refills: 0 | COMMUNITY
Start: 2018-11-02

## 2018-11-02 RX ORDER — SIMETHICONE 80 MG/1
1 TABLET, CHEWABLE ORAL
Qty: 0 | Refills: 0 | COMMUNITY
Start: 2018-11-02

## 2018-11-02 RX ORDER — COLCHICINE 0.6 MG
1 TABLET ORAL
Qty: 0 | Refills: 0 | COMMUNITY

## 2018-11-02 RX ORDER — PANTOPRAZOLE SODIUM 20 MG/1
1 TABLET, DELAYED RELEASE ORAL
Qty: 0 | Refills: 0 | COMMUNITY

## 2018-11-02 RX ORDER — LATANOPROST 0.05 MG/ML
1 SOLUTION/ DROPS OPHTHALMIC; TOPICAL
Qty: 0 | Refills: 0 | COMMUNITY

## 2018-11-02 RX ORDER — FLUTICASONE PROPIONATE AND SALMETEROL 50; 250 UG/1; UG/1
1 POWDER ORAL; RESPIRATORY (INHALATION)
Qty: 0 | Refills: 0 | COMMUNITY

## 2018-11-02 RX ORDER — ASPIRIN/CALCIUM CARB/MAGNESIUM 324 MG
1 TABLET ORAL
Qty: 0 | Refills: 0 | COMMUNITY
Start: 2018-11-02

## 2018-11-02 RX ORDER — TIOTROPIUM BROMIDE 18 UG/1
1 CAPSULE ORAL; RESPIRATORY (INHALATION)
Qty: 0 | Refills: 0 | COMMUNITY

## 2018-11-02 RX ORDER — LATANOPROST 0.05 MG/ML
1 SOLUTION/ DROPS OPHTHALMIC; TOPICAL
Qty: 0 | Refills: 0 | COMMUNITY
Start: 2018-11-02

## 2018-11-02 RX ORDER — DOXAZOSIN MESYLATE 4 MG
1 TABLET ORAL
Qty: 0 | Refills: 0 | COMMUNITY
Start: 2018-11-02

## 2018-11-02 RX ORDER — METOCLOPRAMIDE HCL 10 MG
5 TABLET ORAL
Qty: 0 | Refills: 0 | COMMUNITY

## 2018-11-02 RX ORDER — FENTANYL CITRATE 50 UG/ML
1 INJECTION INTRAVENOUS
Qty: 0 | Refills: 0 | COMMUNITY
Start: 2018-11-02

## 2018-11-02 RX ORDER — CLONAZEPAM 1 MG
1 TABLET ORAL
Qty: 0 | Refills: 0 | COMMUNITY
Start: 2018-11-02

## 2018-11-02 RX ORDER — OMEPRAZOLE 10 MG/1
1 CAPSULE, DELAYED RELEASE ORAL
Qty: 0 | Refills: 0 | COMMUNITY

## 2018-11-02 RX ORDER — ATORVASTATIN CALCIUM 80 MG/1
1 TABLET, FILM COATED ORAL
Qty: 0 | Refills: 0 | COMMUNITY

## 2018-11-02 RX ORDER — BUDESONIDE, MICRONIZED 100 %
2 POWDER (GRAM) MISCELLANEOUS
Qty: 0 | Refills: 0 | COMMUNITY
Start: 2018-11-02

## 2018-11-02 RX ORDER — POLYETHYLENE GLYCOL 3350 17 G/17G
17 POWDER, FOR SOLUTION ORAL
Qty: 0 | Refills: 0 | COMMUNITY
Start: 2018-11-02

## 2018-11-02 RX ORDER — DILTIAZEM HCL 120 MG
1 CAPSULE, EXT RELEASE 24 HR ORAL
Qty: 0 | Refills: 0 | COMMUNITY

## 2018-11-02 RX ORDER — HYDRALAZINE HCL 50 MG
1 TABLET ORAL
Qty: 0 | Refills: 0 | COMMUNITY
Start: 2018-11-02

## 2018-11-02 RX ORDER — ALBUTEROL 90 UG/1
3 AEROSOL, METERED ORAL
Qty: 0 | Refills: 0 | COMMUNITY

## 2018-11-02 RX ADMIN — Medication 0.25 MILLIGRAM(S): at 05:07

## 2018-11-02 RX ADMIN — PANTOPRAZOLE SODIUM 40 MILLIGRAM(S): 20 TABLET, DELAYED RELEASE ORAL at 07:09

## 2018-11-02 RX ADMIN — SIMETHICONE 80 MILLIGRAM(S): 80 TABLET, CHEWABLE ORAL at 07:08

## 2018-11-02 RX ADMIN — Medication 3 MILLILITER(S): at 11:46

## 2018-11-02 RX ADMIN — Medication 5 MILLIGRAM(S): at 07:09

## 2018-11-02 RX ADMIN — Medication 25 MILLIGRAM(S): at 07:09

## 2018-11-02 RX ADMIN — FENTANYL CITRATE 1 PATCH: 50 INJECTION INTRAVENOUS at 07:45

## 2018-11-02 RX ADMIN — Medication 3 MILLILITER(S): at 05:07

## 2018-11-02 RX ADMIN — Medication 81 MILLIGRAM(S): at 12:04

## 2018-11-02 RX ADMIN — FENTANYL CITRATE 1 PATCH: 50 INJECTION INTRAVENOUS at 07:41

## 2018-11-02 RX ADMIN — Medication 2: at 07:08

## 2018-11-02 RX ADMIN — Medication 3 MILLILITER(S): at 00:20

## 2018-11-02 RX ADMIN — Medication 40 MILLIGRAM(S): at 07:10

## 2018-11-02 NOTE — DISCHARGE NOTE ADULT - MEDICATION SUMMARY - MEDICATIONS TO CHANGE
I will SWITCH the dose or number of times a day I take the medications listed below when I get home from the hospital:    Cardizem  mg/24 hours oral capsule, extended release  -- 1 cap(s) by mouth once a day    warfarin 5 mg oral tablet  -- 1 tab(s) by mouth once a day I will SWITCH the dose or number of times a day I take the medications listed below when I get home from the hospital:    Cardizem  mg/24 hours oral capsule, extended release  -- 1 cap(s) by mouth once a day    omeprazole 20 mg oral delayed release capsule  -- 1 cap(s) by mouth once a day    warfarin 5 mg oral tablet  -- 1 tab(s) by mouth once a day    torsemide 20 mg oral tablet  -- 2 tab(s) by mouth once a day

## 2018-11-02 NOTE — PROGRESS NOTE ADULT - SUBJECTIVE AND OBJECTIVE BOX
Patient is a 84y old  Female who presents with a chief complaint of COPD exacerbation, ADHF.....      Interval Events:    REVIEW OF SYSTEMS:  [ ] Positive  [ ] All other systems negative  [ ] Unable to assess ROS because ________    Vital Signs Last 24 Hrs  T(C): 36.7 (11-02-18 @ 05:08), Max: 36.7 (11-02-18 @ 05:08)  T(F): 98 (11-02-18 @ 05:08), Max: 98 (11-02-18 @ 05:08)  HR: 61 (11-02-18 @ 06:40) (59 - 73)  BP: 100/55 (11-02-18 @ 05:08) (100/55 - 168/69)  RR: 13 (11-02-18 @ 06:10) (13 - 19)  SpO2: 98% (11-02-18 @ 06:40) (95% - 100%)    PHYSICAL EXAM:  HEENT:   [ ]Tracheostomy:  [ ]Pupils equal  [ ]No oral lesions  [ ]Abnormal    SKIN  [ ]No Rash  [ ] Abnormal  [ ] pressure    CARDIAC  [ ]Regular  [ ]Abnormal    PULMONARY  [ ]Bilateral Clear Breath Sounds  [ ]Normal Excursion  [ ]Abnormal    GI  [ ]PEG      [ ] +BS		              [ ]Soft, nondistended, nontender	  [ ]Abnormal    MUSCULOSKELETAL                                   [ ]Bedbound                 [ ]Abnormal    [ ]Ambulatory/OOB to chair                           EXTREMITIES                                         [ ]Normal  [ ]Edema                           NEUROLOGIC  [ ] Normal, non focal  [ ] Focal findings:    PSYCHIATRIC  [ ]Alert and appropriate  [ ] Sedated	 [ ]Agitated    :  Rosales: [ ] Yes, if yes: Date of Placement:                   [  ] No    LINES: Central Lines [ ] Yes, if yes: Date of Placement                                     [  ] No    HOSPITAL MEDICATIONS:  MEDICATIONS  (STANDING):  ALBUTerol/ipratropium for Nebulization 3 milliLiter(s) Nebulizer every 6 hours  aspirin  chewable 81 milliGRAM(s) Oral daily  buDESOnide   0.25 milliGRAM(s) Respule 0.25 milliGRAM(s) Inhalation every 12 hours  dextrose 5%. 1000 milliLiter(s) (50 mL/Hr) IV Continuous <Continuous>  dextrose 50% Injectable 12.5 Gram(s) IV Push once  dextrose 50% Injectable 25 Gram(s) IV Push once  dextrose 50% Injectable 25 Gram(s) IV Push once  diltiazem    Tablet 30 milliGRAM(s) Oral every 6 hours  doxazosin 2 milliGRAM(s) Oral at bedtime  fentaNYL   Patch  25 MICROgram(s)/Hr 1 Patch Transdermal every 72 hours  hydrALAZINE 25 milliGRAM(s) Oral every 8 hours  insulin lispro (HumaLOG) corrective regimen sliding scale   SubCutaneous three times a day before meals  latanoprost 0.005% Ophthalmic Solution 1 Drop(s) Both EYES at bedtime  metoclopramide Injectable 5 milliGRAM(s) IV Push every 8 hours  pantoprazole  Injectable 40 milliGRAM(s) IV Push every 12 hours  polyethylene glycol 3350 17 Gram(s) Oral daily  simethicone 80 milliGRAM(s) Chew every 8 hours  torsemide 40 milliGRAM(s) Oral daily    MEDICATIONS  (PRN):  acetaminophen    Suspension .. 650 milliGRAM(s) Oral every 6 hours PRN Mild Pain (1 - 3)  clonazePAM Tablet 0.5 milliGRAM(s) Oral every 8 hours PRN Anxiety  dextrose 40% Gel 15 Gram(s) Oral once PRN Blood Glucose LESS THAN 70 milliGRAM(s)/deciliter  glucagon  Injectable 1 milliGRAM(s) IntraMuscular once PRN Glucose LESS THAN 70 milligrams/deciliter  melatonin 1 milliGRAM(s) Oral at bedtime PRN Insomnia      LABS:                        8.7    12.8  )-----------( 244      ( 02 Nov 2018 07:12 )             29.1     11-02    139  |  99  |  83<H>  ----------------------------<  144<H>  5.1   |  29  |  1.45<H>    Ca    9.4      02 Nov 2018 07:12      PT/INR - ( 02 Nov 2018 07:12 )   PT: 42.2 sec;   INR: 3.56 ratio         PTT - ( 31 Oct 2018 14:04 )  PTT:52.9 sec        CAPILLARY BLOOD GLUCOSE    MICROBIOLOGY:     RADIOLOGY:  [ ] Reviewed and interpreted by me    Mode: Vent Off

## 2018-11-02 NOTE — PROGRESS NOTE ADULT - ATTENDING COMMENTS
Patient seen and examined.   1. Acute resp failure with hypoxia:  - Wean as tolerated - was able to tolerate 16 hours of TC yesterday. Should continue TC trials with goal to get off vent  - Cont pulm toilet   2. HFpEF/ s/p mech MVR/ AS/ a fib:  - Cont ASA, cardizem and hydralazine  - INR within range and no signs of bleeding. Patient will be discharged to facility with regular INR drawings and Coumadin 5 MWF, 6 TuThSS  - Coumadin dosing likely to be different than prior to admission given change in diet from tube feeds  - Continue diuresis  3. LATANYA:  - Cr stable  - Follow UO/ electrolytes  - Nephrology followup - continue Torsemide  - Repeat BMP 2x weekly upon discharge  4. Anemia:  - Hb stable  5. Agitation:  - Well controlled on Klonopin  6. UTI:  - Ucx  with ESBL E coli  - Completed Ertapenem 10/29  7. Diabetes:  - Continue sliding scale  - Monitor fingersticks  8. Gen:  - D-c planning to vent facility today  - Patient medically stable for discharge. Discharge Time 40 minutes

## 2018-11-02 NOTE — DISCHARGE NOTE ADULT - PLAN OF CARE
Eventual liberation off ventilator Tracheostomy placed 10/3, #6 cuffed shiley. /14/5/30. Continue with weaning and continue to advance trach collar daily as patient tolerates.  Continue with nebulizers and chest PT every shift to promote airway clearance.  Trach placed by surgeon, Dr. Haseeb Russell (see contact info below). PEG placed 10/4.   Continue PEG feeds.  Continue reglan as patient has had gastroparesis in the past which has since resolved.  PEG placed by Dr. Antwon Pisano (see contact below). s/p mechanical mitral valve replacement. Goal INR is 2.5-3.5.  Patient with alternative coumadin dosing. Please check INR in 2-3 days.  Has a history of Aortic stenosis (Not a candidate for TAVR)  Cardiologist, Dr. Maged Denny. See contact below. Echo from 9/14 shows diastolic stage 3.   History of sick sinus syndrome + pacemaker/Afib.   Continue cardizem and torsemide. Monitor cbc weekly.  H/H currently stable. Creatinine has stabilized. Please monitor weekly. Adjust torsemide dosing per renal function.  Nephrologist, Dr. Zach Brower. Seen contact below Continue cardura.   Known to have had urinary retention. Has been voiding on own for past 2 days. Continue to monitor output. s/p mechanical mitral valve replacement. Goal INR is 2.5-3.5.  Patient with alternative coumadin dosing. Please check INR Daily per family request.  Has a history of Aortic stenosis (Not a candidate for TAVR)  Cardiologist, Dr. Maged Denny. See contact below.

## 2018-11-02 NOTE — PROGRESS NOTE ADULT - SUBJECTIVE AND OBJECTIVE BOX
[facility]   [phone]  RENAL FOLLOW UP NOTE  --------------------------------------------------------------------------------  Chief Complaint:    24 hour events/subjective:    seen. trach/vent anxious appearing    PAST HISTORY  --------------------------------------------------------------------------------  No significant changes to PMH, PSH, FHx, SHx, unless otherwise noted    ALLERGIES & MEDICATIONS  --------------------------------------------------------------------------------  Allergies    penicillin (Rash)    Intolerances      Standing Inpatient Medications  ALBUTerol/ipratropium for Nebulization 3 milliLiter(s) Nebulizer every 6 hours  aspirin  chewable 81 milliGRAM(s) Oral daily  buDESOnide   0.25 milliGRAM(s) Respule 0.25 milliGRAM(s) Inhalation every 12 hours  dextrose 5%. 1000 milliLiter(s) IV Continuous <Continuous>  dextrose 50% Injectable 12.5 Gram(s) IV Push once  dextrose 50% Injectable 25 Gram(s) IV Push once  dextrose 50% Injectable 25 Gram(s) IV Push once  diltiazem    Tablet 30 milliGRAM(s) Oral every 6 hours  doxazosin 2 milliGRAM(s) Oral at bedtime  fentaNYL   Patch  25 MICROgram(s)/Hr 1 Patch Transdermal every 72 hours  hydrALAZINE 25 milliGRAM(s) Oral every 8 hours  insulin lispro (HumaLOG) corrective regimen sliding scale   SubCutaneous three times a day before meals  latanoprost 0.005% Ophthalmic Solution 1 Drop(s) Both EYES at bedtime  metoclopramide Injectable 5 milliGRAM(s) IV Push every 8 hours  pantoprazole  Injectable 40 milliGRAM(s) IV Push every 12 hours  polyethylene glycol 3350 17 Gram(s) Oral daily  simethicone 80 milliGRAM(s) Chew every 8 hours  torsemide 40 milliGRAM(s) Oral daily    PRN Inpatient Medications  acetaminophen    Suspension .. 650 milliGRAM(s) Oral every 6 hours PRN  clonazePAM Tablet 0.5 milliGRAM(s) Oral every 8 hours PRN  dextrose 40% Gel 15 Gram(s) Oral once PRN  glucagon  Injectable 1 milliGRAM(s) IntraMuscular once PRN  melatonin 1 milliGRAM(s) Oral at bedtime PRN      REVIEW OF SYSTEMS  --------------------------------------------------------------------------------  trach      VITALS/PHYSICAL EXAM  --------------------------------------------------------------------------------  T(C): 36.7 (11-02-18 @ 05:08), Max: 36.7 (11-02-18 @ 05:08)  HR: 61 (11-02-18 @ 06:40) (59 - 73)  BP: 100/55 (11-02-18 @ 05:08) (100/55 - 168/69)  RR: 13 (11-02-18 @ 06:10) (13 - 19)  SpO2: 98% (11-02-18 @ 06:40) (95% - 100%)  Wt(kg): --        11-01-18 @ 07:01  -  11-02-18 @ 07:00  --------------------------------------------------------  IN: 870 mL / OUT: 0 mL / NET: 870 mL      Physical Exam:  	  Gen: trach   	Pulm: Decreased breath sounds b/l bases. no rales +  ronchi - wheezing  	CV: RRR, S1/S2. no rub  	Abd: +BS, soft, nontender/nondistended  	: No suprapubic tenderness.               Extremity: No cyanosis, 2+ pitting  edema no clubbing    	  LABS/STUDIES  --------------------------------------------------------------------------------              8.2    11.3  >-----------<  232      [11-01-18 @ 07:14]              26.3     138  |  96  |  84  ----------------------------<  139      [11-01-18 @ 07:11]  4.9   |  28  |  1.34        Ca     9.2     [11-01-18 @ 07:11]      PT/INR: PT 50.7 , INR 4.25       [11-01-18 @ 14:42]  PTT: 52.9       [10-31-18 @ 14:04]      Creatinine Trend:  SCr 1.34 [11-01 @ 07:11]  SCr 1.35 [10-31 @ 14:04]  SCr 1.41 [10-30 @ 07:03]  SCr 1.40 [10-29 @ 07:14]  SCr 1.35 [10-28 @ 12:19]              Urinalysis - [10-21-18 @ 17:21]      Color Yellow / Appearance Turbid / SG 1.018 / pH 5.5      Gluc Negative / Ketone Negative  / Bili Negative / Urobili 2 mg/dL       Blood Moderate / Protein 100 mg/dL / Leuk Est Large / Nitrite Negative      RBC 16 /  / Hyaline 0 / Gran  / Sq Epi  / Non Sq Epi 3 / Bacteria Many      PTH -- (Ca 11.1)      [09-28-18 @ 09:27]   67  HbA1c 7.2      [09-22-18 @ 02:44]

## 2018-11-02 NOTE — PROGRESS NOTE ADULT - NSHPATTENDINGPLANDISCUSS_GEN_ALL_CORE
RCU team
RCU team
MICU team
RCU team
MICU team
MICU team.
MICU team
MICU team
team
MICU team
team
RCU team
patient, team, family
RCU team
patient, team

## 2018-11-02 NOTE — DISCHARGE NOTE ADULT - CARE PLAN
Principal Discharge DX:	Respiratory distress  Goal:	Eventual liberation off ventilator  Assessment and plan of treatment:	Tracheostomy placed 10/3, #6 cuffed ju. /14/5/30. Continue with weaning and continue to advance trach collar daily as patient tolerates.  Continue with nebulizers and chest PT every shift to promote airway clearance.  Trach placed by surgeon, Dr. Haseeb Russell (see contact info below).  Secondary Diagnosis:	Dysphagia  Assessment and plan of treatment:	PEG placed 10/4.   Continue PEG feeds.  Continue reglan as patient has had gastroparesis in the past which has since resolved.  PEG placed by Dr. Antwon Pisano (see contact below).  Secondary Diagnosis:	Valvular disease  Assessment and plan of treatment:	s/p mechanical mitral valve replacement. Goal INR is 2.5-3.5.  Patient with alternative coumadin dosing. Please check INR in 2-3 days.  Has a history of Aortic stenosis (Not a candidate for TAVR)  Cardiologist, Dr. Maged Denny. See contact below.  Secondary Diagnosis:	Heart failure with preserved ejection fraction  Assessment and plan of treatment:	Echo from 9/14 shows diastolic stage 3.   History of sick sinus syndrome + pacemaker/Afib.   Continue cardizem and torsemide.  Secondary Diagnosis:	Anemia  Assessment and plan of treatment:	Monitor cbc weekly.  H/H currently stable.  Secondary Diagnosis:	LATANYA (acute kidney injury)  Assessment and plan of treatment:	Creatinine has stabilized. Please monitor weekly. Adjust torsemide dosing per renal function.  Nephrologist, Dr. Zach Brower. Seen contact below  Secondary Diagnosis:	Urinary retention with incomplete bladder emptying  Assessment and plan of treatment:	Continue cardura.   Known to have had urinary retention. Has been voiding on own for past 2 days. Continue to monitor output. Principal Discharge DX:	Respiratory distress  Goal:	Eventual liberation off ventilator  Assessment and plan of treatment:	Tracheostomy placed 10/3, #6 cuffed ju. /14/5/30. Continue with weaning and continue to advance trach collar daily as patient tolerates.  Continue with nebulizers and chest PT every shift to promote airway clearance.  Trach placed by surgeon, Dr. Haseeb Russell (see contact info below).  Secondary Diagnosis:	Dysphagia  Assessment and plan of treatment:	PEG placed 10/4.   Continue PEG feeds.  Continue reglan as patient has had gastroparesis in the past which has since resolved.  PEG placed by Dr. Antwon Pisano (see contact below).  Secondary Diagnosis:	Valvular disease  Assessment and plan of treatment:	s/p mechanical mitral valve replacement. Goal INR is 2.5-3.5.  Patient with alternative coumadin dosing. Please check INR Daily per family request.  Has a history of Aortic stenosis (Not a candidate for TAVR)  Cardiologist, Dr. Maged Denny. See contact below.  Secondary Diagnosis:	Heart failure with preserved ejection fraction  Assessment and plan of treatment:	Echo from 9/14 shows diastolic stage 3.   History of sick sinus syndrome + pacemaker/Afib.   Continue cardizem and torsemide.  Secondary Diagnosis:	Anemia  Assessment and plan of treatment:	Monitor cbc weekly.  H/H currently stable.  Secondary Diagnosis:	LATANYA (acute kidney injury)  Assessment and plan of treatment:	Creatinine has stabilized. Please monitor weekly. Adjust torsemide dosing per renal function.  Nephrologist, Dr. Zach Brower. Seen contact below  Secondary Diagnosis:	Urinary retention with incomplete bladder emptying  Assessment and plan of treatment:	Continue cardura.   Known to have had urinary retention. Has been voiding on own for past 2 days. Continue to monitor output.

## 2018-11-02 NOTE — DISCHARGE NOTE ADULT - MEDICATION SUMMARY - MEDICATIONS TO TAKE
I will START or STAY ON the medications listed below when I get home from the hospital:    budesonide 0.25 mg/2 mL inhalation suspension  -- 2 milliliter(s) inhaled every 12 hours  -- Indication: For COPD    aspirin 81 mg oral tablet, chewable  -- 1 tab(s) by gastrostomy tube once a day  -- Indication: For Coronary artery disease    fentaNYL 25 mcg/hr transdermal film, extended release  -- 1 patch by transdermal patch every 72 hours  -- Indication: For Pain management    doxazosin 2 mg oral tablet  -- 1 tab(s) by gastrostomy tube once a day (at bedtime)  -- Indication: For Urinary retention with incomplete bladder emptying    dilTIAZem 30 mg oral tablet  -- 1 tab(s) by gastrostomy tube every 6 hours  -- Indication: For Hypertension    warfarin 5 mg oral tablet  -- 1 tab(s) by gastrostomy tube Monday, Wednesday, and Friday  Give at bedtime  -- Indication: For mechanical valve    warfarin 6 mg oral tablet  -- 1 tab(s) by mouth Tuesday, Thursday, Saturday, Sunday  Give at bedtime  -- Indication: For mechanical valve    clonazePAM 0.5 mg oral tablet  -- 1 tab(s) by gastrostomy tube every 8 hours, As Needed  -- Indication: For Anxiety    HumaLOG 100 units/mL subcutaneous solution  -- 2 Unit(s) if Glucose 151 - 200  4 Unit(s) if Glucose 201 - 250  6 Unit(s) if Glucose 251 - 300  8 Unit(s) if Glucose 301 - 350  10 Unit(s) if Glucose 351 - 400  12 Unit(s) if Glucose Greater Than 400  Every 6 hours based on fingerstick    -- Indication: For Diabetes    Reglan 5 mg oral tablet  -- 5 milligram(s) by gastrostomy tube every 8 hours  -- Indication: For gastroparesis    ipratropium-albuterol 0.5 mg-2.5 mg/3 mLinhalation solution  -- 3 milliliter(s) inhaled every 6 hours  -- Indication: For COPD    torsemide 20 mg oral tablet  -- 2 tab(s) by gastrostomy tube once a day  -- Indication: For Heart failure with preserved ejection fraction    polyethylene glycol 3350 oral powder for reconstitution  -- 17 gram(s) by mouth once a day  -- Indication: For Constipation    simethicone 80 mg oral tablet, chewable  -- 1 tab(s) by mouth every 8 hours  -- Indication: For flatulence    latanoprost 0.005% ophthalmic solution  -- 1 drop(s) to each affected eye once a day (at bedtime)  -- Indication: For glaucoma    Protonix 40 mg oral granule, delayed release  -- 1 tab(s) by gastrostomy tube once a day  -- Indication: For gerd    hydrALAZINE 25 mg oral tablet  -- 1 tab(s) by gastrostomy tube every 8 hours  -- Indication: For Hypertension I will START or STAY ON the medications listed below when I get home from the hospital:    budesonide 0.25 mg/2 mL inhalation suspension  -- 2 milliliter(s) inhaled every 12 hours  -- Indication: For Copd    aspirin 81 mg oral tablet, chewable  -- 1 tab(s) by gastrostomy tube once a day  -- Indication: For Coronary artery disease    fentaNYL 25 mcg/hr transdermal film, extended release  -- 1 patch by transdermal patch every 72 hours  -- Indication: For Pain control    doxazosin 2 mg oral tablet  -- 1 tab(s) by gastrostomy tube once a day (at bedtime)  -- Indication: For Urinary retention with incomplete bladder emptying    dilTIAZem 30 mg oral tablet  -- 1 tab(s) by gastrostomy tube every 6 hours  -- Indication: For Atrial fibrillation    warfarin 5 mg oral tablet  -- 1 tab(s) by gastrostomy tube Monday, Wednesday, and Friday  Give at bedtime  -- Indication: For Mechanical Valve replacement    warfarin 6 mg oral tablet  -- 1 tab(s) by mouth Tuesday, Thursday, Saturday, Sunday  Give at bedtime  -- Indication: For mechanical valve replacement    clonazePAM 0.5 mg oral tablet  -- 1 tab(s) by gastrostomy tube every 8 hours, As Needed  -- Indication: For Anxiety    HumaLOG 100 units/mL subcutaneous solution  -- 2 Unit(s) if Glucose 151 - 200  4 Unit(s) if Glucose 201 - 250  6 Unit(s) if Glucose 251 - 300  8 Unit(s) if Glucose 301 - 350  10 Unit(s) if Glucose 351 - 400  12 Unit(s) if Glucose Greater Than 400  Every 6 hours based on fingerstick    -- Indication: For Diabetes    Reglan 5 mg oral tablet  -- 5 milligram(s) by gastrostomy tube every 8 hours  -- Indication: For Gastroparesis    ipratropium-albuterol 0.5 mg-2.5 mg/3 mLinhalation solution  -- 3 milliliter(s) inhaled every 6 hours  -- Indication: For COPD    torsemide 20 mg oral tablet  -- 1 tab(s) by gastrostomy tube once a day  -- Indication: For Heart failure with preserved ejection fraction    polyethylene glycol 3350 oral powder for reconstitution  -- 17 gram(s) by mouth once a day  -- Indication: For Constipation    simethicone 80 mg oral tablet, chewable  -- 1 tab(s) by mouth every 8 hours  -- Indication: For Flatulence    latanoprost 0.005% ophthalmic solution  -- 1 drop(s) to each affected eye once a day (at bedtime)  -- Indication: For glaucoma    Protonix 40 mg oral granule, delayed release  -- 1 tab(s) by gastrostomy tube once a day  -- Indication: For GERD    hydrALAZINE 25 mg oral tablet  -- 1 tab(s) by gastrostomy tube every 8 hours  -- Indication: For Hypertension

## 2018-11-02 NOTE — PROGRESS NOTE ADULT - ASSESSMENT
84  Year old Female with PMH significant for COPD, JENNIE on BiPAP at home , multivalvular disease (severe AS Not a candidate for TAVR, S/p MV replacement), HFpEF/ Severe diastolic failure, A-fib on coumadin, sick sinus syndrome S/p pacemaker placement, HTN, and CKD3 who was admitted for acute respiratory failure requiring intubation suspected to be 2/2 COPD exacerbation c/b PNA w/ +entero/rhinovirus and GN coccobacillus and H. influenza bacteremia, requiring continued respiratory support and tracheostomy. Patient S/p Trach placement 10/3 and PEG Placement 10/4. pt required diuretics. in addition also developed UTI as well     1- LATANYA   2- chf  3- UTI completed abx  4- htn     suspect LATANYA cr improving  torsemide 40 mg qd may need to decrease dose soon keep O>I  cardizem and hydralazine to cont    trend hb

## 2018-11-02 NOTE — PROGRESS NOTE ADULT - REASON FOR ADMISSION
COPD exacerbation, ADHF

## 2018-11-02 NOTE — DISCHARGE NOTE ADULT - PATIENT PORTAL LINK FT
You can access the [facility] Patient Portal, offered by [facility], by registering with the following website: [email or web address]

## 2018-11-02 NOTE — DISCHARGE NOTE ADULT - SECONDARY DIAGNOSIS.
Dysphagia Valvular disease Heart failure with preserved ejection fraction Anemia LATANYA (acute kidney injury) Urinary retention with incomplete bladder emptying

## 2018-11-02 NOTE — PROGRESS NOTE ADULT - PROVIDER SPECIALTY LIST ADULT
Cardiology
Critical Care
Gastroenterology
MICU
Nephrology
Pulmonology
Surgery
Surgery
Urology
Urology
MICU
Pulmonology
MICU
Pulmonology

## 2018-11-02 NOTE — DISCHARGE NOTE ADULT - HOSPITAL COURSE
84  Year old Female with PMH significant for COPD, JENNIE on BiPAP at home , multivalvular disease (severe AS Not a candidate for TAVR, S/p MV replacement), HFpEF/ Severe diastolic failure, A-fib on coumadin, sick sinus syndrome S/p pacemaker placement, HTN, and CKD3 who was admitted for acute respiratory failure requiring intubation suspected to be 2/2 COPD exacerbation c/b PNA w/ +entero/rhinovirus and GN coccobacillus and H. influenza bacteremia, requiring continued respiratory support and tracheostomy. Patient S/p Trach placement 10/3 and PEG Placement 10/4.     10/7: Patient remains with abdominal distention today but tolerating trickle feeds, ABD X-ray  performed this morning patient remains with air filled loops of small and large bowel. GI fellow called to re-eval the patient this morning, X-ray  reviewed slightly worsened compared to yesterday. GI fellow recommended to place patient in Left lateral decubitus position and oob to chair later today. Patient with large amount of gas expressed with turning as Per RN. As per GI no role for rectal tube at this time and can continue to advance feeds as tolerated to promote gastric motility. H+H Has remained stable case d/w  will restart coumadin this evening. Pt with elevated bp will increase Hydralazine 50 mg q 8 hr   10/8-Abdominal distention noted with hypoactive bowel sounds, Kub noted from this weekend. Daily bowel movements noted. Will repeat PTT as there were 2 therapeutics prior on same dosing, also repeat CBC as well. Will transfuse if remains low. RCU weaning  10/10: 4 dark BMs reported night of 10/9, concerning for melena,  hgb 6.9 in AM,  1U RBC given . Goal heparin reduced to 50-70. GI consulted due to GIB Hx with known AV malformations. EGD 10/4: Gastric Erythema, No evidence of Active bleeding. 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   No intvn.  10/11: H/H stable post 1U with no furthers melena or intvn from GI. Resumed coumadin.  10/12: agitated this am. seroquel not working changed back to klonopin  10/13 No events overnight. Continues on heparin gtt/coumadin bridge. INR 1.45, coumadin 7.5 mg x1. Continue with PS trials as tolerated.   10/14 Heparin gtt d/c, INR 2.82, coumadin 1mg x1. Continue with PS trials as tolerated. LE edema, lasix 20 mg po x1. Hg 7.7 stable.   10/15:  INR 2.3, reduced coumadin to 5mg  10/16: INR 1.8, resumed heparin, coumadin increased to 7.5mg tonight. Tolerated trach collar for 40mins  10/21 INR 4.97. Coumadin held. + cloudy urine per RN, increased WBC, will send UA and obtain CXR .   10/22: hgb 6.8, transfused 1U RBC.   10/23: Started renal dose Aztreonam for UTI (10/22 UCx +E.Coli). Restarted heparin infusion for low INR.  10/24: Aztreonam changed to Ertapenem as ecoli is ESBL. Will treat for 5 days.  10/27: Resumed klonopin 1gm Q8H prn. minimal trach bleeding, will continue with heparin and coumadin. Resumed torsemide 30mg.  10/28: Pt vomited overnight and abd distention. Reglan started with resolution.  10/29- 11/2: Patient remains stable. INR at goal rate of 3.5. 84  Year old Female with PMH significant for COPD, JENNIE on BiPAP at home , multivalvular disease (severe AS Not a candidate for TAVR, S/p MV replacement), HFpEF/ Severe diastolic failure, A-fib on coumadin, sick sinus syndrome S/p pacemaker placement, HTN, and CKD3 who was admitted for acute respiratory failure requiring intubation suspected to be 2/2 COPD exacerbation c/b PNA w/ +entero/rhinovirus and GN coccobacillus and H. influenza bacteremia, requiring continued respiratory support and tracheostomy. Patient S/p Trach placement 10/3 and PEG Placement 10/4.     10/7: Patient remains with abdominal distention today but tolerating trickle feeds, ABD X-ray  performed this morning patient remains with air filled loops of small and large bowel. GI fellow called to re-eval the patient this morning, X-ray  reviewed slightly worsened compared to yesterday. GI fellow recommended to place patient in Left lateral decubitus position and oob to chair later today. Patient with large amount of gas expressed with turning as Per RN. As per GI no role for rectal tube at this time and can continue to advance feeds as tolerated to promote gastric motility. H+H Has remained stable case d/w  will restart coumadin this evening. Pt with elevated bp will increase Hydralazine 50 mg q 8 hr   10/8-Abdominal distention noted with hypoactive bowel sounds, Kub noted from this weekend. Daily bowel movements noted. Will repeat PTT as there were 2 therapeutics prior on same dosing, also repeat CBC as well. Will transfuse if remains low. RCU weaning  10/10: 4 dark BMs reported night of 10/9, concerning for melena,  hgb 6.9 in AM,  1U RBC given . Goal heparin reduced to 50-70. GI consulted due to GIB Hx with known AV malformations. EGD 10/4: Gastric Erythema, No evidence of Active bleeding. 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   No intvn.  10/11: H/H stable post 1U with no furthers melena or intvn from GI. Resumed coumadin.  10/12: agitated. seroquel not working changed back to klonopin.  10/13 No events overnight. Continues on heparin gtt/coumadin bridge. INR 1.45, coumadin 7.5 mg x1. Continue with PS trials as tolerated.   10/14 Heparin gtt d/c, INR 2.82, coumadin 1mg x1. Continue with PS trials as tolerated. LE edema, lasix 20 mg po x1. Hg 7.7 stable.   10/15:  INR 2.3, reduced coumadin to 5mg  10/16: INR 1.8, resumed heparin, coumadin increased to 7.5mg tonight. Tolerated trach collar for 40mins  10/21 INR 4.97. Coumadin held. + cloudy urine per RN, increased WBC, will send UA.   10/22: hgb 6.8, transfused 1U RBC.   10/23: Started renal dose Aztreonam for UTI (10/22 UCx +E.Coli). Restarted heparin infusion for low INR.  10/24: Aztreonam changed to Ertapenem as ecoli is ESBL. Will treat for 5 days.  10/27: Resumed klonopin 1gm Q8H prn. minimal trach bleeding, will continue with heparin and coumadin. Resumed torsemide 30mg.  10/28: Pt vomited overnight and abd distention. Reglan started with resolution.  10/29- 11/2: Patient remains stable. INR at goal rate of 3.5.

## 2018-11-02 NOTE — DISCHARGE NOTE ADULT - MEDICATION SUMMARY - MEDICATIONS TO STOP TAKING
I will STOP taking the medications listed below when I get home from the hospital:    Advair Diskus 500 mcg-50 mcg inhalation powder  -- 1 puff(s) inhaled 2 times a day    albuterol 2.5 mg/3 mL (0.083%) inhalation solution  -- 3 milliliter(s) inhaled every 6 hours    aspirin 81 mg oral delayed release tablet  -- 1 tab(s) by mouth once a day    Lipitor 20 mg oral tablet  -- 1 tab(s) by mouth once a day    Colcrys 0.6 mg oral tablet  -- 1 tab(s) by mouth once a day    omeprazole 20 mg oral delayed release capsule  -- 1 cap(s) by mouth once a day    sotalol 120 mg oral tablet  -- 1 tab(s) by mouth once a day    Spiriva 18 mcg inhalation capsule  -- 1 cap(s) inhaled once a day I will STOP taking the medications listed below when I get home from the hospital:    Advair Diskus 500 mcg-50 mcg inhalation powder  -- 1 puff(s) inhaled 2 times a day    albuterol 2.5 mg/3 mL (0.083%) inhalation solution  -- 3 milliliter(s) inhaled every 6 hours    aspirin 81 mg oral delayed release tablet  -- 1 tab(s) by mouth once a day    Lipitor 20 mg oral tablet  -- 1 tab(s) by mouth once a day    Colcrys 0.6 mg oral tablet  -- 1 tab(s) by mouth once a day    sotalol 120 mg oral tablet  -- 1 tab(s) by mouth once a day    Spiriva 18 mcg inhalation capsule  -- 1 cap(s) inhaled once a day

## 2018-11-02 NOTE — DISCHARGE NOTE ADULT - CARE PROVIDERS DIRECT ADDRESSES
,DirectAddress_Unknown,[email or web address]

## 2018-11-02 NOTE — DISCHARGE NOTE ADULT - CARE PROVIDER_API CALL
Zach Brower (DO), Nephrology  [address]  [place] [address]  Phone: [phone]  Fax: [phone]    Antwon Pisano), Gastroenterology; Internal Medicine  [address] [place] [address]  Phone: [phone]  Fax: [phone]    Haseeb Russell), Surgery; Surgical Critical Care  [address] [place] [address]  Phone: [phone]  Fax: [phone]    Maged Denny (DO), Internal Medicine; Nuclear Cardiology  [address] [place] [address]  Phone: [phone]  Fax: [phone]

## 2018-11-11 PROCEDURE — 81001 URINALYSIS AUTO W/SCOPE: CPT

## 2018-11-11 PROCEDURE — 94002 VENT MGMT INPAT INIT DAY: CPT

## 2018-11-11 PROCEDURE — 83690 ASSAY OF LIPASE: CPT

## 2018-11-11 PROCEDURE — 93306 TTE W/DOPPLER COMPLETE: CPT

## 2018-11-11 PROCEDURE — 97530 THERAPEUTIC ACTIVITIES: CPT

## 2018-11-11 PROCEDURE — 85014 HEMATOCRIT: CPT

## 2018-11-11 PROCEDURE — 94640 AIRWAY INHALATION TREATMENT: CPT

## 2018-11-11 PROCEDURE — 74018 RADEX ABDOMEN 1 VIEW: CPT

## 2018-11-11 PROCEDURE — 87581 M.PNEUMON DNA AMP PROBE: CPT

## 2018-11-11 PROCEDURE — 80048 BASIC METABOLIC PNL TOTAL CA: CPT

## 2018-11-11 PROCEDURE — 82272 OCCULT BLD FECES 1-3 TESTS: CPT

## 2018-11-11 PROCEDURE — 97535 SELF CARE MNGMENT TRAINING: CPT

## 2018-11-11 PROCEDURE — 84484 ASSAY OF TROPONIN QUANT: CPT

## 2018-11-11 PROCEDURE — 97166 OT EVAL MOD COMPLEX 45 MIN: CPT

## 2018-11-11 PROCEDURE — 87486 CHLMYD PNEUM DNA AMP PROBE: CPT

## 2018-11-11 PROCEDURE — 82565 ASSAY OF CREATININE: CPT

## 2018-11-11 PROCEDURE — P9040: CPT

## 2018-11-11 PROCEDURE — 97162 PT EVAL MOD COMPLEX 30 MIN: CPT

## 2018-11-11 PROCEDURE — 87798 DETECT AGENT NOS DNA AMP: CPT

## 2018-11-11 PROCEDURE — 85027 COMPLETE CBC AUTOMATED: CPT

## 2018-11-11 PROCEDURE — 87150 DNA/RNA AMPLIFIED PROBE: CPT

## 2018-11-11 PROCEDURE — 80202 ASSAY OF VANCOMYCIN: CPT

## 2018-11-11 PROCEDURE — 84145 PROCALCITONIN (PCT): CPT

## 2018-11-11 PROCEDURE — 96374 THER/PROPH/DIAG INJ IV PUSH: CPT | Mod: XU

## 2018-11-11 PROCEDURE — 76770 US EXAM ABDO BACK WALL COMP: CPT

## 2018-11-11 PROCEDURE — 86901 BLOOD TYPING SEROLOGIC RH(D): CPT

## 2018-11-11 PROCEDURE — 86900 BLOOD TYPING SEROLOGIC ABO: CPT

## 2018-11-11 PROCEDURE — 96375 TX/PRO/DX INJ NEW DRUG ADDON: CPT | Mod: XU

## 2018-11-11 PROCEDURE — 93005 ELECTROCARDIOGRAM TRACING: CPT | Mod: XU

## 2018-11-11 PROCEDURE — 82330 ASSAY OF CALCIUM: CPT

## 2018-11-11 PROCEDURE — 84132 ASSAY OF SERUM POTASSIUM: CPT

## 2018-11-11 PROCEDURE — 82533 TOTAL CORTISOL: CPT

## 2018-11-11 PROCEDURE — 87070 CULTURE OTHR SPECIMN AEROBIC: CPT

## 2018-11-11 PROCEDURE — 71045 X-RAY EXAM CHEST 1 VIEW: CPT

## 2018-11-11 PROCEDURE — 82553 CREATINE MB FRACTION: CPT

## 2018-11-11 PROCEDURE — 85384 FIBRINOGEN ACTIVITY: CPT

## 2018-11-11 PROCEDURE — 83735 ASSAY OF MAGNESIUM: CPT

## 2018-11-11 PROCEDURE — 83605 ASSAY OF LACTIC ACID: CPT

## 2018-11-11 PROCEDURE — 83970 ASSAY OF PARATHORMONE: CPT

## 2018-11-11 PROCEDURE — 80053 COMPREHEN METABOLIC PANEL: CPT

## 2018-11-11 PROCEDURE — 82803 BLOOD GASES ANY COMBINATION: CPT

## 2018-11-11 PROCEDURE — 94660 CPAP INITIATION&MGMT: CPT

## 2018-11-11 PROCEDURE — 31500 INSERT EMERGENCY AIRWAY: CPT

## 2018-11-11 PROCEDURE — 87186 SC STD MICRODIL/AGAR DIL: CPT

## 2018-11-11 PROCEDURE — 74176 CT ABD & PELVIS W/O CONTRAST: CPT

## 2018-11-11 PROCEDURE — 36430 TRANSFUSION BLD/BLD COMPNT: CPT

## 2018-11-11 PROCEDURE — 82550 ASSAY OF CK (CPK): CPT

## 2018-11-11 PROCEDURE — 86850 RBC ANTIBODY SCREEN: CPT

## 2018-11-11 PROCEDURE — 87449 NOS EACH ORGANISM AG IA: CPT

## 2018-11-11 PROCEDURE — 85610 PROTHROMBIN TIME: CPT

## 2018-11-11 PROCEDURE — 99291 CRITICAL CARE FIRST HOUR: CPT | Mod: 25

## 2018-11-11 PROCEDURE — 85379 FIBRIN DEGRADATION QUANT: CPT

## 2018-11-11 PROCEDURE — 83880 ASSAY OF NATRIURETIC PEPTIDE: CPT

## 2018-11-11 PROCEDURE — 82947 ASSAY GLUCOSE BLOOD QUANT: CPT

## 2018-11-11 PROCEDURE — 86923 COMPATIBILITY TEST ELECTRIC: CPT

## 2018-11-11 PROCEDURE — 94799 UNLISTED PULMONARY SVC/PX: CPT

## 2018-11-11 PROCEDURE — 87040 BLOOD CULTURE FOR BACTERIA: CPT

## 2018-11-11 PROCEDURE — 92610 EVALUATE SWALLOWING FUNCTION: CPT

## 2018-11-11 PROCEDURE — 99221 1ST HOSP IP/OBS SF/LOW 40: CPT

## 2018-11-11 PROCEDURE — 85730 THROMBOPLASTIN TIME PARTIAL: CPT

## 2018-11-11 PROCEDURE — 87086 URINE CULTURE/COLONY COUNT: CPT

## 2018-11-11 PROCEDURE — 82962 GLUCOSE BLOOD TEST: CPT

## 2018-11-11 PROCEDURE — L8699: CPT

## 2018-11-11 PROCEDURE — 82310 ASSAY OF CALCIUM: CPT

## 2018-11-11 PROCEDURE — 94003 VENT MGMT INPAT SUBQ DAY: CPT

## 2018-11-11 PROCEDURE — P9016: CPT

## 2018-11-11 PROCEDURE — 87184 SC STD DISK METHOD PER PLATE: CPT

## 2018-11-11 PROCEDURE — 97110 THERAPEUTIC EXERCISES: CPT

## 2018-11-11 PROCEDURE — 84295 ASSAY OF SERUM SODIUM: CPT

## 2018-11-11 PROCEDURE — 83036 HEMOGLOBIN GLYCOSYLATED A1C: CPT

## 2018-11-11 PROCEDURE — 87633 RESP VIRUS 12-25 TARGETS: CPT

## 2018-11-11 PROCEDURE — 82435 ASSAY OF BLOOD CHLORIDE: CPT

## 2018-11-11 PROCEDURE — 84550 ASSAY OF BLOOD/URIC ACID: CPT

## 2018-11-11 PROCEDURE — 84100 ASSAY OF PHOSPHORUS: CPT

## 2018-11-12 NOTE — PROGRESS NOTE ADULT - ASSESSMENT
ENDORSED PT TO THE [facility] SHIFT NURSE AT BEDSIDE FOR CONTINUITY OF CARE. PT IS IN STABLE 
CONDITION. 84  Year old Female with PMH significant for COPD, JENNIE on BiPAP at home , multivalvular disease (severe AS Not a candidate for TAVR, S/p MV replacement), HFpEF/ Severe diastolic failure, A-fib on coumadin, sick sinus syndrome S/p pacemaker placement, HTN, and CKD3 who was admitted for acute respiratory failure requiring intubation suspected to be 2/2 COPD exacerbation c/b PNA w/ +entero/rhinovirus and GN coccobacillus and H. influenza bacteremia, requiring continued respiratory support and tracheostomy. Patient S/p Trach placement 10/3 and PEG Placement 10/4.     10/7: Patient remains with abdominal distention today but tolerating trickle feeds, ABD X-ray  performed this morning patient remains with air filled loops of small and large bowel. GI fellow called to re-eval the patient this morning, X-ray  reviewed slightly worsened compared to yesterday. GI fellow recommended to place patient in Left lateral decubitus position and oob to chair later today. Patient with large amount of gas expressed with turning as Per RN. As per GI no role for rectal tube at this time and can continue to advance feeds as tolerated to promote gastric motility. H+H Has remained stable case d/w  will restart coumadin this evening. Pt with elevated bp will increase Hydralazine 50 mg q 8 hr   10/8-Abdominal distention noted with hypoactive bowel sounds, Kub noted from this weekend. Daily bowel movements noted. Will repeat PTT as there were 2 therapeutics prior on same dosing, also repeat CBC as well. Will transfuse if remains low. RCU weaning  10/10: 4 dark BMs reported night of 10/9, concerning for melena,  hgb 6.9 in AM,  1U RBC given . Goal heparin reduced to 50-70. GI consulted due to GIB Hx with known AV malformations. No intvn.  10/11: H/H stable post 1U with no furthers melena or intvn from GI. Resumed coumadin.  10/12: agitated this am. seroquel not working changed back to klonopin  10/13 No events overnight. Continues on heparin gtt/coumadin bridge. INR 1.45, coumadin 7.5 mg x1. Continue with PS trials as tolerated.   10/14 Heparin gtt d/c, INR 2.82, coumadin 1mg x1. Continue with PS trials as tolerated. LE edema, lasix 20 mg po x1. Hg 7.7 stable.   10/15:  INR 2.3, reduced coumadin to 5mg  10/16: INR 1.8, resumed heparin, coumadin increased to 7.5mg tonight. Tolerated trach collar for 40mins  10/18: Await INR results today.

## 2018-12-27 NOTE — ED ADULT NURSE NOTE - RESPIRATION RHYTHM, QM
Discharge Note -Hand Therapy    Initial Evaluation Date:  8/9/2018  Current Date: 12/27/2018  Number of Visits: 3    S: Dr. Mckenzie strongly suggested that patient continue to follow up with hand therapy due to improvement in elbow pain. However, patient has not returned to therapy since 9/13/2018.   Pt did not follow up for scheduled visits and did not respond to telephone calls to reschedule appointments.   Call to pt revealed that symptoms are resolving and they would like to continue independently.    O:  Objective information is not available as pt has not returned for therapy.  Last visit or last objective information will serve as final entry.      A: Pt did not return for further treatment.    Status of goals is unknown due to lack of followup of patient.  Patient's symptoms are resolving.     P:  Discharge from Hand Therapy; continue home program.         hyperpnea (hyperventilation)/tachypneic

## 2019-05-21 NOTE — ED ADULT NURSE REASSESSMENT NOTE - NS ED NURSE REASSESS COMMENT FT1
Form was completed and faxed back to [place] [employer] [phone].  Original mailed to pt and copy sent to scan.  Verito Velasquze CMA      Report to MORIAH Johnson in MICU, RN migdalia send down MD Avi Varela for transport

## 2020-10-19 NOTE — PROGRESS NOTE ADULT - ATTENDING COMMENTS
I have seen and examined the patient. I agree with the above history, physical exam, and plan of care except for as detailed below.    Wean from vent as tolerated  Continue coumadin and heparin for mechanical valve despite bloody secretions  Pain control for abdominal pain  Continue Klonopin  Free water restriction for hyponatremia  LATANYA stable, trend cr, avoid nephrotoxins 97

## 2021-05-04 NOTE — PROGRESS NOTE ADULT - PROBLEM SELECTOR PLAN 7
Continue Klonopin No signs of agitation  Continue Fentanyl patch [FreeTextEntry1] : Patient is a 21-year-old male with history of type I diabetes, diagnosed in 2015.  He is here for follow up visit. \par He also has celiac antibodies, avoids glutens.  \par \par He just graduated from [employer].  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  States that glucose has been fluctuating due to recent stress of finals as well as the recent move back to [place].\par \par He had an episode of DKA in Oct 2018 due to gastroenteritis.  Resolved within 24 hours with fluid and insulin treatment.  \par \par He was diagnosed in May 2015. He was found to have ketones on routine exam and to be in DKA and was hospitalized.\par \par He has seen eye doctor last year does not have retinopathy or neuropathy.\par \par He has microalbuminuria and he is currently on lisinopril 2.5mg daily.  His last microalbumin was checked in November 2019 and found to be within normal limits.\par \par He uses omni pod and FreeStyle jasper sensor, is considering T. Slim and Dexcom.  Versus OmniPod–with Dexcom running on the [employer] closed-loop system.\par \par He is up-to-date with his podiatrist seen within this year.  He is up-to-date with his ophthalmologist, also seen within this year.\par \par Regards to his celiac disease, he avoid gluten in his diet most of the time.  He denies any diarrhea.  Following up with gastroenterologist.

## 2021-08-16 NOTE — PROGRESS NOTE ADULT - PROBLEM SELECTOR PLAN 8
[Subsequent Evaluation] : a subsequent evaluation for [FreeTextEntry2] : 42yo woman with VM and CSD/anxiety, good control with topamax 75mg and xanax prn.much improved since last visit, no sx. f/u 6 mos.  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   Stool Occult 10/6: Positive, No reports of Melena / [employer]   10/10: 4 dark BMs reported night of 10/9, concerning for melena,  hgb 6.9 in AM, s/p 1U RBC. Goal heparin reduced to 50-70. Transfuse to hgb >7  Per GI: no intvn, PPI BID

## 2022-11-29 NOTE — CONSULT NOTE ADULT - CONSULT REASON
tracheostomy Metronidazole Counseling:  I discussed with the patient the risks of metronidazole including but not limited to seizures, nausea/vomiting, a metallic taste in the mouth, nausea/vomiting and severe allergy.

## 2023-05-31 NOTE — ED ADULT NURSE NOTE - CHPI ED NUR QUALITY2
productive cough Posterior Auricular Interpolation Flap Text: A decision was made to reconstruct the defect utilizing an interpolation axial flap and a staged reconstruction.  A telfa template was made of the defect.  This telfa template was then used to outline the posterior auricular interpolation flap.  The donor area for the pedicle flap was then injected with anesthesia.  The flap was excised through the skin and subcutaneous tissue down to the layer of the underlying musculature.  The pedicle flap was carefully excised within this deep plane to maintain its blood supply.  The edges of the donor site were undermined.   The donor site was closed in a primary fashion.  The pedicle was then rotated into position and sutured.  Once the tube was sutured into place, adequate blood supply was confirmed with blanching and refill.  The pedicle was then wrapped with xeroform gauze and dressed appropriately with a telfa and gauze bandage to ensure continued blood supply and protect the attached pedicle.

## 2023-07-26 NOTE — PHYSICAL THERAPY INITIAL EVALUATION ADULT - ADDITIONAL COMMENTS
2 Pt lives with her son, daughter in law and grandchildren in a house with 1 steps to enter and 13 steps inside, +HR. Pt can stay on the 1st floor as there is a bedroom on the main level. Pt's family available to assist when needed. Pt was independent with all ADLs and ambulation. Pt does not uses a AD for ambulation but owns RW. Pt wears eyeglasses.

## 2024-03-07 NOTE — PROGRESS NOTE ADULT - ASSESSMENT
removed. 83 y/o F w/ a PMH significant for COPD, JENNIE on BiPAP, multivalvular disease (severe AS, s/p MV replacement), HFpEF/severe diastolic failure, A-fib on coumadin, sick sinus syndrome s/p pacemaker placement, HTN, and CKD3 who admitted for acute respiratory failure requiring intubation suspected to be 2/2 COPD exacerbation c/b PNA w/ +entero/rhinovirus and GN coccobacillus and H. influenza bacteremia.    #Neuro  -awake and alert  -no sedation  -pt feels restless  -will start pt on seroquel and d/c clonazepam    #CV  -admitted w/ sBP 200, since requiring pressure support 2/2 sepsis, pt placed on hydralazine, required cardene drip  -pt currently does not require norepi  -HFpEF (40-50%) w/ severe diastolic failure  -multivalvular disease: MV replacement, severe AS  -c/w heparin and ASA 81mg daily, full anticoagulation for the MV - currently therapeutic levels of aPTT and will bridge to coumadin and start 5mg coumadin today if continues to be therapeutic  -pt has increase of BP  -increased hydralazine dose to 50mg Q8  -add diltiazem 30mg q6  -continue to monitor BP    #Resp  - Intubated on 9/14; extubated on 9/17. Re-intubated on 9/19 for increased secretions and WOB and extubated 9/25  - initial CXR w/ pulmonary edema vs overlying PNA  - Enterovirus positive on RVP, now w/ GN coccobacillus positive, possible bacteremia from overlying PNA  - + H. flu PNA  - c/w cefepime  - c/w prednisone 20mg daily  - c/w nebs  - c/w chest PT  - c/w BiPAP at night  - pt currently saturating well on NC  - will continue to monitor and assess for secretions and WOB    #GI  -pt has diffuse abdominal tenderness  -will check lipase  -c/w tube feeds until pt is more stabilized and gets speech/swallow eval, will do bedside trial today  -c/w bowel regimen  -d/c PPI ppx    #Renal  -LATANYA, likely 2/2 sepsis + HTN urgency; resolved with Cr at baseline  -pt was hypernatremic and will start 200mL free water Q12  -will continue to monitor I&O's    #ID  - +entero/rhinovirus  - GN coccobacillus (H. influenzae) on 9/14 blood culture  - UClx (9/14): NGTD  - Sputum Clx (9/14): No organisms, repeat combicath (9/21): normal respiratory chastity  - repeat blood cx from 9/17 and 9/21: NGTD  - legionella negative  - c/w cefepime for full 2 week course since blood culture 9/17 (until 10/1)    #Heme:  -DIC panel negative  -LFTs no longer mildly elevated  - Hb drop from 10 to 8 (now stable); CT A/P was negative for acute bleeding  - bridge patient to coumadin for MV replacement and afib.    #Endo:  - C/w NPH    #Ophtho  -c/w home latanoprost drops for glaucoma    #Dispo  - will continue with PT 85 y/o F w/ a PMH significant for COPD, JENNIE on BiPAP, multivalvular disease (severe AS, s/p MV replacement), HFpEF/severe diastolic failure, A-fib on coumadin, sick sinus syndrome s/p pacemaker placement, HTN, and CKD3 who admitted for acute respiratory failure requiring intubation suspected to be 2/2 COPD exacerbation c/b PNA w/ +entero/rhinovirus and GN coccobacillus and H. influenza bacteremia.    #Neuro  -awake and mildly agitated  -no sedation  -pt feels restless  -will d/c seroquel for now as pt continued to feel restless s/p seroquel    #CV  -admitted w/ sBP 200, since requiring pressure support 2/2 sepsis, pt placed on hydralazine, required cardene drip  -pt currently does not require norepi  -HFpEF (40-50%) w/ severe diastolic failure  -multivalvular disease: MV replacement, severe AS  -held heparin and coumadin while assessing for active bleed with decrease in H&H.  -pt continues to have elevated BP  -c/w hydralazine dose to 50mg Q8  -titrate diltiazem to 60mg q6  -continue to monitor BP    #Resp  -+enterovirus on RVP with +H.influenza bacteremia  -currently extubated since 9/25,   -repeat CXR yesterday revealed new infiltrates in left lung for possible effusion vs. PNA  -cefepime was switched to meropenem  -c/w prednisone 20mg today and will taper to 10mg tomorrow  -c/w nebs  -c/w chest PT  -c/w BiPAP at night  -pt currently saturating well on NC  -will continue to monitor and assess for secretions and WOB    #GI  -pt has does not have abdominal tenderness today.  -lipase 182  -will trend lipase  -c/w tube feeds until speech/swallow eval, was seen yesterday but pt was lethargic and not following commands s/p seroquel and will be re-evaluated today  -decreased H&H on hep ggt and possible melena x 2, concern for GI bleed  -hold hep and coumadin  -PPI ppx restarted with concern for GI bleed  -hold bowel regimen for today    #Renal  -LATANYA, likely 2/2 sepsis + HTN urgency; resolved with Cr at baseline  -will continue to monitor I&O's    #ID  - +entero/rhinovirus  - GN coccobacillus (H. influenzae) on 9/14 blood culture  - UClx (9/14): NGTD  - Sputum Clx (9/14): No organisms, repeat combicath (9/21): normal respiratory chastity  - repeat blood cx from 9/17 and 9/21: NGTD  - legionella negative  - cefepime switched to meropenem (9/28) with concern for possible PNA.    #Heme:  -DIC panel negative  -LFTs no longer mildly elevated  - Hb drop from 10 to 8 (now stable); CT A/P was negative for acute bleeding  -Hgb dropped to 7.2 with concerns for GI bleed and hep ggt and coumadin were held.    #Endo:  - C/w NPH    #Ophtho  -c/w home latanoprost drops for glaucoma    #Dispo  - will continue with PT 85 y/o F w/ a PMH significant for COPD, JENNIE on BiPAP, multivalvular disease (severe AS, s/p MV replacement), HFpEF/severe diastolic failure, A-fib on coumadin, sick sinus syndrome s/p pacemaker placement, HTN, and CKD3 who admitted for acute respiratory failure requiring intubation suspected to be 2/2 COPD exacerbation c/b PNA w/ +entero/rhinovirus and GN coccobacillus and H. influenza bacteremia.    #Neuro  -awake and mildly agitated  -no sedation  -pt feels restless  -will d/c seroquel for now as pt continued to feel restless s/p seroquel    #CV  -admitted w/ sBP 200, since requiring pressure support 2/2 sepsis, pt placed on hydralazine, required cardene drip  -pt currently does not require norepi  -HFpEF (40-50%) w/ severe diastolic failure  -multivalvular disease: MV replacement, severe AS  -held heparin and coumadin while assessing for active bleed with decrease in H&H.  -pt continues to have elevated BP  -c/w hydralazine dose to 50mg Q8  -titrate diltiazem to 60mg q6  -continue to monitor BP    #Resp  -+enterovirus on RVP with +H.influenza bacteremia  -currently extubated since 9/25,   -repeat CXR yesterday revealed new infiltrates in left lung for possible effusion vs. PNA  -cefepime was switched to meropenem  -c/w prednisone 20mg today and will taper to 10mg tomorrow  -c/w nebs  -c/w chest PT  -c/w BiPAP at night  -pt currently saturating well on NC  -will continue to monitor and assess for secretions and WOB    #GI  -pt has does not have abdominal tenderness today.  -lipase 182  -will trend lipase  -c/w tube feeds until speech/swallow eval, was seen yesterday but pt was lethargic and not following commands s/p seroquel and will be re-evaluated today  -decreased H&H on hep ggt and possible melena x 2, concern for GI bleed  -hold hep and coumadin  -PPI ppx restarted with concern for GI bleed  -hold bowel regimen for today    #Renal  -LATANYA, likely 2/2 sepsis + HTN urgency; resolved with Cr at baseline  -will continue to monitor I&O's    #ID  - +entero/rhinovirus  - GN coccobacillus (H. influenzae) on 9/14 blood culture  - UClx (9/14): NGTD  - Sputum Clx (9/14): No organisms, repeat combicath (9/21): normal respiratory chastity  - repeat blood cx from 9/17 and 9/21: NGTD  - legionella negative  - cefepime switched to meropenem (9/28) with concern for possible PNA.    #Heme:  -DIC panel negative  -LFTs no longer mildly elevated  - Hb drop from 10 to 8 (now stable); CT A/P was negative for acute bleeding  -Hgb dropped to 7.2 with concerns for GI bleed and hep ggt and coumadin were held.    #Endo:  - C/w NPH  - will f/u with PTH for hypercalcemia    #Ophtho  -c/w home latanoprost drops for glaucoma    #Dispo  - will continue with PT

## 2024-03-25 NOTE — PROGRESS NOTE ADULT - PROBLEM SELECTOR PLAN 5
Patient with HX of AS ( Not a candidate for TAVR )  Patient S/p Mechanical Mitral Valve Replacement   Continue AC with Heparin Drip with goal PTT of 50-70 bridging to coumadin (INR goal 2.5-3.5)  Coumadin restarted on 10/11. One dose of 7.5mg, then can resume 5mg as prior Patient with HX of AS ( Not a candidate for TAVR )  Patient S/p Mechanical Mitral Valve Replacement   Continue AC with Heparin Drip with goal PTT of 50-70 bridging to coumadin (INR goal 2.5-3.5)  Coumadin restarted on 10/11. INR 1.45, coumadin 7.5 mg x1 Negative Screen

## 2024-11-07 NOTE — PROGRESS NOTE ADULT - ASSESSMENT
Headache Calendar  Please maintain a headache calendar  Consider using phone applications such as Migraine Kulwinder or [place] Migraine Tracker    Headache/migraine treatment:   Acute medications (for immediate treatment of a headache):   It is ok to take acetaminophen (Tylenol) if they help your headaches you should limit these to No more than 2-3 times a week to avoid medication overuse/rebound headaches.     For your more moderate to severe migraines take this medication early  Ubrelvy 100mg tabs - take one at the onset of headache. May repeat one time after 2 hours if pain has not resolved.   (Max 2 a day)    Prescription preventive medications for headaches/migraines   Emgality/Galcanezumab - the 1st dose is 240 mg loading dose of 2 consecutive 120 mg injections.  Thereafter, 120 mg injections every 30 days    If needed there is a coupon card for the copay at emgality.com     READ INSTRUCTIONS that come with the medication. REFRIGERATE. Keep out of direct sunlight. Prior to administration, allow to come to room temperature for 30 minutes. Do not warm using a heat source (eg, microwave or hot water). Do not shake. Administer in preferably abdomen (avoiding 2 inches around the navel), thigh, upper arm, or buttocks avoiding areas of skin that are tender, bruised, red or hard. Deliver entire contents of single-use prefilled pen or syringe.  Unknown impact in pregnancy therefore would recommend stopping 6 months prior to considering pregnancy.    Lifestyle Recommendations:  [x] SLEEP - Maintain a regular sleep schedule: Adults need at least 7-8 hours of uninterrupted a night. Maintain good sleep hygiene:  Going to bed and waking up at consistent times, avoiding excessive daytime naps, avoiding caffeinated beverages in the evening, avoid excessive stimulation in the evening and generally using bed primarily for sleeping.  One hour before bedtime would recommend turning lights down lower, decreasing your activity (may  read quietly, listen to music at a low volume). When you get into bed, should eliminate all technology (no texting, emailing, playing with your phone, iPad or tablet in bed).  [x] HYDRATION - Maintain good hydration.  Drink  2L of fluid a day (4 typical small water bottles)  [x] DIET - Maintain good nutrition. In particular don't skip meals and try and eat healthy balanced meals regularly.  [x] TRIGGERS - Look for other triggers and avoid them: Limit caffeine to 1-2 cups a day or less. Avoid dietary triggers that you have noticed bring on your headaches (this could include aged cheese, peanuts, MSG, aspartame and nitrates).  [x] EXERCISE - physical exercise as we all know is good for you in many ways, and not only is good for your heart, but also is beneficial for your mental health, cognitive health and  chronic pain/headaches. I would encourage at the least 5 days of physical exercise weekly for at least 30 minutes.     Education and Follow-up  [x] Please call with any questions or concerns. Of course if any new concerning symptoms go to the emergency department.  [x] Follow up in 6 months   84  Year old Female with PMH significant for COPD, JENNIE on BiPAP at home , multivalvular disease (severe AS Not a candidate for TAVR, S/p MV replacement), HFpEF/ Severe diastolic failure, A-fib on coumadin, sick sinus syndrome S/p pacemaker placement, HTN, and CKD3 who was admitted for acute respiratory failure requiring intubation suspected to be 2/2 COPD exacerbation c/b PNA w/ +entero/rhinovirus and GN coccobacillus and H. influenza bacteremia, requiring continued respiratory support and tracheostomy. Patient S/p Trach placement 10/3 and PEG Placement 10/4.     10/6: Patient transferred to [facility], will attempt Trickle Feeds today, Son who is psychiatrist is concerned patient is too sedated during the day and awake at night, He has requested that Klonopin be changed to PRN and Melatonin be added QHS. Patient tolerating CPAP trials this afternoon

## 2025-07-22 NOTE — PHYSICAL THERAPY INITIAL EVALUATION ADULT - LEVEL OF INDEPENDENCE: SIT/SUPINE, REHAB EVAL
Cholesterol Medication Protocol [id number] 03:20 PM   Protocol Details ALT < 80    ALT resulted within past year    Lipid panel within past 12 months    In person appointment or virtual visit in the past 12 mos or appointment in next 3 mos    Medication is active on med list      3. Hyperlipidemia associated with type 2 diabetes mellitus (HCC)  Continue statin.  Hypertension Medications Protocol [id number] 03:20 PM   Protocol Details CMP or BMP in past 12 months    Last BP reading less than 140/90    In person appointment or virtual visit in the past 12 mos or appointment in next 3 mos    EGFRCR or GFRNAA > 50    Medication is active on med list   2. Primary hypertension  Continue meds.      Future Appointments   Date Time Provider Department Center   11/18/2025 12:40 PM Shirley Nichols MD [id number]       
maximum assist (25% patients effort)